# Patient Record
Sex: MALE | Race: WHITE | NOT HISPANIC OR LATINO | Employment: OTHER | URBAN - METROPOLITAN AREA
[De-identification: names, ages, dates, MRNs, and addresses within clinical notes are randomized per-mention and may not be internally consistent; named-entity substitution may affect disease eponyms.]

---

## 2017-06-29 ENCOUNTER — OFFICE VISIT (OUTPATIENT)
Dept: FAMILY MEDICINE | Facility: CLINIC | Age: 57
End: 2017-06-29

## 2017-06-29 VITALS
HEIGHT: 72 IN | RESPIRATION RATE: 16 BRPM | BODY MASS INDEX: 28.04 KG/M2 | WEIGHT: 207 LBS | OXYGEN SATURATION: 97 % | DIASTOLIC BLOOD PRESSURE: 95 MMHG | TEMPERATURE: 97.7 F | SYSTOLIC BLOOD PRESSURE: 178 MMHG | HEART RATE: 84 BPM

## 2017-06-29 DIAGNOSIS — I10 ESSENTIAL HYPERTENSION: ICD-10-CM

## 2017-06-29 DIAGNOSIS — E07.9 DISORDER OF THYROID: ICD-10-CM

## 2017-06-29 DIAGNOSIS — E11.9 DIABETES MELLITUS WITHOUT COMPLICATION (H): Primary | ICD-10-CM

## 2017-06-29 LAB
ANION GAP SERPL CALCULATED.3IONS-SCNC: 8 MMOL/L (ref 3–14)
BUN SERPL-MCNC: 18 MG/DL (ref 7–30)
CALCIUM SERPL-MCNC: 9.2 MG/DL (ref 8.5–10.1)
CHLORIDE SERPL-SCNC: 98 MMOL/L (ref 94–109)
CHOLEST SERPL-MCNC: 236 MG/DL
CO2 SERPL-SCNC: 27 MMOL/L (ref 20–32)
CREAT SERPL-MCNC: 0.97 MG/DL (ref 0.66–1.25)
CREAT UR-MCNC: 49 MG/DL
GFR SERPL CREATININE-BSD FRML MDRD: 79 ML/MIN/1.7M2
GLUCOSE SERPL-MCNC: 324 MG/DL (ref 70–99)
HBA1C MFR BLD: 10.7 % (ref 4.3–6)
HDLC SERPL-MCNC: 35 MG/DL
LDLC SERPL CALC-MCNC: ABNORMAL MG/DL
MICROALBUMIN UR-MCNC: 465 MG/L
MICROALBUMIN/CREAT UR: 956.79 MG/G CR (ref 0–17)
NONHDLC SERPL-MCNC: 201 MG/DL
POTASSIUM SERPL-SCNC: 4.1 MMOL/L (ref 3.4–5.3)
SODIUM SERPL-SCNC: 133 MMOL/L (ref 133–144)
TRIGL SERPL-MCNC: 541 MG/DL
TSH SERPL DL<=0.005 MIU/L-ACNC: 3.17 MU/L (ref 0.4–4)

## 2017-06-29 RX ORDER — GLIPIZIDE 10 MG/1
10 TABLET ORAL
Qty: 30 TABLET | Refills: 0 | Status: SHIPPED | OUTPATIENT
Start: 2017-06-29 | End: 2017-06-29

## 2017-06-29 RX ORDER — BLOOD-GLUCOSE METER
EACH MISCELLANEOUS
Qty: 1 KIT | Refills: 0 | Status: SHIPPED | OUTPATIENT
Start: 2017-06-29 | End: 2017-06-29

## 2017-06-29 RX ORDER — LANCETS
EACH MISCELLANEOUS
Qty: 102 EACH | Refills: 0 | Status: SHIPPED | OUTPATIENT
Start: 2017-06-29 | End: 2017-06-29

## 2017-06-29 RX ORDER — GLIPIZIDE 10 MG/1
10 TABLET ORAL
Qty: 60 TABLET | Refills: 0 | Status: SHIPPED | OUTPATIENT
Start: 2017-06-29 | End: 2017-07-27

## 2017-06-29 RX ORDER — LANCETS
EACH MISCELLANEOUS
Qty: 102 EACH | Refills: 0 | Status: SHIPPED | OUTPATIENT
Start: 2017-06-29 | End: 2019-07-29

## 2017-06-29 RX ORDER — LISINOPRIL 20 MG/1
20 TABLET ORAL DAILY
Qty: 30 TABLET | Refills: 0 | Status: SHIPPED | OUTPATIENT
Start: 2017-06-29 | End: 2017-07-27

## 2017-06-29 RX ORDER — BLOOD-GLUCOSE METER
EACH MISCELLANEOUS
Qty: 1 KIT | Refills: 0 | Status: SHIPPED | OUTPATIENT
Start: 2017-06-29 | End: 2019-07-29

## 2017-06-29 RX ORDER — LISINOPRIL 20 MG/1
20 TABLET ORAL DAILY
Qty: 30 TABLET | Refills: 0 | Status: SHIPPED | OUTPATIENT
Start: 2017-06-29 | End: 2017-06-29

## 2017-06-29 ASSESSMENT — PAIN SCALES - GENERAL: PAINLEVEL: NO PAIN (0)

## 2017-06-29 NOTE — Clinical Note
I have completed my note, please review, add tie in statement and close encounter. Any feedback you have would be appreciated! Thank you for all of your help.  Thanks!   Carlos [email or web address]

## 2017-06-29 NOTE — MR AVS SNAPSHOT
After Visit Summary   2017    Nabil Cheung    MRN: [id number]           Patient Information     Date Of Birth          1960        Visit Information        Provider Department      2017 7:00 PM Clinician, Tomi Claudio [facility]        Today's Diagnoses     Diabetes mellitus without complication (H)    -  1    Essential hypertension        Disorder of thyroid           Follow-ups after your visit        Who to contact     Please call your clinic at [phone] to:    Ask questions about your health    Make or cancel appointments    Discuss your medicines    Learn about your test results    Speak to your doctor   If you have compliments or concerns about an experience at your clinic, or if you wish to file a complaint, please contact [facility] Patient Relations at [phone] or email us at [email or web address]         Additional Information About Your Visit        MyChart Information     The Simple is an electronic gateway that provides easy, online access to your medical records. With The Simple, you can request a clinic appointment, read your test results, renew a prescription or communicate with your care team.     To sign up for Rank By Searcht visit the website at [email or web address]   You will be asked to enter the access code listed below, as well as some personal information. Please follow the directions to create your username and password.     Your access code is: [email or web address]  Expires: 2017  8:25 PM     Your access code will  in 90 days. If you need help or a new code, please contact your [facility] or call [phone] for assistance.        Care EveryWhere ID     This is your Care EveryWhere ID. This could be used by other organizations to access your [place] medical records  [id number]        Your Vitals Were     Pulse Temperature Respirations Height Pulse Oximetry BMI (Body Mass  Index)    84 97.7  F (36.5  C) (Oral) 16 6' (182.9 cm) 97% 28.07 kg/m2       Blood Pressure from Last 3 Encounters:   06/29/17 (!) 178/95    Weight from Last 3 Encounters:   06/29/17 207 lb (93.9 kg)              We Performed the Following     Albumin Random Urine Quantitative     Basic metabolic panel     Hemoglobin A1c     Lipid panel reflex to direct LDL     TSH with free T4 reflex        Primary Care Provider    None Specified       No primary provider on file.        Equal Access to Services     [facility] : Hadii aad ku hadasho Soomaali, waaxda luqadaha, qaybta kaalmada adeegyada, waxsuresh wray . So [facility] [phone].    ATENCIÓN: Si habla español, tiene a delaney disposición servicios gratuitos de asistencia lingüística. Llame al [phone].    We comply with applicable federal civil rights laws and [place] laws. We do not discriminate on the basis of race, color, national origin, age, disability sex, sexual orientation or gender identity.            Thank you!     Thank you for choosing [facility]  for your care. Our goal is always to provide you with excellent care. Hearing back from our patients is one way we can continue to improve our services. Please take a few minutes to complete the written survey that you may receive in the mail after your visit with us. Thank you!             Your Updated Medication List - Protect others around you: Learn how to safely use, store and throw away your medicines at www.disposemymeds.org.      Notice  As of 6/29/2017  8:27 PM    You have not been prescribed any medications.

## 2017-06-30 LAB — LDLC SERPL DIRECT ASSAY-MCNC: 132 MG/DL

## 2017-06-30 NOTE — NURSING NOTE
Patient reports to [facility] as a first time patient. ODALSI. He lost his insurance about 14 months ago and has now run out of his medications for diabetes and hypertension. Patient brought old medications bottles with to the visit. He was on 1000mg Metformin, 10mg Glipizide, and 10mg Lisinopril. Patient states that he has run out of all three medications within the past few weeks to month. BP was 178/95. Patient states that he checked his blood pressure a few weeks ago and it was 175/90. While on 10 mg Lisinopril, patient states that his blood pressure was down to 155/85. He drinks 2-3 diet cokes per day and states he has no symptoms related to his diabetes or hypertension. Denies pain and PHQ-2 negative. Information has been provided on social work and patient spoke with CHW at the  for insurance information. SN Alfonzo

## 2017-06-30 NOTE — PROGRESS NOTES
[facility] Progress Note    Chief complaint: Diabetes and Hypertension Medications    Subjective:  Condition 1: Diabetes  Condition 2: Hypertension     Patient CLAUDIO presents to the [facility] after running out of his diabetes and hypertension medications 1-2 weeks ago depending on the medication. Patient CLAUDIO has currently been without insurance for over 12 months but is hopeful to get insurance within the next 2 months. When taking diabetes meds blood glucose levels ranged from 180-200 (patient took readings in the morning before meals). Last A1C in March 2016 was 8.1. When taking his lisinopril 10 mg, patient had a BP around 155/85. Patient normally does a fairly good job of walking /exercising during the summer.      Objective:   BP (!) 178/95 (BP Location: Right arm, Patient Position: Sitting, Cuff Size: Adult Regular)  Pulse 84  Temp 97.7  F (36.5  C) (Oral)  Resp 16  Ht 6' (182.9 cm)  Wt 207 lb (93.9 kg)  SpO2 97%  BMI 28.07 kg/m2    Assessment:     Condition 1- Diabetes  DTP: None  Rationale: Patient should continue to take his Metformin 1000mg 2 times daily and glipizide 10 mg 2 times daily. CLAUDIO was currently without his medication for roughly 1 week because of insurance issues but should continue therapy as before. Patient also ran out of test strips and lancets for his OneTouch glucometer, but the only supplies [facility] has is AccuCheck so a new meter was also dispensed.    Condition 2- Hypertension  DTP: Dosage Too Low: dose too low   Rationale: When taking current lisinopril 10 mg dose, patient had a blood pressure level around 155/85, which was slightly high.. Patient indicated a desire to increase strength of medication to lower his BP. Lisinopril 10 mg dose is not the max dose for this medication, so lisinopril 20 mg is an appropriate dose to change to for this patient.    Plan:  1. Continue to take metformin 1000mg twice daily and glipizide twice daily BID for diabetes and use AccuCheck glucometer and  supplies to check blood sugars daily, or as directed by PCP.  2. Begin taking lisinopril 20mg once daily for hypertension.  3. Continue to walk/exercise regularly for general health and wellness related to diabetes.    Pharmacy Follow-Up Plan (Method, Date, Parameters): Phone call on 7/6/17 should be initiated. Evaluate effectiveness by asking patient if they have taken their BP since switichng to lisinopril 20 mg and determine if blood pressure is lower than 155/85. Evaluate safety by assessing patient for low blood pressure symptoms such as fatigue and lightheadedness. Ask patient if new glucometer is easy to use and how frequently they are measuring their blood-glucose levels.    PharmCare Clinician: Carlos Bashir  Pharmacy Preceptor: Jojo Ross PharmD, INDER    _____________________________  Preceptor Use Only:  In supervising the student, I have reviewed and verified the student's documentation and found it to be correct and complete.   Preceptor Signature: Jojo Ross PharmD, INDER

## 2017-06-30 NOTE — PROGRESS NOTES
MEDICINE NOTE    SUBJECTIVE:  Mr. Cheung is a 57 y.o. male with a history of diabetes and hypertension who presents to clinic in need of a refill of his medications to manage his chronic conditions.  He has been seen regularly by a PCP until around a year ago when he was kicked off his insurance.  He is confident that he will have Medicaid within a month but has been out of medications for 1-2 weeks and would like a refill in the meantime.  He has no acute complaints. He was originally diagnosed with diabetes 13 years ago and has been on Metformin since.  1.5 years ago he began taking Glipizide as well.  He reports that he normally has a glucose level of 200 when measured in the morning while taking his medications.  He has not checked since running out of his medications.  The only associated symptoms reported are fatigue when walking and slight neuropathy in both lower extremities bilaterally.  His last HbA1c was March of 2016 and it was 8.1.     Regarding his hypertension, he generally is in the range of 150/85 when taking his lisinopril.  He has been on this medication for 1.5-2 years. He has a chronic cough that he attributes to his acid reflux and allergies.  He takes tums 7x per week to treat this symptom.  He also noted that he has a benign thyroid tumor, but denies difficulty swallowing.  He said that his PCP was monitoring this and expressed interest in lab tests tonight to check TSH.      REVIEW OF SYSTEMS:  Gen: no fevers, notes fatigue with exertion   Eyes: no vision change, cloudy vision  Cardiac: chest pain associated with musculoskeletal pain, no palpitations since college  Lungs: no dyspnea, cough, or shortness of breath  : no change in urine  Musculoskeletal: no joint or muscle pain or swelling  Skin: no concerning lesions or moles  Neuro: no loss of strength, slight numbness and loss of sensation bilaterally in the lower extremities near the 1st digit  Endo: no polyuria  Allergy: pollen,  feathers, dust  Psych: no depression or anxiety    No past medical history on file.    No past surgical history on file.    No family history on file.    Social History     Social History     Marital status: Single     Spouse name: N/A     Number of children: N/A     Years of education: N/A     Occupational History     Not on file.     Social History Main Topics     Smoking status: Never Smoker     Smokeless tobacco: Not on file     Alcohol use No     Drug use: No     Sexual activity: Not on file     Other Topics Concern     Not on file     Social History Narrative    6/29/17 - Pt was interested in getting help filling out [employer] application online to receive medicaid. CHW gave him the list of information he needed to complete online application. He stated he had a tax extension for 2016 so he did not have tax forms that the application required. He was going to work on the application with tax information from 2015 later tonight. AM           OBJECTIVE:  Physical Exam:  BP (!) 178/95 (BP Location: Right arm, Patient Position: Sitting, Cuff Size: Adult Regular)  Pulse 84  Temp 97.7  F (36.5  C) (Oral)  Resp 16  Ht 6' (182.9 cm)  Wt 207 lb (93.9 kg)  SpO2 97%  BMI 28.07 kg/m2  Constitutional: no distress, comfortable, pleasant   Ears, Nose and Throat:  no thyromegaly appreciated  Cardiovascular: regular rate and rhythm, normal S1 and S2, no murmurs, rubs or gallops, peripheral pulses full and symmetric   Respiratory: clear to auscultation, no wheezes or crackles, normal breath sounds   Musculoskeletal: full range of motion, slight edema noted in left LE  Skin: no concerning lesions, no jaundice   Neurological: cranial nerves intact, normal strength and sensation  Psychological: appropriate mood     ASSESSMENT/PLAN:  Nabil was seen today for medication problem.    Diagnoses and all orders for this visit:    Diabetes mellitus without complication (H)  -     Hemoglobin A1c  -     Basic metabolic panel  -     Lipid  panel reflex to direct LDL  -     Albumin Random Urine Quantitative  -     Discontinue: metFORMIN (GLUCOPHAGE) 1000 MG tablet; Take 1 tablet (1,000 mg) by mouth 2 times daily (with meals)  -     Discontinue: glipiZIDE (GLUCOTROL) 10 MG tablet; Take 1 tablet (10 mg) by mouth 2 times daily (before meals)  -     Discontinue: blood glucose monitoring (ACCU-CHEK SHERRY) test strip; Use to test blood sugar once daily or as directed.  -     Discontinue: blood glucose monitoring (ACCU-CHEK SHERRY PLUS) meter device kit; Use to test blood sugar once daily or as directed.  -     metFORMIN (GLUCOPHAGE) 1000 MG tablet; Take 1 tablet (1,000 mg) by mouth 2 times daily (with meals)  -     glipiZIDE (GLUCOTROL) 10 MG tablet; Take 1 tablet (10 mg) by mouth 2 times daily (before meals)  -     blood glucose monitoring (ACCU-CHEK SHERRY) test strip; Use to test blood sugar once daily or as directed.  -     blood glucose monitoring (ACCU-CHEK SHERRY PLUS) meter device kit; Use to test blood sugar once daily or as directed.    Essential hypertension  -     Discontinue: lisinopril (PRINIVIL/ZESTRIL) 20 MG tablet; Take 1 tablet (20 mg) by mouth daily  -     Discontinue: blood glucose monitoring (ACCU-CHEK FASTCLIX) lancets; Use to test blood sugar once daily or as directed.  -     lisinopril (PRINIVIL/ZESTRIL) 20 MG tablet; Take 1 tablet (20 mg) by mouth daily  -     blood glucose monitoring (ACCU-CHEK FASTCLIX) lancets; Use to test blood sugar once daily or as directed.    Disorder of thyroid  -     TSH with free T4 reflex    Other orders  -     Cancel: UA without Microscopic  -     Cancel: Cholesterol    1. Diabetes: Mr. Cheung's diabetes has been controled on Metformin and Glipizide. Reported glucose levels are slightly high around 200, but we will continue this medication on the same dose that he was previously taking (1000 mg Metformin and 10mg Glipizide).  Ordered albumin urine test and BMP to check kidney function.  Also, ordered lipid  panel to check cholesterol levels.     2. Hypertension: Systolic blood pressure has remained slightly elevated on low dose lisinopril, so we will increase dose to 20 mg daily.  Follow-up in clinic or with PCP on next visit to re-check blood pressure and make adjustments to dosage as needed.      3. Benign thyroid tumor: No palpable thyroid masses on exam.  Ordered a thyroid cascade to check thyroid function and rule out any possible complications.        Med Clinician: John Lauren, MS2  Preceptor: Stephanie Reed MD    In supervising the Medical student, I repeated the exam documented above.  I have reviewed and verified the student s documentation.  Supervising Provider: Stephanie Reed MD

## 2017-07-03 ENCOUNTER — TELEPHONE (OUTPATIENT)
Dept: FAMILY MEDICINE | Facility: CLINIC | Age: 57
End: 2017-07-03

## 2017-07-06 ENCOUNTER — TELEPHONE (OUTPATIENT)
Dept: FAMILY MEDICINE | Facility: CLINIC | Age: 57
End: 2017-07-06

## 2017-07-06 NOTE — TELEPHONE ENCOUNTER
----- Message from Carlos Bashir sent at 2017  4:53 PM CDT -----  Regardin17 ENGLISH  Phone call on 17 should be initiated. Evaluate effectiveness by asking patient if they have taken their BP since switichng to lisinopril 20 mg and determine if blood pressure is lower than 155/85. Evaluate safety by assessing patient for low blood pressure symptoms such as fatigue and lightheadedness. Ask patient if new glucometer is easy to use and how frequently they are measuring their blood-glucose levels.

## 2017-07-07 NOTE — TELEPHONE ENCOUNTER
Patient denies any side effects to lisinopril such as fatigue and lightheadedness. He has not checked his blood pressure within the past week since his last visit to [facility] because he has been away from home. He states that he has been feeling fine so far. Patient has been measuring his blood glucose a couple of times in the morning within the past week. They range around 200-300 mg/dl. Patient is currently taking metformin 1000 mg BID and glipizide 10 mg BID for his diabetes. Patient is aware of the need to monitor his blood pressure at home, as well as taking his diabetic medications.

## 2017-07-11 NOTE — TELEPHONE ENCOUNTER
"Pt was concerned with albumin/creatinine ratio lab result (received call about lab results a few days ago). Explained to pt that his GFR and creatinine do not indicate kidney failure but the high albumin/creatinine ratio could indicate potential future kidney failure. Also explained the benefits of ACE inhibitor in protecting kidney function. Regarding dose increase of lisinopril, pt reports no dizziness, lightheadedness or fatigue. Hasn't started using new glucometer yet because he still has his old one. Says his blood pressure and blood glucose levels have been \"good,\" didn't give exact values.   ===================    Pharmacy Attestation Statement:    Patient s case reviewed. I agree with the written assessment and plan of care.    Bry Dubose, PharmD.    ===================    "

## 2017-07-11 NOTE — TELEPHONE ENCOUNTER
----- Message from Carlso Bashir sent at 2017  4:53 PM CDT -----  Regardin17 ENGLISH  Phone call on 17 should be initiated. Evaluate effectiveness by asking patient if they have taken their BP since switichng to lisinopril 20 mg and determine if blood pressure is lower than 155/85. Evaluate safety by assessing patient for low blood pressure symptoms such as fatigue and lightheadedness. Ask patient if new glucometer is easy to use and how frequently they are measuring their blood-glucose levels.

## 2017-07-27 ENCOUNTER — OFFICE VISIT (OUTPATIENT)
Dept: FAMILY MEDICINE | Facility: CLINIC | Age: 57
End: 2017-07-27

## 2017-07-27 VITALS
HEART RATE: 70 BPM | HEIGHT: 72 IN | DIASTOLIC BLOOD PRESSURE: 82 MMHG | WEIGHT: 209.4 LBS | BODY MASS INDEX: 28.36 KG/M2 | OXYGEN SATURATION: 98 % | SYSTOLIC BLOOD PRESSURE: 168 MMHG | TEMPERATURE: 97.3 F | RESPIRATION RATE: 16 BRPM

## 2017-07-27 DIAGNOSIS — E11.9 DIABETES MELLITUS WITHOUT COMPLICATION (H): ICD-10-CM

## 2017-07-27 DIAGNOSIS — I10 ESSENTIAL HYPERTENSION: ICD-10-CM

## 2017-07-27 RX ORDER — ASPIRIN 81 MG/1
81 TABLET, CHEWABLE ORAL DAILY
Qty: 30 TABLET | Refills: 0 | Status: SHIPPED | OUTPATIENT
Start: 2017-07-27 | End: 2017-08-26

## 2017-07-27 RX ORDER — GLIPIZIDE 10 MG/1
10 TABLET ORAL
Qty: 60 TABLET | Refills: 0 | Status: SHIPPED | OUTPATIENT
Start: 2017-07-27 | End: 2017-08-28

## 2017-07-27 RX ORDER — LISINOPRIL 20 MG/1
40 TABLET ORAL DAILY
Qty: 60 TABLET | Refills: 0 | Status: SHIPPED | OUTPATIENT
Start: 2017-07-27 | End: 2017-08-28

## 2017-07-27 NOTE — Clinical Note
I have completed my note, please review, add tie in statement and close encounter.   Thanks!   Lorenzo [email or web address]

## 2017-07-27 NOTE — MR AVS SNAPSHOT
After Visit Summary   2017    Nabil Cheung    MRN: [id number]           Patient Information     Date Of Birth          1960        Visit Information        Provider Department      2017 6:45 PM Clinician, Tomi Claudio [facility]        Today's Diagnoses     Diabetes mellitus without complication (H)        Essential hypertension           Follow-ups after your visit        Who to contact     Please call your clinic at [phone] to:    Ask questions about your health    Make or cancel appointments    Discuss your medicines    Learn about your test results    Speak to your doctor   If you have compliments or concerns about an experience at your clinic, or if you wish to file a complaint, please contact [facility] Patient Relations at [phone] or email us at [email or web address]         Additional Information About Your Visit        MyChart Information     Neptune.io is an electronic gateway that provides easy, online access to your medical records. With Neptune.io, you can request a clinic appointment, read your test results, renew a prescription or communicate with your care team.     To sign up for Letaot visit the website at [email or web address]   You will be asked to enter the access code listed below, as well as some personal information. Please follow the directions to create your username and password.     Your access code is: [email or web address]  Expires: 2017  8:25 PM     Your access code will  in 90 days. If you need help or a new code, please contact your [facility] or call [phone] for assistance.        Care EveryWhere ID     This is your Care EveryWhere ID. This could be used by other organizations to access your [place] medical records  [id number]        Your Vitals Were     Pulse Temperature Respirations Height Pulse Oximetry BMI (Body Mass Index)    70 97.3  F (36.3  C)  "(Oral) 16 5' 11.75\" (182.2 cm) 98% 28.6 kg/m2       Blood Pressure from Last 3 Encounters:   07/27/17 168/82   06/29/17 (!) 178/95    Weight from Last 3 Encounters:   07/27/17 209 lb 6.4 oz (95 kg)   06/29/17 207 lb (93.9 kg)              Today, you had the following     No orders found for display         Today's Medication Changes          These changes are accurate as of: 7/27/17 11:59 PM.  If you have any questions, ask your nurse or doctor.               Start taking these medicines.        Dose/Directions    aspirin 81 MG chewable tablet   Used for:  Diabetes mellitus without complication (H)   Started by:  ClinicianTomi Ump        Dose:  81 mg   Take 1 tablet (81 mg) by mouth daily   Quantity:  30 tablet   Refills:  0         These medicines have changed or have updated prescriptions.        Dose/Directions    lisinopril 20 MG tablet   Commonly known as:  PRINIVIL/ZESTRIL   This may have changed:  how much to take   Used for:  Essential hypertension   Changed by:  ClinicianTomi Ump        Dose:  40 mg   Take 2 tablets (40 mg) by mouth daily   Quantity:  60 tablet   Refills:  0            Where to get your medicines      Some of these will need a paper prescription and others can be bought over the counter.  Ask your nurse if you have questions.     Bring a paper prescription for each of these medications     aspirin 81 MG chewable tablet    glipiZIDE 10 MG tablet    lisinopril 20 MG tablet    metFORMIN 1000 MG tablet                Primary Care Provider    None Specified       No primary provider on file.        Equal Access to Services     [facility]: Shanae Thibodeaux, wadaronda luraj, qaybta kaalmada omari, niels wray . So [facility] [phone].    ATENCIÓN: Si habla español, tiene a delaney disposición servicios gratuitos de asistencia lingüística. Llame al [phone].    We comply with applicable federal civil rights laws and [place] laws. We do not " discriminate on the basis of race, color, national origin, age, disability sex, sexual orientation or gender identity.            Thank you!     Thank you for choosing [facility]  for your care. Our goal is always to provide you with excellent care. Hearing back from our patients is one way we can continue to improve our services. Please take a few minutes to complete the written survey that you may receive in the mail after your visit with us. Thank you!             Your Updated Medication List - Protect others around you: Learn how to safely use, store and throw away your medicines at www.disposemymeds.org.          This list is accurate as of: 7/27/17 11:59 PM.  Always use your most recent med list.                   Brand Name Dispense Instructions for use Diagnosis    aspirin 81 MG chewable tablet     30 tablet    Take 1 tablet (81 mg) by mouth daily    Diabetes mellitus without complication (H)       blood glucose monitoring lancets     102 each    Use to test blood sugar once daily or as directed.    Essential hypertension       blood glucose monitoring meter device kit     1 kit    Use to test blood sugar once daily or as directed.    Diabetes mellitus without complication (H)       blood glucose monitoring test strip    ACCU-CHEK SHERRY    50 strip    Use to test blood sugar once daily or as directed.    Diabetes mellitus without complication (H)       glipiZIDE 10 MG tablet    GLUCOTROL    60 tablet    Take 1 tablet (10 mg) by mouth 2 times daily (before meals)    Diabetes mellitus without complication (H)       lisinopril 20 MG tablet    PRINIVIL/ZESTRIL    60 tablet    Take 2 tablets (40 mg) by mouth daily    Essential hypertension       metFORMIN 1000 MG tablet    GLUCOPHAGE    60 tablet    Take 1 tablet (1,000 mg) by mouth 2 times daily (with meals)    Diabetes mellitus without complication (H)

## 2017-07-28 NOTE — NURSING NOTE
Patient Education Outtake Form      DOMAIN (choose one): ENV     PSYCHSOC     PHYSIO     HEALTH-RELATED  PROBLEM 1: Income    Problem Specific Details:      Other data/Risk factors :     MODIFIER 1 (choose one):  Actual   Potential          Health Promotion Potential  MODIFIER 2 (choose one): Individual Family  Community     Impairment: Wants to exercise but cannot afford a gym    Problem Rating:   Knowledge: 4    Behavior: 3    Status: 3    Intervention Scheme:  Teaching, Guidance, and Counseling: community outreach - connected him with CHW to look for gyms that he may be able to get for a lower rate within price range.     Treatments and Procedures:     Surveillance:       DOMAIN (choose one): ENV     PSYCHSOC     PHYSIO     HEALTH-RELATED  PROBLEM 2: Neighborhood safety    Problem Specific Details: Patient wants to exercise, but recently moved from a place with a safe path to walk on and no longer has access/feels safe walking in neighborhood alone.       Other data/Risk factors :     MODIFIER 1 (choose one):  Actual   Potential          Health Promotion Potential  MODIFIER 2 (choose one): Individual Family  Community     Impairment: Unable to access safe place (without cars etc) to walk for exercise when alone.     Problem Rating:   Knowledge: 4    Behavior: 3    Status:3    Intervention Scheme:  Teaching, Guidance, and Counseling: Connected him with CHW to look for gyms in his area    Treatments and Procedures:     Surveillance:     DOMAIN (choose one): ENV     PSYCHSOC     PHYSIO     HEALTH-RELATED  PROBLEM 3: Physical activity    Problem Specific Details:      Other data/Risk factors :     MODIFIER 1 (choose one):  Actual   Potential          Health Promotion Potential  MODIFIER 2 (choose one): Individual Family  Community     Impairment: Has high blood pressure that he has previously managed with a mixture of exercise and medication. No longer exercising due to changes in environment.     Problem Rating:    Knowledge: 4    Behavior: 3    Status: 3    Intervention Scheme:  Teaching, Guidance, and Counseling: Talked about importance of exercise and connected him with CHW for information about gyms he could access.     Treatments and Procedures:     Surveillance:

## 2017-08-03 ENCOUNTER — TELEPHONE (OUTPATIENT)
Dept: FAMILY MEDICINE | Facility: CLINIC | Age: 57
End: 2017-08-03

## 2017-08-03 NOTE — TELEPHONE ENCOUNTER
Contacted RG on the phone. RG reported that lisinopril was working well; BP down to 145/85 this morning with no cough, hypotension, or other side effects with the exception of fatigue (although patient indicated this may be due to recent strenuous physical activity). RG reported that metformin was continuing to work well (no change in strength from last pharm care visit), with no side effects reported (diarrhea, constipation, nausea). RG reported that glipizide was continuing to work (no change in strength from last pharm care visit), with no side effects reported (hypoglycemia). RG reported that his blood glucose continues to be high (did not have specific values on hand).   RG reported that he may skip some meals and is not regularly taking medication 30 minutes before meals; reminded to skip dose if skipping meals.   Patient was confused why he was restarted on aspirin; RG reported on last visit aspirin was not discussed. RG reported he is not taking aspirin and does not plan to take aspirin but pledged to keep his dose on hand.

## 2017-08-03 NOTE — PROGRESS NOTES
[facility] Pharm[facility] Progress Note    Chief complaint: Patient presents to the clinic to day to refill his medications with hypertension and diabetes.    Subjective:    CLAUDIO is returning to get his medications refilled. He notes that he has no known drug allergies. He typically drinks about 1 cup of coffee 3 times a week or he'll drink diet coke on the days he's not drinking coffee. Patient likes to maintain moderate alcohol consumption as he notes that it is healthy for someone his size based on studies he had come across. He reports to drinking about 1-2 alcoholic beverages per day, about twice a week.  Condition 1: Hypertension-  His blood pressure readings today are 168/82 mmHg on the first reading and 158/68 mmHg on the second reading. On the previous visit, his lisinopril dose was changed from 10mg to 20mg, and he has been taking the lisinopril 20mg for about 3 weeks now. Patient is a moderate historian with his medications and notes that it was typical for him to have high blood pressure.     Condition 2: Diabetes- RG notes that he had been diagnosed with diabetes for about 10 years now, and is confident in his ability to manage his condition. He reports that his metformin and glipizide has been working and he has not had any side effects with them. He reports that due to his partner's recent hip surgery, he has not been exercising as often has he used to because he would typically go on long walks with his partner. RG typically checks his blood sugar level about once per week. CLAUDIO is not willing to start any new diabetes treatment at this time.     Condition 3: Dyslipidemia- CLAUDIO's 10 year ASCVD risk score is 34.6%. He reports that he had tried simvastatin in the past, but had side effects such as back pain and muscle spasms. He is hesitant to try new medications at this time because of his past reaction.     Current Outpatient Prescriptions   Medication Sig Dispense Refill     metFORMIN (GLUCOPHAGE) 1000 MG tablet Take  "1 tablet (1,000 mg) by mouth 2 times daily (with meals) 60 tablet 0     glipiZIDE (GLUCOTROL) 10 MG tablet Take 1 tablet (10 mg) by mouth 2 times daily (before meals) 60 tablet 0     lisinopril (PRINIVIL/ZESTRIL) 20 MG tablet Take 2 tablets (40 mg) by mouth daily 60 tablet 0     aspirin 81 MG chewable tablet Take 1 tablet (81 mg) by mouth daily 30 tablet 0     blood glucose monitoring (ACCU-CHEK FASTCLIX) lancets Use to test blood sugar once daily or as directed. 102 each 0     blood glucose monitoring (ACCU-CHEK SHERRY) test strip Use to test blood sugar once daily or as directed. 50 strip 0     blood glucose monitoring (ACCU-CHEK SHERRY PLUS) meter device kit Use to test blood sugar once daily or as directed. 1 kit 0         Objective:   /82 (BP Location: Left arm)  Pulse 70  Temp 97.3  F (36.3  C) (Oral)  Resp 16  Ht 5' 11.75\" (182.2 cm)  Wt 209 lb 6.4 oz (95 kg)  SpO2 98%  BMI 28.6 kg/m2    Assessment:     Condition 1- Hypertension  DTP: Effectiveness: dose too low   Rationale: CLAUDIO's current dose of lisinopril 20mg is not effective in treating his blood pressure and requires an increase in dose. Current options to treat CLAUDIO's condition include increasing lisinopril to 40mg or adding another agent such as a calcium channel blocker to help reduce blood pressure. Because patient is compliant with current therapy, increasing the lisinopril dose to 40mg may be the best option in reducing blood pressure levels and protecting his kidneys in the long run.     Condition 2- Diabetes  DTP: Effectiveness- different drug needed  Rationale: CLAUDIO's current medications of metformin 1000mg twice daily with meals and Glipizide 10 mg twice daily with meals is helping reduce the progression of diabetes. However, CLAUDIO's A1c level of 10.7 indicates that CLAUDIO needs additional drug therapy to manage his disease condition. Patient reports that he has not had any sides of neuropathy or episodes of hypoglycemia with his condition. " Additional options include adding a long acting insulin such as insulin glargine or adding a short acting insulin. Patient is resistant towards the thought of using insulin, and would prefer not to use it.     Condition 3- Dyslipidemia  DTP: Needs Additional Therapy: preventative therapy   Rationale: CLAUDIO's current ASCVD risk of 34% puts him at greater risk of experiencing a cardiac event. Due to this risk score, CLAUDIO's cardivascular health requires additional drug therapy to help prevent potential cardiac events. Options to help prevent long term cardiac events include statins and aspirin. CLAUDIO reports that due to his past reaction of muscle spasms and back pain to simvastatin, he is hesitant to try another statin at this time. CLAUDIO is willing to use aspirin as a long term preventative measure.       Plan:  1. Continue Metformin 1000mg. Take 1 tablet by mouth twice daily.   2. Continue Glipizide 10mg. Take 1 tablet by mouth twice daily with meals.   3. Increase lisinopril 20mg to lisinopril 40mg. Take 2 tablets (40mg) by mouth once daily.   4. Initiate aspirin 81mg. Take 1 tablet by mouth once daily.     Pharmacy Follow-Up Plan (Method, Date, Parameters):  Follow up with patient in one week (08/03/2017) by phone to discuss potential side effects and effectiveness of medications. Follow up with patient in clinic in one month (08/24/2017) to discuss safety and efficacy of medications.   Lisinopril: unbearable cough, hypotension  Metformin: diarrhea, constipation, nausea  Glipizide: hypoglycemia (shaking, anxiety, increase irritableness). Verify if patient is taking medication 30 minutes before meals, and identify if patient is skipping meals. Educate them to skip the dose if they skip meals.   Aspirin: abnormal bleeding, dizziness, headache.    PharmCare Clinician: Lorenzo Mayen  Pharmacy Preceptor: Patricia Bernstein    _____________________________  Preceptor Use Only:  In supervising the student, I have reviewed and verified the  student's documentation and found it to be correct and complete.   Preceptor Signature: ***

## 2017-08-03 NOTE — TELEPHONE ENCOUNTER
----- Message from Lorenzo Mayen sent at 8/3/2017  2:50 PM CDT -----  Regarding: FW: 08/07/2017 ENGLISH  Contact: [phone]      ----- Message -----     From: Lorenzo Mayen     Sent: 8/3/2017   2:19 PM       To: [facility]  Subject: 08/07/2017 ENGLISH                               CLAUDIO presented to the clinic today to refill his medications of lisinopril, metformin, and glipizide. His calculated ASCVD risk score was greater than 10%, and was given aspirin for a preventative for cardiac events. RG's lisinopril dose was changed from 20mg to 40mg to treat his high blood pressure of 168/82 mmHg from his last reading. Please talk to CLAUDIO about safety and effectiveness in regards to the following meds:  Lisinopril 40mg: unbearable cough, hypotension. Inform patient that this will help protect his kidneys due to his increase microalbumin.  Metformin 1000mg: diarrhea, constipation, nausea  Glipizide 10mg: hypoglycemia (shaking, anxiety, increase irritableness). Verify if patient is taking medication 30 minutes before meals, and identify if patient is skipping meals. Educate them to skip the dose if they skip meals.   Aspirin 81mg: abnormal bleeding, dizziness, headache.

## 2017-08-09 NOTE — PROGRESS NOTES
[facility] Pharmacy Progress Note    Chief complaint: Patient presents to the clinic to day to refill his medications with hypertension and diabetes.    Subjective:    RG is returning to get his medications refilled. He notes that he has no known drug allergies. He typically drinks about 1 cup of coffee 3 times a week or he'll drink diet coke on the days he's not drinking coffee. Patient likes to maintain moderate alcohol consumption as he notes that it is healthy for someone his size based on studies he had come across. He reports to drinking about 1-2 alcoholic beverages per day, about twice a week.    Condition 1: Hypertension-  His blood pressure readings today are 168/82 mmHg on the first reading and 158/68 mmHg on the second reading. On the previous visit, his lisinopril dose was changed from 10mg to 20mg, and he has been taking the lisinopril 20mg for about 3 weeks now. Patient is a moderate historian with his medications and notes that it was typical for him to have high blood pressure. Patient is hesitant to make any changes to antihypertensive medications because he is concerned that his mild dry cough will get worse (cough started before lisinopril was initiated).     Condition 2: Diabetes- RG notes that he had been diagnosed with diabetes for about 10 years now, and is confident in his ability to manage his condition. He reports that his metformin and glipizide have been working and he has not had any side effects with them. He reports that due to his partner's recent hip surgery, he has not been exercising as often has he used to because he would typically go on long walks with his partner. RG typically checks his blood sugar level about once per week and they're often in the 190's mg/dL or higher post-prandial. RG is not willing to start any new diabetes treatments at this time.      Condition 3: Dyslipidemia- He is hesitant to try new medications at this time because of his past reaction to simvastatin - he  "reports that he had side effects such as back pain and muscle spasms. These muscle pains went away when the medication was discontinued.    Current Outpatient Prescriptions   Medication Sig Dispense Refill     metFORMIN (GLUCOPHAGE) 1000 MG tablet Take 1 tablet (1,000 mg) by mouth 2 times daily (with meals) 60 tablet 0     glipiZIDE (GLUCOTROL) 10 MG tablet Take 1 tablet (10 mg) by mouth 2 times daily (before meals) 60 tablet 0     lisinopril (PRINIVIL/ZESTRIL) 20 MG tablet Take 2 tablets (40 mg) by mouth daily 60 tablet 0     aspirin 81 MG chewable tablet Take 1 tablet (81 mg) by mouth daily 30 tablet 0     blood glucose monitoring (ACCU-CHEK FASTCLIX) lancets Use to test blood sugar once daily or as directed. 102 each 0     blood glucose monitoring (ACCU-CHEK SHERRY) test strip Use to test blood sugar once daily or as directed. 50 strip 0     blood glucose monitoring (ACCU-CHEK SHERRY PLUS) meter device kit Use to test blood sugar once daily or as directed. 1 kit 0         Objective:   /82 (BP Location: Left arm)  Pulse 70  Temp 97.3  F (36.3  C) (Oral)  Resp 16  Ht 5' 11.75\" (182.2 cm)  Wt 209 lb 6.4 oz (95 kg)  SpO2 98%  BMI 28.6 kg/m2    Assessment:     Condition 1- Hypertension  DTP: Effectiveness: dose too low   Rationale: CLAUDIO's current dose of lisinopril 20mg is not effective in treating his blood pressure and requires an increase in dose. Current options to treat CLAUDIO's condition include increasing lisinopril to 40mg or adding another agent such as a calcium channel blocker to help reduce blood pressure. CLAUDIO's previous microalbumin level of 956 mg/g puts him at risk for MISA or CKD. Because patient is compliant with current therapy, increasing the lisinopril dose to 40mg may be the best option in reducing blood pressure levels and protecting his kidneys in the long run.     Condition 2- Diabetes  DTP: Effectiveness- different drug needed  Rationale: CLAUDIO's current medications of metformin 1000mg twice " daily with meals and Glipizide 10 mg twice daily with meals is helping reduce the progression of diabetes. However, CLAUDIO's A1c level of 10.7 indicates that CLAUDIO needs additional drug therapy to manage his disease condition. Patient reports that he has not had any sides of neuropathy or episodes of hypoglycemia with his condition. Additional options include adding a long acting insulin such as insulin glargine or adding a short acting insulin. Based on the American Diabetes Association guidelines, clinical recommendation would be to initiate long-acting insulin daily and short-acting insulin pre-prandial. After a long discussion with the patient about insulin therapy, patient refused to start insulin or another medication and is going to make significant lifestyle changes.    Condition 3- Dyslipidemia  DTP: Needs Additional Therapy: preventative therapy   Rationale: CLAUDIO's current ASCVD risk of 34% puts him at greater risk of experiencing a cardiac event. CLAUDIO's 10 year ASCVD risk score is 34.6%. Due to this risk score, CLAUDIO's cardivascular health requires additional drug therapy to help prevent potential cardiac events. Options to help prevent long term cardiac events include statins and aspirin. CLAUDIO reports that due to his past reaction of muscle spasms and back pain to simvastatin, he refused to try another statin at this time. CLAUDIO is willing to use aspirin as a long term preventative measure.       Plan:  1. Continue Metformin 1000mg. Take 1 tablet by mouth twice daily.   2. Continue Glipizide 10mg. Take 1 tablet by mouth twice daily with meals.   3. Increase lisinopril 20mg to lisinopril 40mg. Take 2 tablets (40mg) by mouth once daily.   4. Initiate aspirin 81mg. Take 1 tablet by mouth once daily.     Pharmacy Follow-Up Plan (Method, Date, Parameters):  Follow up with patient in one week (08/03/2017) by phone to discuss potential side effects and effectiveness of medications. Follow up with patient in clinic in one month  (08/24/2017) to discuss safety and efficacy of medications.   Lisinopril: unbearable cough, hypotension  Metformin: diarrhea, constipation, nausea  Glipizide: hypoglycemia (shaking, anxiety, increase irritableness). Verify if patient is taking medication 30 minutes before meals, and identify if patient is skipping meals. Educate them to skip the dose if they skip meals.   Aspirin: abnormal bleeding, dizziness, headache.    PharmCare Clinician: Lorenzo Mayen  Pharmacy Preceptor: Patricia Bernstein    _____________________________  Preceptor Use Only:  In supervising the student, I have reviewed and verified the student's documentation and found it to be correct and complete.   Preceptor Signature: Patricia Bernstein, PharmD 8/10/17

## 2017-08-28 ENCOUNTER — OFFICE VISIT (OUTPATIENT)
Dept: FAMILY MEDICINE | Facility: CLINIC | Age: 57
End: 2017-08-28

## 2017-08-28 VITALS
OXYGEN SATURATION: 98 % | BODY MASS INDEX: 28.16 KG/M2 | DIASTOLIC BLOOD PRESSURE: 90 MMHG | TEMPERATURE: 98.1 F | HEART RATE: 103 BPM | RESPIRATION RATE: 15 BRPM | SYSTOLIC BLOOD PRESSURE: 156 MMHG | WEIGHT: 206.2 LBS

## 2017-08-28 DIAGNOSIS — I10 ESSENTIAL HYPERTENSION: Primary | ICD-10-CM

## 2017-08-28 DIAGNOSIS — E11.9 DIABETES MELLITUS WITHOUT COMPLICATION (H): ICD-10-CM

## 2017-08-28 RX ORDER — GLIPIZIDE 10 MG/1
10 TABLET ORAL
Qty: 60 TABLET | Refills: 0 | Status: SHIPPED | OUTPATIENT
Start: 2017-08-28 | End: 2017-09-28

## 2017-08-28 RX ORDER — LISINOPRIL 20 MG/1
40 TABLET ORAL DAILY
Qty: 60 TABLET | Refills: 0 | Status: SHIPPED | OUTPATIENT
Start: 2017-08-28 | End: 2017-09-28

## 2017-08-28 NOTE — MR AVS SNAPSHOT
After Visit Summary   2017    Nabil Cheung    MRN: [id number]           Patient Information     Date Of Birth          1960        Visit Information        Provider Department      2017 6:45 PM Clinician, Tomi Claudio [facility]        Today's Diagnoses     Essential hypertension    -  1    Diabetes mellitus without complication (H)           Follow-ups after your visit        Who to contact     Please call your clinic at [phone] to:    Ask questions about your health    Make or cancel appointments    Discuss your medicines    Learn about your test results    Speak to your doctor   If you have compliments or concerns about an experience at your clinic, or if you wish to file a complaint, please contact [facility] Patient Relations at [phone] or email us at [email or web address]         Additional Information About Your Visit        MyChart Information     [employer] is an electronic gateway that provides easy, online access to your medical records. With [employer], you can request a clinic appointment, read your test results, renew a prescription or communicate with your care team.     To sign up for [employer] visit the website at [email or web address]   You will be asked to enter the access code listed below, as well as some personal information. Please follow the directions to create your username and password.     Your access code is: [email or web address]  Expires: 2017  6:36 PM     Your access code will  in 90 days. If you need help or a new code, please contact your [facility] or call [phone] for assistance.        Care EveryWhere ID     This is your Care EveryWhere ID. This could be used by other organizations to access your [place] medical records  [id number]        Your Vitals Were     Pulse Temperature Respirations Pulse Oximetry BMI (Body Mass Index)       103 98.1  F (36.7  C)  (Oral) 15 98% 28.16 kg/m2        Blood Pressure from Last 3 Encounters:   11/02/17 (!) 152/100   09/28/17 (!) 142/92   08/28/17 156/90    Weight from Last 3 Encounters:   09/28/17 209 lb 3.2 oz (94.9 kg)   08/28/17 206 lb 3.2 oz (93.5 kg)   07/27/17 209 lb 6.4 oz (95 kg)                 Where to get your medicines      Some of these will need a paper prescription and others can be bought over the counter.  Ask your nurse if you have questions.     Bring a paper prescription for each of these medications     glipiZIDE 10 MG tablet    lisinopril 20 MG tablet    metFORMIN 1000 MG tablet          Primary Care Provider    None Specified       No primary provider on file.        Equal Access to Services     TIMOTEO SAGASTUME : Shanae Thibodeaux, bridgette lainez, ashwin salcido, niels benson. So [facility] [phone].    ATENCIÓN: Si habla español, tiene a delaney disposición servicios gratuitos de asistencia lingüística. Llame al [phone].    We comply with applicable federal civil rights laws and [place] laws. We do not discriminate on the basis of race, color, national origin, age, disability, sex, sexual orientation, or gender identity.            Thank you!     Thank you for choosing [facility]  for your care. Our goal is always to provide you with excellent care. Hearing back from our patients is one way we can continue to improve our services. Please take a few minutes to complete the written survey that you may receive in the mail after your visit with us. Thank you!             Your Updated Medication List - Protect others around you: Learn how to safely use, store and throw away your medicines at www.disposemymeds.org.          This list is accurate as of: 8/28/17 11:59 PM.  Always use your most recent med list.                   Brand Name Dispense Instructions for use Diagnosis    blood glucose monitoring lancets     102 each    Use to test blood  sugar once daily or as directed.    Essential hypertension       blood glucose monitoring meter device kit     1 kit    Use to test blood sugar once daily or as directed.    Diabetes mellitus without complication (H)       blood glucose monitoring test strip    ACCU-CHEK SHERRY    50 strip    Use to test blood sugar once daily or as directed.    Diabetes mellitus without complication (H)       glipiZIDE 10 MG tablet    GLUCOTROL    60 tablet    Take 1 tablet (10 mg) by mouth 2 times daily (before meals)    Diabetes mellitus without complication (H)       lisinopril 20 MG tablet    PRINIVIL/ZESTRIL    60 tablet    Take 2 tablets (40 mg) by mouth daily    Essential hypertension       metFORMIN 1000 MG tablet    GLUCOPHAGE    60 tablet    Take 1 tablet (1,000 mg) by mouth 2 times daily (with meals)    Diabetes mellitus without complication (H)

## 2017-08-29 NOTE — NURSING NOTE
"Pt is here for a medication refill. Originally a full med but will be returning in a month for a lab visit and full med visit, so med refill was done today. Reporting an increased heart rate (measured in clinic and other settings) in last month but no \"racing heart\" or chest pains. Complains of no other s/sx. Reports checking blood sugars every morning or every other morning. Reports blood sugars ranging from 160-190.  "

## 2017-09-25 ENCOUNTER — OFFICE VISIT (OUTPATIENT)
Dept: FAMILY MEDICINE | Facility: CLINIC | Age: 57
End: 2017-09-25

## 2017-09-25 DIAGNOSIS — I10 ESSENTIAL HYPERTENSION: ICD-10-CM

## 2017-09-25 LAB
ALBUMIN UR-MCNC: 10 MG/DL
ANION GAP SERPL CALCULATED.3IONS-SCNC: 8 MMOL/L (ref 3–14)
APPEARANCE UR: CLEAR
BILIRUB UR QL STRIP: NEGATIVE
BUN SERPL-MCNC: 25 MG/DL (ref 7–30)
CALCIUM SERPL-MCNC: 8.8 MG/DL (ref 8.5–10.1)
CHLORIDE SERPL-SCNC: 101 MMOL/L (ref 94–109)
CHOLEST SERPL-MCNC: 212 MG/DL
CO2 SERPL-SCNC: 26 MMOL/L (ref 20–32)
COLOR UR AUTO: ABNORMAL
CREAT SERPL-MCNC: 1.06 MG/DL (ref 0.66–1.25)
GFR SERPL CREATININE-BSD FRML MDRD: 71 ML/MIN/1.7M2
GLUCOSE SERPL-MCNC: 348 MG/DL (ref 70–99)
GLUCOSE UR STRIP-MCNC: >1000 MG/DL
HBA1C MFR BLD: 9.6 % (ref 4.3–6)
HDLC SERPL-MCNC: 31 MG/DL
HGB UR QL STRIP: NEGATIVE
KETONES UR STRIP-MCNC: NEGATIVE MG/DL
LDLC SERPL CALC-MCNC: ABNORMAL MG/DL
LEUKOCYTE ESTERASE UR QL STRIP: NEGATIVE
NITRATE UR QL: NEGATIVE
NONHDLC SERPL-MCNC: 181 MG/DL
PH UR STRIP: 5 PH (ref 5–7)
POTASSIUM SERPL-SCNC: 5 MMOL/L (ref 3.4–5.3)
SODIUM SERPL-SCNC: 135 MMOL/L (ref 133–144)
SOURCE: ABNORMAL
SP GR UR STRIP: 1.02 (ref 1–1.03)
TRIGL SERPL-MCNC: 565 MG/DL
TSH SERPL DL<=0.005 MIU/L-ACNC: 2.79 MU/L (ref 0.4–4)
UROBILINOGEN UR STRIP-MCNC: NORMAL MG/DL (ref 0–2)

## 2017-09-25 NOTE — MR AVS SNAPSHOT
After Visit Summary   2017    Nabil Cheung    MRN: [id number]           Patient Information     Date Of Birth          1960        Visit Information        Provider Department      2017 7:15 PM Clinician, [facility]        Today's Diagnoses     Essential hypertension           Follow-ups after your visit        Who to contact     Please call your clinic at [phone] to:    Ask questions about your health    Make or cancel appointments    Discuss your medicines    Learn about your test results    Speak to your doctor   If you have compliments or concerns about an experience at your clinic, or if you wish to file a complaint, please contact [facility] Patient Relations at [phone] or email us at [email or web address]         Additional Information About Your Visit        MyChart Information     Tiberium is an electronic gateway that provides easy, online access to your medical records. With Tiberium, you can request a clinic appointment, read your test results, renew a prescription or communicate with your care team.     To sign up for [employer] visit the website at [email or web address]   You will be asked to enter the access code listed below, as well as some personal information. Please follow the directions to create your username and password.     Your access code is: [email or web address]  Expires: 2017  6:36 PM     Your access code will  in 90 days. If you need help or a new code, please contact your [facility] or call [phone] for assistance.        Care EveryWhere ID     This is your Care EveryWhere ID. This could be used by other organizations to access your [facility] medical records  [id number]         Blood Pressure from Last 3 Encounters:   17 (!) 152/100   17 (!) 142/92   17 156/90    Weight from Last 3 Encounters:   17 209 lb 3.2 oz (94.9 kg)    08/28/17 206 lb 3.2 oz (93.5 kg)   07/27/17 209 lb 6.4 oz (95 kg)              We Performed the Following     Basic metabolic panel     Hemoglobin A1c     LDL cholesterol direct     Lipid panel reflex to direct LDL     TSH with free T4 reflex     UA without Microscopic        Primary Care Provider    None Specified       No primary provider on file.        Equal Access to Services     LUISIGOR TANIKA : Hadii rosa ku hadsilaso Somayelinali, waaxda luqadaha, qaybta kaalmada omari, niels ruizanayelichandler benson. So [facility] [phone].    ATENCIÓN: Si habla español, tiene a delaney disposición servicios gratuitos de asistencia lingüística. Llame al [phone].    We comply with applicable federal civil rights laws and [place] laws. We do not discriminate on the basis of race, color, national origin, age, disability, sex, sexual orientation, or gender identity.            Thank you!     Thank you for choosing [facility]  for your care. Our goal is always to provide you with excellent care. Hearing back from our patients is one way we can continue to improve our services. Please take a few minutes to complete the written survey that you may receive in the mail after your visit with us. Thank you!             Your Updated Medication List - Protect others around you: Learn how to safely use, store and throw away your medicines at www.disposemymeds.org.          This list is accurate as of: 9/25/17 11:59 PM.  Always use your most recent med list.                   Brand Name Dispense Instructions for use Diagnosis    blood glucose monitoring lancets     102 each    Use to test blood sugar once daily or as directed.    Essential hypertension       blood glucose monitoring meter device kit     1 kit    Use to test blood sugar once daily or as directed.    Diabetes mellitus without complication (H)       blood glucose monitoring test strip    ACCU-CHEK SHERRY    50 strip    Use to test blood sugar once  daily or as directed.    Diabetes mellitus without complication (H)

## 2017-09-26 LAB — LDLC SERPL DIRECT ASSAY-MCNC: 118 MG/DL

## 2017-09-28 ENCOUNTER — OFFICE VISIT (OUTPATIENT)
Dept: FAMILY MEDICINE | Facility: CLINIC | Age: 57
End: 2017-09-28

## 2017-09-28 VITALS
DIASTOLIC BLOOD PRESSURE: 92 MMHG | BODY MASS INDEX: 28.33 KG/M2 | HEIGHT: 72 IN | HEART RATE: 85 BPM | RESPIRATION RATE: 16 BRPM | WEIGHT: 209.2 LBS | SYSTOLIC BLOOD PRESSURE: 142 MMHG | TEMPERATURE: 97.8 F | OXYGEN SATURATION: 96 %

## 2017-09-28 DIAGNOSIS — I10 ESSENTIAL HYPERTENSION: ICD-10-CM

## 2017-09-28 DIAGNOSIS — E11.9 DIABETES MELLITUS WITHOUT COMPLICATION (H): ICD-10-CM

## 2017-09-28 RX ORDER — LISINOPRIL 20 MG/1
40 TABLET ORAL DAILY
Qty: 60 TABLET | Refills: 0 | Status: SHIPPED | OUTPATIENT
Start: 2017-09-28 | End: 2017-11-02

## 2017-09-28 RX ORDER — GLIPIZIDE 10 MG/1
10 TABLET ORAL
Qty: 60 TABLET | Refills: 0 | Status: SHIPPED | OUTPATIENT
Start: 2017-09-28 | End: 2017-11-02

## 2017-09-28 NOTE — Clinical Note
I have completed my note, please review, add tie in statement and close encounter. Please feel free to let me know if you have any comments, questions, or concerns. It was great to meet you!   Thanks!   Shanda [email or web address]

## 2017-09-28 NOTE — NURSING NOTE
Pt states he had an A1C of 11 last time he was here. Pt was also increased on his dose of Lisinopril to 20 mg BID. Pt said he also had some labs last time that needed review including Albumin and Kidney Function labs. Pt says his blood glucose is typically 200-240 in the morning. Pt says he hasn't been able to do as much exercise recently because he has been helping take care of his partners father.     Pt states he also has a benign thyroid tumor and a shoulder injury that needs surgery from a volleyball injury from 25 years ago. Pt is hoping to get back on insurance soon but was curious what other options might exist to deal with these issues as thyroid tumor is causing some limited difficulty with swallowing for him.    Pt is taking magnesium and dayan-lipoic acid.

## 2017-09-28 NOTE — MR AVS SNAPSHOT
After Visit Summary   9/28/2017    Nabil Cheung    MRN: [id number]           Patient Information     Date Of Birth          1960        Visit Information        Provider Department      9/28/2017 6:30 PM Clinician, [facility]        Today's Diagnoses     Diabetes mellitus without complication (H)        Essential hypertension          Care Instructions    Patient Visit Summary    Patient Name: Nabil Cheung  MRN: [id number]    Date of Visit: 9/28/2017    Physician's Recommendations/Instructions: We refilled your lisinopril, metformin, glipizide. Please continue taking prescribed medications as instructed. If possible, try to start exercising as time and circumstances allow.    We also discussed potentially starting aspirin and simvastatin in the future once circumstances stabilize for you.    Follow Up/Results: Your A1C taken on 9/25/17 was 9.6. We have given you a copy of the lab results from 9/25/17 for your records.    Referrals and Instructions: Please come back in a month for medication refill. If you have other concerns before then, please come in whenever they occur.    Ophthomology night is on Monday, November 20th.    Physician: Dr. Moreau          Follow-ups after your visit        Who to contact     Please call your clinic at [phone] to:    Ask questions about your health    Make or cancel appointments    Discuss your medicines    Learn about your test results    Speak to your doctor   If you have compliments or concerns about an experience at your clinic, or if you wish to file a complaint, please contact [facility] Patient Relations at [phone] or email us at [email or web address]         Additional Information About Your Visit        [employer] Information     [employer] is an electronic gateway that provides easy, online access to your medical records. With [employer], you can request a clinic appointment, read your test  "results, renew a prescription or communicate with your care team.     To sign up for TGS Knee Innovationshart visit the website at [email or web address]   You will be asked to enter the access code listed below, as well as some personal information. Please follow the directions to create your username and password.     Your access code is: [email or web address]  Expires: 2017  7:36 PM     Your access code will  in 90 days. If you need help or a new code, please contact your [facility] or call [phone] for assistance.        Care EveryWhere ID     This is your Care EveryWhere ID. This could be used by other organizations to access your [place] medical records  [id number]        Your Vitals Were     Pulse Temperature Respirations Height Pulse Oximetry BMI (Body Mass Index)    85 97.8  F (36.6  C) (Oral) 16 5' 11.75\" (182.2 cm) 96% 28.57 kg/m2       Blood Pressure from Last 3 Encounters:   17 (!) 142/92   17 156/90   17 168/82    Weight from Last 3 Encounters:   17 209 lb 3.2 oz (94.9 kg)   17 206 lb 3.2 oz (93.5 kg)   17 209 lb 6.4 oz (95 kg)              Today, you had the following     No orders found for display         Where to get your medicines      Some of these will need a paper prescription and others can be bought over the counter.  Ask your nurse if you have questions.     Bring a paper prescription for each of these medications     glipiZIDE 10 MG tablet    lisinopril 20 MG tablet    metFORMIN 1000 MG tablet          Primary Care Provider    None Specified       No primary provider on file.        Equal Access to Services     TIMOTEO SAGASTUME : Hadlacie Thibodeaux, bridgette lainez, niels morrissey. So [facility] [phone].    ATENCIÓN: Si habla español, tiene a delaney disposición servicios gratuitos de asistencia lingüística. Llame al [phone].    We comply with applicable federal civil " rights laws and [place] laws. We do not discriminate on the basis of race, color, national origin, age, disability sex, sexual orientation or gender identity.            Thank you!     Thank you for choosing [facility]  for your care. Our goal is always to provide you with excellent care. Hearing back from our patients is one way we can continue to improve our services. Please take a few minutes to complete the written survey that you may receive in the mail after your visit with us. Thank you!             Your Updated Medication List - Protect others around you: Learn how to safely use, store and throw away your medicines at www.disposemymeds.org.          This list is accurate as of: 9/28/17  7:36 PM.  Always use your most recent med list.                   Brand Name Dispense Instructions for use Diagnosis    blood glucose monitoring lancets     102 each    Use to test blood sugar once daily or as directed.    Essential hypertension       blood glucose monitoring meter device kit     1 kit    Use to test blood sugar once daily or as directed.    Diabetes mellitus without complication (H)       blood glucose monitoring test strip    ACCU-CHEK SHERRY    50 strip    Use to test blood sugar once daily or as directed.    Diabetes mellitus without complication (H)       glipiZIDE 10 MG tablet    GLUCOTROL    60 tablet    Take 1 tablet (10 mg) by mouth 2 times daily (before meals)    Diabetes mellitus without complication (H)       lisinopril 20 MG tablet    PRINIVIL/ZESTRIL    60 tablet    Take 2 tablets (40 mg) by mouth daily    Essential hypertension       metFORMIN 1000 MG tablet    GLUCOPHAGE    60 tablet    Take 1 tablet (1,000 mg) by mouth 2 times daily (with meals)    Diabetes mellitus without complication (H)

## 2017-09-29 NOTE — PROGRESS NOTES
"[facility] Progress Note    Chief complaint: Lab Results and Medication Refill    Last date of full med (Med Refill only):    Subjective:  Patient is a 57 year old male who presents to clinic today for follow-up on labs that were drawn 3 days ago (9/25/17) and also for medication refills. He seems very aware of how his activity and diet affects his medical conditions. He reports drinking 3 cups of coffee 5 days per week, no tobacco use, 1 alcoholic drink a few times weekly, and no illicit drug use.   Condition 1: Hypertension  Patient states that his blood pressure is dropping significantly with systolic in the 140s and that he is happy with that. He feels that he is not exercising as much as he would like and that is preventing him from being as healthy as he used to be. He checks his blood pressure \"a few times a week\" and says it ranges from 130-150/.   Condition 2: Type 2 Diabetes Mellitus  Patient reports that when he began coming to the [facility], his A1C was around 11. He is happy today that it has dropped to 9.6. Patient reports checking his blood glucose 3x weekly and states that his fasting readings are between 200 and 250mg/dL.     Current Outpatient Prescriptions   Medication Sig Dispense Refill     metFORMIN (GLUCOPHAGE) 1000 MG tablet Take 1 tablet (1,000 mg) by mouth 2 times daily (with meals) 60 tablet 0     glipiZIDE (GLUCOTROL) 10 MG tablet Take 1 tablet (10 mg) by mouth 2 times daily (before meals) 60 tablet 0     lisinopril (PRINIVIL/ZESTRIL) 20 MG tablet Take 2 tablets (40 mg) by mouth daily 60 tablet 0     MAGNESIUM OXIDE PO Take 250 mg by mouth At Bedtime       blood glucose monitoring (ACCU-CHEK FASTCLIX) lancets Use to test blood sugar once daily or as directed. 102 each 0     blood glucose monitoring (ACCU-CHEK SHERRY) test strip Use to test blood sugar once daily or as directed. 50 strip 0     blood glucose monitoring (ACCU-CHEK SHERRY PLUS) meter device kit Use to " "test blood sugar once daily or as directed. 1 kit 0     [DISCONTINUED] metFORMIN (GLUCOPHAGE) 1000 MG tablet Take 1 tablet (1,000 mg) by mouth 2 times daily (with meals) 60 tablet 0     [DISCONTINUED] glipiZIDE (GLUCOTROL) 10 MG tablet Take 1 tablet (10 mg) by mouth 2 times daily (before meals) 60 tablet 0     [DISCONTINUED] lisinopril (PRINIVIL/ZESTRIL) 20 MG tablet Take 2 tablets (40 mg) by mouth daily 60 tablet 0         Objective:  BP (!) 142/92 (BP Location: Left arm, Patient Position: Chair, Cuff Size: Adult Regular)  Pulse 85  Temp 97.8  F (36.6  C) (Oral)  Resp 16  Ht 5' 11.75\" (182.2 cm)  Wt 209 lb 3.2 oz (94.9 kg)  SpO2 96%  BMI 28.57 kg/m2    Assessment:     Condition 1- Hypertension  DTP:Patient's hypertension is not adequately controlled with 20 mg lisinopril BID.   Rationale: Patient's blood pressure is 142/92. Options include adding an additional medication. However, at this time, the patient is hoping to lower his blood pressure via lifestyle changes and diet changes. He is not at his goal for physical activity and is hoping to improve this, especially to help his diabetes and blood pressure.    Condition 2- Type II Diabetes Mellitus   DTP: The patient's blood sugars are not well controlled, based on his self reported readings of 200-250 mg/dL at home, and 350 at clinic on Monday 9/25/17. The patient was counseled on the ability to be referred to [facility] for insulin, but did not want to pursue this at this time. He is hoping to address some of his high blood sugars with exercise and diet before moving to insulin. The patient was counseled on the benefits of statin use due to his 10 year ASCVD risk of 29%, but he is not going to take advantage of this at this time.   Rationale: Patient is unwilling to add additional medications to his regimen today.      Plan:  1. Continue taking Lisinopril 20 mg tab BID.   2. Continue taking Metformin 1000 mg tab BID.  3. Continue taking Glipizide 10 mg tab " BID.  4. Work toward walking 2 miles daily, as pt was doing previously, and talk with [facility]W to find affordable gym options.  5. Report back to clinic next month for medication refill and a follow up visit to discuss diabetes control further.     PharmCare Clinician: Shanda Rice  Pharmacy Preceptor: Roberta Lieberman    _____________________________  Preceptor Use Only:  In supervising the student, I have reviewed and verified the student's documentation and found it to be correct and complete.   Preceptor Signature: Roberta Lieberman, Tania

## 2017-09-29 NOTE — PROGRESS NOTES
"NUTRITION NOTE    Patient Name: Nabil Cheung   MRN: [id number]    Reason for assessment:  Pt request to see nutrition; referral related to elevated morning blood glucose self-checks.    Anthropometrics  Admit Weight:   Height: 5' 11.75\"   Current weight: 209 lbs 3.2 oz   Body mass index is 28.57 kg/(m^2).     Assessment  Relevant nutritional labs: Labs taken 9/25/17-- A1C 9.6, glucose 348    Nutrition problem: food- and nutrition-related knowledge deficit (NB-1.1)    Related to: healthy shelf-stable food choices, meal preparation with limited space and equipment, and healthy, lower-carbohydrate options at fast food restaurants    As indicated by: elevated glucose and A1c (indicated above), diet history, and self report    Nutrition Counseling: Pt wished to discuss with this writer options for healthy, diabetic-friendly, shelf-stable or longer-lasting refrigerated food options. Pt stated he has a small refrigerator, no freezer, and a microwave for food storage/preparation at his home. Pt relayed he tries to eat a healthy diet, which for him is a higher-fat, higher-protein, lower-carbohydrate diet. Pt relayed he eats out frequently and attempts to order lower-carbohydrate options. Pt stated he enjoys sweets and sometimes prefers to  save  his carbohydrates for these options and instead choosing foods with lower carbohydrate content for meals. Pt has 12-15 staple foods he usually has at home for consumption, including yogurt, cheese, grapefruit juice, 2% milk, peanut butter, nuts, olives and a small amount of fruits and vegetables. He keeps a detailed spreadsheet to assist in grocery shopping, but states he sometimes doesn't get to the store as often as he would like to. Pt asked this writer for additional ideas to add to his list of staples. This writer discussed a variety of options to see what might be acceptable to pt, including canned beans, canned fruits (in 100% juice) and vegetables, popcorn, whole wheat " crackers, broth-based canned soup, eggs, and cottage cheese. Pt questioned nutritional quality of canned vs fresh fruit and this writer assured pt they are nutritionally equivalent. This writer also recommended pt to choose lower-sodium options related to history of HTN. This writer discussed healthier options when eating out, such as choosing grilled over fried or breaded meats and choosing flatbread over other bread options at [employer]. Pt was provided with nutrition fast-pass to come back to discuss further healthier options at fast food restaurants.    Those present during counseling: Dea Corley nutrition clinician    Interventions:  - Developed a plan/list of healthy staple foods for easy meal preparation given food storage and equipment constraints.  - Provided recommendations for healthier choices when eating out at fast food restaurants.  - Encouraged pt to request nutrition information when eating out to facilitate making healthier food choices.  - Provided nutrition fast pass for pt to come back to clinic to discuss additional ways to choose healthy, diabetic-friendly options when eating out and any other nutrition concerns.     Monitor/evaluate: Blood glucose, A1c, intake and food choices per 24-hour recall.    Understanding of recommendations demonstrated: Yes    Predicted compliance: compliant most of the time-- pt maintains a spreadsheet for grocery shopping purposes.    Nutrition Clinician: Dea Corley  Preceptor: Mariana Law, SUKUMAR, ONELIA    In supervising the nutrition student, I repeated the exam documented above.  I have reviewed and verified the student s documentation.  Supervising Provider: Mariana Law, SUKUMAR, ONELIA

## 2017-09-29 NOTE — PROGRESS NOTES
MEDICINE NOTE    SUBJECTIVE: Mr. Cheung is a 57 year old man presenting to clinic to review his 9/25/17 labs and discuss his medications.    REVIEW OF SYSTEMS:  Gen: no fevers, night sweats or weight change  Eyes: no vision change, diplopia or red eyes  Ears, Noses, Mouth, Throat: no ringing in ears or hearing change, no epistaxis or nasal discharge, no oral lesions, throat clear  Cardiac: no chest pain, palpitations, or pain with walking  Lungs: no dyspnea, cough, or shortness of breath  GI: no nausea, vomiting, diarrhea or constipation, no abdominal pain  : no change in urine, hematuria, or sexual dysfunction  Musculoskeletal: no joint or muscle pain or swelling  Skin: no concerning lesions or moles  Neuro: no loss of strength or sensation, no numbness or tingling, no tremor  Endo: no polyuria or polydipsia, no temperature intolerance  Heme/Lymph: no concerning bumps, no bleeding problems  Allergy: no environmental or drug allergies  Psych: no depression or anxiety    No past medical history on file.    No past surgical history on file.    No family history on file.    Social History     Social History     Marital status: Single     Spouse name: N/A     Number of children: N/A     Years of education: N/A     Occupational History     Not on file.     Social History Main Topics     Smoking status: Never Smoker     Smokeless tobacco: Not on file     Alcohol use No     Drug use: No     Sexual activity: Not on file     Other Topics Concern     Not on file     Social History Narrative    6/29/17 - Pt was interested in getting help filling out [employer] application online to receive medicaid. CHW gave him the list of information he needed to complete online application. He stated he had a tax extension for 2016 so he did not have tax forms that the application required. He was going to work on the application with tax information from 2015 later tonight. AM        7/27/17:    Pt wanted to get more information about gyms  "that are low-cost within the [facility] area. Pt's asked for a gym with a membership fee of $10-$20 (max), that also had a walking track and a hot tub/sauna. We told him that options that fit these requirements he mentioned were very limited. He then stated that he heard that the [facility] offered a $5 fee for track-usage only, however, we could not call [facility] since it was closed. We suggested to the patient to call tomorrow and also mentioned there are gyms that are low-cost nearby, however, might not have a track or hot tub/sauna. We also suggested that he could walk around a lake would also be a cheap-alternative. -HB and NV        8/28/17:    Pt was here for a med refill. We asked him about his gym membership and access based on CHW's previous encounter with him and he said he tried to walk outside more this summer, but still did not find an affordable option. He did not have need for any other services at the time.     - AA and KT        OBJECTIVE:  Physical Exam:  BP (!) 142/92 (BP Location: Left arm, Patient Position: Chair, Cuff Size: Adult Regular)  Pulse 85  Temp 97.8  F (36.6  C) (Oral)  Resp 16  Ht 5' 11.75\" (182.2 cm)  Wt 209 lb 3.2 oz (94.9 kg)  SpO2 96%  BMI 28.57 kg/m2  Constitutional: no distress, comfortable, pleasant   Eyes: anicteric, normal extra-ocular movements    Skin: no concerning lesions, no jaundice   Neurological: normal gait, no tremor   Psychological: appropriate mood     ASSESSMENT/PLAN:  Nabil was seen today for diabetes.    Diagnoses and all orders for this visit:    Diabetes mellitus without complication (H)  -     metFORMIN (GLUCOPHAGE) 1000 MG tablet; Take 1 tablet (1,000 mg) by mouth 2 times daily (with meals)  -     glipiZIDE (GLUCOTROL) 10 MG tablet; Take 1 tablet (10 mg) by mouth 2 times daily (before meals)    Essential hypertension  -     lisinopril (PRINIVIL/ZESTRIL) 20 MG tablet; Take 2 tablets (40 mg) by mouth daily        Med Clinician: Johana Chan  Preceptor:  " Jessica Moreau MD, PhD    In supervising the medical student, I repeated the exam documented above.  I have reviewed and verified the student s documentation.  Patients is a 57-year-old man with long-standing diabetes.  Patient is very knowledgeable about his diabetes and is aware that his hgb A1C is not at goal.  His personal goal is 6.5-7s.  He identifies several life stressors that have interfered with his ability to achieve this goal.  In the past, he had been very successful with life-style interventions including regular walking.  His goal is moderate walking 5 days per week and currently at only about 1.  His weight is also up about 10lbs.  We reviewed his 10-year ASCVD risk calculator, which puts him at about 29% 10-year risk and discussed potential strategies to improve this.  His is currently at upper limit of ACE therapy and is not interested in adding another agent to improved BP control.  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  We discussed that the only other option for statins available in our clinic in simvastatin but if he is able to regain insurance, he could consider other options such as fluvastatin or pravastatin that have lower risk of myopathy.  He was previously on aspirin therapy but stopped this due to GI upset.  He is not currently interested in resuming this.  With respect to diabetes control, we discussed that there may be other options, including insulin, and could place referral to low-cost clinic such as [facility] if he is interested.  He would prefer to focus on diet and exercise at this time so we will check back in with him in a couple months to see how this is going and re-assess A1C.   We also reviewed screening for microvascular risk .  He is already on an ACE and does regular foot exams.  He is interested in dilated eye exam so we provided information about next ophthalmology night.     Supervising Provider: Jessica NICHOLSON  MD Prieto, PhD  Adult & Pediatric Infectious Diseases Fellow PGY6, [facility]  Phone: [phone]  Pager: [phone]

## 2017-11-02 ENCOUNTER — OFFICE VISIT (OUTPATIENT)
Dept: FAMILY MEDICINE | Facility: CLINIC | Age: 57
End: 2017-11-02

## 2017-11-02 VITALS
HEART RATE: 86 BPM | OXYGEN SATURATION: 97 % | RESPIRATION RATE: 12 BRPM | SYSTOLIC BLOOD PRESSURE: 152 MMHG | DIASTOLIC BLOOD PRESSURE: 100 MMHG

## 2017-11-02 DIAGNOSIS — I10 ESSENTIAL HYPERTENSION: ICD-10-CM

## 2017-11-02 DIAGNOSIS — E11.9 DIABETES MELLITUS WITHOUT COMPLICATION (H): ICD-10-CM

## 2017-11-02 RX ORDER — LISINOPRIL 20 MG/1
40 TABLET ORAL DAILY
Qty: 60 TABLET | Refills: 0 | Status: SHIPPED | OUTPATIENT
Start: 2017-11-02 | End: 2018-07-19

## 2017-11-02 RX ORDER — GLIPIZIDE 10 MG/1
10 TABLET ORAL
Qty: 60 TABLET | Refills: 0 | Status: SHIPPED | OUTPATIENT
Start: 2017-11-02 | End: 2018-07-19

## 2017-11-02 NOTE — Clinical Note
I have completed my note, please review, add tie in statement and close encounter.   Thanks!   Mick Linares

## 2017-11-02 NOTE — MR AVS SNAPSHOT
After Visit Summary   2017    Nabil Cheung    MRN: [id number]           Patient Information     Date Of Birth          1960        Visit Information        Provider Department      2017 6:45 PM Clinician, Tomi Claudio [facility]        Today's Diagnoses     Diabetes mellitus without complication (H)        Essential hypertension           Follow-ups after your visit        Who to contact     Please call your clinic at [phone] to:    Ask questions about your health    Make or cancel appointments    Discuss your medicines    Learn about your test results    Speak to your doctor   If you have compliments or concerns about an experience at your clinic, or if you wish to file a complaint, please contact [facility] Patient Relations at [phone] or email us at [email or web address]         Additional Information About Your Visit        MyChart Information     PassKit is an electronic gateway that provides easy, online access to your medical records. With PassKit, you can request a clinic appointment, read your test results, renew a prescription or communicate with your care team.     To sign up for LoopNett visit the website at [email or web address]   You will be asked to enter the access code listed below, as well as some personal information. Please follow the directions to create your username and password.     Your access code is: [email or web address]  Expires: 2017  7:36 PM     Your access code will  in 90 days. If you need help or a new code, please contact your [facility] or call [phone] for assistance.        Care EveryWhere ID     This is your Care EveryWhere ID. This could be used by other organizations to access your [place] medical records  [id number]        Your Vitals Were     Pulse Respirations Pulse Oximetry             86 12 97%          Blood Pressure from Last 3 Encounters:    11/02/17 (!) 152/100   09/28/17 (!) 142/92   08/28/17 156/90    Weight from Last 3 Encounters:   09/28/17 209 lb 3.2 oz (94.9 kg)   08/28/17 206 lb 3.2 oz (93.5 kg)   07/27/17 209 lb 6.4 oz (95 kg)              Today, you had the following     No orders found for display         Where to get your medicines      Some of these will need a paper prescription and others can be bought over the counter.  Ask your nurse if you have questions.     Bring a paper prescription for each of these medications     glipiZIDE 10 MG tablet    lisinopril 20 MG tablet    metFORMIN 1000 MG tablet          Primary Care Provider    None Specified       No primary provider on file.        Equal Access to Services     TIMOTEO SAGASTUME : Shanae Thibodeaux, bridgette lainez, ashwin salcido, niels benson. So [facility] [phone].    ATENCIÓN: Si habla español, tiene a delaney disposición servicios gratuitos de asistencia lingüística. Llame al [phone].    We comply with applicable federal civil rights laws and [place] laws. We do not discriminate on the basis of race, color, national origin, age, disability, sex, sexual orientation, or gender identity.            Thank you!     Thank you for choosing [facility]  for your care. Our goal is always to provide you with excellent care. Hearing back from our patients is one way we can continue to improve our services. Please take a few minutes to complete the written survey that you may receive in the mail after your visit with us. Thank you!             Your Updated Medication List - Protect others around you: Learn how to safely use, store and throw away your medicines at www.disposemymeds.org.          This list is accurate as of: 11/2/17 11:59 PM.  Always use your most recent med list.                   Brand Name Dispense Instructions for use Diagnosis    blood glucose monitoring lancets     102 each    Use to test blood sugar  once daily or as directed.    Essential hypertension       blood glucose monitoring meter device kit     1 kit    Use to test blood sugar once daily or as directed.    Diabetes mellitus without complication (H)       blood glucose monitoring test strip    ACCU-CHEK SHERRY    50 strip    Use to test blood sugar once daily or as directed.    Diabetes mellitus without complication (H)       glipiZIDE 10 MG tablet    GLUCOTROL    60 tablet    Take 1 tablet (10 mg) by mouth 2 times daily (before meals)    Diabetes mellitus without complication (H)       lisinopril 20 MG tablet    PRINIVIL/ZESTRIL    60 tablet    Take 2 tablets (40 mg) by mouth daily    Essential hypertension       MAGNESIUM OXIDE PO      Take 250 mg by mouth At Bedtime        metFORMIN 1000 MG tablet    GLUCOPHAGE    60 tablet    Take 1 tablet (1,000 mg) by mouth 2 times daily (with meals)    Diabetes mellitus without complication (H)

## 2017-11-03 NOTE — PROGRESS NOTES
[facility] Progress Note     Chief complaint: Patient presents to the clinic to day to refill his medications for hypertension and diabetes.     Subjective:  CLAUDIO is returning to get his medications refilled. He appeared agitated as he assumed he was getting QuickFill today. He hopes to be placed on QuickFill in his next visit. He notes that he has no known drug allergies. He typically drinks about 1-2 cups of coffee 5 times a week. He doesn't drink alcohol often averaging around 1 drink per week. Patient denies tobacco use and illicit drug use. He denies any symptoms of dizziness, headache, diarrhea, or shakiness.     Condition 1: Hypertension-  His blood pressure reading today in clinic was 152/100 mmHg but his blood pressure reading this morning at home was 149/91 mmHg.. Based on the previous visit, his lisinopril dose should be 2 tablets of 20 mg in the morning but patient takes 1 20mg tablet in the morning and 1 20mg tablet in the evening. He believes the way he takes the medication is a greater benefit to his blood pressure readings. Patient indicates his blood pressure normally reads high around 145/95 mmHg. He knows his readings are not ideal but he is very content with it and does not see a need to change this current regimen. Patient indicates a mild cough that is slightly bothersome.     Condition 2: Diabetes- CLAUDIO notes that he had been diagnosed with diabetes in 2004 and is confident in his ability to manage his condition. He reports that his metformin and glipizide have been working. He reports he has not been exercising as often as he used to. CLAUDIO typically checks his blood sugar level about once per week in the morning and they're often in the 200-220 mg/dL. CLAUDIO is not willing to start any new diabetes treatments at this time.       Condition 3: Dyslipidemia- He is hesitant to try new medications at this time because of his past reaction to simvastatin - he reports that he had side effects such as back  pain and muscle spasms. These muscle pains went away when the medication was discontinued.     Current Outpatient Prescriptions   Medication Sig Dispense Refill     metFORMIN (GLUCOPHAGE) 1000 MG tablet Take 1 tablet (1,000 mg) by mouth 2 times daily (with meals) 60 tablet 0     glipiZIDE (GLUCOTROL) 10 MG tablet Take 1 tablet (10 mg) by mouth 2 times daily (before meals) 60 tablet 0     lisinopril (PRINIVIL/ZESTRIL) 20 MG tablet Take 2 tablets (40 mg) by mouth daily 60 tablet 0     MAGNESIUM OXIDE PO Take 250 mg by mouth At Bedtime       [DISCONTINUED] metFORMIN (GLUCOPHAGE) 1000 MG tablet Take 1 tablet (1,000 mg) by mouth 2 times daily (with meals) 60 tablet 0     [DISCONTINUED] glipiZIDE (GLUCOTROL) 10 MG tablet Take 1 tablet (10 mg) by mouth 2 times daily (before meals) 60 tablet 0     [DISCONTINUED] lisinopril (PRINIVIL/ZESTRIL) 20 MG tablet Take 2 tablets (40 mg) by mouth daily 60 tablet 0     blood glucose monitoring (ACCU-CHEK FASTCLIX) lancets Use to test blood sugar once daily or as directed. 102 each 0     blood glucose monitoring (ACCU-CHEK SHERRY) test strip Use to test blood sugar once daily or as directed. 50 strip 0     blood glucose monitoring (ACCU-CHEK SHERRY PLUS) meter device kit Use to test blood sugar once daily or as directed. 1 kit 0     Objective:   BP (!) 152/100 (BP Location: Right arm, Patient Position: Supine)  Pulse 86  Resp 12  SpO2 97%    Assessment:   Condition 1- Hypertension  DTP: Effectiveness: need additional therapy  Rationale: CLAUDIO's current dose of lisinopril 40mg total is not effective in treating his blood pressure and requires additional therapy. His current blood pressure goal is <140/90 mmHg per ADA 2017 guidelines. Current options to treat CLAUDIO's condition is to add another agent such as a calcium channel blocker. After explaining his goals, risks of continuous high blood pressure, and the additional medication we might add, patient did not want to start a new  therapy.     Condition 2- Diabetes  DTP: Effectiveness- needs additional therapy  Rationale: CLAUDIO's current medications of metformin 1000mg twice daily with meals and Glipizide 10 mg twice daily with meals is helping reduce the progression of diabetes. However, CLAUDIO's A1c level of 9.6 indicates that CLAUDIO needs additional drug therapy to manage his disease condition. Patient reports that he has not had any sides of neuropathy or episodes of hypoglycemia with his condition. Additional options include adding a long acting insulin such as insulin glargine or adding a short acting insulin. Based on the American Diabetes Association guidelines, clinical recommendation would be to initiate long-acting insulin daily and short-acting insulin pre-prandial. Patient refuses to start insulin or another medication at this time.     Condition 3- Dyslipidemia  DTP: Indication - Needs Additional Therapy   Rationale: CLAUDIO's 10 year ASCVD risk score is 34.6%. Due to this risk score, CLAUDIO's cardivascular health requires additional drug therapy to help prevent potential cardiac events. Options to help prevent long term cardiac events include statins and aspirin. CLAUDIO reports that due to his past reaction of muscle spasms and back pain to simvastatin, he refused to try another statin at this time. CLAUDIO is unwilling to use aspirin as a long term preventative measure.      Plan:  1. Continue Metformin 1000mg. Take 1 tablet by mouth twice daily.   2. Continue Glipizide 10mg. Take 1 tablet by mouth twice daily with meals.   3. Continue lisinopril 20mg. Take 2 tablets (40mg) by mouth once daily.     Pharmacy Follow-Up Plan (Method, Date, Parameters):  Follow up with patient in clinic for FULL MED visit in one month (11/30/2017) to discuss safety and efficacy of medications as well as additional therapy to control his conditions.   Lisinopril: unbearable cough, hypotension  Metformin: diarrhea, constipation, nausea  Glipizide: hypoglycemia (shaking,  anxiety, increase irritableness). Verify if patient is taking medication 30 minutes before meals, and identify if patient is skipping meals. Educate them to skip the dose if they skip meals.     PharmCare Clinician: Mick Linares PD3  Pharmacy Preceptor: Patricia Bernstein PharmD     _____________________________  Preceptor Use Only:  In supervising the student, I have reviewed and verified the student's documentation and found it to be correct and complete.   Preceptor Signature: Patricia Bernstein, PharmD 11/3/17

## 2017-11-03 NOTE — NURSING NOTE
"Patient came to clinic for a refill of his Metformin. He has DM type II since 2008. He was not interested in hearing about diet modification to manage his DM. Patient states, \"The fact is I have better numbers on medication,\" even though his blood pressure and fasting blood glucose is elevated.   "

## 2018-07-19 ENCOUNTER — OFFICE VISIT (OUTPATIENT)
Dept: FAMILY MEDICINE | Facility: CLINIC | Age: 58
End: 2018-07-19

## 2018-07-19 VITALS
WEIGHT: 209.9 LBS | HEIGHT: 72 IN | DIASTOLIC BLOOD PRESSURE: 84 MMHG | BODY MASS INDEX: 28.43 KG/M2 | RESPIRATION RATE: 19 BRPM | TEMPERATURE: 98.4 F | HEART RATE: 69 BPM | SYSTOLIC BLOOD PRESSURE: 134 MMHG

## 2018-07-19 DIAGNOSIS — E11.9 DIABETES MELLITUS WITHOUT COMPLICATION (H): Primary | ICD-10-CM

## 2018-07-19 DIAGNOSIS — M10.9 ACUTE GOUTY ARTHRITIS: ICD-10-CM

## 2018-07-19 DIAGNOSIS — I10 ESSENTIAL HYPERTENSION: ICD-10-CM

## 2018-07-19 DIAGNOSIS — M10.072 ACUTE IDIOPATHIC GOUT OF LEFT FOOT: ICD-10-CM

## 2018-07-19 RX ORDER — LISINOPRIL 20 MG/1
40 TABLET ORAL DAILY
Qty: 60 TABLET | Refills: 0 | Status: SHIPPED | OUTPATIENT
Start: 2018-07-19 | End: 2019-07-29

## 2018-07-19 RX ORDER — GLIPIZIDE 10 MG/1
10 TABLET ORAL
Qty: 60 TABLET | Refills: 0 | Status: SHIPPED | OUTPATIENT
Start: 2018-07-19 | End: 2019-07-29

## 2018-07-19 RX ORDER — NAPROXEN 500 MG/1
500 TABLET ORAL 2 TIMES DAILY WITH MEALS
Qty: 14 TABLET | Refills: 0 | Status: SHIPPED | OUTPATIENT
Start: 2018-07-19 | End: 2018-07-26

## 2018-07-19 NOTE — MR AVS SNAPSHOT
After Visit Summary   2018    Nabil Cheung    MRN: [id number]           Patient Information     Date Of Birth          1960        Visit Information        Provider Department      2018 8:15 PM Clinician, Tomi Claudio [facility]        Today's Diagnoses     Diabetes mellitus without complication (H)    -  1    Essential hypertension        Acute idiopathic gout of left foot        Acute gouty arthritis           Follow-ups after your visit        Who to contact     Please call your clinic at [phone] to:    Ask questions about your health    Make or cancel appointments    Discuss your medicines    Learn about your test results    Speak to your doctor            Additional Information About Your Visit        MyChart Information     PayByGroup is an electronic gateway that provides easy, online access to your medical records. With [employer]ByGroup, you can request a clinic appointment, read your test results, renew a prescription or communicate with your care team.     To sign up for PayByGroup visit the website at [email or web address]   You will be asked to enter the access code listed below, as well as some personal information. Please follow the directions to create your username and password.     Your access code is: [email or web address]  Expires: 11/10/2018  5:48 AM     Your access code will  in 90 days. If you need help or a new code, please contact your [facility] or call [phone] for assistance.        Care EveryWhere ID     This is your Care EveryWhere ID. This could be used by other organizations to access your [place] medical records  [id number]        Your Vitals Were     Pulse Temperature Respirations Height BMI (Body Mass Index)       69 98.4  F (36.9  C) (Oral) 19 6' (182.9 cm) 28.47 kg/m2        Blood Pressure from Last 3 Encounters:   18 134/84   17 (!) 152/100   17 (!) 142/92    Weight from Last 3 Encounters:    07/19/18 209 lb 14.4 oz (95.2 kg)   09/28/17 209 lb 3.2 oz (94.9 kg)   08/28/17 206 lb 3.2 oz (93.5 kg)              Today, you had the following     No orders found for display         Today's Medication Changes          These changes are accurate as of 7/19/18 11:59 PM.  If you have any questions, ask your nurse or doctor.               Start taking these medicines.        Dose/Directions    naproxen 500 MG tablet   Commonly known as:  NAPROSYN   Used for:  Acute idiopathic gout of left foot   Started by:  Clinician, Tomi Umfelix        Dose:  500 mg   Take 1 tablet (500 mg) by mouth 2 times daily (with meals) for 7 days   Quantity:  14 tablet   Refills:  0            Where to get your medicines      Some of these will need a paper prescription and others can be bought over the counter.  Ask your nurse if you have questions.     Bring a paper prescription for each of these medications     glipiZIDE 10 MG tablet    lisinopril 20 MG tablet    metFORMIN 1000 MG tablet    naproxen 500 MG tablet                Primary Care Provider    None Specified       No primary provider on file.        Equal Access to Services     TIMOTEO SAGASTUME : Shanae Thibodeaux, wadena lainez, qaybchano kaalsara salcido, niels wray . So [facility] [phone].    ATENCIÓN: Si habla español, tiene a delaney disposición servicios gratuitos de asistencia lingüística. Llame al [phone].    We comply with applicable federal civil rights laws and [place] laws. We do not discriminate on the basis of race, color, national origin, age, disability, sex, sexual orientation, or gender identity.            Thank you!     Thank you for choosing [facility]  for your care. Our goal is always to provide you with excellent care. Hearing back from our patients is one way we can continue to improve our services. Please take a few minutes to complete the written survey that you may receive in the mail after your  visit with us. Thank you!             Your Updated Medication List - Protect others around you: Learn how to safely use, store and throw away your medicines at www.disposemymeds.org.          This list is accurate as of 7/19/18 11:59 PM.  Always use your most recent med list.                   Brand Name Dispense Instructions for use Diagnosis    Alpha-Lipoic Acid 300 MG Caps      Take 500 mg by mouth daily        blood glucose monitoring lancets     102 each    Use to test blood sugar once daily or as directed.    Essential hypertension       blood glucose monitoring meter device kit     1 kit    Use to test blood sugar once daily or as directed.    Diabetes mellitus without complication (H)       blood glucose monitoring test strip    ACCU-CHEK SHERRY    50 strip    Use to test blood sugar once daily or as directed.    Diabetes mellitus without complication (H)       glipiZIDE 10 MG tablet    GLUCOTROL    60 tablet    Take 1 tablet (10 mg) by mouth 2 times daily (before meals)    Diabetes mellitus without complication (H)       lisinopril 20 MG tablet    PRINIVIL/ZESTRIL    60 tablet    Take 2 tablets (40 mg) by mouth daily    Essential hypertension       MAGNESIUM OXIDE PO      Take 250 mg by mouth At Bedtime        metFORMIN 1000 MG tablet    GLUCOPHAGE    60 tablet    Take 1 tablet (1,000 mg) by mouth 2 times daily (with meals)    Diabetes mellitus without complication (H)       naproxen 500 MG tablet    NAPROSYN    14 tablet    Take 1 tablet (500 mg) by mouth 2 times daily (with meals) for 7 days    Acute idiopathic gout of left foot

## 2018-07-19 NOTE — Clinical Note
I have completed my note, please review, add tie in statement and close encounter.   Thanks!   [email or web address] [phone]

## 2018-07-20 NOTE — PROGRESS NOTES
MEDICINE NOTE    SUBJECTIVE:  Nabil is a 59yo male who presents to the clinic [facility] for refills of lisinopril, glipizide, and metformin; and for what he believes to be gout in his left hallux. Nabil is happy with his medications at their current doses, is not experiencing any side effects, and would like to continue his medications at the same doses. His blood pressure is well managed and is the best it has been for many years according to Nabil. He still has high blood glucose levels but is very adamant about not taking insulin and is currently trying lifestyle modifications to help lower his levels. Nabil began a fasting diet about 8 weeks ago to lower his blood glucose levels. This diet consists of 23hrs of fasting twice a week. Nabil does not take glipizide whenever he is fasting. He monitors his blood glucose throughout different time points of his fasting and has never become hypoglycemic. He feels that fasting is helping, and is able to get his blood glucose down to around 200 after a fast. His normal range is 230-250 in the mornings. After about 4 weeks of fasting he experienced a sudden onset of significant pain and swelling in his left hallux which he believes to be gout. He has not noticed any breaks in his skin around the toe and the pain and swelling has not radiated. He used to walk 2-4 miles every day but has had to reduce his walking since the onset of the swelling due to the pain. He has been soaking his toe in cold water, and feels that staying off of his foot and the cold soaks have helped some but he is still experiencing pain in his toe. This pain and inflammation in his toe has been present for the past four weeks. Nabil would like to know if there is an uric acid blocking drug that he could take that would treat gout and allow him to get back to walking and also continue his fasting.     REVIEW OF SYSTEMS:  Gen: No fevers  Eyes: Some diabetic retinopathy, progressively worsening vision over the  Patient needs to be advised no further dose changes until appt.    Rx for 21 tab to use in addition to current 6 tablet.   May take one tab tid until follow up.      Rx completed. Please call to pharmacy of choice and notify patient.        past 12 yrs  Musculoskeletal: Pain and swelling in the left hallux  Skin: no concerning lesions  Neuro: Diabetic neuropathy in hands and feet    No past medical history on file.    No past surgical history on file.    No family history on file.    Social History     Social History     Marital status: Single     Spouse name: N/A     Number of children: N/A     Years of education: N/A     Occupational History     Not on file.     Social History Main Topics     Smoking status: Never Smoker     Smokeless tobacco: Not on file     Alcohol use No     Drug use: No     Sexual activity: Not on file     Other Topics Concern     Not on file     Social History Narrative    6/29/17 - Pt was interested in getting help filling out [employer] application online to receive medicaid. CHW gave him the list of information he needed to complete online application. He stated he had a tax extension for 2016 so he did not have tax forms that the application required. He was going to work on the application with tax information from 2015 later tonight. AM        7/27/17:    Pt wanted to get more information about gyms that are low-cost within the [facility]. Pt's asked for a gym with a membership fee of $10-$20 (max), that also had a walking track and a hot tub/sauna. We told him that options that fit these requirements he mentioned were very limited. He then stated that he heard that the [facility] offered a $5 fee for track-usage only, however, we could not call [facility] since it was closed. We suggested to the patient to call tomorrow and also mentioned there are gyms that are low-cost nearby, however, might not have a track or hot tub/sauna. We also suggested that he could walk around a lake would also be a cheap-alternative. -HB and NV        8/28/17:    Pt was here for a med refill. We asked him about his gym membership and access based on CHW's previous encounter with him and he said he tried to walk outside more this summer, but still  did not find an affordable option. He did not have need for any other services at the time.     - AA and KT         9/28/17:    Pt had questions about free/low cost (under $10/month) walking tracks.  Found a website to walk through with the patient and shared different resources. - AB and HB        7/19/18: Pt. Was not seen tonight.- ZR, KP       OBJECTIVE:  Physical Exam:  /84 (BP Location: Left arm, Patient Position: Sitting, Cuff Size: Adult Regular)  Pulse 69  Temp 98.4  F (36.9  C) (Oral)  Resp 19  Ht 6' (182.9 cm)  Wt 209 lb 14.4 oz (95.2 kg)  BMI 28.47 kg/m2  Constitutional: no distress, comfortable, pleasant   Cardiovascular: Palpable left DP and PT pulses  Musculoskeletal: Inflamed left hallux, slightly tender, not hot to the touch   Skin: no venous ulcers near feet    ASSESSMENT/PLAN:  Diagnoses and all orders for this visit:    Gout    Diabetes mellitus without complication (H)  -     glipiZIDE (GLUCOTROL) 10 MG tablet; Take 1 tablet (10 mg) by mouth 2 times daily (before meals)  -     metFORMIN (GLUCOPHAGE) 1000 MG tablet; Take 1 tablet (1,000 mg) by mouth 2 times daily (with meals)    Essential hypertension  -     lisinopril (PRINIVIL/ZESTRIL) 20 MG tablet; Take 2 tablets (40 mg) by mouth daily    Acute idiopathic gout of left foot  -     naproxen (NAPROSYN) 500 MG tablet; Take 1 tablet (500 mg) by mouth 2 times daily (with meals) for 7 days    BMP should be ordered on future date to recheck kidney function.    I suggested to Nabil to consider modifying his diet since fasting does not seem to be lowering his blood glucose levels consistently and may be triggering the gout, but he is not interested in trying to eat a healthier diet. He will, however, try other unspecified modifications. It may be beneficial for him to talk with nutrition during a future visit. Would defer any increase in his oral hypoglycemics as we are concerned that improved glycemic control may cause him to become  hypoglycemic while he is fasting. Have advised naproxen as needed for left toe pain, which could represent resolving gout.     Med Clinician: Kathy Pepe  Preceptor: Devon Callahan    In supervising the medical student, I repeated the exam documented above.  I have reviewed and verified the student s documentation and modified where appropriate.  Supervising Provider: Devon Callahan, MD on 7/23/2018 at 12:24 PM

## 2018-07-20 NOTE — NURSING NOTE
Patient was seen today for medication refills of Metformin, Lisinopril, and Glipizide. Patient has been on medications long term and is educated on potential side effects. Reports higher blood sugar numbers in the low-mid 200's. Walks 2 miles daily and reports fairly healthy diet. Patient has tried intermittent 24hr fasting 2 times a week and says that has helped lower blood sugars. However, patient is experiencing gout in toes from fasting periods and says that makes it difficult to walk. Is interested in more education regarding fasting, gout, and uric acid and potentially wants to start an anti-uric acid med to combat gout as he says it helps manage his blood glucose levels.

## 2018-07-21 RX ORDER — PERPHENAZINE 16 MG
600 TABLET ORAL DAILY
COMMUNITY

## 2018-07-21 NOTE — PROGRESS NOTES
[facility] Progress Note    Chief complaint: Medication refill and potential gout    Last date of full med (Med Refill only):    Subjective:  CLAUDIO presents to clinic for a refill on his glipizide, metformin and lisinopril, as well as receive treatment for what CLAUDIO self-identified as gout. CLAUDIO said he is only at [facility] to gain a one month supply of his medication since his insurance will begin in August 2018. He says he will start seeking continual primary care elsewhere once he has insurance. Additionally, CLAUDIO reports drinking 2-3 cups of caffeine 2-3 times per week. He does not smoke, drink alcohol or use illicit drugs.     Condition 1: Diabetes - CLAUDIO reports walking 2-4 miles daily for his overall health and wellness. CLAUDIO reports he has moderate diabetic-related neuropathy in his toes, and slight neuropathy in his fingers. He checks his feet nightly with a mirror, and reports his feet have been normal (until about 1 month ago, see condition 2 - gout). CLAUDIO reports he tests his blood sugar once daily first thing in the morning before eating, and his readings range from 230-250 mg/dL. About 1 month ago, CLAUDIO started an intermittent fasting regimen where he does 23 hours of fasting twice a week (starting Sunday night at 6pm and Thursday night at 6pm). CLAUDIO started this fasting regimen because he is willing to exhaust every option before using insulin to lower his blood sugar -- CLAUDIO extensively researches his various conditions and is quite knowledgable about them. CLAUDIO reports the morning during his fasting (Monday and Friday mornings) his glucose levels are approximately 200 mg/dL. Additionally, CLAUDIO reports he does not use glipizide while fasting. Overall, CLAUDIO has continued to fast for the last month and says he does not have any trouble with not eating during fasting, and is happy that his fasting has decreased his blood sugar levels.      Condition 2: Gout - A few days after starting his fasting regimen (about a month ago), CLAUDIO had  walked about 4 miles several days in a row. He noticed unusual swelling in his left big toe, and it was painful to the touch. CLAUDIO believes this is gout. Since the onset, he has decreased his walking, as it is painful to walk on. Every night CLAUDIO runs cold water from the faucet over his toe to help with the swelling and pain, and would like medication to help treat his toe. CLAUDIO has continued his fasting regimen ever since the incident, and the swelling was still present during the visit.     Condition 3: Hypertension - CLAUDIO reports starting lisinopril 10mg daily for his high blood pressure about 3 years ago, and has continued to take lisinopril since. He reports [facility] increased his dose around August 2017 from 10mg daily to 20mg daily in order to help lower his blood pressure. He says [facility] increased his dose again around October 2017 from 20mg daily to 40 mg daily in order to further lower his blood pressure. During a visit to [facility] on 9/28/2017 it was documented the pharmacy student encouraged GR to start walking 2 miles daily. CLAUDIO now reports he walks 2-4 miles daily, and he believes the addition of walking has helped lower his blood pressure.      Current Outpatient Prescriptions   Medication Sig Dispense Refill     Alpha-Lipoic Acid 300 MG CAPS Take 500 mg by mouth daily       blood glucose monitoring (ACCU-CHEK SHERRY PLUS) meter device kit Use to test blood sugar once daily or as directed. 1 kit 0     blood glucose monitoring (ACCU-CHEK SHERRY) test strip Use to test blood sugar once daily or as directed. 50 strip 0     blood glucose monitoring (ACCU-CHEK FASTCLIX) lancets Use to test blood sugar once daily or as directed. 102 each 0     glipiZIDE (GLUCOTROL) 10 MG tablet Take 1 tablet (10 mg) by mouth 2 times daily (before meals) 60 tablet 0     lisinopril (PRINIVIL/ZESTRIL) 20 MG tablet Take 2 tablets (40 mg) by mouth daily 60 tablet 0     MAGNESIUM OXIDE PO Take 250 mg by mouth At Bedtime       metFORMIN (GLUCOPHAGE) 1000  MG tablet Take 1 tablet (1,000 mg) by mouth 2 times daily (with meals) 60 tablet 0     naproxen (NAPROSYN) 500 MG tablet Take 1 tablet (500 mg) by mouth 2 times daily (with meals) for 7 days 14 tablet 0         Objective:   /84 (BP Location: Left arm, Patient Position: Sitting, Cuff Size: Adult Regular)  Pulse 69  Temp 98.4  F (36.9  C) (Oral)  Resp 19  Ht 6' (182.9 cm)  Wt 209 lb 14.4 oz (95.2 kg)  BMI 28.47 kg/m2    Assessment:     Condition 1- Diabetes  DTP: Indication - needs additional therapy  Rationale: According to CLAUDIO's self-recorded blood glucose readings, his levels are still high. The acceptable range for preprandial capillary PG (before eating) is  mg/dL per ADA 2016 guidelines. A long-acting insulin would be best for CLAUDIO to help lower his baseline blood glucose levels, but CLAUDIO refuses to use insulin until he has exhausted all other options. We then suggested he highly considers changing his diet since he has already increased his physical activity via walking. Our hope is when he gets insurance, a primary physician will be able to convince him of changing his diet or consider using insulin to lower his blood glucose levels. If CLAUDIO does come back to clinic, nutrition may be a great specialty for him to chat with.    Condition 2- Gout   DTP: Indication - needs additional therapy  Rationale: Upon examination by our medical student and med preceptor, it was confirmed CLAUDIO does have an acute case of gout. Please refer to the medical student's progress notes (Kathy Pepe) for further details of examination. We decided to dispense Naproxen 500mg (take 1 (500 mg) tablet by mouth twice daily) to help with his swelling and pain. We dispensed a one-week supply of naproxen, but informed the patient the swelling should dissipate within 3-5 days.    Condition 3- Hypertension  Rationale: MAMIE's blood pressure was 134/84 mmHg, which is within the acceptable range of <140/90 mmHg per ADA 2018 guidelines.  MAMIE's history of blood pressure readings have been near or above 140/90 mmHg. This visit's readings linger near the acceptable blood pressure readings, so if MAMIE continues his care through [facility] (he says he will be starting primary care elsewhere once he gains insurance in August 2018) he should be monitored to see if additional drug therapy is necessary, such as a calcium channel blocker. At a previous [facility] visit on 11/2/2017, MAMIE did not want to start additional therapy for blood pressure, such as a calcium channel blocker.     Plan:  1. When CLAUDIO gains insurance again, he plans to follow up with a primary physician to receive continual care.   2. We advised CLAUDIO to continue taking his medications, as well as consider changing his diet to continue decreasing his blood sugar levels.   3. We dispensed Naproxen 500mg for treatment of CLAUDIO's gout, with the instructions of take 1 tablet (500 mg) by mouth twice daily. We informed the patient the swelling should dissipate within 3-5 days, but we gave a one-week supply just in case.          PharmCare Clinician: Karely Corley  Pharmacy Preceptor: Dr. Ross    I am signing this for Dr. Ross who has been unable to sign this note in a timely manner.  The content of this note has been reviewed by the preceptor for accuracy.  I did not participate in the care of this patient.  Alex Ortiz MD - Medical Director, [facility]

## 2019-07-29 ENCOUNTER — APPOINTMENT (OUTPATIENT)
Dept: CT IMAGING | Facility: CLINIC | Age: 59
End: 2019-07-29
Attending: EMERGENCY MEDICINE
Payer: COMMERCIAL

## 2019-07-29 ENCOUNTER — HOSPITAL ENCOUNTER (EMERGENCY)
Facility: CLINIC | Age: 59
Discharge: HOME | End: 2019-07-29
Attending: EMERGENCY MEDICINE | Admitting: EMERGENCY MEDICINE
Payer: COMMERCIAL

## 2019-07-29 VITALS
TEMPERATURE: 97.8 F | OXYGEN SATURATION: 98 % | SYSTOLIC BLOOD PRESSURE: 156 MMHG | WEIGHT: 190 LBS | HEIGHT: 72 IN | HEART RATE: 91 BPM | RESPIRATION RATE: 16 BRPM | BODY MASS INDEX: 25.73 KG/M2 | DIASTOLIC BLOOD PRESSURE: 99 MMHG

## 2019-07-29 DIAGNOSIS — N40.0 ENLARGED PROSTATE: ICD-10-CM

## 2019-07-29 DIAGNOSIS — N39.0 URINARY TRACT INFECTION WITHOUT HEMATURIA, SITE UNSPECIFIED: ICD-10-CM

## 2019-07-29 DIAGNOSIS — E87.1 HYPONATREMIA: ICD-10-CM

## 2019-07-29 DIAGNOSIS — R33.9 URINARY RETENTION: ICD-10-CM

## 2019-07-29 LAB
ALBUMIN UR-MCNC: 30 MG/DL
ANION GAP SERPL CALCULATED.3IONS-SCNC: 9 MMOL/L (ref 3–14)
APPEARANCE UR: CLEAR
BACTERIA #/AREA URNS HPF: ABNORMAL /HPF
BASOPHILS # BLD AUTO: 0 10E9/L (ref 0–0.2)
BASOPHILS NFR BLD AUTO: 0.1 %
BILIRUB UR QL STRIP: ABNORMAL
BUN SERPL-MCNC: 11 MG/DL (ref 7–30)
CALCIUM SERPL-MCNC: 8.6 MG/DL (ref 8.5–10.1)
CHLORIDE SERPL-SCNC: 92 MMOL/L (ref 94–109)
CO2 BLDCOV-SCNC: 22 MMOL/L (ref 21–28)
CO2 SERPL-SCNC: 24 MMOL/L (ref 20–32)
COLOR UR AUTO: ABNORMAL
CREAT SERPL-MCNC: 1.02 MG/DL (ref 0.66–1.25)
DIFFERENTIAL METHOD BLD: ABNORMAL
EOSINOPHIL # BLD AUTO: 0.2 10E9/L (ref 0–0.7)
EOSINOPHIL NFR BLD AUTO: 0.9 %
ERYTHROCYTE [DISTWIDTH] IN BLOOD BY AUTOMATED COUNT: 12.7 % (ref 10–15)
GFR SERPL CREATININE-BSD FRML MDRD: 79 ML/MIN/{1.73_M2}
GLUCOSE SERPL-MCNC: 220 MG/DL (ref 70–99)
GLUCOSE UR STRIP-MCNC: 150 MG/DL
HCT VFR BLD AUTO: 34.7 % (ref 40–53)
HGB BLD-MCNC: 12.5 G/DL (ref 13.3–17.7)
HGB UR QL STRIP: ABNORMAL
IMM GRANULOCYTES # BLD: 0.1 10E9/L (ref 0–0.4)
IMM GRANULOCYTES NFR BLD: 0.5 %
KETONES UR STRIP-MCNC: NEGATIVE MG/DL
LACTATE BLD-SCNC: 1.1 MMOL/L (ref 0.7–2.1)
LEUKOCYTE ESTERASE UR QL STRIP: ABNORMAL
LYMPHOCYTES # BLD AUTO: 1.8 10E9/L (ref 0.8–5.3)
LYMPHOCYTES NFR BLD AUTO: 8.7 %
MCH RBC QN AUTO: 28.3 PG (ref 26.5–33)
MCHC RBC AUTO-ENTMCNC: 36 G/DL (ref 31.5–36.5)
MCV RBC AUTO: 79 FL (ref 78–100)
MONOCYTES # BLD AUTO: 1.6 10E9/L (ref 0–1.3)
MONOCYTES NFR BLD AUTO: 7.5 %
NEUTROPHILS # BLD AUTO: 17.3 10E9/L (ref 1.6–8.3)
NEUTROPHILS NFR BLD AUTO: 82.3 %
NITRATE UR QL: POSITIVE
NRBC # BLD AUTO: 0 10*3/UL
NRBC BLD AUTO-RTO: 0 /100
PCO2 BLDV: 35 MM HG (ref 40–50)
PH BLDV: 7.41 PH (ref 7.32–7.43)
PH UR STRIP: 6 PH (ref 5–7)
PLATELET # BLD AUTO: 284 10E9/L (ref 150–450)
PO2 BLDV: 26 MM HG (ref 25–47)
POTASSIUM SERPL-SCNC: 3.9 MMOL/L (ref 3.4–5.3)
RBC # BLD AUTO: 4.41 10E12/L (ref 4.4–5.9)
RBC #/AREA URNS AUTO: 1 /HPF (ref 0–2)
SAO2 % BLDV FROM PO2: 50 %
SODIUM SERPL-SCNC: 125 MMOL/L (ref 133–144)
SOURCE: ABNORMAL
SP GR UR STRIP: 1 (ref 1–1.03)
SQUAMOUS #/AREA URNS AUTO: <1 /HPF (ref 0–1)
TRANS CELLS #/AREA URNS HPF: <1 /HPF (ref 0–1)
UROBILINOGEN UR STRIP-MCNC: 2 MG/DL (ref 0–2)
WBC # BLD AUTO: 21 10E9/L (ref 4–11)
WBC #/AREA URNS AUTO: 80 /HPF (ref 0–5)
WBC CLUMPS #/AREA URNS HPF: PRESENT /HPF

## 2019-07-29 PROCEDURE — 96365 THER/PROPH/DIAG IV INF INIT: CPT | Mod: 59

## 2019-07-29 PROCEDURE — 82803 BLOOD GASES ANY COMBINATION: CPT

## 2019-07-29 PROCEDURE — 25800030 ZZH RX IP 258 OP 636: Performed by: EMERGENCY MEDICINE

## 2019-07-29 PROCEDURE — 25000132 ZZH RX MED GY IP 250 OP 250 PS 637: Performed by: EMERGENCY MEDICINE

## 2019-07-29 PROCEDURE — 96361 HYDRATE IV INFUSION ADD-ON: CPT

## 2019-07-29 PROCEDURE — 25500064 ZZH RX 255 OP 636: Performed by: EMERGENCY MEDICINE

## 2019-07-29 PROCEDURE — 99285 EMERGENCY DEPT VISIT HI MDM: CPT | Mod: 25

## 2019-07-29 PROCEDURE — 25000125 ZZHC RX 250: Performed by: EMERGENCY MEDICINE

## 2019-07-29 PROCEDURE — 85025 COMPLETE CBC W/AUTO DIFF WBC: CPT | Performed by: EMERGENCY MEDICINE

## 2019-07-29 PROCEDURE — 51798 US URINE CAPACITY MEASURE: CPT

## 2019-07-29 PROCEDURE — 81001 URINALYSIS AUTO W/SCOPE: CPT | Performed by: EMERGENCY MEDICINE

## 2019-07-29 PROCEDURE — 87086 URINE CULTURE/COLONY COUNT: CPT | Performed by: EMERGENCY MEDICINE

## 2019-07-29 PROCEDURE — 51702 INSERT TEMP BLADDER CATH: CPT

## 2019-07-29 PROCEDURE — 25000128 H RX IP 250 OP 636: Performed by: EMERGENCY MEDICINE

## 2019-07-29 PROCEDURE — 74177 CT ABD & PELVIS W/CONTRAST: CPT

## 2019-07-29 PROCEDURE — 80048 BASIC METABOLIC PNL TOTAL CA: CPT | Performed by: EMERGENCY MEDICINE

## 2019-07-29 PROCEDURE — 83605 ASSAY OF LACTIC ACID: CPT

## 2019-07-29 RX ORDER — SODIUM CHLORIDE, SODIUM LACTATE, POTASSIUM CHLORIDE, CALCIUM CHLORIDE 600; 310; 30; 20 MG/100ML; MG/100ML; MG/100ML; MG/100ML
1000 INJECTION, SOLUTION INTRAVENOUS CONTINUOUS
Status: DISCONTINUED | OUTPATIENT
Start: 2019-07-29 | End: 2019-07-29 | Stop reason: HOSPADM

## 2019-07-29 RX ORDER — ATROPA BELLADONNA AND OPIUM 16.2; 3 MG/1; MG/1
30 SUPPOSITORY RECTAL ONCE
Status: COMPLETED | OUTPATIENT
Start: 2019-07-29 | End: 2019-07-29

## 2019-07-29 RX ORDER — LIDOCAINE HYDROCHLORIDE 20 MG/ML
10 JELLY TOPICAL ONCE
Status: DISCONTINUED | OUTPATIENT
Start: 2019-07-29 | End: 2019-07-29

## 2019-07-29 RX ORDER — CIPROFLOXACIN 500 MG/1
500 TABLET, FILM COATED ORAL 2 TIMES DAILY
Qty: 14 TABLET | Refills: 0 | Status: SHIPPED | OUTPATIENT
Start: 2019-07-29 | End: 2019-08-05

## 2019-07-29 RX ORDER — HYDROCODONE BITARTRATE AND ACETAMINOPHEN 5; 325 MG/1; MG/1
1 TABLET ORAL ONCE
Status: COMPLETED | OUTPATIENT
Start: 2019-07-29 | End: 2019-07-29

## 2019-07-29 RX ORDER — CHOLECALCIFEROL (VITAMIN D3) 50 MCG
1 TABLET ORAL DAILY
COMMUNITY

## 2019-07-29 RX ORDER — SULFAMETHOXAZOLE/TRIMETHOPRIM 800-160 MG
1 TABLET ORAL 2 TIMES DAILY
COMMUNITY
Start: 2019-07-23 | End: 2019-09-06

## 2019-07-29 RX ORDER — POLYETHYLENE GLYCOL 3350 17 G/17G
1 POWDER, FOR SOLUTION ORAL DAILY
COMMUNITY
End: 2020-07-08

## 2019-07-29 RX ORDER — CYCLOBENZAPRINE HCL 10 MG
10 TABLET ORAL 3 TIMES DAILY PRN
Qty: 15 TABLET | Refills: 0 | Status: ON HOLD | OUTPATIENT
Start: 2019-07-29 | End: 2020-06-21

## 2019-07-29 RX ORDER — CYCLOBENZAPRINE HCL 10 MG
10 TABLET ORAL ONCE
Status: COMPLETED | OUTPATIENT
Start: 2019-07-29 | End: 2019-07-29

## 2019-07-29 RX ORDER — LISINOPRIL 20 MG/1
2.5 TABLET ORAL DAILY
Status: ON HOLD | COMMUNITY
End: 2020-06-21

## 2019-07-29 RX ORDER — MULTIVITAMIN WITH IRON
1 TABLET ORAL AT BEDTIME
Status: ON HOLD | COMMUNITY
End: 2020-06-21

## 2019-07-29 RX ORDER — LIDOCAINE HYDROCHLORIDE 20 MG/ML
10 JELLY TOPICAL ONCE
Status: COMPLETED | OUTPATIENT
Start: 2019-07-29 | End: 2019-07-29

## 2019-07-29 RX ORDER — CEFTRIAXONE 2 G/1
2 INJECTION, POWDER, FOR SOLUTION INTRAMUSCULAR; INTRAVENOUS ONCE
Status: COMPLETED | OUTPATIENT
Start: 2019-07-29 | End: 2019-07-29

## 2019-07-29 RX ORDER — TAMSULOSIN HYDROCHLORIDE 0.4 MG/1
0.4 CAPSULE ORAL EVERY EVENING
Status: ON HOLD | COMMUNITY
End: 2020-06-11

## 2019-07-29 RX ORDER — CIPROFLOXACIN 500 MG/1
750 TABLET, FILM COATED ORAL
Status: ON HOLD | COMMUNITY
End: 2020-06-11

## 2019-07-29 RX ORDER — SENNOSIDES 8.6 MG
1 TABLET ORAL 2 TIMES DAILY
COMMUNITY
End: 2020-07-08

## 2019-07-29 RX ADMIN — HYDROCODONE BITARTRATE AND ACETAMINOPHEN 1 TABLET: 5; 325 TABLET ORAL at 08:42

## 2019-07-29 RX ADMIN — SODIUM CHLORIDE, POTASSIUM CHLORIDE, SODIUM LACTATE AND CALCIUM CHLORIDE 1000 ML: 600; 310; 30; 20 INJECTION, SOLUTION INTRAVENOUS at 06:16

## 2019-07-29 RX ADMIN — IOHEXOL 95 ML: 350 INJECTION, SOLUTION INTRAVENOUS at 07:01

## 2019-07-29 RX ADMIN — LIDOCAINE HYDROCHLORIDE 6 ML: 20 JELLY TOPICAL at 08:14

## 2019-07-29 RX ADMIN — ATROPA BELLADONNA AND OPIUM 1 SUPPOSITORY: 16.2; 3 SUPPOSITORY RECTAL at 09:11

## 2019-07-29 RX ADMIN — CYCLOBENZAPRINE HYDROCHLORIDE 10 MG: 10 TABLET, FILM COATED ORAL at 10:36

## 2019-07-29 RX ADMIN — SODIUM CHLORIDE 69 ML: 9 INJECTION, SOLUTION INTRAVENOUS at 07:01

## 2019-07-29 RX ADMIN — CEFTRIAXONE SODIUM 2 G: 2 INJECTION, POWDER, FOR SOLUTION INTRAMUSCULAR; INTRAVENOUS at 07:04

## 2019-07-29 ASSESSMENT — ENCOUNTER SYMPTOMS
DIFFICULTY URINATING: 1
FEVER: 0
RECTAL PAIN: 1

## 2019-07-29 ASSESSMENT — MIFFLIN-ST. JEOR: SCORE: 1704.83

## 2019-07-29 NOTE — ED PROVIDER NOTES
History     Chief Complaint:  Dysuria      HPI   Nabil Cheung is a 59 year old male who presents with difficulty urinating. The patient states that it is difficult for him to start and continue urinating with 8 to 9/10 pain. He says it takes his breath away. He finally went in for evaluation one and a half weeks ago. After painful digital rectal exam and other laboratory studies he was diagnosed with prostatitis and discharged on Senakot, Miralax, and Bactrim. However, patient reports he is not producing much stool and becomes exhausted from the act of voiding both urine and stool because of the pain in both his anus and urethra.     UA: Urein glucose >1000, Blood small, Protein 100  Trichomonas PCR: negative  Chlamydia and neisseria: negative   ALT: 17  AST: 13  HGB A1c: 12.1  Hepatitis C antibody: nonreactive  BMP: potassium 132, Glucose 336, WNL (Creatinine 1.08)  TSH: 3.92  HIV combo: nonreactive       Allergies:  No known drug allergies     Medications:    Alpha-lipoic acid  Glipizide  Lisinopril  Metformin  Miralax  Senokot  Flomax  Bactrim  Pyridium    Past Medical History:    Diabetes   Hypertension  Microalbuminuria  Thyroid nodule    Past Surgical History:    History reviewed. No pertinent surgical history.    Family History:    Diabetes  Cancer    Social History:  Smoking status: Never smoker  Alcohol use: No  Marital Status:  Single [1]     Review of Systems   Constitutional: Negative for fever.   Gastrointestinal: Positive for rectal pain.   Genitourinary: Positive for difficulty urinating.   All other systems reviewed and are negative.        Physical Exam     Patient Vitals for the past 24 hrs:   BP Temp Temp src Heart Rate Resp SpO2 Height Weight   07/29/19 0512 (!) 142/94 97.8  F (36.6  C) Oral 102 16 98 % 1.829 m (6') 86.2 kg (190 lb)         Physical Exam  General: Alert, interactive in mild distress  Head:  Scalp is atraumatic  Eyes:  The pupils are equal, round, and reactive to light    EOM's  intact    No scleral icterus  ENT:      Nose:  The external nose is normal  Ears:  External ears are normal  Mouth/Throat: The oropharynx is normal    Mucus membranes are moist      Neck:  Normal range of motion.      There is no rigidity.    Trachea is in the midline         CV:  Regular rate and rhythm    No murmur   Resp:  Breath sounds are clear bilaterally    Non-labored, no retractions or accessory muscle use      GI:  Abdomen is soft, no distension, mild LLQ tenderness.       MS:  Normal strength in all 4 extremities, No CVAtenderness  Skin:  Warm and dry, No rash or lesions noted.  Neuro: Strength 5/5 x4.  Sensation intact  In all 4 extremities.        Psych:  Awake. Alert.  Normal affect.      Appropriate interactions.      Emergency Department Course     Imaging:  Radiographic findings were communicated with the patient who voiced understanding of the findings.    CT abdomen pelvis w contrast  CT Abdomen Pelvis w Contrast   Final Result   IMPRESSION:   1.  No acute abnormalities in the abdomen or pelvis.   2.  Colonic diverticulosis without evidence of diverticulitis.   3.  Prostatomegaly.      HOMAR LOAIZA MD          Laboratory:  CBC: WBC 21.0, HGB 12.5,   BMP: Sodium 125, Chloride 92, Glucose 220, WNL (Creatinine 1.02)  ISTAT gases lactate ashlee POCT: pH 7.41, PCO2 35, PO2 26, Bicarbonate 22    UA: Glucose 150, bilirubin small, blood trace, protein albumin 30, nitrite positive, leukocyte esterase large, WBC 80, bacteria few, o/w negative     Interventions:  0559: LR bolus 1000 ml IV  0624: Rocephin 2 g in 100ml NaCl IV infusion    Emergency Department Course:  Past medical records, nursing notes, and vitals reviewed.  0516: I performed an exam of the patient and obtained history, as documented above.    IV inserted and blood drawn.    The patient was sent for a CT abdomen pelvis while in the emergency department, findings above.    Discussed benefits and risk of Winchester catheter placement and the  patient is requesting this which I find reasonable given the urinary retention and discomfort.    Impression & Plan      Medical Decision Making:  Following presentation history and physical examination were performed findings are consistent with an acute UTI, patient has been retaining urine and I think this is likely secondary to his enlarged prostate.  He is not improving with Bactrim therefore I have added ciprofloxacin to his regimen.  Urine culture is pending at this time.  Patient does have a leukocytosis however has a normal lactate, no fever, and his heart rate is normal.  I doubt sepsis or more concerning illness.  A Winchester catheter was placed for his comfort and he will follow-up with his primary care provider as well as urologist he will return here if new symptoms develop.  He was informed of the hyponatremia he states he has been drinking large amounts of water to flush his  System.  Patient was feeling improved and was comfortable with this plan of action was subsequently discharged home.    Diagnosis:    ICD-10-CM    1. Urinary tract infection without hematuria, site unspecified N39.0 Urine Culture   2. Enlarged prostate N40.0    3. Hyponatremia E87.1    4. Urinary retention R33.9        Disposition:  discharged to home    Discharge Medications:     Medication List      Started    ciprofloxacin 500 MG tablet  Commonly known as:  CIPRO  500 mg, Oral, 2 TIMES DAILY              Mesfin Barcenas  7/29/2019    EMERGENCY DEPARTMENT    Scribe Disclosure:  I, Mesfin Barcenas, am serving as a scribe at 5:16 AM on 7/29/2019 to document services personally performed by García Trent,* based on my observations and the provider's statements to me.          García Trent MD  07/29/19 8539

## 2019-07-29 NOTE — ED NOTES
"[facility]  ED Nurse Handoff Report    ED Chief complaint: Dysuria      ED Diagnosis:   Final diagnoses:   Urinary tract infection without hematuria, site unspecified   Enlarged prostate   Hyponatremia   Urinary retention       Code Status: Full Code to be confirmed by admitting    Allergies:   Allergies   Allergen Reactions     Dust Mites        Activity level - Baseline/Home:  Independent  Activity Level - Current:   Independent    Patient's Preferred language: english   Needed?: No    Isolation: No  Infection: Not Applicable  Bariatric?: No    Vital Signs:   Vitals:    07/29/19 0715 07/29/19 0720 07/29/19 0740 07/29/19 0800   BP: 162/84 (!) 188/93 154/83 151/76   Pulse: 87  86 89   Resp: 16      Temp:       TempSrc:       SpO2:       Weight:       Height:           Cardiac Rhythm: ,        Pain level: 0-10 Pain Scale: 9    Is this patient confused?: No   Does this patient have a guardian?  No         If yes, is there guardianship documents in the Epic \"Code/ACP\" activity?  N/A         Guardian Notified?  N/A  [place] - Suicide Severity Rating Scale Completed?  Yes  If yes, what color did the patient score?  White    Patient Report: Initial Complaint: dysuria for past couple weeks. Diagnosed with prostatitis. Continues with dysuria and frequency.  Focused Assessment: Dysuria, polyuria, urinary retention, anxious  Tests Performed: labs, CT, UA  Abnormal Results:   Labs Ordered and Resulted from Time of ED Arrival Up to the Time of Departure from the ED   ROUTINE UA WITH MICROSCOPIC - Abnormal; Notable for the following components:       Result Value    Glucose Urine 150 (*)     Bilirubin Urine Small (*)     Blood Urine Trace (*)     Protein Albumin Urine 30 (*)     Nitrite Urine Positive (*)     Leukocyte Esterase Urine Large (*)     WBC Urine 80 (*)     WBC Clumps Present (*)     Bacteria Urine Few (*)     All other components within normal limits   BASIC METABOLIC PANEL - Abnormal; " Notable for the following components:    Sodium 125 (*)     Chloride 92 (*)     Glucose 220 (*)     All other components within normal limits   CBC WITH PLATELETS DIFFERENTIAL - Abnormal; Notable for the following components:    WBC 21.0 (*)     Hemoglobin 12.5 (*)     Hematocrit 34.7 (*)     Absolute Neutrophil 17.3 (*)     Absolute Monocytes 1.6 (*)     All other components within normal limits   ISTAT  GASES LACTATE PATRICK POCT - Abnormal; Notable for the following components:    PCO2 Venous 35 (*)     All other components within normal limits   BLADDER SCAN   PERIPHERAL IV CATHETER   FREE WATER   ISTAT GAS OR ELECTROLYTE NURSING POCT   NURSING DRAW AND HOLD   CONTINUE INDWELLING URINARY CATHETER (UMANA)   URINE CULTURE AEROBIC BACTERIAL     CT Abdomen Pelvis w Contrast   Final Result   IMPRESSION:   1.  No acute abnormalities in the abdomen or pelvis.   2.  Colonic diverticulosis without evidence of diverticulitis.   3.  Prostatomegaly.      HOMAR LOAIZA MD        Treatments provided: 1L LR bolus, norco, B&O suppository, flexeril, umana placement, rocephin    Family Comments: wife at bedside    OBS brochure/video discussed/provided to patient/family: Yes      ED Medications:   Medications   lactated ringers BOLUS 1,000 mL (0 mLs Intravenous Stopped 7/29/19 0842)     Followed by   lactated ringers infusion (has no administration in time range)   cefTRIAXone (ROCEPHIN) 2 g vial to attach to  ml bag for ADULTS or NS 50 ml bag for PEDS (0 g Intravenous Stopped 7/29/19 0745)   iohexol (OMNIPAQUE) 350 mg/mL solution 95 mL (95 mLs Intravenous Given 7/29/19 0701)   CT scan flush (69 mLs Intravenous Given 7/29/19 0701)   lidocaine (XYLOCAINE) 2 % external gel 10 mL (6 mLs Urethral Given 7/29/19 0814)   HYDROcodone-acetaminophen (NORCO) 5-325 MG per tablet 1 tablet (1 tablet Oral Given 7/29/19 0842)   opium-belladonna (B&O SUPPRETTES) 30-16.2 MG per suppository 1 suppository (1 suppository Rectal Given 7/29/19 0911)        Drips infusing?:  No    For the majority of the shift this patient was Green but very anxious and requires frequent rounding/reassurance  Interventions performed were care, rounding, reassurance    Severe Sepsis OR Septic Shock Diagnosis Present: No    To be done/followed up on inpatient unit:      ED NURSE PHONE NUMBER: *[phone]

## 2019-07-29 NOTE — ED AVS SNAPSHOT
Emergency Department  [address] [place] [address]  Phone:  [phone]  Fax:  [phone]                                    Nabil Cheung   MRN: [id number]    Department:   Emergency Department   Date of Visit:  7/29/2019           After Visit Summary Signature Page    I have received my discharge instructions, and my questions have been answered. I have discussed any challenges I see with this plan with the nurse or doctor.    ..........................................................................................................................................  Patient/Patient Representative Signature      ..........................................................................................................................................  Patient Representative Print Name and Relationship to Patient    ..................................................               ................................................  Date                                   Time    ..........................................................................................................................................  Reviewed by Signature/Title    ...................................................              ..............................................  Date                                               Time          22EPIC Rev 08/18

## 2019-07-29 NOTE — ED PROVIDER NOTES
ED course:  Patient received as a handoff from my partner Dr. Trent.  On face-to-face evaluation patient reports increasing bladder and leg spasms after [place] catheter placement.  Patient was provided Norco, Flexeril, and belladonna suppository with some improvement in symptoms.  Patient reported that he did not feel comfortable with discharge given his current symptoms and was offered hospital observation which he declined.  Prescribed Flexeril for muscle spasms.  Please see Dr. Trent's note for additional details.    Impression:    ICD-10-CM    1. Urinary tract infection without hematuria, site unspecified N39.0 Urine Culture   2. Enlarged prostate N40.0    3. Hyponatremia E87.1    4. Urinary retention R33.9        Disposition:  Discharge       Jaswant Dang, DO  07/29/19 1038       Jaswant Dang, DO  07/29/19 1151

## 2019-07-29 NOTE — PHARMACY-ADMISSION MEDICATION HISTORY
Admission medication history interview status for the 7/29/2019  admission is complete. See EPIC admission navigator for prior to admission medications     Medication history source reliability:Good    Actions taken by pharmacist (provider contacted, etc): Interviewed patient, had med bottles with him.      Additional medication history information not noted on PTA med list :None    Medication reconciliation/reorder completed by provider prior to medication history? No    Time spent in this activity: 15 minutes    Prior to Admission medications    Medication Sig Last Dose Taking? Auth Provider   alpha-lipoic acid 600 MG capsule Take 600 mg by mouth daily  7/28/2019 at Unknown time Yes Unknown, Entered By History   ciprofloxacin (CIPRO) 500 MG tablet Take 1 tablet (500 mg) by mouth 2 times daily for 7 days  Yes Trigger, García Foster MD   ciprofloxacin (CIPRO) 500 MG tablet Take 750 mg by mouth once as needed (prior to prostate biopsy)  at prn Yes Unknown, Entered By History   lisinopril (PRINIVIL/ZESTRIL) 20 MG tablet Take 20 mg by mouth 2 times daily 7/28/2019 at pm Yes Unknown, Entered By History   magnesium 250 MG tablet Take 1 tablet by mouth At Bedtime 7/28/2019 at pm Yes Unknown, Entered By History   metFORMIN (GLUCOPHAGE) 1000 MG tablet Take 1 tablet (1,000 mg) by mouth 2 times daily (with meals) 7/28/2019 at pm Yes Eduardo Dash MD   polyethylene glycol (MIRALAX/GLYCOLAX) packet Take 1 packet by mouth daily 7/28/2019 at am Yes Unknown, Entered By History   sennosides (SENOKOT) 8.6 MG tablet Take 1 tablet by mouth 2 times daily 7/28/2019 at pm Yes Unknown, Entered By History   sulfamethoxazole-trimethoprim (BACTRIM DS/SEPTRA DS) 800-160 MG tablet Take 1 tablet by mouth 2 times daily 45 days 7/28/2019 at pm Yes Unknown, Entered By History   tamsulosin (FLOMAX) 0.4 MG capsule Take 0.4 mg by mouth every evening 7/28/2019 at pm Yes Unknown, Entered By History   vitamin D3 (CHOLECALCIFEROL) 2000 units (50  mcg) tablet Take 1 tablet by mouth daily 7/28/2019 at am Yes Unknown, Entered By History     Amparo Nicolas,  PharmD  [phone]

## 2019-07-29 NOTE — DISCHARGE INSTRUCTIONS
Discharge Instructions  Urinary Tract Infection  You or your child have been diagnosed with a urinary tract infection, or UTI. The urinary tract includes the kidneys (which make urine/pee), ureters (the tubes that carry urine/pee from the kidneys to the bladder), the bladder (which stores urine/pee), and urethra (the tube that carries urine/pee out of the bladder). Urinary tract infections occur when bacteria travel up the urethra into the bladder (bladder infection) and, in some cases, from there into the kidneys (kidney infection).  Generally, every Emergency Department visit should have a follow-up clinic visit with either a primary or a specialty clinic/provider. Please follow-up as instructed by your emergency provider today.  Return to the Emergency Department if:  You or your child have severe back pain.  You or your child are vomiting (throwing up) so that you cannot take your medicine.  You or your child have a new fever (had not previously had a fever) over 101 F.  You or your child have confusion or are very weak, or feel very ill.  Your child seems much more ill, will not wake up, will not respond right, or is crying for a long time and will not calm down.  You or your child are showing signs of dehydration. These signs may include decreased urination (pee), dry mouth/gums/tongue, or decreased activity.    Follow-up with your provider:   Children under 24 months need to be seen by their regular provider within one week after a diagnosis of a UTI. It may be necessary to do some more tests to look at the child s kidney or bladder.  You should begin to feel better within 24 - 48 hours of starting your antibiotic; follow-up with your regular clinic/doctor/provider if this is not the case.    Treatment:   You will be treated with an antibiotic to kill the bacteria. We have to make an educated guess, based on what we know about common bacteria and antibiotics, as to which antibiotic will work for your  "infection. We will be correct most times but there will be some cases where the antibiotic chosen is not correct (see urine cultures below).  Take a pain medication such as acetaminophen (Tylenol ) or ibuprofen (Advil , Motrin , Nuprin ).  Phenazopyridine (Pyridium , Uristat ) is a prescription medication that numbs the bladder to reduce the burning pain of some UTIs.  The same medication is available in a non-prescription version (Azo-Standard , Urodol ). This medication will change the color of the urine and tears (usually blue or orange). If you wear contacts, do not wear them while taking this medication as they may be stained by the medication.    Urine Cultures:  If indicated, a urine culture may have been performed today. This test generally takes 24-48 hours to complete so the results are not known at this time. The results can confirm that an infection is present but also determine which antibiotic is effective for the specific bacteria that is causing the infection. If your urine culture shows that the antibiotic you were given today will not work to treat your infection, we will attempt to contact you to make arrangements to change the antibiotic. If the culture confirms that the antibiotic is effective for your infection, you will not be contacted. We often recommend follow-up with your regular physician/provider on the culture results regardless of this process.    Antibiotic Warning:   If you have been placed on antibiotics - watch for signs of allergic reaction.  These include rash, lip swelling, difficulty breathing, wheezing, and dizziness.  If you develop any of these symptoms, stop the antibiotic immediately and go to an emergency room or urgent care for evaluation.    Probiotics: If you have been given an antibiotic, you may want to also take a probiotic pill or eat yogurt with live cultures. Probiotics have \"good bacteria\" to help your intestines stay healthy. Studies have shown that probiotics " help prevent diarrhea and other intestine problems (including C. diff infection) when you take antibiotics. You can buy these without a prescription in the pharmacy section of the store.   If you were given a prescription for medicine here today, be sure to read all of the information (including the package insert) that comes with your prescription.  This will include important information about the medicine, its side effects, and any warnings that you need to know about.  The pharmacist who fills the prescription can provide more information and answer questions you may have about the medicine.  If you have questions or concerns that the pharmacist cannot address, please call or return to the Emergency Department.   Remember that you can always come back to the Emergency Department if you are not able to see your regular provider in the amount of time listed above, if you get any new symptoms, or if there is anything that worries you.

## 2019-07-30 LAB
BACTERIA SPEC CULT: NO GROWTH
Lab: NORMAL
SPECIMEN SOURCE: NORMAL

## 2019-07-30 NOTE — RESULT ENCOUNTER NOTE
Final urine culture report is NEGATIVE per [facility] ED Lab Result protocol.    If NEGATIVE result, no change in treatment, per [facility] ED Lab Result protocol.

## 2020-06-09 ENCOUNTER — TRANSFERRED RECORDS (OUTPATIENT)
Dept: HEALTH INFORMATION MANAGEMENT | Facility: CLINIC | Age: 60
End: 2020-06-09

## 2020-06-10 ENCOUNTER — TRANSFERRED RECORDS (OUTPATIENT)
Dept: HEALTH INFORMATION MANAGEMENT | Facility: CLINIC | Age: 60
End: 2020-06-10

## 2020-06-10 ENCOUNTER — HOSPITAL ENCOUNTER (INPATIENT)
Facility: CLINIC | Age: 60
LOS: 11 days | Discharge: HOME HEALTH | End: 2020-06-21
Attending: THORACIC SURGERY (CARDIOTHORACIC VASCULAR SURGERY) | Admitting: THORACIC SURGERY (CARDIOTHORACIC VASCULAR SURGERY)
Payer: COMMERCIAL

## 2020-06-10 DIAGNOSIS — T81.89XD NON-HEALING SURGICAL WOUND, SUBSEQUENT ENCOUNTER: ICD-10-CM

## 2020-06-10 DIAGNOSIS — Z98.890 STATUS POST CARDIAC SURGERY: ICD-10-CM

## 2020-06-10 DIAGNOSIS — T81.49XA WOUND INFECTION AFTER SURGERY: Primary | ICD-10-CM

## 2020-06-10 LAB — GLUCOSE BLDC GLUCOMTR-MCNC: 184 MG/DL (ref 70–99)

## 2020-06-10 PROCEDURE — 25000131 ZZH RX MED GY IP 250 OP 636 PS 637: Performed by: STUDENT IN AN ORGANIZED HEALTH CARE EDUCATION/TRAINING PROGRAM

## 2020-06-10 PROCEDURE — 25800030 ZZH RX IP 258 OP 636

## 2020-06-10 PROCEDURE — 25000132 ZZH RX MED GY IP 250 OP 250 PS 637: Performed by: STUDENT IN AN ORGANIZED HEALTH CARE EDUCATION/TRAINING PROGRAM

## 2020-06-10 PROCEDURE — 25800030 ZZH RX IP 258 OP 636: Performed by: STUDENT IN AN ORGANIZED HEALTH CARE EDUCATION/TRAINING PROGRAM

## 2020-06-10 PROCEDURE — 12000004 ZZH R&B IMCU UMMC

## 2020-06-10 PROCEDURE — 25000128 H RX IP 250 OP 636: Performed by: STUDENT IN AN ORGANIZED HEALTH CARE EDUCATION/TRAINING PROGRAM

## 2020-06-10 PROCEDURE — 00000146 ZZHCL STATISTIC GLUCOSE BY METER IP

## 2020-06-10 PROCEDURE — 25000128 H RX IP 250 OP 636

## 2020-06-10 RX ORDER — TAMSULOSIN HYDROCHLORIDE 0.4 MG/1
0.4 CAPSULE ORAL EVERY EVENING
Status: DISCONTINUED | OUTPATIENT
Start: 2020-06-10 | End: 2020-06-10

## 2020-06-10 RX ORDER — METOPROLOL TARTRATE 50 MG
50 TABLET ORAL DAILY
Status: ON HOLD | COMMUNITY
End: 2020-06-11

## 2020-06-10 RX ORDER — ROSUVASTATIN CALCIUM 20 MG/1
20 TABLET, COATED ORAL EVERY EVENING
Status: DISCONTINUED | OUTPATIENT
Start: 2020-06-10 | End: 2020-06-21 | Stop reason: HOSPADM

## 2020-06-10 RX ORDER — ACETAMINOPHEN 325 MG/1
975 TABLET ORAL 3 TIMES DAILY
Status: DISCONTINUED | OUTPATIENT
Start: 2020-06-10 | End: 2020-06-11

## 2020-06-10 RX ORDER — FINASTERIDE 1 MG/1
TABLET, FILM COATED ORAL DAILY
Status: ON HOLD | COMMUNITY
End: 2020-06-11

## 2020-06-10 RX ORDER — NALOXONE HYDROCHLORIDE 0.4 MG/ML
.1-.4 INJECTION, SOLUTION INTRAMUSCULAR; INTRAVENOUS; SUBCUTANEOUS
Status: DISCONTINUED | OUTPATIENT
Start: 2020-06-10 | End: 2020-06-19

## 2020-06-10 RX ORDER — ASPIRIN 325 MG
325 TABLET ORAL DAILY
Status: DISCONTINUED | OUTPATIENT
Start: 2020-06-11 | End: 2020-06-21 | Stop reason: HOSPADM

## 2020-06-10 RX ORDER — NICOTINE POLACRILEX 4 MG
15-30 LOZENGE BUCCAL
Status: DISCONTINUED | OUTPATIENT
Start: 2020-06-10 | End: 2020-06-21 | Stop reason: HOSPADM

## 2020-06-10 RX ORDER — OXYCODONE HYDROCHLORIDE 5 MG/1
5-10 TABLET ORAL
Status: DISCONTINUED | OUTPATIENT
Start: 2020-06-10 | End: 2020-06-21 | Stop reason: HOSPADM

## 2020-06-10 RX ORDER — ALFUZOSIN HYDROCHLORIDE 10 MG/1
10 TABLET, EXTENDED RELEASE ORAL DAILY
Status: DISCONTINUED | OUTPATIENT
Start: 2020-06-11 | End: 2020-06-21 | Stop reason: HOSPADM

## 2020-06-10 RX ORDER — LIDOCAINE 40 MG/G
CREAM TOPICAL
Status: DISCONTINUED | OUTPATIENT
Start: 2020-06-10 | End: 2020-06-13

## 2020-06-10 RX ORDER — PIPERACILLIN SODIUM, TAZOBACTAM SODIUM 3; .375 G/15ML; G/15ML
3.38 INJECTION, POWDER, LYOPHILIZED, FOR SOLUTION INTRAVENOUS EVERY 6 HOURS
Status: COMPLETED | OUTPATIENT
Start: 2020-06-10 | End: 2020-06-12

## 2020-06-10 RX ORDER — ROSUVASTATIN CALCIUM 40 MG/1
20 TABLET, COATED ORAL DAILY
COMMUNITY

## 2020-06-10 RX ORDER — CHLORAL HYDRATE 500 MG
1 CAPSULE ORAL 2 TIMES DAILY
COMMUNITY
End: 2023-12-01

## 2020-06-10 RX ORDER — SODIUM CHLORIDE, SODIUM LACTATE, POTASSIUM CHLORIDE, CALCIUM CHLORIDE 600; 310; 30; 20 MG/100ML; MG/100ML; MG/100ML; MG/100ML
1000 INJECTION, SOLUTION INTRAVENOUS CONTINUOUS
Status: DISCONTINUED | OUTPATIENT
Start: 2020-06-10 | End: 2020-06-11

## 2020-06-10 RX ORDER — HYDROMORPHONE HCL/0.9% NACL/PF 0.2MG/0.2
0.2 SYRINGE (ML) INTRAVENOUS
Status: DISCONTINUED | OUTPATIENT
Start: 2020-06-10 | End: 2020-06-20

## 2020-06-10 RX ORDER — METOPROLOL TARTRATE 50 MG
50 TABLET ORAL DAILY
Status: DISCONTINUED | OUTPATIENT
Start: 2020-06-11 | End: 2020-06-11

## 2020-06-10 RX ORDER — CYCLOBENZAPRINE HCL 10 MG
10 TABLET ORAL 3 TIMES DAILY PRN
Status: DISCONTINUED | OUTPATIENT
Start: 2020-06-10 | End: 2020-06-21 | Stop reason: HOSPADM

## 2020-06-10 RX ORDER — LISINOPRIL 2.5 MG/1
2.5 TABLET ORAL DAILY
Status: DISCONTINUED | OUTPATIENT
Start: 2020-06-11 | End: 2020-06-11

## 2020-06-10 RX ORDER — DEXTROSE MONOHYDRATE 25 G/50ML
25-50 INJECTION, SOLUTION INTRAVENOUS
Status: DISCONTINUED | OUTPATIENT
Start: 2020-06-10 | End: 2020-06-21 | Stop reason: HOSPADM

## 2020-06-10 RX ORDER — AMIODARONE HYDROCHLORIDE 100 MG/1
200 TABLET ORAL DAILY
Status: ON HOLD | COMMUNITY
Start: 2020-06-11 | End: 2020-06-21

## 2020-06-10 RX ORDER — ALFUZOSIN HYDROCHLORIDE 10 MG/1
10 TABLET, EXTENDED RELEASE ORAL DAILY
COMMUNITY
End: 2023-12-01

## 2020-06-10 RX ORDER — FINASTERIDE 1 MG/1
1 TABLET, FILM COATED ORAL DAILY
Status: DISCONTINUED | OUTPATIENT
Start: 2020-06-11 | End: 2020-06-11

## 2020-06-10 RX ADMIN — SODIUM CHLORIDE, POTASSIUM CHLORIDE, SODIUM LACTATE AND CALCIUM CHLORIDE 1000 ML: 600; 310; 30; 20 INJECTION, SOLUTION INTRAVENOUS at 22:19

## 2020-06-10 RX ADMIN — PIPERACILLIN AND TAZOBACTAM 3.38 G: 3; .375 INJECTION, POWDER, FOR SOLUTION INTRAVENOUS at 22:24

## 2020-06-10 RX ADMIN — VANCOMYCIN HYDROCHLORIDE 1750 MG: 10 INJECTION, POWDER, LYOPHILIZED, FOR SOLUTION INTRAVENOUS at 23:03

## 2020-06-10 RX ADMIN — INSULIN GLARGINE 18 UNITS: 100 INJECTION, SOLUTION SUBCUTANEOUS at 22:59

## 2020-06-10 RX ADMIN — CYCLOBENZAPRINE 10 MG: 10 TABLET, FILM COATED ORAL at 21:56

## 2020-06-10 RX ADMIN — ROSUVASTATIN CALCIUM 20 MG: 20 TABLET, FILM COATED ORAL at 21:56

## 2020-06-10 ASSESSMENT — ACTIVITIES OF DAILY LIVING (ADL)
COGNITION: 0 - NO COGNITION ISSUES REPORTED
COGNITION: 0 - NO COGNITION ISSUES REPORTED
DRESS: 0-->INDEPENDENT
SWALLOWING: 0-->SWALLOWS FOODS/LIQUIDS WITHOUT DIFFICULTY
AMBULATION: 0-->INDEPENDENT
BATHING: 0-->INDEPENDENT
DRESS: 0-->INDEPENDENT
TRANSFERRING: 0-->INDEPENDENT
TOILETING: 0-->INDEPENDENT
RETIRED_EATING: 0-->INDEPENDENT
BATHING: 0-->INDEPENDENT
TRANSFERRING: 0-->INDEPENDENT
RETIRED_COMMUNICATION: 0-->UNDERSTANDS/COMMUNICATES WITHOUT DIFFICULTY
RETIRED_COMMUNICATION: 0-->UNDERSTANDS/COMMUNICATES WITHOUT DIFFICULTY
FALL_HISTORY_WITHIN_LAST_SIX_MONTHS: NO
AMBULATION: 0-->INDEPENDENT
RETIRED_EATING: 0-->INDEPENDENT
FALL_HISTORY_WITHIN_LAST_SIX_MONTHS: NO
SWALLOWING: 0-->SWALLOWS FOODS/LIQUIDS WITHOUT DIFFICULTY
TOILETING: 0-->INDEPENDENT

## 2020-06-10 ASSESSMENT — MIFFLIN-ST. JEOR: SCORE: 1686.13

## 2020-06-10 ASSESSMENT — PAIN DESCRIPTION - DESCRIPTORS: DESCRIPTORS: DISCOMFORT

## 2020-06-10 NOTE — H&P
Cardiovascular Surgery H&P  6/10/2020           Assessment and Plan:     Nabil Cheung is a 60 y.o. male with a PMH of poorly controlled DMT2, hypertension, CKD stage 2-3, BPH/urinary retention, chronic diabetic left foot ulcer, and recently diagnosed severe 3 vessel disease with progressive symptoms at home. Underwent CABG x 4 (LIMA to LAD, SVG to rPDA, SVG to OM, SVG to diag) 05/04 with Dr. Mckinney. Discharged to home 05/12. Admitted to [facility] from home 06/08 with sternal wound infection. Underwent I&D of sternum at [facility] 06/10 with Dr. Mckinney, then transfered to [facility] for further wound management management    Cardiovascular:   CAD s/p CABG  Hypertension  - Most recent TTE 05/05 with preserved biventricular dysfunction  - Aspirin 325 mg daily  - Rosuvastatin 20 mg daily  - PTA lisinopril 2.5 mg daily and metoprol 50 mg daily  - Euvolemic. Diuretic PRN    Pulmonary:  - No active issues. Saturating well on RA.   - Supplemental O2 PRN to keep sats > 92%. Wean off as tolerated.  - Pulm toilet, IS, activity and deep breathing     Neurology / MSK:  Postop pain  - Tylenol PRN  - Oxycodone PRN  - IV Dilaudid PRN     / Renal:  CKD stage 2-3  - Most recent creatinine within baseline, trend daily  - Avoid nephrotoxins  - Diuresis as above    BPH  Urinary retention  - PTA finasteride 5 mg daily  - PTA tamsulosin 0.4 mg daily    GI / FEN:   Nutrition  - Diabetic diet, bowel regimen    Endocrine:  DMT2, poorly controlled  - Endocrine consult  - Continue home meds plus SSI    Infectious Disease:  Leukocytosis  Sternal wound infection  - Bedside I&D 06/09 at [facility]  - Wound culture 06/09 ([facility]) pending  - OR I&D 06/10 with Dr. Mckinney. Infection tracks length of sternal incision with areas of exposed bone and exposed wires. Exposed wires removed, sternal wound vac placed  - Consult ID  - Continue vanc and zosyn   - May eventually need plastic surgery for flap closure   Chronic left foot ulcer  - Previously recommended for  out-patient podiatry follow up for further debridement  - WOC consult  - Mepilex dressing daily and PRN for saturation    Hematology:   - CBC pending    Prophylaxis:   - Stress ulcer prophylaxis: not indicated  - DVT prophylaxis: Subcutaneous heparin, SCD      Patient and plan discussed with CVTS fellow, Dr. Sena    ________________________________________________________________________________________________    HPI:   Nabil Cheung is a 60 y.o. male with a PMH of poorly controlled DMT2, hypertension, CKD stage 2-3, BPH/urinary retention, chronic diabetic left foot ulcer, and recently diagnosed severe 3 vessel disease with progressive symptoms at home. Underwent CABG x 4 (LIMA to LAD, SVG to rPDA, SVG to OM, SVG to diag) 05/04 with Dr. Mckinney. Discharged to home 05/12. Now admitted to [facility] from home 06/08 with sternal wound infection.     Mr. Cheung was seen in postop follow up in clinic on 05/26 at which time he did report some grinding and clicking with some activity, but his sternum and incision were noted to be in tact at that time; sternal precautions were reinforced. Mr Cheung reports that last week he noticed increased redness and warmth at the superior aspect of the incision.  This progressed over the weekend and began draining a moderate amount of serous and purulent fluid. He noticed some sutures protruding from the wound on Saturday and cut them out. He reports that clicking and grinding of his sternum have also progressed and are now more prominent with activity. He denies recent fever, chills, body aches. He called clinic on Monday to report his concerns, and was advised to come in for direct admission to [facility] 06/08. He was started on broad spectrum IV abx. He underwent bedside sternal I&D 6/09 and then OR I&D 06/10 with Dr. Mckinney. Following I&D 06/10 he was transferred to [facility] for further wound management.     PMH:  No past medical history on file.    PSH:  No past surgical history on  file.    FH:  No family history on file.    SH:  Social History     Socioeconomic History     Marital status: Single     Spouse name: Not on file     Number of children: Not on file     Years of education: Not on file     Highest education level: Not on file   Occupational History     Not on file   Social Needs     Financial resource strain: Not on file     Food insecurity     Worry: Not on file     Inability: Not on file     Transportation needs     Medical: Not on file     Non-medical: Not on file   Tobacco Use     Smoking status: Never Smoker   Substance and Sexual Activity     Alcohol use: Yes     Drug use: No     Sexual activity: Not on file   Lifestyle     Physical activity     Days per week: Not on file     Minutes per session: Not on file     Stress: Not on file   Relationships     Social connections     Talks on phone: Not on file     Gets together: Not on file     Attends Holiness service: Not on file     Active member of club or organization: Not on file     Attends meetings of clubs or organizations: Not on file     Relationship status: Not on file     Intimate partner violence     Fear of current or ex partner: Not on file     Emotionally abused: Not on file     Physically abused: Not on file     Forced sexual activity: Not on file   Other Topics Concern     Not on file   Social History Narrative    6/29/17 - Pt was interested in getting help filling out [employer] application online to receive medicaid. CHW gave him the list of information he needed to complete online application. He stated he had a tax extension for 2016 so he did not have tax forms that the application required. He was going to work on the application with tax information from 2015 later tonight. AM        7/27/17:    Pt wanted to get more information about gyms that are low-cost within the [facility]. Pt's asked for a gym with a membership fee of $10-$20 (max), that also had a walking track and a hot tub/sauna. We told him  that options that fit these requirements he mentioned were very limited. He then stated that he heard that [facility] offered a $5 fee for track-usage only, however, we could not call [facility] since [facility] was closed. We suggested to the patient to call tomorrow and also mentioned there are gyms that are low-cost nearby, however, might not have a track or hot tub/sauna. We also suggested that he could walk around a lake would also be a cheap-alternative. -HB and NV        8/28/17:    Pt was here for a med refill. We asked him about his gym membership and access based on CHW's previous encounter with him and he said he tried to walk outside more this summer, but still did not find an affordable option. He did not have need for any other services at the time.     - AA and KT         9/28/17:    Pt had questions about free/low cost (under $10/month) walking tracks.  Found a website to walk through with the patient and shared different resources. - AB and HB        7/19/18: Pt. Was not seen tonight.- ZR,        Home Meds:  Medications Prior to Admission   Medication Sig Dispense Refill Last Dose     alfuzosin ER (UROXATRAL) 10 MG 24 hr tablet Take by mouth daily Unknown dosage   Past Week at Unknown time     aspirin (ASA) 325 MG EC tablet Take 325 mg by mouth daily   Past Week at Unknown time     cyclobenzaprine (FLEXERIL) 10 MG tablet Take 1 tablet (10 mg) by mouth 3 times daily as needed for muscle spasms 15 tablet 0 6/9/2020 at 2000     empagliflozin (JARDIANCE) 10 MG TABS tablet Take by mouth daily Unknown dosage   Past Week at Unknown time     finasteride (PROPECIA) 1 MG tablet Take by mouth daily Unknown dosage   Past Week at Unknown time     fish oil-omega-3 fatty acids 1000 MG capsule Take 1 g by mouth 2 times daily   6/9/2020 at Unknown time     lisinopril (PRINIVIL/ZESTRIL) 20 MG tablet Take 2.5 mg by mouth daily    6/9/2020 at 2000     magnesium 250 MG tablet Take 1 tablet by mouth At Bedtime   Past Week at 2000      metFORMIN (GLUCOPHAGE) 1000 MG tablet Take 1 tablet (1,000 mg) by mouth 2 times daily (with meals) 60 tablet 0 Past Week     rosuvastatin (CRESTOR) 20 MG tablet Take 20 mg by mouth daily Evening   Past Week at Unknown time     vitamin D3 (CHOLECALCIFEROL) 2000 units (50 mcg) tablet Take 1 tablet by mouth daily   Past Week at Unknown time     alpha-lipoic acid 600 MG capsule Take 600 mg by mouth daily    More than a month at Unknown time     ciprofloxacin (CIPRO) 500 MG tablet Take 750 mg by mouth once as needed (prior to prostate biopsy)   More than a month at Unknown time     metoprolol tartrate (LOPRESSOR) 50 MG tablet Take 50 mg by mouth daily Evening   Unknown at Unknown time     polyethylene glycol (MIRALAX/GLYCOLAX) packet Take 1 packet by mouth daily   More than a month at Unknown time     sennosides (SENOKOT) 8.6 MG tablet Take 1 tablet by mouth 2 times daily   More than a month at Unknown time     tamsulosin (FLOMAX) 0.4 MG capsule Take 0.4 mg by mouth every evening   More than a month at Unknown time       Allergies:  Allergies   Allergen Reactions     Atorvastatin      Dust Mites        ROS:  10 point ROS neg other than the symptoms noted above in the HPI.       Physical Exam:  Temp:  [97.6  F (36.4  C)] 97.6  F (36.4  C)  Pulse:  [82] 82  Resp:  [18] 18  BP: (147)/(76) 147/76  SpO2:  [96 %] 96 %    NAD  Anicteric, normocephalic  RRR  Normal work of breathing  Wound vac in place, holding suction, no surrounding erythema  Abdomen soft, nontender, nondistended  WWP, no edema    Labs:  ABG No lab results found in last 7 days.  CBCNo lab results found in last 7 days.  BMPNo lab results found in last 7 days.  LFTNo lab results found in last 7 days.  PancreasNo lab results found in last 7 days.    Imaging:  No results found for this or any previous visit (from the past 24 hour(s)).   CT Chest 06/08/2020 ([facility]  Impression:   1. Soft tissue thickening/inflammatory changes posterior to the midline   sternotomy  without discrete fluid collection.   2. Small pericardial effusion.   3. Subtle groundglass opacities in the left lingula, may represent   atelectasis, mild edema/inflammation, or  infection.     [place]  The estimated left ventricular ejection fraction is 62 %.  There is no left ventricular wall motion abnormality identified.  No tricuspid regurgitation was present, so it was not possible to estimate  PA systolic pressure.  Based on the appearance of the IVC, the RA pressure is significantly  elevated (assuming pt is not mechanically ventilated or receiving CPAP or  BiPaP----which would invalidate accuracy of RA pressure estimation based  on IVC geometry).  No evidence for pericardial effusion.

## 2020-06-11 ENCOUNTER — PREP FOR PROCEDURE (OUTPATIENT)
Dept: CARDIOLOGY | Facility: CLINIC | Age: 60
End: 2020-06-11

## 2020-06-11 ENCOUNTER — ANESTHESIA EVENT (OUTPATIENT)
Dept: SURGERY | Facility: CLINIC | Age: 60
End: 2020-06-11
Payer: COMMERCIAL

## 2020-06-11 DIAGNOSIS — Z98.890 STATUS POST CARDIAC SURGERY: Primary | ICD-10-CM

## 2020-06-11 LAB
ABO + RH BLD: NORMAL
ABO + RH BLD: NORMAL
ALBUMIN SERPL-MCNC: 2.6 G/DL (ref 3.4–5)
ALP SERPL-CCNC: 64 U/L (ref 40–150)
ALT SERPL W P-5'-P-CCNC: 16 U/L (ref 0–70)
ANION GAP SERPL CALCULATED.3IONS-SCNC: 6 MMOL/L (ref 3–14)
AST SERPL W P-5'-P-CCNC: 12 U/L (ref 0–45)
BILIRUB SERPL-MCNC: 0.5 MG/DL (ref 0.2–1.3)
BLD GP AB SCN SERPL QL: NORMAL
BLOOD BANK CMNT PATIENT-IMP: NORMAL
BUN SERPL-MCNC: 15 MG/DL (ref 7–30)
CALCIUM SERPL-MCNC: 8.3 MG/DL (ref 8.5–10.1)
CHLORIDE SERPL-SCNC: 105 MMOL/L (ref 94–109)
CO2 SERPL-SCNC: 26 MMOL/L (ref 20–32)
CREAT SERPL-MCNC: 1.13 MG/DL (ref 0.66–1.25)
ERYTHROCYTE [DISTWIDTH] IN BLOOD BY AUTOMATED COUNT: 15.2 % (ref 10–15)
GFR SERPL CREATININE-BSD FRML MDRD: 69 ML/MIN/{1.73_M2}
GLUCOSE BLDC GLUCOMTR-MCNC: 102 MG/DL (ref 70–99)
GLUCOSE BLDC GLUCOMTR-MCNC: 188 MG/DL (ref 70–99)
GLUCOSE BLDC GLUCOMTR-MCNC: 244 MG/DL (ref 70–99)
GLUCOSE BLDC GLUCOMTR-MCNC: 309 MG/DL (ref 70–99)
GLUCOSE SERPL-MCNC: 100 MG/DL (ref 70–99)
HCT VFR BLD AUTO: 32.4 % (ref 40–53)
HGB BLD-MCNC: 9.8 G/DL (ref 13.3–17.7)
MAGNESIUM SERPL-MCNC: 2.1 MG/DL (ref 1.6–2.3)
MCH RBC QN AUTO: 25.3 PG (ref 26.5–33)
MCHC RBC AUTO-ENTMCNC: 30.2 G/DL (ref 31.5–36.5)
MCV RBC AUTO: 84 FL (ref 78–100)
PHOSPHATE SERPL-MCNC: 3.8 MG/DL (ref 2.5–4.5)
PLATELET # BLD AUTO: 320 10E9/L (ref 150–450)
POTASSIUM SERPL-SCNC: 4.1 MMOL/L (ref 3.4–5.3)
PROT SERPL-MCNC: 6.4 G/DL (ref 6.8–8.8)
RBC # BLD AUTO: 3.88 10E12/L (ref 4.4–5.9)
SARS-COV-2 PCR COMMENT: NORMAL
SARS-COV-2 RNA SPEC QL NAA+PROBE: NEGATIVE
SARS-COV-2 RNA SPEC QL NAA+PROBE: NORMAL
SODIUM SERPL-SCNC: 137 MMOL/L (ref 133–144)
SPECIMEN EXP DATE BLD: NORMAL
SPECIMEN SOURCE: NORMAL
SPECIMEN SOURCE: NORMAL
WBC # BLD AUTO: 13.3 10E9/L (ref 4–11)

## 2020-06-11 PROCEDURE — 21400000 ZZH R&B CCU UMMC

## 2020-06-11 PROCEDURE — 85027 COMPLETE CBC AUTOMATED: CPT | Performed by: STUDENT IN AN ORGANIZED HEALTH CARE EDUCATION/TRAINING PROGRAM

## 2020-06-11 PROCEDURE — 25000128 H RX IP 250 OP 636

## 2020-06-11 PROCEDURE — 25000132 ZZH RX MED GY IP 250 OP 250 PS 637: Performed by: THORACIC SURGERY (CARDIOTHORACIC VASCULAR SURGERY)

## 2020-06-11 PROCEDURE — 80053 COMPREHEN METABOLIC PANEL: CPT | Performed by: STUDENT IN AN ORGANIZED HEALTH CARE EDUCATION/TRAINING PROGRAM

## 2020-06-11 PROCEDURE — 25000132 ZZH RX MED GY IP 250 OP 250 PS 637: Performed by: PHYSICIAN ASSISTANT

## 2020-06-11 PROCEDURE — 25000132 ZZH RX MED GY IP 250 OP 250 PS 637: Performed by: STUDENT IN AN ORGANIZED HEALTH CARE EDUCATION/TRAINING PROGRAM

## 2020-06-11 PROCEDURE — 36415 COLL VENOUS BLD VENIPUNCTURE: CPT | Performed by: STUDENT IN AN ORGANIZED HEALTH CARE EDUCATION/TRAINING PROGRAM

## 2020-06-11 PROCEDURE — 83735 ASSAY OF MAGNESIUM: CPT | Performed by: STUDENT IN AN ORGANIZED HEALTH CARE EDUCATION/TRAINING PROGRAM

## 2020-06-11 PROCEDURE — U0003 INFECTIOUS AGENT DETECTION BY NUCLEIC ACID (DNA OR RNA); SEVERE ACUTE RESPIRATORY SYNDROME CORONAVIRUS 2 (SARS-COV-2) (CORONAVIRUS DISEASE [COVID-19]), AMPLIFIED PROBE TECHNIQUE, MAKING USE OF HIGH THROUGHPUT TECHNOLOGIES AS DESCRIBED BY CMS-2020-01-R: HCPCS | Performed by: PHYSICIAN ASSISTANT

## 2020-06-11 PROCEDURE — 86900 BLOOD TYPING SEROLOGIC ABO: CPT | Performed by: PHYSICIAN ASSISTANT

## 2020-06-11 PROCEDURE — 86850 RBC ANTIBODY SCREEN: CPT | Performed by: PHYSICIAN ASSISTANT

## 2020-06-11 PROCEDURE — 25800030 ZZH RX IP 258 OP 636

## 2020-06-11 PROCEDURE — 86901 BLOOD TYPING SEROLOGIC RH(D): CPT | Performed by: PHYSICIAN ASSISTANT

## 2020-06-11 PROCEDURE — 00000146 ZZHCL STATISTIC GLUCOSE BY METER IP

## 2020-06-11 PROCEDURE — 84100 ASSAY OF PHOSPHORUS: CPT | Performed by: STUDENT IN AN ORGANIZED HEALTH CARE EDUCATION/TRAINING PROGRAM

## 2020-06-11 PROCEDURE — 25000128 H RX IP 250 OP 636: Performed by: STUDENT IN AN ORGANIZED HEALTH CARE EDUCATION/TRAINING PROGRAM

## 2020-06-11 PROCEDURE — G0463 HOSPITAL OUTPT CLINIC VISIT: HCPCS

## 2020-06-11 PROCEDURE — 25000131 ZZH RX MED GY IP 250 OP 636 PS 637: Performed by: NURSE PRACTITIONER

## 2020-06-11 PROCEDURE — 36415 COLL VENOUS BLD VENIPUNCTURE: CPT | Performed by: PHYSICIAN ASSISTANT

## 2020-06-11 PROCEDURE — 40000141 ZZH STATISTIC PERIPHERAL IV START W/O US GUIDANCE

## 2020-06-11 RX ORDER — FINASTERIDE 5 MG/1
5 TABLET, FILM COATED ORAL DAILY
COMMUNITY

## 2020-06-11 RX ORDER — METOPROLOL SUCCINATE 50 MG/1
50 TABLET, EXTENDED RELEASE ORAL DAILY
Status: DISCONTINUED | OUTPATIENT
Start: 2020-06-12 | End: 2020-06-21 | Stop reason: HOSPADM

## 2020-06-11 RX ORDER — FINASTERIDE 5 MG/1
5 TABLET, FILM COATED ORAL DAILY
Status: DISCONTINUED | OUTPATIENT
Start: 2020-06-12 | End: 2020-06-11

## 2020-06-11 RX ORDER — ONDANSETRON 4 MG/1
TABLET, FILM COATED ORAL EVERY 4 HOURS PRN
COMMUNITY
End: 2021-08-15

## 2020-06-11 RX ORDER — LISINOPRIL 5 MG/1
5 TABLET ORAL DAILY
Status: DISCONTINUED | OUTPATIENT
Start: 2020-06-12 | End: 2020-06-13

## 2020-06-11 RX ORDER — FINASTERIDE 1 MG/1
5 TABLET, FILM COATED ORAL DAILY
Status: DISCONTINUED | OUTPATIENT
Start: 2020-06-12 | End: 2020-06-11

## 2020-06-11 RX ORDER — AMIODARONE HYDROCHLORIDE 200 MG/1
200 TABLET ORAL DAILY
Status: DISCONTINUED | OUTPATIENT
Start: 2020-06-11 | End: 2020-06-21 | Stop reason: HOSPADM

## 2020-06-11 RX ORDER — NITROGLYCERIN 0.4 MG/1
0.4 TABLET SUBLINGUAL EVERY 5 MIN PRN
COMMUNITY

## 2020-06-11 RX ORDER — ACETAMINOPHEN 325 MG/1
325-650 TABLET ORAL EVERY 4 HOURS PRN
Status: DISCONTINUED | OUTPATIENT
Start: 2020-06-11 | End: 2020-06-21 | Stop reason: HOSPADM

## 2020-06-11 RX ORDER — ACETAMINOPHEN 325 MG/1
650 TABLET ORAL EVERY 6 HOURS PRN
COMMUNITY

## 2020-06-11 RX ORDER — METOPROLOL SUCCINATE 50 MG/1
50 TABLET, EXTENDED RELEASE ORAL EVERY EVENING
Status: ON HOLD | COMMUNITY
End: 2021-08-21

## 2020-06-11 RX ORDER — FINASTERIDE 5 MG/1
5 TABLET, FILM COATED ORAL DAILY
Status: DISCONTINUED | OUTPATIENT
Start: 2020-06-11 | End: 2020-06-21 | Stop reason: HOSPADM

## 2020-06-11 RX ORDER — LISINOPRIL 2.5 MG/1
2.5 TABLET ORAL ONCE
Status: COMPLETED | OUTPATIENT
Start: 2020-06-11 | End: 2020-06-11

## 2020-06-11 RX ADMIN — INSULIN ASPART 1 UNITS: 100 INJECTION, SOLUTION INTRAVENOUS; SUBCUTANEOUS at 12:31

## 2020-06-11 RX ADMIN — VANCOMYCIN HYDROCHLORIDE 1750 MG: 10 INJECTION, POWDER, LYOPHILIZED, FOR SOLUTION INTRAVENOUS at 23:25

## 2020-06-11 RX ADMIN — CYCLOBENZAPRINE 10 MG: 10 TABLET, FILM COATED ORAL at 08:06

## 2020-06-11 RX ADMIN — PIPERACILLIN AND TAZOBACTAM 3.38 G: 3; .375 INJECTION, POWDER, FOR SOLUTION INTRAVENOUS at 09:18

## 2020-06-11 RX ADMIN — PIPERACILLIN AND TAZOBACTAM 3.38 G: 3; .375 INJECTION, POWDER, FOR SOLUTION INTRAVENOUS at 22:34

## 2020-06-11 RX ADMIN — CYCLOBENZAPRINE 10 MG: 10 TABLET, FILM COATED ORAL at 16:24

## 2020-06-11 RX ADMIN — LISINOPRIL 2.5 MG: 2.5 TABLET ORAL at 10:26

## 2020-06-11 RX ADMIN — ACETAMINOPHEN 650 MG: 325 TABLET, FILM COATED ORAL at 14:27

## 2020-06-11 RX ADMIN — INSULIN ASPART 3 UNITS: 100 INJECTION, SOLUTION INTRAVENOUS; SUBCUTANEOUS at 17:16

## 2020-06-11 RX ADMIN — METOPROLOL TARTRATE 50 MG: 50 TABLET, FILM COATED ORAL at 07:53

## 2020-06-11 RX ADMIN — VANCOMYCIN HYDROCHLORIDE 1750 MG: 10 INJECTION, POWDER, LYOPHILIZED, FOR SOLUTION INTRAVENOUS at 10:26

## 2020-06-11 RX ADMIN — FINASTERIDE 5 MG: 5 TABLET, FILM COATED ORAL at 20:52

## 2020-06-11 RX ADMIN — ROSUVASTATIN CALCIUM 20 MG: 20 TABLET, FILM COATED ORAL at 20:10

## 2020-06-11 RX ADMIN — PIPERACILLIN AND TAZOBACTAM 3.38 G: 3; .375 INJECTION, POWDER, FOR SOLUTION INTRAVENOUS at 03:44

## 2020-06-11 RX ADMIN — ALFUZOSIN HYDROCHLORIDE 10 MG: 10 TABLET, EXTENDED RELEASE ORAL at 07:53

## 2020-06-11 RX ADMIN — AMIODARONE HYDROCHLORIDE 200 MG: 200 TABLET ORAL at 10:26

## 2020-06-11 RX ADMIN — LISINOPRIL 2.5 MG: 2.5 TABLET ORAL at 07:53

## 2020-06-11 RX ADMIN — PIPERACILLIN AND TAZOBACTAM 3.38 G: 3; .375 INJECTION, POWDER, FOR SOLUTION INTRAVENOUS at 16:09

## 2020-06-11 RX ADMIN — ASPIRIN 325 MG ORAL TABLET 325 MG: 325 PILL ORAL at 07:53

## 2020-06-11 ASSESSMENT — ACTIVITIES OF DAILY LIVING (ADL)
ADLS_ACUITY_SCORE: 14

## 2020-06-11 ASSESSMENT — PAIN DESCRIPTION - DESCRIPTORS
DESCRIPTORS: SHOOTING
DESCRIPTORS: SHOOTING;INTERMITTENT

## 2020-06-11 ASSESSMENT — MIFFLIN-ST. JEOR: SCORE: 1687.13

## 2020-06-11 NOTE — CONSULTS
"Diabetes/Hyperglycemia Management Consult    Chief Complaint diabetes management  Consult requested by: Dr. Laughlin  History of Present Illness Nabil Cheung is a 60 year old male with history of type 2 diabetes, hypertension, chronic kidney disease stage 3, CABG x 4 (5/4/2020) with Dr. Mckinney.  [place] [facility] [place] infection (6/8/2020) with sternal wound infection. Transferred to [facility] on (6/10/2020) for further wound management.    COVID 19-negative  Information was obtained from review of medical records and interview with patient.    Mr. Cheung was dx with type 2 diabetes > 15 years ago. PTA medications as listed below. He has tried using glipizide, does not recall the reason why the medications was discontinued. Has tried a GLP-1, but caused sever nausea and vomiting that continued after discontinuation of the medication for additional 2-3 months ( per chart review was Bydureon 2 mg weekly).  Adjusts diet to help improve blood sugars. He prefers to eat a low carbohydrate diet along with walking 2 miles per day.  He has tried intermittent fasting ( no breakfast and lunch on Mondays and Tuesdays).  He likes the philosophy from Dr. Jasso, who has wrote the book, \"Diabetes Code\"  He tests his blood sugars daily in the morning, reports glucose 100-150.  Monthly, will test his blood sugars every 1/2 hour after meals to see how certain foods impact his blood sugars.      Most recent clinic note: April 9/2020: Is taking \"Shiela Herbal blood sugar support\" per documentation it contains 20 herbs. In addition to prescribed medications, glucose was reported 160's to 170's.    HGBA1C 12.1 (H) 07/22/2019 1122   HGBA1C 9.6 (H) 11/15/2019 0913     Hemoglobin A1C 11.4 (H) 4.0 - 6.0 %  (2/13/2020)    A1C 9.8% (3/17/2020)  A1C 9.4% (4/27/2020) with HGB 13.6  A1C 7.5% (6/9/2020) with HGB of 9.3%    On presentation: continued on lantus 18 units at bedtime. Metformin and Jardiance on hold  Am glucose 100 at ~ 0430 and " 102 at ~ 0800.  Pre-meal glucose 188  Per CVTS, NPO after midnight for procedure    Currently, denies fever, chills, chest pain, shortness of breath, abdominal pain, nausea and or vomiting, bowel or bladder issues.        Recent Labs   Lab 06/11/20  0427 06/10/20  2202   *  --    BGM  --  184*         Diabetes Type: type 2  Diabetes Duration: dx > 15 years ago  Usual Diabetes Regimen:   lantus 18 units at bedtime  metformin 1000 mg twice daily  Jardiance (empagliflozin) does not recall the dose, per chart review 10 mg daily  Sitagliptin ( januvia) 100 mg daily ( has not start the medication)  Ability to Many Prescribed Regimen: yes, but has very specific thoughts  Diabetes Control:   Lab Results   Component Value Date    A1C 9.6 09/25/2017    A1C 10.7 06/29/2017     Diabetes Complications: diabetic foot ulcer, chronic kidney disease  Able to Detect Hypoglycemia: yes  Usual Diabetes Care Provider: Dr. Melina Gold and roger Butler CDE  Factors Impacting Glucose Control: choices      Review of Systems  10 point ROS completed with pertinent positives and negatives noted in the HPI    Past medical, family and social histories are reviewed and updated.    Past Medical History  Type 2 diabetes  Hypertension  CKD stage 3    Family History  Family history specific for diabetes, mother and father and possibly grandparents  Social History  Social History     Socioeconomic History     Marital status: Single     Spouse name: Not on file     Number of children: Not on file     Years of education: Not on file     Highest education level: Not on file   Occupational History     Not on file   Social Needs     Financial resource strain: Not on file     Food insecurity     Worry: Not on file     Inability: Not on file     Transportation needs     Medical: Not on file     Non-medical: Not on file   Tobacco Use     Smoking status: Never Smoker   Substance and Sexual Activity     Alcohol use: Yes     Drug use: No     Sexual  activity: Not on file   Lifestyle     Physical activity     Days per week: Not on file     Minutes per session: Not on file     Stress: Not on file   Relationships     Social connections     Talks on phone: Not on file     Gets together: Not on file     Attends Episcopal service: Not on file     Active member of club or organization: Not on file     Attends meetings of clubs or organizations: Not on file     Relationship status: Not on file     Intimate partner violence     Fear of current or ex partner: Not on file     Emotionally abused: Not on file     Physically abused: Not on file     Forced sexual activity: Not on file   Other Topics Concern     Not on file   Social History Narrative    6/29/17 - Pt was interested in getting help filling out [employer] application online to receive medicaid. CHW gave him the list of information he needed to complete online application. He stated he had a tax extension for 2016 so he did not have tax forms that the application required. He was going to work on the application with tax information from 2015 later tonight. AM        7/27/17:    Pt wanted to get more information about gyms that are low-cost within the [facility]. Pt's asked for a gym with a membership fee of $10-$20 (max), that also had a walking track and a hot tub/sauna. We told him that options that fit these requirements he mentioned were very limited. He then stated that he heard that the [facility] offered a $5 fee for track-usage only, however, we could not call [facility] since it was closed. We suggested to the patient to call tomorrow and also mentioned there are gyms that are low-cost nearby, however, might not have a track or hot tub/sauna. We also suggested that he could walk around a lake would also be a cheap-alternative. -HB and NV        8/28/17:    Pt was here for a med refill. We asked him about his gym membership and access based on CHW's previous encounter with him and he said he tried to walk  "outside more this summer, but still did not find an affordable option. He did not have need for any other services at the time.     - AA and KT         9/28/17:    Pt had questions about free/low cost (under $10/month) walking tracks.  Found a website to walk through with the patient and shared different resources. - AB and HB        7/19/18: Pt. Was not seen tonight.- ZR, KP         Physical Exam  /70   Pulse 95   Temp 98.2  F (36.8  C) (Oral)   Resp 18   Ht 1.803 m (5' 11\")   Wt 86.5 kg (190 lb 11.2 oz)   SpO2 97%   BMI 26.60 kg/m        General:  pleasant  resting in bed, in no distress.   HEENT: PER and anicteric, non-injected, oral mucous membranes moist.   Lungs: non-labored  ABD:  soft  Skin: warm and dry   MSK:  Moving all extremties  Mental status:  alert, oriented x3, communicating clearly  Psych:  calm, even mood    Laboratory  Recent Labs   Lab Test 06/11/20 0427 07/29/19  0529    125*   POTASSIUM 4.1 3.9   CHLORIDE 105 92*   CO2 26 24   ANIONGAP 6 9   * 220*   BUN 15 11   CR 1.13 1.02   ONEYDA 8.3* 8.6     CBC RESULTS:   Recent Labs   Lab Test 06/11/20 0427   WBC 13.3*   RBC 3.88*   HGB 9.8*   HCT 32.4*   MCV 84   MCH 25.3*   MCHC 30.2*   RDW 15.2*          Liver Function Studies -   Recent Labs   Lab Test 06/11/20 0427   PROTTOTAL 6.4*   ALBUMIN 2.6*   BILITOTAL 0.5   ALKPHOS 64   AST 12   ALT 16       Active Medications  Current Facility-Administered Medications   Medication     acetaminophen (TYLENOL) tablet 975 mg     alfuzosin ER (UROXATRAL) 24 hr tablet 10 mg     aspirin (ASA) EC tablet 325 mg     cyclobenzaprine (FLEXERIL) tablet 10 mg     glucose gel 15-30 g    Or     dextrose 50 % injection 25-50 mL    Or     glucagon injection 1 mg     finasteride (PROPECIA) tablet 1 mg     HYDROmorphone (DILAUDID) injection 0.2 mg     insulin glargine (LANTUS PEN) injection 18 Units     lactated ringers infusion     lidocaine (LMX4) cream     lidocaine 1 % 1 mL     lisinopril " (ZESTRIL) tablet 2.5 mg     metoprolol tartrate (LOPRESSOR) tablet 50 mg     naloxone (NARCAN) injection 0.1-0.4 mg     oxyCODONE (ROXICODONE) tablet 5-10 mg     piperacillin-tazobactam (ZOSYN) 3.375 g vial to attach to  mL bag     rosuvastatin (CRESTOR) tablet 20 mg     sodium chloride (PF) 0.9% PF flush 3 mL     sodium chloride (PF) 0.9% PF flush 3 mL     vancomycin (VANCOCIN) 1,750 mg in sodium chloride 0.9 % 500 mL intermittent infusion     No current outpatient medications on file.       Current Diet  Orders Placed This Encounter      Regular Diet Adult        Assessment: Nabil Cheung is a 60 year old male with history of type 2 diabetes, hypertension, chronic kidney disease stage 2-3, CABG x 4 (5/4/2020) with Dr. Mckinney.  Admitted to [facility] with sternal wound infection (6/8/2020) with sternal wound infection. Transferred to [facility] on (6/10/2020) for further wound management.    Type 2 diabetes: poorly controlled with a recent A1C, A1C 7.5% (6/9/2020) with HGB of 9.3%. not an accurate measure 2/2 anemia.  -Mr. Cheung has specific ideas regarding his diabetes    Plan  -Lantus 18 units at bedtime, will decrease to 12 units  for NPO after midnight  - Added novolog medium intensity sliding scale before meals and at bedtime  - will add meal insulin if glucose continues to be elevated  -test blood sugars before meals, HS and 0200  -continue to hold metformin and Jardiance  -hypoglycemia protocol  -will continue to follow    Caro Arzola, [phone]  Diabetes Management Team job code: [id number]    I spent a total of 80 minutes bedside and on the inpatient unit managing the glycemic care of Nabil Cheung. Over 50% of my time on the unit was spent counseling the patient  and/or coordinating care regarding .  See note for details.

## 2020-06-11 NOTE — PLAN OF CARE
Neuro: A&Ox4. Baseline numbness to feet unchanged.    Cardiac: SR with 1st Degree AVB. VSS.        Respiratory: Sating 92%+ on RA, denies SOB.  GI/: Adequate urine output, no BM. No N/V.   Diet/appetite: Tolerating regular diet.  Activity:  SBA.  Pain: Intermittent pain to wound vac on chest, declined pain medication.  Skin: No new deficits noted. Wound vac -125 to chest with ~50 mls sanguinous output, Old chest and graft site incisions approximated, BECKY/CDI. L foot ulceration with primapore CDI.  LDA's: L PIV with LR @100/hr.      Plan: Continue with POC. Notify primary team with changes

## 2020-06-11 NOTE — CONSULTS
[facility] ID SERVICE CONSULTATION     Patient:  Nabil Cheung   Date of birth 1960, Medical record number [id number]  Date of Visit:  06/11/2020  Date of Admission: 6/10/2020  Consult Requester:Mark Mckinney, *            Assessment and Recommendations:   ASSESSMENT:  1. Patient with sternal wound infection after surgery. Culture from outside hospital, [facility] on 6/9/2020 growing Staphylococcus epidermidis only. Some thickening/inflammation of tissue seen posterior to the sternum on the [facility] CT chest. No clear abscess or mediastinal fluid collection seen.       RECOMMENDATION:  1. Would continue IV vancomycin and plan a prolonged course 4-6 weeks due to concern the infection may extend down to and include the sternum.    2. Discontinue piperacillin/tazobactam.  3. Monitor Cr and vanco trough levels. Aim for a trough level of 15-20.     Attending Physician Attestation:  I have seen and evaluated Nabil Cheung.  I have reviewed today's vital signs, medications, labs and imaging.     Shivani Lopes MD  ID staff   Pager [phone]  ________________________________________________________________    Consult Question:.  Admission Diagnosis: sternal wound infection  Wound infection after surgery         History of Present Illness:     Patient is a pleasant 60 year old man with long term diagnosis of type 2 DM with complication of CAD requiring 4 vessel CABG in early May 2020. He was admitted to [facility] two days ago when we developed signs of a sternal wound infection. He denies fevers or chills but he did develop swelling tenderness and drainage at the superior aspect of the sternal wound. A stitch was sticking out and and it seem irritated so he cut off the part that was sticking out. The wound opened slightly. He applied hot compresses and pressed on it and the wound drainage clear fluid and a lttle bloody drainage and some white drainage. Then is started to get swollen up again the next day so he went to [facility] to be  seen. I bedside I and D was done on 6/9/2020 and that culture swab is growing one colony Staph epi at [facility]. Susceptibility tests were not done.     Patient states that the bone orf the sternum have not healed together well. He experiences clicking and grinding of the sternal edges with movement. He is worried that the wires were loose or broken.       Recent culture results include:  Culture Micro   Date Value Ref Range Status   07/29/2019 No growth  Final              Review of Systems:   CONSTITUTIONAL:  No fevers or chills  EYES: negative for icterus  ENT:  negative for hearing loss, tinnitus and sore throat  RESPIRATORY:  negative for cough with sputum and dyspnea  CARDIOVASCULAR:  negative for chest pain, dyspnea  GASTROINTESTINAL:  negative for nausea, vomiting, diarrhea and constipation  GENITOURINARY:  negative for dysuria  HEME:  No easy bruising  INTEGUMENT:  negative for rash and pruritus  NEURO:  Negative for headache           Past Medical History:   No past medical history on file.  Type 2 DM for over 15 years, not well controlled  Chronic kidney disease stage 3.  HTN         Past Surgical History:   No past surgical history on file.   S/p CABG x 4 vessels 5[place]4/[place] [facility]         Family History:   No family history on file.   Father with DM  Mother with breast CA and DM         Social History:     Social History     Tobacco Use     Smoking status: Never Smoker   Substance Use Topics     Alcohol use: Yes     History   Sexual Activity     Sexual activity: Not on file            Current Medications (antimicrobials listed in bold):       alfuzosin ER  10 mg Oral Daily     amiodarone  200 mg Oral Daily     aspirin  325 mg Oral Daily     [START ON 6/12/2020] finasteride  5 mg Oral Daily     insulin aspart  1-7 Units Subcutaneous TID AC     insulin aspart  1-5 Units Subcutaneous At Bedtime     insulin glargine  12 Units Subcutaneous At Bedtime     [START ON 6/12/2020] lisinopril  5 mg Oral Daily     [START  "ON 6/12/2020] metoprolol succinate ER  50 mg Oral Daily     piperacillin-tazobactam  3.375 g Intravenous Q6H     rosuvastatin  20 mg Oral QPM     sodium chloride (PF)  3 mL Intracatheter Q8H     vancomycin (VANCOCIN) IV  1,750 mg Intravenous Q12H            Allergies:     Allergies   Allergen Reactions     Atorvastatin      Dust Mites             Physical Exam:   Vitals were reviewed  Patient Vitals for the past 24 hrs:   BP Temp Temp src Pulse Heart Rate Resp SpO2 Height Weight   06/11/20 1533 133/78 98.7  F (37.1  C) Oral -- 81 16 97 % -- --   06/11/20 1350 132/70 98.5  F (36.9  C) Oral 80 80 18 96 % -- --   06/11/20 0737 137/82 98.2  F (36.8  C) Oral 95 94 18 97 % -- --   06/11/20 0345 (!) 159/88 -- -- -- -- -- -- -- --   06/11/20 0331 -- -- -- -- -- -- -- -- 86.5 kg (190 lb 11.2 oz)   06/11/20 0328 (!) 175/87 98.6  F (37  C) Oral 88 86 18 97 % -- --   06/11/20 0030 (!) 167/89 98.6  F (37  C) Oral 81 81 18 98 % -- --   06/10/20 2100 -- -- -- -- -- -- -- 1.803 m (5' 11\") 86.4 kg (190 lb 7.6 oz)   06/10/20 1919 (!) 147/76 97.6  F (36.4  C) Oral 82 -- 18 96 % -- --     Ranges for his vital signs:  Temp:  [97.6  F (36.4  C)-98.7  F (37.1  C)] 98.7  F (37.1  C)  Pulse:  [80-95] 80  Heart Rate:  [80-94] 81  Resp:  [16-18] 16  BP: (132-175)/(70-89) 133/78  SpO2:  [96 %-98 %] 97 %    Physical Examination:  GENERAL:  well-developed, well-nourished, in bed in no acute distress.   HEENT:  Head is normocephalic, atraumatic   EYES:  Eyes have anicteric sclerae without conjunctival injection or stigmata of endocarditis.    ENT:  Oropharynx is moist without exudates or ulcers. Tongue is midline  NECK:  Supple. No  Cervical lymphadenopathy  LUNGS:  Clear to auscultation bilateral.   CARDIOVASCULAR:  Regular rate and rhythm with no murmurs, gallops or rubs. Large mid-line sternotomy open chest wound down to sternal with wound vac overlying. Surrounding skin is clean dry and intact under the wound VAC dressing.   ABDOMEN:  Normal " bowel sounds, soft, nontender. No appreciable hepatosplenomegaly  SKIN:  No acute rashes.  Line(s) are in place without any surrounding erythema or exudate. No stigmata of endocarditis.  NEUROLOGIC:  Grossly nonfocal. Active x4 extremities         Laboratory Data:     Inflammatory Markers  No lab results found.    Hematology Studies    Recent Labs   Lab Test 06/11/20 0427 07/29/19  0529   WBC 13.3* 21.0*   ANEU  --  17.3*   AEOS  --  0.2   HGB 9.8* 12.5*   MCV 84 79    284       Immune Globulin Studies  No lab results found.    Metabolic Studies     Recent Labs   Lab Test 06/11/20 0427 07/29/19  0529 09/25/17  1825 06/29/17  2100    125* 135 133   POTASSIUM 4.1 3.9 5.0 4.1   CHLORIDE 105 92* 101 98   CO2 26 24 26 27   BUN 15 11 25 18   CR 1.13 1.02 1.06 0.97   GFRESTIMATED 69 79 71 79       Hepatic Studies    Recent Labs   Lab Test 06/11/20 0427   BILITOTAL 0.5   ALKPHOS 64   ALBUMIN 2.6*   AST 12   ALT 16       Thyroid Studies    Recent Labs   Lab Test 09/25/17  1825 06/29/17  2100   TSH 2.79 3.17       Microbiology:  Culture Micro   Date Value Ref Range Status   07/29/2019 No growth  Final       Urine Studies    Recent Labs   Lab Test 07/29/19  0544 09/25/17  1830   LEUKEST Large* Negative   WBCU 80*  --        Vancomycin Levels  No lab results found.    Invalid input(s): VANCO    Serostatus:  No results found for: CMVG, CMIGG, CMIG, CMIM, CMVIGM, CMVM, CMLTX, HSVG1, HSVG2, HSVTP1, JS5264, HS12M, HS12GR, HS1GR, HS2GR, HERPES, HSIM, HSIG, HSIGR, HSVIGMAB, HSVG1, VARICZOSAB, EBVIGG, EBIGG, EBVAGN, ND0652  No results found for: EBIG2, EBIGM, TOXG  No lab results found.    Invalid input(s): HSV12, VZVG, FIO772    Toxoplasma Testing  No lab results found.    Invalid input(s): TOXPL, TOXABM, TOXPCR    Virology:  CMV viral loads  No lab results found.  No results found for: CMQNT, CMVQ  EBV viral loads   No lab results found.  No results found for: EBVDN, EBRES, EBVDN, EBVSP, EBVPC, EBVPCR  BK viral loads  No lab results found.    Invalid input(s): BKDN, BKVPC, BKVPCR  HSV testing  No lab results found.    Hepatitis B Testing No lab results found.  Hepatitis C Testing   No results found for: HCVAB, HQTG, HCGENO, HCPCR, HQTRNA, HEPRNA  Respiratory Virus Testing    No results found for: RS, FLUAG

## 2020-06-11 NOTE — PHARMACY-ADMISSION MEDICATION HISTORY
Admission medication history interview status for the 6/10/2020 admission is complete. See Epic admission navigator for allergy information, pharmacy, prior to admission medications and immunization status.     Medication history interview sources:  Discharge med rec completed at [facility] prior to transfer    Changes made to PTA medication list (reason)  Added: sitagliptan, Nitrostat, tylenol, ondansetron, amiodarone  Deleted: cipro, tamsulosin  Changed: metoprolol from tartrate to succinate, finasteride from 1mg to 5mg    Additional medication history information (including reliability of information, actions taken by pharmacist):None      Prior to Admission medications    Medication Sig Last Dose Taking? Auth Provider   alfuzosin ER (UROXATRAL) 10 MG 24 hr tablet Take 10 mg by mouth daily  Past Week at Unknown time Yes Reported, Patient   amiodarone (PACERONE) 100 MG TABS tablet Take 200 mg by mouth daily   at Not started Yes Reported, Patient   aspirin (ASA) 325 MG EC tablet Take 325 mg by mouth daily Past Week at Unknown time Yes Reported, Patient   cyclobenzaprine (FLEXERIL) 10 MG tablet Take 1 tablet (10 mg) by mouth 3 times daily as needed for muscle spasms 6/9/2020 at 2000 Yes Jaswant Dang,    empagliflozin (JARDIANCE) 10 MG TABS tablet Take by mouth daily Unknown dosage Past Week at Unknown time Yes Reported, Patient   finasteride (PROSCAR) 5 MG tablet Take 5 mg by mouth daily 06/09/2020 at AM Yes Unknown, Entered By History   fish oil-omega-3 fatty acids 1000 MG capsule Take 1 g by mouth 2 times daily 6/9/2020 at Unknown time Yes Reported, Patient   insulin glargine (LANTUS PEN) 100 UNIT/ML pen Inject 18 Units Subcutaneous At Bedtime 06/09/2020 at PM Yes Reported, Patient   lisinopril (PRINIVIL/ZESTRIL) 20 MG tablet Take 2.5 mg by mouth daily  6/9/2020 at PM Yes Unknown, Entered By History   magnesium 250 MG tablet Take 1 tablet by mouth At Bedtime Past Week at PM Yes Unknown, Entered By  History   metFORMIN (GLUCOPHAGE) 1000 MG tablet Take 1 tablet (1,000 mg) by mouth 2 times daily (with meals) Past Week at Unknown time Yes Eduardo Dash MD   metoprolol succinate ER (TOPROL-XL) 50 MG 24 hr tablet Take 50 mg by mouth every evening 06/09/2020 at PM Yes Unknown, Entered By History   ondansetron (ZOFRAN) 4 MG tablet Take by mouth every 4 hours as needed for nausea Past Week at Unknown time Yes Unknown, Entered By History   rosuvastatin (CRESTOR) 20 MG tablet Take 20 mg by mouth daily Evening Past Week at PM Yes Reported, Patient   sitagliptin (JANUVIA) 100 MG tablet Take 100 mg by mouth daily Past Week at Unknown time Yes Unknown, Entered By History   vitamin D3 (CHOLECALCIFEROL) 2000 units (50 mcg) tablet Take 1 tablet by mouth daily Past Week at PM Yes Unknown, Entered By History   acetaminophen (TYLENOL) 325 MG tablet Take 650 mg by mouth every 6 hours as needed for mild pain 06/09/2020 at AM  Unknown, Entered By History   alpha-lipoic acid 600 MG capsule Take 600 mg by mouth daily  More than a month at Unknown time  Unknown, Entered By History   nitroGLYcerin (NITROSTAT) 0.4 MG sublingual tablet Place 0.4 mg under the tongue every 5 minutes as needed for chest pain For chest pain place 1 tablet under the tongue every 5 minutes for 3 doses. If symptoms persist 5 minutes after 1st dose call 911. Unknown at Unknown time  Unknown, Entered By History   polyethylene glycol (MIRALAX/GLYCOLAX) packet Take 1 packet by mouth daily More than a month at PM  Unknown, Entered By History   sennosides (SENOKOT) 8.6 MG tablet Take 1 tablet by mouth 2 times daily More than a month at PM  Unknown, Entered By History         Medication history completed by: García Romeo RP on 6/11/2020 at 8:13 AM

## 2020-06-11 NOTE — PROGRESS NOTES
D: PMH of poorly controlled DMT2, hypertension, CKD stage 2-3, BPH/urinary retention, chronic diabetic left foot ulcer, and recently diagnosed severe 3 vessel disease with progressive symptoms at home. Underwent CABG x 4 (LIMA to LAD, SVG to rPDA, SVG to OM, SVG to diag) 05/04 with Dr. Mckinney. Discharged to home 05/12. [place] [facility] [place]08 with sternal wound infection. Underwent I&D of sternum at [facility] 06/10 with Dr. Mckinney, then transfered to [facility] for further wound management.   ?  I: Monitored vitals and assessed pt status. Monitor wound vac.   Changed:  Running: IV vanco and zosyn infusions  PRN: flexeril given for pain related to muscle spasms with good effect.   ?  A: A0x4. VSS. Afebrile. Urinating adequately. Wound vac in place and maintaining suction. IV infiltrated during zosyn administration, immediate site slightly red, no edema present, patient denies pain/burning at site, IV replaced. Will continue to monitor.   ?  P: Continue to monitor Pt status and report changes to treatment team. Plan is to go to OR tomorrow for wound irrigation and wound vac change. NPO at midnight.

## 2020-06-11 NOTE — PROGRESS NOTES
[facility] Nurse Inpatient Wound Assessment   Reason for consultation: Left foot wound   Sternal wound with NPWT    Assessment  Left lateral 5th MTP wound due to Neuropathic Ulcer  Status: initial assessment     Sternum not assessed today     Treatment Plan  Left foot  wound: Daily    cleanse wound with microklenz spray and gauze.    Apply vaseline to wound.  Cover with primapore dressing    Orders Written  Recommended provider order: Outpatient Podiatry consult to see Dr. Ivy at [facility] Nurse follow-up plan:weekly for foot.  tomm for NPWT sternal dressing change   Nursing to notify the Provider(s) and re-consult the [facility] Nurse if wound(s) deteriorates or new skin concern.    Patient History  According to provider note(s): 60 y.o. male with a PMH of poorly controlled DMT2, hypertension, CKD stage 2-3, BPH/urinary retention, chronic diabetic left foot ulcer, and recently diagnosed severe 3 vessel disease with progressive symptoms at home. Underwent CABG x 4 (LIMA to LAD, SVG to rPDA, SVG to OM, SVG to diag) 05/04 with Dr. Mckinney. Discharged to home 05/12. Admitted to [facility] from [place] 06/08 with sternal wound infection. Underwent I&D of sternum at [facility] 06/10 with Dr. Mckinney, then transfered to [facility] for further wound management management  Objective Data  Active Diet Order  Orders Placed This Encounter      Regular Diet Adult      NPO per Anesthesia Guidelines for Procedure/Surgery Except for: Meds      Output:   I/O last 3 completed shifts:  In: 1768.33 [P.O.:600; I.V.:1168.33]  Out: 2075 [Urine:1950; Drains:125]    Risk Assessment:   Sensory Perception: 4-->no impairment  Moisture: 4-->rarely moist  Activity: 3-->walks occasionally  Mobility: 4-->no limitation  Nutrition: 3-->adequate  Friction and Shear: 3-->no apparent problem  Solis Score: 21                          Labs:   Recent Labs   Lab 06/11/20  0427   ALBUMIN 2.6*   HGB 9.8*   WBC 13.3*       Physical Exam  Skin inspection: focused Left foot    Skin pale,  "dry and peeling.  PP and DP strong.  Cap refill 2-3 seconds.  Foot including toes appropriately warm     Wound Location: Left lateral foot, inferior to 5th toe         Date of last photo 6/11/20  Wound History: wound present for approximately 11 months.  Pt tries to walk 2 miles a day.  Pt notes pain in foot during walks but \"I power through it because the walks are so important for my Diabetic management\"  He has shaved down the periwound callus multiple times.  Was given a DH2 shoe but states that is too awkward to wear.  He has cut out the area under the wound on his tennis shoe insole.  Has used cocoa butter, Vit E oil for wound care.  Most recently leaving open to air and walking 2 blocks twice a day.    Measurements (length x width x depth, in cm) 1.8  x 1.1  x  0.1 cm   Wound Base: 100 % dry red fibrin that is peeling at edges   Palpation of the wound bed: normal, no fluctuance noted  Periwound skin: intact and hyperkeratosis  Periwound Color: normal and consistent with surrounding tissue  Periwound Temperature: normal   Drainage:, none  Odor: none  Pain: only when walking     Interventions  Current support surface: Standard  Low air loss mattress  Visual inspection and assessment completed   Wound Care: done per plan of care  Supplies: at bedside  Education provided: plan of care, wound progress and Off-loading pressure  Discussed plan of care with Patient      "

## 2020-06-11 NOTE — PLAN OF CARE
Patient A&Ox4. SR, HR 80s. VSS. Afebrile. Sating >95% on RA. Denies SOB. Denies nausea. BS ACHS, on regular diet. Sternal wound vac in place at -125 suction. Flexeril given x1 for leg cramping

## 2020-06-11 NOTE — PROGRESS NOTES
Admission          6/10/2020  7:15 PM  -----------------------------------------------------------  Reason for admission: management of wound vac  Primary team notified of pt arrival.  Admitted from: [facility]   Via: stretcher  Accompanied by: EMS   Belongings: Placed in closet;   Admission Profile: complete  Teaching: orientation to unit and call light- call light within reach, call don't fall, use of console, meal times, when to call for the RN, and enforced importance of safety   Access: PIV  Telemetry:Placed on pt  Ht./Wt.: complete  2 RN Skin Assessment Completed By: alfredo rn and matt rn   Bed surface reassessed with algorithm and charted: yes  New bed surface ordered: no    Pt status:    Temp:  [97.6  F (36.4  C)] 97.6  F (36.4  C)  Pulse:  [82] 82  Resp:  [18] 18  BP: (147)/(76) 147/76  SpO2:  [96 %] 96 %

## 2020-06-11 NOTE — PROGRESS NOTES
Transfer  Transferred from: 6B  Via: wheel chair  Reason for transfer:Pt appropriate for 6C- sternal incision infection with wound vac therapy, stable condition  Family: Aware of transfer  Belongings: Sent with pt  Chart: Sent with pt  Medications: Meds from bin sent with pt  Report called from: Carmita ESCAMILLA

## 2020-06-11 NOTE — PHARMACY-VANCOMYCIN DOSING SERVICE
Pharmacy Vancomycin Initial Note  Date of Service Erica 10, 2020  Patient's  1960  60 year old, male    Indication: Bone and Joint Infection    Current estimated CrCl = CrCl cannot be calculated (Patient's most recent lab result is older than the maximum 10 days allowed.).    Creatinine for last 3 days  No results found for requested labs within last 72 hours.    Recent Vancomycin Level(s) for last 3 days  No results found for requested labs within last 72 hours.      Vancomycin IV Administrations (past 72 hours)      No vancomycin orders with administrations in past 72 hours.                Nephrotoxins and other renal medications (From now, onward)    Start     Dose/Rate Route Frequency Ordered Stop    20 0800  lisinopril (ZESTRIL) tablet 2.5 mg      2.5 mg Oral DAILY 06/10/20 2120      06/10/20 2200  piperacillin-tazobactam (ZOSYN) 3.375 g vial to attach to  mL bag      3.375 g  over 30 Minutes Intravenous EVERY 6 HOURS 06/10/20 2104      06/10/20 2200  vancomycin (VANCOCIN) 1,750 mg in sodium chloride 0.9 % 500 mL intermittent infusion      1,750 mg  over 2 Hours Intravenous EVERY 12 HOURS 06/10/20 2136            Contrast Orders - past 72 hours (72h ago, onward)    None                Plan:  1.  Start vancomycin  1750 mg IV q12h. (Patient was receiving this dose at [facility]. Per MAR last dose was  11:05PM)  2.  Goal Trough Level: 15-20 mg/L   3.  Pharmacy will check trough levels as appropriate in 1-3 Days.    4. Serum creatinine levels will be ordered daily for the first week of therapy and at least twice weekly for subsequent weeks.    5. [place] method utilized to dose vancomycin therapy: Method 2    Benjamín Regalado, [facility]

## 2020-06-11 NOTE — ANESTHESIA PREPROCEDURE EVALUATION
"Anesthesia Pre-Procedure Evaluation    Patient: Nabil Cheung   MRN:     [id number] Gender:   male   Age:    60 year old :      1960        Preoperative Diagnosis: Status post cardiac surgery [Z98.890]   Procedure(s):  INCISION AND DRAINAGE, WOUND, CHEST, WITH IRRIGATION and wound vac change     LABS:  CBC:   Lab Results   Component Value Date    WBC 13.3 (H) 2020    WBC 21.0 (H) 2019    HGB 9.8 (L) 2020    HGB 12.5 (L) 2019    HCT 32.4 (L) 2020    HCT 34.7 (L) 2019     2020     2019     BMP:   Lab Results   Component Value Date     2020     (L) 2019    POTASSIUM 4.1 2020    POTASSIUM 3.9 2019    CHLORIDE 105 2020    CHLORIDE 92 (L) 2019    CO2 26 2020    CO2 24 2019    BUN 15 2020    BUN 11 2019    CR 1.13 2020    CR 1.02 2019     (H) 2020     (H) 2019     COAGS: No results found for: PTT, INR, FIBR  POC:   Lab Results   Component Value Date     (H) 2020     OTHER:   Lab Results   Component Value Date    LACT 1.1 2019    A1C 9.6 (H) 2017    ONEYDA 8.3 (L) 2020    PHOS 3.8 2020    MAG 2.1 2020    ALBUMIN 2.6 (L) 2020    PROTTOTAL 6.4 (L) 2020    ALT 16 2020    AST 12 2020    ALKPHOS 64 2020    BILITOTAL 0.5 2020    TSH 2.79 2017        Preop Vitals    BP Readings from Last 3 Encounters:   20 133/78   19 (!) 156/99   18 134/84    Pulse Readings from Last 3 Encounters:   20 80   19 91   18 69      Resp Readings from Last 3 Encounters:   20 16   19 16   18 19    SpO2 Readings from Last 3 Encounters:   20 97%   19 98%   17 97%      Temp Readings from Last 1 Encounters:   20 37.1  C (98.7  F) (Oral)    Ht Readings from Last 1 Encounters:   06/10/20 1.803 m (5' 11\")      Wt Readings from " "Last 1 Encounters:   06/11/20 86.5 kg (190 lb 11.2 oz)    Estimated body mass index is 26.6 kg/m  as calculated from the following:    Height as of this encounter: 1.803 m (5' 11\").    Weight as of this encounter: 86.5 kg (190 lb 11.2 oz).     LDA:  Peripheral IV 06/11/20 Left;Anterior Lower forearm (Active)   Site Assessment WDL 06/11/20 1700   Line Status Infusing 06/11/20 1700   Infiltration Scale 0 06/11/20 1700   Infiltration Site Treatment Method  Warm compress 06/11/20 1700   Extravasation? No 06/11/20 1700   Number of days: 0       Negative Pressure Wound Therapy Chest (Active)   Wound Type Surgical 06/11/20 1350   Dressing Pieces Removed (# of Each Type) Black foam 06/11/20 0800   Cycle On 06/11/20 0800   Target Pressure (mmHg) 125 06/11/20 0800   Dressing Status Clean, dry, intact 06/11/20 1350   Drainage Color/Charcteristics Sanguinous 06/11/20 1350   Cannister changed? No 06/11/20 0345   Output (ml) 25 ml 06/11/20 0701   Number of days: 1        No past medical history on file.   No past surgical history on file.   Allergies   Allergen Reactions     Atorvastatin      Dust Mites         Anesthesia Evaluation     . Pt has had prior anesthetic. Type: General           ROS/MED HX    ENT/Pulmonary:  - neg pulmonary ROS     Neurologic:     (+)neuropathy - Feet,     Cardiovascular:     (+) hypertension--CAD, -CABG-date: 5/4/20, . Taking blood thinners : . . . :. .       METS/Exercise Tolerance:     Hematologic:     (+) Anemia, -      Musculoskeletal:  - neg musculoskeletal ROS       GI/Hepatic:  - neg GI/hepatic ROS       Renal/Genitourinary:     (+) chronic renal disease, type: CRI,       Endo:     (+) type II DM Using insulin Diabetic complications: nephropathy neuropathy, .      Psychiatric:  - neg psychiatric ROS       Infectious Disease:  - neg infectious disease ROS       Malignancy:      - no malignancy   Other:                     JSTEPHENIE[facility] AN PHYSICAL EXAM    Assessment:   ASA SCORE: 3    H&P: History and " physical reviewed and following examination; no interval change.   Smoking Status:  Non-Smoker/Unknown   NPO Status: NPO Appropriate     Plan:   Anes. Type:  General   Pre-Medication: None   Induction:  IV (Standard)   Airway: ETT; Oral   Access/Monitoring: PIV; 2nd PIV; A-Line   Maintenance: Balanced     Blood products: PRBC     Advanced Monitoring: BIS; NIRS (cerebral)     Postop Plan:   Postop Pain: Opioids  Postop Sedation/Airway: Not planned  Disposition: Inpatient/Admit     PONV Management:   Adult Risk Factors:, Non-Smoker, Postop Opioids   Prevention: Ondansetron                   Artur Thompson MD

## 2020-06-11 NOTE — PROGRESS NOTES
Neuro: Patient alert and oriented x4.   Cardiac: NSR, BP's stable. Metoprolol restarted. Amiodarone added. Afebrile.   Respiratory: Sating >92% on RA.  GI/: Adequate urine output. No BM.   Diet/appetite: Tolerating regular diet. Eating well.  Activity:  Assist of stand by, up to chair and in halls.  Pain: At acceptable level on current regimen. Flexeril given x1.   Skin: No new deficits noted. WOC to see pt today for unhealing foot ulcer.   LDA's: PIV TKO for antibiotics. Wound vac to -125 to sternum (to be managed by CVTS until further notice).   Plan: Plans to go to OR tomorrow for sternum wash out. NPO at midnight. Transferring to . Report given.

## 2020-06-11 NOTE — PROGRESS NOTES
Cardiovascular Surgery Progress Note  6/11/2020  Surgeon: Dr. Mckinney           Assessment and Plan:     Nabil Cheung is a 60 y.o. male with a PMH of poorly controlled DMT2, hypertension, CKD stage 2-3, BPH/urinary retention, chronic diabetic left foot ulcer, and recently diagnosed severe 3 vessel disease with progressive symptoms at home. Underwent CABG x 4 (LIMA to LAD, SVG to rPDA, SVG to OM, SVG to diag) 05/04 with Dr. Mckinney. Discharged to home 05/12. Admitted to [facility] from home 06/08 with sternal wound infection. Underwent I&D of sternum at [facility] 06/10 with Dr. Mckinney, then transfered to [facility] for further wound management management     Cardiovascular:   CAD s/p CABG  Hypertension  - Most recent TTE 05/05 with preserved biventricular dysfunction  - Aspirin 325 mg daily  - Rosuvastatin 20 mg daily  - BP high. Increase lisinopril to 5 mg daily and Toprol 50 mg daily  - Euvolemic. Diuretic PRN  Postop atrial fibrillation  - Continue PTA amiodarone taper. 200 mg daily (last dose 06/25)  - Systemic anticoagulation deferred     Pulmonary:  - No active issues. Saturating well on RA.   - Supplemental O2 PRN to keep sats > 92%. Wean off as tolerated.  - Pulm toilet, IS, activity and deep breathing      Neurology / MSK:  Postop pain  - Tylenol PRN  - Oxycodone PRN  - IV Dilaudid PRN  - Flexeril PRN      / Renal:  CKD stage 2-3  - Most recent creatinine within baseline, trend daily  - Avoid nephrotoxins  - Diuresis as above   BPH  Urinary retention  - PTA finasteride 5 mg daily  - PTA alfuzosin 10 mg daily     GI / FEN:   Nutrition  - Diabetic diet, bowel regimen     Endocrine:  DMT2, poorly controlled  - Endocrine consult, appreciate recs  - Continue home meds plus SSI     Infectious Disease:  Leukocytosis  Sternal wound infection  - Bedside I&D 06/09 at [facility]  - Wound culture 06/09 ([facility]) growing staph epidermidis  - Will plan to obtain repeat cultures at next washout  - OR I&D 06/10 with Dr. Mckinney. Infection  "tracks length of sternal incision with areas of exposed bone and exposed wires. Exposed wires removed, sternal wound vac placed  - OR washout 06/12  - Consult ID  - Continue vanc and zosyn pending cultures  - May eventually need plastic surgery for flap closure   Chronic left foot ulcer  - Previously recommended for out-patient podiatry follow up for further debridement  - WOC consult  - Mepilex dressing daily and PRN for saturation     Hematology:   - CBC pending     Prophylaxis:   - Stress ulcer prophylaxis: not indicated  - DVT prophylaxis: Subcutaneous heparin, SCD    Discussed with CVTS Fellow as needed.  Staff surgeons have been informed of changes through both verbal and written communication.      Maribeth Pennington PA-C  Cardiothoracic Surgery  Pager [phone]            Interval History:     No complaints. Pain controlled. Denies SOB.          Physical Exam:   Blood pressure 137/82, pulse 95, temperature 98.2  F (36.8  C), temperature source Oral, resp. rate 18, height 1.803 m (5' 11\"), weight 86.5 kg (190 lb 11.2 oz), SpO2 97 %.  Vitals:    06/10/20 2100 06/11/20 0331   Weight: 86.4 kg (190 lb 7.6 oz) 86.5 kg (190 lb 11.2 oz)      Gen: NAD, resting in bed  CV: RRR, S1S2 normal, no murmurs, rubs, or gallops. JVP not elevated  Pulm: clear to auscultation bilaterally, no rhonchi or wheezes  Abd: soft, non-tender, no guarding, +BM  Ext: no lower extremity edema  Incision: sternal wound vac in place  Neuro: grossly normal  Psych: calm, cooperative            Data:    Imaging:  reviewed recent imaging    Labs:  BMP  Recent Labs   Lab 06/11/20 0427      POTASSIUM 4.1   CHLORIDE 105   ONEYDA 8.3*   CO2 26   BUN 15   CR 1.13   *     CBC  Recent Labs   Lab 06/11/20 0427   WBC 13.3*   RBC 3.88*   HGB 9.8*   HCT 32.4*   MCV 84   MCH 25.3*   MCHC 30.2*   RDW 15.2*        INR  No lab results found in last 7 days.   Liver Function Studies -   Recent Labs   Lab Test 06/11/20 0427   PROTTOTAL 6.4* "   ALBUMIN 2.6*   BILITOTAL 0.5   ALKPHOS 64   AST 12   ALT 16     GLUCOSE:   Recent Labs   Lab 06/11/20  0756 06/11/20  0427 06/10/20  2202   GLC  --  100*  --    *  --  184*

## 2020-06-12 ENCOUNTER — ANESTHESIA (OUTPATIENT)
Dept: SURGERY | Facility: CLINIC | Age: 60
End: 2020-06-12
Payer: COMMERCIAL

## 2020-06-12 LAB
GLUCOSE BLDC GLUCOMTR-MCNC: 148 MG/DL (ref 70–99)
GLUCOSE BLDC GLUCOMTR-MCNC: 155 MG/DL (ref 70–99)
GLUCOSE BLDC GLUCOMTR-MCNC: 167 MG/DL (ref 70–99)
GLUCOSE BLDC GLUCOMTR-MCNC: 190 MG/DL (ref 70–99)
GLUCOSE BLDC GLUCOMTR-MCNC: 227 MG/DL (ref 70–99)
GLUCOSE BLDC GLUCOMTR-MCNC: 280 MG/DL (ref 70–99)
GRAM STN SPEC: NORMAL
SPECIMEN SOURCE: NORMAL
VANCOMYCIN SERPL-MCNC: 25.9 MG/L

## 2020-06-12 PROCEDURE — 25000132 ZZH RX MED GY IP 250 OP 250 PS 637: Performed by: PHYSICIAN ASSISTANT

## 2020-06-12 PROCEDURE — 87077 CULTURE AEROBIC IDENTIFY: CPT | Performed by: THORACIC SURGERY (CARDIOTHORACIC VASCULAR SURGERY)

## 2020-06-12 PROCEDURE — 87205 SMEAR GRAM STAIN: CPT | Performed by: THORACIC SURGERY (CARDIOTHORACIC VASCULAR SURGERY)

## 2020-06-12 PROCEDURE — 25000125 ZZHC RX 250: Performed by: NURSE ANESTHETIST, CERTIFIED REGISTERED

## 2020-06-12 PROCEDURE — 25000128 H RX IP 250 OP 636: Performed by: PHYSICIAN ASSISTANT

## 2020-06-12 PROCEDURE — 25000125 ZZHC RX 250: Performed by: THORACIC SURGERY (CARDIOTHORACIC VASCULAR SURGERY)

## 2020-06-12 PROCEDURE — 36415 COLL VENOUS BLD VENIPUNCTURE: CPT | Performed by: THORACIC SURGERY (CARDIOTHORACIC VASCULAR SURGERY)

## 2020-06-12 PROCEDURE — 2W14X6Z COMPRESSION OF CHEST WALL USING PRESSURE DRESSING: ICD-10-PCS | Performed by: THORACIC SURGERY (CARDIOTHORACIC VASCULAR SURGERY)

## 2020-06-12 PROCEDURE — 25000128 H RX IP 250 OP 636: Performed by: STUDENT IN AN ORGANIZED HEALTH CARE EDUCATION/TRAINING PROGRAM

## 2020-06-12 PROCEDURE — 36000059 ZZH SURGERY LEVEL 3 EA 15 ADDTL MIN UMMC: Performed by: THORACIC SURGERY (CARDIOTHORACIC VASCULAR SURGERY)

## 2020-06-12 PROCEDURE — 87070 CULTURE OTHR SPECIMN AEROBIC: CPT | Performed by: THORACIC SURGERY (CARDIOTHORACIC VASCULAR SURGERY)

## 2020-06-12 PROCEDURE — 25800030 ZZH RX IP 258 OP 636: Performed by: THORACIC SURGERY (CARDIOTHORACIC VASCULAR SURGERY)

## 2020-06-12 PROCEDURE — 0PD00ZZ EXTRACTION OF STERNUM, OPEN APPROACH: ICD-10-PCS | Performed by: THORACIC SURGERY (CARDIOTHORACIC VASCULAR SURGERY)

## 2020-06-12 PROCEDURE — 87102 FUNGUS ISOLATION CULTURE: CPT | Performed by: THORACIC SURGERY (CARDIOTHORACIC VASCULAR SURGERY)

## 2020-06-12 PROCEDURE — 80202 ASSAY OF VANCOMYCIN: CPT | Performed by: THORACIC SURGERY (CARDIOTHORACIC VASCULAR SURGERY)

## 2020-06-12 PROCEDURE — 87186 SC STD MICRODIL/AGAR DIL: CPT | Performed by: THORACIC SURGERY (CARDIOTHORACIC VASCULAR SURGERY)

## 2020-06-12 PROCEDURE — 00000146 ZZHCL STATISTIC GLUCOSE BY METER IP

## 2020-06-12 PROCEDURE — 25000132 ZZH RX MED GY IP 250 OP 250 PS 637: Performed by: STUDENT IN AN ORGANIZED HEALTH CARE EDUCATION/TRAINING PROGRAM

## 2020-06-12 PROCEDURE — 40000170 ZZH STATISTIC PRE-PROCEDURE ASSESSMENT II: Performed by: THORACIC SURGERY (CARDIOTHORACIC VASCULAR SURGERY)

## 2020-06-12 PROCEDURE — 37000009 ZZH ANESTHESIA TECHNICAL FEE, EACH ADDTL 15 MIN: Performed by: THORACIC SURGERY (CARDIOTHORACIC VASCULAR SURGERY)

## 2020-06-12 PROCEDURE — 87075 CULTR BACTERIA EXCEPT BLOOD: CPT | Performed by: THORACIC SURGERY (CARDIOTHORACIC VASCULAR SURGERY)

## 2020-06-12 PROCEDURE — 21400000 ZZH R&B CCU UMMC

## 2020-06-12 PROCEDURE — 36000057 ZZH SURGERY LEVEL 3 1ST 30 MIN - UMMC: Performed by: THORACIC SURGERY (CARDIOTHORACIC VASCULAR SURGERY)

## 2020-06-12 PROCEDURE — 37000008 ZZH ANESTHESIA TECHNICAL FEE, 1ST 30 MIN: Performed by: THORACIC SURGERY (CARDIOTHORACIC VASCULAR SURGERY)

## 2020-06-12 PROCEDURE — 27210794 ZZH OR GENERAL SUPPLY STERILE: Performed by: THORACIC SURGERY (CARDIOTHORACIC VASCULAR SURGERY)

## 2020-06-12 PROCEDURE — 25000128 H RX IP 250 OP 636: Performed by: THORACIC SURGERY (CARDIOTHORACIC VASCULAR SURGERY)

## 2020-06-12 PROCEDURE — 25000128 H RX IP 250 OP 636: Performed by: NURSE ANESTHETIST, CERTIFIED REGISTERED

## 2020-06-12 RX ORDER — LABETALOL 20 MG/4 ML (5 MG/ML) INTRAVENOUS SYRINGE
10-20 ONCE
Status: COMPLETED | OUTPATIENT
Start: 2020-06-12 | End: 2020-06-12

## 2020-06-12 RX ORDER — SODIUM CHLORIDE, SODIUM GLUCONATE, SODIUM ACETATE, POTASSIUM CHLORIDE AND MAGNESIUM CHLORIDE 526; 502; 368; 37; 30 MG/100ML; MG/100ML; MG/100ML; MG/100ML; MG/100ML
INJECTION, SOLUTION INTRAVENOUS CONTINUOUS PRN
Status: DISCONTINUED | OUTPATIENT
Start: 2020-06-12 | End: 2020-06-12

## 2020-06-12 RX ORDER — ONDANSETRON 2 MG/ML
INJECTION INTRAMUSCULAR; INTRAVENOUS PRN
Status: DISCONTINUED | OUTPATIENT
Start: 2020-06-12 | End: 2020-06-12

## 2020-06-12 RX ORDER — POLYETHYLENE GLYCOL 3350 17 G/17G
17 POWDER, FOR SOLUTION ORAL DAILY PRN
Status: DISCONTINUED | OUTPATIENT
Start: 2020-06-12 | End: 2020-06-21 | Stop reason: HOSPADM

## 2020-06-12 RX ORDER — FENTANYL CITRATE 50 UG/ML
INJECTION, SOLUTION INTRAMUSCULAR; INTRAVENOUS PRN
Status: DISCONTINUED | OUTPATIENT
Start: 2020-06-12 | End: 2020-06-12

## 2020-06-12 RX ORDER — FENTANYL CITRATE 50 UG/ML
25-50 INJECTION, SOLUTION INTRAMUSCULAR; INTRAVENOUS
Status: CANCELLED | OUTPATIENT
Start: 2020-06-12

## 2020-06-12 RX ORDER — PROPOFOL 10 MG/ML
INJECTION, EMULSION INTRAVENOUS CONTINUOUS PRN
Status: DISCONTINUED | OUTPATIENT
Start: 2020-06-12 | End: 2020-06-12

## 2020-06-12 RX ORDER — NALOXONE HYDROCHLORIDE 0.4 MG/ML
.1-.4 INJECTION, SOLUTION INTRAMUSCULAR; INTRAVENOUS; SUBCUTANEOUS
Status: DISCONTINUED | OUTPATIENT
Start: 2020-06-12 | End: 2020-06-13

## 2020-06-12 RX ORDER — ONDANSETRON 2 MG/ML
4 INJECTION INTRAMUSCULAR; INTRAVENOUS EVERY 30 MIN PRN
Status: CANCELLED | OUTPATIENT
Start: 2020-06-12

## 2020-06-12 RX ORDER — SODIUM CHLORIDE, SODIUM LACTATE, POTASSIUM CHLORIDE, CALCIUM CHLORIDE 600; 310; 30; 20 MG/100ML; MG/100ML; MG/100ML; MG/100ML
INJECTION, SOLUTION INTRAVENOUS CONTINUOUS
Status: DISCONTINUED | OUTPATIENT
Start: 2020-06-12 | End: 2020-06-20

## 2020-06-12 RX ORDER — SODIUM CHLORIDE, SODIUM LACTATE, POTASSIUM CHLORIDE, CALCIUM CHLORIDE 600; 310; 30; 20 MG/100ML; MG/100ML; MG/100ML; MG/100ML
INJECTION, SOLUTION INTRAVENOUS CONTINUOUS
Status: CANCELLED | OUTPATIENT
Start: 2020-06-12

## 2020-06-12 RX ORDER — ONDANSETRON 4 MG/1
4 TABLET, ORALLY DISINTEGRATING ORAL EVERY 30 MIN PRN
Status: CANCELLED | OUTPATIENT
Start: 2020-06-12

## 2020-06-12 RX ORDER — LIDOCAINE 40 MG/G
CREAM TOPICAL
Status: DISCONTINUED | OUTPATIENT
Start: 2020-06-12 | End: 2020-06-15

## 2020-06-12 RX ADMIN — MIDAZOLAM 1 MG: 1 INJECTION INTRAMUSCULAR; INTRAVENOUS at 08:45

## 2020-06-12 RX ADMIN — METOPROLOL SUCCINATE 50 MG: 50 TABLET, EXTENDED RELEASE ORAL at 10:27

## 2020-06-12 RX ADMIN — ROSUVASTATIN CALCIUM 20 MG: 20 TABLET, FILM COATED ORAL at 20:07

## 2020-06-12 RX ADMIN — PIPERACILLIN AND TAZOBACTAM 3.38 G: 3; .375 INJECTION, POWDER, FOR SOLUTION INTRAVENOUS at 10:28

## 2020-06-12 RX ADMIN — LISINOPRIL 5 MG: 5 TABLET ORAL at 10:27

## 2020-06-12 RX ADMIN — PROPOFOL 150 MCG/KG/MIN: 10 INJECTION, EMULSION INTRAVENOUS at 08:51

## 2020-06-12 RX ADMIN — CYCLOBENZAPRINE 10 MG: 10 TABLET, FILM COATED ORAL at 18:00

## 2020-06-12 RX ADMIN — ASPIRIN 325 MG ORAL TABLET 325 MG: 325 PILL ORAL at 10:27

## 2020-06-12 RX ADMIN — CYCLOBENZAPRINE 10 MG: 10 TABLET, FILM COATED ORAL at 07:57

## 2020-06-12 RX ADMIN — ONDANSETRON 4 MG: 2 INJECTION INTRAMUSCULAR; INTRAVENOUS at 09:18

## 2020-06-12 RX ADMIN — VANCOMYCIN HYDROCHLORIDE 1500 MG: 10 INJECTION, POWDER, LYOPHILIZED, FOR SOLUTION INTRAVENOUS at 13:01

## 2020-06-12 RX ADMIN — PIPERACILLIN AND TAZOBACTAM 3.38 G: 3; .375 INJECTION, POWDER, FOR SOLUTION INTRAVENOUS at 18:00

## 2020-06-12 RX ADMIN — SODIUM CHLORIDE, SODIUM GLUCONATE, SODIUM ACETATE, POTASSIUM CHLORIDE AND MAGNESIUM CHLORIDE: 526; 502; 368; 37; 30 INJECTION, SOLUTION INTRAVENOUS at 08:42

## 2020-06-12 RX ADMIN — FINASTERIDE 5 MG: 5 TABLET, FILM COATED ORAL at 20:07

## 2020-06-12 RX ADMIN — ACETAMINOPHEN 650 MG: 325 TABLET, FILM COATED ORAL at 00:10

## 2020-06-12 RX ADMIN — AMIODARONE HYDROCHLORIDE 200 MG: 200 TABLET ORAL at 10:27

## 2020-06-12 RX ADMIN — LABETALOL 20 MG/4 ML (5 MG/ML) INTRAVENOUS SYRINGE 10 MG: at 01:31

## 2020-06-12 RX ADMIN — ALFUZOSIN HYDROCHLORIDE 10 MG: 10 TABLET, EXTENDED RELEASE ORAL at 10:27

## 2020-06-12 RX ADMIN — FENTANYL CITRATE 25 MCG: 50 INJECTION, SOLUTION INTRAMUSCULAR; INTRAVENOUS at 08:57

## 2020-06-12 RX ADMIN — ACETAMINOPHEN 650 MG: 325 TABLET, FILM COATED ORAL at 07:57

## 2020-06-12 RX ADMIN — POLYETHYLENE GLYCOL 3350 17 G: 17 POWDER, FOR SOLUTION ORAL at 22:34

## 2020-06-12 RX ADMIN — FENTANYL CITRATE 25 MCG: 50 INJECTION, SOLUTION INTRAMUSCULAR; INTRAVENOUS at 09:05

## 2020-06-12 RX ADMIN — CYCLOBENZAPRINE 10 MG: 10 TABLET, FILM COATED ORAL at 00:10

## 2020-06-12 RX ADMIN — ACETAMINOPHEN 650 MG: 325 TABLET, FILM COATED ORAL at 18:00

## 2020-06-12 RX ADMIN — PIPERACILLIN AND TAZOBACTAM 3.38 G: 3; .375 INJECTION, POWDER, FOR SOLUTION INTRAVENOUS at 03:06

## 2020-06-12 ASSESSMENT — PAIN DESCRIPTION - DESCRIPTORS
DESCRIPTORS: ACHING;SORE
DESCRIPTORS: ACHING

## 2020-06-12 ASSESSMENT — ACTIVITIES OF DAILY LIVING (ADL)
ADLS_ACUITY_SCORE: 14

## 2020-06-12 ASSESSMENT — MIFFLIN-ST. JEOR: SCORE: 1698.03

## 2020-06-12 NOTE — ANESTHESIA POSTPROCEDURE EVALUATION
Anesthesia POST Procedure Evaluation    Patient: Nabil Cheung   MRN:     [id number] Gender:   male   Age:    60 year old :      1960        Preoperative Diagnosis: Status post cardiac surgery [Z98.890]   Procedure(s):  INCISION AND DRAINAGE, WOUND, CHEST, WITH IRRIGATION and wound vac change   Postop Comments: No value filed.     Anesthesia Type: General       Disposition: Admission   Postop Pain Control: Uneventful            Sign Out: Well controlled pain   PONV: No   Neuro/Psych: Uneventful            Sign Out: Acceptable/Baseline neuro status   Airway/Respiratory: Uneventful            Sign Out: AIRWAY IN SITU/Resp. Support   CV/Hemodynamics: Uneventful            Sign Out: Acceptable CV status   Other NRE: NONE   DID A NON-ROUTINE EVENT OCCUR? No         Last Anesthesia Record Vitals:  CRNA VITALS  2020 0853 - 2020 0953      2020             Pulse:  70    SpO2:  97 %    Resp Rate (observed):  12          Last PACU Vitals:  No vitals data found for the desired time range.        Electronically Signed By: Nigel Rivero MD, 2020, 12:01 PM

## 2020-06-12 NOTE — PROGRESS NOTES
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 Mckinney.  [place] [facility] [place] infection (6/8/2020) with sternal wound infection. Transferred to [facility] on (6/10/2020) for further wound management.     Type 2 diabetes: poorly controlled with a recent A1C, A1C 7.5% (6/9/2020) with HGB of 9.3%. not an accurate measure 2/2 anemia.  -Mr. Cheung has specific ideas regarding his diabetes     Plan  - resume Lantus 18 units at bedtime  -add prandial insulin, Novolog 1 unit per 13 grams of CHO for meals/snacks/supplements  -  Discontinue novolog medium intensity sliding scale before meals and at bedtime  - changed to custome correction 1 unit per 40 > 140 before meals and > 200 HS  -test blood sugars before meals, HS and 0200  -continue to hold metformin and Jardiance ( Jardiance is non-formulary and possibly additional procedures per CVTS)  -hypoglycemia protocol  -will continue to follow     Outpatient diabetes follow up: Dr. Melina Gold and roger Butler CDE  Plan discussed with patient and primary team.           Interval History:   The last 24 hours progress and nursing notes reviewed.    Was NPO for wash-out of sternum for Friday morning  Decreased lantus for procedure and resumed PTA dose for Friday night   Discussed making sure glucose at ~ 0200, 100 or > and fasting glcusoe ~ 120   Discussed the rational to avoid hypoglycemia overnight.  Discussed glucose control of 180 or less for healing and the importance of good nutrition  reviewed previous A1C and recommended A1C around 7%, without hypoglycemia  Appetite is reported as ok, denies nausea and or vomiting      PTA:  lantus 18 units at bedtime  metformin 1000 mg twice daily  Jardiance (empagliflozin) does not recall the dose, per chart review 10 mg  "daily  Sitagliptin ( januvia) 100 mg daily ( has not start the medication)      Recent Labs   Lab 06/12/20  0139 06/11/20  2144 06/11/20  1713 06/11/20  1153 06/11/20  0756 06/11/20  0427 06/10/20  2202   GLC  --   --   --   --   --  100*  --    * 309* 244* 188* 102*  --  184*               Review of Systems:   See interval hx          Medications:     Orders Placed This Encounter      NPO per Anesthesia Guidelines for Procedure/Surgery Except for: Meds       Physical Exam:  Gen: Alert, resting in bed, in NAD   HEENT:  mucous membranes are moist  Resp: non-labored  Ext: moving all extremteis   Neuro:oriented x3, communicating clearly  BP (!) 146/82 (BP Location: Left arm)   Pulse 80   Temp 98  F (36.7  C) (Oral)   Resp 16   Ht 1.803 m (5' 11\")   Wt 87.6 kg (193 lb 1.6 oz)   SpO2 94%   BMI 26.93 kg/m             Data:     Lab Results   Component Value Date    A1C 9.6 09/25/2017    A1C 10.7 06/29/2017              CBC RESULTS:   Recent Labs   Lab Test 06/11/20 0427   WBC 13.3*   RBC 3.88*   HGB 9.8*   HCT 32.4*   MCV 84   MCH 25.3*   MCHC 30.2*   RDW 15.2*        Recent Labs   Lab Test 06/11/20  0427 07/29/19  0529    125*   POTASSIUM 4.1 3.9   CHLORIDE 105 92*   CO2 26 24   ANIONGAP 6 9   * 220*   BUN 15 11   CR 1.13 1.02   ONEYDA 8.3* 8.6     Liver Function Studies -   Recent Labs   Lab Test 06/11/20  0427   PROTTOTAL 6.4*   ALBUMIN 2.6*   BILITOTAL 0.5   ALKPHOS 64   AST 12   ALT 16     No results found for: INR      I spent a total of 35 minutes bedside and on the inpatient unit managing the glycemic care of Nabil Cheung. Over 50% of my time on the unit was spent counseling the patient  and/or coordinating care regarding .  See note for details.      Caro Arzola -[phone]  Diabetes Management job code [id number]            "

## 2020-06-12 NOTE — PROGRESS NOTES
[facility] GENERAL ID SERVICE CONSULTATION     Patient:  Nabil Cheung   Date of birth 1960, Medical record number [id number]  Date of Visit:  06/12/2020  Date of Admission: 6/10/2020  Consult Requester:Mark Mckinney, *            Assessment and Recommendations:   ASSESSMENT:  1. Patient with sternal wound infection after surgery. Culture from outside hospital, [facility] on 6/9/2020 growing Staphylococcus epidermidis only. Some thickening/inflammation of tissue seen posterior to the sternum on the [facility] CT chest. No clear abscess or mediastinal fluid collection seen. Concern for possible osteomyelitis mediastinitis though. Awaiting results of the multiple samples of tissue and bone sent from the OR debridement today for gram stain and cultures both aerobic and anaerobic.       RECOMMENDATION:  1. Would continue IV vancomycin and plan a prolonged course 6 weeks due to concern the infection may extend down to and include the sternum.    2. Discontinue piperacillin/tazobactam for now based on the [facility] cultures only showing coag neg staph. If the [facility] cultures grow gram neg rods or anaerobic bacteria though will need to add back additional gram negative and anaerobic coverage.   3. Monitor Cr and vanco trough levels. Aim for a trough level of 15-20.     ID will follow-up on the tissue/bone culture results and make final antibiotic treatment recs.  Dr. Janna Cervantes will be on call for general ID this weekend.     Attending Physician Attestation:  I have seen and evaluated Nabil Cheung.  I have reviewed today's vital signs, medications, labs and imaging.     Shivani Lopes MD  ID staff   Pager [phone]    6/12/202: Interval History- Patient had wound debrided and irrigated in the OR today with removal of infected appearing/non-viable tissue and bone.   ________________________________________________________________    Consult Question:.  Admission Diagnosis: sternal wound infection  Wound infection after surgery         History of  Present Illness:     Patient is a pleasant 60 year old man with long term diagnosis of type 2 DM with complication of CAD requiring 4 vessel CABG in early May 2020. He was admitted to [facility] two days ago when we developed signs of a sternal wound infection. He denies fevers or chills but he did develop swelling tenderness and drainage at the superior aspect of the sternal wound. A stitch was sticking out and and it seem irritated so he cut off the part that was sticking out. The wound opened slightly. He applied hot compresses and pressed on it and the wound drainage clear fluid and a lttle bloody drainage and some white drainage. Then is started to get swollen up again the next day so he went to [facility] to be seen. I bedside I and D was done on 6/9/2020 and that culture swab is growing one colony Staph epi at [facility]. Susceptibility tests were not done.     Patient states that the bone orf the sternum have not healed together well. He experiences clicking and grinding of the sternal edges with movement. He is worried that the wires were loose or broken.       Recent culture results include:  Culture Micro   Date Value Ref Range Status   06/12/2020 PENDING  Preliminary   06/12/2020 PENDING  Preliminary   06/12/2020   Preliminary    Canceled, Test credited  Test reordered as correct code  Reordered as BONEC     06/12/2020 PENDING  Preliminary   06/12/2020 PENDING  Preliminary   06/12/2020 PENDING  Preliminary   06/12/2020   Preliminary    Canceled, Test credited  Incorrectly ordered by PCU/Clinic  Test reordered as correct code     07/29/2019 No growth  Final              Review of Systems:   CONSTITUTIONAL:  No fevers or chills  EYES: negative for icterus  ENT:  negative for hearing loss, tinnitus and sore throat  RESPIRATORY:  negative for cough with sputum and dyspnea  CARDIOVASCULAR:  negative for chest pain, dyspnea  GASTROINTESTINAL:  negative for nausea, vomiting, diarrhea and constipation  GENITOURINARY:  negative  for dysuria  HEME:  No easy bruising  INTEGUMENT:  negative for rash and pruritus  NEURO:  Negative for headache           Past Medical History:   No past medical history on file.  Type 2 DM for over 15 years, not well controlled  Chronic kidney disease stage 3.  HTN         Past Surgical History:   History reviewed. No pertinent surgical history.   S/p CABG x 4 vessels 5/4/2020 at [facility]         Family History:   History reviewed. No pertinent family history.   Father with DM  Mother with breast CA and DM         Social History:     Social History     Tobacco Use     Smoking status: Never Smoker   Substance Use Topics     Alcohol use: Yes     History   Sexual Activity     Sexual activity: Not on file            Current Medications (antimicrobials listed in bold):       alfuzosin ER  10 mg Oral Daily     amiodarone  200 mg Oral Daily     aspirin  325 mg Oral Daily     finasteride  5 mg Oral Daily     insulin aspart   Subcutaneous TID w/meals     insulin aspart  1-6 Units Subcutaneous TID w/meals     insulin aspart  1-5 Units Subcutaneous At Bedtime     insulin glargine  18 Units Subcutaneous At Bedtime     lisinopril  5 mg Oral Daily     metoprolol succinate ER  50 mg Oral Daily     piperacillin-tazobactam  3.375 g Intravenous Q6H     rosuvastatin  20 mg Oral QPM     sodium chloride (PF)  3 mL Intracatheter Q8H     vancomycin (VANCOCIN) IV  1,500 mg Intravenous Q12H            Allergies:     Allergies   Allergen Reactions     Atorvastatin      Dust Mites             Physical Exam:   Vitals were reviewed  Patient Vitals for the past 24 hrs:   BP Temp Temp src Pulse Heart Rate Resp SpO2 Weight   06/12/20 1130 (!) 143/80 -- -- 63 -- 18 95 % --   06/12/20 1045 (!) 150/79 -- -- 68 -- -- -- --   06/12/20 1030 (!) 156/69 -- -- 63 -- -- 96 % --   06/12/20 1015 (!) 152/76 -- -- 62 -- -- -- --   06/12/20 1000 138/75 -- -- 64 -- 16 94 % --   06/12/20 0754 (!) 151/90 98  F (36.7  C) Oral -- 69 16 97 % --   06/12/20 0619 -- -- --  -- -- -- -- 87.6 kg (193 lb 1.6 oz)   06/12/20 0304 (!) 146/82 98  F (36.7  C) Oral -- 78 16 94 % --   06/12/20 0100 (!) 168/86 -- -- -- 86 16 -- --   06/12/20 0007 (!) 161/79 98.5  F (36.9  C) Oral -- 80 16 97 % --   06/11/20 2000 136/79 97.5  F (36.4  C) Oral -- 89 16 96 % --   06/11/20 1533 133/78 98.7  F (37.1  C) Oral -- 81 16 97 % --     Ranges for his vital signs:  Temp:  [97.5  F (36.4  C)-98.7  F (37.1  C)] 98  F (36.7  C)  Pulse:  [62-68] 63  Heart Rate:  [69-89] 69  Resp:  [16-18] 18  BP: (133-168)/(69-90) 143/80  SpO2:  [94 %-97 %] 95 %    Physical Examination:  GENERAL:  well-developed, well-nourished, in bed in no acute distress.   HEENT:  Head is normocephalic, atraumatic   EYES:  Eyes have anicteric sclerae without conjunctival injection or stigmata of endocarditis.    ENT:  Oropharynx is moist without exudates or ulcers. Tongue is midline  NECK:  Supple. No  Cervical lymphadenopathy  LUNGS:  Clear to auscultation bilateral.   CARDIOVASCULAR:  Regular rate and rhythm with no murmurs, gallops or rubs. Large mid-line sternotomy open chest wound down to sternal with wound vac overlying. Surrounding skin is clean dry and intact under the wound VAC dressing.   ABDOMEN:  Normal bowel sounds, soft, nontender. No appreciable hepatosplenomegaly  SKIN:  No acute rashes.  Line(s) are in place without any surrounding erythema or exudate. No stigmata of endocarditis.  NEUROLOGIC:  Grossly nonfocal. Active x4 extremities         Laboratory Data:     Inflammatory Markers  No lab results found.    Hematology Studies    Recent Labs   Lab Test 06/11/20  0427 07/29/19  0529   WBC 13.3* 21.0*   ANEU  --  17.3*   AEOS  --  0.2   HGB 9.8* 12.5*   MCV 84 79    284       Immune Globulin Studies  No lab results found.    Metabolic Studies     Recent Labs   Lab Test 06/11/20  0427 07/29/19  0529 09/25/17  1825 06/29/17  2100    125* 135 133   POTASSIUM 4.1 3.9 5.0 4.1   CHLORIDE 105 92* 101 98   CO2 26 24 26 27    BUN 15 11 25 18   CR 1.13 1.02 1.06 0.97   GFRESTIMATED 69 79 71 79       Hepatic Studies    Recent Labs   Lab Test 06/11/20  0427   BILITOTAL 0.5   ALKPHOS 64   ALBUMIN 2.6*   AST 12   ALT 16       Thyroid Studies    Recent Labs   Lab Test 09/25/17  1825 06/29/17  2100   TSH 2.79 3.17       Microbiology:  Culture Micro   Date Value Ref Range Status   06/12/2020 PENDING  Preliminary   06/12/2020 PENDING  Preliminary   06/12/2020   Preliminary    Canceled, Test credited  Test reordered as correct code  Reordered as BONEC     06/12/2020 PENDING  Preliminary   06/12/2020 PENDING  Preliminary   06/12/2020 PENDING  Preliminary   06/12/2020   Preliminary    Canceled, Test credited  Incorrectly ordered by PCU/Clinic  Test reordered as correct code     07/29/2019 No growth  Final       Urine Studies    Recent Labs   Lab Test 07/29/19  0544 09/25/17  1830   LEUKEST Large* Negative   WBCU 80*  --        Vancomycin Levels    Recent Labs   Lab Test 06/12/20  1000   VANCOMYCIN 25.9*

## 2020-06-12 NOTE — PROGRESS NOTES
Cardiovascular Surgery Progress Note  6/12/2020         Assessment and Plan:     Nabil Cheung is a 60 y.o. male with a PMH of poorly controlled DMT2, hypertension, CKD stage 2-3, BPH/urinary retention, chronic diabetic left foot ulcer, and recently diagnosed severe 3 vessel disease with progressive symptoms at home. Underwent CABG x 4 (LIMA to LAD, SVG to rPDA, SVG to OM, SVG to diag) 05/04 with Dr. Mckinney. Discharged to home 05/12. Admitted to [facility] from home 06/08 with sternal wound infection. Underwent I&D of sternum at [facility] 06/10 with Dr. Mckinney, then transfered to [facility] for further wound management management.   Plastic Surgery consult ordered for 6/15.      Cardiovascular:   CAD s/p CABG  Hypertension  - Most recent TTE 05/05 with preserved biventricular dysfunction  - Aspirin 325 mg daily  - Rosuvastatin 20 mg daily  - HTN: increase meds PRN; lisinopril 5 mg daily and Toprol 50 mg daily  - Euvolemic. Diuretic PRN  Postop atrial fibrillation  - Continue PTA amiodarone taper, currently 200 mg daily (last dose 06/25)  - Systemic anticoagulation deferred     Pulmonary:  - No active issues. Saturating well on RA.   - Supplemental O2 PRN to keep sats > 92%. Wean off as tolerated.  - Pulm toilet, IS, activity and deep breathing      Neurology / MSK:  Postop pain  - Tylenol PRN, Oxycodone PRN  - IV Dilaudid PRN  - Flexeril PRN      / Renal:  CKD stage 2-3  - Most recent creatinine within baseline, trend daily  - Avoid nephrotoxins, Diuresis as above   BPH  Urinary retention  - PTA finasteride 5 mg daily  - PTA alfuzosin 10 mg daily     GI / FEN:   Nutrition  - Diabetic diet, bowel regimen     Endocrine:  DMT2, poorly controlled  - Endocrine consult, appreciate recs.  - Lantus, sliding scale, prandial dosing      Infectious Disease:  Leukocytosis  Sternal wound infection  - Bedside I&D 06/09 at [facility], Culture 06/09 ([facility]) growing staph epidermidis  - OR I&D 6/10 with Dr. Mckinney. Infection tracks length of  "sternal incision with areas of exposed bone and exposed wires. Exposed wires removed, sternal wound vac placed  - OR washout 6/12 with replacement of wound vac, purulent debris present, cultures resent.   - Consult ID 6/11: continue vanc through 4-6 weeks and stop zosyn 6/12.    - Will eventually need plastic surgery for flap closure.    Chronic left foot ulcer  - Previously recommended for out-patient podiatry follow up for further debridement  - WOC consult  - Mepilex dressing daily and PRN for saturation     Hematology:   - Stable Hbg 9's, Plts WNL.      Prophylaxis:   - Stress ulcer prophylaxis: not indicated  - DVT prophylaxis: Subcutaneous heparin, SCD      Discussed with CVTS Fellow as needed.  Staff surgeons have been informed of changes through both verbal and written communication.      Joao Claros PA-C  Cardiothoracic Surgery  Pager [phone]    9:32 AM   June 12, 2020          Interval History:     No overnight events.    States pain is well managed on current regimen. Slept well overnight.  Tolerating diet, is passing flatus, + BM. No nausea or vomiting.  Breathing well without complaints.   Working with therapies and ambulating in halls without assistance.   Denies chest pain, palpitations, dizziness, syncopal symptoms, fevers, chills, myalgias.   Feels sternal popping/clicking with deep inspiration.         Physical Exam:   Blood pressure (!) 151/90, pulse 80, temperature 98  F (36.7  C), temperature source Oral, resp. rate 16, height 1.803 m (5' 11\"), weight 87.6 kg (193 lb 1.6 oz), SpO2 97 %.  Vitals:    06/10/20 2100 06/11/20 0331 06/12/20 0619   Weight: 86.4 kg (190 lb 7.6 oz) 86.5 kg (190 lb 11.2 oz) 87.6 kg (193 lb 1.6 oz)      Weight; + 1.2 kg since admit and trending up.   24 hr Fluid status; net loss 800 mL.   MAPs: 108 - 127    Gen: A&Ox4, NAD  Neuro: no focal deficits   CV: RRR, normal S1 S2, no murmurs, rubs or gallops.  Pulm: CTA, no wheezing or rhonchi, normal breathing   Abd: " nondistended, normal BS, soft, nontender  Ext: trace periperal edema  Incision: suction with wound vac intact          Data:    Imaging:  reviewed recent imaging, no acute concerns    Labs:  BMP  Recent Labs   Lab 06/11/20 0427      POTASSIUM 4.1   CHLORIDE 105   ONEYDA 8.3*   CO2 26   BUN 15   CR 1.13   *     CBC  Recent Labs   Lab 06/11/20 0427   WBC 13.3*   RBC 3.88*   HGB 9.8*   HCT 32.4*   MCV 84   MCH 25.3*   MCHC 30.2*   RDW 15.2*        INR  No lab results found in last 7 days.   Liver Function Studies -   Recent Labs   Lab Test 06/11/20 0427   PROTTOTAL 6.4*   ALBUMIN 2.6*   BILITOTAL 0.5   ALKPHOS 64   AST 12   ALT 16     GLUCOSE:   Recent Labs   Lab 06/12/20  0800 06/12/20  0139 06/11/20  2144 06/11/20  1713 06/11/20  1153 06/11/20  0756 06/11/20 0427   GLC  --   --   --   --   --   --  100*   * 280* 309* 244* 188* 102*  --

## 2020-06-12 NOTE — PLAN OF CARE
D: Pt admitted from [facility] for further management of sternal wound infection. PMHx DM2, HTN, CKD, urinary retention, chronic diabetic L foot ulcer, CABGx4 (5/4) c/b wound infection now s/p I&D of sternum on 6/10.     I/A: AVSS, on RA. SR. Flexeril and tylenol for pain. AOx4. NPO for procedure today. BG checks ACHS. +BS. Voiding via urinal, good amounts.Woundvac to sternal incision, cdi. PIV SL. Up SBA. Pre-procedure shampoo and bath completed last evening. Needs second one this am. Slept between cares.    P: Plan is for OR washout today and woundvac change. Continue to monitor and follow POC. Notify team of any changes.

## 2020-06-12 NOTE — PHARMACY-VANCOMYCIN DOSING SERVICE
Pharmacy Vancomycin Note  Date of Service 2020  Patient's  1960   60 year old, male    Indication: Bone and Joint Infection  Goal Trough Level: 15-20 mg/L  Day of Therapy: 2  Current Vancomycin regimen:  1750 mg IV q12h    Current estimated CrCl = Estimated Creatinine Clearance: 84 mL/min (based on SCr of 1.13 mg/dL).    Creatinine for last 3 days  2020:  4:27 AM Creatinine 1.13 mg/dL    Recent Vancomycin Levels (past 3 days)  2020: 10:00 AM Vancomycin Level 25.9 mg/L (10.5 hr tr)    Vancomycin IV Administrations (past 72 hours)                   vancomycin (VANCOCIN) 1,750 mg in sodium chloride 0.9 % 500 mL intermittent infusion (mg) 1,750 mg New Bag 20 2325     1,750 mg New Bag  1026     1,750 mg New Bag 06/10/20 2303                Nephrotoxins and other renal medications (From now, onward)    Start     Dose/Rate Route Frequency Ordered Stop    20 0800  lisinopril (ZESTRIL) tablet 5 mg      5 mg Oral DAILY 20 0956      06/10/20 2200  piperacillin-tazobactam (ZOSYN) 3.375 g vial to attach to  mL bag      3.375 g  over 30 Minutes Intravenous EVERY 6 HOURS 06/10/20 2104 20 2159    06/10/20 2200  vancomycin (VANCOCIN) 1,750 mg in sodium chloride 0.9 % 500 mL intermittent infusion      1,750 mg  over 2 Hours Intravenous EVERY 12 HOURS 06/10/20 2136               Contrast Orders - past 72 hours (72h ago, onward)    None          Interpretation of levels and current regimen:  Trough level is  Supratherapeutic    Has serum creatinine changed > 50% in last 72 hours: unable to determine as only one level on     Urine output:  good urine output    Renal Function: Stable    Plan:  1.  Decrease Dose to 1500mg (17mg/kg) IV q 12 h and rescheduled dose for 1400 as patient has not received this morning's dose yet  2.  Pharmacy will check trough levels as appropriate in 1-3 Days.    3. Serum creatinine levels will be ordered daily for the first week of therapy and at  least twice weekly for subsequent weeks.      Carmine Beavers RPH        .

## 2020-06-12 NOTE — PROGRESS NOTES
WOC Consult    WOC Consult received 6/11/2020 for VAC changes to sternal wound starting 6/12/2020. WOC attempted to see patient for VAC change, patient in OR for washout and VAC change.     WOC will follow up with Plastics on Monday 6/15/2020 to assess need.    Shreya Sanchez RN, CWOCN

## 2020-06-12 NOTE — ANESTHESIA CARE TRANSFER NOTE
Patient: Nabil Cheung    Procedure(s):  INCISION AND DRAINAGE, WOUND, CHEST, WITH IRRIGATION and wound vac change    Diagnosis: Status post cardiac surgery [Z98.890]  Diagnosis Additional Information: No value filed.    Anesthesia Type:   General     Note:  Airway :Room Air  Patient transferred to:PACU  Comments: Patient transported to PACU by this CRNA. Assessed with PACU RN and determined that he meets criteria to return to  with transport. Report called to  RN, all questions answered. VSS. Patient A&O, neuro at baseline.Handoff Report: Identifed the Patient, Identified the Reponsible Provider, Reviewed the pertinent medical history, Discussed the surgical course, Reviewed Intra-OP anesthesia mangement and issues during anesthesia, Set expectations for post-procedure period and Allowed opportunity for questions and acknowledgement of understanding      Vitals: (Last set prior to Anesthesia Care Transfer)    CRNA VITALS  6/12/2020 0853 - 6/12/2020 0935      6/12/2020             NIBP:  126/69    Pulse:  73    SpO2:  96 %    EKG:  Sinus rhythm                Electronically Signed By: KANCHAN Boucher CRNA  June 12, 2020  9:35 AM

## 2020-06-12 NOTE — PLAN OF CARE
Pt went to OR for sternal wound washout, and wound VAC replacement this morning. Returned without complications. Denies pain. Currently new drsg intact. Continues on IV abxs-vanco dosing adjusted for elevated trough level.   Endocrine adjusted insulin coverage for CHO counting and sliding scale. Most recent  before first meal post-op.   Pt cooperative but frustrated at current medical status and implications with future lifting restrictions.  Continue current meds and therapies.

## 2020-06-12 NOTE — OP NOTE
OPERATIVE DATE: 6/12/2020    PRE-OPERATIVE DIAGNOSIS:  1) Mediastinitis  2) Coronary artery disease  Patient Active Problem List   Diagnosis     Diabetes mellitus (H)     Essential (primary) hypertension     Wound infection after surgery     Status post cardiac surgery       POST-OPERATIVE DIAGNOSIS:  1) Mediastinitis  2) Coronary artery disease  Patient Active Problem List   Diagnosis     Diabetes mellitus (H)     Essential (primary) hypertension     Wound infection after surgery     Status post cardiac surgery       PROCEDURE:  1) Chest washout  2) Wound vac placement    SURGEON: Mark Mckinney MD    ASSISTANT: Driss Gresham PA-C    ANESTHESIA: GETA    ESTIMATED BLOOD LOSS: 25cc    INDICATIONS:  Mr. ABILIO SUAZO is a 60 year old male admitted with mediastinitis after recent CABG.  [place] was [place] [facility] [place] possible [place] closure.  The patient had wound debridement on Wednesday.  I recommend dressing change today.  Risks and benefits of the operation were explained to the patient and their family including, but not limited to, bleeding, infection, stroke and even death.  They understood these risks and agreed to proceed electively.    OPERATIVE REPORT:  The patient was transferred to the operating room and positioned supine on the OR table.  General anesthesia was initiated by the anesthesia team.  Endotracheal intubation and IV access was already in place. The patients neck, chest, abdomen and bilateral lower extremities were clipped, prepped and draped in sterile fashion.  A pre-procedure time-out was performed confirming the correct patient, correct site and correct procedure.    Previous dressing was removed.  There was necrotic tissue, purulent debris and exposed bone in the bed of the wound.  All nonviable tissue was sharply debrided with scissors to the level of the sternum, including some non-viable sternum bone.  The wound was made hemostatic.  Wound swab, tissue and bone were sent for culture.   A wound vac was fashioned and applied.    The patient was transferred to PACU in stable condition.    All needle, sponge and instrument counts were correct at the end of the case.    Mark Mckinney  Cardiothoracic Surgery  Pager: [phone]

## 2020-06-13 ENCOUNTER — APPOINTMENT (OUTPATIENT)
Dept: GENERAL RADIOLOGY | Facility: CLINIC | Age: 60
End: 2020-06-13
Attending: PHYSICIAN ASSISTANT
Payer: COMMERCIAL

## 2020-06-13 LAB
CREAT SERPL-MCNC: 1.38 MG/DL (ref 0.66–1.25)
GFR SERPL CREATININE-BSD FRML MDRD: 54 ML/MIN/{1.73_M2}
GLUCOSE BLDC GLUCOMTR-MCNC: 133 MG/DL (ref 70–99)
GLUCOSE BLDC GLUCOMTR-MCNC: 179 MG/DL (ref 70–99)
GLUCOSE BLDC GLUCOMTR-MCNC: 183 MG/DL (ref 70–99)
VANCOMYCIN SERPL-MCNC: 23.1 MG/L

## 2020-06-13 PROCEDURE — 21400000 ZZH R&B CCU UMMC

## 2020-06-13 PROCEDURE — 25000128 H RX IP 250 OP 636: Performed by: THORACIC SURGERY (CARDIOTHORACIC VASCULAR SURGERY)

## 2020-06-13 PROCEDURE — 40000556 ZZH STATISTIC PERIPHERAL IV START W US GUIDANCE

## 2020-06-13 PROCEDURE — 25000132 ZZH RX MED GY IP 250 OP 250 PS 637: Performed by: PHYSICIAN ASSISTANT

## 2020-06-13 PROCEDURE — 25800030 ZZH RX IP 258 OP 636: Performed by: THORACIC SURGERY (CARDIOTHORACIC VASCULAR SURGERY)

## 2020-06-13 PROCEDURE — 00000146 ZZHCL STATISTIC GLUCOSE BY METER IP

## 2020-06-13 PROCEDURE — 71046 X-RAY EXAM CHEST 2 VIEWS: CPT

## 2020-06-13 PROCEDURE — 80202 ASSAY OF VANCOMYCIN: CPT | Performed by: THORACIC SURGERY (CARDIOTHORACIC VASCULAR SURGERY)

## 2020-06-13 PROCEDURE — 25000132 ZZH RX MED GY IP 250 OP 250 PS 637: Performed by: STUDENT IN AN ORGANIZED HEALTH CARE EDUCATION/TRAINING PROGRAM

## 2020-06-13 PROCEDURE — 36415 COLL VENOUS BLD VENIPUNCTURE: CPT | Performed by: THORACIC SURGERY (CARDIOTHORACIC VASCULAR SURGERY)

## 2020-06-13 PROCEDURE — 82565 ASSAY OF CREATININE: CPT | Performed by: THORACIC SURGERY (CARDIOTHORACIC VASCULAR SURGERY)

## 2020-06-13 RX ORDER — HEPARIN SODIUM,PORCINE 10 UNIT/ML
2-5 VIAL (ML) INTRAVENOUS
Status: COMPLETED | OUTPATIENT
Start: 2020-06-13 | End: 2020-06-16

## 2020-06-13 RX ORDER — LIDOCAINE 40 MG/G
CREAM TOPICAL
Status: DISCONTINUED | OUTPATIENT
Start: 2020-06-13 | End: 2020-06-17

## 2020-06-13 RX ORDER — HYDRALAZINE HYDROCHLORIDE 25 MG/1
25 TABLET, FILM COATED ORAL 3 TIMES DAILY
Status: DISCONTINUED | OUTPATIENT
Start: 2020-06-13 | End: 2020-06-14

## 2020-06-13 RX ADMIN — ASPIRIN 325 MG ORAL TABLET 325 MG: 325 PILL ORAL at 08:19

## 2020-06-13 RX ADMIN — ACETAMINOPHEN 650 MG: 325 TABLET, FILM COATED ORAL at 08:19

## 2020-06-13 RX ADMIN — HYDRALAZINE HYDROCHLORIDE 25 MG: 25 TABLET, FILM COATED ORAL at 20:13

## 2020-06-13 RX ADMIN — CYCLOBENZAPRINE 10 MG: 10 TABLET, FILM COATED ORAL at 16:28

## 2020-06-13 RX ADMIN — FINASTERIDE 5 MG: 5 TABLET, FILM COATED ORAL at 20:13

## 2020-06-13 RX ADMIN — POLYETHYLENE GLYCOL 3350 17 G: 17 POWDER, FOR SOLUTION ORAL at 08:35

## 2020-06-13 RX ADMIN — ALFUZOSIN HYDROCHLORIDE 10 MG: 10 TABLET, EXTENDED RELEASE ORAL at 08:19

## 2020-06-13 RX ADMIN — VANCOMYCIN HYDROCHLORIDE 1250 MG: 10 INJECTION, POWDER, LYOPHILIZED, FOR SOLUTION INTRAVENOUS at 16:28

## 2020-06-13 RX ADMIN — OXYCODONE HYDROCHLORIDE 5 MG: 5 TABLET ORAL at 20:13

## 2020-06-13 RX ADMIN — ACETAMINOPHEN 650 MG: 325 TABLET, FILM COATED ORAL at 20:13

## 2020-06-13 RX ADMIN — ROSUVASTATIN CALCIUM 20 MG: 20 TABLET, FILM COATED ORAL at 20:13

## 2020-06-13 RX ADMIN — ACETAMINOPHEN 650 MG: 325 TABLET, FILM COATED ORAL at 00:04

## 2020-06-13 RX ADMIN — CYCLOBENZAPRINE 10 MG: 10 TABLET, FILM COATED ORAL at 08:19

## 2020-06-13 RX ADMIN — VANCOMYCIN HYDROCHLORIDE 1500 MG: 10 INJECTION, POWDER, LYOPHILIZED, FOR SOLUTION INTRAVENOUS at 02:59

## 2020-06-13 RX ADMIN — CYCLOBENZAPRINE 10 MG: 10 TABLET, FILM COATED ORAL at 00:04

## 2020-06-13 RX ADMIN — LISINOPRIL 5 MG: 5 TABLET ORAL at 08:19

## 2020-06-13 RX ADMIN — METOPROLOL SUCCINATE 50 MG: 50 TABLET, EXTENDED RELEASE ORAL at 08:19

## 2020-06-13 RX ADMIN — ACETAMINOPHEN 650 MG: 325 TABLET, FILM COATED ORAL at 13:06

## 2020-06-13 RX ADMIN — AMIODARONE HYDROCHLORIDE 200 MG: 200 TABLET ORAL at 08:19

## 2020-06-13 RX ADMIN — OXYCODONE HYDROCHLORIDE 5 MG: 5 TABLET ORAL at 13:06

## 2020-06-13 RX ADMIN — HYDRALAZINE HYDROCHLORIDE 25 MG: 25 TABLET, FILM COATED ORAL at 13:03

## 2020-06-13 ASSESSMENT — ACTIVITIES OF DAILY LIVING (ADL)
ADLS_ACUITY_SCORE: 14

## 2020-06-13 ASSESSMENT — MIFFLIN-ST. JEOR: SCORE: 1711.63

## 2020-06-13 ASSESSMENT — PAIN DESCRIPTION - DESCRIPTORS
DESCRIPTORS: ACHING;SORE
DESCRIPTORS: ACHING
DESCRIPTORS: ACHING;SORE
DESCRIPTORS: ACHING

## 2020-06-13 NOTE — PROGRESS NOTES
Cardiovascular Surgery Progress Note  6/13/2020         Assessment and Plan:     Nabil Cheung is a 60 y.o. male with a PMH of poorly controlled DMT2, hypertension, CKD stage 2-3, BPH/urinary retention, chronic diabetic left foot ulcer, and recently diagnosed severe 3 vessel disease with progressive symptoms at home. Underwent CABG x 4 (LIMA to LAD, SVG to rPDA, SVG to OM, SVG to diag) 05/04 with Dr. Mckinney. Discharged to home 05/12. [place] [facility] [place]08 with sternal wound infection. Underwent I&D of sternum [place] [facility] 06/10 with Dr. Mckniney, then transfered to [facility] for further wound management management.   Plastic Surgery consult ordered for 6/15.      Cardiovascular:   CAD s/p CABG  Hypertension  - Most recent TTE 05/05 with preserved biventricular dysfunction  - Aspirin 325 mg daily  - Rosuvastatin 20 mg daily  - HTN: increase meds PRN; lisinopril 5 mg daily and Toprol 50 mg daily. Hypertensive today, change lisinopril to hydralazine for bump in Cr likely due to elevated vanco level. Continue to titrate up.   - Euvolemic. Diuretic PRN  Postop atrial fibrillation  - Continue PTA amiodarone taper, currently 200 mg daily (last dose 06/25)  - Systemic anticoagulation deferred     Pulmonary:  - No active issues. Saturating well on RA.   - Supplemental O2 PRN to keep sats > 92%. Wean off as tolerated.  - Pulm toilet, IS, activity and deep breathing      Neurology / MSK:  Postop pain  - Tylenol PRN, Oxycodone PRN  - IV Dilaudid PRN  - Flexeril PRN      / Renal:  CKD stage 2-3  - Most recent creatinine within baseline, trend daily  - Avoid nephrotoxins, Diuresis PRN  - Cr 1.38 today up from 1.13, vanco level 25.9 on 6/12, 23 this AM.   - stopped Lisinopril.     BPH  Urinary retention  - PTA finasteride 5 mg daily  - PTA alfuzosin 10 mg daily     GI / FEN:   Nutrition  - Diabetic diet, bowel regimen     Endocrine:  DMT2, poorly controlled  - Endocrine consult, appreciate recs.  - Lantus, sliding scale,  "prandial dosing      Infectious Disease:  Leukocytosis  Sternal wound infection  - Bedside I&D 06/09 at [facility], Culture 06/09 ([facility]) growing staph epidermidis  - OR I&D 6/10 with Dr. Mckinney. Infection tracks length of sternal incision with areas of exposed bone and exposed wires. Exposed wires removed, sternal wound vac placed  - OR washout 6/12 with replacement of wound vac, purulent debris present, cultures resent.   - Consult ID 6/11: continue vanc through 4-6 weeks and stop zosyn 6/12.    - Will eventually need plastic surgery for flap closure.    Chronic left foot ulcer  - Previously recommended for out-patient podiatry follow up for further debridement  - WO consult  - Mepilex dressing daily and PRN for saturation     Hematology:   - Stable Hbg 9's, Plts WNL.      Prophylaxis:   - Stress ulcer prophylaxis: not indicated  - DVT prophylaxis: Subcutaneous heparin, SCD      Discussed with CVTS Fellow as needed.  Staff surgeons have been informed of changes through both verbal and written communication.      Elma Hallman PA-C  Cardiothoracic Surgery  Pager [phone]  June 13, 2020            Interval History:     No overnight events.    States pain is well managed on current regimen. Slept well overnight.  Tolerating diet, is passing flatus, + BM. No nausea or vomiting.  Breathing well without complaints.   Working with therapies and ambulating in halls without assistance.   Denies chest pain, palpitations, dizziness, syncopal symptoms, fevers, chills, myalgias.   Feels sternal popping/clicking with deep inspiration.  Very concerned about blood pressure medications and home regimen this AM.          Physical Exam:   Blood pressure (!) 161/76, pulse 77, temperature 98.4  F (36.9  C), temperature source Oral, resp. rate 20, height 1.803 m (5' 11\"), weight 89 kg (196 lb 1.6 oz), SpO2 93 %.  Vitals:    06/11/20 0331 06/12/20 0619 06/13/20 0500   Weight: 86.5 kg (190 lb 11.2 oz) 87.6 kg (193 lb 1.6 oz) 89 kg " (196 lb 1.6 oz)        Gen: A&Ox4, NAD  Neuro: no focal deficits   CV: RRR, normal S1 S2, no murmurs, rubs or gallops.  Pulm: CTA, no wheezing or rhonchi, normal breathing   Abd: nondistended, normal BS, soft, nontender  Ext: no periperal edema  Incision: suction with wound vac intact          Data:    Imaging:  reviewed recent imaging, no acute concerns    Labs:  BMP  Recent Labs   Lab 06/13/20  0540 06/11/20 0427   NA  --  137   POTASSIUM  --  4.1   CHLORIDE  --  105   ONEYDA  --  8.3*   CO2  --  26   BUN  --  15   CR 1.38* 1.13   GLC  --  100*     CBC  Recent Labs   Lab 06/11/20 0427   WBC 13.3*   RBC 3.88*   HGB 9.8*   HCT 32.4*   MCV 84   MCH 25.3*   MCHC 30.2*   RDW 15.2*        INR  No lab results found in last 7 days.   Liver Function Studies -   Recent Labs   Lab Test 06/11/20 0427   PROTTOTAL 6.4*   ALBUMIN 2.6*   BILITOTAL 0.5   ALKPHOS 64   AST 12   ALT 16     GLUCOSE:   Recent Labs   Lab 06/13/20  1412 06/13/20  0859 06/12/20  2152 06/12/20  1820 06/12/20  1342 06/12/20  1151  06/11/20  0427   GLC  --   --   --   --   --   --   --  100*   * 133* 227* 190* 148* 155*   < >  --     < > = values in this interval not displayed.

## 2020-06-13 NOTE — PROGRESS NOTES
Pt reports feeling sore today s/p washout in OR yesterday. Tylenol, flexeril and oxycodone for pain w/general relief. Continues on IV vanco. Ambulating independently in room. Reported constipation in a.m but had large BM after miralax and prune juice.  WoundVAC in place w/minimal drainage. Possible plan to return to OR again Monday-awaiting plastics consult.

## 2020-06-13 NOTE — PHARMACY-VANCOMYCIN DOSING SERVICE
Pharmacy Vancomycin Note  Date of Service 2020  Patient's  1960   60 year old, male    Indication: Bone and Joint Infection  Goal Trough Level: 15-20 mg/L  Day of Therapy: 3  Current Vancomycin regimen:  1250 mg IV q12h    Current estimated CrCl = Estimated Creatinine Clearance: 69.9 mL/min (A) (based on SCr of 1.38 mg/dL (H)).    Creatinine for last 3 days  2020:  4:27 AM Creatinine 1.13 mg/dL  2020:  5:40 AM Creatinine 1.38 mg/dL    Recent Vancomycin Levels (past 3 days)  2020: 10:00 AM Vancomycin Level 25.9 mg/L  2020:  1:02 PM Vancomycin Level 23.1 mg/L    Vancomycin IV Administrations (past 72 hours)                   vancomycin 1500 mg in 0.9% NaCl 250 ml intermittent infusion 1,500 mg (mg) 1,500 mg Given 20 0259     1,500 mg Given 20 1301    vancomycin (VANCOCIN) 1,750 mg in sodium chloride 0.9 % 500 mL intermittent infusion (mg) 1,750 mg New Bag 20 2325     1,750 mg New Bag  1026     1,750 mg New Bag 06/10/20 2303                Nephrotoxins and other renal medications (From now, onward)    Start     Dose/Rate Route Frequency Ordered Stop    20 1445  vancomycin 1250 mg in 0.9% NaCl 250 mL intermittent infusion 1,250 mg      1,250 mg  over 90 Minutes Intravenous EVERY 12 HOURS 20 1432               Contrast Orders - past 72 hours (72h ago, onward)    None          Interpretation of levels and current regimen:  Trough level is  Supratherapeutic    Has serum creatinine changed > 50% in last 72 hours: Yes, increased.     Urine output:  good urine output    Plan:  1.  Decrease dose to 1250 mg IV q12h.   2.  Pharmacy will check trough levels as appropriate in 1-3 Days.      Esme Chavez, PharmD, pager [phone]        .

## 2020-06-13 NOTE — PLAN OF CARE
Alert and oriented x4. Anxious at times. VSS on RA. Sinus rhythm. Tylenol and Flexeril given x1 for incisional pain. On diabetic diet. Good appetite. No BM. PRN Miralax given. Voiding adequately. PIV saline locked. Wound vac on sternal incision. Up with standby assist. Pt appeared to rest between cares. Will continue to monitor and notify team accordingly.

## 2020-06-14 LAB
ANION GAP SERPL CALCULATED.3IONS-SCNC: 4 MMOL/L (ref 3–14)
BACTERIA SPEC CULT: NORMAL
BUN SERPL-MCNC: 16 MG/DL (ref 7–30)
CALCIUM SERPL-MCNC: 8.3 MG/DL (ref 8.5–10.1)
CHLORIDE SERPL-SCNC: 107 MMOL/L (ref 94–109)
CO2 SERPL-SCNC: 26 MMOL/L (ref 20–32)
CREAT SERPL-MCNC: 1.07 MG/DL (ref 0.66–1.25)
ERYTHROCYTE [DISTWIDTH] IN BLOOD BY AUTOMATED COUNT: 14.9 % (ref 10–15)
GFR SERPL CREATININE-BSD FRML MDRD: 74 ML/MIN/{1.73_M2}
GLUCOSE BLDC GLUCOMTR-MCNC: 215 MG/DL (ref 70–99)
GLUCOSE BLDC GLUCOMTR-MCNC: 268 MG/DL (ref 70–99)
GLUCOSE SERPL-MCNC: 174 MG/DL (ref 70–99)
HCT VFR BLD AUTO: 30.2 % (ref 40–53)
HGB BLD-MCNC: 9.3 G/DL (ref 13.3–17.7)
MCH RBC QN AUTO: 25.7 PG (ref 26.5–33)
MCHC RBC AUTO-ENTMCNC: 30.8 G/DL (ref 31.5–36.5)
MCV RBC AUTO: 83 FL (ref 78–100)
PLATELET # BLD AUTO: 299 10E9/L (ref 150–450)
POTASSIUM SERPL-SCNC: 4 MMOL/L (ref 3.4–5.3)
RBC # BLD AUTO: 3.62 10E12/L (ref 4.4–5.9)
SODIUM SERPL-SCNC: 137 MMOL/L (ref 133–144)
SPECIMEN SOURCE: NORMAL
WBC # BLD AUTO: 7.9 10E9/L (ref 4–11)

## 2020-06-14 PROCEDURE — 25000132 ZZH RX MED GY IP 250 OP 250 PS 637: Performed by: PHYSICIAN ASSISTANT

## 2020-06-14 PROCEDURE — 80048 BASIC METABOLIC PNL TOTAL CA: CPT | Performed by: PHYSICIAN ASSISTANT

## 2020-06-14 PROCEDURE — 21400000 ZZH R&B CCU UMMC

## 2020-06-14 PROCEDURE — 00000146 ZZHCL STATISTIC GLUCOSE BY METER IP

## 2020-06-14 PROCEDURE — 25000128 H RX IP 250 OP 636: Performed by: THORACIC SURGERY (CARDIOTHORACIC VASCULAR SURGERY)

## 2020-06-14 PROCEDURE — 85027 COMPLETE CBC AUTOMATED: CPT | Performed by: PHYSICIAN ASSISTANT

## 2020-06-14 PROCEDURE — 36415 COLL VENOUS BLD VENIPUNCTURE: CPT | Performed by: PHYSICIAN ASSISTANT

## 2020-06-14 PROCEDURE — 25800030 ZZH RX IP 258 OP 636: Performed by: THORACIC SURGERY (CARDIOTHORACIC VASCULAR SURGERY)

## 2020-06-14 RX ORDER — LISINOPRIL 5 MG/1
5 TABLET ORAL 2 TIMES DAILY
Status: DISCONTINUED | OUTPATIENT
Start: 2020-06-14 | End: 2020-06-14

## 2020-06-14 RX ORDER — HYDRALAZINE HYDROCHLORIDE 50 MG/1
50 TABLET, FILM COATED ORAL 3 TIMES DAILY
Status: DISCONTINUED | OUTPATIENT
Start: 2020-06-14 | End: 2020-06-14

## 2020-06-14 RX ORDER — LISINOPRIL 10 MG/1
10 TABLET ORAL DAILY
Status: DISCONTINUED | OUTPATIENT
Start: 2020-06-14 | End: 2020-06-16

## 2020-06-14 RX ADMIN — CYCLOBENZAPRINE 10 MG: 10 TABLET, FILM COATED ORAL at 00:31

## 2020-06-14 RX ADMIN — CYCLOBENZAPRINE 10 MG: 10 TABLET, FILM COATED ORAL at 08:25

## 2020-06-14 RX ADMIN — VANCOMYCIN HYDROCHLORIDE 1250 MG: 10 INJECTION, POWDER, LYOPHILIZED, FOR SOLUTION INTRAVENOUS at 09:32

## 2020-06-14 RX ADMIN — ACETAMINOPHEN 650 MG: 325 TABLET, FILM COATED ORAL at 00:31

## 2020-06-14 RX ADMIN — ASPIRIN 325 MG ORAL TABLET 325 MG: 325 PILL ORAL at 08:25

## 2020-06-14 RX ADMIN — METOPROLOL SUCCINATE 50 MG: 50 TABLET, EXTENDED RELEASE ORAL at 08:25

## 2020-06-14 RX ADMIN — ROSUVASTATIN CALCIUM 20 MG: 20 TABLET, FILM COATED ORAL at 19:40

## 2020-06-14 RX ADMIN — OXYCODONE HYDROCHLORIDE 5 MG: 5 TABLET ORAL at 09:12

## 2020-06-14 RX ADMIN — VANCOMYCIN HYDROCHLORIDE 1250 MG: 10 INJECTION, POWDER, LYOPHILIZED, FOR SOLUTION INTRAVENOUS at 19:40

## 2020-06-14 RX ADMIN — OXYCODONE HYDROCHLORIDE 5 MG: 5 TABLET ORAL at 15:48

## 2020-06-14 RX ADMIN — AMIODARONE HYDROCHLORIDE 200 MG: 200 TABLET ORAL at 08:25

## 2020-06-14 RX ADMIN — ACETAMINOPHEN 650 MG: 325 TABLET, FILM COATED ORAL at 15:48

## 2020-06-14 RX ADMIN — HYDRALAZINE HYDROCHLORIDE 25 MG: 25 TABLET, FILM COATED ORAL at 08:25

## 2020-06-14 RX ADMIN — LISINOPRIL 10 MG: 10 TABLET ORAL at 09:44

## 2020-06-14 RX ADMIN — FINASTERIDE 5 MG: 5 TABLET, FILM COATED ORAL at 19:40

## 2020-06-14 RX ADMIN — ALFUZOSIN HYDROCHLORIDE 10 MG: 10 TABLET, EXTENDED RELEASE ORAL at 08:27

## 2020-06-14 RX ADMIN — CYCLOBENZAPRINE 10 MG: 10 TABLET, FILM COATED ORAL at 15:48

## 2020-06-14 RX ADMIN — ACETAMINOPHEN 650 MG: 325 TABLET, FILM COATED ORAL at 08:25

## 2020-06-14 RX ADMIN — OXYCODONE HYDROCHLORIDE 5 MG: 5 TABLET ORAL at 00:31

## 2020-06-14 ASSESSMENT — MIFFLIN-ST. JEOR: SCORE: 1715.26

## 2020-06-14 ASSESSMENT — ACTIVITIES OF DAILY LIVING (ADL)
ADLS_ACUITY_SCORE: 14

## 2020-06-14 ASSESSMENT — PAIN DESCRIPTION - DESCRIPTORS
DESCRIPTORS: ACHING;BURNING;SHARP
DESCRIPTORS: ACHING;SORE
DESCRIPTORS: ACHING
DESCRIPTORS: ACHING

## 2020-06-14 NOTE — PROGRESS NOTES
Cardiovascular Surgery Progress Note  6/14/2020         Assessment and Plan:     Nabil Cheung is a 60 y.o. male with a PMH of poorly controlled DMT2, hypertension, CKD stage 2-3, BPH/urinary retention, chronic diabetic left foot ulcer, and recently diagnosed severe 3 vessel disease with progressive symptoms at home. Underwent CABG x 4 (LIMA to LAD, SVG to rPDA, SVG to OM, SVG to diag) 05/04 with Dr. Mckinney. Discharged to home 05/12. Admitted to [facility] [place] from home 06/08 with sternal wound infection. Underwent I&D of sternum at [facility] [place] 06/10 with Dr. Mckinney, then transfered to [facility] for further wound management management.   Plastic Surgery consult ordered for 6/15.      Cardiovascular:   CAD s/p CABG  Hypertension  - Most recent TTE 05/05 with preserved biventricular dysfunction  - Aspirin 325 mg daily  - Rosuvastatin 20 mg daily  - HTN: increase meds PRN; Toprol 50 mg daily. Hypertensive, changed lisinopril to hydralazine for bump in Cr likely due to elevated vanco level. Now Cr back down, will switch back to Lisinopril at 10 mg daily.   - Euvolemic. Diuretic PRN  Postop atrial fibrillation  - Continue PTA amiodarone taper, currently 200 mg daily (last dose 06/25)  - Systemic anticoagulation deferred     Pulmonary:  - No active issues. Saturating well on RA.   - Supplemental O2 PRN to keep sats > 92%. Wean off as tolerated.  - Pulm toilet, IS, activity and deep breathing      Neurology / MSK:  Postop pain  - Tylenol PRN, Oxycodone PRN  - IV Dilaudid PRN  - Flexeril PRN      / Renal:  CKD stage 2-3  - Most recent creatinine within baseline, trend daily  - Avoid nephrotoxins, Diuresis PRN  - Cr 1.38 6/13 up from 1.13, now back to 1.07 vanco level 25.9 on 6/12, 23 this AM.   - Monitor weight, weight elevated but patient appears euvolemic and doesn't want diuretic.     BPH  Urinary retention  - PTA finasteride 5 mg daily  - PTA alfuzosin 10 mg daily     GI / FEN:   Nutrition  - Diabetic diet, bowel  regimen     Endocrine:  DMT2, poorly controlled  - Endocrine consult, appreciate recs.  - Lantus, sliding scale, prandial dosing      Infectious Disease:  Leukocytosis  Sternal wound infection  - Bedside I&D 06/09 at [facility], Culture 06/09 ([facility]) growing staph epidermidis  - OR I&D 6/10 with Dr. Mckinney. Infection tracks length of sternal incision with areas of exposed bone and exposed wires. Exposed wires removed, sternal wound vac placed  - OR washout 6/12 with replacement of wound vac, purulent debris present, cultures resent.   - Consult ID 6/11: continue vanc through 4-6 weeks and stop zosyn 6/12.    - Will eventually need plastic surgery for flap closure.    - Itra op culture of bone showing gram positive cocci, awaiting speciation.     Chronic left foot ulcer  - Previously recommended for out-patient podiatry follow up for further debridement  - [facility] consult  - Mepilex dressing daily and PRN for saturation     Hematology:   - Stable Hbg 9's, Plts WNL.      Prophylaxis:   - Stress ulcer prophylaxis: not indicated  - DVT prophylaxis: Subcutaneous heparin, SCD  - Get med list from [facility] Monday   - Discuss plan with bebe Monday, Plastics??  - Ordered PICC/Midline, patient talking over with family.       Discussed with CVTS Fellow as needed.  Staff surgeons have been informed of changes through both verbal and written communication.      Elma Hallman PA-C  Cardiothoracic Surgery  Pager [phone]  June 14, 2020            Interval History:     No overnight events.    States pain is well managed on current regimen. Slept well overnight.  Tolerating diet, is passing flatus, + BM. No nausea or vomiting.  Breathing well without complaints.   Working with therapies and ambulating in halls without assistance.   He is very worries about the movement of his sternum. He reports he is not in a hurry to leave with it moving as much as it is.   Denies chest pain, palpitations, dizziness, syncopal symptoms, fevers,  "chills, myalgias.   Feels sternal popping/clicking. More today.   Worried about getting PICC line, I discussed this with patient. He may prefer midline instead. He is going to talk with his family.          Physical Exam:   Blood pressure (!) 139/90, pulse 63, temperature 96.7  F (35.9  C), temperature source Axillary, resp. rate 18, height 1.803 m (5' 11\"), weight 89.3 kg (196 lb 14.4 oz), SpO2 99 %.  Vitals:    06/12/20 0619 06/13/20 0500 06/14/20 0539   Weight: 87.6 kg (193 lb 1.6 oz) 89 kg (196 lb 1.6 oz) 89.3 kg (196 lb 14.4 oz)        Gen: A&Ox4, NAD  Neuro: no focal deficits   CV: RRR, normal S1 S2, no murmurs, rubs or gallops.  Pulm: CTA, no wheezing or rhonchi, normal breathing   Abd: nondistended, normal BS, soft, nontender  Ext: no periperal edema  Incision: suction with wound vac intact. Sternum moves with coughing and deep breathing.          Data:    Imaging:  reviewed recent imaging, no acute concerns    Labs:  BMP  Recent Labs   Lab 06/14/20 0540 06/13/20 0540 06/11/20 0427     --  137   POTASSIUM 4.0  --  4.1   CHLORIDE 107  --  105   ONEYDA 8.3*  --  8.3*   CO2 26  --  26   BUN 16  --  15   CR 1.07 1.38* 1.13   *  --  100*     CBC  Recent Labs   Lab 06/14/20  0540 06/11/20  0427   WBC 7.9 13.3*   RBC 3.62* 3.88*   HGB 9.3* 9.8*   HCT 30.2* 32.4*   MCV 83 84   MCH 25.7* 25.3*   MCHC 30.8* 30.2*   RDW 14.9 15.2*    320     INR  No lab results found in last 7 days.   Liver Function Studies -   Recent Labs   Lab Test 06/11/20 0427   PROTTOTAL 6.4*   ALBUMIN 2.6*   BILITOTAL 0.5   ALKPHOS 64   AST 12   ALT 16     GLUCOSE:   Recent Labs   Lab 06/14/20  0540 06/13/20  1850 06/13/20  1412 06/13/20  0859 06/12/20  2152 06/12/20  1820 06/12/20  1342  06/11/20  0427   *  --   --   --   --   --   --   --  100*   BGM  --  183* 179* 133* 227* 190* 148*   < >  --     < > = values in this interval not displayed.       "

## 2020-06-14 NOTE — PROGRESS NOTES
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 Mckinney.  His type 2 DM is complicated by CAD and chronic diabetic foot ulcer.    Admitted to [facility] with sternal wound infection (6/8/2020) with sternal wound infection. Transferred to [facility] on (6/10/2020) for further wound management.     Type 2 diabetes: poorly controlled and complicated by CAD and chronic diabetic foot ulcerwith a recent A1C, A1C 7.5% (6/9/2020) with HGB of 9.3%. not an accurate measure 2/2 anemia.  -Mr. Cheung has specific ideas regarding his diabetes.  Good appetite currently.      Plan  - Continue Lantus 18 units at bedtime  -Increase prandial insulin, Novolog 1 unit per 12 grams of CHO for meals/snacks/supplements based on recent TDD and post prandial hyperglycemia  -  Discontinue novolog medium intensity sliding scale before meals and at bedtime  - changed to custome correction 1 unit per 40 > 140 before meals and > 200 HS  -test blood sugars before meals, HS and 0200  -continue to hold metformin and Jardiance ( Jardiance is non-formulary and possibly additional procedures per CVTS)  -hypoglycemia protocol  -will continue to follow     Outpatient diabetes follow up: Dr. Melina Gold and roger Butler CDE  Plan discussed with patient and primary team.           Interval History:   The last 24 hours progress and nursing notes reviewed.    Was NPO for wash-out of sternum for Friday morning  Decreased lantus for procedure and resumed PTA dose for Friday night   Discussed making sure glucose at ~ 0200, 100 or > and fasting glcusoe ~ 120   Discussed the rational to avoid hypoglycemia overnight.  Discussed glucose control of 180 or less for healing and the importance of good nutrition  reviewed previous A1C and recommended A1C around 7%, without  "hypoglycemia  Appetite is reported as ok, denies nausea and or vomiting      PTA:  lantus 18 units at bedtime  metformin 1000 mg twice daily  Jardiance (empagliflozin) does not recall the dose, per chart review 10 mg daily  Sitagliptin ( januvia) 100 mg daily ( has not start the medication)      Recent Labs   Lab 06/14/20  0540 06/13/20  1850 06/13/20  1412 06/13/20  0859 06/12/20  2152 06/12/20  1820 06/12/20  1342  06/11/20  0427   *  --   --   --   --   --   --   --  100*   BGM  --  183* 179* 133* 227* 190* 148*   < >  --     < > = values in this interval not displayed.               Review of Systems:   See interval hx          Medications:     Orders Placed This Encounter      Consistent Carbohydrate Diet 6177-2841 Calories: Moderate Consistent CHO (4-6 CHO units/meal)       Physical Exam:  Gen: Alert, resting in bed, in NAD   HEENT:  mucous membranes are moist  Resp: non-labored  Ext: moving all extremteis   Neuro:oriented x3, communicating clearly  BP (!) 152/73 (BP Location: Left arm)   Pulse 63   Temp 97.7  F (36.5  C) (Oral)   Resp 16   Ht 1.803 m (5' 11\")   Wt 89.3 kg (196 lb 14.4 oz)   SpO2 95%   BMI 27.46 kg/m             Data:     Lab Results   Component Value Date    A1C 9.6 09/25/2017    A1C 10.7 06/29/2017              CBC RESULTS:   Recent Labs   Lab Test 06/11/20 0427   WBC 13.3*   RBC 3.88*   HGB 9.8*   HCT 32.4*   MCV 84   MCH 25.3*   MCHC 30.2*   RDW 15.2*        Recent Labs   Lab Test 06/11/20 0427 07/29/19  0529    125*   POTASSIUM 4.1 3.9   CHLORIDE 105 92*   CO2 26 24   ANIONGAP 6 9   * 220*   BUN 15 11   CR 1.13 1.02   ONEYDA 8.3* 8.6     Liver Function Studies -   Recent Labs   Lab Test 06/11/20 0427   PROTTOTAL 6.4*   ALBUMIN 2.6*   BILITOTAL 0.5   ALKPHOS 64   AST 12   ALT 16     No results found for: INR      I spent a total of 35 minutes bedside and on the inpatient unit managing the glycemic care of Nabil Cheung. Over 50% of my time on the unit was " spent counseling the patient  and/or coordinating care regarding .  See note for details.      Renata Rose PA-C, MPAS [phone]  Diabetes Management job code [id number]

## 2020-06-14 NOTE — PLAN OF CARE
Alert and oriented x4. Anxious at times. BP elevated. Other VSS. Sinus rhythm. Incisional pain managed with Tylenol, Flexeril and Oxycodone. On diabetic diet. Good appetite. No BM overnight. Voiding adequately. PIV saline locked. Wound vac on sternal incision. Up with standby assist. Pt appeared to rest between cares. Will continue to monitor and notify team accordingly.

## 2020-06-14 NOTE — PROGRESS NOTES
No significant change in status today. BP remains elevated. Hydralazine discontinued and lisinopril dosing increased.   Continues on IV vanco for sternal wound infection. WoundVAC in place w/small amt serosang drainage.   Tylenol & flexeril for pain w/occasional 5mg oxycodone as well.   Plan for future midline or PICC to be placed for long-term antibiotics.  Awaiting word as to whether pt will return to OR tomorrow or Tuesday for washout again.

## 2020-06-14 NOTE — PROGRESS NOTES
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 Mckinney.  [place] [facility] [place] (6/8/2020) with sternal wound infection. Transferred to [facility] on (6/10/2020) for further wound management.     Type 2 diabetes: poorly controlled with a recent A1C, A1C 7.5% (6/9/2020) with HGB of 9.3%. not an accurate measure 2/2 anemia.  -Mr. Cheung has has read a number of books regarding management type 2 diabetes.  He did not tolerate trial of Bydurean earlier this year.   -He very much respects his CDE, and wants to continue working with her.    Plan  -Increase Lantus to 19 units at bedtime, suspect may need more than this.  -Increase prandial insulin, Novolog 1 unit per 12 grams of CHO for meals/snacks/supplements  -Continue custome correction 1 unit per 40 > 140 before meals and > 200 HS. this may also need to be advanced addressed ongoing hyperglycemia.  -test blood sugars before meals, HS and 0200  -continue to hold metformin and Jardiance ( Jardiance is non-formulary and possibly additional procedures per CVTS)  -hypoglycemia protocol  -will continue to follow     Outpatient diabetes follow up: Dr. Melina Gold and roger Butler CDE.  Recommend resume empagliflozin.  Plan discussed with patient and primary team.           Interval History:   The last 48 hours progress and nursing notes reviewed.  Blood glucose to 227 overnight on 6/12 coming into range 133-183 yesterday and this morning 174 on 18 units of Lantus each evening with sliding scale 1u:40 mg/dl and 1u:12 g carb coverage insulin.  Rising to 215 today following afternoon meal.          PTA:  lantus 18 units at bedtime  metformin 1000 mg twice daily  Jardiance (empagliflozin) does not recall the dose, per chart review 10 mg daily  Sitagliptin (  "januvia) 100 mg daily ( has not start the medication)      Recent Labs   Lab 06/14/20  0540 06/13/20  1850 06/13/20  1412 06/13/20  0859 06/12/20  2152 06/12/20  1820 06/12/20  1342  06/11/20  0427   *  --   --   --   --   --   --   --  100*   BGM  --  183* 179* 133* 227* 190* 148*   < >  --     < > = values in this interval not displayed.               Review of Systems:   See interval hx          Medications:     Orders Placed This Encounter      Consistent Carbohydrate Diet 6695-7053 Calories: Moderate Consistent CHO (4-6 CHO units/meal)       Physical Exam:  Gen: Alert, resting in bed, in NAD.  Very talkative man in good spirits.  Inquisitive asks a lot of questions about research regarding allergies and GLP-1 agonists, time restricted feeding, different diabetic diets.  HEENT:  mucous membranes are moist  Resp: non-labored, no cough appreciated  Ext: moving all extremteis   Skin is warm and moist without any lesions.  Neuro:oriented x3, communicating clearly  Mood is good affect is bright and warm.  He has 4 non science books and he recommends all of them.  BP (!) 152/73 (BP Location: Left arm)   Pulse 63   Temp 97.7  F (36.5  C) (Oral)   Resp 16   Ht 1.803 m (5' 11\")   Wt 89.3 kg (196 lb 14.4 oz)   SpO2 95%   BMI 27.46 kg/m             Data:     Lab Results   Component Value Date    A1C 9.6 09/25/2017    A1C 10.7 06/29/2017              CBC RESULTS:   Recent Labs   Lab Test 06/11/20 0427   WBC 13.3*   RBC 3.88*   HGB 9.8*   HCT 32.4*   MCV 84   MCH 25.3*   MCHC 30.2*   RDW 15.2*        Recent Labs   Lab Test 06/11/20 0427 07/29/19  0529    125*   POTASSIUM 4.1 3.9   CHLORIDE 105 92*   CO2 26 24   ANIONGAP 6 9   * 220*   BUN 15 11   CR 1.13 1.02   ONEYDA 8.3* 8.6     Liver Function Studies -   Recent Labs   Lab Test 06/11/20 0427   PROTTOTAL 6.4*   ALBUMIN 2.6*   BILITOTAL 0.5   ALKPHOS 64   AST 12   ALT 16     No results found for: INR    GFR Estimate   Date Value Ref Range " Status   06/14/2020 74 >60 mL/min/[1.73_m2] Final     Comment:     Non  GFR Calc  Starting 12/18/2018, serum creatinine based estimated GFR (eGFR) will be   calculated using the Chronic Kidney Disease Epidemiology Collaboration   (CKD-EPI) equation.           I spent a total of 35 minutes bedside and on the inpatient unit managing the glycemic care of Nabil Cheung. Over 50% of my time on the unit was spent counseling the patient  and/or coordinating care regarding .  See note for details.      Renata Rose PA-C [phone]  Diabetes Management job code [id number]

## 2020-06-15 ENCOUNTER — APPOINTMENT (OUTPATIENT)
Dept: GENERAL RADIOLOGY | Facility: CLINIC | Age: 60
End: 2020-06-15
Attending: PHYSICIAN ASSISTANT
Payer: COMMERCIAL

## 2020-06-15 ENCOUNTER — DOCUMENTATION ONLY (OUTPATIENT)
Dept: SURGERY | Facility: CLINIC | Age: 60
End: 2020-06-15

## 2020-06-15 DIAGNOSIS — T81.89XD NON-HEALING SURGICAL WOUND, SUBSEQUENT ENCOUNTER: Primary | ICD-10-CM

## 2020-06-15 LAB
ANION GAP SERPL CALCULATED.3IONS-SCNC: 6 MMOL/L (ref 3–14)
BUN SERPL-MCNC: 21 MG/DL (ref 7–30)
CALCIUM SERPL-MCNC: 8.6 MG/DL (ref 8.5–10.1)
CHLORIDE SERPL-SCNC: 106 MMOL/L (ref 94–109)
CO2 SERPL-SCNC: 26 MMOL/L (ref 20–32)
CREAT SERPL-MCNC: 1.16 MG/DL (ref 0.66–1.25)
ERYTHROCYTE [DISTWIDTH] IN BLOOD BY AUTOMATED COUNT: 15.1 % (ref 10–15)
GFR SERPL CREATININE-BSD FRML MDRD: 67 ML/MIN/{1.73_M2}
GLUCOSE BLDC GLUCOMTR-MCNC: 162 MG/DL (ref 70–99)
GLUCOSE BLDC GLUCOMTR-MCNC: 168 MG/DL (ref 70–99)
GLUCOSE BLDC GLUCOMTR-MCNC: 182 MG/DL (ref 70–99)
GLUCOSE BLDC GLUCOMTR-MCNC: 194 MG/DL (ref 70–99)
GLUCOSE BLDC GLUCOMTR-MCNC: 204 MG/DL (ref 70–99)
GLUCOSE SERPL-MCNC: 187 MG/DL (ref 70–99)
HCT VFR BLD AUTO: 29.8 % (ref 40–53)
HGB BLD-MCNC: 9.1 G/DL (ref 13.3–17.7)
MCH RBC QN AUTO: 25.3 PG (ref 26.5–33)
MCHC RBC AUTO-ENTMCNC: 30.5 G/DL (ref 31.5–36.5)
MCV RBC AUTO: 83 FL (ref 78–100)
PLATELET # BLD AUTO: 313 10E9/L (ref 150–450)
POTASSIUM SERPL-SCNC: 4.2 MMOL/L (ref 3.4–5.3)
RBC # BLD AUTO: 3.59 10E12/L (ref 4.4–5.9)
SODIUM SERPL-SCNC: 139 MMOL/L (ref 133–144)
VANCOMYCIN SERPL-MCNC: 22.2 MG/L
WBC # BLD AUTO: 7.8 10E9/L (ref 4–11)

## 2020-06-15 PROCEDURE — 25800030 ZZH RX IP 258 OP 636: Performed by: THORACIC SURGERY (CARDIOTHORACIC VASCULAR SURGERY)

## 2020-06-15 PROCEDURE — 40000986 XR CHEST PORT 1 VW

## 2020-06-15 PROCEDURE — 36569 INSJ PICC 5 YR+ W/O IMAGING: CPT

## 2020-06-15 PROCEDURE — 25000132 ZZH RX MED GY IP 250 OP 250 PS 637: Performed by: PHYSICIAN ASSISTANT

## 2020-06-15 PROCEDURE — 80202 ASSAY OF VANCOMYCIN: CPT | Performed by: THORACIC SURGERY (CARDIOTHORACIC VASCULAR SURGERY)

## 2020-06-15 PROCEDURE — 25000128 H RX IP 250 OP 636: Performed by: PHYSICIAN ASSISTANT

## 2020-06-15 PROCEDURE — 36415 COLL VENOUS BLD VENIPUNCTURE: CPT | Performed by: THORACIC SURGERY (CARDIOTHORACIC VASCULAR SURGERY)

## 2020-06-15 PROCEDURE — 27211417 ZZ H KIT, VPS RHYTHM STYLET

## 2020-06-15 PROCEDURE — 36415 COLL VENOUS BLD VENIPUNCTURE: CPT | Performed by: PHYSICIAN ASSISTANT

## 2020-06-15 PROCEDURE — 85027 COMPLETE CBC AUTOMATED: CPT | Performed by: PHYSICIAN ASSISTANT

## 2020-06-15 PROCEDURE — 21400000 ZZH R&B CCU UMMC

## 2020-06-15 PROCEDURE — 80048 BASIC METABOLIC PNL TOTAL CA: CPT | Performed by: PHYSICIAN ASSISTANT

## 2020-06-15 PROCEDURE — 00000146 ZZHCL STATISTIC GLUCOSE BY METER IP

## 2020-06-15 PROCEDURE — 25000128 H RX IP 250 OP 636: Performed by: THORACIC SURGERY (CARDIOTHORACIC VASCULAR SURGERY)

## 2020-06-15 PROCEDURE — 84134 ASSAY OF PREALBUMIN: CPT | Performed by: THORACIC SURGERY (CARDIOTHORACIC VASCULAR SURGERY)

## 2020-06-15 PROCEDURE — C1751 CATH, INF, PER/CENT/MIDLINE: HCPCS

## 2020-06-15 RX ORDER — CEFAZOLIN SODIUM 1 G/50ML
1 INJECTION, SOLUTION INTRAVENOUS SEE ADMIN INSTRUCTIONS
Status: CANCELLED | OUTPATIENT
Start: 2020-06-15

## 2020-06-15 RX ORDER — CEFAZOLIN SODIUM 2 G/50ML
2 SOLUTION INTRAVENOUS
Status: CANCELLED | OUTPATIENT
Start: 2020-06-15

## 2020-06-15 RX ORDER — LIDOCAINE 40 MG/G
CREAM TOPICAL
Status: DISCONTINUED | OUTPATIENT
Start: 2020-06-15 | End: 2020-06-21 | Stop reason: HOSPADM

## 2020-06-15 RX ADMIN — VANCOMYCIN HYDROCHLORIDE 1250 MG: 10 INJECTION, POWDER, LYOPHILIZED, FOR SOLUTION INTRAVENOUS at 19:46

## 2020-06-15 RX ADMIN — CYCLOBENZAPRINE 10 MG: 10 TABLET, FILM COATED ORAL at 08:41

## 2020-06-15 RX ADMIN — OXYCODONE HYDROCHLORIDE 5 MG: 5 TABLET ORAL at 08:41

## 2020-06-15 RX ADMIN — AMIODARONE HYDROCHLORIDE 200 MG: 200 TABLET ORAL at 08:42

## 2020-06-15 RX ADMIN — CYCLOBENZAPRINE 10 MG: 10 TABLET, FILM COATED ORAL at 00:02

## 2020-06-15 RX ADMIN — OXYCODONE HYDROCHLORIDE 5 MG: 5 TABLET ORAL at 00:02

## 2020-06-15 RX ADMIN — ACETAMINOPHEN 650 MG: 325 TABLET, FILM COATED ORAL at 15:44

## 2020-06-15 RX ADMIN — VANCOMYCIN HYDROCHLORIDE 1250 MG: 10 INJECTION, POWDER, LYOPHILIZED, FOR SOLUTION INTRAVENOUS at 08:44

## 2020-06-15 RX ADMIN — ACETAMINOPHEN 650 MG: 325 TABLET, FILM COATED ORAL at 08:41

## 2020-06-15 RX ADMIN — ASPIRIN 325 MG ORAL TABLET 325 MG: 325 PILL ORAL at 08:42

## 2020-06-15 RX ADMIN — FINASTERIDE 5 MG: 5 TABLET, FILM COATED ORAL at 19:46

## 2020-06-15 RX ADMIN — CYCLOBENZAPRINE 10 MG: 10 TABLET, FILM COATED ORAL at 15:44

## 2020-06-15 RX ADMIN — ALFUZOSIN HYDROCHLORIDE 10 MG: 10 TABLET, EXTENDED RELEASE ORAL at 08:42

## 2020-06-15 RX ADMIN — ACETAMINOPHEN 650 MG: 325 TABLET, FILM COATED ORAL at 00:02

## 2020-06-15 RX ADMIN — Medication 0.2 MG: at 09:59

## 2020-06-15 RX ADMIN — OXYCODONE HYDROCHLORIDE 5 MG: 5 TABLET ORAL at 15:44

## 2020-06-15 RX ADMIN — LISINOPRIL 10 MG: 10 TABLET ORAL at 08:42

## 2020-06-15 RX ADMIN — ROSUVASTATIN CALCIUM 20 MG: 20 TABLET, FILM COATED ORAL at 19:46

## 2020-06-15 RX ADMIN — METOPROLOL SUCCINATE 50 MG: 50 TABLET, EXTENDED RELEASE ORAL at 08:42

## 2020-06-15 ASSESSMENT — ACTIVITIES OF DAILY LIVING (ADL)
ADLS_ACUITY_SCORE: 14

## 2020-06-15 ASSESSMENT — MIFFLIN-ST. JEOR: SCORE: 1726.6

## 2020-06-15 ASSESSMENT — PAIN DESCRIPTION - DESCRIPTORS
DESCRIPTORS: ACHING;BURNING;SHARP
DESCRIPTORS: ACHING;DISCOMFORT

## 2020-06-15 NOTE — PHARMACY-VANCOMYCIN DOSING SERVICE
Pharmacy Vancomycin Note  Date of Service Erica 15, 2020  Patient's  1960   60 year old, male    Indication: Bone and Joint Infection  Goal Trough Level: 15-20 mg/L  Day of Therapy: 8 (start 2020)  Current Vancomycin regimen: 1250 mg IV Q12h    Current estimated CrCl = Estimated Creatinine Clearance: 75.9 mL/min (based on SCr of 1.16 mg/dL).    Creatinine for last 3 days  2020:  5:40 AM Creatinine 1.38 mg/dL  2020:  5:40 AM Creatinine 1.07 mg/dL  6/15/2020:  5:43 AM Creatinine 1.16 mg/dL    Recent Vancomycin Levels (past 3 days)  2020: 10:00 AM Vancomycin Level 25.9 mg/L  2020:  1:02 PM Vancomycin Level 23.1 mg/L  6/15/2020:  5:43 AM Vancomycin Level 22.2 mg/L (10 hr tr)    Vancomycin IV Administrations (past 72 hours)                   vancomycin 1250 mg in 0.9% NaCl 250 mL intermittent infusion 1,250 mg (mg) 1,250 mg Given 20 1940     1,250 mg Given  09     1,250 mg Given 20 1628    vancomycin 1500 mg in 0.9% NaCl 250 ml intermittent infusion 1,500 mg (mg) 1,500 mg Given 20 0259     1,500 mg Given 20 1301                Nephrotoxins and other renal medications (From now, onward)    Start     Dose/Rate Route Frequency Ordered Stop    20 0915  lisinopril (ZESTRIL) tablet 10 mg      10 mg Oral DAILY 20 0903      20 1445  vancomycin 1250 mg in 0.9% NaCl 250 mL intermittent infusion 1,250 mg      1,250 mg  over 90 Minutes Intravenous EVERY 12 HOURS 20 1432               Contrast Orders - past 72 hours (72h ago, onward)    None          Interpretation of levels and current regimen:  Trough level is Supratherapeutic but this trough was drawn early. Estimated true trough would be 20 mg/L. Dose was recently decreased and patient has good renal function, so continue current dose.    Has serum creatinine changed > 50% in last 72 hours: No    Urine output: good    Renal Function: Stable    Plan:  1.  Continue Current Dose  2.  Pharmacy will check  trough levels as appropriate in 1-3 Days.    3. Serum creatinine levels will be ordered daily for the first week of therapy and at least twice weekly for subsequent weeks.      María Vasquez, PharmD, MPH  PGY1 Pharmacy Practice Resident

## 2020-06-15 NOTE — PROCEDURES
[facility] [place]    Double Lumen PICC Placement    Date/Time: 6/15/2020 12:28 PM  Performed by: Alisia Rios RN  Authorized by: Elma Osborne PA-C   Indications: vascular access    UNIVERSAL PROTOCOL   Site Marked: Yes  Prior Images Obtained and Reviewed:  Yes  Required items: Required blood products, implants, devices and special equipment available    Patient identity confirmed:  Verbally with patient and arm band  NA - No sedation, light sedation, or local anesthesia  Confirmation Checklist:  Patient's identity using two indicators, relevant allergies, procedure was appropriate and matched the consent or emergent situation and correct equipment/implants were available  Time out: Immediately prior to the procedure a time out was called    Universal Protocol: the [facility] Universal Protocol was followed    Preparation: Patient was prepped and draped in usual sterile fashion           ANESTHESIA    Anesthesia: Local infiltration  Local Anesthetic:  Lidocaine 2% without epinephrine  Anesthetic Total (mL):  3.5      SEDATION    Patient Sedated: No        Preparation: skin prepped with ChloraPrep  Skin prep agent: skin prep agent completely dried prior to procedure  Sterile barriers: maximum sterile barriers were used: cap, mask, sterile gown, sterile gloves, and large sterile sheet  Hand hygiene: hand hygiene performed prior to central venous catheter insertion  Type of line used: PICC and Power PICC  Catheter type: double lumen  Lumen type: valved  Catheter size: 5 Fr  Brand: other (see comment) (Qoniac)  Lot number: [id number]  Placement method: venipuncture, MST, ultrasound and tip confirmation system  Number of attempts: 1  Successful placement: yes  Orientation: left  Location: basilic vein (0.65 cm vein diameter)  Arm circumference: adults 10 cm  Extremity circumference: 28  Visible catheter length: 1  Total catheter length: 48  Dressing and securement:  chlorhexidine disc applied, site cleaned, sterile dressing applied and statlock  Post procedure assessment: placement verified by x-ray, free fluid flow and blood return through all ports  PROCEDURE   Patient Tolerance:  Patient tolerated the procedure well with no immediate complications

## 2020-06-15 NOTE — PLAN OF CARE
D: Pt presented to [facility] [place]8/20 with sternal wound infection following CABG x4 5/4/2020. Pt underwent I&D of sternum at [facility] 6/10 and transferred to [facility] for wound management. History of poorly controlled DMII, HTN, CKD, BPH/urinary retention, CAD and chronic diabetic L foot ulcer.     I/A: A&Ox4. BP elevated 's. RA. SR. Pain at incision/wound vac site. Incision/wound pain managed with PRN oxycodone, flexeril and tylenol x1. PRN IV dilaudid with wound vac dressing change. Urinating adequately. No BM this shift. PICC placed this afternoon for long term antibiotics. Persistent L foot ulcer covered, dressing CDI, no drainage noted.      P: Per NP note, wound vac change on Wednesday, 6/17, with plastics team at 0830, and tentative flap closure on 6/18.

## 2020-06-15 NOTE — PLAN OF CARE
D: Pt presented to [facility] 6/8/20 with sternal wound infection following CABG x4 5/4/2020 after recently diagnosed severe 3 vessel disease w/progressive symptoms at home. Pt underwent I&D of sternum at [facility] 6/10 and transferred to [facility] for wound management. PMH poorly controlled DMII, HTN, CKD 2-3, BPH/urinary retention, chronic diabetic L foot ulcer    I/A:   Neuro: A&Ox4. Pleasant, makes needs known.   VS: BP elevated 's. RA.   Tele: SR  Pain: Pain at incision/wound vac site. PRN oxycodone x1, PRN tylenol x1, and PRN flexeril x1 with relief.   GI/: Urinating adequately. No BM this shift.   Diet: Consistent carb [id number]  BG/insulin: Pt refused 0200 BG check for sleep promotion.   IV/Drips: L PIV SL.  Activity: Independent  Skin/drains: Pt has continuous wound vac on chest incision, dressing CDI. Persistent L foot ulcer covered, dressing CDI, no drainage noted. Pt is to have plastic surgery consult 6/15 for flap closure    P: Plan for possible PICC placement for long term IV antibiotics and possible repeat washout today or Tuesday. Continue to monitor pt status and report changes to CVTS.     Valentine Thomas RN

## 2020-06-15 NOTE — CONSULTS
[facility] Surgery Consultation    Nabil Cheung MRN# [id number]   Age: 60 year old YOB: 1960     Date of Admission:  6/10/2020    Date of Consult:   6/10/20    Reason for consult: Chest wound       Requesting service: CVTS                   Assessment and Plan:   Assessment:   61 yo male with PMH significant for DM2, HTN, CKD, with sternal wound infection following CABG on 5/4/2020 now s/p debridement and vac placement.        Plan:   - Vac change with CVTS today  - If wound looks healthy for reconstruction, can tentatively add on for reconstruction with right VRAM vs omentum vs pec flaps on Thursday 6/18    Discussed with staff, Dr. Serna            Chief Complaint:   Sternal wound infection         History of Present Illness:   61 yo male with PMH significant for DM2, HTN, CKD, with sternal wound infection following CABG on 5/4/2020.  Admitted to [facility] on 6/8 with sternal wound infection underwent debridement and transferred to [facility] for repeat debridement and vac placement on 6/12. Currently denies fever/chills or significant pain/swelling/redness around wound.          Past Medical History:   DM2  HTN  CKD  BPH          Past Surgical History:   CABG  Thyroid bx  Foot surgery  No prior abdominal surgery          Social History:     Social History     Tobacco Use     Smoking status: Never Smoker   Substance Use Topics     Alcohol use: Yes             Family History:   History reviewed. No pertinent family history.             Allergies:     Allergies   Allergen Reactions     Atorvastatin      Dust Mites              Medications:     Current Facility-Administered Medications   Medication     acetaminophen (TYLENOL) tablet 325-650 mg     alfuzosin ER (UROXATRAL) 24 hr tablet 10 mg     amiodarone (PACERONE) tablet 200 mg     aspirin (ASA) EC tablet 325 mg     Beta Blocker NOT indicated pre-operatively for the patient     cyclobenzaprine (FLEXERIL) tablet 10 mg     glucose gel 15-30 g    Or      dextrose 50 % injection 25-50 mL    Or     glucagon injection 1 mg     finasteride (PROSCAR) tablet 5 mg     heparin lock flush 10 UNIT/ML injection 2-5 mL     HOLD:  All AM Medications     HYDROmorphone (DILAUDID) injection 0.2 mg     insulin aspart (NovoLOG) injection (RAPID ACTING)     insulin aspart (NovoLOG) injection (RAPID ACTING)     insulin aspart (NovoLOG) injection (RAPID ACTING)     insulin aspart (NovoLOG) injection (RAPID ACTING)     insulin glargine (LANTUS PEN) injection 19 Units     lactated ringers infusion     lidocaine (LMX4) cream     lidocaine (LMX4) cream     lidocaine 1 % 0.1-1 mL     lidocaine 1 % 1 mL     lisinopril (ZESTRIL) tablet 10 mg     metoprolol succinate ER (TOPROL-XL) 24 hr tablet 50 mg     naloxone (NARCAN) injection 0.1-0.4 mg     oxyCODONE (ROXICODONE) tablet 5-10 mg     Patient RECEIVING antibiotic to treat a different condition and it provides ADEQUATE COVERAGE for this surgical procedure.     polyethylene glycol (MIRALAX) Packet 17 g     rosuvastatin (CRESTOR) tablet 20 mg     sodium chloride (PF) 0.9% PF flush 10-20 mL     sodium chloride (PF) 0.9% PF flush 3 mL     sodium chloride (PF) 0.9% PF flush 3 mL     sodium chloride (PF) 0.9% PF flush 5-50 mL     vancomycin 1250 mg in 0.9% NaCl 250 mL intermittent infusion 1,250 mg               Review of Systems:   ROS otherwise negative          Physical Exam:   All vitals have been reviewed  Temp:  [96.7  F (35.9  C)-98.7  F (37.1  C)] 97.7  F (36.5  C)  Heart Rate:  [62-78] 71  Resp:  [16-18] 18  BP: (110-158)/(74-90) 158/86  SpO2:  [95 %-99 %] 97 %    Intake/Output Summary (Last 24 hours) at 6/15/2020 0823  Last data filed at 6/15/2020 0647  Gross per 24 hour   Intake 1990 ml   Output 2400 ml   Net -410 ml     Physical Exam:  Gen: NAD, AOx3  Chest: NLB on RA, RRR. Midline wound ~3-4cm in width with wound vac in place.  Abdomen: soft, ND. No scars other than chest tube sites.  Ext: WWP          Data:   All laboratory data  reviewed    Results:  BMP  Recent Labs   Lab 06/15/20  0543 06/14/20  0540 06/13/20  0540 06/11/20  0427    137  --  137   POTASSIUM 4.2 4.0  --  4.1   CHLORIDE 106 107  --  105   CO2 26 26  --  26   BUN 21 16  --  15   CR 1.16 1.07 1.38* 1.13   * 174*  --  100*     CBC  Recent Labs   Lab 06/15/20  0543 06/14/20  0540 06/11/20  0427   WBC 7.8 7.9 13.3*   HGB 9.1* 9.3* 9.8*    299 320     LFT  Recent Labs   Lab 06/11/20 0427   AST 12   ALT 16   ALKPHOS 64   BILITOTAL 0.5   ALBUMIN 2.6*     Recent Labs   Lab 06/15/20  0815 06/15/20  0543 06/15/20  0405 06/14/20  2158 06/14/20  1813 06/14/20  0540 06/13/20  1850 06/13/20  1412  06/11/20  0427   GLC  --  187*  --   --   --  174*  --   --   --  100*   *  --  204* 268* 215*  --  183* 179*   < >  --     < > = values in this interval not displayed.       Imaging:  No results found for this or any previous visit (from the past 24 hour(s)).    Rahul Mejia MD   Surgery PGY-4  Please Page On-Call on [employer]

## 2020-06-15 NOTE — PROGRESS NOTES
Surgery is scheduled with Dr. Serna on 6/25 at [place].  Scheduled per MD.    H&P: to be completed by inpatient team.  POST-OP: 7/10 with Saadia LARA    Pt is aware that they will be contacted to schedule a COVID-19 test within 3 days of surgery.    They are aware that they will receive a call  ~2 days prior to the scheduled procedure and will be given an exact arrival/start time.    Patient is currently admitted and so I did not call.

## 2020-06-15 NOTE — PROGRESS NOTES
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 Mckinney.  Admitted to [facility] with sternal wound infection (6/8/2020) with sternal wound infection. Transferred to [facility] on (6/10/2020) for further wound management.     Type 2 diabetes: poorly controlled with a recent A1C, A1C 7.5% (6/9/2020) with HGB of 9.3%. not an accurate measure 2/2 anemia.  -Mr. Cheung has has read a number of books regarding management type 2 diabetes.  He did not tolerate trial of Bydurean earlier this year.   -He very much respects his CDE, and wants to continue working with her.     Plan  - Lantus to 19 units at bedtime, increase to 21 units at bedtime  -Increase prandial insulin, Novolog 1 unit per 12 grams of CHO for meals/snacks/supplements  -Novolog custome correction 1 unit per 40 > 140 before meals and > 200 HS. this may also need to be advanced addressed ongoing hyperglycemia.  -test blood sugars before meals, HS and 0200  -continue to hold metformin and Jardiance ( Jardiance is non-formulary and possibly additional procedures per CVTS)  -hypoglycemia protocol  -will continue to follow     Outpatient diabetes follow up: Dr. Melina Gold and roger Butler CDE.  Recommend resume empagliflozin.                       Plan discussed with bedside RN           Interval History:   The last 24 hours progress and nursing notes reviewed.    Blood sugars are moderately controlled on current regime  Glucose at bedtime 268  Glucose at ~ 0400, 204 and am glucose 187/162, will increase basal insulin for tonight  Mr. Cheung was sleepy after VAC change today ( received medication)  Seems to have a good appetite. Had 75 grams of CHO for breakfast and 67 grams of CHO for lunch    PTA:  lantus 18 units at bedtime  metformin 1000 mg twice daily  Jardiance (empagliflozin) does not  "recall the dose, per chart review 10 mg daily  Sitagliptin ( januvia) 100 mg daily ( has not start the medication)    Recent Labs   Lab 06/15/20  0543 06/15/20  0405 06/14/20  2158 06/14/20  1813 06/14/20  0540 06/13/20  1850 06/13/20  1412 06/13/20  0859  06/11/20  0427   *  --   --   --  174*  --   --   --   --  100*   BGM  --  204* 268* 215*  --  183* 179* 133*   < >  --     < > = values in this interval not displayed.               Review of Systems:   See interval hx          Medications:     Orders Placed This Encounter      Consistent Carbohydrate Diet 5447-8358 Calories: Moderate Consistent CHO (4-6 CHO units/meal)       Physical Exam:  Gen: Alert,  NAD   HEENT:  mucous membranes are moist  Resp: non-labored  Neuro: sleeping  BP (!) 144/81 (BP Location: Left arm)   Pulse 63   Temp 97.9  F (36.6  C) (Oral)   Resp 18   Ht 1.803 m (5' 11\")   Wt 90.4 kg (199 lb 6.4 oz)   SpO2 95%   BMI 27.81 kg/m             Data:     Lab Results   Component Value Date    A1C 9.6 09/25/2017    A1C 10.7 06/29/2017              CBC RESULTS:   Recent Labs   Lab Test 06/15/20  0543   WBC 7.8   RBC 3.59*   HGB 9.1*   HCT 29.8*   MCV 83   MCH 25.3*   MCHC 30.5*   RDW 15.1*        Recent Labs   Lab Test 06/15/20  0543 06/14/20  0540    137   POTASSIUM 4.2 4.0   CHLORIDE 106 107   CO2 26 26   ANIONGAP 6 4   * 174*   BUN 21 16   CR 1.16 1.07   ONEYDA 8.6 8.3*     Liver Function Studies -   Recent Labs   Lab Test 06/11/20  0427   PROTTOTAL 6.4*   ALBUMIN 2.6*   BILITOTAL 0.5   ALKPHOS 64   AST 12   ALT 16     No results found for: INR      I spent a total of 25 minutes bedside and on the inpatient unit managing the glycemic care of Nabil Cheung. Over 50% of my time on the unit was spent counseling the patient  and/or coordinating care regarding .  See note for details.      Caro Arzola [phone]  Diabetes Management job code [id number]            "

## 2020-06-15 NOTE — PROGRESS NOTES
Cardiovascular Surgery Progress Note  6/14/2020         Assessment and Plan:     Nabil Cheung is a 60 y.o. male with a PMH of poorly controlled DMT2, hypertension, CKD stage 2-3, BPH/urinary retention, chronic diabetic left foot ulcer, and recently diagnosed severe 3 vessel disease with progressive symptoms at home. Underwent CABG x 4 (LIMA to LAD, SVG to rPDA, SVG to OM, SVG to diag) 05/04 with Dr. Mckinney. Discharged to home 05/12. Admitted to [facility] from home 06/08 with sternal wound infection. Underwent I&D of sternum at [facility] 06/10 with Dr. Mckinney, then transfered to [facility] for further wound management management.   Plastic Surgery consult ordered for 6/15.        Infectious Disease:  Leukocytosis  Sternal wound infection  - Bedside I&D 06/09 at [facility], Culture 06/09 ([facility]) growing staph epidermidis  - OR I&D 6/10 with Dr. Mckinney. Infection tracks length of sternal incision with areas of exposed bone and exposed wires. Exposed wires removed, sternal wound vac placed  - OR washout 6/12 with replacement of wound vac, purulent debris present, cultures resent.   - Consulted ID 6/11: continue vanc through 4-6 weeks and stop zosyn 6/12.    - Intra op culture of bone showing gram positive cocci, ID awaiting final tissue/bone culture results and will make final abx recs   - Plastic surgery consulted 6/15 for flap closure: tentatively plan for flap closure Thursday, 6/18, if wound looks clean. If not, then plan to follow up as outpatient to determine timing of flap closure.      Addendum 1045:   Dressing changed per Dr. Mckinney, wound looked clean.   Plan dressing change on Wednesday 6/17 in coordination with Plastics to see wound and likely proceed with skin flap with wound closure 6/18.     Plastics will plan to be here Wed 6/17 at 0830 to see wound.     Cardiovascular:   CAD s/p CABG 5/4/2020  Hypertension  - Most recent TTE 05/05 with preserved biventricular dysfunction  - Aspirin 325 mg daily  - Rosuvastatin 20  mg daily  - HTN: increase meds PRN; Toprol 50 mg daily.   - Hypertensive, changed lisinopril to hydralazine for bump in Cr likely due to elevated vanco level. Now Cr back down, switched back to Lisinopril at 10 mg daily 6/14. Creat stable, b/p slightly improved. Monitor b/p and creat.   - net even over 24 hours, net negative overall 1.9 L despite weight being up 1.1 kg from yesterday. Diuretic PRN  Postop atrial fibrillation - currently in SR   - Continue PTA amiodarone taper, currently 200 mg daily (last dose 06/25)  - Systemic anticoagulation deferred     Pulmonary:  - No active issues. Saturating well on RA.   - Supplemental O2 PRN to keep sats > 92%. Wean off as tolerated.  - Pulm toilet, IS, activity and deep breathing      Neurology / MSK:  Postop pain - moderate, controlled with pain meds   - Tylenol PRN, Oxycodone PRN  - IV Dilaudid PRN  - Flexeril PRN      / Renal:  CKD stage 2-3  - Most recent creatinine within baseline, trend daily  - Avoid nephrotoxins, Diuresis PRN  - Cr 1.38 6/13 up from 1.13, 1.16 today. Vanco level 25.9 on 6/12, 22.2 this AM. Aim for a trough level of 15-20 per ID.   - Monitor weight, weight elevated but patient appears euvolemic and doesn't want diuretic.     BPH  Urinary retention  - PTA finasteride 5 mg daily  - PTA alfuzosin 10 mg daily     GI / FEN:   Nutrition  - Diabetic diet, bowel regimen     Endocrine:  DMT2, poorly controlled  - Endocrine consult, appreciate recs.  - Lantus, sliding scale, prandial dosing      Chronic left foot ulcer  - Previously recommended for out-patient podiatry follow up for further debridement  - WOC consult  - Mepilex dressing daily and PRN for saturation     Hematology:   - Stable Hbg 9.1s, Plts WNL.      Prophylaxis:   - Stress ulcer prophylaxis: not indicated  - DVT prophylaxis: Subcutaneous heparin, SCD  - Order PICC for prior to discharge for long term IV antibiotics, patient okay with this.       Discussed with CVTS Fellow as needed.  Staff  "surgeons have been informed of changes through both verbal and written communication.      Anahy Pena, HARPAL, CNP  Cardiothoracic Surgery  Pager [phone]  Erica 15, 2020            Interval History:     No overnight events.    States pain is very well managed on current regimen. Slept well overnight.  Tolerating diet, is passing flatus, + BM. No nausea or vomiting.  Breathing well without complaints.   Working with therapies and ambulating in halls without assistance.   Does feeling some movement of his sternum and sternal popping with cough and deep breath.    Denies chest pain, palpitations, dizziness, syncopal symptoms, fevers, chills, myalgias.            Physical Exam:   Blood pressure (!) 144/81, pulse 63, temperature 97.9  F (36.6  C), temperature source Oral, resp. rate 18, height 1.803 m (5' 11\"), weight 90.4 kg (199 lb 6.4 oz), SpO2 95 %.  Vitals:    06/13/20 0500 06/14/20 0539 06/15/20 0408   Weight: 89 kg (196 lb 1.6 oz) 89.3 kg (196 lb 14.4 oz) 90.4 kg (199 lb 6.4 oz)        Gen: A&Ox4, NAD  Neuro: no focal deficits   CV: RRR, normal S1 S2, no murmurs, rubs or gallops.  Pulm: CTA, no wheezing or rhonchi, normal breathing   Abd: nondistended, normal BS, soft, nontender  Ext: no periperal edema  Incision: suction with wound vac intact. Feels the sternum move slightly with coughing and deep breathing.          Data:    Imaging:  reviewed recent imaging, no acute concerns    CXR 6/13/2020: Findings:   PA and lateral views of the chest were obtained. Intact upper  sternotomy wires. The cardiomediastinal silhouette is at the upper  limits of normal in size. No pneumothorax. Small left pleural effusion  and possible trace right pleural effusion. Mild streaky bibasilar  opacities, likely atelectasis. Mild gaseous distention of bowel in the  left upper quadrant. No acute osseous abnormalities visualized  radiographically although the sternum is suboptimally evaluated.                                          "            Impression:   Mild bibasilar atelectasis and small left pleural effusion. No  pneumothorax or appreciable pneumomediastinum following sternal  debridement.    Labs:  BMP  Recent Labs   Lab 06/15/20  0543 06/14/20  0540 06/13/20  0540 06/11/20  0427    137  --  137   POTASSIUM 4.2 4.0  --  4.1   CHLORIDE 106 107  --  105   ONEYDA 8.6 8.3*  --  8.3*   CO2 26 26  --  26   BUN 21 16  --  15   CR 1.16 1.07 1.38* 1.13   * 174*  --  100*     CBC  Recent Labs   Lab 06/15/20  0543 06/14/20  0540 06/11/20 0427   WBC 7.8 7.9 13.3*   RBC 3.59* 3.62* 3.88*   HGB 9.1* 9.3* 9.8*   HCT 29.8* 30.2* 32.4*   MCV 83 83 84   MCH 25.3* 25.7* 25.3*   MCHC 30.5* 30.8* 30.2*   RDW 15.1* 14.9 15.2*    299 320     INR  No lab results found in last 7 days.   Liver Function Studies -   Recent Labs   Lab Test 06/11/20 0427   PROTTOTAL 6.4*   ALBUMIN 2.6*   BILITOTAL 0.5   ALKPHOS 64   AST 12   ALT 16     GLUCOSE:   Recent Labs   Lab 06/15/20  0543 06/15/20  0405 06/14/20  2158 06/14/20  1813 06/14/20  0540 06/13/20  1850 06/13/20  1412 06/13/20  0859  06/11/20  0427   *  --   --   --  174*  --   --   --   --  100*   BGM  --  204* 268* 215*  --  183* 179* 133*   < >  --     < > = values in this interval not displayed.     MEDICATIONS:   alfuzosin ER, 10 mg, Oral, Daily  amiodarone, 200 mg, Oral, Daily  aspirin, 325 mg, Oral, Daily  finasteride, 5 mg, Oral, Daily  insulin aspart, , Subcutaneous, TID w/meals  insulin aspart, 1-6 Units, Subcutaneous, TID w/meals  insulin aspart, 1-5 Units, Subcutaneous, At Bedtime  insulin glargine, 19 Units, Subcutaneous, At Bedtime  lisinopril, 10 mg, Oral, Daily  metoprolol succinate ER, 50 mg, Oral, Daily  rosuvastatin, 20 mg, Oral, QPM  sodium chloride (PF), 3 mL, Intracatheter, Q8H  vancomycin (VANCOCIN) IV, 1,250 mg, Intravenous, Q12H

## 2020-06-15 NOTE — PROGRESS NOTES
[facility] : PROGRESS NOTE  Nabil Cheung : 1960 Sex: male:   Medical record number [id number] Attending Physician: Mark Mckinney, *  Date of Service: Erica 15, 2020    ASSESSMENT :  Nabil Cheung is a 60 y.o. male with a PMH of poorly controlled DMT2, hypertension, CKD stage 2-3, BPH/urinary retention, chronic diabetic left foot ulcer, and recently diagnosed severe 3 vessel disease with progressive symptoms at home. Underwent CABG x 4 (LIMA to LAD, SVG to rPDA, SVG to OM, SVG to diag)  with Dr. Mckinney. Discharged to home . Admitted to [facility] [place]  with sternal wound infection. Underwent I&D of sternum at [facility] 06/10 with Dr. Mckinney, then transfered to [facility] for further wound management management.     1. Mediastinitis / Sternal osteomyelitis  s/p CABG x4 on 20  - s/p washout and wound vac placement 20     - Staphylococcus epidermidis at [facility] 20  - Staphylococcus epidermidis at [facility]  - susceptibility is pending  - CT chest wo contrast 20   Impression:   1. Soft tissue thickening/inflammatory changes posterior to the midline sternotomy without discrete fluid collection.  2. Small pericardial effusion.  3. Subtle groundglass opacities in the left lingula, may represent atelectasis, mild edema/inflammation, or  infection.    2. CAD s/p CABG x 4 20  3. Diabetes mellitus  HbA1c 7.5 (20)  4. CKD   5. Hypertension   6. leukocytosis - resolved     PLAN:  - continue Vancomycin IV ( goal Vanco trough 15-20)   - follow CRP/ ESR   - follow-up cultures     ID will continue to follow.     Bart Landeros MD, M.Med.Sc.  Staff, Infectious Diseases  Pager: [phone]     SUBJECTIVE:   no fever, chills. still having pain around the sternal area. wound vac in place. concern about infection and plastic surgery on Thursday     ROS:  A five-point review of systems was obtained and was negative with the exception of that which is described  "above.  Allergies   Allergen Reactions     Atorvastatin      Dust Mites    CURRENT ANTI-INFECTIVES:   Vancomycin IV 6/10-present   Pip/tazo 6/10-6/12    EXAMINATION: (Recommend ? 5 systems)   Vital Signs: BP (!) 157/78 (BP Location: Left arm)   Pulse 63   Temp 97.7  F (36.5  C) (Oral)   Resp 16   Ht 1.803 m (5' 11\")   Wt 90.4 kg (199 lb 6.4 oz)   SpO2 99%   BMI 27.81 kg/m     GENERAL:  well-developed, well-nourished, in bed in no acute distress.  HEENT:  Head is normocephalic, atraumatic   EYES:  Eyes have anicteric sclerae without conjunctival injection     ENT:  Oropharynx is moist without exudates or ulcers. Tongue is midline  NECK:  Supple. No  Cervical lymphadenopathy  Chest : surgical site is covered, wound vac+, render around the surgical site   LUNGS:  Clear to auscultation bilateral.decreased breath ounds in bases   CARDIOVASCULAR:  Regular rate and rhythm with no murmurs, gallops or rubs.  ABDOMEN:  Normal bowel sounds, soft, nontender. No appreciable hepatosplenomegaly  SKIN:  No acute rashes.  Line(s) are in place without any surrounding erythema or exudate.   NEUROLOGIC:  Grossly nonfocal. Active x4 extremities  CURRENT LINES: PIV     NEW DATA/RESULTS:   Culture Micro   Date Value Ref Range Status   06/12/2020 Culture negative monitoring continues  Preliminary   06/12/2020 PENDING  Preliminary   06/12/2020   Final    Canceled, Test credited  Test reordered as correct code  Reordered as BONE     06/12/2020 (A)  Preliminary    On day 2, isolated in broth only:  Staphylococcus epidermidis  Susceptibility testing in progress     06/12/2020 Culture in progress  Preliminary     No lab results found.  Recent Labs   Lab Test 06/15/20  0543 06/14/20  0540 06/11/20  0427 07/29/19  0529   WBC 7.8 7.9 13.3* 21.0*     Recent Labs   Lab Test 06/15/20  0543 06/14/20  0540 06/13/20  0540 06/11/20  0427   CR 1.16 1.07 1.38* 1.13   GFRESTIMATED 67 74 54* 69     Hematology Studies  Recent Labs   Lab Test " 06/15/20  0543 06/14/20  0540 06/11/20 0427 07/29/19  0529   WBC 7.8 7.9 13.3* 21.0*   ANEU  --   --   --  17.3*   AEOS  --   --   --  0.2   HCT 29.8* 30.2* 32.4* 34.7*    299 320 284     Metabolic  Recent Labs   Lab Test 06/15/20  0543 06/14/20 0540 06/13/20 0540 06/11/20 0427    137  --  137   BUN 21 16  --  15   CO2 26 26  --  26   CR 1.16 1.07 1.38* 1.13   GFRESTIMATED 67 74 54* 69     Hepatic Studies  Recent Labs   Lab Test 06/11/20 0427   BILITOTAL 0.5   ALKPHOS 64   ALBUMIN 2.6*   AST 12   ALT 16

## 2020-06-16 LAB
CREAT SERPL-MCNC: 1.11 MG/DL (ref 0.66–1.25)
CRP SERPL-MCNC: 26 MG/L (ref 0–8)
ERYTHROCYTE [SEDIMENTATION RATE] IN BLOOD BY WESTERGREN METHOD: 50 MM/H (ref 0–20)
GFR SERPL CREATININE-BSD FRML MDRD: 71 ML/MIN/{1.73_M2}
GLUCOSE BLDC GLUCOMTR-MCNC: 117 MG/DL (ref 70–99)
GLUCOSE BLDC GLUCOMTR-MCNC: 158 MG/DL (ref 70–99)
GLUCOSE BLDC GLUCOMTR-MCNC: 173 MG/DL (ref 70–99)
GLUCOSE BLDC GLUCOMTR-MCNC: 202 MG/DL (ref 70–99)
PREALB SERPL IA-MCNC: 16 MG/DL (ref 15–45)

## 2020-06-16 PROCEDURE — G0463 HOSPITAL OUTPT CLINIC VISIT: HCPCS

## 2020-06-16 PROCEDURE — 25000132 ZZH RX MED GY IP 250 OP 250 PS 637: Performed by: NURSE PRACTITIONER

## 2020-06-16 PROCEDURE — 25000132 ZZH RX MED GY IP 250 OP 250 PS 637: Performed by: PHYSICIAN ASSISTANT

## 2020-06-16 PROCEDURE — 25000128 H RX IP 250 OP 636: Performed by: PHYSICIAN ASSISTANT

## 2020-06-16 PROCEDURE — 21400000 ZZH R&B CCU UMMC

## 2020-06-16 PROCEDURE — 36415 COLL VENOUS BLD VENIPUNCTURE: CPT | Performed by: THORACIC SURGERY (CARDIOTHORACIC VASCULAR SURGERY)

## 2020-06-16 PROCEDURE — 85652 RBC SED RATE AUTOMATED: CPT | Performed by: INTERNAL MEDICINE

## 2020-06-16 PROCEDURE — 36592 COLLECT BLOOD FROM PICC: CPT | Performed by: INTERNAL MEDICINE

## 2020-06-16 PROCEDURE — 86140 C-REACTIVE PROTEIN: CPT | Performed by: THORACIC SURGERY (CARDIOTHORACIC VASCULAR SURGERY)

## 2020-06-16 PROCEDURE — 25800030 ZZH RX IP 258 OP 636: Performed by: THORACIC SURGERY (CARDIOTHORACIC VASCULAR SURGERY)

## 2020-06-16 PROCEDURE — 25000128 H RX IP 250 OP 636: Performed by: THORACIC SURGERY (CARDIOTHORACIC VASCULAR SURGERY)

## 2020-06-16 PROCEDURE — 00000146 ZZHCL STATISTIC GLUCOSE BY METER IP

## 2020-06-16 PROCEDURE — 82565 ASSAY OF CREATININE: CPT | Performed by: THORACIC SURGERY (CARDIOTHORACIC VASCULAR SURGERY)

## 2020-06-16 RX ORDER — LISINOPRIL 10 MG/1
10 TABLET ORAL ONCE
Status: COMPLETED | OUTPATIENT
Start: 2020-06-16 | End: 2020-06-16

## 2020-06-16 RX ORDER — LISINOPRIL 20 MG/1
20 TABLET ORAL DAILY
Status: DISCONTINUED | OUTPATIENT
Start: 2020-06-17 | End: 2020-06-19

## 2020-06-16 RX ORDER — LABETALOL 20 MG/4 ML (5 MG/ML) INTRAVENOUS SYRINGE
10-20 EVERY 4 HOURS PRN
Status: DISCONTINUED | OUTPATIENT
Start: 2020-06-16 | End: 2020-06-21 | Stop reason: HOSPADM

## 2020-06-16 RX ADMIN — AMIODARONE HYDROCHLORIDE 200 MG: 200 TABLET ORAL at 08:11

## 2020-06-16 RX ADMIN — ACETAMINOPHEN 650 MG: 325 TABLET, FILM COATED ORAL at 16:06

## 2020-06-16 RX ADMIN — ASPIRIN 325 MG ORAL TABLET 325 MG: 325 PILL ORAL at 08:10

## 2020-06-16 RX ADMIN — OXYCODONE HYDROCHLORIDE 5 MG: 5 TABLET ORAL at 20:10

## 2020-06-16 RX ADMIN — CYCLOBENZAPRINE 10 MG: 10 TABLET, FILM COATED ORAL at 00:11

## 2020-06-16 RX ADMIN — ACETAMINOPHEN 650 MG: 325 TABLET, FILM COATED ORAL at 00:11

## 2020-06-16 RX ADMIN — FINASTERIDE 5 MG: 5 TABLET, FILM COATED ORAL at 20:01

## 2020-06-16 RX ADMIN — ACETAMINOPHEN 650 MG: 325 TABLET, FILM COATED ORAL at 20:10

## 2020-06-16 RX ADMIN — METOPROLOL SUCCINATE 50 MG: 50 TABLET, EXTENDED RELEASE ORAL at 08:11

## 2020-06-16 RX ADMIN — VANCOMYCIN HYDROCHLORIDE 1250 MG: 10 INJECTION, POWDER, LYOPHILIZED, FOR SOLUTION INTRAVENOUS at 20:01

## 2020-06-16 RX ADMIN — OXYCODONE HYDROCHLORIDE 5 MG: 5 TABLET ORAL at 08:10

## 2020-06-16 RX ADMIN — VANCOMYCIN HYDROCHLORIDE 1250 MG: 10 INJECTION, POWDER, LYOPHILIZED, FOR SOLUTION INTRAVENOUS at 08:10

## 2020-06-16 RX ADMIN — LISINOPRIL 10 MG: 10 TABLET ORAL at 10:00

## 2020-06-16 RX ADMIN — Medication 5 ML: at 06:06

## 2020-06-16 RX ADMIN — ALFUZOSIN HYDROCHLORIDE 10 MG: 10 TABLET, EXTENDED RELEASE ORAL at 08:10

## 2020-06-16 RX ADMIN — LISINOPRIL 10 MG: 10 TABLET ORAL at 08:11

## 2020-06-16 RX ADMIN — OXYCODONE HYDROCHLORIDE 5 MG: 5 TABLET ORAL at 00:11

## 2020-06-16 RX ADMIN — ROSUVASTATIN CALCIUM 20 MG: 20 TABLET, FILM COATED ORAL at 20:01

## 2020-06-16 RX ADMIN — ACETAMINOPHEN 650 MG: 325 TABLET, FILM COATED ORAL at 08:10

## 2020-06-16 RX ADMIN — CYCLOBENZAPRINE 10 MG: 10 TABLET, FILM COATED ORAL at 16:05

## 2020-06-16 RX ADMIN — CYCLOBENZAPRINE 10 MG: 10 TABLET, FILM COATED ORAL at 08:10

## 2020-06-16 ASSESSMENT — PAIN DESCRIPTION - DESCRIPTORS
DESCRIPTORS: ACHING
DESCRIPTORS: ACHING
DESCRIPTORS: ACHING;DISCOMFORT
DESCRIPTORS: CRAMPING

## 2020-06-16 ASSESSMENT — MIFFLIN-ST. JEOR: SCORE: 1713.45

## 2020-06-16 ASSESSMENT — ACTIVITIES OF DAILY LIVING (ADL)
ADLS_ACUITY_SCORE: 14

## 2020-06-16 NOTE — PROGRESS NOTES
[facility] Nurse Inpatient Wound Assessment   Reason for consultation: Left foot wound   Sternal wound with NPWT    Assessment  Left lateral 5th MTP wound due to Neuropathic Ulcer  Status: stable     Sternum: Patient with VAC in place. [facility] consulted for VAC changes. Spoke with CVTS NP Anahy Pena: CVTS will continue to perform VAC changes and involve Plastics for help with wound coverage. Per YOLANDA Higginbotham for WOC to sign off on sternal wound.     Treatment Plan  Left foot  wound: Daily    cleanse wound with microklenz spray and gauze.    Apply vaseline to wound.  Cover with primapore dressing      Orders Reviewed  Recommended provider order: Outpatient Podiatry consult to see Dr. [facility]   WO Nurse follow-up plan:weekly for foot  Nursing to notify the Provider(s) and re-consult the [facility] Nurse if wound(s) deteriorates or new skin concern.    Patient History  According to provider note(s): 60 y.o. male with a PMH of poorly controlled DMT2, hypertension, CKD stage 2-3, BPH/urinary retention, chronic diabetic left foot ulcer, and recently diagnosed severe 3 vessel disease with progressive symptoms at home. Underwent CABG x 4 (LIMA to LAD, SVG to rPDA, SVG to OM, SVG to diag) 05/04 with Dr. Mckinney. Discharged to home 05/12. Admitted to [facility] from home 06/08 with sternal wound infection. Underwent I&D of sternum at [facility] 06/10 with Dr. Mckinney, then transfered to [facility] for further wound management management  Objective Data  Active Diet Order  Orders Placed This Encounter      Consistent Carbohydrate Diet [id number] Calories: Moderate Consistent CHO (4-6 CHO units/meal)      Output:   I/O last 3 completed shifts:  In: 2450 [P.O.:2200; I.V.:250]  Out: 2075 [Urine:2075]    Risk Assessment:   Sensory Perception: 3-->slightly limited  Moisture: 4-->rarely moist  Activity: 3-->walks occasionally  Mobility: 4-->no limitation  Nutrition: 3-->adequate  Friction and Shear: 3-->no apparent problem  Solis Score: 20                    "       Labs:   Recent Labs   Lab 06/16/20  0610 06/15/20  0543  06/11/20  0427   ALBUMIN  --   --   --  2.6*   PREALB  --  16  --   --    HGB  --  9.1*   < > 9.8*   WBC  --  7.8   < > 13.3*   CRP 26.0*  --   --   --     < > = values in this interval not displayed.       Physical Exam  Skin inspection: focused Left foot    Skin pale, dry and peeling.  PP and DP strong.  Cap refill 2-3 seconds.  Foot including toes appropriately warm     Wound Location: Left lateral foot, inferior to 5th toe     6/16 Left lateral foot    Date of last photo 6/18/20  Wound History: wound present for approximately 11 months.  Pt tries to walk 2 miles a day.  Pt notes pain in foot during walks but \"I power through it because the walks are so important for my Diabetic management\"  He has shaved down the periwound callus multiple times.  Was given a DH2 shoe but states that is too awkward to wear.  He has cut out the area under the wound on his tennis shoe insole.  Has used cocoa butter, Vit E oil for wound care.  Most recently leaving open to air and walking 2 blocks twice a day.    Measurements (length x width x depth, in cm) 1.8  x 1.1  x  0.1 cm   Wound Base: 100 % thick callous with old blood   Palpation of the wound bed: normal, no fluctuance noted  Periwound skin: intact and hyperkeratosis  Periwound Color: normal and consistent with surrounding tissue  Periwound Temperature: normal   Drainage: none  Odor: none  Pain: only when walking     Interventions  Current support surface: Standard  Low air loss mattress  Visual inspection and assessment completed   Wound Care: done per plan of care  Supplies: at bedside  Education provided: plan of care, wound progress and Off-loading pressure  Discussed plan of care with Patient      "

## 2020-06-16 NOTE — PROGRESS NOTES
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 Mckinney.  Admitted to [facility] with sternal wound infection (6/8/2020) with sternal wound infection. Transferred to [facility] on (6/10/2020) for further wound management.     Type 2 diabetes: poorly controlled with a recent A1C, A1C 7.5% (6/9/2020) with HGB of 9.3%. not an accurate measure 2/2 anemia.  -Mr. Cheung has has read a number of books regarding management type 2 diabetes.  He did not tolerate trial of Bydurean earlier this year.   -He very much respects his CDE, and defers to her for advice on most changes to DM management.    Post-prandial BGs too high, but pt not comfortable increasing aspart any further.     Plan  - Lantus order changed to 18 units at bedtime, (pt declines to take 21 units)  -continue prandial insulin, Novolog 1 unit per 12 grams of CHO for meals/snacks/supplements for tonight.  - patient will limit carb intake (about 45 grams), reassess post-meal BG, then may increase insulin to carb ratio (keeping total meal dose to 6 units or less, per his maximum comfortable dose)  - RN will provide carb content supplement for the menu, to aid pt in ordering lower carb meals  -Novolog custom correction 1 unit per 40 > 140 before meals and > 200 HS.   -test blood sugars before meals, HS and 0200  -continue to hold metformin and empaglifozin for now  -hypoglycemia protocol  -will continue to follow     Outpatient diabetes follow up: Dr. Melina Gold and roger Butler CDE.                   Plan discussed with patient and bedside RN           Interval History:   The last 24 hours progress and nursing notes reviewed.  Nabil asks why metformin and empagliflozin can't be used.  Explained acute care setting increases risk for MISA and concerns with volume loss/hypotension (and no RTC acute  "care studies on empagliflozin). He understands that withholding those medications are the reason he needs aspart.  But, feels willing to accept higher BGs to avoid using more insulin.  And he verbalizes knowledge of the risk higher BGs have for impaired healing.  He proposes carbohydrate reduction/fasting. Knows he needs sufficient calories to heal.  Discussed carb reduction to 45 grams as safe choice and he can manage this if has resource.    Plans for OR Thursday.    After surgery, once nearing discharge consider restart metformin and possibly empagliflozin.    PTA:  lantus 18 units at bedtime  metformin 1000 mg twice daily  Jardiance (empagliflozin) does not recall the dose, per chart review 10 mg daily  Sitagliptin ( januvia) 100 mg daily ( has not start the medication)    Recent Labs   Lab 06/16/20  0746 06/15/20  2135 06/15/20  1759 06/15/20  1238 06/15/20  0815 06/15/20  0543 06/15/20  0405  06/14/20  0540  06/11/20  0427   GLC  --   --   --   --   --  187*  --   --  174*  --  100*   * 194* 168* 182* 162*  --  204*   < >  --    < >  --     < > = values in this interval not displayed.               Review of Systems:   See interval hx          Medications:     Orders Placed This Encounter      Consistent Carbohydrate Diet [id number] Calories: Moderate Consistent CHO (4-6 CHO units/meal)       Physical Exam:  Gen: Alert,  NAD   HEENT:  mucous membranes are moist  Resp: non-labored  Neuro: alert, oriented x3, communicating clearly and asking appropriate questions  BP (!) 152/78   Pulse 62   Temp 97.8  F (36.6  C) (Oral)   Resp 16   Ht 1.803 m (5' 11\")   Wt 89.1 kg (196 lb 8 oz)   SpO2 96%   BMI 27.41 kg/m    Remainder physical exam not completed d/t Covid 19 precautions         Data:     Lab Results   Component Value Date    A1C 9.6 09/25/2017    A1C 10.7 06/29/2017            Recent Labs   Lab Test 06/16/20  0610 06/15/20  0543 06/14/20  0540   NA  --  139 137   POTASSIUM  --  4.2 4.0   CHLORIDE  " "--  106 107   CO2  --  26 26   ANIONGAP  --  6 4   GLC  --  187* 174*   BUN  --  21 16   CR 1.11 1.16 1.07   ONEYDA  --  8.6 8.3*     CBC RESULTS:   Recent Labs   Lab Test 06/15/20  0543   WBC 7.8   RBC 3.59*   HGB 9.1*   HCT 29.8*   MCV 83   MCH 25.3*   MCHC 30.5*   RDW 15.1*        Due to coronavirus precautions, a telephone (video visit offered, pt declined) visit instead of an in-person visit took place.       Nabil Cheung is being evaluated via a billable telephone visit.             \"This telephone visit will be conducted via a call between patient and  provider. We have found that certain health care needs can be provided without the need for a full physical exam.  This service lets us provide the care you need with a phone conversation.\"       Patient has given verbal consent for Telephone visit? Yes           I spent a total of 10 minutes via telephone with patient, and 20 minutes floor time /care coordination managing glycemic care. See note for details.             Krystina Rosa APRN [id number]  Diabetes Management job code [id number]            "

## 2020-06-16 NOTE — PLAN OF CARE
Status: Patient admitted after admission to [facility] for sternal wound infection. Here for wound management.   VS: VSS on RA. HTN. PRN Labetalol ordered.   Neuros: A&Ox4. [place].  Cardiovascular: First degree AV block 60-80s.   GI/: Tolerating moderate CHO diet. Denies nausea. LBM 6/15. Voiding spontaneously.  IV: L PIV & L PICC SL.  Activity: Up indpendently.   Pain: Incisional pain controlled w/ PRN Oxycodone, Tylenol, and Flexeril.  Respiratory/Trach: No issues.  Skin: Midsternal wound vac to -125. Plan for CVTS/Plastics to change dressing today.   Plan of Care: Plan for flap closure on 6/18 if wound healing. Continue to monitor and follow POC.

## 2020-06-16 NOTE — PROGRESS NOTES
Cardiovascular Surgery Progress Note  6/14/2020         Assessment and Plan:     Nabil Cheung is a 60 y.o. male with a PMH of poorly controlled DMT2, hypertension, CKD stage 2-3, BPH/urinary retention, chronic diabetic left foot ulcer, and recently diagnosed severe 3 vessel disease with progressive symptoms at home. Underwent CABG x 4 (LIMA to LAD, SVG to rPDA, SVG to OM, SVG to diag) 05/04 with Dr. Mckinney. Discharged to home 05/12. Admitted to [facility] [place] 06/08 with sternal wound infection. Underwent I&D of sternum at [facility] 06/10 with Dr. Mckinney, then transfered to [facility] for further wound management management.   Plastic Surgery consult ordered for 6/15.        Infectious Disease:  Leukocytosis  Sternal wound infection  - Bedside I&D 06/09 at [facility], Culture 06/09 ([facility]) growing staph epidermidis  - OR I&D 6/10 with Dr. Mckinney. Infection tracks length of sternal incision with areas of exposed bone and exposed wires. Exposed wires removed, sternal wound vac placed  - OR washout 6/12 with replacement of wound vac, purulent debris present, cultures resent.   - Consulted ID 6/11: continue vanc through 4-6 weeks and stop zosyn 6/12.    - Intra op culture of bone showing gram positive cocci, ID awaiting final tissue/bone culture results and will make final abx recs   - Plastic surgery consulted 6/15 for flap closure: tentatively plan for flap closure Thursday, 6/18, if wound looks clean during dressing change 6/17. If not, then plan to follow up as outpatient to determine timing of flap closure.    - Dressing change planned for Wednesday 6/17 in coordination with Plastics.     Cardiovascular:   CAD s/p CABG 5/4/2020  Hypertension  - Most recent TTE 05/05 with preserved biventricular dysfunction  - Aspirin 325 mg daily  - Rosuvastatin 20 mg daily  - HTN: increase meds PRN; Toprol 50 mg daily.   - Hypertensive, changed lisinopril to hydralazine for bump in Cr likely due to elevated vanco level. Now Cr back down,  switched back to Lisinopril at 10 mg daily 6/14. Creat stable, b/p slightly improved.   - B/p's have continued to run high in the 150-160's/70-90's, Will increase Lisinopril to 20 mg daily.    *patient very concerned about the jump in b/p and spent a great deal of time discussing his concerns and asking for an explanation. Reviewed diet, stress, anxiety, and pain all being potential causes of increased, as well recent surgery and infection contributing to lower b/p's prior to admission. He has had his Metformin and Jardiance held while here, taking sliding scale insulin. Jardiance could have contributed to a decrease in b/p prior to admission. Will need to monitor b/p's closely when home meds resumed.    - net even over 24 hours, net negative overall 1.9 L despite weight being up 1.1 kg from yesterday. Diuretic PRN  Postop atrial fibrillation - currently in SR   - Continue PTA amiodarone taper, currently 200 mg daily (last dose 06/25)  - Systemic anticoagulation deferred     Pulmonary:  - No active issues. Saturating well on RA.   - Supplemental O2 PRN to keep sats > 92%. Wean off as tolerated.  - Pulm toilet, IS, activity and deep breathing      Neurology / MSK:  Postop pain - moderate, controlled with pain meds   - Tylenol PRN, Oxycodone PRN  - IV Dilaudid PRN  - Flexeril PRN      / Renal:  CKD stage 2-3  - Most recent creatinine within baseline, trend daily  - Avoid nephrotoxins, Diuresis PRN  - Cr 1.38 6/13 up from 1.13, 1.11 today. Vanco level 25.9 on 6/12, 22.2 6/15. Aim for a trough level of 15-20 per ID.   - Monitor weight, appears euvolemic, 24 hour net even. Doesn't want diuretic.     BPH  Urinary retention  - PTA finasteride 5 mg daily  - PTA alfuzosin 10 mg daily     GI / FEN:   Nutrition  - Diabetic diet, bowel regimen     Endocrine:  DMT2, poorly controlled  - Endocrine consult, appreciate recs.  - Lantus, sliding scale, prandial dosing      Chronic left foot ulcer  - Previously recommended for  "out-patient podiatry follow up for further debridement  - WOC consult, saw today   - Mepilex dressing daily and PRN for saturation     Hematology:   - Stable Hbg 9s, Plts WNL.   - recheck CBC tomorrow      Prophylaxis:   - Stress ulcer prophylaxis: not indicated  - DVT prophylaxis: Subcutaneous heparin, SCD  - PICC for long term IV antibiotics      Discussed with CVTS Fellow as needed.  Staff surgeons have been informed of changes through both verbal and written communication.      Anahy Pena, DNP, CNP  Cardiothoracic Surgery  Pager [phone]  June 16, 2020            Interval History:     No overnight events.    States pain is very well managed on current regimen, although the dressing change yesterday did require more pain medication.  Slept well overnight.  Tolerating diet, is passing flatus, + BM. No nausea or vomiting.  Breathing well without complaints.   Working with therapies and ambulating in halls without assistance.   Does feeling some movement of his sternum with cough and deep breath.    Denies chest pain, palpitations, dizziness, syncopal symptoms, fevers, chills, myalgias.            Physical Exam:   Blood pressure (!) 152/78, pulse 62, temperature 97.8  F (36.6  C), temperature source Oral, resp. rate 16, height 1.803 m (5' 11\"), weight 89.1 kg (196 lb 8 oz), SpO2 96 %.  Vitals:    06/14/20 0539 06/15/20 0408 06/16/20 0437   Weight: 89.3 kg (196 lb 14.4 oz) 90.4 kg (199 lb 6.4 oz) 89.1 kg (196 lb 8 oz)        Gen: A&Ox4, NAD  Neuro: no focal deficits   CV: RRR, normal S1 S2, no murmurs, rubs or gallops.  Pulm: CTA, no wheezing or rhonchi, normal breathing   Abd: nondistended, normal BS, soft, nontender  Ext: no periperal edema  Incision: suction with wound vac intact. Feels the sternum move slightly with coughing and deep breathing.    Left foot dressing WDI.          Data:    Imaging:  reviewed recent imaging, no acute concerns    CXR 6/13/2020: Findings:   PA and lateral views of the chest " were obtained. Intact upper  sternotomy wires. The cardiomediastinal silhouette is at the upper  limits of normal in size. No pneumothorax. Small left pleural effusion  and possible trace right pleural effusion. Mild streaky bibasilar  opacities, likely atelectasis. Mild gaseous distention of bowel in the  left upper quadrant. No acute osseous abnormalities visualized  radiographically although the sternum is suboptimally evaluated.                                                     Impression:   Mild bibasilar atelectasis and small left pleural effusion. No  pneumothorax or appreciable pneumomediastinum following sternal  debridement.    Labs:  BMP  Recent Labs   Lab 06/16/20  0610 06/15/20  0543 06/14/20  0540 06/13/20  0540 06/11/20 0427   NA  --  139 137  --  137   POTASSIUM  --  4.2 4.0  --  4.1   CHLORIDE  --  106 107  --  105   ONEYDA  --  8.6 8.3*  --  8.3*   CO2  --  26 26  --  26   BUN  --  21 16  --  15   CR 1.11 1.16 1.07 1.38* 1.13   GLC  --  187* 174*  --  100*     CBC  Recent Labs   Lab 06/15/20  0543 06/14/20  0540 06/11/20 0427   WBC 7.8 7.9 13.3*   RBC 3.59* 3.62* 3.88*   HGB 9.1* 9.3* 9.8*   HCT 29.8* 30.2* 32.4*   MCV 83 83 84   MCH 25.3* 25.7* 25.3*   MCHC 30.5* 30.8* 30.2*   RDW 15.1* 14.9 15.2*    299 320     INR  No lab results found in last 7 days.   Liver Function Studies -   Recent Labs   Lab Test 06/11/20 0427   PROTTOTAL 6.4*   ALBUMIN 2.6*   BILITOTAL 0.5   ALKPHOS 64   AST 12   ALT 16     GLUCOSE:   Recent Labs   Lab 06/16/20  0746 06/15/20  2135 06/15/20  1759 06/15/20  1238 06/15/20  0815 06/15/20  0543 06/15/20  0405  06/14/20  0540  06/11/20 0427   GLC  --   --   --   --   --  187*  --   --  174*  --  100*   * 194* 168* 182* 162*  --  204*   < >  --    < >  --     < > = values in this interval not displayed.     MEDICATIONS:   alfuzosin ER, 10 mg, Oral, Daily  amiodarone, 200 mg, Oral, Daily  aspirin, 325 mg, Oral, Daily  finasteride, 5 mg, Oral, Daily  insulin  aspart, , Subcutaneous, TID w/meals  insulin aspart, 1-6 Units, Subcutaneous, TID w/meals  insulin aspart, 1-5 Units, Subcutaneous, At Bedtime  insulin glargine, 21 Units, Subcutaneous, At Bedtime  [START ON 6/17/2020] lisinopril, 20 mg, Oral, Daily  metoprolol succinate ER, 50 mg, Oral, Daily  rosuvastatin, 20 mg, Oral, QPM  sodium chloride (PF), 10 mL, Intracatheter, Q7 Days  sodium chloride (PF), 3 mL, Intracatheter, Q8H  vancomycin (VANCOCIN) IV, 1,250 mg, Intravenous, Q12H

## 2020-06-16 NOTE — PROVIDER NOTIFICATION
CVTS cross cover notified - /85, no PRNs. Not sure if the team is aware about pt's 1st degree AV block?    PRN Labetalol ordered. Team aware of heart block.

## 2020-06-16 NOTE — PLAN OF CARE
D: Pt presented to [facility] 6/8/20 with sternal wound infection following CABG x4 5/4/2020. Pt underwent I&D of sternum at [facility] 6/10 and transferred to [facility] for wound management. History of poorly controlled DMII, HTN, CKD, BPH/urinary retention, CAD and chronic diabetic L foot ulcer.    I: Monitored vitals and assessed pt status.   Changed: L foot wound dressing  Running: PIV SL, PICC SL- int abx  PRN: oxy, tylenol, flexeril    A: A0x4. VSS- BP consistently elevated, increased lisinopril to 20mg today. Labetalol available if SBP >160.  Afebrile. SR, 1st degree AVB. C/o some sternal pain- treated with tylenol, oxy, flexeril q 8hr per pt request (all avail. PRN). Next due @ 1600. WV to sternal wound @ 125cont. Serosang output. Urinating adequately. LBM 6/15. Eating well, regular diet. BG ACHS- sliding scale, carb coverage. Up ind in room. Pt pleasant, cooperative.     P: WV dressing change tomorrow. Potential sternal wound closure, Thursday. 6/18. Continue to monitor Pt status and report changes to treatment team.

## 2020-06-17 ENCOUNTER — ANESTHESIA EVENT (OUTPATIENT)
Dept: SURGERY | Facility: CLINIC | Age: 60
End: 2020-06-17
Payer: COMMERCIAL

## 2020-06-17 LAB
BACTERIA SPEC CULT: ABNORMAL
BASOPHILS # BLD AUTO: 0.1 10E9/L (ref 0–0.2)
BASOPHILS NFR BLD AUTO: 1 %
CREAT SERPL-MCNC: 1.14 MG/DL (ref 0.66–1.25)
DIFFERENTIAL METHOD BLD: ABNORMAL
EOSINOPHIL # BLD AUTO: 0.6 10E9/L (ref 0–0.7)
EOSINOPHIL NFR BLD AUTO: 7.5 %
ERYTHROCYTE [DISTWIDTH] IN BLOOD BY AUTOMATED COUNT: 15 % (ref 10–15)
GFR SERPL CREATININE-BSD FRML MDRD: 68 ML/MIN/{1.73_M2}
GLUCOSE BLDC GLUCOMTR-MCNC: 124 MG/DL (ref 70–99)
GLUCOSE BLDC GLUCOMTR-MCNC: 129 MG/DL (ref 70–99)
GLUCOSE BLDC GLUCOMTR-MCNC: 132 MG/DL (ref 70–99)
GLUCOSE BLDC GLUCOMTR-MCNC: 181 MG/DL (ref 70–99)
GLUCOSE BLDC GLUCOMTR-MCNC: 205 MG/DL (ref 70–99)
HCT VFR BLD AUTO: 31 % (ref 40–53)
HGB BLD-MCNC: 9.4 G/DL (ref 13.3–17.7)
IMM GRANULOCYTES # BLD: 0 10E9/L (ref 0–0.4)
IMM GRANULOCYTES NFR BLD: 0.4 %
LYMPHOCYTES # BLD AUTO: 1.9 10E9/L (ref 0.8–5.3)
LYMPHOCYTES NFR BLD AUTO: 25 %
MCH RBC QN AUTO: 25.6 PG (ref 26.5–33)
MCHC RBC AUTO-ENTMCNC: 30.3 G/DL (ref 31.5–36.5)
MCV RBC AUTO: 85 FL (ref 78–100)
MONOCYTES # BLD AUTO: 0.7 10E9/L (ref 0–1.3)
MONOCYTES NFR BLD AUTO: 8.9 %
NEUTROPHILS # BLD AUTO: 4.4 10E9/L (ref 1.6–8.3)
NEUTROPHILS NFR BLD AUTO: 57.2 %
NRBC # BLD AUTO: 0 10*3/UL
NRBC BLD AUTO-RTO: 0 /100
PLATELET # BLD AUTO: 283 10E9/L (ref 150–450)
RBC # BLD AUTO: 3.67 10E12/L (ref 4.4–5.9)
SARS-COV-2 PCR COMMENT: NORMAL
SARS-COV-2 RNA SPEC QL NAA+PROBE: NEGATIVE
SARS-COV-2 RNA SPEC QL NAA+PROBE: NORMAL
SPECIMEN SOURCE: ABNORMAL
SPECIMEN SOURCE: NORMAL
SPECIMEN SOURCE: NORMAL
WBC # BLD AUTO: 7.8 10E9/L (ref 4–11)

## 2020-06-17 PROCEDURE — 82565 ASSAY OF CREATININE: CPT | Performed by: THORACIC SURGERY (CARDIOTHORACIC VASCULAR SURGERY)

## 2020-06-17 PROCEDURE — 25000128 H RX IP 250 OP 636: Performed by: THORACIC SURGERY (CARDIOTHORACIC VASCULAR SURGERY)

## 2020-06-17 PROCEDURE — 25000132 ZZH RX MED GY IP 250 OP 250 PS 637: Performed by: PHYSICIAN ASSISTANT

## 2020-06-17 PROCEDURE — U0003 INFECTIOUS AGENT DETECTION BY NUCLEIC ACID (DNA OR RNA); SEVERE ACUTE RESPIRATORY SYNDROME CORONAVIRUS 2 (SARS-COV-2) (CORONAVIRUS DISEASE [COVID-19]), AMPLIFIED PROBE TECHNIQUE, MAKING USE OF HIGH THROUGHPUT TECHNOLOGIES AS DESCRIBED BY CMS-2020-01-R: HCPCS | Performed by: STUDENT IN AN ORGANIZED HEALTH CARE EDUCATION/TRAINING PROGRAM

## 2020-06-17 PROCEDURE — 21400000 ZZH R&B CCU UMMC

## 2020-06-17 PROCEDURE — 25800030 ZZH RX IP 258 OP 636: Performed by: THORACIC SURGERY (CARDIOTHORACIC VASCULAR SURGERY)

## 2020-06-17 PROCEDURE — 00000146 ZZHCL STATISTIC GLUCOSE BY METER IP

## 2020-06-17 PROCEDURE — 25000132 ZZH RX MED GY IP 250 OP 250 PS 637: Performed by: NURSE PRACTITIONER

## 2020-06-17 PROCEDURE — 25000128 H RX IP 250 OP 636: Performed by: PHYSICIAN ASSISTANT

## 2020-06-17 PROCEDURE — 85025 COMPLETE CBC W/AUTO DIFF WBC: CPT | Performed by: THORACIC SURGERY (CARDIOTHORACIC VASCULAR SURGERY)

## 2020-06-17 PROCEDURE — 36592 COLLECT BLOOD FROM PICC: CPT | Performed by: THORACIC SURGERY (CARDIOTHORACIC VASCULAR SURGERY)

## 2020-06-17 RX ORDER — LIDOCAINE 40 MG/G
CREAM TOPICAL
Status: DISCONTINUED | OUTPATIENT
Start: 2020-06-17 | End: 2020-06-17

## 2020-06-17 RX ORDER — HEPARIN SODIUM,PORCINE 10 UNIT/ML
5-10 VIAL (ML) INTRAVENOUS EVERY 24 HOURS
Status: DISCONTINUED | OUTPATIENT
Start: 2020-06-17 | End: 2020-06-17

## 2020-06-17 RX ORDER — AMLODIPINE BESYLATE 5 MG/1
5 TABLET ORAL DAILY
Status: DISCONTINUED | OUTPATIENT
Start: 2020-06-17 | End: 2020-06-19

## 2020-06-17 RX ORDER — HEPARIN SODIUM,PORCINE 10 UNIT/ML
5-10 VIAL (ML) INTRAVENOUS
Status: DISCONTINUED | OUTPATIENT
Start: 2020-06-17 | End: 2020-06-17

## 2020-06-17 RX ADMIN — METOPROLOL SUCCINATE 50 MG: 50 TABLET, EXTENDED RELEASE ORAL at 08:24

## 2020-06-17 RX ADMIN — OXYCODONE HYDROCHLORIDE 5 MG: 5 TABLET ORAL at 04:19

## 2020-06-17 RX ADMIN — AMIODARONE HYDROCHLORIDE 200 MG: 200 TABLET ORAL at 08:24

## 2020-06-17 RX ADMIN — ROSUVASTATIN CALCIUM 20 MG: 20 TABLET, FILM COATED ORAL at 20:28

## 2020-06-17 RX ADMIN — LISINOPRIL 20 MG: 20 TABLET ORAL at 08:24

## 2020-06-17 RX ADMIN — OXYCODONE HYDROCHLORIDE 5 MG: 5 TABLET ORAL at 20:28

## 2020-06-17 RX ADMIN — AMLODIPINE BESYLATE 5 MG: 5 TABLET ORAL at 09:44

## 2020-06-17 RX ADMIN — VANCOMYCIN HYDROCHLORIDE 1250 MG: 10 INJECTION, POWDER, LYOPHILIZED, FOR SOLUTION INTRAVENOUS at 08:27

## 2020-06-17 RX ADMIN — ACETAMINOPHEN 650 MG: 325 TABLET, FILM COATED ORAL at 09:44

## 2020-06-17 RX ADMIN — ACETAMINOPHEN 650 MG: 325 TABLET, FILM COATED ORAL at 20:28

## 2020-06-17 RX ADMIN — CYCLOBENZAPRINE 10 MG: 10 TABLET, FILM COATED ORAL at 14:28

## 2020-06-17 RX ADMIN — ASPIRIN 325 MG ORAL TABLET 325 MG: 325 PILL ORAL at 08:24

## 2020-06-17 RX ADMIN — OXYCODONE HYDROCHLORIDE 5 MG: 5 TABLET ORAL at 00:33

## 2020-06-17 RX ADMIN — VANCOMYCIN HYDROCHLORIDE 1250 MG: 10 INJECTION, POWDER, LYOPHILIZED, FOR SOLUTION INTRAVENOUS at 20:33

## 2020-06-17 RX ADMIN — Medication 0.2 MG: at 08:44

## 2020-06-17 RX ADMIN — CYCLOBENZAPRINE 10 MG: 10 TABLET, FILM COATED ORAL at 00:33

## 2020-06-17 RX ADMIN — ACETAMINOPHEN 650 MG: 325 TABLET, FILM COATED ORAL at 04:19

## 2020-06-17 RX ADMIN — OXYCODONE HYDROCHLORIDE 5 MG: 5 TABLET ORAL at 14:28

## 2020-06-17 RX ADMIN — ACETAMINOPHEN 650 MG: 325 TABLET, FILM COATED ORAL at 14:28

## 2020-06-17 RX ADMIN — CYCLOBENZAPRINE 10 MG: 10 TABLET, FILM COATED ORAL at 09:44

## 2020-06-17 RX ADMIN — ACETAMINOPHEN 650 MG: 325 TABLET, FILM COATED ORAL at 00:33

## 2020-06-17 RX ADMIN — FINASTERIDE 5 MG: 5 TABLET, FILM COATED ORAL at 20:28

## 2020-06-17 RX ADMIN — ALFUZOSIN HYDROCHLORIDE 10 MG: 10 TABLET, EXTENDED RELEASE ORAL at 08:24

## 2020-06-17 ASSESSMENT — MIFFLIN-ST. JEOR: SCORE: 1713.45

## 2020-06-17 ASSESSMENT — PAIN DESCRIPTION - DESCRIPTORS: DESCRIPTORS: ACHING

## 2020-06-17 ASSESSMENT — ACTIVITIES OF DAILY LIVING (ADL)
ADLS_ACUITY_SCORE: 14

## 2020-06-17 NOTE — PROGRESS NOTES
[facility]EAS[facility] : PROGRESS NOTE  Nabil Cheung : 1960 Sex: male:   Medical record number [id number] Attending Physician: Mark Mckinney, *  Date of Service: 2020    ASSESSMENT :  Nabil Cheung is a 60 y.o. male with a PMH of poorly controlled DMT2, hypertension, CKD stage 2-3, BPH/urinary retention, chronic diabetic left foot ulcer, and recently diagnosed severe 3 vessel disease with progressive symptoms at home. Underwent CABG x 4 (LIMA to LAD, SVG to rPDA, SVG to OM, SVG to diag)  with Dr. Mckinney. Discharged to home . Admitted to [facility] from [place]  with sternal wound infection. Underwent I&D of sternum at [facility] 06/10 with Dr. Mckinney, then transfered to [facility] for further wound management management.     1. Mediastinitis / Sternal osteomyelitis  s/p CABG x4 on 20  - s/p washout and wound vac placement 20   - Staphylococcus epidermidis at [facility] 20  - Staphylococcus epidermidis at [facility]  - oxacillin resistant MRSE   - CT chest wo contrast 20   Impression:   1. Soft tissue thickening/inflammatory changes posterior to the midline sternotomy without discrete fluid collection.  2. Small pericardial effusion.  3. Subtle groundglass opacities in the left lingula, may represent atelectasis, mild edema/inflammation, or  infection.    2. CAD s/p CABG x 4 20  3. Diabetes mellitus  HbA1c 7.5 (20)  4. CKD   5. Hypertension   6. leukocytosis - resolved   7. Left foot wound - dry, no redness - started in 2020  7. CRP 26 (20)    PLAN:  - continue Vancomycin IV ( goal Vanco trough 15-)     ID will continue to follow.     Bart Landeros MD, M.Med.Sc.  Staff, Infectious Diseases  Pager: [phone]     SUBJECTIVE:   no fever, chills. wound vac in place. has good appetite.no shortness of breath. left foot wound -started in 2020    ROS:  A five-point review of systems was obtained and was negative with the exception of that  "which is described above.  Allergies   Allergen Reactions     Atorvastatin      Dust Mites    CURRENT ANTI-INFECTIVES:   Vancomycin IV 6/10-present   Pip/tazo 6/10-6/12    EXAMINATION: (Recommend ? 5 systems)   Vital Signs: BP (!) 160/86 (BP Location: Right arm)   Pulse 78   Temp 97.4  F (36.3  C) (Oral)   Resp 16   Ht 1.803 m (5' 11\")   Wt 89.1 kg (196 lb 8 oz)   SpO2 95%   BMI 27.41 kg/m     GENERAL:  well-developed, well-nourished, in bed in no acute distress.  HEENT:  Head is normocephalic, atraumatic   EYES:  Eyes have anicteric sclerae without conjunctival injection     ENT:  Oropharynx is moist without exudates or ulcers. Tongue is midline  NECK:  Supple. No  Cervical lymphadenopathy  Chest : surgical site is covered, wound vac+, render around the surgical site   LUNGS:  Clear to auscultation bilateral.decreased breath ounds in bases   CARDIOVASCULAR:  Regular rate and rhythm with no murmurs, gallops or rubs.  ABDOMEN:  Normal bowel sounds, soft, nontender. No appreciable hepatosplenomegaly  SKIN:  No acute rashes.  left foot - plantar - ulcer - dry. no redness  Line(s) are in place without any surrounding erythema or exudate.   NEUROLOGIC:  Grossly nonfocal. Active x4 extremities  CURRENT LINES: PICC     NEW DATA/RESULTS:   Culture Micro   Date Value Ref Range Status   06/12/2020 Culture negative monitoring continues  Preliminary   06/12/2020 Culture negative after 4 days  Preliminary   06/12/2020   Final    Canceled, Test credited  Test reordered as correct code  Reordered as BONEC     06/12/2020 (A)  Final    On day 2, isolated in broth only:  Staphylococcus epidermidis     06/12/2020 Culture negative monitoring continues  Preliminary   06/12/2020 Culture negative after 4 days  Preliminary   06/12/2020 Light growth  Normal skin riki    Final     Recent Labs   Lab Test 06/16/20  0610   CRP 26.0*     Recent Labs   Lab Test 06/15/20  0543 06/14/20  0540 06/11/20  0427 07/29/19  0529   WBC 7.8 7.9 " 13.3* 21.0*     Recent Labs   Lab Test 06/16/20  0610 06/15/20  0543 06/14/20  0540 06/13/20  0540   CR 1.11 1.16 1.07 1.38*   GFRESTIMATED 71 67 74 54*     Hematology Studies  Recent Labs   Lab Test 06/15/20  0543 06/14/20  0540 06/11/20 0427 07/29/19  0529   WBC 7.8 7.9 13.3* 21.0*   ANEU  --   --   --  17.3*   AEOS  --   --   --  0.2   HCT 29.8* 30.2* 32.4* 34.7*    299 320 284     Metabolic  Recent Labs   Lab Test 06/16/20  0610 06/15/20  0543 06/14/20  0540  06/11/20 0427   NA  --  139 137  --  137   BUN  --  21 16  --  15   CO2  --  26 26  --  26   CR 1.11 1.16 1.07   < > 1.13   GFRESTIMATED 71 67 74   < > 69    < > = values in this interval not displayed.     Hepatic Studies  Recent Labs   Lab Test 06/11/20 0427   BILITOTAL 0.5   ALKPHOS 64   ALBUMIN 2.6*   AST 12   ALT 16

## 2020-06-17 NOTE — PLAN OF CARE
[place] 6[place]10 [place] [facility] [place]al wound infection s/p I & D with wound vac placement 6/10.  Wound vac has serosanguinous drainage and closure planned soon.  SBP elevated, but barely did not met parameters for prn Labetalol SBP >160.  SR on RA with incisional pain and medicated with prn Tylenol, Flexeril, and Roxicodone.  Weight identical last two nights:)  Otherwise, pt slept well and up independently.  Continue to monitor and with POC.

## 2020-06-17 NOTE — PROGRESS NOTES
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 Mckinney.  Admitted to [facility] with sternal wound infection (6/8/2020) with sternal wound infection. Transferred to [facility] on (6/10/2020) for further wound management.     Type 2 diabetes: poorly controlled with a recent A1C, A1C 7.5% (6/9/2020) with HGB of 9.3%. not an accurate measure 2/2 anemia.  -Mr. Cheung has has read a number of books regarding management type 2 diabetes.  He did not tolerate trial of Bydurean earlier this year.   -He very much respects his CDE, and defers to her for advice on most changes to DM management.    Post-prandial BG still too high, after starting carb reduction w/ supper last night     Plan  - Lantus 18 units at bedtime, this is appropriate to continue when NPO  - patient declines to increase prandial insulin, so continue Novolog 1 unit per 12 grams of CHO for meals/snacks/supplements.  - patient will continue cutting carb intake (</= 45 grams),  If post-prandial glucose remains high, he may reconsider increasing aspart  - RN provided carb content supplement for the menu, to aid pt in ordering lower carb meals  -Novolog custom correction 1 unit per 40 > 140 before meals and > 200 HS.   -test blood sugars before meals, HS and 0200  -continue to hold metformin and empaglifozin for now, restart as soon as stable post-op.  -hypoglycemia protocol  -will continue to follow     Outpatient diabetes follow up: Dr. Melina Gold and Charlotte Butler CDE.                   Plan discussed with patient and bedside RN, page to primary, message to outpt CDE           Interval History:   The last 24 hours progress and nursing notes reviewed.  Reduced supper carb intake to 36 grams-- covered with 3 units.  BG at .  4 units for the meal might have matched need better.  Nabil  "continues to be resistance to increasing aspart.  He describes how his BG patterns usually run.  300s not unusual for him and not unusual for sustained elevations.  He again acknowledged he knows the goal for BGs < 180 for wound healing. Verbalizes understanding recommendation to increase aspart and declines.  Wound vac change this morning and plans for surgery w/ Plastics tomorrow.  Discussed NPO status and glargine.    6/16- Nabil asks why metformin and empagliflozin can't be used.  Explained acute care setting increases risk for MISA and concerns with volume loss/hypotension (and no RTC acute care studies on empagliflozin). He understands that withholding those medications are the reason he needs aspart.  But, feels willing to accept higher BGs to avoid using more insulin.  And he verbalizes knowledge of the risk higher BGs have for impaired healing.  He proposes carbohydrate reduction/fasting. Knows he needs sufficient calories to heal.  Discussed carb reduction to 45 grams as safe choice and he can manage this if has resource.    \"tentatively plan for flap closure Thursday, 6/18, if wound looks clean during dressing change 6/17. If not, then plan to follow up as outpatient to determine timing of flap closure.\"    After surgery, once nearing discharge consider restart metformin and possibly empagliflozin.    PTA:  lantus 18 units at bedtime  metformin 1000 mg twice daily  Jardiance (empagliflozin) does not recall the dose, per chart review 10 mg daily  Sitagliptin ( januvia) 100 mg daily ( has not start the medication)    Recent Labs   Lab 06/17/20  0421 06/16/20  2201 06/16/20  1752 06/16/20  1140 06/16/20  0746 06/15/20  2135  06/15/20  0543  06/14/20  0540  06/11/20  0427   GLC  --   --   --   --   --   --   --  187*  --  174*  --  100*   * 202* 158* 173* 117* 194*   < >  --    < >  --    < >  --     < > = values in this interval not displayed.               Review of Systems:   See interval hx          " "Medications:     Orders Placed This Encounter      Consistent Carbohydrate Diet 2694-0598 Calories: Moderate Consistent CHO (4-6 CHO units/meal)       Physical Exam:  Gen: Alert,  NAD  HEENT:  mucous membranes are moist  Resp: non-labored  Neuro: alert, oriented x3, communicating clearly, answering in detail  BP (!) 160/95 (BP Location: Right arm)   Pulse 78   Temp 98  F (36.7  C) (Oral)   Resp 16   Ht 1.803 m (5' 11\")   Wt 89.1 kg (196 lb 8 oz)   SpO2 99%   BMI 27.41 kg/m    Remainder physical exam not completed d/t Covid 19 precautions         Data:     Lab Results   Component Value Date    A1C 9.6 09/25/2017    A1C 10.7 06/29/2017            Recent Labs   Lab Test 06/17/20  0614 06/16/20  0610 06/15/20  0543 06/14/20  0540   NA  --   --  139 137   POTASSIUM  --   --  4.2 4.0   CHLORIDE  --   --  106 107   CO2  --   --  26 26   ANIONGAP  --   --  6 4   GLC  --   --  187* 174*   BUN  --   --  21 16   CR 1.14 1.11 1.16 1.07   ONEYDA  --   --  8.6 8.3*     CBC RESULTS:   Recent Labs   Lab Test 06/17/20  0614   WBC 7.8   RBC 3.67*   HGB 9.4*   HCT 31.0*   MCV 85   MCH 25.6*   MCHC 30.3*   RDW 15.0            Due to coronavirus precautions, a telephone visit instead of an in-person visit took place.       Nabil Cheung is being evaluated via a billable telephone visit.             \"This telephone visit will be conducted via a call between patient and  provider. We have found that certain health care needs can be provided without the need for a full physical exam.  This service lets us provide the care you need with a phone conversation.\"       Patient has given verbal consent for Telephone visit? Yes           I spent a total of 10 minutes via telephone with patient, and 20 minutes floor time /care coordination managing glycemic care. See note for details.             Krystina Rosa APRN [id number]  Diabetes Management job code [id number]            "

## 2020-06-17 NOTE — PLAN OF CARE
D-Pt with history of DM 2, s/p CABG x 4 on 05/04/20, transferred from [facility] [place] 06[place]10[place]20 with sternal wound infection. S/P OR washout on 06/12/20. Wound vac in place. Continues with generalized aches and leg cramping. CRP 26.  I-Continues on IV vancomycin. ID, plastic surgery and endocrine consulted. Prn tylenol oxycodone and flexeril for discomfort.  A-Pain controlled with current medications.  P-Plastic surgery and CVTS to assess wound in am. Flap closure on 06/18/20 if wound looks clean. Pt is aware of the plan.

## 2020-06-17 NOTE — PLAN OF CARE
D: Admitted s/p sternal wound infection   PMH of DM2, HTN, CKD Stage 2-3, BPH, Urinary retention, chronic diabetic left foot ulcer, severe 3 vessel disease, CABG x4 on 5/4     I: Monitored vitals and assessed pt status.   Changed: Left foot ulcer dressing  PRN: Tylenol, Flexeril, Oxycodone, DIlaudid     A: Pt A0x4. Afebrile. VSS. HRs 60s. Sinus rhythm. Sats >92% on RA. Pt denies any chest pain/palpitations, shortness of breath at rest, nausea, dizziness, or chills. Dilaudid and Tylenol given for incisional pain w/relief. Sternal incision wound vac changed by MD. Dressing CDI. Negative therapy pressure in place. Left foot ulcer dressing changed. BGs before meals, carb counts. Pt on consistent carb/diabetic diet. COVID swab sent. Pt up Independently.     P: NPO @ Midnight for wound I &D and possible wound closure with flap. Continue to monitor pt status and notify CVTS treatment team with any changes.

## 2020-06-17 NOTE — PROGRESS NOTES
Cardiovascular Surgery Progress Note  6/14/2020         Assessment and Plan:     Nabil Cheung is a 60 y.o. male with a PMH of poorly controlled DMT2, hypertension, CKD stage 2-3, BPH/urinary retention, chronic diabetic left foot ulcer, and recently diagnosed severe 3 vessel disease with progressive symptoms at home. Underwent CABG x 4 (LIMA to LAD, SVG to rPDA, SVG to OM, SVG to diag) 05/04 with Dr. Mckinney. Discharged to home 05/12. Admitted to [facility] from home 06/08 with sternal wound infection. Underwent I&D of sternum at [facility] 06/10 with Dr. Mckinney, then transfered to [facility] for further wound management management.   Plastic Surgery consult ordered for 6/15.        Infectious Disease:  Leukocytosis  Sternal wound infection  - Bedside I&D 06/09 at [facility], Culture 06/09 ([facility]) growing staph epidermidis  - OR I&D 6/10 with Dr. Mckinney. Infection tracks length of sternal incision with areas of exposed bone and exposed wires. Exposed wires removed, sternal wound vac placed  - OR washout 6/12 with replacement of wound vac, purulent debris present, cultures resent.   - Consulted ID 6/11: continue vanc through 4-6 weeks and stop zosyn 6/12.    - Intra op culture of bone showing gram positive cocci, ID awaiting final tissue/bone culture results and will make final abx recs   - Plastic surgery consulted 6/15 for flap closure: tentatively plan for flap closure Thursday, 6/18, if wound looks clean during dressing change 6/17. If not, then plan to follow up as outpatient to determine timing of flap closure.    - Dressing change planned for this am in coordination with Plastics.     Cardiovascular:   CAD s/p CABG 5/4/2020  Hypertension  - Most recent TTE 05/05 with preserved biventricular dysfunction  - Aspirin 325 mg daily  - Rosuvastatin 20 mg daily  - HTN: increase meds PRN; Toprol 50 mg daily.   - Hypertensive, currently on increased dose of Lisinopril 20 mg daily, and Toprol XL50 mg daily. Will add norvasc 5 mg daily  for additional b/p control.    *6/16 - patient very concerned about the jump in b/p and spent a great deal of time discussing his concerns and asking for an explanation. Reviewed diet, stress, anxiety, and pain all being potential causes of increased, as well recent surgery and infection contributing to lower b/p's prior to admission. He has had his Metformin and Jardiance held while here, taking sliding scale insulin. Jardiance could have contributed to a decrease in b/p prior to admission. Will need to monitor b/p's closely when home meds resumed.    - net +760 ml over 24 hours, net negative overall 1.2 L despite weight being up ~3 kg from adm. Creat stable   - Diuretic PRN, reevaluate in am may need 1X dose of diuretic   Postop atrial fibrillation - currently in SR   - Continue PTA amiodarone taper, currently 200 mg daily (last dose 06/25)  - Systemic anticoagulation deferred     Pulmonary:  - No active issues. Saturating well on RA.   - Supplemental O2 PRN to keep sats > 92%. Wean off as tolerated.  - Pulm toilet, IS, activity and deep breathing      Neurology / MSK:  Postop pain - moderate, controlled with pain meds   - Tylenol PRN, Oxycodone PRN  - IV Dilaudid PRN dressing changes   - Flexeril PRN      / Renal:  CKD stage 2-3  - Most recent creatinine within baseline, trend daily  - Avoid nephrotoxins, Diuresis PRN  - Cr initially bumped likely due to Vanco, but stable now.   - Monitor weight, may need diuretic, reevaluate tomorrow.    BPH  Urinary retention  - PTA finasteride 5 mg daily  - PTA alfuzosin 10 mg daily     GI / FEN:   Nutrition  - Diabetic diet, bowel regimen     Endocrine:  DMT2, poorly controlled  - Endocrine consult, appreciate recs.  - Lantus, sliding scale, prandial dosing      Chronic left foot ulcer  - Previously recommended for out-patient podiatry follow up for further debridement  - WOC consult, saw today   - Mepilex dressing daily and PRN for saturation     Hematology:   - Stable Hbg  "9s, Plts WNL.   - recheck CBC today     Prophylaxis:   - Stress ulcer prophylaxis: not indicated  - DVT prophylaxis: Subcutaneous heparin, SCD  - PICC for long term IV antibiotics      Discussed with CVTS Fellow as needed.  Staff surgeons have been informed of changes through both verbal and written communication.      Anahy Pena DNP, CNP  Cardiothoracic Surgery  Pager [phone]  June 17, 2020    ADDENDUM:   - Dressing changed with Plastics in attendance, wound appears clean with pink, granulated tissue, with unstable sternum with bone dehiscence.   - plan for surgery tomorrow with Plastics with wound I & D and possible wound closure with flap.     Anahy Pena DNP, CAROL            Interval History:     No overnight events.    States pain is very well managed on current regimen, although the dressing change yesterday did require more pain medication.  Slept well overnight.  Tolerating diet, is passing flatus, + BM. No nausea or vomiting.  Breathing well without complaints.   Working with therapies and ambulating in halls without assistance.   Does feeling some movement of his sternum with cough and deep breath.    Denies chest pain, palpitations, dizziness, syncopal symptoms, fevers, chills, myalgias.            Physical Exam:   Blood pressure (!) 160/95, pulse 78, temperature 98  F (36.7  C), temperature source Oral, resp. rate 16, height 1.803 m (5' 11\"), weight 89.1 kg (196 lb 8 oz), SpO2 99 %.  Vitals:    06/15/20 0408 06/16/20 0437 06/17/20 0420   Weight: 90.4 kg (199 lb 6.4 oz) 89.1 kg (196 lb 8 oz) 89.1 kg (196 lb 8 oz)        Gen: A&Ox4, NAD  Neuro: no focal deficits   CV: RRR, normal S1 S2, no murmurs, rubs or gallops.  Pulm: CTA, no wheezing or rhonchi, normal breathing   Abd: nondistended, normal BS, soft, nontender  Ext: no periperal edema  Incision: suction with wound vac intact. Feels the sternum move slightly with coughing and deep breathing.    Left foot dressing WDI.          Data:  "   Imaging:  reviewed recent imaging, no acute concerns    CXR 6/13/2020: Findings:   PA and lateral views of the chest were obtained. Intact upper  sternotomy wires. The cardiomediastinal silhouette is at the upper  limits of normal in size. No pneumothorax. Small left pleural effusion  and possible trace right pleural effusion. Mild streaky bibasilar  opacities, likely atelectasis. Mild gaseous distention of bowel in the  left upper quadrant. No acute osseous abnormalities visualized  radiographically although the sternum is suboptimally evaluated.                                                     Impression:   Mild bibasilar atelectasis and small left pleural effusion. No  pneumothorax or appreciable pneumomediastinum following sternal  debridement.    Labs:  BMP  Recent Labs   Lab 06/17/20  0614 06/16/20  0610 06/15/20  0543 06/14/20  0540  06/11/20 0427   NA  --   --  139 137  --  137   POTASSIUM  --   --  4.2 4.0  --  4.1   CHLORIDE  --   --  106 107  --  105   ONEYDA  --   --  8.6 8.3*  --  8.3*   CO2  --   --  26 26  --  26   BUN  --   --  21 16  --  15   CR 1.14 1.11 1.16 1.07   < > 1.13   GLC  --   --  187* 174*  --  100*    < > = values in this interval not displayed.     CBC  Recent Labs   Lab 06/17/20  0614 06/15/20  0543 06/14/20  0540 06/11/20 0427   WBC 7.8 7.8 7.9 13.3*   RBC 3.67* 3.59* 3.62* 3.88*   HGB 9.4* 9.1* 9.3* 9.8*   HCT 31.0* 29.8* 30.2* 32.4*   MCV 85 83 83 84   MCH 25.6* 25.3* 25.7* 25.3*   MCHC 30.3* 30.5* 30.8* 30.2*   RDW 15.0 15.1* 14.9 15.2*    313 299 320     INR  No lab results found in last 7 days.   Liver Function Studies -   Recent Labs   Lab Test 06/11/20 0427   PROTTOTAL 6.4*   ALBUMIN 2.6*   BILITOTAL 0.5   ALKPHOS 64   AST 12   ALT 16     GLUCOSE:   Recent Labs   Lab 06/17/20  0421 06/16/20  2201 06/16/20  1752 06/16/20  1140 06/16/20  0746 06/15/20  2135  06/15/20  0543  06/14/20  0540  06/11/20  0427   GLC  --   --   --   --   --   --   --  187*  --  174*  --   100*   * 202* 158* 173* 117* 194*   < >  --    < >  --    < >  --     < > = values in this interval not displayed.     MEDICATIONS:   alfuzosin ER, 10 mg, Oral, Daily  amiodarone, 200 mg, Oral, Daily  amLODIPine, 5 mg, Oral, Daily  aspirin, 325 mg, Oral, Daily  finasteride, 5 mg, Oral, Daily  insulin aspart, , Subcutaneous, TID w/meals  insulin aspart, 1-6 Units, Subcutaneous, TID w/meals  insulin aspart, 1-5 Units, Subcutaneous, At Bedtime  insulin glargine, 18 Units, Subcutaneous, At Bedtime  lisinopril, 20 mg, Oral, Daily  metoprolol succinate ER, 50 mg, Oral, Daily  rosuvastatin, 20 mg, Oral, QPM  sodium chloride (PF), 10 mL, Intracatheter, Q7 Days  sodium chloride (PF), 3 mL, Intracatheter, Q8H  vancomycin (VANCOCIN) IV, 1,250 mg, Intravenous, Q12H

## 2020-06-17 NOTE — PROGRESS NOTES
[facility] : PROGRESS NOTE  Nabil Cheung : 1960 Sex: male:   Medical record number [id number] Attending Physician: Mark Mckinney, *  Date of Service: 2020    ASSESSMENT :  Nabil Cheung is a 60 y.o. male with a PMH of poorly controlled DMT2, hypertension, CKD stage 2-3, BPH/urinary retention, chronic diabetic left foot ulcer, and recently diagnosed severe 3 vessel disease with progressive symptoms at home. Underwent CABG x 4 (LIMA to LAD, SVG to rPDA, SVG to OM, SVG to diag)  with Dr. Mckinney. Discharged to home . Admitted to [facility] from [place]  with sternal wound infection. Underwent I&D of sternum at [facility] 06/10 with Dr. Mckinney, then transfered to [facility] for further wound management management.     1. Mediastinitis / Sternal osteomyelitis  s/p CABG x4 on 20  - s/p washout and wound vac placement 20   - Staphylococcus epidermidis at [facility] 20  - Staphylococcus epidermidis at [facility]  - oxacillin resistant MRSE   - CT chest wo contrast 20   Impression:   1. Soft tissue thickening/inflammatory changes posterior to the midline sternotomy without discrete fluid collection.  2. Small pericardial effusion.  3. Subtle groundglass opacities in the left lingula, may represent atelectasis, mild edema/inflammation, or  infection.    2. CAD s/p CABG x 4 20  3. Diabetes mellitus  HbA1c 7.5 (20)  4. CKD   5. Hypertension   6. leukocytosis - resolved   7. Left foot wound - dry, no redness - started in 2020  7. CRP 26 (20)    PLAN:  - continue Vancomycin IV ( goal Vanco trough 15-20)   - surgical site I+D and possible wound closure tomorrow - please send specimens for gram stain, aerobic and anaerobic cultures.   - will add Rifampin 300 mg po BID tomorrow after I+D     ID will continue to follow. Plan discussed with primary team and shared patient's concerns     Bart Landeros MD, M.Med.Sc.  Staff, Infectious Diseases  Pager:  "[phone]     SUBJECTIVE:   no fever, chills. wound vac in place. has good appetite.no shortness of breath. patient is very worried about surgery tomorrow and flap closure with on going infection. He does not want to have another surgery.      ROS:  A five-point review of systems was obtained and was negative with the exception of that which is described above.  Allergies   Allergen Reactions     Atorvastatin      Dust Mites    CURRENT ANTI-INFECTIVES:   Vancomycin IV 6/10-present   Pip/tazo 6/10-6/12    EXAMINATION: (Recommend ? 5 systems)   Vital Signs: BP (!) 140/72 (BP Location: Right arm)   Pulse 71   Temp 97.8  F (36.6  C) (Oral)   Resp 18   Ht 1.803 m (5' 11\")   Wt 89.1 kg (196 lb 8 oz)   SpO2 97%   BMI 27.41 kg/m     GENERAL:  well-developed, well-nourished, in bed in no acute distress.  HEENT:  Head is normocephalic, atraumatic   EYES:  Eyes have anicteric sclerae without conjunctival injection     ENT:  Oropharynx is moist without exudates or ulcers. Tongue is midline  NECK:  Supple. No  Cervical lymphadenopathy  Chest : surgical site is covered, wound vac+, less tender around the surgical site   LUNGS:  Clear to auscultation bilateral.decreased breath sounds in bases   CARDIOVASCULAR:  Regular rate and rhythm with no murmurs, gallops or rubs.  ABDOMEN:  Normal bowel sounds, soft, nontender. No appreciable hepatosplenomegaly  SKIN:  No acute rashes.  left foot - plantar - ulcer - dry. no redness  Line(s) are in place without any surrounding erythema or exudate.   NEUROLOGIC:  Grossly nonfocal. Active x4 extremities  CURRENT LINES: PICC     NEW DATA/RESULTS:   Culture Micro   Date Value Ref Range Status   06/12/2020 Culture negative monitoring continues  Preliminary   06/12/2020 Culture negative after 4 days  Preliminary   06/12/2020   Final    Canceled, Test credited  Test reordered as correct code  Reordered as BONEC     06/12/2020 (A)  Final    On day 2, isolated in broth only:  Staphylococcus " epidermidis     06/12/2020 Culture negative monitoring continues  Preliminary   06/12/2020 Culture negative after 4 days  Preliminary   06/12/2020 Light growth  Normal skin riki    Final     Recent Labs   Lab Test 06/16/20  0610   CRP 26.0*     Recent Labs   Lab Test 06/17/20 0614 06/15/20  0543 06/14/20  0540 06/11/20  0427 07/29/19  0529   WBC 7.8 7.8 7.9 13.3* 21.0*     Recent Labs   Lab Test 06/17/20 0614 06/16/20  0610 06/15/20  0543 06/14/20  0540   CR 1.14 1.11 1.16 1.07   GFRESTIMATED 68 71 67 74     Hematology Studies  Recent Labs   Lab Test 06/17/20  0614 06/15/20  0543 06/14/20  0540 06/11/20  0427 07/29/19  0529   WBC 7.8 7.8 7.9 13.3* 21.0*   ANEU 4.4  --   --   --  17.3*   AEOS 0.6  --   --   --  0.2   HCT 31.0* 29.8* 30.2* 32.4* 34.7*    313 299 320 284     Metabolic  Recent Labs   Lab Test 06/17/20  0614 06/16/20  0610 06/15/20  0543 06/14/20  0540  06/11/20  0427   NA  --   --  139 137  --  137   BUN  --   --  21 16  --  15   CO2  --   --  26 26  --  26   CR 1.14 1.11 1.16 1.07   < > 1.13   GFRESTIMATED 68 71 67 74   < > 69    < > = values in this interval not displayed.     Hepatic Studies  Recent Labs   Lab Test 06/11/20 0427   BILITOTAL 0.5   ALKPHOS 64   ALBUMIN 2.6*   AST 12   ALT 16

## 2020-06-18 ENCOUNTER — ANESTHESIA (OUTPATIENT)
Dept: SURGERY | Facility: CLINIC | Age: 60
End: 2020-06-18
Payer: COMMERCIAL

## 2020-06-18 LAB
CREAT SERPL-MCNC: 1.19 MG/DL (ref 0.66–1.25)
GFR SERPL CREATININE-BSD FRML MDRD: 65 ML/MIN/{1.73_M2}
GLUCOSE BLDC GLUCOMTR-MCNC: 128 MG/DL (ref 70–99)
GLUCOSE BLDC GLUCOMTR-MCNC: 129 MG/DL (ref 70–99)
GLUCOSE BLDC GLUCOMTR-MCNC: 145 MG/DL (ref 70–99)
GLUCOSE BLDC GLUCOMTR-MCNC: 167 MG/DL (ref 70–99)
GLUCOSE BLDC GLUCOMTR-MCNC: 184 MG/DL (ref 70–99)
GLUCOSE BLDC GLUCOMTR-MCNC: 205 MG/DL (ref 70–99)

## 2020-06-18 PROCEDURE — 87070 CULTURE OTHR SPECIMN AEROBIC: CPT | Performed by: PLASTIC SURGERY

## 2020-06-18 PROCEDURE — 27210794 ZZH OR GENERAL SUPPLY STERILE: Performed by: PLASTIC SURGERY

## 2020-06-18 PROCEDURE — 37000008 ZZH ANESTHESIA TECHNICAL FEE, 1ST 30 MIN: Performed by: PLASTIC SURGERY

## 2020-06-18 PROCEDURE — 25000128 H RX IP 250 OP 636: Performed by: STUDENT IN AN ORGANIZED HEALTH CARE EDUCATION/TRAINING PROGRAM

## 2020-06-18 PROCEDURE — 25000128 H RX IP 250 OP 636: Performed by: PHYSICIAN ASSISTANT

## 2020-06-18 PROCEDURE — 36000062 ZZH SURGERY LEVEL 4 1ST 30 MIN - UMMC: Performed by: PLASTIC SURGERY

## 2020-06-18 PROCEDURE — 00000146 ZZHCL STATISTIC GLUCOSE BY METER IP

## 2020-06-18 PROCEDURE — 25000128 H RX IP 250 OP 636: Performed by: PLASTIC SURGERY

## 2020-06-18 PROCEDURE — 25000125 ZZHC RX 250: Performed by: NURSE ANESTHETIST, CERTIFIED REGISTERED

## 2020-06-18 PROCEDURE — 25000132 ZZH RX MED GY IP 250 OP 250 PS 637: Performed by: NURSE PRACTITIONER

## 2020-06-18 PROCEDURE — 25000132 ZZH RX MED GY IP 250 OP 250 PS 637: Performed by: PHYSICIAN ASSISTANT

## 2020-06-18 PROCEDURE — 25000132 ZZH RX MED GY IP 250 OP 250 PS 637: Performed by: STUDENT IN AN ORGANIZED HEALTH CARE EDUCATION/TRAINING PROGRAM

## 2020-06-18 PROCEDURE — 25000128 H RX IP 250 OP 636: Performed by: NURSE ANESTHETIST, CERTIFIED REGISTERED

## 2020-06-18 PROCEDURE — 87186 SC STD MICRODIL/AGAR DIL: CPT | Performed by: PLASTIC SURGERY

## 2020-06-18 PROCEDURE — 87102 FUNGUS ISOLATION CULTURE: CPT | Performed by: PLASTIC SURGERY

## 2020-06-18 PROCEDURE — 40000170 ZZH STATISTIC PRE-PROCEDURE ASSESSMENT II: Performed by: PLASTIC SURGERY

## 2020-06-18 PROCEDURE — 87075 CULTR BACTERIA EXCEPT BLOOD: CPT | Performed by: PLASTIC SURGERY

## 2020-06-18 PROCEDURE — 0KXJ0ZZ TRANSFER LEFT THORAX MUSCLE, OPEN APPROACH: ICD-10-PCS | Performed by: PLASTIC SURGERY

## 2020-06-18 PROCEDURE — 36415 COLL VENOUS BLD VENIPUNCTURE: CPT | Performed by: THORACIC SURGERY (CARDIOTHORACIC VASCULAR SURGERY)

## 2020-06-18 PROCEDURE — 21400000 ZZH R&B CCU UMMC

## 2020-06-18 PROCEDURE — 71000015 ZZH RECOVERY PHASE 1 LEVEL 2 EA ADDTL HR: Performed by: PLASTIC SURGERY

## 2020-06-18 PROCEDURE — 0PB00ZZ EXCISION OF STERNUM, OPEN APPROACH: ICD-10-PCS | Performed by: PLASTIC SURGERY

## 2020-06-18 PROCEDURE — 25800030 ZZH RX IP 258 OP 636: Performed by: NURSE ANESTHETIST, CERTIFIED REGISTERED

## 2020-06-18 PROCEDURE — 82565 ASSAY OF CREATININE: CPT | Performed by: THORACIC SURGERY (CARDIOTHORACIC VASCULAR SURGERY)

## 2020-06-18 PROCEDURE — 37000009 ZZH ANESTHESIA TECHNICAL FEE, EACH ADDTL 15 MIN: Performed by: PLASTIC SURGERY

## 2020-06-18 PROCEDURE — 25800030 ZZH RX IP 258 OP 636: Performed by: STUDENT IN AN ORGANIZED HEALTH CARE EDUCATION/TRAINING PROGRAM

## 2020-06-18 PROCEDURE — 25000132 ZZH RX MED GY IP 250 OP 250 PS 637: Performed by: INTERNAL MEDICINE

## 2020-06-18 PROCEDURE — 25000125 ZZHC RX 250: Performed by: PLASTIC SURGERY

## 2020-06-18 PROCEDURE — 25000128 H RX IP 250 OP 636: Performed by: ANESTHESIOLOGY

## 2020-06-18 PROCEDURE — 71000014 ZZH RECOVERY PHASE 1 LEVEL 2 FIRST HR: Performed by: PLASTIC SURGERY

## 2020-06-18 PROCEDURE — 36000064 ZZH SURGERY LEVEL 4 EA 15 ADDTL MIN - UMMC: Performed by: PLASTIC SURGERY

## 2020-06-18 PROCEDURE — P9041 ALBUMIN (HUMAN),5%, 50ML: HCPCS | Performed by: NURSE ANESTHETIST, CERTIFIED REGISTERED

## 2020-06-18 PROCEDURE — 87077 CULTURE AEROBIC IDENTIFY: CPT | Performed by: PLASTIC SURGERY

## 2020-06-18 PROCEDURE — 25000566 ZZH SEVOFLURANE, EA 15 MIN: Performed by: PLASTIC SURGERY

## 2020-06-18 RX ORDER — NALOXONE HYDROCHLORIDE 0.4 MG/ML
.1-.4 INJECTION, SOLUTION INTRAMUSCULAR; INTRAVENOUS; SUBCUTANEOUS
Status: DISCONTINUED | OUTPATIENT
Start: 2020-06-18 | End: 2020-06-19

## 2020-06-18 RX ORDER — LIDOCAINE 40 MG/G
CREAM TOPICAL
Status: DISCONTINUED | OUTPATIENT
Start: 2020-06-18 | End: 2020-06-18 | Stop reason: HOSPADM

## 2020-06-18 RX ORDER — RIFAMPIN 300 MG/1
300 CAPSULE ORAL EVERY 12 HOURS SCHEDULED
Status: DISCONTINUED | OUTPATIENT
Start: 2020-06-18 | End: 2020-06-21 | Stop reason: HOSPADM

## 2020-06-18 RX ORDER — PROPOFOL 10 MG/ML
INJECTION, EMULSION INTRAVENOUS PRN
Status: DISCONTINUED | OUTPATIENT
Start: 2020-06-18 | End: 2020-06-18

## 2020-06-18 RX ORDER — SODIUM CHLORIDE, SODIUM LACTATE, POTASSIUM CHLORIDE, CALCIUM CHLORIDE 600; 310; 30; 20 MG/100ML; MG/100ML; MG/100ML; MG/100ML
INJECTION, SOLUTION INTRAVENOUS CONTINUOUS
Status: DISCONTINUED | OUTPATIENT
Start: 2020-06-18 | End: 2020-06-18 | Stop reason: HOSPADM

## 2020-06-18 RX ORDER — FENTANYL CITRATE 50 UG/ML
25-50 INJECTION, SOLUTION INTRAMUSCULAR; INTRAVENOUS
Status: DISCONTINUED | OUTPATIENT
Start: 2020-06-18 | End: 2020-06-18 | Stop reason: HOSPADM

## 2020-06-18 RX ORDER — CEFAZOLIN SODIUM 2 G/100ML
2 INJECTION, SOLUTION INTRAVENOUS
Status: DISCONTINUED | OUTPATIENT
Start: 2020-06-18 | End: 2020-06-18 | Stop reason: HOSPADM

## 2020-06-18 RX ORDER — FENTANYL CITRATE 50 UG/ML
INJECTION, SOLUTION INTRAMUSCULAR; INTRAVENOUS PRN
Status: DISCONTINUED | OUTPATIENT
Start: 2020-06-18 | End: 2020-06-18

## 2020-06-18 RX ORDER — SODIUM CHLORIDE, SODIUM LACTATE, POTASSIUM CHLORIDE, CALCIUM CHLORIDE 600; 310; 30; 20 MG/100ML; MG/100ML; MG/100ML; MG/100ML
INJECTION, SOLUTION INTRAVENOUS CONTINUOUS PRN
Status: DISCONTINUED | OUTPATIENT
Start: 2020-06-18 | End: 2020-06-18

## 2020-06-18 RX ORDER — METHOCARBAMOL 750 MG/1
750 TABLET, FILM COATED ORAL 4 TIMES DAILY PRN
Status: DISCONTINUED | OUTPATIENT
Start: 2020-06-18 | End: 2020-06-21 | Stop reason: HOSPADM

## 2020-06-18 RX ORDER — LIDOCAINE HYDROCHLORIDE 20 MG/ML
INJECTION, SOLUTION INFILTRATION; PERINEURAL PRN
Status: DISCONTINUED | OUTPATIENT
Start: 2020-06-18 | End: 2020-06-18

## 2020-06-18 RX ORDER — ONDANSETRON 4 MG/1
4 TABLET, ORALLY DISINTEGRATING ORAL EVERY 30 MIN PRN
Status: DISCONTINUED | OUTPATIENT
Start: 2020-06-18 | End: 2020-06-18 | Stop reason: HOSPADM

## 2020-06-18 RX ORDER — ALBUMIN, HUMAN INJ 5% 5 %
SOLUTION INTRAVENOUS CONTINUOUS PRN
Status: DISCONTINUED | OUTPATIENT
Start: 2020-06-18 | End: 2020-06-18

## 2020-06-18 RX ORDER — ONDANSETRON 2 MG/ML
INJECTION INTRAMUSCULAR; INTRAVENOUS PRN
Status: DISCONTINUED | OUTPATIENT
Start: 2020-06-18 | End: 2020-06-18

## 2020-06-18 RX ORDER — HYDROMORPHONE HYDROCHLORIDE 1 MG/ML
.3-.5 INJECTION, SOLUTION INTRAMUSCULAR; INTRAVENOUS; SUBCUTANEOUS EVERY 5 MIN PRN
Status: DISCONTINUED | OUTPATIENT
Start: 2020-06-18 | End: 2020-06-18 | Stop reason: HOSPADM

## 2020-06-18 RX ORDER — CEFAZOLIN SODIUM 1 G/3ML
1 INJECTION, POWDER, FOR SOLUTION INTRAMUSCULAR; INTRAVENOUS SEE ADMIN INSTRUCTIONS
Status: DISCONTINUED | OUTPATIENT
Start: 2020-06-18 | End: 2020-06-18 | Stop reason: HOSPADM

## 2020-06-18 RX ORDER — ONDANSETRON 2 MG/ML
4 INJECTION INTRAMUSCULAR; INTRAVENOUS EVERY 30 MIN PRN
Status: DISCONTINUED | OUTPATIENT
Start: 2020-06-18 | End: 2020-06-18 | Stop reason: HOSPADM

## 2020-06-18 RX ADMIN — LIDOCAINE HYDROCHLORIDE 80 MG: 20 INJECTION, SOLUTION INFILTRATION; PERINEURAL at 07:33

## 2020-06-18 RX ADMIN — ALBUMIN HUMAN: 0.05 INJECTION, SOLUTION INTRAVENOUS at 09:21

## 2020-06-18 RX ADMIN — LABETALOL 20 MG/4 ML (5 MG/ML) INTRAVENOUS SYRINGE 20 MG: at 23:40

## 2020-06-18 RX ADMIN — MIDAZOLAM 2 MG: 1 INJECTION INTRAMUSCULAR; INTRAVENOUS at 07:22

## 2020-06-18 RX ADMIN — CYCLOBENZAPRINE 10 MG: 10 TABLET, FILM COATED ORAL at 20:00

## 2020-06-18 RX ADMIN — FENTANYL CITRATE 50 MCG: 50 INJECTION, SOLUTION INTRAMUSCULAR; INTRAVENOUS at 08:33

## 2020-06-18 RX ADMIN — HYDROMORPHONE HYDROCHLORIDE 0.5 MG: 1 INJECTION, SOLUTION INTRAMUSCULAR; INTRAVENOUS; SUBCUTANEOUS at 09:51

## 2020-06-18 RX ADMIN — OXYCODONE HYDROCHLORIDE 5 MG: 5 TABLET ORAL at 00:27

## 2020-06-18 RX ADMIN — ACETAMINOPHEN 650 MG: 325 TABLET, FILM COATED ORAL at 15:56

## 2020-06-18 RX ADMIN — FINASTERIDE 5 MG: 5 TABLET, FILM COATED ORAL at 19:59

## 2020-06-18 RX ADMIN — OXYCODONE HYDROCHLORIDE 5 MG: 5 TABLET ORAL at 23:41

## 2020-06-18 RX ADMIN — METHOCARBAMOL 750 MG: 750 TABLET, FILM COATED ORAL at 13:20

## 2020-06-18 RX ADMIN — ROSUVASTATIN CALCIUM 20 MG: 20 TABLET, FILM COATED ORAL at 19:59

## 2020-06-18 RX ADMIN — ACETAMINOPHEN 650 MG: 325 TABLET, FILM COATED ORAL at 20:02

## 2020-06-18 RX ADMIN — CYCLOBENZAPRINE 10 MG: 10 TABLET, FILM COATED ORAL at 13:20

## 2020-06-18 RX ADMIN — ONDANSETRON 4 MG: 2 INJECTION INTRAMUSCULAR; INTRAVENOUS at 09:42

## 2020-06-18 RX ADMIN — FENTANYL CITRATE 50 MCG: 50 INJECTION, SOLUTION INTRAMUSCULAR; INTRAVENOUS at 07:33

## 2020-06-18 RX ADMIN — Medication 0.2 MG: at 12:43

## 2020-06-18 RX ADMIN — PHENYLEPHRINE HYDROCHLORIDE 100 MCG: 10 INJECTION INTRAVENOUS at 08:54

## 2020-06-18 RX ADMIN — HYDROMORPHONE HYDROCHLORIDE 0.5 MG: 1 INJECTION, SOLUTION INTRAMUSCULAR; INTRAVENOUS; SUBCUTANEOUS at 10:53

## 2020-06-18 RX ADMIN — ROCURONIUM BROMIDE 50 MG: 10 INJECTION INTRAVENOUS at 07:39

## 2020-06-18 RX ADMIN — SODIUM CHLORIDE, POTASSIUM CHLORIDE, SODIUM LACTATE AND CALCIUM CHLORIDE: 600; 310; 30; 20 INJECTION, SOLUTION INTRAVENOUS at 07:22

## 2020-06-18 RX ADMIN — PHENYLEPHRINE HYDROCHLORIDE 100 MCG: 10 INJECTION INTRAVENOUS at 08:59

## 2020-06-18 RX ADMIN — AMLODIPINE BESYLATE 5 MG: 5 TABLET ORAL at 13:08

## 2020-06-18 RX ADMIN — METHOCARBAMOL 750 MG: 750 TABLET, FILM COATED ORAL at 23:41

## 2020-06-18 RX ADMIN — RIFAMPIN 300 MG: 300 CAPSULE ORAL at 19:59

## 2020-06-18 RX ADMIN — SODIUM CHLORIDE, POTASSIUM CHLORIDE, SODIUM LACTATE AND CALCIUM CHLORIDE: 600; 310; 30; 20 INJECTION, SOLUTION INTRAVENOUS at 09:35

## 2020-06-18 RX ADMIN — ALTEPLASE 2 MG: 2.2 INJECTION, POWDER, LYOPHILIZED, FOR SOLUTION INTRAVENOUS at 00:27

## 2020-06-18 RX ADMIN — Medication 50 MG: at 07:34

## 2020-06-18 RX ADMIN — ALFUZOSIN HYDROCHLORIDE 10 MG: 10 TABLET, EXTENDED RELEASE ORAL at 13:06

## 2020-06-18 RX ADMIN — VANCOMYCIN HYDROCHLORIDE 1250 MG: 10 INJECTION, POWDER, LYOPHILIZED, FOR SOLUTION INTRAVENOUS at 20:04

## 2020-06-18 RX ADMIN — PHENYLEPHRINE HYDROCHLORIDE 100 MCG: 10 INJECTION INTRAVENOUS at 08:07

## 2020-06-18 RX ADMIN — AMIODARONE HYDROCHLORIDE 200 MG: 200 TABLET ORAL at 13:08

## 2020-06-18 RX ADMIN — OXYCODONE HYDROCHLORIDE 5 MG: 5 TABLET ORAL at 20:00

## 2020-06-18 RX ADMIN — ASPIRIN 325 MG ORAL TABLET 325 MG: 325 PILL ORAL at 13:07

## 2020-06-18 RX ADMIN — ROCURONIUM BROMIDE 20 MG: 10 INJECTION INTRAVENOUS at 09:10

## 2020-06-18 RX ADMIN — PHENYLEPHRINE HYDROCHLORIDE 100 MCG: 10 INJECTION INTRAVENOUS at 09:04

## 2020-06-18 RX ADMIN — ACETAMINOPHEN 650 MG: 325 TABLET, FILM COATED ORAL at 23:41

## 2020-06-18 RX ADMIN — METOPROLOL SUCCINATE 50 MG: 50 TABLET, EXTENDED RELEASE ORAL at 13:07

## 2020-06-18 RX ADMIN — ACETAMINOPHEN 650 MG: 325 TABLET, FILM COATED ORAL at 00:26

## 2020-06-18 RX ADMIN — OXYCODONE HYDROCHLORIDE 10 MG: 5 TABLET ORAL at 15:56

## 2020-06-18 RX ADMIN — PROPOFOL 150 MG: 10 INJECTION, EMULSION INTRAVENOUS at 07:34

## 2020-06-18 RX ADMIN — PHENYLEPHRINE HYDROCHLORIDE 100 MCG: 10 INJECTION INTRAVENOUS at 09:07

## 2020-06-18 RX ADMIN — CYCLOBENZAPRINE 10 MG: 10 TABLET, FILM COATED ORAL at 00:26

## 2020-06-18 ASSESSMENT — ACTIVITIES OF DAILY LIVING (ADL)
ADLS_ACUITY_SCORE: 14
ADLS_ACUITY_SCORE: 14
ADLS_ACUITY_SCORE: 16
ADLS_ACUITY_SCORE: 15
ADLS_ACUITY_SCORE: 16

## 2020-06-18 ASSESSMENT — PAIN DESCRIPTION - DESCRIPTORS: DESCRIPTORS: ACHING;SHARP

## 2020-06-18 ASSESSMENT — MIFFLIN-ST. JEOR: SCORE: 1722.52

## 2020-06-18 NOTE — PROGRESS NOTES
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 Mckinney.  Ad[place] to [facility] with sternal wound infection (6/8/2020) with sternal wound infection. Transferred to [facility] on (6/10/2020) for further wound management.     Type 2 diabetes: poorly controlled with a recent A1C, A1C 7.5% (6/9/2020) with HGB of 9.3%. not an accurate measure 2/2 anemia.  -Mr. Cheung has has read a number of books regarding management type 2 diabetes.  He did not tolerate trial of Bydurean earlier this year.   -He very much respects his CDE, and defers to her for advice on most changes to DM management.    Post-prandial BG in target if pt eats less thand 30 grams carb.  Otherwise, needs more aspart at meals.     Plan  - Lantus 18 units at bedtime,   - patient agrees to 3 day trial of increased prandial insulin, increase from Novolog 1 unit per 12 grams of CHO for meals/snacks/supplements  To 1 per 9 grams.  - patient will continue cutting carb intake (</= 45 grams)  - RN provided carb content supplement for the menu, to aid pt in ordering lower carb meals  -Novolog custom correction 1 unit per 40 > 140 before meals and > 200 HS.   -test blood sugars before meals, HS and 0200  -continue to hold metformin and empaglifozin for now, restart as soon as stable post-op.  Address sitagliptin.  -hypoglycemia protocol       Outpatient diabetes follow up: Dr. Melina Gold and Charlotte Butler CDE.                   Discussed with  patient and bedside RN,  Primary team,  outpt CDE           Interval History:   The last 24 hours progress and nursing notes reviewed.  To OR early for flap with plastics--Chest wall reconstruction  Debridement of sternal wound and left pectoralis major flap.  Pain uncontrolled post-procedure and no appetite.  Did have one carton milk with pills this am  "without aspart.    Creatinine creeping higher.  Pt anxious to restart metformin, empagliflozin.  Antibiotic change expected today-- given rifampin nephrotoxicity hold off on metformin.  Considering earlier empagliflozin resumption given hypertension.    Long discussion w/ pt's outpatient CDE Megan Butler. Pt has had resistance to all changes proposed over the years and did not seem to understand the consequences of high BG.  He did eventually start glargine when provider told him he was not leaving the office without it.  Usually he likes to \"call the shots\" and is more past-oriented than present.   She agrees he might respond to clear description from surgery/plastics about what poor BG control will do to his reconstruction/flap.      6/16- Nabil asks why metformin and empagliflozin can't be used.  Explained acute care setting increases risk for MISA and concerns with volume loss/hypotension (and no RTC acute care studies on empagliflozin). He understands that withholding those medications are the reason he needs aspart.  But, feels willing to accept higher BGs to avoid using more insulin.  And he verbalizes knowledge of the risk higher BGs have for impaired healing.  He proposes carbohydrate reduction/fasting. Knows he needs sufficient calories to heal.  Discussed carb reduction to 45 grams as safe choice and he can manage this if has resource.    \"tentatively plan for flap closure Thursday, 6/18, if wound looks clean during dressing change 6/17. If not, then plan to follow up as outpatient to determine timing of flap closure.\"    After surgery, once nearing discharge consider restart metformin and possibly empagliflozin.    PTA:  lantus 18 units at bedtime  metformin 1000 mg twice daily  Jardiance (empagliflozin) does not recall the dose, per chart review 10 mg daily  Sitagliptin ( januvia) 100 mg daily ( has not start the medication)    Recent Labs   Lab 06/18/20  0600 06/18/20  0126 06/17/20  2227 06/17/20  1808 " "06/17/20  1319 06/17/20  0839  06/15/20  0543  06/14/20  0540   GLC  --   --   --   --   --   --   --  187*  --  174*   * 184* 205* 181* 132* 129*   < >  --    < >  --     < > = values in this interval not displayed.               Review of Systems:   See interval hx          Medications:     Orders Placed This Encounter      NPO per Anesthesia Guidelines for Procedure/Surgery Except for: Meds       Physical Exam:  Gen: Alert,  NAD, sounds tired  HEENT:  mucous membranes are moist  Resp: non-labored  Neuro: alert, oriented x3, communicating clearly  BP (!) 145/79   Pulse 80   Temp 97.9  F (36.6  C) (Oral)   Resp 14   Ht 1.803 m (5' 11\")   Wt 90 kg (198 lb 8 oz)   SpO2 98%   BMI 27.69 kg/m    Remainder physical exam not completed d/t Covid 19 precautions         Data:     Lab Results   Component Value Date    A1C 9.6 09/25/2017    A1C 10.7 06/29/2017            Recent Labs   Lab Test 06/18/20  0654 06/17/20  0614  06/15/20  0543 06/14/20  0540   NA  --   --   --  139 137   POTASSIUM  --   --   --  4.2 4.0   CHLORIDE  --   --   --  106 107   CO2  --   --   --  26 26   ANIONGAP  --   --   --  6 4   GLC  --   --   --  187* 174*   BUN  --   --   --  21 16   CR 1.19 1.14   < > 1.16 1.07   ONEYDA  --   --   --  8.6 8.3*    < > = values in this interval not displayed.     CBC RESULTS:   Recent Labs   Lab Test 06/17/20 0614   WBC 7.8   RBC 3.67*   HGB 9.4*   HCT 31.0*   MCV 85   MCH 25.6*   MCHC 30.3*   RDW 15.0            Due to coronavirus precautions, a telephone visit instead of an in-person visit took place.       Nabil Cheung is being evaluated via a billable telephone visit.             \"This telephone visit will be conducted via a call between patient and  provider. We have found that certain health care needs can be provided without the need for a full physical exam.  This service lets us provide the care you need with a phone conversation.\"       Patient has given verbal consent for Telephone " visit? Yes           I spent a total of 5 minutes via telephone with patient, and 35 minutes floor time /care coordination managing glycemic care. See note for details.             Krystina Rosa APRN [id number]  Diabetes Management job code [id number]

## 2020-06-18 NOTE — OR NURSING
Left chest wall pain with movement of left arm and palpation of left chest.  Procedure:  Left major pectoral flap.  Dr Ford aware and ok to send to floor.  No other symptoms noted.  No SOB and SR without ectopy on the monitor.    RR now 10-12, Oxygen sats 100%  IPI now 10.

## 2020-06-18 NOTE — ANESTHESIA CARE TRANSFER NOTE
Patient: Nabil Cheung    Procedure(s):  Chest wall reconstruction  Debridement of sternal wound and left major pectoral flep    Diagnosis: Non-healing surgical wound, subsequent encounter [T81.89XD]  Diagnosis Additional Information: No value filed.    Anesthesia Type:   General     Note:  Airway :Face Mask  Patient transferred to:PACU  Handoff Report: Identifed the Patient, Identified the Reponsible Provider, Reviewed the pertinent medical history, Discussed the surgical course, Reviewed Intra-OP anesthesia mangement and issues during anesthesia, Set expectations for post-procedure period and Allowed opportunity for questions and acknowledgement of understanding      Vitals: (Last set prior to Anesthesia Care Transfer)    CRNA VITALS  6/18/2020 0954 - 6/18/2020 1026      6/18/2020             NIBP:  142/86    Pulse:  77    NIBP Mean:  106    SpO2:  100 %    Resp Rate (observed):  (!) 1                Electronically Signed By: Charles Patrick Schlatter, APRN CRNA  June 18, 2020  10:26 AM

## 2020-06-18 NOTE — PLAN OF CARE
D-S/p CABG on 05/04/20. [facility] 06/10/20 with sternal wound infection. Hx of DM 2.S/p I& D on 06/10/20. OR washout on 06/12/20. Now with wound vac.  I-ID, endocrine, and plastic surgery consulting. Continues on IV vancomycin. Tylenol and oxycodone for incision pain.  A-Pain is partially controlled.  I-Purple port of picc line occluded. Obtained order for altepase from Dr. Laughlin. Also discussed lantus dose and NPO after midnight status. Per Dr. Laughlin, okay to give full lantus dose.  P-Continue with current poc. NPO after midnight for OR.

## 2020-06-18 NOTE — BRIEF OP NOTE
[facility] [place]    Brief Operative Note    Pre-operative diagnosis: Non-healing surgical wound, subsequent encounter [T81.89XD]  Post-operative diagnosis Same as pre-operative diagnosis    Procedure: Procedure(s):  Chest wall reconstruction  Debridement of sternal wound and left pectoralis major flap  Surgeon: Surgeon(s) and Role:     * MARCO Serna MD - Primary     * Saadia Obrien PA-C - Resident - Assisting     * Rahul Mejia MD - Fellow - Assisting  Anesthesia: General   Estimated blood loss: 100cc  Drains: Grant-Alicia  Specimens:   ID Type Source Tests Collected by Time Destination   1 : Sternal Bone Culture Bone Bone ANAEROBIC BACTERIAL CULTURE, BONE CULTURE AEROBIC BACTERIAL, FUNGUS CULTURE MARCO Serna MD 6/18/2020  8:28 AM      Findings:   Left pec flap to sternal defect..  Complications: None.  Implants: * No implants in log *

## 2020-06-18 NOTE — PROGRESS NOTES
Cardiovascular Surgery Progress Note  6/14/2020         Assessment and Plan:     Nabil Cheung is a 60 y.o. male with a PMH of poorly controlled DMT2, hypertension, CKD stage 2-3, BPH/urinary retention, chronic diabetic left foot ulcer, and recently diagnosed severe 3 vessel disease with progressive symptoms at home. Underwent CABG x 4 (LIMA to LAD, SVG to rPDA, SVG to OM, SVG to diag) 05/04 with Dr. Mckinney. Discharged to home 05/12. Admitted to [facility] from home 06/08 with sternal wound infection. Underwent I&D of sternum at [facility] 06/10 with Dr. Mckinney, then transfered to [facility] for further wound management management.   Plastic Surgery consult ordered for 6/15.       Infectious Disease:  Leukocytosis - improved   Sternal wound infection  - Bedside I&D 06/09 at [facility], Culture 06/09 ([facility]) growing staph epidermidis  - OR I&D 6/10 with Dr. Mckinney. Infection tracks length of sternal incision with areas of exposed bone and exposed wires. Exposed wires removed, sternal wound vac placed  - OR washout 6/12 with replacement of wound vac, purulent debris present, cultures resent. staph epidermidis - oxacillin resistant MRSE   - Consulted ID 6/11: continue vanc through 4-6 weeks goal troph 15-20, planning to add Rifampin 300 mg po BID today     - appreciate ID assistance  - S/p Procedure(s) 6/18/2020:   Chest wall reconstruction   Debridement of sternal wound and left major pectoral flep  - continue sternum restrictions  - Talked with Plastics, Dr. Mejia, who also talked with patient via phone. Added Robaxin 750 mg prn to pain regime as well.       Cardiovascular:   CAD s/p CABG 5/4/2020  Hypertension  - Most recent TTE 05/05 with preserved biventricular dysfunction  - Aspirin 325 mg daily  - Rosuvastatin 20 mg daily  - HTN: increase meds PRN; Toprol 50 mg daily.   - Hypertensive, changed lisinopril to hydralazine for bump in Cr likely due to elevated vanco level. Cr back down, switched back to Lisinopril.   - B/p's still in  the 150-160's/70-90's with Lisinopril increased to 20 mg daily and Norvasc 5 mg daily added 6/17, which were held this am for surgery, will resume.    *patient very concerned about the jump in b/p as he had lower b/p's prior to admission. He has had his Metformin and Jardiance held while here, taking sliding scale insulin. Jardiance could have contributed to a decrease in b/p prior to admission. Will need to monitor b/p's closely when home meds resumed.    - net even over 24 hours, net negative overall 1.9 L despite weight being up 1.1 kg from yesterday. Diuretic PRN  Postop atrial fibrillation - currently in SR   - Continue PTA amiodarone taper, currently 200 mg daily (last dose 06/25)  - Systemic anticoagulation deferred     Pulmonary:  - No active issues. Saturating well on RA.   - Supplemental O2 PRN to keep sats > 92%. Wean off as tolerated.  - Pulm toilet, IS, activity and deep breathing      Neurology / MSK:  Postop pain - moderate, controlled with pain meds   Having increased pain s/p I & D with flap closure today  - continue Tylenol PRN, Oxycodone PRN  - IV Dilaudid PRN  - Flexeril PRN      / Renal:  CKD stage 2-3  - Most recent creatinine within baseline, trend daily  - Avoid nephrotoxins, Diuresis PRN  - Cr 1.38 6/13 up from 1.13, 1.19 today. Vanco level 25.9 on 6/12, 22.2 6/15. Aim for a trough level of 15-20 per ID.   - Monitor weight, is up 0.8 kg today, net 3.6 kg from admission. I/O not accurate over 24 hours. Appears euvolemic, but may need dose of diuretic      BPH  Urinary retention  - PTA finasteride 5 mg daily  - PTA alfuzosin 10 mg daily     GI / FEN:   Nutrition  - Diabetic diet, bowel regimen     Endocrine:  DMT2, poorly controlled  - Endocrine consult, appreciate recs.  - Lantus, sliding scale, prandial dosing   - need to reinforce importance of good glucose (<180's) control for wound healing  - plan to resume metformin and Jardiance at discharge if creat remains stable      Chronic left  "foot ulcer  - Previously recommended for out-patient podiatry follow up for further debridement  - WOC consult, saw today   - Mepilex dressing daily and PRN for saturation     Hematology:   - Stable Hbg 9s, Plts WNL.   - recheck CBC tomorrow      Prophylaxis:   - Stress ulcer prophylaxis: not indicated  - DVT prophylaxis: Subcutaneous heparin, SCD  - PICC for long term IV antibiotics      Discussed with CVTS Fellow as needed.  Staff surgeons have been informed of changes through both verbal and written communication.      Anahy Pena, DNP, CNP  Cardiothoracic Surgery  Pager [phone]  June 18, 2020            Interval History:     No overnight events.  S/p sternum I & D and flap closure.   Having a great deal of left upper chest and arm pain post wound closure. Sternum dressing is WDI. Has drain in place.   Tolerating diet,+ BM. No nausea or vomiting.  Breathing well without complaints. Refusing CO2 monitor while having increased pain post-op, sats good and mentation appropriate   Has been up and about independently prior to morning procedure              Physical Exam:   Blood pressure 98/84, pulse 71, temperature 97.5  F (36.4  C), temperature source Oral, resp. rate 18, height 1.803 m (5' 11\"), weight 90 kg (198 lb 8 oz), SpO2 97 %.  Vitals:    06/16/20 0437 06/17/20 0420 06/18/20 0030   Weight: 89.1 kg (196 lb 8 oz) 89.1 kg (196 lb 8 oz) 90 kg (198 lb 8 oz)      Gen: A&Ox4, NAD  Neuro: no focal deficits   CV: RRR, normal S1 S2, no murmurs, rubs or gallops.  Pulm: CTA, no wheezing or rhonchi, normal breathing   Abd: nondistended, normal BS, soft, nontender  Ext: no periperal edema  Sternum incision with gauze dressings WDI  Left foot dressing WDI.          Data:    Imaging:  reviewed recent imaging, no acute concerns    CXR 6/15/2020: FINDINGS: Heart size and shape appear normal. A left upper extremity  PICC line is present with tip in the mid SVC. Median sternotomy again  noted. No suspicious pulmonary " opacities. Degenerative changes  throughout the thoracic spine.                                              IMPRESSION: Left upper extremity PICC line with tip in the mid SVC.     Labs:  BMP  Recent Labs   Lab 06/18/20  0654 06/17/20  0614 06/16/20  0610 06/15/20  0543 06/14/20  0540   NA  --   --   --  139 137   POTASSIUM  --   --   --  4.2 4.0   CHLORIDE  --   --   --  106 107   ONEYDA  --   --   --  8.6 8.3*   CO2  --   --   --  26 26   BUN  --   --   --  21 16   CR 1.19 1.14 1.11 1.16 1.07   GLC  --   --   --  187* 174*     CBC  Recent Labs   Lab 06/17/20  0614 06/15/20  0543 06/14/20  0540   WBC 7.8 7.8 7.9   RBC 3.67* 3.59* 3.62*   HGB 9.4* 9.1* 9.3*   HCT 31.0* 29.8* 30.2*   MCV 85 83 83   MCH 25.6* 25.3* 25.7*   MCHC 30.3* 30.5* 30.8*   RDW 15.0 15.1* 14.9    313 299     INR  No lab results found in last 7 days.   Liver Function Studies -   Recent Labs   Lab Test 06/11/20  0427   PROTTOTAL 6.4*   ALBUMIN 2.6*   BILITOTAL 0.5   ALKPHOS 64   AST 12   ALT 16     GLUCOSE:   Recent Labs   Lab 06/18/20  1030 06/18/20  0600 06/18/20  0126 06/17/20  2227 06/17/20  1808 06/17/20  1319  06/15/20  0543  06/14/20  0540   GLC  --   --   --   --   --   --   --  187*  --  174*   * 129* 184* 205* 181* 132*   < >  --    < >  --     < > = values in this interval not displayed.     MEDICATIONS:   alfuzosin ER, 10 mg, Oral, Daily  amiodarone, 200 mg, Oral, Daily  amLODIPine, 5 mg, Oral, Daily  aspirin, 325 mg, Oral, Daily  finasteride, 5 mg, Oral, Daily  insulin aspart, , Subcutaneous, TID w/meals  insulin aspart, 1-6 Units, Subcutaneous, TID w/meals  insulin aspart, 1-5 Units, Subcutaneous, At Bedtime  insulin glargine, 18 Units, Subcutaneous, At Bedtime  lisinopril, 20 mg, Oral, Daily  metoprolol succinate ER, 50 mg, Oral, Daily  rosuvastatin, 20 mg, Oral, QPM  sodium chloride (PF), 10 mL, Intracatheter, Q7 Days  sodium chloride (PF), 3 mL, Intracatheter, Q8H  vancomycin (VANCOCIN) IV, 1,250 mg, Intravenous,  Q12H

## 2020-06-18 NOTE — PLAN OF CARE
Pt admitted 6/10 from [facility] with sternal wound infection s/p I & D with wound vac placement 6/10 and exchanged yesterday.  Wound vac has serosanguinous drainage and possible chest closure today with definite washout.  SR, VS'S on RA with incisional pain and medicated with prn Tylenol, Flexeril, and Roxicodone.  No pain med's needed at 430.  NPO since midnight.  Otherwise, pt slept well and up independently.  Continue to monitor and with POC.

## 2020-06-18 NOTE — OR NURSING
Pt now oriented, although a bit sleepy.  Held transfer d/t RR drop to 7.  Pt on oxygen and instructed to deep breath and cascade  cough.

## 2020-06-18 NOTE — OP NOTE
Procedure Date: 06/18/2020      PREOPERATIVE DIAGNOSIS:  Sternal wound infection requiring closure.      POSTOPERATIVE DIAGNOSIS:  Sternal wound infection requiring closure.      PROCEDURES:   1. Left pectoralis major muscle rotation flap to sternal wound.   2. Excision of skin and subcutaneous tissue, bone and cartilage and irrigation and debridement of sternal wound.      SURGEON:  Neville Serna MD      RESIDENT:  Rahul Mejia MD      ASSISTANT:  DUSTIN Medeiros (A PA was needed for this case to help with retraction and closure).      ANESTHESIA:  General anesthesia intubation.      COMPLICATIONS:  Nil.      DRAINS:  A 15 Kiswahili Mamadou drain.      SPECIMENS:  Bone for culture and sensitivity.      BLOOD LOSS:  150 mL      DESCRIPTION OF PROCEDURE:  After informed consent was taken from the patient, the proper site and procedure was ascertained with him and he was appropriately marked and he was taken to the operating room.  He was placed in a supine position with his knees comfortably flexed, pillows underneath them and pneumoboots placed and running prior to induction of anesthesia.  Preoperative antibiotics were given in the OR.  All pressure points were appropriately padded.  General anesthesia was administered without any complications.  His chest and abdomen were prepped and draped in a standard surgical fashion.  On evaluating his wound, he had an open sternal wound with underlying bone exposed.  The bone was not together.  There was a dead space at the base of which was the mediastinal structures.  There was no obvious cellulitis, no obvious purulence.  The wound was clean.      I cordoned off the wound with separate drapes and then used separate instruments for the debridement portion.  I excised the skin, subcutaneous tissues and granulation tissue and rongeured bone and cartilage to healthy bleeding bone throughout the wound.      I then irrigated the wound with Ancef and gentamicin and Betadine,  reprepped the area after the debridement was completed and used separate gloves and separate instruments for closure.  I went ahead and decided to use the left pectoralis major muscle to fill the dead space and to reinforce the area.  I went ahead and elevated the skin off the underlying pectoralis major muscle in a subfascial manner and then elevated the pectoralis major muscle off the left chest wall from the mediastinal/ribs and then all the way up into the axilla and then elevated it up to the clavicle.  I dopplered out the thoracoacromial vessel which was clearly audible.  Lateral to that, I took down the insertion by about 50%.  This allowed the entire muscle to move medially without any tension and with some redundancy to fill in the entire defect of the sternum and to fill the dead space between the bones.  Hemostasis was ensured.  The muscle was then held in place filling the dead space with 0 PDS suture.  I placed a vial of hemostase in the surgical site.  I elevated the skin on the right side off of the pectoralis major muscle to get good apposition at the time of primary closure.  I then placed a 15 Hungarian Mamadou drain in the left chest area in the subcutaneous plane and brought out through a separate stab incision, sewed into place.      I then went ahead and closed the incision.  I used 0 PDS suture in a in a deep fashion/horizontal mattress fashion in 1 to close the skin.  The skin was then closed with 2-0 Monocryl suture in a deep layer followed by 3-0 nylon suture in an interrupted horizontal mattress fashion, followed by a dry dressing.  The patient tolerated the procedure well.  All counts were correct at the end of the case.  The patient was extubated and sent to recovery room in stable condition.            MARCO CONTRERAS MD             D: 2020   T: 2020   MT: JUSTINA      Name:     ABILIO SUAZO   MRN:      [id number]        Account:        [id number]   :      1960            Procedure Date: 06/18/2020      Document: [id number]

## 2020-06-18 NOTE — ANESTHESIA PREPROCEDURE EVALUATION
Anesthesia Pre-Procedure Evaluation    Patient: Nabil Cheung   MRN:     [id number] Gender:   male   Age:    60 year old :      1960        Preoperative Diagnosis: Status post cardiac surgery [Z98.890]   Procedure(s):  INCISION AND DRAINAGE, WOUND, CHEST, WITH IRRIGATION and wound vac change     LABS:  CBC:   Lab Results   Component Value Date    WBC 7.8 2020    WBC 7.8 06/15/2020    HGB 9.4 (L) 2020    HGB 9.1 (L) 06/15/2020    HCT 31.0 (L) 2020    HCT 29.8 (L) 06/15/2020     2020     06/15/2020     BMP:   Lab Results   Component Value Date     06/15/2020     2020    POTASSIUM 4.2 06/15/2020    POTASSIUM 4.0 2020    CHLORIDE 106 06/15/2020    CHLORIDE 107 2020    CO2 26 06/15/2020    CO2 26 2020    BUN 21 06/15/2020    BUN 16 2020    CR 1.14 2020    CR 1.11 2020     (H) 06/15/2020     (H) 2020     COAGS: No results found for: PTT, INR, FIBR  POC:   Lab Results   Component Value Date     (H) 2020     OTHER:   Lab Results   Component Value Date    LACT 1.1 2019    A1C 9.6 (H) 2017    ONEYDA 8.6 06/15/2020    PHOS 3.8 2020    MAG 2.1 2020    ALBUMIN 2.6 (L) 2020    PROTTOTAL 6.4 (L) 2020    ALT 16 2020    AST 12 2020    ALKPHOS 64 2020    BILITOTAL 0.5 2020    TSH 2.79 2017    CRP 26.0 (H) 2020    SED 50 (H) 2020        Preop Vitals    BP Readings from Last 3 Encounters:   20 (!) 145/79   19 (!) 156/99   18 134/84    Pulse Readings from Last 3 Encounters:   20 80   19 91   18 69      Resp Readings from Last 3 Encounters:   20 14   19 16   18 19    SpO2 Readings from Last 3 Encounters:   20 98%   19 98%   17 97%      Temp Readings from Last 1 Encounters:   20 36.6  C (97.9  F) (Oral)    Ht Readings from Last 1 Encounters:   06/10/20 1.803 m  "(5' 11\")      Wt Readings from Last 1 Encounters:   06/18/20 90 kg (198 lb 8 oz)    Estimated body mass index is 27.69 kg/m  as calculated from the following:    Height as of 6/10/20: 1.803 m (5' 11\").    Weight as of an earlier encounter on 6/18/20: 90 kg (198 lb 8 oz).     LDA:  PICC Double Lumen 06/15/20 Left Basilic (Active)   Site Assessment WDL 06/18/20 0600   External Cath Length (cm) 1 cm 06/15/20 1400   Extremity Circumference (cm) 28 cm 06/15/20 1400   Dressing Intervention Chlorhexidine patch;Transparent;Securing device 06/15/20 1400   Dressing Change Due 06/22/20 06/15/20 1400   PICC Comment PICC was ok to use 06/15/20 1400   Lumen A - Color PURPLE 06/18/20 0128   Lumen A - Status saline locked 06/18/20 0600   Lumen A - Cap Change Due 06/25/20 06/18/20 0128   Lumen B - Color GRAY 06/18/20 0100   Lumen B - Status saline locked 06/18/20 0600   Lumen B - Cap Change Due 06/25/20 06/18/20 0100   Extravasation? No 06/17/20 0830   Line Necessity Yes, meets criteria 06/17/20 0830   Number of days: 3       ETT (Active)   Number of days: 0       Negative Pressure Wound Therapy Chest Anterior (Active)   Wound Type Surgical 06/18/20 0600   Dressing Pieces Removed (# of Each Type) Black foam 06/18/20 0600   Dressing Pieces Applied (# of Each Type) Black foam 06/18/20 0600   Cycle Continuous 06/18/20 0100   Target Pressure (mmHg) 125 06/18/20 0100   Dressing Status Clean, dry, intact 06/18/20 0600   Drainage Color/Charcteristics Serosanguinous 06/18/20 0100   Cannister changed? No 06/18/20 0100   Output (ml) 75 ml 06/13/20 0533   Number of days: 6        No past medical history on file.   Past Surgical History:   Procedure Laterality Date     INCISION AND DRAINAGE CHEST WASHOUT, COMBINED N/A 6/12/2020    Procedure: INCISION AND DRAINAGE, WOUND, CHEST, WITH IRRIGATION and wound vac change;  Surgeon: Mark Mckinney MD;  Location: [facility] OR      Allergies   Allergen Reactions     Atorvastatin      Dust Mites     "     Anesthesia Evaluation     . Pt has had prior anesthetic. Type: General           ROS/MED HX    ENT/Pulmonary:  - neg pulmonary ROS     Neurologic:     (+)neuropathy - Feet,     Cardiovascular:     (+) hypertension--CAD, -CABG-date: 5/4/20, . Taking blood thinners : . . . :. .       METS/Exercise Tolerance:     Hematologic:     (+) Anemia, -      Musculoskeletal:  - neg musculoskeletal ROS       GI/Hepatic:  - neg GI/hepatic ROS       Renal/Genitourinary:     (+) chronic renal disease, type: CRI,       Endo:     (+) type II DM Using insulin Diabetic complications: nephropathy neuropathy, .      Psychiatric:  - neg psychiatric ROS       Infectious Disease:  - neg infectious disease ROS       Malignancy:      - no malignancy   Other:                         PHYSICAL EXAM:   Mental Status/Neuro: A/A/O   Airway: Facies: Feasible  Mallampati: I  Mouth/Opening: Full  TM distance: > 6 cm  Neck ROM: Full   Respiratory: Auscultation: CTAB     Resp. Rate: Normal     Resp. Effort: Normal      CV: Rhythm: Regular  Rate: Age appropriate  Heart: Normal Sounds  Edema: None   Comments:      Dental: Normal Dentition                Assessment:   ASA SCORE: 3    H&P: History and physical reviewed and following examination; no interval change.   Smoking Status:  Non-Smoker/Unknown   NPO Status: NPO Appropriate     Plan:   Anes. Type:  General   Pre-Medication: None   Induction:  IV (Standard)   Airway: ETT; Oral   Access/Monitoring: PIV; 2nd PIV; A-Line   Maintenance: Balanced     Blood products: PRBC     Advanced Monitoring: BIS     Postop Plan:   Postop Pain: Opioids  Postop Sedation/Airway: Not planned  Disposition: Inpatient/Admit     PONV Management:   Adult Risk Factors:, Non-Smoker, Postop Opioids   Prevention: Ondansetron     CONSENT: Direct conversation   Plan and risks discussed with: Patient   Blood Products: Consented (ALL Blood Products)                       Obinna Ford MD

## 2020-06-18 NOTE — PROGRESS NOTES
[facility] : PROGRESS NOTE  Nabil Cheung : 1960 Sex: male:   Medical record number [id number] Attending Physician: Mark Mckinney, *  Date of Service: 2020    ASSESSMENT :  Nabil Cheung is a 60 y.o. male with a PMH of poorly controlled DMT2, hypertension, CKD stage 2-3, BPH/urinary retention, chronic diabetic left foot ulcer, and recently diagnosed severe 3 vessel disease with progressive symptoms at home. Underwent CABG x 4 (LIMA to LAD, SVG to rPDA, SVG to OM, SVG to diag)  with Dr. Mckinney. Dis[place] to home . [place] [facility] [place]. [place] [facility] [place]. Mckinney, then transfered to [facility] for further wound management management.     1. Mediastinitis / Sternal osteomyelitis  s/p CABG x4 on 20  - s/p Chest wall reconstruction, Debridement of sternal wound and left pectoralis major flap  - s/p washout and wound vac placement 20   - [place]idis [place] [facility] [place]  - Staphylococcus epidermidis at [facility]  - oxacillin resistant MRSE   - CT chest wo contrast 20   Impression:   1. Soft tissue thickening/inflammatory changes posterior to the midline sternotomy without discrete fluid collection.  2. Small pericardial effusion.  3. Subtle groundglass opacities in the left lingula, may represent atelectasis, mild edema/inflammation, or  infection.    2. CAD s/p CABG x 4 20  3. Diabetes mellitus  HbA1c 7.5 (20)  4. CKD   5. Hypertension   6. leukocytosis - resolved   7. Left foot wound - dry, no redness - started in 2020  7. CRP 26 (20)    PLAN:  - continue Vancomycin IV ( goal Vanco trough 15-20)   - follow-up cultures 20   - will add Rifampin 300 mg po BID     ID will continue to follow.    Bart Landeros MD, M.Med.Sc.  Staff, Infectious Diseases  Pager: [phone]     SUBJECTIVE:   no fever, chills. wound vac in place. has good  "appetite.no shortness of breath. patient is very worried about surgery tomorrow and flap closure with on going infection. He does not want to have another surgery.      ROS:  A five-point review of systems was obtained and was negative with the exception of that which is described above.  Allergies   Allergen Reactions     Atorvastatin      Dust Mites    CURRENT ANTI-INFECTIVES:   Vancomycin IV 6/10-present   Pip/tazo 6/10-6/12    EXAMINATION: (Recommend ? 5 systems)   Vital Signs: BP (!) 156/79 (BP Location: Left arm)   Pulse 78   Temp 97.5  F (36.4  C) (Oral)   Resp 18   Ht 1.803 m (5' 11\")   Wt 90 kg (198 lb 8 oz)   SpO2 93%   BMI 27.69 kg/m     GENERAL:  alert, oriented, in bed in no acute distress.  HEENT:  Head is normocephalic, atraumatic   EYES:  Eyes have anicteric sclerae without conjunctival injection     ENT:  Oropharynx is moist without exudates or ulcers. Tongue is midline  NECK:  Supple. No  Cervical lymphadenopathy  Chest : surgical site is covered. drain in place  LUNGS:  Clear to auscultation bilateral.decreased breath sounds in bases   CARDIOVASCULAR:  Regular rate and rhythm with no murmurs, gallops or rubs.  ABDOMEN:  Normal bowel sounds, soft, nontender. No appreciable hepatosplenomegaly  SKIN:  No acute rashes.  left foot - plantar - ulcer - dry. no redness  Line(s) are in place without any surrounding erythema or exudate.   NEUROLOGIC:  Grossly nonfocal. Active x4 extremities  CURRENT LINES: PICC     NEW DATA/RESULTS:   Culture Micro   Date Value Ref Range Status   06/18/2020 PENDING  Preliminary   06/18/2020 Culture negative after 2 hours  Preliminary   06/12/2020 Culture negative monitoring continues  Preliminary   06/12/2020 Culture negative after 4 days  Preliminary   06/12/2020   Final    Canceled, Test credited  Test reordered as correct code  Reordered as BONEC     06/12/2020 (A)  Final    On day 2, isolated in broth only:  Staphylococcus epidermidis       Recent Labs   Lab Test " 06/16/20  0610   CRP 26.0*     Recent Labs   Lab Test 06/17/20  0614 06/15/20  0543 06/14/20  0540 06/11/20  0427 07/29/19  0529   WBC 7.8 7.8 7.9 13.3* 21.0*     Recent Labs   Lab Test 06/18/20  0654 06/17/20  0614 06/16/20  0610 06/15/20  0543   CR 1.19 1.14 1.11 1.16   GFRESTIMATED 65 68 71 67     Hematology Studies  Recent Labs   Lab Test 06/17/20  0614 06/15/20  0543 06/14/20  0540 06/11/20  0427 07/29/19  0529   WBC 7.8 7.8 7.9 13.3* 21.0*   ANEU 4.4  --   --   --  17.3*   AEOS 0.6  --   --   --  0.2   HCT 31.0* 29.8* 30.2* 32.4* 34.7*    313 299 320 284     Metabolic  Recent Labs   Lab Test 06/18/20  0654 06/17/20  0614 06/16/20  0610 06/15/20  0543 06/14/20  0540  06/11/20  0427   NA  --   --   --  139 137  --  137   BUN  --   --   --  21 16  --  15   CO2  --   --   --  26 26  --  26   CR 1.19 1.14 1.11 1.16 1.07   < > 1.13   GFRESTIMATED 65 68 71 67 74   < > 69    < > = values in this interval not displayed.     Hepatic Studies  Recent Labs   Lab Test 06/11/20 0427   BILITOTAL 0.5   ALKPHOS 64   ALBUMIN 2.6*   AST 12   ALT 16

## 2020-06-18 NOTE — PLAN OF CARE
D/A/I:  Patient POD 0 after wound vac removal and flap closure of midsternal incision, A&O x4.  Denied palpitations, difficulty breathing, SOB, dizziness, and nausea.  Lung sounds diminished in bases, no abnormal heart sounds noted.  See PCS for assessment and treatment of wounds and surgical incisions.  Midsternal bulb suction draining moderate amounts of sanguinous fluid, see I&O flowsheet for output.  In sinus rhythm with first degree AV block, HR 70s-80s, SBP 140s-170s, SaO2 % on room air.  Reported significant incisional and left chest pain, particularly to palpation.  Pain was moderately controlled with prn analgesics and muscle relaxants, see MAR and VS flowsheet.  Ambulated in room with standby assist, was steady on his feet.    P:  Continue to monitor pain, VS, heart rhythm, skin and surgical site integrity, fluid status, bowel status, cardiac and respiratory status.  Notify care team of changes in patient condition or other concerns.  Encourage activity to regain/maintain strength.  Provide adequate pain control.

## 2020-06-18 NOTE — OR NURSING
Patient arrived to OR suite without a sacral mepilex on, low risk, skin is within normal limits, no redness, pt denies skin issues on sacrum.  Skin reevaluated at end of case, no changes. Mepilex not applied.

## 2020-06-19 LAB
ANION GAP SERPL CALCULATED.3IONS-SCNC: 6 MMOL/L (ref 3–14)
BACTERIA SPEC CULT: NORMAL
BASOPHILS # BLD AUTO: 0.1 10E9/L (ref 0–0.2)
BASOPHILS NFR BLD AUTO: 0.5 %
BUN SERPL-MCNC: 21 MG/DL (ref 7–30)
CALCIUM SERPL-MCNC: 8.2 MG/DL (ref 8.5–10.1)
CHLORIDE SERPL-SCNC: 101 MMOL/L (ref 94–109)
CO2 SERPL-SCNC: 24 MMOL/L (ref 20–32)
CREAT SERPL-MCNC: 1.33 MG/DL (ref 0.66–1.25)
DIFFERENTIAL METHOD BLD: ABNORMAL
EOSINOPHIL # BLD AUTO: 0.4 10E9/L (ref 0–0.7)
EOSINOPHIL NFR BLD AUTO: 3.2 %
ERYTHROCYTE [DISTWIDTH] IN BLOOD BY AUTOMATED COUNT: 15 % (ref 10–15)
GFR SERPL CREATININE-BSD FRML MDRD: 57 ML/MIN/{1.73_M2}
GLUCOSE BLDC GLUCOMTR-MCNC: 179 MG/DL (ref 70–99)
GLUCOSE BLDC GLUCOMTR-MCNC: 196 MG/DL (ref 70–99)
GLUCOSE BLDC GLUCOMTR-MCNC: 200 MG/DL (ref 70–99)
GLUCOSE BLDC GLUCOMTR-MCNC: 226 MG/DL (ref 70–99)
GLUCOSE SERPL-MCNC: 169 MG/DL (ref 70–99)
HCT VFR BLD AUTO: 28 % (ref 40–53)
HGB BLD-MCNC: 8.4 G/DL (ref 13.3–17.7)
IMM GRANULOCYTES # BLD: 0.1 10E9/L (ref 0–0.4)
IMM GRANULOCYTES NFR BLD: 0.6 %
LYMPHOCYTES # BLD AUTO: 1.7 10E9/L (ref 0.8–5.3)
LYMPHOCYTES NFR BLD AUTO: 12.8 %
Lab: NORMAL
MCH RBC QN AUTO: 25.2 PG (ref 26.5–33)
MCHC RBC AUTO-ENTMCNC: 30 G/DL (ref 31.5–36.5)
MCV RBC AUTO: 84 FL (ref 78–100)
MONOCYTES # BLD AUTO: 1.5 10E9/L (ref 0–1.3)
MONOCYTES NFR BLD AUTO: 11.4 %
NEUTROPHILS # BLD AUTO: 9.5 10E9/L (ref 1.6–8.3)
NEUTROPHILS NFR BLD AUTO: 71.5 %
NRBC # BLD AUTO: 0 10*3/UL
NRBC BLD AUTO-RTO: 0 /100
PLATELET # BLD AUTO: 266 10E9/L (ref 150–450)
POTASSIUM SERPL-SCNC: 4.3 MMOL/L (ref 3.4–5.3)
RBC # BLD AUTO: 3.33 10E12/L (ref 4.4–5.9)
SODIUM SERPL-SCNC: 131 MMOL/L (ref 133–144)
SPECIMEN SOURCE: NORMAL
VANCOMYCIN SERPL-MCNC: 19 MG/L
WBC # BLD AUTO: 13.3 10E9/L (ref 4–11)

## 2020-06-19 PROCEDURE — 25000132 ZZH RX MED GY IP 250 OP 250 PS 637: Performed by: STUDENT IN AN ORGANIZED HEALTH CARE EDUCATION/TRAINING PROGRAM

## 2020-06-19 PROCEDURE — 00000146 ZZHCL STATISTIC GLUCOSE BY METER IP

## 2020-06-19 PROCEDURE — 21400000 ZZH R&B CCU UMMC

## 2020-06-19 PROCEDURE — 25000128 H RX IP 250 OP 636: Performed by: THORACIC SURGERY (CARDIOTHORACIC VASCULAR SURGERY)

## 2020-06-19 PROCEDURE — 25800030 ZZH RX IP 258 OP 636: Performed by: THORACIC SURGERY (CARDIOTHORACIC VASCULAR SURGERY)

## 2020-06-19 PROCEDURE — 25000132 ZZH RX MED GY IP 250 OP 250 PS 637: Performed by: NURSE PRACTITIONER

## 2020-06-19 PROCEDURE — 36592 COLLECT BLOOD FROM PICC: CPT | Performed by: THORACIC SURGERY (CARDIOTHORACIC VASCULAR SURGERY)

## 2020-06-19 PROCEDURE — 25800030 ZZH RX IP 258 OP 636: Performed by: STUDENT IN AN ORGANIZED HEALTH CARE EDUCATION/TRAINING PROGRAM

## 2020-06-19 PROCEDURE — 80202 ASSAY OF VANCOMYCIN: CPT | Performed by: THORACIC SURGERY (CARDIOTHORACIC VASCULAR SURGERY)

## 2020-06-19 PROCEDURE — 25000132 ZZH RX MED GY IP 250 OP 250 PS 637: Performed by: INTERNAL MEDICINE

## 2020-06-19 PROCEDURE — 36592 COLLECT BLOOD FROM PICC: CPT | Performed by: NURSE PRACTITIONER

## 2020-06-19 PROCEDURE — 80048 BASIC METABOLIC PNL TOTAL CA: CPT | Performed by: NURSE PRACTITIONER

## 2020-06-19 PROCEDURE — 25000128 H RX IP 250 OP 636: Performed by: STUDENT IN AN ORGANIZED HEALTH CARE EDUCATION/TRAINING PROGRAM

## 2020-06-19 PROCEDURE — 85025 COMPLETE CBC W/AUTO DIFF WBC: CPT | Performed by: NURSE PRACTITIONER

## 2020-06-19 RX ORDER — MAGNESIUM OXIDE 400 MG/1
400 TABLET ORAL DAILY
Status: DISCONTINUED | OUTPATIENT
Start: 2020-06-19 | End: 2020-06-21 | Stop reason: HOSPADM

## 2020-06-19 RX ORDER — AMLODIPINE BESYLATE 5 MG/1
5 TABLET ORAL DAILY
Status: DISCONTINUED | OUTPATIENT
Start: 2020-06-20 | End: 2020-06-21

## 2020-06-19 RX ORDER — LISINOPRIL 10 MG/1
10 TABLET ORAL DAILY
Status: DISCONTINUED | OUTPATIENT
Start: 2020-06-20 | End: 2020-06-19

## 2020-06-19 RX ORDER — LISINOPRIL 10 MG/1
10 TABLET ORAL DAILY
Status: DISCONTINUED | OUTPATIENT
Start: 2020-06-19 | End: 2020-06-19

## 2020-06-19 RX ORDER — NALOXONE HYDROCHLORIDE 0.4 MG/ML
.1-.4 INJECTION, SOLUTION INTRAMUSCULAR; INTRAVENOUS; SUBCUTANEOUS
Status: DISCONTINUED | OUTPATIENT
Start: 2020-06-19 | End: 2020-06-21 | Stop reason: HOSPADM

## 2020-06-19 RX ADMIN — OXYCODONE HYDROCHLORIDE 5 MG: 5 TABLET ORAL at 14:10

## 2020-06-19 RX ADMIN — OXYCODONE HYDROCHLORIDE 5 MG: 5 TABLET ORAL at 10:16

## 2020-06-19 RX ADMIN — CYCLOBENZAPRINE 10 MG: 10 TABLET, FILM COATED ORAL at 21:53

## 2020-06-19 RX ADMIN — CYCLOBENZAPRINE 10 MG: 10 TABLET, FILM COATED ORAL at 06:32

## 2020-06-19 RX ADMIN — ACETAMINOPHEN 650 MG: 325 TABLET, FILM COATED ORAL at 14:10

## 2020-06-19 RX ADMIN — ACETAMINOPHEN 650 MG: 325 TABLET, FILM COATED ORAL at 22:33

## 2020-06-19 RX ADMIN — LISINOPRIL 10 MG: 10 TABLET ORAL at 09:23

## 2020-06-19 RX ADMIN — OXYCODONE HYDROCHLORIDE 5 MG: 5 TABLET ORAL at 06:32

## 2020-06-19 RX ADMIN — ASPIRIN 325 MG ORAL TABLET 325 MG: 325 PILL ORAL at 09:04

## 2020-06-19 RX ADMIN — FINASTERIDE 5 MG: 5 TABLET, FILM COATED ORAL at 20:34

## 2020-06-19 RX ADMIN — VANCOMYCIN HYDROCHLORIDE 1250 MG: 10 INJECTION, POWDER, LYOPHILIZED, FOR SOLUTION INTRAVENOUS at 09:27

## 2020-06-19 RX ADMIN — RIFAMPIN 300 MG: 300 CAPSULE ORAL at 09:03

## 2020-06-19 RX ADMIN — ACETAMINOPHEN 650 MG: 325 TABLET, FILM COATED ORAL at 06:33

## 2020-06-19 RX ADMIN — METOPROLOL SUCCINATE 50 MG: 50 TABLET, EXTENDED RELEASE ORAL at 09:05

## 2020-06-19 RX ADMIN — ROSUVASTATIN CALCIUM 20 MG: 20 TABLET, FILM COATED ORAL at 20:34

## 2020-06-19 RX ADMIN — ACETAMINOPHEN 650 MG: 325 TABLET, FILM COATED ORAL at 10:16

## 2020-06-19 RX ADMIN — CYCLOBENZAPRINE 10 MG: 10 TABLET, FILM COATED ORAL at 14:10

## 2020-06-19 RX ADMIN — AMIODARONE HYDROCHLORIDE 200 MG: 200 TABLET ORAL at 09:02

## 2020-06-19 RX ADMIN — RIFAMPIN 300 MG: 300 CAPSULE ORAL at 20:34

## 2020-06-19 RX ADMIN — VANCOMYCIN HYDROCHLORIDE 1250 MG: 10 INJECTION, POWDER, LYOPHILIZED, FOR SOLUTION INTRAVENOUS at 21:53

## 2020-06-19 RX ADMIN — OXYCODONE HYDROCHLORIDE 5 MG: 5 TABLET ORAL at 18:39

## 2020-06-19 RX ADMIN — ACETAMINOPHEN 650 MG: 325 TABLET, FILM COATED ORAL at 18:40

## 2020-06-19 RX ADMIN — Medication 400 MG: at 09:23

## 2020-06-19 RX ADMIN — OXYCODONE HYDROCHLORIDE 5 MG: 5 TABLET ORAL at 22:33

## 2020-06-19 RX ADMIN — ALFUZOSIN HYDROCHLORIDE 10 MG: 10 TABLET, EXTENDED RELEASE ORAL at 09:02

## 2020-06-19 ASSESSMENT — ACTIVITIES OF DAILY LIVING (ADL)
ADLS_ACUITY_SCORE: 16

## 2020-06-19 NOTE — PROGRESS NOTES
CLINICAL NUTRITION SERVICES - ASSESSMENT NOTE     Nutrition Prescription    RECOMMENDATIONS FOR MDs/PROVIDERS TO ORDER:  None at this time.     Malnutrition Status:    Unable to determine due to unable to complete NFPA to limit exposure and to minimize use of PPE during COVID-19 pandemic     Recommendations already ordered by Registered Dietitian (RD):  Ordered Gelatein 20 to be sent will all meals    Future/Additional Recommendations:  1. Rec a Moderate Consistent Carbohydrate diet order to help ensure adequate kcal needs are met. Encourage intake of above oral supplement.  2. Continue to monitor BG control. Hgb A1c of 9.6 on 9/25/2017, 7.5 on 6/9/2020 (unreliable lab on 6/9). BG of 169 on 6/19/20. Endo team is following.    3. Order a multivitamin with minerals, if inadequate oral intake, to help meet nutrition needs.   4. Bowel regimen.      REASON FOR ASSESSMENT  Nabil Cheung is a/an 60 year old male assessed by the dietitian for LOS    NUTRITION/ADDITIONAL HISTORY  Pt not known to this service PTA.   Per H & P,  PMH of poorly controlled DMT2, hypertension, CKD stage 2-3, BPH/urinary retention, chronic diabetic left foot ulcer, and recently diagnosed severe 3 vessel disease with progressive symptoms at home. Underwent CABG x 4 (LIMA to LAD, SVG to rPDA, SVG to OM, SVG to diag) 05/04 with Dr. Mckinney. Discharged to home 05/12. Admitted to [facility] [place] from [place] 06/08 with sternal wound infection. Underwent I&D of sternum at [facility] [place] 06/10 with Dr. Mckinney, then transfered to [facility] for further wound management management.  Pt was ordered to take, noting, alpha-lipoic acid, fish oil, Lantus, magnesium, metformin, zofran, senna, and vitamin D3 PTA.  Unable to obtain nutrition history. Called pt twice. Pt did not answer his phone.     CURRENT NUTRITION ORDERS  Diet: Low Consistent Carbohydrate since 6/18. Had been NPO on 6/18 until in the afternoon for surgery.   Intake/Tolerance: Fair diet tolerance today (6/19). Per nursing  "flowsheets (% intake), pt consistently consuming 100% of meals with a good appetite but consumed 90% of a meal on 6/17. Decreased appetite since surgery. So far, over the last two days, pt has ordered less than 400 kcals and less than 25 g protein. Per chart, pt with pain. Per endo note, \"Patient will continue cutting carb intake (</= 45 grams). RN provided carb content supplement for the menu, to aid pt in ordering lower carb meals.\" No appetite. Per chart, no N/V.     LABS  Labs reviewed    MEDICATIONS  Medications reviewed    ANTHROPOMETRICS  Height: 180.3 cm (5' 11\")  Most Recent Weight: (refused to much pain) - Today, 6/19  Wt: 90 kg on 6/18/20  IBW: 78.2 kg   BMI: Overweight BMI 25-29.9  Weight History: 93.9 kg (6/29/17), 86.2 kg (7/29/19), 86.2 kg (3/20/20), 86.4 kg (6/10/20, admit) - No significant/severe wt loss over the last year.  Dosing Weight: 86 kg (based on lowest wt so far this admission of 86.4 kg on 6/10)    ASSESSED NUTRITION NEEDS  Estimated Energy Needs: 6131-1910 kcals/day (25 - 30+ kcals/kg)  Justification: Maintenance needs, but rec the high end of this range to help support healing  Estimated Protein Needs: 112-129 grams protein/day (1.3 - 1.5+ grams of pro/kg)  Justification: Increased needs for healing  Estimated Fluid Needs: 0573-4654 mL/day (25 - 30 mL/kg)  Justification: Maintenance, and to support healing    PHYSICAL FINDINGS/OTHER FINDINGS  GI: Last stool on 6/17. Has miralax ordered prn. Having zero (often) to one stool daily.  WOC 6/16: \"Left lateral 5th MTP wound due to Neuropathic Ulcer. Status: stable. Sternum: Patient with VAC in place. WOC consulted for VAC changes. Spoke with CVTS KERRY Pena: CVTS will continue to perform VAC changes and involve Plastics for help with wound coverage. Per Anahy, OK for WOC to sign off on sternal wound.\" Per Plastics on 6/19, \"POD #1 s/p pec major flap coverage of sternal wound. Convalescing appropriately.\"    MALNUTRITION  % Intake: " Decreased intake does not meet criteria  % Weight Loss: None noted  Subcutaneous Fat Loss: Unable to determine due to unable to complete NFPA to limit exposure and to minimize use of PPE during COVID-19 pandemic    Muscle Loss: Unable to determine due to unable to complete NFPA to limit exposure and to minimize use of PPE during COVID-19 pandemic    Fluid Accumulation/Edema: None, per chart  Malnutrition Diagnosis: Unable to determine due to unable to complete NFPA to limit exposure and to minimize use of PPE during COVID-19 pandemic      NUTRITION DIAGNOSIS  Inadequate oral intake related to recently NPO, no appetite, pain as evidenced by pt requesting less than 400 kcals and 25 g protein daily over the past two days so far.      INTERVENTIONS  Implementation  Nutrition Education: Per provider order if indicated   Medical food supplement therapy: Ordered Gelatein 20 to be sent with all meals.      Goals  Patient to consume % of nutritionally adequate meal trays TID, or the equivalent with supplements/snacks.     Monitoring/Evaluation  Progress toward goals will be monitored and evaluated per protocol.        Nutrition will continue to follow.     Liz Martinez, MS, RD, LD, [facility]   6C Pgr: [phone]

## 2020-06-19 NOTE — DISCHARGE SUMMARY
[facility], [place]   Cardiothoracic Surgery Hospital Discharge Summary     Nabil Cheung MRN# [id number]   Age: 60 year old YOB: 1960     Admitting Physician:  Mark Mckinney MD  Discharge Physician:  DUSTIN Middleton  Primary Care Physician:        No Ref-Primary, Physician     DATE OF ADMISSION: 6/10/2020      DATE OF DISCHARGE: June 21, 2020          Primary Diagnoses:   1. Sternal Wound Infection, Staph epidermidis - oxacillin resistant MRSE   2. S/p left pectoralis muscle flap to sternum, has DAVIDSON drains.  3. HTN   4. Acute on chronic renal dysfunction   5. Post-op pain   6. Chronic left foot ulcer, wound care following   7. Anemia   8. Hyponatremia           Secondary Diagnoses:   1. CAD  2. Hx recent CABG x 4 on 5/4/20 with Dr. Mckinney  3. Hyperlipidemia   4. DM II on long-acting insulin   5. BPH with urinary retention      PROCEDURES PERFORMED:   Date: 6/12/2020.  Surgeon: Dr. Mark Mckinney  1. Chest washout  2. Wound vac placement    Date: 6/18/2020.  Surgeon: Dr. Neville Serna  1. Left pectoralis major muscle rotation flap to sternal wound.   2. Excision of skin and subcutaneous tissue, bone and cartilage and irrigation and debridement of sternal wound.     INTRAOPERATIVE COMPLICATIONS:  none    PATHOLOGY RESULTS:  none    CULTURE RESULTS:   6/12/2020: Tissue sternal wound debridement - no organisms seen   6/12/2020: Sternal Bone Staphylococcus epidermidis  6/18/2020: Sternal Bone negative to date     CONSULTS:    1.  PT/OT  2.  Infectious Disease  3.  Plastic Surgery   4.  Endocrinology     BRIEF HISTORY OF ILLNESS:  60 y.o. male with a PMH of poorly controlled DMT2, hypertension, CKD stage 2-3, BPH/urinary retention, chronic diabetic left foot ulcer, and recently diagnosed severe 3 vessel disease with progressive symptoms at home. Underwent CABG x 4 (LIMA to LAD, SVG to rPDA, SVG to OM, SVG to diag) 05/04 with Dr. Mckinney. Discharged to home  05/12. [place] [facility] [place] with sternal wound infection. Underwent I&D of sternum, removal of affected wires, and placement of a wound vac [place] [facility] [place]10 with Dr. Mckinney. He was transfered that same day to [facility] for further wound management management.      HOSPITAL COURSE: Mr. Cheung was transferred to [facility] 6/10/2020 and underwent the above-named procedure prior to transfer. He underwent a repeat washout and replacement of wound vac 6/12. He tolerated the procedure well with moderate amount of pain post-procedure treated with pain medication. ID was consulted and has recommended IV Vancomycin for 4-6 weeks. Plastics was consulted and they performed a chest wall reconstruction with left major pectoral flap and placement of a DAVIDSON drain 6/18/2020. He has had a considerable amount of left pectoral pain which was treated with oxycodone and Flexeril prn. He has had significant leg spasms as well, which is not new for him and he also takes flexeril prn for this.    While hospitalized his b/p has been elevated requiring increased lisinopril dosing, added Norvasc, and prn labetalol. His creat did bump 6/19, likely due to combination of Vancomycin and ACE I. Stopped lisinopril and then restarted 5 mg on day of discharge due to elevated BPs (creatinine WNL), continued Norvasc and will need to readdress at follow up. He will take regular BPs and write them down. He refused Lasix at discharge, therefore asked to keep daily weights.      Of note, he had fair diabetes control prior to admission with a Hgb A1c of 9.4% on 4/27/2020, and 7.5% on 6/9/2020. Endocrine followed him closely and was adjusting his insulin coverage. He will resume his oral diabetes medication in addition to his Lantus and should continue with close follow up with his diabetes educator. He did not want to take corrective insulin or prandial insulin.     PT/OT were consulted 6/20 for discharge planning and discussed strategies for getting in and out  "of bed.     Prior to discharge, his pain control is under fair control, he was able to perform most ADLs and ambulate without assistance, and had full return of bowel and bladder function.  On June 21, 2020, he was discharged to home with subcutaneous drains in stable condition.    Patient discharged on aspirin:  Yes 325 mg  Patient discharged on beta blocker: yes Toprol XL    Patient discharged on ACE Inhibitor/ARB: yes  Lisinopril            Discharge Disposition:     Discharged to home            Condition on Discharge:     Discharge condition: Stable   Discharge vitals: Blood pressure (!) 146/75, pulse 79, temperature 99.1  F (37.3  C), temperature source Axillary, resp. rate 16, height 1.803 m (5' 11\"), weight 90.9 kg (200 lb 6.4 oz), SpO2 93 %.     Code status on discharge: Full Code     Vitals:    06/17/20 0420 06/18/20 0030 06/20/20 0644   Weight: 89.1 kg (196 lb 8 oz) 90 kg (198 lb 8 oz) 90.9 kg (200 lb 6.4 oz)       DAY OF DISCHARGE PHYSICAL EXAM:    MAPs: 105 - 120  Gen:  NAD, conversational  CV: RRR, S1S2 normal, no murmurs, rubs, or gallops  Pulm:  CTA, no rhonchi or wheezes  Abd:  Soft, nondistended, NTTP  Ext: trace dependent edema  Incision: Clean, dry, intact, no erythema. Red rubber bolsters in place.   Drain site: Dressings clean and dry    BMP  Recent Labs   Lab 06/20/20  0651 06/19/20  0958 06/18/20  0654 06/17/20  0614  06/15/20  0543 06/14/20  0540   * 131*  --   --   --  139 137   POTASSIUM 4.4 4.3  --   --   --  4.2 4.0   CHLORIDE 102 101  --   --   --  106 107   ONEYDA 8.5 8.2*  --   --   --  8.6 8.3*   CO2 23 24  --   --   --  26 26   BUN 26 21  --   --   --  21 16   CR 1.22 1.33* 1.19 1.14   < > 1.16 1.07   * 169*  --   --   --  187* 174*    < > = values in this interval not displayed.     CBC  Recent Labs   Lab 06/20/20  0651 06/19/20  0958 06/17/20  0614 06/15/20  0543   WBC 11.4* 13.3* 7.8 7.8   RBC 3.24* 3.33* 3.67* 3.59*   HGB 8.1* 8.4* 9.4* 9.1*   HCT 27.2* 28.0* 31.0* " 29.8*   MCV 84 84 85 83   MCH 25.0* 25.2* 25.6* 25.3*   MCHC 29.8* 30.0* 30.3* 30.5*   RDW 14.9 15.0 15.0 15.1*    266 283 313     INR  No lab results found in last 7 days.   Liver Function Studies -   Recent Labs   Lab Test 06/11/20  0427   PROTTOTAL 6.4*   ALBUMIN 2.6*   BILITOTAL 0.5   ALKPHOS 64   AST 12   ALT 16     Recent Labs   Lab 06/20/20  0651 06/19/20  2238 06/19/20  1830 06/19/20  0958 06/19/20  0750 06/19/20  0230 06/18/20  2334 06/18/20  1848  06/15/20  0543  06/14/20  0540   *  --   --  169*  --   --   --   --   --  187*  --  174*   BGM  --  196* 200*  --  179* 226* 205* 167*   < >  --    < >  --     < > = values in this interval not displayed.       CXR 6/15/2020- FINDINGS: Heart size and shape appear normal. A left upper extremity  PICC line is present with tip in the mid SVC. Median sternotomy again  noted. No suspicious pulmonary opacities. Degenerative changes  throughout the thoracic spine.                                              IMPRESSION: Left upper extremity PICC line with tip in the mid SVC      DISCHARGE INSTRUCTIONS:  You had a sternectomy with muscle flap closure, avoid lifting anything greater than ten pounds for 6 weeks after surgery and then less than 20 pounds for an additional 6 weeks. Do not reach backwards or use arms to push out of chair. Do not let people pull on your arms to assist with standing. Avoid twisting or reaching too far across your body.  Avoid strenuous activities such as bowling, vacuuming, raking, shoveling, golf or tennis for 12 weeks after your surgery OR as indicated by Plastic Surgery.   It is okay to resume sex if you feel comfortable in doing so. You may have to try different positions with your partner.  Splint your chest incision by hugging a pillow or bringing your arms across your chest when coughing or sneezing.     No driving for 4 weeks after surgery or while on pain medication.    Shower or wash your incisions daily with soap and  water (or as instructed), pat dry. Keep wound clean and dry, showers are okay after discharge, but don't let spray hit directly on incision. No baths or swimming for 1 month. Cover chest tube sites with gauze until they stop draining, then leave open to air. It is not abnormal for chest tube sites to drain yellowish/clear fluid for up to 2-3 weeks after surgery.   Watch for signs of infection: increased redness, tenderness, warmth or any drainage that appears infected (pus like) or is persistent.  Also a temperature > 100.5 F or chills. Call your surgeon or primary care provider's office immediately. Remove any skin glue left on incisions after 10-14 days. This will not affect your incision and can speed up healing.    Exercise is very important in your recovery. Please follow the guidelines set up for you in your cardiac rehab classes at the hospital. If outpatient cardiac rehab was ordered for you, we highly recommend you participate. If you have problems arranging your cardiac rehab, please call [phone] for all locations, with the exception of [place], please call [phone] and [facility] [place], please call [phone].    Avoid sitting for prolonged periods of time, try to walk every hour during the day. If you have a leg incision, elevate your leg often when you are not walking.    Check your weight when you get home from the hospital and continue to check it daily through your recovery for at least a month. If you notice a weight gain of 2-3 pounds in a week, notify your primary care physician, cardiologist or surgeon.    Bowel activity may be slow after surgery. If necessary, you may take an over the counter laxative such as Milk of Magnesia or Miralax. You may have stool softeners prescribed (docusate sodium, Senokot). We recommend using stool softeners while using narcotics for pain (oxycodone/percocet, hydrocodone/vicodin, hydromorphone/dilaudid).      Wean OFF of narcotics (oxycodone, dilaudid,  hydrocodone) as soon as possible. You should continue taking acetaminophen as long as you have any surgical pain as the first choice for pain control and add narcotics as necessary for pain to be tolerable.      DO NOT SMOKE.  IF YOU NEED HELP QUITTING, PLEASE TALK WITH YOUR CARDIOLOGIST OR PRIMARY DOCTOR.    REGARDING PRESCRIPTION REFILLS.  If you need a refill on your pain medication contact us to discuss your pain and a possible one time refill.   All other medications will be adjusted, discontinued and re-filled by your primary care physician and/or your cardiologist as they were prior to your surgery. We have given you enough for one to three month with possibly one refill.    POST-OPERATIVE CLINIC VISITS  You have a follow up telephone call with [facility] Advance Care Practitioners on 6/26/20 at 2:30 pm.  You will then return to the care of your primary provider and your cardiologist. Future medication refills should come from your PCP or Cardiologist.   You should see your primary care provider in 1-2 weeks after discharge. It is important to see your cardiologist about 4-6 weeks after discharge.    You will see Infectious Disease (Dr Connolly) on 7/8/20 at 9 am.    You will see Plastic Surgery team on 7/10 at 9:30 am.   If you do not hear from a  in 7 days, please call [phone] (choose option 1) and request to be seen with a general cardiologist or someone that you have seen in the past.   If there is a need to return to see CT Surgery please call our  at [phone].    DISCHARGE MEDICATIONS:    Nabil Cheung   Home Medication Instructions RIANA:[id number]    Printed on:06/21/20 5610   Medication Information                      acetaminophen (TYLENOL) 325 MG tablet  Take 650 mg by mouth every 6 hours as needed for mild pain             alfuzosin ER (UROXATRAL) 10 MG 24 hr tablet  Take 10 mg by mouth daily              alpha-lipoic acid 600 MG capsule  Take 600 mg by mouth daily               amLODIPine (NORVASC) 10 MG tablet  Take 1 tablet (10 mg) by mouth daily             aspirin (ASA) 325 MG EC tablet  Take 325 mg by mouth daily             cyclobenzaprine (FLEXERIL) 10 MG tablet  Take 1 tablet (10 mg) by mouth 3 times daily as needed for muscle spasms             empagliflozin (JARDIANCE) 10 MG TABS tablet  Take by mouth daily Unknown dosage             finasteride (PROSCAR) 5 MG tablet  Take 5 mg by mouth daily             fish oil-omega-3 fatty acids 1000 MG capsule  Take 1 g by mouth 2 times daily             insulin glargine (LANTUS PEN) 100 UNIT/ML pen  Inject 15 Units Subcutaneous At Bedtime             lactulose (CEPHULAC) 20 GM packet  Take 1 packet (20 g) by mouth daily as needed for constipation             lisinopril (ZESTRIL) 2.5 MG tablet  Take 2 tablets (5 mg) by mouth daily             metFORMIN (GLUCOPHAGE) 1000 MG tablet  Take 1 tablet (1,000 mg) by mouth 2 times daily (with meals)             metoprolol succinate ER (TOPROL-XL) 50 MG 24 hr tablet  Take 50 mg by mouth every evening             nitroGLYcerin (NITROSTAT) 0.4 MG sublingual tablet  Place 0.4 mg under the tongue every 5 minutes as needed for chest pain For chest pain place 1 tablet under the tongue every 5 minutes for 3 doses. If symptoms persist 5 minutes after 1st dose call 911.             ondansetron (ZOFRAN) 4 MG tablet  Take by mouth every 4 hours as needed for nausea             oxyCODONE (ROXICODONE) 5 MG tablet  Take 1 tablet (5 mg) by mouth every 4 hours as needed for severe pain             polyethylene glycol (MIRALAX/GLYCOLAX) packet  Take 1 packet by mouth daily             rifampin (RIFADIN) 300 MG capsule  Take 1 capsule (300 mg) by mouth every 12 hours             rosuvastatin (CRESTOR) 20 MG tablet  Take 20 mg by mouth daily Evening             sennosides (SENOKOT) 8.6 MG tablet  Take 1 tablet by mouth 2 times daily             vancomycin 1,250 mg  Inject 1,250 mg into the vein every 12 hours              vitamin D3 (CHOLECALCIFEROL) 2000 units (50 mcg) tablet  Take 1 tablet by mouth daily               CC:s  No Ref-Primary, Physician      [facility]   Cardiothoracic Surgery  Office phone: [phone]  Office fax: [phone]

## 2020-06-19 NOTE — PLAN OF CARE
[place] [facility] [place] infection s/p I & D with wound vac placement 6/10 and pulled yesterday.  S/p flap closure yesterday as well.  Temp did jump to 99.8 and subsided with prn Tylenol.  SBP elevated  and subsided with prn Labetalol SBP >160.  SR/ST on RA with incisional pain and medicated with prn Tylenol, Flexeril, Robaxin, and Roxicodone.  DAVIDSON bulb had a fair amount out.  Otherwise, pt slept well and up independently.  Continue to monitor and with POC.

## 2020-06-19 NOTE — PHARMACY
[facility] [place], [facility]  Parenteral ANtibiotic Review at Departure from Acute Care [facility]     Antimicrobial Stewardship Program - A joint venture between [facility] and  Physicians to optimize antibiotic management.  NOT a formal consult - Restricted Antimicrobial Review     Patient: Nabil Cheung  MRN: [id number]  Allergies: Atorvastatin and Dust mites    Brief Summary: Nabil Cheung is a 60 year old male with a PMHx of poorly controlled T2DM, HTN, CKD II-III, BPH/urinary retention, chronic diabetic L foot ulcer, and recently diagnosed severe 3 vessel disease with progressive symptoms at home. He underwent CABG x4 (LIMA to LAD, SVG to rPDA, SVG to OM, SVG to diag) on 5/4/2020 and was subsequently discharged to home on 5/12/20. He was admitted to [facility] on 6/8 with a sternal wound infection s/p beside I&D at [facility] on 6/9 with cultures growing Staph epidermidis. He transferred to [facility] and underwent I&D at [facility] of sternum on 6/10 with exposed wire removal, followed by a washout on 6/12 with cultures growing methicillin-resistant  Staph epidermidis. On 6/18, he went back to the OR for chest wall reconstruction and debridement of sternal wound and left major pectoral flap. Cultures from this procedure are pending.    Antimicrobial Dose/Route/Frequency Duration/Indication Start Date End Date   Vancomycin IV 1250 mg/IV/every 12 hours Tentative plan of 4-6 weeks/osteomyelitis(MRSE mediastinitis) 6/10/897108 7/8/2020-  7/22/2020 (tentative)    Rifampin PO   300 mg/PO/twice daily While on vancomycin IV/osteomyelitis(MRSE mediastinitis) 6/18/2020 7/8/2020-  7/22/2020 (tentative)     Laboratory Tests: CRP, CBC with Diff, BMP   Lab Monitoring Frequency: 1x week   Therapeutic Drug Monitoring: Vancomycin Trough (serum) goal trough 15-20 mg/L (to ensure adequate concentrations are being reached in the bone)   Therapeutic Drug Monitoring Frequency: 1x week(or more frequently  with dose changes, labile renal function, and/or concurrent nephrotoxic medications)   Miscellaneous Drug Monitoring: None      Line Type: PICC    Reassess Line/Pull Line Date: TBD    First Dose Received in Controlled Setting: Yes    Designated Provider: Dr. Bart Landeros    Follow-up: Patient does  have follow-up. Dr. Landeros working to schedule an appointment in ID clinic on 7/8/20.     Recommendations/Additional Information:   1.) Agree with current management - follow-up cultures from 6/18/20 procedure  2.) Monitor for drug-drug interactions with rifampin new start     Anat Rivas, PharmD  Pager: [phone]    Vital Signs/Clinical Features:  Vitals         06/17 0700  -  06/18 0659 06/18 0700  -  06/19 0659 06/19 0700  -  06/19 1249   Most Recent    Temp ( F) 97.5 -  98    97.4 -  99.8    97.8 -  98.1     98.1 (36.7)    Pulse 71 -  80    66 -  103       103    Heart Rate 61 -  79    70 -  116    79 -  88     79    Resp 14 -  18    6 -  20      20     20    /72 -  160/95    98/84 -  169/85    108/63 -  125/71     125/71    SpO2 (%) 95 -  99    90 -  100    93 -  95     95            Labs  Estimated Creatinine Clearance: 73.3 mL/min (A) (based on SCr of 1.33 mg/dL (H)).  Recent Labs   Lab Test 06/14/20  0540 06/15/20  0543 06/16/20  0610 06/17/20  0614 06/18/20  0654 06/19/20  0958   CR 1.07 1.16 1.11 1.14 1.19 1.33*       Recent Labs   Lab Test 07/29/19  0529 06/11/20  0427 06/14/20  0540 06/15/20  0543 06/17/20  0614 06/19/20  0958   WBC 21.0* 13.3* 7.9 7.8 7.8 13.3*   ANEU 17.3*  --   --   --  4.4 9.5*   ALYM 1.8  --   --   --  1.9 1.7   SID 1.6*  --   --   --  0.7 1.5*   AEOS 0.2  --   --   --  0.6 0.4   HGB 12.5* 9.8* 9.3* 9.1* 9.4* 8.4*   HCT 34.7* 32.4* 30.2* 29.8* 31.0* 28.0*   MCV 79 84 83 83 85 84    320 299 313 283 266       Recent Labs   Lab Test 06/11/20  0427   BILITOTAL 0.5   ALKPHOS 64   ALBUMIN 2.6*   AST 12   ALT 16       Recent Labs   Lab Test 07/29/19  0617  06/16/20  0610 06/16/20  0759   LACT 1.1  --   --    CRP  --  26.0*  --    SED  --   --  50*       Recent Labs   Lab Test 06/15/20  0543   VANCOMYCIN 22.2       Culture Results:  7-Day Micro Results       Procedure Component Value Units Date/Time    Anaerobic bacterial culture [[id number]] Collected:  06/18/20 0828    Order Status:  Completed Lab Status:  Preliminary result Updated:  06/19/20 0929    Specimen:  Bone      Specimen Description Bone STERNAL     Special Requests Received in anaerobic tubes.     Culture Micro Culture negative monitoring continues    Bone Culture Aerobic Bacterial [[id number]] Collected:  06/18/20 0828    Order Status:  Completed Lab Status:  Preliminary result Updated:  06/19/20 0758    Specimen:  Bone      Specimen Description Bone STERNAL     Culture Micro Culture negative monitoring continues    Fungus Culture, non-blood [[id number]] Collected:  06/18/20 0828    Order Status:  Completed Lab Status:  Preliminary result Updated:  06/19/20 1141    Specimen:  Bone      Specimen Description Bone STERNAL     Culture Micro Culture negative after 1 day    Bone Culture Aerobic Bacterial Collected:  06/18/20 0825    Order Status:  Canceled Lab Status:  No result     Specimen:  Bone     Fungus Culture, non-blood Collected:  06/18/20 0825    Order Status:  Canceled Lab Status:  No result     Specimen:  Bone             Recent Labs   Lab Test 09/25/17  1830 07/29/19  0544   URINEPH 5.0 6.0   NITRITE Negative Positive*   LEUKEST Negative Large*   WBCU  --  80*                         Imaging: Xr Chest 2 Views    Result Date: 6/13/2020  Exam: XR CHEST 2 VW, 6/13/2020 9:34 AM Indication: Sternal wound, sternal osteomyelitis Comparison: None available Findings: PA and lateral views of the chest were obtained. Intact upper sternotomy wires. The cardiomediastinal silhouette is at the upper limits of normal in size. No pneumothorax. Small left pleural effusion and possible trace right pleural effusion. Mild streaky  bibasilar opacities, likely atelectasis. Mild gaseous distention of bowel in the left upper quadrant. No acute osseous abnormalities visualized radiographically although the sternum is suboptimally evaluated.     Impression: Mild bibasilar atelectasis and small left pleural effusion. No pneumothorax or appreciable pneumomediastinum following sternal debridement. HAIDER REYNA MD    Xr Chest Port 1 View    Result Date: 6/15/2020  Portable chest INDICATION: Picc COMPARISON: 6/13/2020 FINDINGS: Heart size and shape appear normal. A left upper extremity PICC line is present with tip in the mid SVC. Median sternotomy again noted. No suspicious pulmonary opacities. Degenerative changes throughout the thoracic spine.     IMPRESSION: Left upper extremity PICC line with tip in the mid SVC. AGUSTINA MCKEON MD

## 2020-06-19 NOTE — PROGRESS NOTES
[facility]EAS[facility] : PROGRESS NOTE  Nabil Cheung : 1960 Sex: male:   Medical record number [id number] Attending Physician: Mark Mckinney, *  Date of Service: 2020    ASSESSMENT :  Nabil Cheung is a 60 y.o. male with a PMH of poorly controlled DMT2, hypertension, CKD stage 2-3, BPH/urinary retention, chronic diabetic left foot ulcer, and recently diagnosed severe 3 vessel disease with progressive symptoms at home. Underwent CABG x 4 (LIMA to LAD, SVG to rPDA, SVG to OM, SVG to diag)  with Dr. Mckinney. Discharged to home . Admitted to [facility] from home  with sternal wound infection. Underwent I&D of sternum at [facility] 06/10 with Dr. Mckinney, then transfered to [facility] for further wound management management.     1. Mediastinitis / Sternal osteomyelitis  s/p CABG x4 on 20  - s/p Chest wall reconstruction, Debridement of sternal wound and left pectoralis major flap 20 - Cx- neg so far   - s/p washout and wound vac placement 20   - Staphylococcus epidermidis at [facility] 20  - Staphylococcus epidermidis at [facility]  - oxacillin resistant MRSE   - CT chest wo contrast 20   Impression:   1. Soft tissue thickening/inflammatory changes posterior to the midline sternotomy without discrete fluid collection.  2. Small pericardial effusion.  3. Subtle groundglass opacities in the left lingula, may represent atelectasis, mild edema/inflammation, or  infection.    2. CAD s/p CABG x 4 20  3. Diabetes mellitus  HbA1c 7.5 (20)  4. CKD   5. Hypertension   6. leukocytosis - resolved   7. Left foot wound - dry, no redness - started in 2020  7. CRP 26 (20)    PLAN:  - continue Vancomycin IV ( goal Vanco trough 15-) Pharmacy may adjust dose per trough level  - follow-up cultures collected on  20   - continue Rifampin 300 mg po BID due to remaining hardware in place      weekly lab: CMP, CBC diff, Vanco trough, CRP   video/tel f/u on  at 9 am  "  ID will sign off.      Bart Carole Landeros MD, M.Med.Sc.  Staff, Infectious Diseases  Pager: [phone]     SUBJECTIVE:   no fever, chills.pain is controlled. has many questions about his Blood pressure. lives by himself. posible discharge tomorrow.     ROS:  A five-point review of systems was obtained and was negative with the exception of that which is described above.  Allergies   Allergen Reactions     Atorvastatin      Dust Mites    CURRENT ANTI-INFECTIVES:   Vancomycin IV 6/10-present   Pip/tazo 6/10-6/12    EXAMINATION: (Recommend ? 5 systems)   Vital Signs: /71 (BP Location: Right arm)   Pulse 103   Temp 98.1  F (36.7  C) (Oral)   Resp 20   Ht 1.803 m (5' 11\")   Wt 90 kg (198 lb 8 oz)   SpO2 95%   BMI 27.69 kg/m     GENERAL:  alert, oriented, in bed in no acute distress.  HEENT:  Head is normocephalic, atraumatic   EYES:  Eyes have anicteric sclerae without conjunctival injection     ENT:  Oropharynx is moist without exudates or ulcers. Tongue is midline  NECK:  Supple. No  Cervical lymphadenopathy  Chest : surgical site is covered. drain in place, less tender   LUNGS:  Clear to auscultation bilateral.decreased breath sounds in bases   CARDIOVASCULAR:  Regular rate and rhythm with no murmurs, gallops or rubs.  ABDOMEN:  Normal bowel sounds, soft, nontender. No appreciable hepatosplenomegaly  SKIN:  No acute rashes.  left foot - plantar - ulcer - dry. no redness  Line(s) are in place without any surrounding erythema or exudate.   NEUROLOGIC:  Grossly nonfocal. Active x4 extremities  CURRENT LINES: PICC     NEW DATA/RESULTS:   Culture Micro   Date Value Ref Range Status   06/18/2020 Culture negative monitoring continues  Preliminary   06/18/2020 Culture negative monitoring continues  Preliminary   06/18/2020 Culture negative after 1 day  Preliminary   06/12/2020 No anaerobes isolated  Final   06/12/2020 Culture negative after 1 week  Preliminary   06/12/2020   Final    Canceled, Test " credited  Test reordered as correct code  Reordered as BONEC     06/12/2020 (A)  Final    On day 2, isolated in broth only:  Staphylococcus epidermidis       Recent Labs   Lab Test 06/16/20  0610   CRP 26.0*     Recent Labs   Lab Test 06/19/20  0958 06/17/20  0614 06/15/20  0543 06/14/20  0540 06/11/20  0427 07/29/19  0529   WBC 13.3* 7.8 7.8 7.9 13.3* 21.0*     Recent Labs   Lab Test 06/19/20  0958 06/18/20  0654 06/17/20  0614 06/16/20  0610   CR 1.33* 1.19 1.14 1.11   GFRESTIMATED 57* 65 68 71     Hematology Studies  Recent Labs   Lab Test 06/19/20  0958 06/17/20  0614 06/15/20  0543 06/14/20  0540 06/11/20  0427 07/29/19  0529   WBC 13.3* 7.8 7.8 7.9 13.3* 21.0*   ANEU 9.5* 4.4  --   --   --  17.3*   AEOS 0.4 0.6  --   --   --  0.2   HCT 28.0* 31.0* 29.8* 30.2* 32.4* 34.7*    283 313 299 320 284     Metabolic  Recent Labs   Lab Test 06/19/20  0958 06/18/20  0654 06/17/20  0614  06/15/20  0543 06/14/20  0540   *  --   --   --  139 137   BUN 21  --   --   --  21 16   CO2 24  --   --   --  26 26   CR 1.33* 1.19 1.14   < > 1.16 1.07   GFRESTIMATED 57* 65 68   < > 67 74    < > = values in this interval not displayed.     Hepatic Studies  Recent Labs   Lab Test 06/11/20 0427   BILITOTAL 0.5   ALKPHOS 64   ALBUMIN 2.6*   AST 12   ALT 16

## 2020-06-19 NOTE — PROGRESS NOTES
"                     Diabetes Consult Daily  Progress Note          Assessment/Plan:   Nabil Cheung is a 60 year old male with history of type 2 diabetes, hypertension, chronic kidney disease stage 2-3, CABG x 4 (5/4/2020) with Dr. Mckinney.  Admitted to [facility] [place] [place] with sternal wound infection (6/8/2020) with sternal wound infection. Transferred to [facility] on (6/10/2020) for further wound management.     Type 2 diabetes: poorly controlled with a recent A1C, A1C 7.5% (6/9/2020) with HGB of 9.3%. not an accurate measure 2/2 anemia.  -Mr. Cheung has read a number of books regarding management type 2 diabetes.  He did not tolerate trial of Bydurean earlier this year.   -He very much respects his CDE, and defers to her for advice on most changes to DM management.    BG up to 200s last evening despite increase in I:C.  Missed aspart for smaller \"snacks\" this morning, then refused midday glucose.  Cr trending up after starting Rifampin yesterday.     Plan  - Lantus 18 units at bedtime,   - aspart for meals and snacks: increased to 1unit/9g CHO on Thursday evening. Pt reminded to notify RN of ALL po intake- he needs aspart even if having a small snack.  - patient will continue cutting carb intake (</= 45 grams)  - RN provided carb content supplement for the menu, to aid pt in ordering lower carb meals  -Novolog custom correction 1 unit per 40 > 140 before meals and > 200 HS.   -test blood sugars before meals, HS and 0200  -continue to hold metformin and empaglifozin for now, restart once Cr trending down.  Address sitagliptin (reviewed today, 6/19, that this likely will not improve post prandial hyperglycemia to the degree that is needed)..  -hypoglycemia protocol    If Nabil discharges tomorrow our team would recommend meal insulin (could determine fixed doses) given post prandial hyperglycemia and rising Cr.  Pt indicated again today that he is not willing to take mealtime insulin at home.       Outpatient diabetes follow up: Dr. Summers " "Alla and Charlotte Butler CDE within 1 week of discharge.                   Discussed with  patient and bedside RN, paged primary team.           Interval History:   The last 24 hours progress and nursing notes reviewed.  Nabil is having more pain today following flap reconstruction yesterday.  His appetite is decreased b/c of pain, per pt.  He had carton of milk earlier this morning (no aspart given), then part of a yogurt (~7g CHO) late morning- again no aspart.  Pt refused to have midday glucose checked b/c of recent yogurt.  Last evening he agreed (after much discussion) to increase insulin to carb ratio to 1unit/9g CHO.  BG following supper was still in the low 200s despite this increase.  Overnight BG remained in 200s, but came down to high 100s this morning.    I had long discussion with pt today.  We reviewed the risk of infection and impaired healing if glucoses are running high.  He still maintains that he does not want to be on meal insulin at home.  Per notes, planning for discharge tomorrow.  Creatinine is up to 1.33 today after starting rifampin yesterday, so we are not starting empagliflozin or metformin yet.    Nabil says at home he can use other ways to get glucose down, and post prandial hyperglycemia is main problem.  However, he then admitted that glucoses still usually reach the 300s post meals even when he is able to go for walks at home.      Notes indicate that Nabil could discharge Saturday or Sunday.    From 6/18:  Long discussion w/ pt's outpatient CDE Megan Butler. Pt has had resistance to all changes proposed over the years and did not seem to understand the consequences of high BG.  He did eventually start glargine when provider told him he was not leaving the office without it.  Usually he likes to \"call the shots\" and is more past-oriented than present.   She agrees he might respond to clear description from surgery/plastics about what poor BG control will do to his " "reconstruction/flap.      6/16- Nabil asks why metformin and empagliflozin can't be used.  Explained acute care setting increases risk for MISA and concerns with volume loss/hypotension (and no RTC acute care studies on empagliflozin). He understands that withholding those medications are the reason he needs aspart.  But, feels willing to accept higher BGs to avoid using more insulin.  And he verbalizes knowledge of the risk higher BGs have for impaired healing.  He proposes carbohydrate reduction/fasting. Knows he needs sufficient calories to heal.  Discussed carb reduction to 45 grams as safe choice and he can manage this if has resource.      PTA:  lantus 18 units at bedtime  metformin 1000 mg twice daily  Jardiance (empagliflozin) does not recall the dose, per chart review 10 mg daily  Sitagliptin ( januvia) 100 mg daily ( has not start the medication)    Recent Labs   Lab 06/19/20  0958 06/19/20  0750 06/19/20  0230 06/18/20  2334 06/18/20  1848 06/18/20  1248 06/18/20  1030  06/15/20  0543  06/14/20  0540   *  --   --   --   --   --   --   --  187*  --  174*   BGM  --  179* 226* 205* 167* 145* 128*   < >  --    < >  --     < > = values in this interval not displayed.               Review of Systems:   See interval hx          Medications:     Orders Placed This Encounter      Low Consistent CHO Diet       Physical Exam:  Gen: Alert,  NAD.  HEENT: hearing intact with normal conversational volume via phone.  Resp: non-labored  Neuro: alert, oriented x3, communicating clearly  /71 (BP Location: Right arm)   Pulse 103   Temp 98.1  F (36.7  C) (Oral)   Resp 20   Ht 1.803 m (5' 11\")   Wt 90 kg (198 lb 8 oz)   SpO2 95%   BMI 27.69 kg/m    Remainder physical exam not completed d/t Covid 19 precautions         Data:     Lab Results   Component Value Date    A1C 9.6 09/25/2017    A1C 10.7 06/29/2017            Recent Labs   Lab Test 06/19/20  0958 06/18/20  0654  06/15/20  0543   *  --   --  139 " "  POTASSIUM 4.3  --   --  4.2   CHLORIDE 101  --   --  106   CO2 24  --   --  26   ANIONGAP 6  --   --  6   *  --   --  187*   BUN 21  --   --  21   CR 1.33* 1.19   < > 1.16   ONEYDA 8.2*  --   --  8.6    < > = values in this interval not displayed.     CBC RESULTS:   Recent Labs   Lab Test 06/19/20  0958   WBC 13.3*   RBC 3.33*   HGB 8.4*   HCT 28.0*   MCV 84   MCH 25.2*   MCHC 30.0*   RDW 15.0          Due to coronavirus precautions, a telephone visit instead of an in-person visit took place.       Nabil Cheung is being evaluated via a billable telephone visit.             \"This telephone visit will be conducted via a call between patient and  provider. We have found that certain health care needs can be provided without the need for a full physical exam.  This service lets us provide the care you need with a phone conversation.\"       Patient has given verbal consent for Telephone visit? Yes           I spent a total of 16 minutes via telephone with patient, and 20 minutes floor time /care coordination managing glycemic care. See note for details.             Tash Cazares PA-C [phone]  Diabetes Management Team Job Code [id number]              "

## 2020-06-19 NOTE — PROGRESS NOTES
"Plastic Surgery Progress Note    S: No acute events overnight. Pain moderately controlled. Minor chills and subjective fever.    O:  /62 (BP Location: Right arm)   Pulse 103   Temp 98.8  F (37.1  C) (Oral)   Resp 20   Ht 1.803 m (5' 11\")   Wt 90 kg (198 lb 8 oz)   SpO2 93%   BMI 27.69 kg/m    Gen: NAD, AOx3  Chest: NLB, mildly tachy around 100s. Incision with dressing in place, minimal strikethrough. Skin flap WWP. No evidence of hematoma. Drain s/s.  Ext: WWP.    A/P:  61 yo with sternal wound infection now POD #1 s/p pec major flap coverage of sternal wound. Convalescing appropriately    - Follow up on intra-op cultures  - Left arm limit abduction to less than 45 degrees for ~2-3 weeks  - Okay to discharge from plastics perspective when pain controlled and appropriate outpatient antimicrobial plan in place    Rahul Mejia MD   Surgery PGY-4  Please Page On-Call on [employer]    "

## 2020-06-19 NOTE — PROGRESS NOTES
Cardiovascular Surgery Progress Note  6/14/2020         Assessment and Plan:     Nabil Cheung is a 60 y.o. male with a PMH of poorly controlled DMT2, hypertension, CKD stage 2-3, BPH/urinary retention, chronic diabetic left foot ulcer, and recently diagnosed severe 3 vessel disease with progressive symptoms at home. Underwent CABG x 4 (LIMA to LAD, SVG to rPDA, SVG to OM, SVG to diag) 05/04 with Dr. Mckinney. Discharged to home 05/12. Admitted to [facility] [place] from home 06/08 with sternal wound infection. Underwent I&D of sternum at [facility] [place] 06/10 with Dr. Mckinney, then transfered to [facility] for further wound management management.   Plastic Surgery consult ordered for 6/15.       Infectious Disease:  Leukocytosis - improved   Sternal wound infection  - Bedside I&D 06/09 at [facility] [place], Culture 06/09 ([facility] [place]) growing staph epidermidis  - OR I&D 6/10 with Dr. Mckinney. Infection tracks length of sternal incision with areas of exposed bone and exposed wires. Exposed wires removed, sternal wound vac placed  - OR washout 6/12 with replacement of wound vac, purulent debris present, cultures resent. staph epidermidis - oxacillin resistant MRSE   - Consulted ID 6/11: continue vanc through 4-6 weeks goal troph 15-20, Rifampin 300 mg po BID today added 6/18    - appreciate ID assistance  - S/p Procedure(s) 6/18/2020:   Chest wall reconstruction   Debridement of sternal wound and left major pectoral flep  - DAVIDSON drain with 250 ml bloody output from yesterday. Continue DAVIDSON drain, will need to continue at discharge as well.   - As per Plastics:    - Follow up on intra-op cultures   - Left arm limit abduction to less than 90 degrees for ~2-3 weeks   - Okay to discharge from plastics perspective when pain controlled and appropriate outpatient antimicrobial plan in place   - Follow up in plastics as scheduled in July  - continue sternum restrictions    Cardiovascular:   CAD s/p CABG 5/4/2020  Hypertension  - Most recent TTE 05/05 with preserved  biventricular dysfunction  - Aspirin 325 mg daily  - Rosuvastatin 20 mg daily  - Hypertensive, changed lisinopril to hydralazine for bump in Cr likely due to elevated vanco level. Cr back down, switched back to Lisinopril.   - B/p's lower this am, likely due to better pain control and antispasmodic medications, will decrease Lisinopril to 10 mg daily and continue Norvasc 5 mg daily, and Toprol XL.  Monitor.   *patient very concerned about the jump in b/p as he had lower b/p's prior to admission. He has had his Metformin and Jardiance held while here, taking sliding scale insulin. Jardiance could have contributed to a decrease in b/p prior to admission. Will need to monitor b/p's closely when home meds resumed.    - net even over 24 hours, net negative overall 2.6 L despite weight being up 1.1 kg from yesterday. Diuretic PRN  Postop atrial fibrillation - currently in SR   - Continue PTA amiodarone taper, currently 200 mg daily (last dose 06/25)  - Systemic anticoagulation deferred     Pulmonary:  - No active issues. Saturating well on RA.   - Supplemental O2 PRN to keep sats > 92%. Wean off as tolerated.  - Pulm toilet, IS, activity and deep breathing      Neurology / MSK:  Postop pain - moderate, controlled with pain meds   Having increased pain s/p I & D with flap closure 6/18  - continue Tylenol PRN, Oxycodone PRN  - IV Dilaudid PRN  - Robaxin PRN post-flap, Flexeril prn muscle spasms         / Renal:  CKD stage 2-3  - Most recent creatinine within baseline, trend daily  - Avoid nephrotoxins, Diuresis PRN  - Cr 1.38 6/13 up from 1.13, 1.19 today. Vanco level 25.9 on 6/12, 22.2 6/15. Aim for a trough level of 15-20 per ID.   - Monitor weight, is up 0.8 kg today, net 3.6 kg from admission. I/O not accurate over 24 hours. Appears euvolemic  - check BMP today       BPH  Urinary retention  - PTA finasteride 5 mg daily  - PTA alfuzosin 10 mg daily     GI / FEN:   Nutrition  - Diabetic diet, bowel  regimen     Endocrine:  DMT2, poorly controlled  - Endocrine consult, appreciate recs.  - Lantus, sliding scale, prandial dosing   - need to reinforce importance of good glucose (<180's) control for wound healing  - plan to resume metformin and Jardiance at discharge if creat remains stable      Chronic left foot ulcer  - Previously recommended for out-patient podiatry follow up for further debridement  - WOC consult, saw today   - Mepilex dressing daily and PRN for saturation     Hematology:   - Stable Hbg 9s, Plts WNL.   - recheck CBC      Prophylaxis:   - Stress ulcer prophylaxis: not indicated  - DVT prophylaxis: Subcutaneous heparin, SCD  - PICC for long term IV antibiotics    Disposition:   Possible discharge over the weekend if pain under good control and okay with ID.   Follow up primary provider within a week for ongoing diabetes and b/p management   Follow up with Plastics, Saadia Obrien PA-C on July 10, 2020 at 9:45 am.   Follow up with ID as per their recommendations, f/u cultures and abx therapy  Follow up with CVTS as needed.       Discussed with CVTS Fellow as needed.  Staff surgeons have been informed of changes through both verbal and written communication.      Anahy Pena DNP, CNP  Cardiothoracic Surgery  Pager [phone]  June 19, 2020    ADDENDUM:   BMP today shows Na 131 and Creat increased to 1.33  CBC today shows drop in hgb to 8.4.   Plan:   - Discontinue lisinopril for now, start norvasc and reevaluate restarting ACE I as outpatient pending creat while on Vancomycin  - recheck BMP tomorrow am  - recheck Hgb in am     Anahy Pena DNP, CNP              Interval History:     No overnight events.  S/p sternum I & D and flap closure. Pain under better control this am but still 4-5 incisional and left pectoral pain   Sternum dressing is WDI with small amount of old bloody drainage. Has drain in place with bloody drainage.   Tolerating diet.  No nausea or vomiting.  Breathing well  "without complaints.   Limited activity this am due to muscle spams in his left lower leg, great toe. Has had leg spasms since childhood. Taking flexeril prn.   Denies light headedness.            Physical Exam:   Blood pressure 108/63, pulse 103, temperature 97.8  F (36.6  C), temperature source Oral, resp. rate 20, height 1.803 m (5' 11\"), weight 90 kg (198 lb 8 oz), SpO2 93 %.  Vitals:    06/16/20 0437 06/17/20 0420 06/18/20 0030   Weight: 89.1 kg (196 lb 8 oz) 89.1 kg (196 lb 8 oz) 90 kg (198 lb 8 oz)      Gen: A&Ox4, NAD. Pain under better control today.  Neuro: no focal deficits   CV: RRR, normal S1 S2, no murmurs, rubs or gallops.  Pulm: CTA, no wheezing or rhonchi, normal breathing   Abd: nondistended, normal BS, soft, nontender  Ext: no periperal edema  Sternum incision with gauze dressings WDI  Left foot dressing WDI.   Sternal, post-op DAVIDSON in place to bulb suction with bloody drainage         Data:    Imaging:  reviewed recent imaging, no acute concerns    CXR 6/15/2020: FINDINGS: Heart size and shape appear normal. A left upper extremity  PICC line is present with tip in the mid SVC. Median sternotomy again  noted. No suspicious pulmonary opacities. Degenerative changes  throughout the thoracic spine.                                              IMPRESSION: Left upper extremity PICC line with tip in the mid SVC.     Labs:  BMP  Recent Labs   Lab 06/18/20  0654 06/17/20  0614 06/16/20  0610 06/15/20  0543 06/14/20  0540   NA  --   --   --  139 137   POTASSIUM  --   --   --  4.2 4.0   CHLORIDE  --   --   --  106 107   ONEYDA  --   --   --  8.6 8.3*   CO2  --   --   --  26 26   BUN  --   --   --  21 16   CR 1.19 1.14 1.11 1.16 1.07   GLC  --   --   --  187* 174*     CBC  Recent Labs   Lab 06/17/20  0614 06/15/20  0543 06/14/20  0540   WBC 7.8 7.8 7.9   RBC 3.67* 3.59* 3.62*   HGB 9.4* 9.1* 9.3*   HCT 31.0* 29.8* 30.2*   MCV 85 83 83   MCH 25.6* 25.3* 25.7*   MCHC 30.3* 30.5* 30.8*   RDW 15.0 15.1* 14.9    " 313 299     INR  No lab results found in last 7 days.   Liver Function Studies -   Recent Labs   Lab Test 06/11/20  0427   PROTTOTAL 6.4*   ALBUMIN 2.6*   BILITOTAL 0.5   ALKPHOS 64   AST 12   ALT 16     GLUCOSE:   Recent Labs   Lab 06/19/20  0750 06/19/20  0230 06/18/20  2334 06/18/20  1848 06/18/20  1248 06/18/20  1030  06/15/20  0543  06/14/20  0540   GLC  --   --   --   --   --   --   --  187*  --  174*   * 226* 205* 167* 145* 128*   < >  --    < >  --     < > = values in this interval not displayed.     MEDICATIONS:   alfuzosin ER, 10 mg, Oral, Daily  amiodarone, 200 mg, Oral, Daily  amLODIPine, 5 mg, Oral, Daily  aspirin, 325 mg, Oral, Daily  finasteride, 5 mg, Oral, Daily  insulin aspart, , Subcutaneous, TID w/meals  insulin aspart, 1-6 Units, Subcutaneous, TID w/meals  insulin aspart, 1-5 Units, Subcutaneous, At Bedtime  insulin glargine, 18 Units, Subcutaneous, At Bedtime  [START ON 6/20/2020] lisinopril, 10 mg, Oral, Daily  metoprolol succinate ER, 50 mg, Oral, Daily  rifampin, 300 mg, Oral, Q12H JAIR  rosuvastatin, 20 mg, Oral, QPM  sodium chloride (PF), 10 mL, Intracatheter, Q7 Days  sodium chloride (PF), 3 mL, Intracatheter, Q8H  vancomycin (VANCOCIN) IV, 1,250 mg, Intravenous, Q12H

## 2020-06-19 NOTE — PROGRESS NOTES
Care Coordinator Progress Note    Admission Date/Time:  6/10/2020  Attending MD:  Mark Mckinney    Data  Chart reviewed, discussed with interdisciplinary team.   Patient was admitted for:    Status post cardiac surgery  Non-healing surgical wound, subsequent encounter   S/P pec major flap coverage of sternal wound on 6/18/2020.    Assessment  Concerns with insurance coverage for discharge needs: TBD  Current Living Situation: Patient lives alone. Pt states he was independent with his cares prior to admission  Support System: Supportive and Involved friend  Services Involved: None previously  Transportation at Discharge: Family or friend will provide  Transportation to Medical Appointments: Friends  Barriers to Discharge: Medical plan of care, arrangements for home IV antibiotics.    Coordination of Care and Referrals: Provided patient/family with options for home infusion. Per NP, pt will need IV vancomycin for a 4-6 wk course. This writer met with pt to discuss home infusion which pt states he has never done, but he feels like he would be able to learn. Pt states that he would need to be able to do it independently as he doesn't have a friend/family member that could come twice a day to assist. Pt stated he would like to check for home infusion coverage with [facility].    Intervention    Voicemail left with PLC to arrange education for tomorrow for PICC/IV meds and DAVIDSON drain, pt states he is not feeling up to education today.       Plan  Anticipated Discharge Date: 6/20/2020  Anticipated Discharge Plan: Discharge to home with home infusion  CC will continue to monitor patient's medical condition and progress towards discharge.  Hien Garcia RN BSN  6C Unit Care Coordinator  Phone number: [phone]  Pager: [phone]    Addendum 6/19 at 1123:  This writer received update from [facility] that they are not in network with pt's insurance plan, referral cancelled. This writer called Anna Jessica  [facility] (Cell: [phone]) to initiate a benefit check.  1215: Per Anna with [facility], pt has 100% coverage for IV abx at home. Anna is working on coordinating a nurse for education tomorrow. PLC spot tentatively held for Sunday at 10:30am, but pt will likely discharge prior to that. Home infusion orders placed. Discussed with bedside RN who will do DAVIDSON education.  1520:  This writer received update from Sofy at [facility]  (cell: [phone]) that she has secured a nursing agency, [employer] who is able to see pt tomorrow if he discharges. Sofy stated she will call pt in the morning to start education and then based on pt's comfort level she can come for an in person teaching session prior to discharge.

## 2020-06-20 ENCOUNTER — APPOINTMENT (OUTPATIENT)
Dept: GENERAL RADIOLOGY | Facility: CLINIC | Age: 60
End: 2020-06-20
Attending: PHYSICIAN ASSISTANT
Payer: COMMERCIAL

## 2020-06-20 ENCOUNTER — APPOINTMENT (OUTPATIENT)
Dept: OCCUPATIONAL THERAPY | Facility: CLINIC | Age: 60
End: 2020-06-20
Attending: PHYSICIAN ASSISTANT
Payer: COMMERCIAL

## 2020-06-20 LAB
ANION GAP SERPL CALCULATED.3IONS-SCNC: 8 MMOL/L (ref 3–14)
BASOPHILS # BLD AUTO: 0.1 10E9/L (ref 0–0.2)
BASOPHILS NFR BLD AUTO: 0.6 %
BUN SERPL-MCNC: 26 MG/DL (ref 7–30)
CALCIUM SERPL-MCNC: 8.5 MG/DL (ref 8.5–10.1)
CHLORIDE SERPL-SCNC: 102 MMOL/L (ref 94–109)
CO2 SERPL-SCNC: 23 MMOL/L (ref 20–32)
CREAT SERPL-MCNC: 1.22 MG/DL (ref 0.66–1.25)
DIFFERENTIAL METHOD BLD: ABNORMAL
EOSINOPHIL # BLD AUTO: 0.6 10E9/L (ref 0–0.7)
EOSINOPHIL NFR BLD AUTO: 5.2 %
ERYTHROCYTE [DISTWIDTH] IN BLOOD BY AUTOMATED COUNT: 14.9 % (ref 10–15)
GFR SERPL CREATININE-BSD FRML MDRD: 63 ML/MIN/{1.73_M2}
GLUCOSE BLDC GLUCOMTR-MCNC: 174 MG/DL (ref 70–99)
GLUCOSE BLDC GLUCOMTR-MCNC: 178 MG/DL (ref 70–99)
GLUCOSE BLDC GLUCOMTR-MCNC: 211 MG/DL (ref 70–99)
GLUCOSE SERPL-MCNC: 196 MG/DL (ref 70–99)
HCT VFR BLD AUTO: 27.2 % (ref 40–53)
HGB BLD-MCNC: 8.1 G/DL (ref 13.3–17.7)
IMM GRANULOCYTES # BLD: 0.1 10E9/L (ref 0–0.4)
IMM GRANULOCYTES NFR BLD: 0.4 %
LYMPHOCYTES # BLD AUTO: 1.3 10E9/L (ref 0.8–5.3)
LYMPHOCYTES NFR BLD AUTO: 11.7 %
MCH RBC QN AUTO: 25 PG (ref 26.5–33)
MCHC RBC AUTO-ENTMCNC: 29.8 G/DL (ref 31.5–36.5)
MCV RBC AUTO: 84 FL (ref 78–100)
MONOCYTES # BLD AUTO: 1.2 10E9/L (ref 0–1.3)
MONOCYTES NFR BLD AUTO: 10.2 %
NEUTROPHILS # BLD AUTO: 8.2 10E9/L (ref 1.6–8.3)
NEUTROPHILS NFR BLD AUTO: 71.9 %
NRBC # BLD AUTO: 0 10*3/UL
NRBC BLD AUTO-RTO: 0 /100
PLATELET # BLD AUTO: 252 10E9/L (ref 150–450)
POTASSIUM SERPL-SCNC: 4.4 MMOL/L (ref 3.4–5.3)
RBC # BLD AUTO: 3.24 10E12/L (ref 4.4–5.9)
SODIUM SERPL-SCNC: 132 MMOL/L (ref 133–144)
WBC # BLD AUTO: 11.4 10E9/L (ref 4–11)

## 2020-06-20 PROCEDURE — 21400000 ZZH R&B CCU UMMC

## 2020-06-20 PROCEDURE — 85025 COMPLETE CBC W/AUTO DIFF WBC: CPT | Performed by: NURSE PRACTITIONER

## 2020-06-20 PROCEDURE — 97530 THERAPEUTIC ACTIVITIES: CPT | Mod: GO

## 2020-06-20 PROCEDURE — 25000132 ZZH RX MED GY IP 250 OP 250 PS 637: Performed by: STUDENT IN AN ORGANIZED HEALTH CARE EDUCATION/TRAINING PROGRAM

## 2020-06-20 PROCEDURE — 00000146 ZZHCL STATISTIC GLUCOSE BY METER IP

## 2020-06-20 PROCEDURE — 25000128 H RX IP 250 OP 636: Performed by: THORACIC SURGERY (CARDIOTHORACIC VASCULAR SURGERY)

## 2020-06-20 PROCEDURE — 25000128 H RX IP 250 OP 636: Performed by: PHYSICIAN ASSISTANT

## 2020-06-20 PROCEDURE — 80048 BASIC METABOLIC PNL TOTAL CA: CPT | Performed by: NURSE PRACTITIONER

## 2020-06-20 PROCEDURE — 97165 OT EVAL LOW COMPLEX 30 MIN: CPT | Mod: GO

## 2020-06-20 PROCEDURE — 25800030 ZZH RX IP 258 OP 636: Performed by: PHYSICIAN ASSISTANT

## 2020-06-20 PROCEDURE — 25000132 ZZH RX MED GY IP 250 OP 250 PS 637: Performed by: NURSE PRACTITIONER

## 2020-06-20 PROCEDURE — 71046 X-RAY EXAM CHEST 2 VIEWS: CPT

## 2020-06-20 PROCEDURE — 25000132 ZZH RX MED GY IP 250 OP 250 PS 637: Performed by: INTERNAL MEDICINE

## 2020-06-20 PROCEDURE — 40000893 ZZH STATISTIC PT IP EVAL DEFER

## 2020-06-20 PROCEDURE — 36592 COLLECT BLOOD FROM PICC: CPT | Performed by: NURSE PRACTITIONER

## 2020-06-20 PROCEDURE — 25000132 ZZH RX MED GY IP 250 OP 250 PS 637: Performed by: PHYSICIAN ASSISTANT

## 2020-06-20 PROCEDURE — 25800030 ZZH RX IP 258 OP 636: Performed by: THORACIC SURGERY (CARDIOTHORACIC VASCULAR SURGERY)

## 2020-06-20 PROCEDURE — 97535 SELF CARE MNGMENT TRAINING: CPT | Mod: GO

## 2020-06-20 RX ORDER — MAGNESIUM OXIDE 400 MG/1
400 TABLET ORAL DAILY
Qty: 14 TABLET | Refills: 0 | Status: SHIPPED | OUTPATIENT
Start: 2020-06-21 | End: 2020-06-21

## 2020-06-20 RX ORDER — FUROSEMIDE 20 MG
20 TABLET ORAL DAILY
Status: DISCONTINUED | OUTPATIENT
Start: 2020-06-20 | End: 2020-06-21 | Stop reason: HOSPADM

## 2020-06-20 RX ORDER — OXYCODONE HYDROCHLORIDE 5 MG/1
5-10 TABLET ORAL EVERY 4 HOURS PRN
Qty: 60 TABLET | Refills: 0 | Status: SHIPPED | OUTPATIENT
Start: 2020-06-20 | End: 2020-06-21

## 2020-06-20 RX ADMIN — OXYCODONE HYDROCHLORIDE 5 MG: 5 TABLET ORAL at 06:54

## 2020-06-20 RX ADMIN — OXYCODONE HYDROCHLORIDE 5 MG: 5 TABLET ORAL at 13:39

## 2020-06-20 RX ADMIN — ACETAMINOPHEN 650 MG: 325 TABLET, FILM COATED ORAL at 15:08

## 2020-06-20 RX ADMIN — CYCLOBENZAPRINE 10 MG: 10 TABLET, FILM COATED ORAL at 09:37

## 2020-06-20 RX ADMIN — CYCLOBENZAPRINE 10 MG: 10 TABLET, FILM COATED ORAL at 22:57

## 2020-06-20 RX ADMIN — VANCOMYCIN HYDROCHLORIDE 1250 MG: 10 INJECTION, POWDER, LYOPHILIZED, FOR SOLUTION INTRAVENOUS at 20:46

## 2020-06-20 RX ADMIN — AMLODIPINE BESYLATE 5 MG: 5 TABLET ORAL at 07:59

## 2020-06-20 RX ADMIN — ACETAMINOPHEN 650 MG: 325 TABLET, FILM COATED ORAL at 02:54

## 2020-06-20 RX ADMIN — ROSUVASTATIN CALCIUM 20 MG: 20 TABLET, FILM COATED ORAL at 20:47

## 2020-06-20 RX ADMIN — RIFAMPIN 300 MG: 300 CAPSULE ORAL at 20:47

## 2020-06-20 RX ADMIN — ASPIRIN 325 MG ORAL TABLET 325 MG: 325 PILL ORAL at 07:58

## 2020-06-20 RX ADMIN — POLYETHYLENE GLYCOL 3350 17 G: 17 POWDER, FOR SOLUTION ORAL at 09:03

## 2020-06-20 RX ADMIN — ALFUZOSIN HYDROCHLORIDE 10 MG: 10 TABLET, EXTENDED RELEASE ORAL at 07:58

## 2020-06-20 RX ADMIN — FINASTERIDE 5 MG: 5 TABLET, FILM COATED ORAL at 20:47

## 2020-06-20 RX ADMIN — ACETAMINOPHEN 650 MG: 325 TABLET, FILM COATED ORAL at 10:57

## 2020-06-20 RX ADMIN — ACETAMINOPHEN 650 MG: 325 TABLET, FILM COATED ORAL at 06:54

## 2020-06-20 RX ADMIN — METFORMIN HYDROCHLORIDE 1000 MG: 500 TABLET ORAL at 09:38

## 2020-06-20 RX ADMIN — CYCLOBENZAPRINE 10 MG: 10 TABLET, FILM COATED ORAL at 14:57

## 2020-06-20 RX ADMIN — OXYCODONE HYDROCHLORIDE 5 MG: 5 TABLET ORAL at 02:54

## 2020-06-20 RX ADMIN — VANCOMYCIN HYDROCHLORIDE 1250 MG: 10 INJECTION, POWDER, LYOPHILIZED, FOR SOLUTION INTRAVENOUS at 08:57

## 2020-06-20 RX ADMIN — METFORMIN HYDROCHLORIDE 1000 MG: 500 TABLET ORAL at 18:04

## 2020-06-20 RX ADMIN — ACETAMINOPHEN 650 MG: 325 TABLET, FILM COATED ORAL at 20:50

## 2020-06-20 RX ADMIN — EMPAGLIFLOZIN 10 MG: 10 TABLET, FILM COATED ORAL at 10:57

## 2020-06-20 RX ADMIN — Medication 400 MG: at 07:58

## 2020-06-20 RX ADMIN — AMIODARONE HYDROCHLORIDE 200 MG: 200 TABLET ORAL at 07:58

## 2020-06-20 RX ADMIN — METOPROLOL SUCCINATE 50 MG: 50 TABLET, EXTENDED RELEASE ORAL at 07:58

## 2020-06-20 RX ADMIN — RIFAMPIN 300 MG: 300 CAPSULE ORAL at 07:58

## 2020-06-20 ASSESSMENT — ACTIVITIES OF DAILY LIVING (ADL)
ADLS_ACUITY_SCORE: 16
ADLS_ACUITY_SCORE: 17
ADLS_ACUITY_SCORE: 16
ADLS_ACUITY_SCORE: 16
ADLS_ACUITY_SCORE: 17
ADLS_ACUITY_SCORE: 17

## 2020-06-20 ASSESSMENT — MIFFLIN-ST. JEOR: SCORE: 1731.14

## 2020-06-20 ASSESSMENT — PAIN DESCRIPTION - DESCRIPTORS: DESCRIPTORS: ACHING

## 2020-06-20 NOTE — PLAN OF CARE
AVSS. Pain controlled with Tylenol 650mg and oxycodone 5mg about q4 hrs. Flexeril x1 today. DAVIDSON with dark red output, volumes charted. Incision dressings with small amount dried drainage, otherwise CDI. MD plans to change tomorrow morning.  Appetite poor today, but good at dinnertime. Sliding scale and carbs covered. Urine output good. No BM today.  Patient has one hour PLC teaching tomorrow, but according to PLC nurse he will need teaching from home care nurse as well, as two hours total is needed.  Home infusion will come tomorrow, and will also meet with patient at home after discharge. Likely discharge tomorrow.

## 2020-06-20 NOTE — PLAN OF CARE
Neuro: A&Ox4.   Cardiac: SR. VSS.   Respiratory: Sating 98% on RA.  GI/: Adequate urine output. BM 6/19  Diet/appetite: Tolerating mod cho diet. Eating well. Monitoring blood sugars- with sliding scale coverage. Restarted Metformin and Jardiance today. Didn't eat dinner- requesting to have evening blood sugar taken at 2000  Activity:  Independent - PT assessed today.   Pain: At acceptable level on current regimen. Oxycodone 5mg x 2. Tylenol and flexaril PRN  Skin: Sternal incision/DAVIDSON dressing changed. DAVIDSON with dark red output.   LDA's: Left PICC-     Plan: Continue with POC. Notify primary team with changes. PLC tomorrow for PICC teaching. Poss discharge tomorrow.         Problem: Adult Inpatient Plan of Care  Goal: Plan of Care Review  Outcome: Improving  Goal: Patient-Specific Goal (Individualization)  Outcome: Improving  Goal: Absence of Hospital-Acquired Illness or Injury  Outcome: Improving  Goal: Optimal Comfort and Wellbeing  Outcome: Improving  Goal: Readiness for Transition of Care  Outcome: Improving  Goal: Rounds/Family Conference  Outcome: Improving     Problem: Infection  Goal: Infection Symptom Resolution  Outcome: Improving

## 2020-06-20 NOTE — PROGRESS NOTES
CVTS:     Planning discharge home Sunday AM.   Will have [facility] RNs visit in afternoon for PICC line teaching and will have Home PT/OT visits as well.   No PLC visit tomorrow.       Joao Claros PA-C  Cardiothoracic Surgery  Pager [phone]    3:06 PM   June 20, 2020

## 2020-06-20 NOTE — PROGRESS NOTES
"   06/20/20 1121   Quick Adds   Type of Visit Initial Occupational Therapy Evaluation   Living Environment   Lives With alone   Living Arrangements other (see comments)  (Basement apartment)   Home Accessibility stairs to enter home  (~8 stairs to basement)   Transportation Anticipated car, drives self   Living Environment Comment Patient lives alone in a basement apartment. Reports ~8 stairs to access. Walk-in shower present. Has friends nearby that can assist.     Self-Care   Usual Activity Tolerance good   Current Activity Tolerance fair   Regular Exercise Yes   Activity/Exercise Type walking  (Walking a few blocks. Planning to start cardiac rehab. )   Equipment Currently Used at Home shower chair   Activity/Exercise/Self-Care Comment Patient reports able to walk ~2 blocks after most recent surgery. [place] [facility] [place].    Functional Level   Ambulation 0-->independent   Transferring 0-->independent   Toileting 0-->independent   Bathing 1-->assistive equipment   Dressing 0-->independent   Eating 0-->independent   Communication 0-->understands/communicates without difficulty   Swallowing 0-->swallows foods/liquids without difficulty   Cognition 0 - no cognition issues reported   Fall history within last six months no   Which of the above functional risks had a recent onset or change? transferring;dressing;bathing;ambulation  (Bed mobility)   Prior Functional Level Comment Patient previously IND with ADL tasks. Uses shower chair at baseline. Given significant R shoulder arthritis, has modified techniques for many years.   General Information   Onset of Illness/Injury or Date of Surgery - Date 06/10/20   Referring Physician Joao Claros PA    Patient/Family Goals Statement To be able to transfer out of bed independently - states this is his primary concern.    Additional Occupational Profile Info/Pertinent History of Current Problem Per chart: \"Nabil Cheung is a " "60 y.o. male with a PMH of poorly controlled DMT2, hypertension, CKD stage 2-3, BPH/urinary retention, chronic diabetic left foot ulcer, and recently diagnosed severe 3 vessel disease with progressive symptoms at home. Underwent CABG x 4 (LIMA to LAD, SVG to rPDA, SVG to OM, SVG to diag) 05/04 with Dr. Mckinney. Discharged to home 05/12. [place] [facility] [place] with [place] wound infection. [place]went [place] of [place] [facility] [place] [facility] [place]10 with Dr. Mckinney, then transfered to [facility] for further wound management management.\"   Precautions/Limitations abdominal precautions;other (see comments)  (No abduction LUE > 45 )   Weight-Bearing Status - LUE partial weight-bearing (% in comments);other (see comments)  (10 lb. lifting restriction)   Weight-Bearing Status - RUE partial weight-bearing (% in comments);other (see comments)  (10 lb. lifting restriction)   Weight-Bearing Status - LLE full weight-bearing   Weight-Bearing Status - RLE full weight-bearing   General Observations Patient greeted supine in bed. Pleasant.   General Info Comments Activity orders: Up ad elena. Do not abduct LUE past 45 degrees (orders for both 45-90 degrees. Spoke with plastics resident to clarify - plan to follow-up w/ attending. Assuming conservative 45 degrees at this time).   Cognitive Status Examination   Orientation orientation to person, place and time   Cognitive Comment Cognition appears intact. Analytical thought processes.    Visual Perception   Visual Perception Comments Patient denies acute changes.   Sensory Examination   Sensory Comments Patient denies changes.   Integumentary/Edema   Integumentary/Edema Comments Sternal incision, L pec flap.    Range of Motion (ROM)   ROM Comment Able to lift L shoulder to ~45 degrees. Limited by precautions.    Strength   Strength Comments DNT due to post-op precautions.   Mobility   Bed Mobility Comments Mod A with difficulty uprighting trunk.   Transfer Skill: Bed to Chair/Chair to Bed   Level of " [place]: Bed to Chair stand-by assist   Transfer Skill: Sit to Stand   Level of [place]: Sit/Stand stand-by assist   Balance   Balance Comments SBA for balance in room. Appears steady on feet. Limited length of ambulation observation due to transport arriving.   Lower Body Dressing   Level of [place]: Dress Lower Body stand-by assist   Instrumental Activities of Daily Living (IADL)   IADL Comments Patient reports IND with IADL tasks at baseline. Has some friends that may be able to assist with heavy ADL/IADL.   Activities of Daily Living Analysis   Impairments Contributing to Impaired Activities of Daily Living pain;flexibility decreased;fear and anxiety;post surgical precautions;strength decreased;ROM decreased  (Post-op Precautions)   General Therapy Interventions   Planned Therapy Interventions ADL retraining;IADL retraining;bed mobility training;transfer training;progressive activity/exercise;home program guidelines;risk factor education   Clinical Impression   Criteria for Skilled Therapeutic Interventions Met yes, treatment indicated   OT Diagnosis Decreased ADL and transfer indepedence.   Influenced by the following impairments Post-Op Precautions, ROM restrictions, Pain, Significant arthritis in R shoulder   Assessment of Occupational Performance 3-5 Performance Deficits   Identified Performance Deficits Bed mobility, dressing, bathing, toileting, home management, leisure   Clinical Decision Making (Complexity) Low complexity   Therapy Frequency Daily   Predicted Duration of Therapy Intervention (days/wks) 4-5 days   Anticipated Equipment Needs at Discharge other (see comments)  (TBD. May require wedge pillow, adjustable bed.)   Anticipated Discharge Disposition Home with Home Therapy;Home with Assist   Risks and Benefits of Treatment have been explained. Yes   Patient, Family & other staff in agreement with plan of care Yes   Clinical Impression Comments Patient would benefit from skilled OT  "services to facilitate increased ADL independence and safety within new post-op restrictions. Has significant difficulty with flat bed mobility.    [facility] AM-PAC  \"6 Clicks\" Daily Activity Inpatient Short Form   1. Putting on and taking off regular lower body clothing? 4 - None   2. Bathing (including washing, rinsing, drying)? 3 - A Little   3. Toileting, which includes using toilet, bedpan or urinal? 3 - A Little   4. Putting on and taking off regular upper body clothing? 4 - None   5. Taking care of personal grooming such as brushing teeth? 4 - None   6. Eating meals? 4 - None   Daily Activity Raw Score (Score out of 24.Lower scores equate to lower levels of function) 22   Total Evaluation Time   Total Evaluation Time (Minutes) 10     "

## 2020-06-20 NOTE — PROGRESS NOTES
Care Coordinator Progress Note    Admission Date/Time:  6/10/2020  Attending MD:  Mark Mckinney, *    Data  Chart reviewed, discussed with interdisciplinary team.   Patient was admitted for:    Status post cardiac surgery  Status post cardiac surgery  Non-healing surgical wound, subsequent encounter  Non-healing surgical wound, subsequent encounter  Wound infection after surgery.    Assessment  PLC appointment cancelled and no availability for the weekend. Patient will need PT and OT consult prior to discharging to home.   Current Plan: PT/OT consults today. Home PT and OT referral sent to [facility] to add to [facility] referral.  IV abx to be given 6/21 prior to discharge in the morning and [facility] RN to meet patient at his home for evening abx dose. No plan for PLC due to cancellation and no appointments available. No further discharge concerns at this time. RNCC available as needed.     Plan  Anticipated Discharge Date:  6/21/2020  Anticipated Discharge Plan:  Home with home infusion and home care.    Rand Ken RN, BSN  Care Coordinator, 8A  Phone [phone]  Pager [phone]

## 2020-06-20 NOTE — PLAN OF CARE
OT/6C: Will see again tomorrow AM to review bed mobility options. Asking if hospital bed rental covered by insurance - per CC, would need to be medically indicated.     Discharge Planner OT   Patient plan for discharge: Home; open to HH therapy evaluation to problem solve sleeping options/bed mobility and ensure safe ADL. Continue OP CR.    Current status: OT evaluation completed, treatment initiated. Patient's biggest concern with home discharge is bed mobility. Limited by significant R shoulder arthritis and L pec flap/sternal restrictions. Previously hooking feet onto baseboards of bed and sitting straight upright. Concern for sternal safety, with patient reporting history of sternal clicking using techniques. Reinforcing importance of protecting sternum while healing. Attempting log roll techniques on flat bed, but patient requiring Mod A to upright trunk d/t arthritic R shoulder. Problem solving sleeping in recliner as option. Patient wondering if hospital bed rental available - reached out to CC. Demonstrates good understanding of UB/LB dressing techniques. SBA for brief ambulation in room to transport.   Barriers to return to prior living situation: Medical Status, Lives Alone, Bed Mobility, Post-op precautions  Recommendations for discharge: Home, but will likely need to sleep in recliner unless would quality for hospital bed or could create some sort of foot support to use as leverage to upright trunk. Plans to stay with friend, Liz for 3 days after discharge. HH therapies to complete home safety eval/further problem solve bed mobility options within home environment; patient ambiguous with ability to re-arrange furniture at home - reports small home environment. OP CR to promote cardiopulmonary recovery post-op - planning to participate at [facility].   Rationale for recommendations: See above for bed mobility concerns. Brief OOB observed due to transport arriving, but appears steady on feet. Needs  reinforcement on importance of maintaining sternal precautions.        Entered by: Hilda Hawk 06/20/2020 11:33 AM

## 2020-06-20 NOTE — PROGRESS NOTES
Inpatient Diabetes consult Service (IDS)    Assessment/plan  1. Diabetes mellitus type 2 .  Control is borderline - could be improved but unclear if patient will accept changes as he heads for possible discharge soon.   He was started back on empaglizin and metformin this AM.   Continue to monitor response to changes.      Due to the COVID 19 pandemic this visit was a video visit in order to help prevent spread of infection in this high risk patient and the general population.     Start time 1441 call to 6c   Stop time 1448  Total time 7 minutes face to face. 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 Mckinney.  [place] [facility] [place]) with sternal wound infection. Transferred to [facility] on (6/10/2020) for further wound management.    During this hospitalization he has had surgical procedures  (washout and wound vac placement) on the mediastinitis/ chest wound on 6/12/2020 and chest wall reconstruction on 6/18/2020    He was first seen by the IDS yesterday. He had been on daily lantus, empagliflozin and metformin prior to admission, He had rx for sitagliptin prior to admission but hadn't actually started it.  Empagliflozin and metformin have been held during the hospitalization.  He has been treated with insulin exclusively during the hospitalizaiton. The total daily insulin doses are trending lower but the blood sugars remain fairly stable in the high 100s /low 200s.  It is noted on the record that he is resistant to change and he needs to be in control and he didn't want to be on prandial insulin at home.  There hasa been discussion about reducing the carb content of his meals.      Total daily insuln  6/19: 25 units  6/18 25 units  6/17: 29 units  6/16: 34 units  6/15: 40 units  6/14/: 39 units  6/13:  "42 units  6/12: 35 units    Ordered DM relevant regimen  glargine 18 units /day at HS- on this dose since 6/12  aspart 1 unit/9 grams of carb with meals and snacks  aspart 1 unit/ 40 > 140 with meals tid and at HS > 200  Amiodarone   Rosuvastatin 20 mg/day  empagliflozin 10 mg/day started today - ordered by someone other than the IDS  Metformin 1000 mg started today - ordered by someone other than the IDS    Active Diet Order  Orders Placed This Encounter      Low Consistent CHO Diet      Recent Labs   Lab 06/20/20  0651 06/19/20  2238 06/19/20  1830 06/19/20  0958 06/19/20  0750 06/19/20  0230 06/18/20  2334 06/18/20  1848  06/15/20  0543  06/14/20  0540   *  --   --  169*  --   --   --   --   --  187*  --  174*   BGM  --  196* 200*  --  179* 226* 205* 167*   < >  --    < >  --     < > = values in this interval not displayed.       PTA:  lantus 18 units at bedtime  metformin 1000 mg twice daily  Jardiance (empagliflozin) does not recall the dose, per chart review 10 mg daily  Sitagliptin ( januvia) 100 mg daily ( has not start the medication)    ROS  So far so good  A little tired; sleep last night ??? Can't remember.  - leg spasms interfered with sleep last night.    Hasn't had lunch yet   He has been walking once in the [place] and also to the bathroom  He anticipates discharge ? Tomorrow?   He is asking me if I have read certain books  Dr Savage Diabetes code and another one about curing diabetes    Exam  BP (!) 146/75 (BP Location: Right arm)   Pulse 79   Temp 99.1  F (37.3  C) (Axillary)   Resp 16   Ht 1.803 m (5' 11\")   Wt 90.9 kg (200 lb 6.4 oz)   SpO2 93%   BMI 27.95 kg/m    GENERAL: no distress. He is sitting in chair in the room  EYES: Eyes grossly normal to inspection.  RESP: No audible wheeze, cough, or visible cyanosis.  No visible retractions or increased work of breathing.    SKIN: Visible skin clear.  NEURO: Cranial nerves grossly intact.  Mentation and speech appropriate  PSYCH: Mentation " appears normal, affect normal, judgement and insight intact, normal speech and appearance well-groomed.    DATA     Results for ABILIO SUAZO (MRN [id number]) as of 6/20/2020 08:21   Ref. Range 6/20/2020 06:51   Sodium Latest Ref Range: 133 - 144 mmol/L 132 (L)   Potassium Latest Ref Range: 3.4 - 5.3 mmol/L 4.4   Chloride Latest Ref Range: 94 - 109 mmol/L 102   Carbon Dioxide Latest Ref Range: 20 - 32 mmol/L 23   Urea Nitrogen Latest Ref Range: 7 - 30 mg/dL 26   Creatinine Latest Ref Range: 0.66 - 1.25 mg/dL 1.22   GFR Estimate Latest Ref Range: >60 mL/min/1.73_m2 63   GFR Estimate If Black Latest Ref Range: >60 mL/min/1.73_m2 73   Calcium Latest Ref Range: 8.5 - 10.1 mg/dL 8.5   Anion Gap Latest Ref Range: 3 - 14 mmol/L 8   Glucose Latest Ref Range: 70 - 99 mg/dL 196 (H)   WBC Latest Ref Range: 4.0 - 11.0 10e9/L 11.4 (H)   Hemoglobin Latest Ref Range: 13.3 - 17.7 g/dL 8.1 (L)   Hematocrit Latest Ref Range: 40.0 - 53.0 % 27.2 (L)   Platelet Count Latest Ref Range: 150 - 450 10e9/L 252

## 2020-06-20 NOTE — PLAN OF CARE
Discharge Planner PT   6C -  Current status: PT orders received. Per discussion with OT - pt will only require one therapy discipline to address rehab needs. Pt transferring and ambulating with SBA - pt mainly limited by bed mobility. OT to continue to follow for therapy, PT to complete orders. Please refer to OT notes for discharge/therapy recs.  Barriers to return to prior living situation: Defer to OT.  Recommendations for discharge: Defer to OT.  Rationale for recommendations: Defer to OT.

## 2020-06-20 NOTE — PHARMACY-VANCOMYCIN DOSING SERVICE
Pharmacy Vancomycin Note  Date of Service 2020  Patient's  1960   60 year old, male    Indication: Bone and Joint Infection  Goal Trough Level: 15-20 mg/L  Day of Therapy: 12  Current Vancomycin regimen:  1250 mg IV q12h    Current estimated CrCl = Estimated Creatinine Clearance: 73.3 mL/min (A) (based on SCr of 1.33 mg/dL (H)).    Creatinine for last 3 days  2020:  6:14 AM Creatinine 1.14 mg/dL  2020:  6:54 AM Creatinine 1.19 mg/dL  2020:  9:58 AM Creatinine 1.33 mg/dL    Recent Vancomycin Levels (past 3 days)  2020:  8:05 PM Vancomycin Level 19.0 mg/L    Vancomycin IV Administrations (past 72 hours)                   vancomycin 1250 mg in 0.9% NaCl 250 mL intermittent infusion 1,250 mg (mg) 1,250 mg Given 20     1,250 mg Given 20     1,250 mg Given 20     1,250 mg Given  827                Nephrotoxins and other renal medications (From now, onward)    Start     Dose/Rate Route Frequency Ordered Stop    20  vancomycin 1250 mg in 0.9% NaCl 250 mL intermittent infusion 1,250 mg      1,250 mg  over 90 Minutes Intravenous EVERY 12 HOURS 20  rifampin (RIFADIN) capsule 300 mg      300 mg Oral EVERY 12 HOURS SCHEDULED 20 1503               Contrast Orders - past 72 hours (72h ago, onward)    None          Interpretation of levels and current regimen:  Trough level is  Therapeutic    Has serum creatinine changed > 50% in last 72 hours: increased in past 24h.     Urine output:  good urine output    Renal Function: Stable    Plan:  1.  Continue Current Dose  2.  Pharmacy will check trough levels as appropriate in 3-5 Days.              .

## 2020-06-20 NOTE — PLAN OF CARE
[id number]: VSS on RA. NSR HR 80s. Afebrile. Pain controlled with PRN Tylenol and Oxycodone q 4 hours. PRN Flexeril TID. Patient refusing PRN Robaxin. Sternal dressing with 2 primapore and L) DAVIDSON with bloody output. BG AC, HS-reminded patient of importance of tight glucose control for wound healing. On IV Vanco and PO Rifampin for sternal infection.   Possible discharge this weekend. Will continue to monitor and follow POC.

## 2020-06-20 NOTE — PROGRESS NOTES
"Plastic Surgery Progress Note    S: No acute events overnight.     O:  /66 (BP Location: Right arm)   Pulse 79   Temp 98.8  F (37.1  C) (Axillary)   Resp 16   Ht 1.803 m (5' 11\")   Wt 90.9 kg (200 lb 6.4 oz)   SpO2 92%   BMI 27.95 kg/m    Gen: NAD, AOx3  Chest: NLB. Incision with dressing in place, minimal strikethrough. Skin flap WWP. No evidence of hematoma. Drain s/s.  Ext: WWP.    A/P:  59 yo with sternal wound infection now POD #2 s/p pec major flap coverage of sternal wound. Convalescing appropriately    - Follow up on intra-op cultures  - Left arm limit abduction to less than 45 degrees for ~2-3 weeks  - Okay to discharge from plastics perspective when pain controlled and appropriate outpatient antimicrobial plan in place    Oren Caro PGY-5  Plastic Surgery  Please page on call    "

## 2020-06-20 NOTE — PROGRESS NOTES
Cardiovascular Surgery Progress Note  6/20/2020         Assessment and Plan:     Nabil Cheung is a 60 y.o. male with a PMH of poorly controlled DMT2, hypertension, CKD stage 2-3, BPH/urinary retention, chronic diabetic left foot ulcer, and recently diagnosed severe 3 vessel disease with progressive symptoms at home. Underwent CABG x 4 (LIMA to LAD, SVG to rPDA, SVG to OM, SVG to diag) 05/04 with Dr. Mckinney. Discharged to home 05/12. Admitted to [facility] [place] [facility] from home 06/08 with sternal wound infection. Underwent I&D of sternum at [facility] 06/10 with Dr. Mckinney, then transfered to [facility] for further wound management management.   Appreciate Plastic Surgery consult and assistance.   PT and OT consult 6/20 for help with ADLs, getting in and out of bed.  Started Metformin and empagliflozin 6/20 am, will need to watch for hypoglycemia during the day and maybe adjust evening Lantus dosing.        Infectious Disease:  Leukocytosis - improved   Sternal wound infection  - Bedside I&D 06/09 at [facility], Culture 6/9 ([facility]) growing staph epidermidis  - OR I&D 6/10 with Dr. Mckinney. Infection tracks length of sternal incision with areas of exposed bone and exposed wires. Exposed wires removed, sternal wound vac placed  - OR washout 6/12 with replacement of wound vac, purulent debris present, cultures resent. Staph epidermidis - oxacillin resistant MRSE   - Consulted ID 6/11: continue vanc through 4-6 weeks goal troph 15-20, Rifampin 300 mg po BID today added 6/18.    - appreciate ID assistance  - S/p Procedure(s) 6/18/2020: Chest wall reconstruction, debridement of sternal wound and left major pectoral flap. DAVIDSON drain with 230 ml bloody output from yesterday. Continue DAVIDSON drain per Plastic surgery, will DC to home with drain.   - As per Plastics:               - Follow up on intra-op cultures              - Left arm limit abduction to less than 90 degrees for ~2-3 weeks              - Okay to discharge from plastics perspective when pain  controlled and appropriate outpatient antimicrobial plan in place              - Follow up in plastics as scheduled in July  - continue sternum restrictions     Cardiovascular:   CAD s/p CABG 5/4/2020  Hypertension  - Most recent TTE 05/05 with preserved biventricular dysfunction  - Aspirin 325 mg daily  - Rosuvastatin 20 mg daily  - Hypertensive, changed lisinopril to hydralazine for bump in Cr likely due to elevated vanco level. Cr back down, switched back to Lisinopril.   - B/p's lower this am, likely due to better pain control and antispasmodic medications, will decrease Lisinopril to 10 mg daily and continue Norvasc 5 mg daily, and Toprol XL.               *patient very concerned about the jump in b/p as he had lower b/p's prior to admission. He has had his Metformin and Jardiance held while here, taking sliding scale insulin. Jardiance could have contributed to a decrease in b/p prior to admission. Will need to monitor b/p's closely when home meds resumed.    - net even over 24 hours, net negative overall 2.6 L despite weight being up 1.1 kg from yesterday. Diuretic PRN  Postop atrial fibrillation - currently in SR   - Continue PTA amiodarone taper, currently 200 mg daily (last dose 06/25)  - Systemic anticoagulation deferred     Pulmonary:  - No active issues. Saturating well on RA.   - Supplemental O2 PRN to keep sats > 92%. Wean off as tolerated.  - Pulm toilet, IS, activity and deep breathing      Neurology / MSK:  Postop pain - moderate, controlled with pain meds   Having increased pain s/p I & D with flap closure 6/18  - continue Tylenol PRN, Oxycodone PRN  - Robaxin PRN post-flap, Flexeril prn muscle spasms.       / Renal:  CKD stage 2-3  - Most recent creatinine within baseline, trend daily. Cr 1.22.    - Avoid nephrotoxins, Diuresis PRN, giving lasix 20 mg 6/20.    - Vanco level 25.9 on 6/12, 22.2 6/15. Aim for a trough level of 15-20 per ID.      BPH  Urinary retention  - PTA finasteride 5 mg  daily.  - PTA alfuzosin 10 mg daily.      GI / FEN:   Nutrition  - Diabetic diet, bowel regimen     Endocrine:  DMT2, poorly controlled  - Endocrine consult, appreciate recs.  Patient has been relatively controlled on Lantus, sliding scale, prandial dosing. However, he has clearly indicated he will not take meal time insulin after returning to home, so will restart Metformin and empagliflozin 6/20 (creat improved) to see how he tolerates it.   - need to reinforce importance of good glucose (<180's) control for wound healing     Chronic left foot ulcer  - Previously recommended for out-patient podiatry follow up for further debridement  - WOC consult  - Mepilex dressing daily and PRN for saturation     Hematology:   - Stable Hgb 9s, Plts WNL.      Prophylaxis:   - Stress ulcer prophylaxis: not indicated.   - DVT prophylaxis: Subcutaneous heparin, SCD.  - PICC for long term IV antibiotics.     Disposition:   Possible discharge Sunday AM after [facility] for PICC/Drain care appt at 10:30 at [facility]. Ordered Home PT/OT and Home Care RN for Vanco administration Sunday around 6 pm.    Follow up PCP within 1 week for ongoing diabetes and BP management.  Follow up with Plastics for all wound care, Saadia Obrien PA-C on July 10, 2020 at 9:45 am.   Follow up with ID as per their recommendations, f/u cultures and abx therapy      Staff surgeons have been informed of changes through both verbal and written communication.      Joao Claros PA-C  Cardiothoracic Surgery  Pager [phone]    8:36 AM   June 20, 2020          Interval History:     No overnight events.  Complains of dry mouth and tingly lips.   States pain is well managed on current regimen. Slept well overnight.  Tolerating diet, is passing flatus, + BM. No nausea or vomiting.  Breathing well without complaints.   Working with therapies and ambulating in halls with assistance.   Denies chest pain, palpitations, dizziness, syncopal symptoms, fevers, chills, myalgias, or  "sternal popping/clicking.         Physical Exam:   Blood pressure (!) 146/75, pulse 79, temperature 99.1  F (37.3  C), temperature source Axillary, resp. rate 16, height 1.803 m (5' 11\"), weight 90.9 kg (200 lb 6.4 oz), SpO2 93 %.  Vitals:    06/17/20 0420 06/18/20 0030 06/20/20 0644   Weight: 89.1 kg (196 lb 8 oz) 90 kg (198 lb 8 oz) 90.9 kg (200 lb 6.4 oz)      Weight; + 4.5 kg since admit and trending up x 1 day.   24 hr Fluid status; net loss 600 mL.   MAPs: 76 - 100     Gen: A&Ox4, NAD  Neuro: no focal deficits   CV: RRR, normal S1 S2, no murmurs, rubs or gallops. + JVD  Pulm: CTA, no wheezing or rhonchi, normal breathing on RA  Abd: nondistended, normal BS, soft, nontender  Ext: no periperal edema  Incision: dressing with spotty soiling, otherwise clean, dry, intact, no erythema, sternum stable  Tubes/drain sites: dressing clean and dry, serosanguinous output, no air leak. 24 hr output 230 mL.            Data:    Imaging:  reviewed recent imaging, no acute concerns    Labs:  BMP  Recent Labs   Lab 06/20/20  0651 06/19/20  0958 06/18/20  0654 06/17/20  0614  06/15/20  0543 06/14/20  0540   * 131*  --   --   --  139 137   POTASSIUM 4.4 4.3  --   --   --  4.2 4.0   CHLORIDE 102 101  --   --   --  106 107   ONEYDA 8.5 8.2*  --   --   --  8.6 8.3*   CO2 23 24  --   --   --  26 26   BUN 26 21  --   --   --  21 16   CR 1.22 1.33* 1.19 1.14   < > 1.16 1.07   * 169*  --   --   --  187* 174*    < > = values in this interval not displayed.     CBC  Recent Labs   Lab 06/20/20  0651 06/19/20  0958 06/17/20  0614 06/15/20  0543   WBC 11.4* 13.3* 7.8 7.8   RBC 3.24* 3.33* 3.67* 3.59*   HGB 8.1* 8.4* 9.4* 9.1*   HCT 27.2* 28.0* 31.0* 29.8*   MCV 84 84 85 83   MCH 25.0* 25.2* 25.6* 25.3*   MCHC 29.8* 30.0* 30.3* 30.5*   RDW 14.9 15.0 15.0 15.1*    266 283 313     INR  No lab results found in last 7 days.   Liver Function Studies -   Recent Labs   Lab Test 06/11/20  0427   PROTTOTAL 6.4*   ALBUMIN 2.6* "   BILITOTAL 0.5   ALKPHOS 64   AST 12   ALT 16     GLUCOSE:   Recent Labs   Lab 06/20/20  0651 06/19/20  2238 06/19/20  1830 06/19/20  0958 06/19/20  0750 06/19/20  0230 06/18/20  2334 06/18/20  1848  06/15/20  0543  06/14/20  0540   *  --   --  169*  --   --   --   --   --  187*  --  174*   BGM  --  196* 200*  --  179* 226* 205* 167*   < >  --    < >  --     < > = values in this interval not displayed.

## 2020-06-21 ENCOUNTER — APPOINTMENT (OUTPATIENT)
Dept: OCCUPATIONAL THERAPY | Facility: CLINIC | Age: 60
End: 2020-06-21
Attending: THORACIC SURGERY (CARDIOTHORACIC VASCULAR SURGERY)
Payer: COMMERCIAL

## 2020-06-21 ENCOUNTER — PATIENT OUTREACH (OUTPATIENT)
Dept: CARE COORDINATION | Facility: CLINIC | Age: 60
End: 2020-06-21

## 2020-06-21 VITALS
HEART RATE: 98 BPM | RESPIRATION RATE: 16 BRPM | OXYGEN SATURATION: 98 % | TEMPERATURE: 99.4 F | SYSTOLIC BLOOD PRESSURE: 163 MMHG | DIASTOLIC BLOOD PRESSURE: 90 MMHG | BODY MASS INDEX: 27.5 KG/M2 | WEIGHT: 196.4 LBS | HEIGHT: 71 IN

## 2020-06-21 LAB
ANION GAP SERPL CALCULATED.3IONS-SCNC: 8 MMOL/L (ref 3–14)
BUN SERPL-MCNC: 24 MG/DL (ref 7–30)
CALCIUM SERPL-MCNC: 9 MG/DL (ref 8.5–10.1)
CHLORIDE SERPL-SCNC: 102 MMOL/L (ref 94–109)
CO2 SERPL-SCNC: 25 MMOL/L (ref 20–32)
CREAT SERPL-MCNC: 1.23 MG/DL (ref 0.66–1.25)
ERYTHROCYTE [DISTWIDTH] IN BLOOD BY AUTOMATED COUNT: 14.8 % (ref 10–15)
GFR SERPL CREATININE-BSD FRML MDRD: 62 ML/MIN/{1.73_M2}
GLUCOSE BLDC GLUCOMTR-MCNC: 140 MG/DL (ref 70–99)
GLUCOSE BLDC GLUCOMTR-MCNC: 146 MG/DL (ref 70–99)
GLUCOSE SERPL-MCNC: 125 MG/DL (ref 70–99)
HCT VFR BLD AUTO: 25.9 % (ref 40–53)
HGB BLD-MCNC: 7.8 G/DL (ref 13.3–17.7)
MAGNESIUM SERPL-MCNC: 2 MG/DL (ref 1.6–2.3)
MCH RBC QN AUTO: 25 PG (ref 26.5–33)
MCHC RBC AUTO-ENTMCNC: 30.1 G/DL (ref 31.5–36.5)
MCV RBC AUTO: 83 FL (ref 78–100)
PLATELET # BLD AUTO: 274 10E9/L (ref 150–450)
POTASSIUM SERPL-SCNC: 4.2 MMOL/L (ref 3.4–5.3)
RBC # BLD AUTO: 3.12 10E12/L (ref 4.4–5.9)
SODIUM SERPL-SCNC: 135 MMOL/L (ref 133–144)
WBC # BLD AUTO: 9.2 10E9/L (ref 4–11)

## 2020-06-21 PROCEDURE — 25000132 ZZH RX MED GY IP 250 OP 250 PS 637: Performed by: STUDENT IN AN ORGANIZED HEALTH CARE EDUCATION/TRAINING PROGRAM

## 2020-06-21 PROCEDURE — 25800030 ZZH RX IP 258 OP 636: Performed by: PHYSICIAN ASSISTANT

## 2020-06-21 PROCEDURE — 25000132 ZZH RX MED GY IP 250 OP 250 PS 637: Performed by: PHYSICIAN ASSISTANT

## 2020-06-21 PROCEDURE — 25000132 ZZH RX MED GY IP 250 OP 250 PS 637: Performed by: INTERNAL MEDICINE

## 2020-06-21 PROCEDURE — 83735 ASSAY OF MAGNESIUM: CPT | Performed by: PHYSICIAN ASSISTANT

## 2020-06-21 PROCEDURE — 25000128 H RX IP 250 OP 636: Performed by: PHYSICIAN ASSISTANT

## 2020-06-21 PROCEDURE — 85027 COMPLETE CBC AUTOMATED: CPT | Performed by: PHYSICIAN ASSISTANT

## 2020-06-21 PROCEDURE — 36592 COLLECT BLOOD FROM PICC: CPT | Performed by: PHYSICIAN ASSISTANT

## 2020-06-21 PROCEDURE — 25000132 ZZH RX MED GY IP 250 OP 250 PS 637: Performed by: NURSE PRACTITIONER

## 2020-06-21 PROCEDURE — 80048 BASIC METABOLIC PNL TOTAL CA: CPT | Performed by: PHYSICIAN ASSISTANT

## 2020-06-21 PROCEDURE — 00000146 ZZHCL STATISTIC GLUCOSE BY METER IP

## 2020-06-21 PROCEDURE — 97530 THERAPEUTIC ACTIVITIES: CPT | Mod: GO

## 2020-06-21 RX ORDER — RIFAMPIN 300 MG/1
300 CAPSULE ORAL EVERY 12 HOURS
Qty: 60 CAPSULE | Refills: 0 | Status: SHIPPED | OUTPATIENT
Start: 2020-06-21 | End: 2020-07-24

## 2020-06-21 RX ORDER — AMLODIPINE BESYLATE 10 MG/1
10 TABLET ORAL DAILY
Status: DISCONTINUED | OUTPATIENT
Start: 2020-06-21 | End: 2020-06-21 | Stop reason: HOSPADM

## 2020-06-21 RX ORDER — OXYCODONE HYDROCHLORIDE 5 MG/1
5 TABLET ORAL EVERY 4 HOURS PRN
Qty: 25 TABLET | Refills: 0 | Status: SHIPPED | OUTPATIENT
Start: 2020-06-21 | End: 2020-07-08

## 2020-06-21 RX ORDER — FUROSEMIDE 20 MG
20 TABLET ORAL DAILY
Qty: 30 TABLET | Refills: 0 | Status: CANCELLED | OUTPATIENT
Start: 2020-06-22

## 2020-06-21 RX ORDER — LISINOPRIL 2.5 MG/1
2.5 TABLET ORAL DAILY
Status: DISCONTINUED | OUTPATIENT
Start: 2020-06-21 | End: 2020-06-21 | Stop reason: HOSPADM

## 2020-06-21 RX ORDER — LISINOPRIL 2.5 MG/1
5 TABLET ORAL DAILY
Qty: 60 TABLET | Refills: 0 | Status: ON HOLD | OUTPATIENT
Start: 2020-06-21 | End: 2021-01-09

## 2020-06-21 RX ORDER — AMLODIPINE BESYLATE 10 MG/1
10 TABLET ORAL DAILY
Qty: 45 TABLET | Refills: 0 | Status: ON HOLD | OUTPATIENT
Start: 2020-06-21 | End: 2021-01-08

## 2020-06-21 RX ORDER — CYCLOBENZAPRINE HCL 10 MG
10 TABLET ORAL 3 TIMES DAILY PRN
Qty: 30 TABLET | Refills: 0 | Status: ON HOLD | OUTPATIENT
Start: 2020-06-21 | End: 2021-08-21

## 2020-06-21 RX ADMIN — EMPAGLIFLOZIN 10 MG: 10 TABLET, FILM COATED ORAL at 08:16

## 2020-06-21 RX ADMIN — METOPROLOL SUCCINATE 50 MG: 50 TABLET, EXTENDED RELEASE ORAL at 08:16

## 2020-06-21 RX ADMIN — ACETAMINOPHEN 650 MG: 325 TABLET, FILM COATED ORAL at 08:32

## 2020-06-21 RX ADMIN — AMLODIPINE BESYLATE 10 MG: 10 TABLET ORAL at 08:32

## 2020-06-21 RX ADMIN — CYCLOBENZAPRINE 10 MG: 10 TABLET, FILM COATED ORAL at 08:32

## 2020-06-21 RX ADMIN — AMIODARONE HYDROCHLORIDE 200 MG: 200 TABLET ORAL at 08:15

## 2020-06-21 RX ADMIN — Medication 400 MG: at 08:16

## 2020-06-21 RX ADMIN — ASPIRIN 325 MG ORAL TABLET 325 MG: 325 PILL ORAL at 08:16

## 2020-06-21 RX ADMIN — LISINOPRIL 2.5 MG: 2.5 TABLET ORAL at 10:10

## 2020-06-21 RX ADMIN — ALFUZOSIN HYDROCHLORIDE 10 MG: 10 TABLET, EXTENDED RELEASE ORAL at 08:16

## 2020-06-21 RX ADMIN — RIFAMPIN 300 MG: 300 CAPSULE ORAL at 08:15

## 2020-06-21 RX ADMIN — VANCOMYCIN HYDROCHLORIDE 1250 MG: 10 INJECTION, POWDER, LYOPHILIZED, FOR SOLUTION INTRAVENOUS at 08:16

## 2020-06-21 ASSESSMENT — ACTIVITIES OF DAILY LIVING (ADL)
ADLS_ACUITY_SCORE: 15

## 2020-06-21 ASSESSMENT — MIFFLIN-ST. JEOR: SCORE: 1712.99

## 2020-06-21 NOTE — PROGRESS NOTES
"Plastic Surgery Progress Note    S: No acute events overnight.     O:  BP (!) 163/90 (BP Location: Right arm)   Pulse 98   Temp 99.4  F (37.4  C) (Axillary)   Resp 16   Ht 1.803 m (5' 11\")   Wt 89.1 kg (196 lb 6.4 oz)   SpO2 98%   BMI 27.39 kg/m    Gen: NAD, AOx3  Chest: NLB. Incision with dressing in place, CVTS team changed dressing this am and no concerns, patient requesting not to remove again, no strikethrough.  Skin flap WWP. No evidence of hematoma. Drain s/s.  Ext: WWP.    A/P:  61 yo with sternal wound infection now POD #3 s/p pec major flap coverage of sternal wound. Convalescing appropriately    - Discharging home today on IV Vanco, PICC in place  - Left arm limit abduction to less than 45 degrees for ~2-3 weeks  - Patient has follow up with plastics in ~2 weeks    Oren Caro PGY-5  Plastic Surgery  Please page on call    "

## 2020-06-21 NOTE — PROGRESS NOTES
Inpatient Diabetes consult Service (IDS)    Assessment/plan  1. Diabetes mellitus type 2 .   Multiple med changes 6/20.  Glucose control looks good so far.  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 Mckinney.  [place] [facility] [place] infection (6/8/2020) with sternal wound infection. Transferred to [facility] on (6/10/2020) for further wound management.    During this hospitalization he has had surgical procedures  (washout and wound vac placement) on the mediastinitis/ chest wound on 6/12/2020 and chest wall reconstruction on 6/18/2020     He had been on daily lantus, empagliflozin and metformin prior to admission, He had rx for sitagliptin prior to admission but hadn't actually started it.  Empagliflozin and metformin have been held during the hospitalization but were restarted yesterday.     Total daily insuln  6/20/2020: 22 units  6/19: 25 units  6/18 25 units  6/17: 29 units  6/16: 34 units    Ordered DM relevant regimen  glargine 15 units /day at HS- started 6/20  aspart 1 unit/ 40 > 140 with meals tid and at HS > 200  Amiodarone   Rosuvastatin 20 mg/day  empagliflozin 10 mg/day started 6/20/2020  Metformin 1000 mg started 6/20/2020    Active Diet Order  Orders Placed This Encounter      Low Consistent CHO Diet      Recent Labs   Lab 06/21/20  0629 06/21/20  0413 06/20/20  2259 06/20/20  2019 06/20/20  0953 06/20/20  0651 06/19/20  2238 06/19/20  1830 06/19/20  0958  06/15/20  0543   *  --   --   --   --  196*  --   --  169*  --  187*   BGM  --  140* 174* 211* 178*  --   "196* 200*  --    < >  --     < > = values in this interval not displayed.       PTA:  lantus 18 units at bedtime  metformin 1000 mg twice daily  Jardiance (empagliflozin) does not recall the dose, per chart review 10 mg daily  Sitagliptin ( januvia) 100 mg daily ( has not start the medication)    Exam  BP (!) 160/90   Pulse 85   Temp 97.4  F (36.3  C) (Axillary)   Resp 16   Ht 1.803 m (5' 11\")   Wt 89.1 kg (196 lb 6.4 oz)   SpO2 98%   BMI 27.39 kg/m        DATA     Results for ABILIO SUAZO (MRN [id number]) as of 6/21/2020 07:40   Ref. Range 6/21/2020 06:29   Sodium Latest Ref Range: 133 - 144 mmol/L 135   Potassium Latest Ref Range: 3.4 - 5.3 mmol/L 4.2   Chloride Latest Ref Range: 94 - 109 mmol/L 102   Carbon Dioxide Latest Ref Range: 20 - 32 mmol/L 25   Urea Nitrogen Latest Ref Range: 7 - 30 mg/dL 24   Creatinine Latest Ref Range: 0.66 - 1.25 mg/dL 1.23   GFR Estimate Latest Ref Range: >60 mL/min/1.73_m2 62   GFR Estimate If Black Latest Ref Range: >60 mL/min/1.73_m2 72   Calcium Latest Ref Range: 8.5 - 10.1 mg/dL 9.0   Anion Gap Latest Ref Range: 3 - 14 mmol/L 8   Magnesium Latest Ref Range: 1.6 - 2.3 mg/dL 2.0   Glucose Latest Ref Range: 70 - 99 mg/dL 125 (H)     "

## 2020-06-21 NOTE — PROGRESS NOTES
Care Coordinator Progress Note    Admission Date/Time:  6/10/2020  Attending MD:  Dr Mark Mckinney    Data  Chart reviewed, discussed with interdisciplinary team. Received call from DUSTIN Hanna; he reported plan is discharge home today on IV antibiotics. I will notify [facility].    Patient was admitted for:    Status post cardiac surgery  Non-healing surgical wound, subsequent encounter  Wound infection after surgery.    Procedure 6-:    1. Left pectoralis major muscle rotation flap to sternal wound.   2. Excision of skin and subcutaneous tissue, bone and cartilage and irrigation and debridement of sternal wound    Coordination of Care and Referrals   Reviewed previous RNCC notes, pt was referred to [facility] for home IV antibiotics and [facility] for skilled home care.   Called [facility] and spoke with Price. Informed her plan is discharge today. Pt on Vanco every 12 hours, next dose is due at 8pm tonight. Price requested Discharge Orders, PICC info be faxed to her at fax# [phone]. Different than other fax number that we have. I will fax to both numbers. Price's phone: [phone].   Faxed Discharge Orders; PICC line info; latest Infectious Disease note and recent Pharmacy notes.   Spoke with pt and confirmed plan for discharge today. Pt said he will be staying at a friend's home for a few days. Discharge Address: [address].   Spoke with TED Stevens. She is recommending home PT/OT. Spoke with nurse, Sarah about discharge plan. She said pt will be discharging with a drain. Updated home care orders on the AVS.    Spoke with Price again at [facility]. She requested specific PICC line flush orders. It can be added to the Vanco script.  Noted pt has a valved PICC line and is being flushed with Normal Saline here, per the MAR. As a resource, I called Abi at [facility] and requested info on their routine PICC line flushes for a valved PICC. I communicated this info to  DUSTIN Johnson and he added it to the Vanco order. Faxed updated Vanco order with flush info to [facility].   Faxed Discharge Orders and Face to Face to [facility]. [facility], they said pt needs to be home by 3pm; they are not able to see him after 3pm. Called pt's nurse, Sarah and she said pt was leaving soon (will be discharged before noon).  [facility] notified of discharge address.      Assessment  Pt ready for discharge. Needing home IV antibiotics; skilled home are RN, PT & OT.      Plan  Anticipated Discharge Date:  Today  Anticipated Discharge Plan:   Discharge to friend's home for a few days. Home care nurse to meet pt at home today for IV teaching. [facility] to provide home IV antibiotics and supplies.       Peri Talavera, RN Care Coordinator (Weekend RNCC Coverage)  [address], [facility]

## 2020-06-21 NOTE — PROGRESS NOTES
DISCHARGE                         6/21/2020 11:59 AM  ----------------------------------------------------------------------------  Discharged to: Home- staying at friend [place] for a couple days/   Via: private transportation- friend antoine  Accompanied by: Family  Discharge Instructions:  Low cho diet, weight restrictions and sternal precautions activity, medications, follow up appointments, when to call the MD, aftercare instructions.  Prescriptions: To be filled by [facility] d/c pharmacy; medication list reviewed & sent with pt  Follow Up Appointments: arranged; information given  Belongings: All sent with pt  IV: d/c'd  Telemetry: d/c'd  Pt exhibits understanding of above discharge instructions; all questions answered.  Educated pt on DAVIDSON and incision cares. Sent dressing supplies home with pt. Pt demonstrated emptying DAVIDSON.     Discharge Paperwork: Signed, copied, and sent home with patient.

## 2020-06-21 NOTE — PLAN OF CARE
OT/6C:   Discharge Planner OT   Patient plan for discharge: To friend [place] for ~3 days and then home with HH therapies.   Current status: Improved success with log roll techniques for bed mobility today with use of wedge pillow and cues to brace LE's against bed. Able to demonstrate x4 with contact guard, progressing to SBA. Patient understands benefits of log roll and importance of protecting sternum. Still reluctant to fully avoid heavy forward flexion but ultimately agreeable upon extended education. IND with donning shoes. Negotiates 3 stairs x2 with cues to slow speed for safety. Ambulates ~150 feet with IV pole support and SBA-CGA. Mild instability, but anticipate related to awkward positioning of IV pole. Declines use of sling on LUE - has been offered. Reinforcing LUE abduction restrictions; with patient electing to hold against body. Elevated BP at end of session.   Barriers to return to prior living situation: Medical Status, Lives Alone, Post-op Precautions  Recommendations for discharge: Home with assist from friends for heavy ADL/IADL tasks. HH OT/PT to progress safety and maintenance of precautions in home environment. Recommend HH/friend assist to set-up bed with wedge pillow supports at home to facilitate ease of bed mobility.   Rationale for recommendations: Patient with improved bed mobility performance today. With environmental modifications, should be able to able to complete bed mobility safely. Eventual progression to OP CR to promote activity tolerance/strength.        Entered by: Hilda Hawk 06/21/2020 9:29 AM     Occupational Therapy Discharge Summary    Reason for therapy discharge:    Discharged to home with home therapy.    Progress towards therapy goal(s). See goals on Care Plan in [facility] electronic health record for goal details.  Goals partially met.  Barriers to achieving goals:   discharge from facility.    Therapy recommendation(s):    Continued therapy is recommended.   Rationale/Recommendations:  HH therapy safety evaluation to optimize home environment and ensure adequate adherence to precautions. Progression to OP CR to promote strength/endurance s/p cardiac surgery.

## 2020-06-21 NOTE — PLAN OF CARE
1900-0730:  Neuro: A&Ox4. Slightly lethargic and forgetful. Patient states he felt groggy and wanted to hold off on taking oxycodone.  Cardiac: NSR, HR 90s. BPs hypertensive, PRN Labetalol not given as recheck was below parameters.  Resp: RA  GI/: Voiding adequate amounts.   Diet/Appetite: Tolerating diabetic diet. BG AC, HS. Restarted metformin and jardiance yesterday.  overnight. Denies nausea.  Skin: Sternal wound covered with primapore. L) ankle ulcer (chronic) covered with mepilex.  Access: L) DL PICC SL. Patient on IV Vanco for sternal infection.  Drains: L) DAVIDSON  Activity: Up ad elena.  Pain: PRN flexeril x1, PRN Tylenol x1.  Discharge today with home care for PICC teaching.    Will continue with plan of care and notify MD of any changes.

## 2020-06-22 ENCOUNTER — TELEPHONE (OUTPATIENT)
Dept: INFECTIOUS DISEASES | Facility: CLINIC | Age: 60
End: 2020-06-22

## 2020-06-22 LAB
BACTERIA SPEC CULT: ABNORMAL
SPECIMEN SOURCE: ABNORMAL

## 2020-06-22 NOTE — TELEPHONE ENCOUNTER
Left [facility] for [place], from Home Health Care, requesting a call back to discuss which specific orders she is wondering if Dr. Connolly will sign.    Tanya Smith RN

## 2020-06-22 NOTE — PROGRESS NOTES
[facility]: Post-Discharge Note  SITUATION                                                      Admission:    Admission Date: 06/10/20   Reason for Admission: Sternal Wound Infection, Staph epidermidis - oxacillin resistant MRSE  Discharge:   Discharge Date: 06/21/20  Discharge Diagnosis: Sternal Wound Infection, Staph epidermidis - oxacillin resistant MRSE  Discharge Service: Cardiothoracic surgery    BACKGROUND                                                      BRIEF HISTORY OF ILLNESS:  60 y.o. male with a PMH of poorly controlled DMT2, hypertension, CKD stage 2-3, BPH/urinary retention, chronic diabetic left foot ulcer, and recently diagnosed severe 3 vessel disease with progressive symptoms at home. Underwent CABG x 4 (LIMA to LAD, SVG to rPDA, SVG to OM, SVG to diag) 05/04 with Dr. Mckinney. Discharged to home 05/12. Admitted to [facility] from home 06/08 with sternal wound infection. Underwent I&D of sternum, removal of affected wires, and placement of a wound vac at [facility] 06/10 with Dr. Mckinney. He was transfered that same day to [facility] for further wound management management.       HOSPITAL COURSE: Mr. Cheung was transferred to [facility] 6/10/2020 and underwent the above-named procedure prior to transfer. He underwent a repeat washout and replacement of wound vac 6/12. He tolerated the procedure well with moderate amount of pain post-procedure treated with pain medication. ID was consulted and has recommended IV Vancomycin for 4-6 weeks. Plastics was consulted and they performed a chest wall reconstruction with left major pectoral flap and placement of a DAVIDSON drain 6/18/2020. He has had a considerable amount of left pectoral pain which was treated with oxycodone and Flexeril prn. He has had significant leg spasms as well, which is not new for him and he also takes flexeril prn for this.     While hospitalized his b/p has been elevated requiring increased lisinopril dosing, added Norvasc, and prn  "labetalol. His creat did bump 6/19, likely due to combination of Vancomycin and ACE I. Stopped lisinopril and then restarted 5 mg on day of discharge due to elevated BPs (creatinine WNL), continued Norvasc and will need to readdress at follow up. He will take regular BPs and write them down. He refused Lasix at discharge, therefore asked to keep daily weights.       Of note, he had fair diabetes control prior to admission with a Hgb A1c of 9.4% on 4/27/2020, and 7.5% on 6/9/2020. Endocrine followed him closely and was adjusting his insulin coverage. He will resume his oral diabetes medication in addition to his Lantus and should continue with close follow up with his diabetes educator. He did not want to take corrective insulin or prandial insulin.      PT/OT were consulted 6/20 for discharge planning and discussed strategies for getting in and out of bed.      Prior to discharge, his pain control is under fair control, he was able to perform most ADLs and ambulate without assistance, and had full return of bowel and bladder function.  On June 21, 2020, he was discharged to home with subcutaneous drains in stable condition.    ASSESSMENT      Discharge Assessment  Patient reports symptoms are: Unchanged  Does the patient have all of their medications?: Yes  Does patient know what their new medications are for?: Yes  Does patient have a follow-up appointment scheduled?: Yes  Does patient have any other questions or concerns?: Yes(Patient reports he is concerned about the \"bones healing. I can hear the clicking and cracking constantly\".)    Post-op  Did the patient have surgery or a procedure: Yes(INCISION AND DRAINAGE, WOUND, CHEST, WITH IRRIGATION and wound vac change)  Fever: No  Chills: No  Eating & Drinking: eating and drinking without complaints/concerns  PO Intake: regular diet  Bowel Function: normal  Urinary Status: voiding without complaint/concerns    PLAN                                                  "     Outpatient Plan:      You have a follow up telephone call with [facility] Advance Care Practitioners on 6/26/20 at 2:30 pm.  You will then return to the care of your primary provider and your cardiologist. Future medication refills should come from your PCP or Cardiologist.   You should see your primary care provider in 1-2 weeks after discharge. It is important to see your cardiologist about 4-6 weeks after discharge.    You will see Infectious Disease (Dr Connolly) on 7/8/20 at 9 am.    You will see Plastic Surgery team on 7/10 at 9:30 am.   If you do not hear from a  in 7 days, please call [phone] (choose option 1) and request to be seen with a general cardiologist or someone that you have seen in the past.   If there is a need to return to see CT Surgery please call our  at [phone].    Future Appointments   Date Time Provider Department Center   6/26/2020  2:30 PM  [facility]   7/8/2020  9:00 AM Bart Landeros MD [facility]   7/10/2020  9:45 AM Saadia Obrien PA-C [facility]           Kimi Landa [facility]

## 2020-06-22 NOTE — TELEPHONE ENCOUNTER
[facility] Call Center    Phone Message    May a detailed message be left on voicemail: yes     Reason for Call: Order(s): Home Care Orders: Other: Olamide from Home Health is asking if Dr Landeros is willing to follow this pt for in hime nursing orders? Please call Olamide at [phone], ext [phone] to discuss. Thanks.    Action Taken: Message routed to:  [facility] ([facility] dis    Travel Screening: Not Applicable

## 2020-06-22 NOTE — PROGRESS NOTES
"Spoke with pt regarding post operative concerns. Pt states he has an uncomfortable \"clicking and clacking\" sensation in his chest when breathing or walking.States this is not painful, but is similar to the feeling he had before his last infection. Pt inquires if this is cause for concern. Pt denies any additional symptoms. Explained that it is this writer's understanding that this would not be concerning and should subside and diminish over time, but that concern would be reviewed with Dr. Serna. Pt states he has also had one loose stool today. Pt states he is taking Rifampin and Vancomycin so there was concern for developing C. Difficile. Recommended pt continue to monitor and report if loose stool is continuing. Vonda HANSEN, RNCC    "

## 2020-06-22 NOTE — PROGRESS NOTES
"The patient was contacted for a post-hospital call and reports overall things are going well. He does have concerns surrounding the bone regrowth and why he is constantly hearing \"clicking and grinding\".    Please contact patient to discuss further.   "

## 2020-06-23 NOTE — PROGRESS NOTES
Relayed to pt per Dr. Serna, not concerning and should subside over time. Recommend reviewing with cardiac team as well. Pt states understanding and denies any additional questions or concerns. Vonda HANSEN RNCC

## 2020-06-24 NOTE — TELEPHONE ENCOUNTER
Left VCM for Olamide, from Home Health Care, letting her know that Dr. oCnnolly will only sign off on orders that are related to ID issues. Since requested orders are not related to ID issues, recommended she route these orders to appropriate doctor.    Tanya Smith RN

## 2020-06-25 LAB
BACTERIA SPEC CULT: NORMAL
Lab: NORMAL
SPECIMEN SOURCE: NORMAL

## 2020-06-26 ENCOUNTER — VIRTUAL VISIT (OUTPATIENT)
Dept: CARDIOLOGY | Facility: CLINIC | Age: 60
End: 2020-06-26
Attending: THORACIC SURGERY (CARDIOTHORACIC VASCULAR SURGERY)
Payer: COMMERCIAL

## 2020-06-26 ENCOUNTER — MYC MEDICAL ADVICE (OUTPATIENT)
Dept: CARDIOLOGY | Facility: CLINIC | Age: 60
End: 2020-06-26

## 2020-06-26 DIAGNOSIS — T81.49XA WOUND INFECTION AFTER SURGERY: Primary | ICD-10-CM

## 2020-06-26 DIAGNOSIS — Z95.1 S/P CABG (CORONARY ARTERY BYPASS GRAFT): ICD-10-CM

## 2020-06-26 RX ORDER — MULTIVITAMIN WITH IRON
1 TABLET ORAL
COMMUNITY
End: 2021-09-01

## 2020-06-26 NOTE — LETTER
"6/26/2020    RE: Nabil Cheung  [address]  [place] [address]       Dear Colleague,    Thank you for the opportunity to participate in the care of your patient, Nabil Cheung, at the [facility]. Please see a copy of my visit note below.    Nabil Cheung is a 60 year old male who is being evaluated via a billable telephone visit.      The patient has been notified of following:     \"This telephone visit will be conducted via a call between you and your physician/provider. We have found that certain health care needs can be provided without the need for a physical exam.  This service lets us provide the care you need with a short phone conversation.  If a prescription is necessary we can send it directly to your pharmacy.  If lab work is needed we can place an order for that and you can then stop by our lab to have the test done at a later time.    Telephone visits are billed at different rates depending on your insurance coverage. Please reach out to your insurance provider with any questions.    If during the course of the call the physician/provider feels a telephone visit is not appropriate, you will not be charged for this service.\"    Patient has given verbal consent for Telephone visit?  Yes    What phone number would you like to be contacted at? [phone]    How would you like to obtain your AVS? Mail a copy          CARDIOTHORACIC SURGERY FOLLOW-UP VISIT  6/26/2020     Nabil Cheung   1960   [id number]      Reason for visit: Sternal wound    HPI: Nabil Cheung is a 60 year old year old male seen in clinic for a routine follow-up appointment after surgery. Patient has past medical history of poorly controlled DMT2, hypertension, CKD stage 2-3, BPH/urinary retention, chronic diabetic left foot ulcer, and recently diagnosed severe 3 vessel disease with progressive symptoms at home. Underwent CABG x 4 (LIMA to LAD, SVG to rPDA, SVG to OM, SVG to " diamayito) 05/04 with Dr. Mckinney. Discharged to home 05/12. Admitted to [facility] from home 06/08 with sternal wound infection. Underwent I&D of sternum, removal of affected wires, and placement of a wound vac at [facility] 06/10 with Dr. Mckinney. He was transfered that same day to [facility] for further wound management management. Patient underwent chest wall reconstruction with left major pectoral flap and placement of a DAVIDSON drain 6/18/2020    Patient has been doing well since discharge and now returns for post op telephone visit.    Overall he is improving, his only complaint is that he is feeling fatigued and not able to walk as far as he was prior to readmission without getting fatigued. He denies any shortness of breath, chest pain, LE edema. This has remained stable.     Otherwise his pain is controlled. He reports his incision is healing well without drainage. He does report a small area of redness at the top of his incision. He denies swelling. He plans to send picture through [employer] for review. He denies any fevers, chills, sweats.     Normal bowel movements.  Voiding without problems. Weight stable  Has not been attending cardiac rehabilitation, but will start shortly.      PAST MEDICAL HISTORY:  No past medical history on file.    PAST SURGICAL HISTORY:  Past Surgical History:   Procedure Laterality Date     GRAFT FLAP PEDICLE TRAM DELAY PROCEDURE Left 6/18/2020    Procedure: Debridement of sternal wound and left major pectoral flep;  Surgeon: MARCO Serna MD;  Location: [facility] OR     INCISION AND DRAINAGE CHEST WASHOUT, COMBINED N/A 6/12/2020    Procedure: INCISION AND DRAINAGE, WOUND, CHEST, WITH IRRIGATION and wound vac change;  Surgeon: Mark Mckinney MD;  Location: [facility] OR     REPAIR CHEST WALL N/A 6/18/2020    Procedure: Chest wall reconstruction;  Surgeon: MARCO Serna MD;  Location: [facility] OR       CURRENT MEDICATIONS:   Current Outpatient Medications   Medication     magnesium 250 MG tablet      acetaminophen (TYLENOL) 325 MG tablet     alfuzosin ER (UROXATRAL) 10 MG 24 hr tablet     alpha-lipoic acid 600 MG capsule     amLODIPine (NORVASC) 10 MG tablet     aspirin (ASA) 325 MG EC tablet     cyclobenzaprine (FLEXERIL) 10 MG tablet     empagliflozin (JARDIANCE) 10 MG TABS tablet     finasteride (PROSCAR) 5 MG tablet     fish oil-omega-3 fatty acids 1000 MG capsule     insulin glargine (LANTUS PEN) 100 UNIT/ML pen     lactulose (CEPHULAC) 20 GM packet     lisinopril (ZESTRIL) 2.5 MG tablet     metFORMIN (GLUCOPHAGE) 1000 MG tablet     metoprolol succinate ER (TOPROL-XL) 50 MG 24 hr tablet     nitroGLYcerin (NITROSTAT) 0.4 MG sublingual tablet     ondansetron (ZOFRAN) 4 MG tablet     oxyCODONE (ROXICODONE) 5 MG tablet     polyethylene glycol (MIRALAX/GLYCOLAX) packet     rifampin (RIFADIN) 300 MG capsule     rosuvastatin (CRESTOR) 20 MG tablet     sennosides (SENOKOT) 8.6 MG tablet     vancomycin 1,250 mg     vitamin D3 (CHOLECALCIFEROL) 2000 units (50 mcg) tablet     No current facility-administered medications for this visit.        ALLERGIES:      Allergies   Allergen Reactions     Atorvastatin      Dust Mites        ROS:  Gen: denies frequent headaches, double/blurry vision, persistent insomnia,  unexplained weight loss/gain, +fatigue/deconditioned  CV: denies chest pain, SOB, palpitations, peripheral edema  Pulm: denies SOB, asthma or wheezing  GI/: denies liver or kidney problems, blood in stool or BRBPR, voiding without problems  Endo: denies thyroid problems or Diabetes  Heme/Onc: denies bleeding  MS: no weakness, tremors or gait instability  Neuro: denies depression, memory problems      PHYSICAL EXAM:   There were no vitals taken for this visit.  No exam given phone visit.    LABS:  Last Basic Metabolic Panel:  Lab Results   Component Value Date     06/21/2020      Lab Results   Component Value Date    POTASSIUM 4.2 06/21/2020     Lab Results   Component Value Date    CHLORIDE 102 06/21/2020      Lab Results   Component Value Date    ONEYDA 9.0 06/21/2020     Lab Results   Component Value Date    CO2 25 06/21/2020     Lab Results   Component Value Date    BUN 24 06/21/2020     Lab Results   Component Value Date    CR 1.23 06/21/2020     Lab Results   Component Value Date     06/21/2020       Last CBC:   Lab Results   Component Value Date    WBC 9.2 06/21/2020     Lab Results   Component Value Date    RBC 3.12 06/21/2020     Lab Results   Component Value Date    HGB 7.8 06/21/2020     Lab Results   Component Value Date    HCT 25.9 06/21/2020     No components found for: MCT  Lab Results   Component Value Date    MCV 83 06/21/2020     Lab Results   Component Value Date    MCH 25.0 06/21/2020     Lab Results   Component Value Date    MCHC 30.1 06/21/2020     Lab Results   Component Value Date    RDW 14.8 06/21/2020     Lab Results   Component Value Date     06/21/2020       INR:  No results found for: INR    IMAGING: None    PROCEDURES: None         ASSESSMENT/PLAN:  Nabil Cheung is a 60 year old year old male status post chest wall reconstruction with left major pectoral flap and placement of a DAVIDSON drain 6/18/2020 who returns to clinic for postop visit.    1. Sternal wound infection - s/p chest wall reconstruction with left major pectoral flap with plastic surgically. Doing well surgically, pain is well controlled, incision is healing well. He does report some redness at the top of his incision, but no drainage or purulence noted. Pictures sent with mild erythema which appears unchanged from 2 days prior, exact dimensions difficult to determine. Patient will outline with marker and continue to monitor over the weekend and send pictures on Monday. Patient will also forward to plastic surgery for review. Follow-up with ID and plastic surgery as planned. We will follow-up with patient next week.   2. S/p CABG - doing well from a cardiovascular standpoint. Continue current medications without change.  Follow-up with cardiologist at [facility] in 4-6 weeks    Plan:   1. Continue current medications, will follow-up on   2. Follow up with cardiology at [facility] in 4-6 weeks.   3. Continue Cardiac Rehab until completed.   4. Continue sternal precautions for 12 weeks from surgery date.        The total time spent with the patient was 50 minutes, > 50% of which was spent in counseling.    CC  BAILEY CHOWDARY     Please do not hesitate to contact me if you have any questions/concerns.     Sincerely,     Cardiovascular Thoracic Surgery

## 2020-06-26 NOTE — PATIENT INSTRUCTIONS
Follow-up with plastic surgery and infectious disease as planned.   Outline redness as discussed and monitor over the weekend  Message sent to plastic surgery clinic to review images you sent.

## 2020-06-26 NOTE — TELEPHONE ENCOUNTER
[facility] Call Center    Phone Message    May a detailed message be left on voicemail: yes     Reason for Call: Order(s): Home Care Orders: Other: Olamide is calling about Dr Gold is not willing to follow patient in homecare and if Dr Landeros is not willing to follow wither the will put the IV services on hold until they have a dr that will follow the home care orders. Also pt has an appt on 7/8/2020 and if patient can in sooner than 7/8/2020 then we can also can include IV orders to in the future if needed? Please call Olamide to discuss further.  call mainline at [phone] ask for intake to talk to a live person about this situation.    Action Taken: Message routed to:  [facility] ([facility]): ID    Travel Screening: Not Applicable

## 2020-06-26 NOTE — PROGRESS NOTES
CARDIOTHORACIC SURGERY FOLLOW-UP VISIT  6/26/2020     Nabil Cheung   1960   [id number]      Reason for visit: Sternal wound    HPI: Nabil Cheung is a 60 year old year old male seen in clinic for a routine follow-up appointment after surgery. Patient has past medical history of poorly controlled DMT2, hypertension, CKD stage 2-3, BPH/urinary retention, chronic diabetic left foot ulcer, and recently diagnosed severe 3 vessel disease with progressive symptoms at home. Underwent CABG x 4 (LIMA to LAD, SVG to rPDA, SVG to OM, SVG to diag) 05/04 with Dr. Mckinney. Discharged to home 05/12. Admitted to [facility] from home 06/08 with sternal wound infection. Underwent I&D of sternum, removal of affected wires, and placement of a wound vac at [facility] 06/10 with Dr. Mckinney. He was transfered that same day to [facility] for further wound management management. Patient underwent chest wall reconstruction with left major pectoral flap and placement of a DAVIDSON drain 6/18/2020    Patient has been doing well since discharge and now returns for post op telephone visit.    Overall he is improving, his only complaint is that he is feeling fatigued and not able to walk as far as he was prior to readmission without getting fatigued. He denies any shortness of breath, chest pain, LE edema. This has remained stable.     Otherwise his pain is controlled. He reports his incision is healing well without drainage. He does report a small area of redness at the top of his incision. He denies swelling. He plans to send picture through [employer] for review. He denies any fevers, chills, sweats.     Normal bowel movements.  Voiding without problems. Weight stable  Has not been attending cardiac rehabilitation, but will start shortly.      PAST MEDICAL HISTORY:  No past medical history on file.    PAST SURGICAL HISTORY:  Past Surgical History:   Procedure Laterality Date     GRAFT FLAP PEDICLE TRAM DELAY PROCEDURE Left 6/18/2020    Procedure: Debridement of sternal  wound and left major pectoral flep;  Surgeon: MARCO Serna MD;  Location: [facility] OR     INCISION AND DRAINAGE CHEST WASHOUT, COMBINED N/A 6/12/2020    Procedure: INCISION AND DRAINAGE, WOUND, CHEST, WITH IRRIGATION and wound vac change;  Surgeon: Mark Mckinney MD;  Location: [facility] OR     REPAIR CHEST WALL N/A 6/18/2020    Procedure: Chest wall reconstruction;  Surgeon: MARCO Serna MD;  Location: [facility] OR       CURRENT MEDICATIONS:   Current Outpatient Medications   Medication     magnesium 250 MG tablet     acetaminophen (TYLENOL) 325 MG tablet     alfuzosin ER (UROXATRAL) 10 MG 24 hr tablet     alpha-lipoic acid 600 MG capsule     amLODIPine (NORVASC) 10 MG tablet     aspirin (ASA) 325 MG EC tablet     cyclobenzaprine (FLEXERIL) 10 MG tablet     empagliflozin (JARDIANCE) 10 MG TABS tablet     finasteride (PROSCAR) 5 MG tablet     fish oil-omega-3 fatty acids 1000 MG capsule     insulin glargine (LANTUS PEN) 100 UNIT/ML pen     lactulose (CEPHULAC) 20 GM packet     lisinopril (ZESTRIL) 2.5 MG tablet     metFORMIN (GLUCOPHAGE) 1000 MG tablet     metoprolol succinate ER (TOPROL-XL) 50 MG 24 hr tablet     nitroGLYcerin (NITROSTAT) 0.4 MG sublingual tablet     ondansetron (ZOFRAN) 4 MG tablet     oxyCODONE (ROXICODONE) 5 MG tablet     polyethylene glycol (MIRALAX/GLYCOLAX) packet     rifampin (RIFADIN) 300 MG capsule     rosuvastatin (CRESTOR) 20 MG tablet     sennosides (SENOKOT) 8.6 MG tablet     vancomycin 1,250 mg     vitamin D3 (CHOLECALCIFEROL) 2000 units (50 mcg) tablet     No current facility-administered medications for this visit.        ALLERGIES:      Allergies   Allergen Reactions     Atorvastatin      Dust Mites        ROS:  Gen: denies frequent headaches, double/blurry vision, persistent insomnia,  unexplained weight loss/gain, +fatigue/deconditioned  CV: denies chest pain, SOB, palpitations, peripheral edema  Pulm: denies SOB, asthma or wheezing  GI/: denies liver or kidney  problems, blood in stool or BRBPR, voiding without problems  Endo: denies thyroid problems or Diabetes  Heme/Onc: denies bleeding  MS: no weakness, tremors or gait instability  Neuro: denies depression, memory problems      PHYSICAL EXAM:   There were no vitals taken for this visit.  No exam given phone visit.    LABS:  Last Basic Metabolic Panel:  Lab Results   Component Value Date     06/21/2020      Lab Results   Component Value Date    POTASSIUM 4.2 06/21/2020     Lab Results   Component Value Date    CHLORIDE 102 06/21/2020     Lab Results   Component Value Date    ONEYDA 9.0 06/21/2020     Lab Results   Component Value Date    CO2 25 06/21/2020     Lab Results   Component Value Date    BUN 24 06/21/2020     Lab Results   Component Value Date    CR 1.23 06/21/2020     Lab Results   Component Value Date     06/21/2020       Last CBC:   Lab Results   Component Value Date    WBC 9.2 06/21/2020     Lab Results   Component Value Date    RBC 3.12 06/21/2020     Lab Results   Component Value Date    HGB 7.8 06/21/2020     Lab Results   Component Value Date    HCT 25.9 06/21/2020     No components found for: MCT  Lab Results   Component Value Date    MCV 83 06/21/2020     Lab Results   Component Value Date    MCH 25.0 06/21/2020     Lab Results   Component Value Date    MCHC 30.1 06/21/2020     Lab Results   Component Value Date    RDW 14.8 06/21/2020     Lab Results   Component Value Date     06/21/2020       INR:  No results found for: INR    IMAGING: None    PROCEDURES: None         ASSESSMENT/PLAN:  Nabil Cheung is a 60 year old year old male status post chest wall reconstruction with left major pectoral flap and placement of a DAVIDSON drain 6/18/2020 who returns to clinic for postop visit.    1. Sternal wound infection - s/p chest wall reconstruction with left major pectoral flap with plastic surgically. Doing well surgically, pain is well controlled, incision is healing well. He does report some redness  at the top of his incision, but no drainage or purulence noted. Pictures sent with mild erythema which appears unchanged from 2 days prior, exact dimensions difficult to determine. Patient will outline with marker and continue to monitor over the weekend and send pictures on Monday. Patient will also forward to plastic surgery for review. Follow-up with ID and plastic surgery as planned. We will follow-up with patient next week.   2. S/p CABG - doing well from a cardiovascular standpoint. Continue current medications without change. [place] [facility] [place]    Plan:   1. Continue current medications, will follow-up on   2. [place] [facility] [place].   3. Continue Cardiac Rehab until completed.   4. Continue sternal precautions for 12 weeks from surgery date.          The total time spent with the patient was 50 minutes, > 50% of which was spent in counseling.    KD CHOWDARY

## 2020-06-26 NOTE — PROGRESS NOTES
"Nabil Cheung is a 60 year old male who is being evaluated via a billable telephone visit.      The patient has been notified of following:     \"This telephone visit will be conducted via a call between you and your physician/provider. We have found that certain health care needs can be provided without the need for a physical exam.  This service lets us provide the care you need with a short phone conversation.  If a prescription is necessary we can send it directly to your pharmacy.  If lab work is needed we can place an order for that and you can then stop by our lab to have the test done at a later time.    Telephone visits are billed at different rates depending on your insurance coverage. Please reach out to your insurance provider with any questions.    If during the course of the call the physician/provider feels a telephone visit is not appropriate, you will not be charged for this service.\"    Patient has given verbal consent for Telephone visit?  Yes    What phone number would you like to be contacted at? [phone]    How would you like to obtain your AVS? Mail a copy        "

## 2020-06-26 NOTE — TELEPHONE ENCOUNTER
Patient will outline redness, and will monitor over the weekend. Will have him forward images to plastic surgery for review. See my clinic note for more details.     Perez Wu

## 2020-06-29 NOTE — TELEPHONE ENCOUNTER
Per discussion with Dr. Connolly, she will sign off on only Infectious Disease related orders (IV antibiotics and labs) but not PT/OT. Writer relayed the above to Home [facility] who said they will try contacting patient's PCP about signing off on the PT/OT orders again.    Tanya Smith RN

## 2020-06-30 ENCOUNTER — PATIENT OUTREACH (OUTPATIENT)
Dept: PLASTIC SURGERY | Facility: CLINIC | Age: 60
End: 2020-06-30

## 2020-06-30 NOTE — PATIENT INSTRUCTIONS
Spoke with pt regarding redness around incision. Pt states this is significantly improved. Pt states he will send new photo of the area (taken Sunday) via [employer] later today for review. Pt denies any concerns. Pt currently has approximately 40ml output from DAVIDSON drain per day. States he has been taking short walks outside and is doing well overall. Encouraged pt to reach out with any concerns. Vonda HANSEN RNCC

## 2020-07-01 ENCOUNTER — TELEPHONE (OUTPATIENT)
Dept: INFECTIOUS DISEASES | Facility: CLINIC | Age: 60
End: 2020-07-01

## 2020-07-01 NOTE — TELEPHONE ENCOUNTER
[facility] Call Center    Phone Message    May a detailed message be left on voicemail: no     Reason for Call: Kurt from [facility] called. He will be faxing over pt's lab results due to pt's WBC is high. Thank you      Action Taken: Message routed to:  [facility]): id    Travel Screening: Not Applicable

## 2020-07-02 NOTE — TELEPHONE ENCOUNTER
Per message below from Dr. Connolly, writer spoke with patient who said the only symptoms he is having is a very minor tenderness at top of surgical site. He denies fever, chills, and fatigue as well as any redness, swelling, or drainage around PICC and surgical site. He had some questions about the culture that was done on 06/12/2020 as well as doing a culture in the near future and his IV antibiotic.       Spoke with Kurt, from [facility], and told him nucleated RBC should be cancelled. Kurt said the nucleated RBC is not put in the system as a separate order but it is automatically done as part of the CBC diff. Confirmed with Kurt that lab orders should be weekly CMP, CBC diff, Vanco trough, CRP.  Per discussion with Dr. Connolly, writer let Kurt know weekly labs should be done on Monday or Tuesday next week in order to have results back by appointment with patient on Wednesday.    Updated Dr. Connolly and patient on the above.    KITTY Mendoza, MD Luis Reynolds Abby, KITTY; Dea Cobb RN               Hi Tanya,     [facility] call patient and ask if there is fever, pain around PICC and surgical site . WBC has gone up     Also, Please call o/p pharmacy, not sure which one, to cancel nucleated RBC . I did not order this.     Thanks   Mohsen

## 2020-07-07 ENCOUNTER — TELEPHONE (OUTPATIENT)
Dept: INFECTIOUS DISEASES | Facility: CLINIC | Age: 60
End: 2020-07-07
Payer: COMMERCIAL

## 2020-07-07 DIAGNOSIS — T81.49XA WOUND INFECTION AFTER SURGERY: Primary | ICD-10-CM

## 2020-07-07 NOTE — TELEPHONE ENCOUNTER
Per message below from Dr. Connolly, writer called Kurt, from [facility], and let him know that labs ordered by Dr. Connolly should be CMP, CBC, CRP and vanco trough. No BMP. Kurt confirmed he understood the above.     Writer called [facility] lab at [phone] and requested that LFTs be added on to blood collected yesterday. Lab order faxed to [facility] lab at [phone].    KITTY Mendoza, Bart Lam MD  P p Infectious Disease Adult [facility]               Hello,     Pls call infusion pharmacy that I ordered CMP not just BMP weekly ( besides CBC, CRP, vanco trough). Pleas let them know to not change my orders     Can the add LFT to blood collected yesterday ?     Debbie Connolly

## 2020-07-08 ENCOUNTER — VIRTUAL VISIT (OUTPATIENT)
Dept: INFECTIOUS DISEASES | Facility: CLINIC | Age: 60
End: 2020-07-08
Attending: INTERNAL MEDICINE
Payer: COMMERCIAL

## 2020-07-08 DIAGNOSIS — J98.51 MEDIASTINITIS: ICD-10-CM

## 2020-07-08 DIAGNOSIS — T81.49XA WOUND INFECTION AFTER SURGERY: ICD-10-CM

## 2020-07-08 DIAGNOSIS — M86.9 STERNAL OSTEOMYELITIS (H): ICD-10-CM

## 2020-07-08 ASSESSMENT — PAIN SCALES - GENERAL: PAINLEVEL: NO PAIN (0)

## 2020-07-08 NOTE — PROGRESS NOTES
"Nabil Cheung is a 60 year old male who is being evaluated via a billable telephone visit.      The patient has been notified of following:     \"This telephone visit will be conducted via a call between you and your physician/provider. We have found that certain health care needs can be provided without the need for a physical exam.  This service lets us provide the care you need with a short phone conversation.  If a prescription is necessary we can send it directly to your pharmacy.  If lab work is needed we can place an order for that and you can then stop by our lab to have the test done at a later time.    Telephone visits are billed at different rates depending on your insurance coverage. During this emergency period, for some insurers they may be billed the same as an in-person visit.  Please reach out to your insurance provider with any questions.    If during the course of the call the physician/provider feels a telephone visit is not appropriate, you will not be charged for this service.\"    Patient has given verbal consent for Telephone visit?  Yes    What phone number would you like to be contacted at? [phone]    How would you like to obtain your AVS? [facility] [place]    INFECTIOUS DISEASES PROGRESS NOTE    Infectious Diseases Clinic     SUBJECTIVE:                                                      Nabil Cheung is a 60 y.o. male with a PMH of poorly controlled DMT2, hypertension, CKD stage 2-3, BPH/urinary retention, chronic diabetic left foot ulcer, and recently diagnosed severe 3 vessel disease with progressive symptoms at home. Underwent CABG x 4 (LIMA to LAD, SVG to rPDA, SVG to OM, SVG to diag) 05/04 with Dr. Mckinney. Discharged to home 05/12. [place] [facility] [place] with sternal wound infection. [place]went [place] of [place] [facility] [place]10 [place] Dr. Mckinney, then transferred to [facility] for further wound management. discharged on 6/21/20.      He is on Vancomycin IV and Rifampin. He tolerates these " antibiotics well. no fever, chills, sweatings. small amount of loose stools, resolved with probiotic use. no abdominal pain.  his diabetes is under control ( between 100-130). his surgical site is healing well. he still has staples and drain in and has a follow-up with CTS this Friday. Drainage is about 20 ml /day for the past 5 days.      discussed labs     Problem list and histories reviewed & adjusted, as indicated.  Additional history: as documented    CURRENT MEDICATIONS:  Current Outpatient Medications   Medication Sig Dispense Refill     alfuzosin ER (UROXATRAL) 10 MG 24 hr tablet Take 10 mg by mouth daily        amLODIPine (NORVASC) 10 MG tablet Take 1 tablet (10 mg) by mouth daily 45 tablet 0     aspirin (ASA) 325 MG EC tablet Take 325 mg by mouth daily       cyclobenzaprine (FLEXERIL) 10 MG tablet Take 1 tablet (10 mg) by mouth 3 times daily as needed for muscle spasms 30 tablet 0     empagliflozin (JARDIANCE) 10 MG TABS tablet Take by mouth daily Unknown dosage       finasteride (PROSCAR) 5 MG tablet Take 5 mg by mouth daily       fish oil-omega-3 fatty acids 1000 MG capsule Take 1 g by mouth 2 times daily       insulin glargine (LANTUS PEN) 100 UNIT/ML pen Inject 15 Units Subcutaneous At Bedtime       lisinopril (ZESTRIL) 2.5 MG tablet Take 2 tablets (5 mg) by mouth daily 60 tablet 0     magnesium 250 MG tablet Take 1 tablet by mouth       metFORMIN (GLUCOPHAGE) 1000 MG tablet Take 1 tablet (1,000 mg) by mouth 2 times daily (with meals) 60 tablet 0     nitroGLYcerin (NITROSTAT) 0.4 MG sublingual tablet Place 0.4 mg under the tongue every 5 minutes as needed for chest pain For chest pain place 1 tablet under the tongue every 5 minutes for 3 doses. If symptoms persist 5 minutes after 1st dose call 911.       rifampin (RIFADIN) 300 MG capsule Take 1 capsule (300 mg) by mouth every 12 hours 60 capsule 0     rosuvastatin (CRESTOR) 20 MG tablet Take 20 mg by mouth daily Evening       vancomycin 1,250 mg Inject  1,250 mg into the vein every 12 hours 60 Dose 0     vitamin D3 (CHOLECALCIFEROL) 2000 units (50 mcg) tablet Take 1 tablet by mouth daily       acetaminophen (TYLENOL) 325 MG tablet Take 650 mg by mouth every 6 hours as needed for mild pain       alpha-lipoic acid 600 MG capsule Take 600 mg by mouth daily        metoprolol succinate ER (TOPROL-XL) 50 MG 24 hr tablet Take 50 mg by mouth every evening       ondansetron (ZOFRAN) 4 MG tablet Take by mouth every 4 hours as needed for nausea       Labs reviewed in EPIC    ASSESSMENT AND PLAN:                                                      1. Mediastinitis / Sternal osteomyelitis  s/p CABG x4 on 5/4/20  - s/p Chest wall reconstruction, Debridement of sternal wound and left pectoralis major flap 6/18/20 - Cx- neg so far   - s/p washout and wound vac placement 6/12/20   - St[place] epidermidis [place] [facility] [place]9/20  - Staphylococcus epidermidis at [facility] 6/12 - oxacillin resistant MRSE   - CT chest wo contrast 6/9/20   Impression:   1. Soft tissue thickening/inflammatory changes posterior to the midline sternotomy without discrete fluid collection.  2. Small pericardial effusion.  3. Subtle groundglass opacities in the left lingula, may represent atelectasis, mild edema/inflammation, or  infection.     2. CAD s/p CABG x 4 5/4/20  3. Diabetes mellitus  HbA1c 7.5 (6/9/20)  4. CKD ( cr 1.05 on 7/6/20)  5. Hypertension   6. leukocytosis - resolved   7. Left foot wound - dry, no redness - started in Jan 2020  7. CRP 26 (6/16/20) --> 0.98 (7/6/20)      PLAN:  - continue Vancomycin IV ( goal Vanco trough 15-20) Pharmacy may adjust dose per trough level  - continue Rifampin 300 mg po BID due to remaining hardware in place    - duration : 6 weeks from 6/18 ( till 7/30/30)    - continue weekly lab: CMP, CBC, CRP, Vanco trough     f/u on 7/24     Bart Landeros MD, M.Med.Sc  Division of Infectious Diseases and International Medicine  [facility]      27  minutes  was spent with patient and greater than 50% of the time was spent counseling and coordination of care

## 2020-07-08 NOTE — LETTER
"7/8/2020       RE: Nabil Cheung  [address]  [place] [facility] [address]     Dear Colleague,    Thank you for referring your patient, Nabil Cheung, to the [facility] AND INFECTIOUS DISEASES at [facility]. Please see a copy of my visit note below.    Nabil Cheung is a 60 year old male who is being evaluated via a billable telephone visit.      The patient has been notified of following:     \"This telephone visit will be conducted via a call between you and your physician/provider. We have found that certain health care needs can be provided without the need for a physical exam.  This service lets us provide the care you need with a short phone conversation.  If a prescription is necessary we can send it directly to your pharmacy.  If lab work is needed we can place an order for that and you can then stop by our lab to have the test done at a later time.    Telephone visits are billed at different rates depending on your insurance coverage. During this emergency period, for some insurers they may be billed the same as an in-person visit.  Please reach out to your insurance provider with any questions.    If during the course of the call the physician/provider feels a telephone visit is not appropriate, you will not be charged for this service.\"    Patient has given verbal consent for Telephone visit?  Yes    What phone number would you like to be contacted at? [phone]    How would you like to obtain your AVS? MyChart    INFECTIOUS DISEASES PROGRESS NOTE    Infectious Diseases Clinic     SUBJECTIVE:                                                      Nabil Cheung is a 60 y.o. male with a PMH of poorly controlled DMT2, hypertension, CKD stage 2-3, BPH/urinary retention, chronic diabetic left foot ulcer, and recently diagnosed severe 3 vessel disease with progressive symptoms at home. Underwent CABG x 4 (LIMA to LAD, SVG to rPDA, SVG to OM, SVG to diag) 05/04 with " Dr. Mckinney. Discharged to home 05/12. [place] [facility] [place]08 with sternal wound infection. Underwent I&[place] of [place] [facility] [place]10 with Dr. Mckinney, then transferred to [facility] for further wound management. discharged on 6/21/20.      He is on Vancomycin IV and Rifampin. He tolerates these antibiotics well. no fever, chills, sweatings. small amount of loose stools, resolved with probiotic use. no abdominal pain.  his diabetes is under control ( between 100-130). his surgical site is healing well. he still has staples and drain in and has a follow-up with CTS this Friday. Drainage is about 20 ml /day for the past 5 days.      discussed labs     Problem list and histories reviewed & adjusted, as indicated.  Additional history: as documented    CURRENT MEDICATIONS:  Current Outpatient Medications   Medication Sig Dispense Refill     alfuzosin ER (UROXATRAL) 10 MG 24 hr tablet Take 10 mg by mouth daily        amLODIPine (NORVASC) 10 MG tablet Take 1 tablet (10 mg) by mouth daily 45 tablet 0     aspirin (ASA) 325 MG EC tablet Take 325 mg by mouth daily       cyclobenzaprine (FLEXERIL) 10 MG tablet Take 1 tablet (10 mg) by mouth 3 times daily as needed for muscle spasms 30 tablet 0     empagliflozin (JARDIANCE) 10 MG TABS tablet Take by mouth daily Unknown dosage       finasteride (PROSCAR) 5 MG tablet Take 5 mg by mouth daily       fish oil-omega-3 fatty acids 1000 MG capsule Take 1 g by mouth 2 times daily       insulin glargine (LANTUS PEN) 100 UNIT/ML pen Inject 15 Units Subcutaneous At Bedtime       lisinopril (ZESTRIL) 2.5 MG tablet Take 2 tablets (5 mg) by mouth daily 60 tablet 0     magnesium 250 MG tablet Take 1 tablet by mouth       metFORMIN (GLUCOPHAGE) 1000 MG tablet Take 1 tablet (1,000 mg) by mouth 2 times daily (with meals) 60 tablet 0     nitroGLYcerin (NITROSTAT) 0.4 MG sublingual tablet Place 0.4 mg under the tongue every 5 minutes as needed for chest pain For chest pain place 1 tablet under the  tongue every 5 minutes for 3 doses. If symptoms persist 5 minutes after 1st dose call 911.       rifampin (RIFADIN) 300 MG capsule Take 1 capsule (300 mg) by mouth every 12 hours 60 capsule 0     rosuvastatin (CRESTOR) 20 MG tablet Take 20 mg by mouth daily Evening       vancomycin 1,250 mg Inject 1,250 mg into the vein every 12 hours 60 Dose 0     vitamin D3 (CHOLECALCIFEROL) 2000 units (50 mcg) tablet Take 1 tablet by mouth daily       acetaminophen (TYLENOL) 325 MG tablet Take 650 mg by mouth every 6 hours as needed for mild pain       alpha-lipoic acid 600 MG capsule Take 600 mg by mouth daily        metoprolol succinate ER (TOPROL-XL) 50 MG 24 hr tablet Take 50 mg by mouth every evening       ondansetron (ZOFRAN) 4 MG tablet Take by mouth every 4 hours as needed for nausea       Labs reviewed in EPIC    ASSESSMENT AND PLAN:                                                      1. Mediastinitis / Sternal osteomyelitis  s/p CABG x4 on 5/4/20  - s/p Chest wall reconstruction, Debridement of sternal wound and left pectoralis major flap 6/18/20 - Cx- neg so far   - s/p washout and wound vac placement 6/12/20   - Staphylococcus epidermidis at [facility] 6/9/20  - Staphylococcus epidermidis at [facility] 6/12 - oxacillin resistant MRSE   - CT chest wo contrast 6/9/20   Impression:   1. Soft tissue thickening/inflammatory changes posterior to the midline sternotomy without discrete fluid collection.  2. Small pericardial effusion.  3. Subtle groundglass opacities in the left lingula, may represent atelectasis, mild edema/inflammation, or  infection.     2. CAD s/p CABG x 4 5/4/20  3. Diabetes mellitus  HbA1c 7.5 (6/9/20)  4. CKD ( cr 1.05 on 7/6/20)  5. Hypertension   6. leukocytosis - resolved   7. Left foot wound - dry, no redness - started in Jan 2020  7. CRP 26 (6/16/20) --> 0.98 (7/6/20)      PLAN:  - continue Vancomycin IV ( goal Vanco trough 15-20) Pharmacy may adjust dose per trough level  - continue Rifampin 300 mg po BID  due to remaining hardware in place    - duration : 6 weeks from 6/18 ( till 7/30/30)    - continue weekly lab: CMP, CBC, CRP, Vanco trough     f/u on 7/24     Bart Landeros MD, M.Med.Sc  Division of Infectious Diseases and International Medicine  [facility]      27  minutes was spent with patient and greater than 50% of the time was spent counseling and coordination of care

## 2020-07-09 NOTE — PROGRESS NOTES
"Plastic Surgery Outpatient Visit    ID: Nabil Cheung is a 60 year old male with PMH DMII, HTN, CKD stage 2-3, BPH/urinary retention, CAD s/p CABG x4 c/b non healing chest wound now s/p pec flap coverage and closure 6/18.    S: Doing well overall. Drain output 20cc daily for 1 week. No pain but complains of a clicking/popping sensation to chest when he moves or breathes.     O:  BP (!) 163/93 (BP Location: Right arm, Patient Position: Sitting, Cuff Size: Adult Regular)   Pulse 102   Temp 97.5  F (36.4  C) (Axillary)   Ht 1.803 m (5' 11\")   Wt 87.1 kg (192 lb)   SpO2 98%   BMI 26.78 kg/m     General: NAD  Chest: midline sternotomy incision with sutures c/d/i. No erythema, open areas or drainage. Drain with small amount serosanguinous output.   Extremities: L arm PICC line    A/P:  s/p pec flap coverage and closure 6/18, healing as anticipated.     -all sutures out today  -DAVIDSON out  -no dressing needed  -lifting restrictions x3 more weeks, no swimming until completely healed and PICC line out  -recommend speaking to cardiology about long term chances of sternal wound healing  -RTC 3 weeks for virtual visit with Dr. Serna, requesting clearance for canoe trip activities    Total time spent with patient was 15 min of which greater than 50% was in counseling.    Saadia Obrien PA-C  Plastic and Reconstructive Surgery  "

## 2020-07-10 ENCOUNTER — OFFICE VISIT (OUTPATIENT)
Dept: PLASTIC SURGERY | Facility: CLINIC | Age: 60
End: 2020-07-10
Payer: COMMERCIAL

## 2020-07-10 VITALS
BODY MASS INDEX: 26.88 KG/M2 | TEMPERATURE: 97.5 F | DIASTOLIC BLOOD PRESSURE: 93 MMHG | HEIGHT: 71 IN | WEIGHT: 192 LBS | OXYGEN SATURATION: 98 % | HEART RATE: 102 BPM | SYSTOLIC BLOOD PRESSURE: 163 MMHG

## 2020-07-10 DIAGNOSIS — Z98.890 S/P FLAP GRAFT: Primary | ICD-10-CM

## 2020-07-10 LAB
FUNGUS SPEC CULT: NORMAL
SPECIMEN SOURCE: NORMAL

## 2020-07-10 ASSESSMENT — MIFFLIN-ST. JEOR: SCORE: 1693.04

## 2020-07-10 ASSESSMENT — PAIN SCALES - GENERAL: PAINLEVEL: NO PAIN (0)

## 2020-07-10 NOTE — LETTER
"7/10/2020       RE: Nabil Cheung  [address]  [place] [address]     Dear Colleague,    Thank you for referring your patient, Nabil Cheung, to the [facility] PLASTIC AND RECON[facility]CT[facility] Please see a copy of my visit note below.    Plastic Surgery Outpatient Visit    ID: Nabil Cheung is a 60 year old male with PMH DMII, HTN, CKD stage 2-3, BPH/urinary retention, CAD s/p CABG x4 c/b non healing chest wound now s/p pec flap coverage and closure 6/18.    S: Doing well overall. Drain output 20cc daily for 1 week. No pain but complains of a clicking/popping sensation to chest when he moves or breathes.     O:  BP (!) 163/93 (BP Location: Right arm, Patient Position: Sitting, Cuff Size: Adult Regular)   Pulse 102   Temp 97.5  F (36.4  C) (Axillary)   Ht 1.803 m (5' 11\")   Wt 87.1 kg (192 lb)   SpO2 98%   BMI 26.78 kg/m     General: NAD  Chest: midline sternotomy incision with sutures c/d/i. No erythema, open areas or drainage. Drain with small amount serosanguinous output.   Extremities: L arm PICC line    A/P:  s/p pec flap coverage and closure 6/18, healing as anticipated.     -all sutures out today  -DAVIDSON out  -no dressing needed  -lifting restrictions x3 more weeks, no swimming until completely healed and PICC line out  -recommend speaking to cardiology about long term chances of sternal wound healing  -RTC 3 weeks for virtual visit with Dr. Serna, requesting clearance for canoe trip activities    Total time spent with patient was 15 min of which greater than 50% was in counseling.    Saadia Obrien PA-C Plastic and Reconstructive Surgery          "

## 2020-07-10 NOTE — NURSING NOTE
"Chief Complaint   Patient presents with     Surgical Followup     Post op visit, date of surgery 6/25/2020 with Dr. Serna.        Vitals:    07/10/20 0934   BP: (!) 163/93   BP Location: Right arm   Patient Position: Sitting   Cuff Size: Adult Regular   Pulse: 102   Temp: 97.5  F (36.4  C)   TempSrc: Axillary   SpO2: 98%   Weight: 87.1 kg (192 lb)   Height: 1.803 m (5' 11\")       Body mass index is 26.78 kg/m .     Ralph Kline LPN                     "

## 2020-07-16 LAB
FUNGUS SPEC CULT: NORMAL
SPECIMEN SOURCE: NORMAL

## 2020-07-21 ENCOUNTER — DOCUMENTATION ONLY (OUTPATIENT)
Dept: CARE COORDINATION | Facility: CLINIC | Age: 60
End: 2020-07-21

## 2020-07-23 ENCOUNTER — TELEPHONE (OUTPATIENT)
Dept: INFECTIOUS DISEASES | Facility: CLINIC | Age: 60
End: 2020-07-23

## 2020-07-23 DIAGNOSIS — M86.9 STERNAL OSTEOMYELITIS (H): Primary | ICD-10-CM

## 2020-07-23 NOTE — TELEPHONE ENCOUNTER
Per discussion with Dr. Connolly, patient should have weekly CRP done, NOT CRP-HS. Writer relayed this to Kurt, pharmacist from [facility], who said he will talk with the patient's home care nurse to make sure the correct CRP is done from now on. Dr. Connolly updated.    Tanya Smith RN

## 2020-07-23 NOTE — TELEPHONE ENCOUNTER
Spoke with Kurt, pharmacist from [facility], who said patient has had to have blood drawn at [facility] the past 2 weeks (07/15 and 07/22) due to nurse not being able to draw blood from patient. Writer spoke with Sushila, with [facility] lab, who said they have the order put into their system as CRP but when she spoke with their Chemistry Department, they said that they only run CRP-HS. Writer relayed the above to patient who said he will go to [facility] at 9:00am next week Tuesday, 07/28/2020, for his labs to be done. Writer entered weekly lab orders (CBC with diff, CMP, CRP, and vanco level) into patient's chart and requested results to faxed to Kurt from [facility], at [phone].  Dr. Connolly updated.    Tanya Smith RN

## 2020-07-24 ENCOUNTER — VIRTUAL VISIT (OUTPATIENT)
Dept: INFECTIOUS DISEASES | Facility: CLINIC | Age: 60
End: 2020-07-24
Attending: INTERNAL MEDICINE
Payer: COMMERCIAL

## 2020-07-24 DIAGNOSIS — M86.9 STERNAL OSTEOMYELITIS (H): Primary | ICD-10-CM

## 2020-07-24 DIAGNOSIS — Z79.4 TYPE 2 DIABETES MELLITUS WITH OTHER SPECIFIED COMPLICATION, WITH LONG-TERM CURRENT USE OF INSULIN (H): ICD-10-CM

## 2020-07-24 DIAGNOSIS — J98.51 MEDIASTINITIS: ICD-10-CM

## 2020-07-24 DIAGNOSIS — E11.69 TYPE 2 DIABETES MELLITUS WITH OTHER SPECIFIED COMPLICATION, WITH LONG-TERM CURRENT USE OF INSULIN (H): ICD-10-CM

## 2020-07-24 DIAGNOSIS — T81.49XA WOUND INFECTION AFTER SURGERY: ICD-10-CM

## 2020-07-24 RX ORDER — RIFAMPIN 300 MG/1
300 CAPSULE ORAL EVERY 12 HOURS
Qty: 28 CAPSULE | Refills: 0 | Status: SHIPPED | OUTPATIENT
Start: 2020-07-31 | End: 2020-08-12

## 2020-07-24 RX ORDER — DOXYCYCLINE HYCLATE 100 MG
100 TABLET ORAL 2 TIMES DAILY
Qty: 28 TABLET | Refills: 0 | Status: SHIPPED | OUTPATIENT
Start: 2020-07-31 | End: 2020-08-12

## 2020-07-24 ASSESSMENT — PAIN SCALES - GENERAL: PAINLEVEL: NO PAIN (0)

## 2020-07-24 NOTE — LETTER
"7/24/2020       RE: Nabil Cheung  [address]  [facility] [address]     Dear Colleague,    Thank you for referring your patient, Nabil Cheung, to the [facility] AND INFECTIOUS DISEASES at [facility]. Please see a copy of my visit note below.    Nabil Cheung is a 60 year old male who is being evaluated via a billable telephone visit.      The patient has been notified of following:     \"This telephone visit will be conducted via a call between you and your physician/provider. We have found that certain health care needs can be provided without the need for a physical exam.  This service lets us provide the care you need with a short phone conversation.  If a prescription is necessary we can send it directly to your pharmacy.  If lab work is needed we can place an order for that and you can then stop by our lab to have the test done at a later time.    Telephone visits are billed at different rates depending on your insurance coverage. During this emergency period, for some insurers they may be billed the same as an in-person visit.  Please reach out to your insurance provider with any questions.    If during the course of the call the physician/provider feels a telephone visit is not appropriate, you will not be charged for this service.\"    Patient has given verbal consent for Telephone visit?  Yes    What phone number would you like to be contacted at? [phone]    How would you like to obtain your AVS? [employer]    Phone call duration: 25 minutes    INFECTIOUS DISEASES PROGRESS NOTE    SUBJECTIVE:                                                      Nabil Cheung is a 60 y.o. male with a PMH of poorly controlled DMT2, hypertension, CKD stage 2-3, BPH/urinary retention, chronic diabetic left foot ulcer, and recently diagnosed severe 3 vessel disease with progressive symptoms at home. Underwent CABG x 4 (LIMA to LAD, SVG to rPDA, SVG to OM, SVG to diag) 05/04 " "with Dr. Mckinney. Discharged to home 05/12. [place] [facility] [place] with sternal wound infection. [place] [facility] [place] Dr. Mckinney, then transferred to [facility] for further wound management. discharged on 6/21/20.      telephone visit on 7/8/20  He is on Vancomycin IV and Rifampin. He tolerates these antibiotics well. no fever, chills, sweatings. small amount of loose stools, resolved with probiotic use. no abdominal pain.  his diabetes is under control ( between 100-130). his surgical site is healing well. he still has staples and drain in and has a follow-up with CTS this Friday. Drainage is about 20 ml /day for the past 5 days.      discussed labs     Telephone visit on 7/24/20   \"doing really well\" drain was removed on 7/10 and surgical site is healed. no redness/ swelling, drainage/pain. \"a bit itchy\" PICC line is working well. no pain, redness, swelling. no fever, chills, nausea, vomiting, diarrhea. has good appetite.  Treatment will end on 7/30 and he wants his PICC removed the next day. He is planning for a canoeing trip in about 1 month. His diabetes is under good control.     Labs reviewed, discussed with patient     Problem list and histories reviewed & adjusted, as indicated.  Additional history: as documented    CURRENT MEDICATIONS:  Current Outpatient Medications   Medication Sig Dispense Refill     acetaminophen (TYLENOL) 325 MG tablet Take 650 mg by mouth every 6 hours as needed for mild pain       alfuzosin ER (UROXATRAL) 10 MG 24 hr tablet Take 10 mg by mouth daily        amLODIPine (NORVASC) 10 MG tablet Take 1 tablet (10 mg) by mouth daily 45 tablet 0     aspirin (ASA) 325 MG EC tablet Take 325 mg by mouth daily       cyclobenzaprine (FLEXERIL) 10 MG tablet Take 1 tablet (10 mg) by mouth 3 times daily as needed for muscle spasms 30 tablet 0     empagliflozin (JARDIANCE) 10 MG TABS tablet Take by mouth daily Unknown dosage       finasteride (PROSCAR) 5 MG tablet Take 5 " mg by mouth daily       fish oil-omega-3 fatty acids 1000 MG capsule Take 1 g by mouth 2 times daily       insulin glargine (LANTUS PEN) 100 UNIT/ML pen Inject 15 Units Subcutaneous At Bedtime       lisinopril (ZESTRIL) 2.5 MG tablet Take 2 tablets (5 mg) by mouth daily 60 tablet 0     magnesium 250 MG tablet Take 1 tablet by mouth       metFORMIN (GLUCOPHAGE) 1000 MG tablet Take 1 tablet (1,000 mg) by mouth 2 times daily (with meals) 60 tablet 0     nitroGLYcerin (NITROSTAT) 0.4 MG sublingual tablet Place 0.4 mg under the tongue every 5 minutes as needed for chest pain For chest pain place 1 tablet under the tongue every 5 minutes for 3 doses. If symptoms persist 5 minutes after 1st dose call 911.       rifampin (RIFADIN) 300 MG capsule Take 1 capsule (300 mg) by mouth every 12 hours 60 capsule 0     rosuvastatin (CRESTOR) 20 MG tablet Take 20 mg by mouth daily Evening       vancomycin 1,250 mg Inject 1,250 mg into the vein every 12 hours 60 Dose 0     vitamin D3 (CHOLECALCIFEROL) 2000 units (50 mcg) tablet Take 1 tablet by mouth daily       alpha-lipoic acid 600 MG capsule Take 600 mg by mouth daily        metoprolol succinate ER (TOPROL-XL) 50 MG 24 hr tablet Take 50 mg by mouth every evening       ondansetron (ZOFRAN) 4 MG tablet Take by mouth every 4 hours as needed for nausea       Labs reviewed in [facility]    ASSESSMENT AND PLAN:                                                      1. Mediastinitis / Sternal osteomyelitis  s/p CABG x4 on 5/4/20  - s/p Chest wall reconstruction, Debridement of sternal wound and left pectoralis major flap 6/18/20 - Sternal bone Cx- positive for Staph epidemidis   - s/p washout and wound vac placement 6/12/20 . Op note: There was necrotic tissue, purulent debris and exposed bone in the bed of the wound.  All nonviable tissue was sharply debrided with scissors to the level of the sternum, including some non-viable sternum bone.  Bone Cx -positive for Staph epidermidis   -  [place] [facility] [place]  - Staphylococcus epidermidis at [facility] 6/12 - oxacillin resistant MRSE   - CT chest wo contrast 6/9/20   Impression:   1. Soft tissue thickening/inflammatory changes posterior to the midline sternotomy without discrete fluid collection.  2. Small pericardial effusion.  3. Subtle groundglass opacities in the left lingula, may represent atelectasis, mild edema/inflammation, or  infection.     2. CAD s/p CABG x 4 5/4/20  3. Diabetes mellitus  HbA1c 7.5 (6/9/20), 6.8 ( 7/15/20)   4. Hypertension   5. leukocytosis - resolved   6. Left foot wound - dry, no redness - started in Jan 2020  7 . CRP 26 (6/16/20) --> 0.98 (7/6/20)      PLAN:  - continue Vancomycin IV ( goal Vanco trough 15-20)  Pharmacy may adjust dose per trough level  - continue Rifampin 300 mg po BID due to remaining hardware in place    - duration : 6 weeks from 6/18 ( till 7/30/30)    - remove PICC on 7/31/20 ( after lst dose of vancomycin IV)   - continue weekly lab: CMP, CBC, CRP, Vanco trough next Tuesday   - will continue 2 weeks Doxycycline 100 mg po q12 hr + Rifampin x 2 more weeks and reevaluate.   - advised to check with his susrgical team, before going on the canoeing trip  - ensure good control of his diabetes mellitus     f/u in 2-3 weeks sooner if needed    Bart Landeros MD, M.Med.Sc  Division of Infectious Diseases and International Medicine  [facility]

## 2020-07-24 NOTE — PROGRESS NOTES
"Nabil Cheung is a 60 year old male who is being evaluated via a billable telephone visit.      The patient has been notified of following:     \"This telephone visit will be conducted via a call between you and your physician/provider. We have found that certain health care needs can be provided without the need for a physical exam.  This service lets us provide the care you need with a short phone conversation.  If a prescription is necessary we can send it directly to your pharmacy.  If lab work is needed we can place an order for that and you can then stop by our lab to have the test done at a later time.    Telephone visits are billed at different rates depending on your insurance coverage. During this emergency period, for some insurers they may be billed the same as an in-person visit.  Please reach out to your insurance provider with any questions.    If during the course of the call the physician/provider feels a telephone visit is not appropriate, you will not be charged for this service.\"    Patient has given verbal consent for Telephone visit?  Yes    What phone number would you like to be contacted at? [phone]    How would you like to obtain your AVS? MyChart    Phone call duration: 25 minutes    INFECTIOUS DISEASES PROGRESS NOTE    SUBJECTIVE:                                                      Nabil Cheung is a 60 y.o. male with a PMH of poorly controlled DMT2, hypertension, CKD stage 2-3, BPH/urinary retention, chronic diabetic left foot ulcer, and recently diagnosed severe 3 vessel disease with progressive symptoms at home. Underwent CABG x 4 (LIMA to LAD, SVG to rPDA, SVG to OM, SVG to diag) 05/04 with Dr. Mckinney. Discharged to home 05/12. [place] [facility] [place] wound infection. [place] [facility] [place] Dr. Mckinney, then transferred to [facility] for further wound management. discharged on 6/21/20.      telephone visit on 7/8/20  He is on Vancomycin IV " "and Rifampin. He tolerates these antibiotics well. no fever, chills, sweatings. small amount of loose stools, resolved with probiotic use. no abdominal pain.  his diabetes is under control ( between 100-130). his surgical site is healing well. he still has staples and drain in and has a follow-up with CTS this Friday. Drainage is about 20 ml /day for the past 5 days.      discussed labs     Telephone visit on 7/24/20   \"doing really well\" drain was removed on 7/10 and surgical site is healed. no redness/ swelling, drainage/pain. \"a bit itchy\" PICC line is working well. no pain, redness, swelling. no fever, chills, nausea, vomiting, diarrhea. has good appetite.  Treatment will end on 7/30 and he wants his PICC removed the next day. He is planning for a canoeing trip in about 1 month. His diabetes is under good control.     Labs reviewed, discussed with patient     Problem list and histories reviewed & adjusted, as indicated.  Additional history: as documented    CURRENT MEDICATIONS:  Current Outpatient Medications   Medication Sig Dispense Refill     acetaminophen (TYLENOL) 325 MG tablet Take 650 mg by mouth every 6 hours as needed for mild pain       alfuzosin ER (UROXATRAL) 10 MG 24 hr tablet Take 10 mg by mouth daily        amLODIPine (NORVASC) 10 MG tablet Take 1 tablet (10 mg) by mouth daily 45 tablet 0     aspirin (ASA) 325 MG EC tablet Take 325 mg by mouth daily       cyclobenzaprine (FLEXERIL) 10 MG tablet Take 1 tablet (10 mg) by mouth 3 times daily as needed for muscle spasms 30 tablet 0     empagliflozin (JARDIANCE) 10 MG TABS tablet Take by mouth daily Unknown dosage       finasteride (PROSCAR) 5 MG tablet Take 5 mg by mouth daily       fish oil-omega-3 fatty acids 1000 MG capsule Take 1 g by mouth 2 times daily       insulin glargine (LANTUS PEN) 100 UNIT/ML pen Inject 15 Units Subcutaneous At Bedtime       lisinopril (ZESTRIL) 2.5 MG tablet Take 2 tablets (5 mg) by mouth daily 60 tablet 0     magnesium " 250 MG tablet Take 1 tablet by mouth       metFORMIN (GLUCOPHAGE) 1000 MG tablet Take 1 tablet (1,000 mg) by mouth 2 times daily (with meals) 60 tablet 0     nitroGLYcerin (NITROSTAT) 0.4 MG sublingual tablet Place 0.4 mg under the tongue every 5 minutes as needed for chest pain For chest pain place 1 tablet under the tongue every 5 minutes for 3 doses. If symptoms persist 5 minutes after 1st dose call 911.       rifampin (RIFADIN) 300 MG capsule Take 1 capsule (300 mg) by mouth every 12 hours 60 capsule 0     rosuvastatin (CRESTOR) 20 MG tablet Take 20 mg by mouth daily Evening       vancomycin 1,250 mg Inject 1,250 mg into the vein every 12 hours 60 Dose 0     vitamin D3 (CHOLECALCIFEROL) 2000 units (50 mcg) tablet Take 1 tablet by mouth daily       alpha-lipoic acid 600 MG capsule Take 600 mg by mouth daily        metoprolol succinate ER (TOPROL-XL) 50 MG 24 hr tablet Take 50 mg by mouth every evening       ondansetron (ZOFRAN) 4 MG tablet Take by mouth every 4 hours as needed for nausea       Labs reviewed in [facility]    ASSESSMENT AND PLAN:                                                      1. Mediastinitis / Sternal osteomyelitis  s/p CABG x4 on 5/4/20  - s/p Chest wall reconstruction, Debridement of sternal wound and left pectoralis major flap 6/18/20 - Sternal bone Cx- positive for Staph epidemidis   - s/p washout and wound vac placement 6/12/20 . Op note: There was necrotic tissue, purulent debris and exposed bone in the bed of the wound.  All nonviable tissue was sharply debrided with scissors to the level of the sternum, including some non-viable sternum bone.  Bone Cx -positive for Staph epidermidis   - [place] [facility] [place]  - Staphylococcus epidermidis at [facility] 6/12 - oxacillin resistant MRSE   - CT chest wo contrast 6/9/20   Impression:   1. Soft tissue thickening/inflammatory changes posterior to the midline sternotomy without discrete fluid collection.  2. Small pericardial  effusion.  3. Subtle groundglass opacities in the left lingula, may represent atelectasis, mild edema/inflammation, or  infection.     2. CAD s/p CABG x 4 5/4/20  3. Diabetes mellitus  HbA1c 7.5 (6/9/20), 6.8 ( 7/15/20)   4. Hypertension   5. leukocytosis - resolved   6. Left foot wound - dry, no redness - started in Jan 2020  7 . CRP 26 (6/16/20) --> 0.98 (7/6/20)      PLAN:  - continue Vancomycin IV ( goal Vanco trough 15-20)  Pharmacy may adjust dose per trough level  - continue Rifampin 300 mg po BID due to remaining hardware in place    - duration : 6 weeks from 6/18 ( till 7/30/30)    - remove PICC on 7/31/20 ( after lst dose of vancomycin IV)   - continue weekly lab: CMP, CBC, CRP, Vanco trough next Tuesday   - will continue 2 weeks Doxycycline 100 mg po q12 hr + Rifampin x 2 more weeks and reevaluate.   - advised to check with his susrgical team, before going on the canoeing trip  - ensure good control of his diabetes mellitus     f/u in 2-3 weeks sooner if needed    Bart Landeros MD, M.Med.Sc  Division of Infectious Diseases and International Medicine  [facility]

## 2020-07-27 ENCOUNTER — TELEPHONE (OUTPATIENT)
Dept: INFECTIOUS DISEASES | Facility: CLINIC | Age: 60
End: 2020-07-27

## 2020-07-27 NOTE — TELEPHONE ENCOUNTER
----- Message from Bart Landeros MD sent at 7/24/2020 10:58 AM CDT -----  Regarding: IV Vancomycin and PICC removal next week  Hi,     Please notify his infusion pharmacy : will complete 6 weeks of Vancomycin IV on 7/30 and to remove PICC on Friday  morning.     Thank you   [place]

## 2020-07-28 DIAGNOSIS — M86.9 STERNAL OSTEOMYELITIS (H): ICD-10-CM

## 2020-07-28 DIAGNOSIS — T81.49XA WOUND INFECTION AFTER SURGERY: ICD-10-CM

## 2020-07-28 LAB
ALBUMIN SERPL-MCNC: 3.4 G/DL (ref 3.4–5)
ALP SERPL-CCNC: 81 U/L (ref 40–150)
ALT SERPL W P-5'-P-CCNC: 18 U/L (ref 0–70)
ANION GAP SERPL CALCULATED.3IONS-SCNC: 7 MMOL/L (ref 3–14)
AST SERPL W P-5'-P-CCNC: 12 U/L (ref 0–45)
BASOPHILS # BLD AUTO: 0.1 10E9/L (ref 0–0.2)
BASOPHILS NFR BLD AUTO: 0.6 %
BILIRUB DIRECT SERPL-MCNC: <0.1 MG/DL (ref 0–0.2)
BILIRUB SERPL-MCNC: 0.3 MG/DL (ref 0.2–1.3)
BUN SERPL-MCNC: 26 MG/DL (ref 7–30)
CALCIUM SERPL-MCNC: 8.7 MG/DL (ref 8.5–10.1)
CHLORIDE SERPL-SCNC: 105 MMOL/L (ref 94–109)
CO2 SERPL-SCNC: 26 MMOL/L (ref 20–32)
CREAT SERPL-MCNC: 1.04 MG/DL (ref 0.66–1.25)
CRP SERPL-MCNC: 8.8 MG/L (ref 0–8)
DIFFERENTIAL METHOD BLD: ABNORMAL
EOSINOPHIL # BLD AUTO: 0.6 10E9/L (ref 0–0.7)
EOSINOPHIL NFR BLD AUTO: 5.3 %
ERYTHROCYTE [DISTWIDTH] IN BLOOD BY AUTOMATED COUNT: 16 % (ref 10–15)
GFR SERPL CREATININE-BSD FRML MDRD: 76 ML/MIN/{1.73_M2}
GLUCOSE SERPL-MCNC: 151 MG/DL (ref 70–99)
HCT VFR BLD AUTO: 36.5 % (ref 40–53)
HGB BLD-MCNC: 11.7 G/DL (ref 13.3–17.7)
LYMPHOCYTES # BLD AUTO: 2.2 10E9/L (ref 0.8–5.3)
LYMPHOCYTES NFR BLD AUTO: 20.7 %
MCH RBC QN AUTO: 26 PG (ref 26.5–33)
MCHC RBC AUTO-ENTMCNC: 32.1 G/DL (ref 31.5–36.5)
MCV RBC AUTO: 81 FL (ref 78–100)
MONOCYTES # BLD AUTO: 1.2 10E9/L (ref 0–1.3)
MONOCYTES NFR BLD AUTO: 11.6 %
NEUTROPHILS # BLD AUTO: 6.4 10E9/L (ref 1.6–8.3)
NEUTROPHILS NFR BLD AUTO: 61.8 %
PLATELET # BLD AUTO: 292 10E9/L (ref 150–450)
POTASSIUM SERPL-SCNC: 4.2 MMOL/L (ref 3.4–5.3)
PROT SERPL-MCNC: 7.6 G/DL (ref 6.8–8.8)
RBC # BLD AUTO: 4.5 10E12/L (ref 4.4–5.9)
SODIUM SERPL-SCNC: 138 MMOL/L (ref 133–144)
VANCOMYCIN SERPL-MCNC: 16 MG/L
WBC # BLD AUTO: 10.4 10E9/L (ref 4–11)

## 2020-07-28 PROCEDURE — 86140 C-REACTIVE PROTEIN: CPT | Performed by: INTERNAL MEDICINE

## 2020-07-28 PROCEDURE — 80053 COMPREHEN METABOLIC PANEL: CPT | Performed by: INTERNAL MEDICINE

## 2020-07-28 PROCEDURE — 36415 COLL VENOUS BLD VENIPUNCTURE: CPT | Performed by: INTERNAL MEDICINE

## 2020-07-28 PROCEDURE — 80202 ASSAY OF VANCOMYCIN: CPT | Performed by: INTERNAL MEDICINE

## 2020-07-28 PROCEDURE — 85025 COMPLETE CBC W/AUTO DIFF WBC: CPT | Performed by: INTERNAL MEDICINE

## 2020-07-28 PROCEDURE — 82248 BILIRUBIN DIRECT: CPT | Performed by: INTERNAL MEDICINE

## 2020-07-29 ENCOUNTER — VIRTUAL VISIT (OUTPATIENT)
Dept: PLASTIC SURGERY | Facility: CLINIC | Age: 60
End: 2020-07-29
Payer: COMMERCIAL

## 2020-07-29 VITALS — HEIGHT: 71 IN | WEIGHT: 190 LBS | BODY MASS INDEX: 26.6 KG/M2

## 2020-07-29 DIAGNOSIS — Z98.890 S/P FLAP GRAFT: Primary | ICD-10-CM

## 2020-07-29 PROBLEM — N53.14 RETROGRADE EJACULATION: Status: ACTIVE | Noted: 2019-12-02

## 2020-07-29 PROBLEM — H52.203 MYOPIA OF BOTH EYES WITH ASTIGMATISM AND PRESBYOPIA: Status: ACTIVE | Noted: 2018-09-26

## 2020-07-29 PROBLEM — E11.65 UNCONTROLLED TYPE 2 DIABETES MELLITUS WITH HYPERGLYCEMIA (H): Status: ACTIVE | Noted: 2019-07-23

## 2020-07-29 PROBLEM — N52.1 ERECTILE DYSFUNCTION ASSOCIATED WITH TYPE 2 DIABETES MELLITUS (H): Status: ACTIVE | Noted: 2019-08-21

## 2020-07-29 PROBLEM — I25.118 CORONARY ARTERY DISEASE INVOLVING NATIVE CORONARY ARTERY OF NATIVE HEART WITH OTHER FORM OF ANGINA PECTORIS (H): Status: ACTIVE | Noted: 2020-05-04

## 2020-07-29 PROBLEM — N40.1 BPH WITH OBSTRUCTION/LOWER URINARY TRACT SYMPTOMS: Status: ACTIVE | Noted: 2019-07-30

## 2020-07-29 PROBLEM — R80.9 MICROALBUMINURIA: Status: ACTIVE | Noted: 2018-08-12

## 2020-07-29 PROBLEM — E04.1 THYROID NODULE: Status: ACTIVE | Noted: 2018-08-24

## 2020-07-29 PROBLEM — T81.49XA SURGICAL WOUND INFECTION: Status: ACTIVE | Noted: 2020-06-08

## 2020-07-29 PROBLEM — L97.412 DIABETIC ULCER OF RIGHT MIDFOOT ASSOCIATED WITH TYPE 2 DIABETES MELLITUS, WITH FAT LAYER EXPOSED (H): Status: ACTIVE | Noted: 2019-07-23

## 2020-07-29 PROBLEM — H26.9 CATARACTS, BILATERAL: Status: ACTIVE | Noted: 2018-09-26

## 2020-07-29 PROBLEM — G89.29 CHRONIC RIGHT SHOULDER PAIN: Status: ACTIVE | Noted: 2018-10-09

## 2020-07-29 PROBLEM — R97.20 ELEVATED PSA, LESS THAN 10 NG/ML: Status: ACTIVE | Noted: 2019-07-23

## 2020-07-29 PROBLEM — E11.69 ERECTILE DYSFUNCTION ASSOCIATED WITH TYPE 2 DIABETES MELLITUS (H): Status: ACTIVE | Noted: 2019-08-21

## 2020-07-29 PROBLEM — H52.13 MYOPIA OF BOTH EYES WITH ASTIGMATISM AND PRESBYOPIA: Status: ACTIVE | Noted: 2018-09-26

## 2020-07-29 PROBLEM — Z95.1 S/P CABG X 4: Status: ACTIVE | Noted: 2020-05-19

## 2020-07-29 PROBLEM — N13.8 BPH WITH OBSTRUCTION/LOWER URINARY TRACT SYMPTOMS: Status: ACTIVE | Noted: 2019-07-30

## 2020-07-29 PROBLEM — M25.511 CHRONIC RIGHT SHOULDER PAIN: Status: ACTIVE | Noted: 2018-10-09

## 2020-07-29 PROBLEM — N45.3 EPIDIDYMOORCHITIS: Status: ACTIVE | Noted: 2020-02-05

## 2020-07-29 PROBLEM — H52.4 MYOPIA OF BOTH EYES WITH ASTIGMATISM AND PRESBYOPIA: Status: ACTIVE | Noted: 2018-09-26

## 2020-07-29 ASSESSMENT — PAIN SCALES - GENERAL: PAINLEVEL: NO PAIN (0)

## 2020-07-29 ASSESSMENT — MIFFLIN-ST. JEOR: SCORE: 1683.96

## 2020-07-29 NOTE — LETTER
"7/29/2020       RE: Nabil Cheung  [address]  [facility] [address]     Dear Colleague,    Thank you for referring your patient, Nabil Cheung, to the [facility] PLASTIC AND RECONSTRUCTIVE SURGERY at [facility]. Please see a copy of my visit note below.    Nabil Cheung is a 60 year old male who is being evaluated via a billable telephone visit.      The patient has been notified of following:     \"This telephone visit will be conducted via a call between you and your physician/provider. We have found that certain health care needs can be provided without the need for a physical exam.  This service lets us provide the care you need with a short phone conversation.  If a prescription is necessary we can send it directly to your pharmacy.  If lab work is needed we can place an order for that and you can then stop by our lab to have the test done at a later time.    Telephone visits are billed at different rates depending on your insurance coverage. During this emergency period, for some insurers they may be billed the same as an in-person visit.  Please reach out to your insurance provider with any questions.    If during the course of the call the physician/provider feels a telephone visit is not appropriate, you will not be charged for this service.\"    Patient has given verbal consent for Telephone visit?  Yes    What phone number would you like to be contacted at? [phone]    How would you like to obtain your AVS? [employer]    Phone call duration: 10 minutes    Neville Serna MD          Service Date: 07/29/2020      PRESENTING COMPLAINT:  Postoperative visit, status post sternal wound infection requiring left pectoralis major muscle flap closure done on 06/18/2020.      HISTORY OF PRESENTING COMPLAINT:  Mr. Cheung is 60 years old.  He is about 6 weeks out from surgery, completely healed, happy with the results, no issues.      PHYSICAL EXAMINATION:  Vital signs are stable.  He is " afebrile, in no obvious distress.  Wounds are healed in.      ASSESSMENT AND PLAN:  Based on above findings, a diagnosis of chest wall reconstruction was made.  Advised aggressive moisturization.  I will see him back p.r.n.  All questions were answered.         MARCO CONTRERAS MD        D: 2020   T: 2020   MT: JANE      Name:     ABILIO SUAZO   MRN:      [id number]        Account:      [id number]   :      1960           Service Date: 2020      Document: [id number]

## 2020-07-29 NOTE — NURSING NOTE
"Chief Complaint   Patient presents with     RECHECK     3wk follow up sternal incision DOS 6/18 from nonhealing sternal wound after CABG, healing well       Vitals:    07/29/20 0916   Weight: 86.2 kg (190 lb)   Height: 1.803 m (5' 11\")       Body mass index is 26.5 kg/m .    Gilberto Laughlin, EMT    "

## 2020-07-29 NOTE — PROGRESS NOTES
"Nabil Cheung is a 60 year old male who is being evaluated via a billable telephone visit.      The patient has been notified of following:     \"This telephone visit will be conducted via a call between you and your physician/provider. We have found that certain health care needs can be provided without the need for a physical exam.  This service lets us provide the care you need with a short phone conversation.  If a prescription is necessary we can send it directly to your pharmacy.  If lab work is needed we can place an order for that and you can then stop by our lab to have the test done at a later time.    Telephone visits are billed at different rates depending on your insurance coverage. During this emergency period, for some insurers they may be billed the same as an in-person visit.  Please reach out to your insurance provider with any questions.    If during the course of the call the physician/provider feels a telephone visit is not appropriate, you will not be charged for this service.\"    Patient has given verbal consent for Telephone visit?  Yes    What phone number would you like to be contacted at? [phone]    How would you like to obtain your AVS? [employer]    Phone call duration: 10 minutes    Neville Serna MD        "

## 2020-07-29 NOTE — PROGRESS NOTES
Service Date: 2020      PRESENTING COMPLAINT:  Postoperative visit, status post sternal wound infection requiring left pectoralis major muscle flap closure done on 2020.      HISTORY OF PRESENTING COMPLAINT:  Mr. Cheung is 60 years old.  He is about 6 weeks out from surgery, completely healed, happy with the results, no issues.      PHYSICAL EXAMINATION:  Vital signs are stable.  He is afebrile, in no obvious distress.  Wounds are healed in.      ASSESSMENT AND PLAN:  Based on above findings, a diagnosis of chest wall reconstruction was made.  Advised aggressive moisturization.  I will see him back p.r.n.  All questions were answered.         MARCO CONTRERAS MD             D: 2020   T: 2020   MT: JANE      Name:     ABILIO CHEUNG   MRN:      [id number]        Account:      [id number]   :      1960           Service Date: 2020      Document: [id number]

## 2020-08-12 ENCOUNTER — VIRTUAL VISIT (OUTPATIENT)
Dept: INFECTIOUS DISEASES | Facility: CLINIC | Age: 60
End: 2020-08-12
Attending: INTERNAL MEDICINE
Payer: COMMERCIAL

## 2020-08-12 DIAGNOSIS — M86.9 STERNAL OSTEOMYELITIS (H): Primary | ICD-10-CM

## 2020-08-12 DIAGNOSIS — J98.51 MEDIASTINITIS: ICD-10-CM

## 2020-08-12 NOTE — LETTER
"8/12/2020       RE: Nabil Cheung  [address]  [facility] [address]     Dear Colleague,    Thank you for referring your patient, Nabil Cheung, to the [facility] INFECTIOUS DISEASES at [facility]. Please see a copy of my visit note below.    Nabil Cheung is a 60 year old male who is being evaluated via a billable telephone visit.      The patient has been notified of following:     \"This telephone visit will be conducted via a call between you and your physician/provider. We have found that certain health care needs can be provided without the need for a physical exam.  This service lets us provide the care you need with a short phone conversation.  If a prescription is necessary we can send it directly to your pharmacy.  If lab work is needed we can place an order for that and you can then stop by our lab to have the test done at a later time.    Telephone visits are billed at different rates depending on your insurance coverage. During this emergency period, for some insurers they may be billed the same as an in-person visit.  Please reach out to your insurance provider with any questions.    If during the course of the call the physician/provider feels a telephone visit is not appropriate, you will not be charged for this service.\"    Patient has given verbal consent for Telephone visit?  Yes    What phone number would you like to be contacted at? [phone]    How would you like to obtain your AVS? [employer]    Phone call duration: 17 minutes    INFECTIOUS DISEASES PROGRESS NOTE    SUBJECTIVE:                                                      Nabil Cheung is a 60 y.o. male with a PMH of poorly controlled DMT2, hypertension, CKD stage 2-3, BPH/urinary retention, chronic diabetic left foot ulcer, and recently diagnosed severe 3 vessel disease with progressive symptoms at home. Underwent CABG x 4 (LIMA to LAD, SVG to rPDA, SVG to OM, SVG to diag) 05/04 " "with Dr. Mckinney. Discharged to home 05/12. Admitted to [facility] from [place] 06/08 with sternal wound infection. Underwent I&D of sternum at [facility] 06/10 with Dr. Mckinney, then transferred to [facility] for further wound management. discharged on 6/21/20.      telephone visit on 7/8/20  He is on Vancomycin IV and Rifampin. He tolerates these antibiotics well. no fever, chills, sweatings. small amount of loose stools, resolved with probiotic use. no abdominal pain.  his diabetes is under control ( between 100-130). his surgical site is healing well. he still has staples and drain in and has a follow-up with CTS this Friday. Drainage is about 20 ml /day for the past 5 days.      discussed labs     Telephone visit on 7/24/20   \"doing really well\" drain was removed on 7/10 and surgical site is healed. no redness/ swelling, drainage/pain. \"a bit itchy\" PICC line is working well. no pain, redness, swelling. no fever, chills, nausea, vomiting, diarrhea. has good appetite.  Treatment will end on 7/30 and he wants his PICC removed the next day. He is planning for a canoeing trip in about 1 month. His diabetes is under good control.     Labs reviewed, discussed with patient     Telephone visit on 8/12/20  feels really good. completed 6 weeks of Vancomycin IV and Rifampin. due to minimal symmptom, was continued on Doxycycline and Rifampin - currently at day 10. surgical site has totally healed. no pain in the area. but complains of some loose stools and upper abdominal discomfort. appetite is not so good. no fever, chills. has cancelled his canoeing trip this month. will have prostate biopsy in September.  I will call his urologist's office again.      Problem list and histories reviewed & adjusted, as indicated.  Additional history: as documented    CURRENT MEDICATIONS:  Current Outpatient Medications   Medication Sig Dispense Refill     acetaminophen (TYLENOL) 325 MG tablet Take 650 mg by mouth every 6 hours as needed for mild pain       " alfuzosin ER (UROXATRAL) 10 MG 24 hr tablet Take 10 mg by mouth daily        alpha-lipoic acid 600 MG capsule Take 600 mg by mouth daily        amLODIPine (NORVASC) 10 MG tablet Take 1 tablet (10 mg) by mouth daily 45 tablet 0     aspirin (ASA) 325 MG EC tablet Take 325 mg by mouth daily       cyclobenzaprine (FLEXERIL) 10 MG tablet Take 1 tablet (10 mg) by mouth 3 times daily as needed for muscle spasms 30 tablet 0     empagliflozin (JARDIANCE) 10 MG TABS tablet Take by mouth daily Unknown dosage       finasteride (PROSCAR) 5 MG tablet Take 5 mg by mouth daily       fish oil-omega-3 fatty acids 1000 MG capsule Take 1 g by mouth 2 times daily       insulin glargine (LANTUS PEN) 100 UNIT/ML pen Inject 15 Units Subcutaneous At Bedtime       lisinopril (ZESTRIL) 2.5 MG tablet Take 2 tablets (5 mg) by mouth daily 60 tablet 0     magnesium 250 MG tablet Take 1 tablet by mouth       metFORMIN (GLUCOPHAGE) 1000 MG tablet Take 1 tablet (1,000 mg) by mouth 2 times daily (with meals) 60 tablet 0     metoprolol succinate ER (TOPROL-XL) 50 MG 24 hr tablet Take 50 mg by mouth every evening       nitroGLYcerin (NITROSTAT) 0.4 MG sublingual tablet Place 0.4 mg under the tongue every 5 minutes as needed for chest pain For chest pain place 1 tablet under the tongue every 5 minutes for 3 doses. If symptoms persist 5 minutes after 1st dose call 911.       ondansetron (ZOFRAN) 4 MG tablet Take by mouth every 4 hours as needed for nausea       rosuvastatin (CRESTOR) 20 MG tablet Take 20 mg by mouth daily Evening       vancomycin 1,250 mg Inject 1,250 mg into the vein every 12 hours 60 Dose 0     vitamin D3 (CHOLECALCIFEROL) 2000 units (50 mcg) tablet Take 1 tablet by mouth daily       Labs reviewed in EPIC    ASSESSMENT AND PLAN:                                                      1. Mediastinitis / Sternal osteomyelitis  s/p CABG x4 on 5/4/20 - resolved - completed antibiotics  - s/p Chest wall reconstruction, Debridement of sternal  wound and left pectoralis major flap 6/18/20 - Sternal bone Cx- positive for Staph epidemidis   - s/p washout and wound vac placement 6/12/20 . Op note: There was necrotic tissue, purulent debris and exposed bone in the bed of the wound.  All nonviable tissue was sharply debrided with scissors to the level of the sternum, including some non-viable sternum bone.  Bone Cx -positive for Staph epidermidis   - Staphylococcus epidermidis at [facility] 6/9/20  - Staphylococcus epidermidis at [facility] 6/12 - oxacillin resistant MRSE   - CT chest wo contrast 6/9/20   Impression:   1. Soft tissue thickening/inflammatory changes posterior to the midline sternotomy without discrete fluid collection.  2. Small pericardial effusion.  3. Subtle groundglass opacities in the left lingula, may represent atelectasis, mild edema/inflammation, or  infection.     - completed Vancomycin and Rifampin x 6 weeks and 10 days of doxy/Rif PO    2. CAD s/p CABG x 4 5/4/20  3. Diabetes mellitus  HbA1c 7.5 (6/9/20), 6.8 ( 7/15/20)   4. Hypertension   5. leukocytosis - resolved   6. Left foot wound - dry, no redness - started in Jan 2020  7 . CRP 26 (6/16/20) --> 0.98 (7/6/20)   8. Elevated PSA - plan for prostate biopsy in September 2020      PLAN:  - stop all antibiotics   - if diarrhea, check stools for C.diff   - ensure good control of his diabetes   - will call his urologist this week , will recommend check UA/UC before prostate biopsy. Per patient, rectal cx will be done   - prophylactic antibiotic 1 hour before prostate biopsy. If UC is positive, will treat before sx.     he is advised to call if he has any questions or concerns     Bart Landeros MD, M.Med.Sc  Division of Infectious Diseases and International Medicine  [facility]

## 2020-08-12 NOTE — PROGRESS NOTES
"Nabil Cheung is a 60 year old male who is being evaluated via a billable telephone visit.      The patient has been notified of following:     \"This telephone visit will be conducted via a call between you and your physician/provider. We have found that certain health care needs can be provided without the need for a physical exam.  This service lets us provide the care you need with a short phone conversation.  If a prescription is necessary we can send it directly to your pharmacy.  If lab work is needed we can place an order for that and you can then stop by our lab to have the test done at a later time.    Telephone visits are billed at different rates depending on your insurance coverage. During this emergency period, for some insurers they may be billed the same as an in-person visit.  Please reach out to your insurance provider with any questions.    If during the course of the call the physician/provider feels a telephone visit is not appropriate, you will not be charged for this service.\"    Patient has given verbal consent for Telephone visit?  Yes    What phone number would you like to be contacted at? [phone]    How would you like to obtain your AVS? MyChart    Phone call duration: 17 minutes    INFECTIOUS DISEASES PROGRESS NOTE    SUBJECTIVE:                                                      Nabil Cheung is a 60 y.o. male with a PMH of poorly controlled DMT2, hypertension, CKD stage 2-3, BPH/urinary retention, chronic diabetic left foot ulcer, and recently diagnosed severe 3 vessel disease with progressive symptoms at home. Underwent CABG x 4 (LIMA to LAD, SVG to rPDA, SVG to OM, SVG to diag) 05/04 with Dr. Mckinney. Discharged to home 05/12. Ad[place] [facility] [place] 06/08 with sternal wound infection. Underwent I&D of sternum at [facility] 06/10 with Dr. Mckinney, then transferred to [facility] for further wound management. discharged on 6/21/20.      telephone visit on 7/8/20  He is on Vancomycin IV " "and Rifampin. He tolerates these antibiotics well. no fever, chills, sweatings. small amount of loose stools, resolved with probiotic use. no abdominal pain.  his diabetes is under control ( between 100-130). his surgical site is healing well. he still has staples and drain in and has a follow-up with CTS this Friday. Drainage is about 20 ml /day for the past 5 days.      discussed labs     Telephone visit on 7/24/20   \"doing really well\" drain was removed on 7/10 and surgical site is healed. no redness/ swelling, drainage/pain. \"a bit itchy\" PICC line is working well. no pain, redness, swelling. no fever, chills, nausea, vomiting, diarrhea. has good appetite.  Treatment will end on 7/30 and he wants his PICC removed the next day. He is planning for a canoeing trip in about 1 month. His diabetes is under good control.     Labs reviewed, discussed with patient     Telephone visit on 8/12/20  feels really good. completed 6 weeks of Vancomycin IV and Rifampin. due to minimal symmptom, was continued on Doxycycline and Rifampin - currently at day 10. surgical site has totally healed. no pain in the area. but complains of some loose stools and upper abdominal discomfort. appetite is not so good. no fever, chills. has cancelled his canoeing trip this month. will have prostate biopsy in September.  I will call his urologist's office again.      Problem list and histories reviewed & adjusted, as indicated.  Additional history: as documented    CURRENT MEDICATIONS:  Current Outpatient Medications   Medication Sig Dispense Refill     acetaminophen (TYLENOL) 325 MG tablet Take 650 mg by mouth every 6 hours as needed for mild pain       alfuzosin ER (UROXATRAL) 10 MG 24 hr tablet Take 10 mg by mouth daily        alpha-lipoic acid 600 MG capsule Take 600 mg by mouth daily        amLODIPine (NORVASC) 10 MG tablet Take 1 tablet (10 mg) by mouth daily 45 tablet 0     aspirin (ASA) 325 MG EC tablet Take 325 mg by mouth daily       " cyclobenzaprine (FLEXERIL) 10 MG tablet Take 1 tablet (10 mg) by mouth 3 times daily as needed for muscle spasms 30 tablet 0     empagliflozin (JARDIANCE) 10 MG TABS tablet Take by mouth daily Unknown dosage       finasteride (PROSCAR) 5 MG tablet Take 5 mg by mouth daily       fish oil-omega-3 fatty acids 1000 MG capsule Take 1 g by mouth 2 times daily       insulin glargine (LANTUS PEN) 100 UNIT/ML pen Inject 15 Units Subcutaneous At Bedtime       lisinopril (ZESTRIL) 2.5 MG tablet Take 2 tablets (5 mg) by mouth daily 60 tablet 0     magnesium 250 MG tablet Take 1 tablet by mouth       metFORMIN (GLUCOPHAGE) 1000 MG tablet Take 1 tablet (1,000 mg) by mouth 2 times daily (with meals) 60 tablet 0     metoprolol succinate ER (TOPROL-XL) 50 MG 24 hr tablet Take 50 mg by mouth every evening       nitroGLYcerin (NITROSTAT) 0.4 MG sublingual tablet Place 0.4 mg under the tongue every 5 minutes as needed for chest pain For chest pain place 1 tablet under the tongue every 5 minutes for 3 doses. If symptoms persist 5 minutes after 1st dose call 911.       ondansetron (ZOFRAN) 4 MG tablet Take by mouth every 4 hours as needed for nausea       rosuvastatin (CRESTOR) 20 MG tablet Take 20 mg by mouth daily Evening       vancomycin 1,250 mg Inject 1,250 mg into the vein every 12 hours 60 Dose 0     vitamin D3 (CHOLECALCIFEROL) 2000 units (50 mcg) tablet Take 1 tablet by mouth daily       Labs reviewed in [facility]    ASSESSMENT AND PLAN:                                                      1. Mediastinitis / Sternal osteomyelitis  s/p CABG x4 on 5/4/20 - resolved - completed antibiotics  - s/p Chest wall reconstruction, Debridement of sternal wound and left pectoralis major flap 6/18/20 - Sternal bone Cx- positive for Staph epidemidis   - s/p washout and wound vac placement 6/12/20 . Op note: There was necrotic tissue, purulent debris and exposed bone in the bed of the wound.  All nonviable tissue was sharply debrided with scissors  to the level of the sternum, including some non-viable sternum bone.  Bone Cx -positive for Staph epidermidis   - Staphylococcus epidermidis at [facility] 6/9/20  - Staphylococcus epidermidis at [facility] 6/12 - oxacillin resistant MRSE   - CT chest wo contrast 6/9/20   Impression:   1. Soft tissue thickening/inflammatory changes posterior to the midline sternotomy without discrete fluid collection.  2. Small pericardial effusion.  3. Subtle groundglass opacities in the left lingula, may represent atelectasis, mild edema/inflammation, or  infection.     - completed Vancomycin and Rifampin x 6 weeks and 10 days of doxy/Rif PO    2. CAD s/p CABG x 4 5/4/20  3. Diabetes mellitus  HbA1c 7.5 (6/9/20), 6.8 ( 7/15/20)   4. Hypertension   5. leukocytosis - resolved   6. Left foot wound - dry, no redness - started in Jan 2020  7 . CRP 26 (6/16/20) --> 0.98 (7/6/20)   8. Elevated PSA - plan for prostate biopsy in September 2020      PLAN:  - stop all antibiotics   - if diarrhea, check stools for C.diff   - ensure good control of his diabetes   - will call his urologist this week , will recommend check UA/UC before prostate biopsy. Per patient, rectal cx will be done   - prophylactic antibiotic 1 hour before prostate biopsy. If UC is positive, will treat before sx.     he is advised to call if he has any questions or concerns     Bart Landeros MD, M.Med.Sc  Division of Infectious Diseases and International Medicine  [facility]

## 2020-12-27 ENCOUNTER — HEALTH MAINTENANCE LETTER (OUTPATIENT)
Age: 60
End: 2020-12-27

## 2021-01-08 ENCOUNTER — HOSPITAL ENCOUNTER (INPATIENT)
Facility: CLINIC | Age: 61
LOS: 1 days | Discharge: HOME | End: 2021-01-09
Attending: EMERGENCY MEDICINE | Admitting: SURGERY
Payer: COMMERCIAL

## 2021-01-08 DIAGNOSIS — Z79.4 ENCOUNTER FOR LONG-TERM (CURRENT) USE OF INSULIN (H): ICD-10-CM

## 2021-01-08 DIAGNOSIS — E11.69 DIABETES MELLITUS ASSOCIATED WITH HORMONAL ETIOLOGY (H): ICD-10-CM

## 2021-01-08 DIAGNOSIS — L08.9 STERNAL WOUND INFECTION: Primary | ICD-10-CM

## 2021-01-08 DIAGNOSIS — S21.101A STERNAL WOUND INFECTION: Primary | ICD-10-CM

## 2021-01-08 DIAGNOSIS — M86.9 OSTEOMYELITIS, UNSPECIFIED SITE, UNSPECIFIED TYPE (H): ICD-10-CM

## 2021-01-08 DIAGNOSIS — T81.49XA WOUND INFECTION AFTER SURGERY: ICD-10-CM

## 2021-01-08 LAB
ALBUMIN SERPL-MCNC: 3.6 G/DL (ref 3.4–5)
ALP SERPL-CCNC: 68 U/L (ref 40–150)
ALT SERPL W P-5'-P-CCNC: 29 U/L (ref 0–70)
ANION GAP SERPL CALCULATED.3IONS-SCNC: 7 MMOL/L (ref 3–14)
AST SERPL W P-5'-P-CCNC: 15 U/L (ref 0–45)
BASOPHILS # BLD AUTO: 0.1 10E9/L (ref 0–0.2)
BASOPHILS NFR BLD AUTO: 0.5 %
BILIRUB SERPL-MCNC: 0.3 MG/DL (ref 0.2–1.3)
BUN SERPL-MCNC: 28 MG/DL (ref 7–30)
CALCIUM SERPL-MCNC: 9 MG/DL (ref 8.5–10.1)
CHLORIDE SERPL-SCNC: 107 MMOL/L (ref 94–109)
CO2 SERPL-SCNC: 22 MMOL/L (ref 20–32)
CREAT SERPL-MCNC: 1.29 MG/DL (ref 0.66–1.25)
DIFFERENTIAL METHOD BLD: ABNORMAL
EOSINOPHIL # BLD AUTO: 0.4 10E9/L (ref 0–0.7)
EOSINOPHIL NFR BLD AUTO: 4.3 %
ERYTHROCYTE [DISTWIDTH] IN BLOOD BY AUTOMATED COUNT: 15 % (ref 10–15)
GFR SERPL CREATININE-BSD FRML MDRD: 59 ML/MIN/{1.73_M2}
GLUCOSE BLDC GLUCOMTR-MCNC: 154 MG/DL (ref 70–99)
GLUCOSE SERPL-MCNC: 233 MG/DL (ref 70–99)
HCT VFR BLD AUTO: 39.6 % (ref 40–53)
HGB BLD-MCNC: 12.8 G/DL (ref 13.3–17.7)
IMM GRANULOCYTES # BLD: 0 10E9/L (ref 0–0.4)
IMM GRANULOCYTES NFR BLD: 0.4 %
INR PPP: 1.05 (ref 0.86–1.14)
LACTATE BLD-SCNC: 2.2 MMOL/L (ref 0.7–2)
LYMPHOCYTES # BLD AUTO: 1.6 10E9/L (ref 0.8–5.3)
LYMPHOCYTES NFR BLD AUTO: 17.2 %
MCH RBC QN AUTO: 25.4 PG (ref 26.5–33)
MCHC RBC AUTO-ENTMCNC: 32.3 G/DL (ref 31.5–36.5)
MCV RBC AUTO: 79 FL (ref 78–100)
MONOCYTES # BLD AUTO: 0.8 10E9/L (ref 0–1.3)
MONOCYTES NFR BLD AUTO: 8.7 %
NEUTROPHILS # BLD AUTO: 6.6 10E9/L (ref 1.6–8.3)
NEUTROPHILS NFR BLD AUTO: 68.9 %
NRBC # BLD AUTO: 0 10*3/UL
NRBC BLD AUTO-RTO: 0 /100
PLATELET # BLD AUTO: 273 10E9/L (ref 150–450)
POTASSIUM SERPL-SCNC: 4.5 MMOL/L (ref 3.4–5.3)
PROT SERPL-MCNC: 7.4 G/DL (ref 6.8–8.8)
RBC # BLD AUTO: 5.03 10E12/L (ref 4.4–5.9)
SODIUM SERPL-SCNC: 137 MMOL/L (ref 133–144)
WBC # BLD AUTO: 9.5 10E9/L (ref 4–11)

## 2021-01-08 PROCEDURE — 83605 ASSAY OF LACTIC ACID: CPT | Performed by: EMERGENCY MEDICINE

## 2021-01-08 PROCEDURE — 87040 BLOOD CULTURE FOR BACTERIA: CPT | Performed by: EMERGENCY MEDICINE

## 2021-01-08 PROCEDURE — 85025 COMPLETE CBC W/AUTO DIFF WBC: CPT | Performed by: EMERGENCY MEDICINE

## 2021-01-08 PROCEDURE — 250N000013 HC RX MED GY IP 250 OP 250 PS 637: Performed by: PHYSICIAN ASSISTANT

## 2021-01-08 PROCEDURE — 214N000001 HC R&B CCU UMMC

## 2021-01-08 PROCEDURE — 250N000011 HC RX IP 250 OP 636: Performed by: PHYSICIAN ASSISTANT

## 2021-01-08 PROCEDURE — 99285 EMERGENCY DEPT VISIT HI MDM: CPT | Mod: 25 | Performed by: EMERGENCY MEDICINE

## 2021-01-08 PROCEDURE — 96366 THER/PROPH/DIAG IV INF ADDON: CPT | Performed by: EMERGENCY MEDICINE

## 2021-01-08 PROCEDURE — 999N001017 HC STATISTIC GLUCOSE BY METER IP

## 2021-01-08 PROCEDURE — 96365 THER/PROPH/DIAG IV INF INIT: CPT | Performed by: EMERGENCY MEDICINE

## 2021-01-08 PROCEDURE — 258N000003 HC RX IP 258 OP 636: Performed by: EMERGENCY MEDICINE

## 2021-01-08 PROCEDURE — 96361 HYDRATE IV INFUSION ADD-ON: CPT | Performed by: EMERGENCY MEDICINE

## 2021-01-08 PROCEDURE — 96367 TX/PROPH/DG ADDL SEQ IV INF: CPT | Performed by: EMERGENCY MEDICINE

## 2021-01-08 PROCEDURE — 250N000012 HC RX MED GY IP 250 OP 636 PS 637: Performed by: PHYSICIAN ASSISTANT

## 2021-01-08 PROCEDURE — 99285 EMERGENCY DEPT VISIT HI MDM: CPT | Performed by: EMERGENCY MEDICINE

## 2021-01-08 PROCEDURE — 85610 PROTHROMBIN TIME: CPT | Performed by: EMERGENCY MEDICINE

## 2021-01-08 PROCEDURE — 250N000011 HC RX IP 250 OP 636: Performed by: EMERGENCY MEDICINE

## 2021-01-08 PROCEDURE — 99231 SBSQ HOSP IP/OBS SF/LOW 25: CPT | Performed by: NURSE PRACTITIONER

## 2021-01-08 PROCEDURE — 99221 1ST HOSP IP/OBS SF/LOW 40: CPT | Performed by: SURGERY

## 2021-01-08 PROCEDURE — 80053 COMPREHEN METABOLIC PANEL: CPT | Performed by: EMERGENCY MEDICINE

## 2021-01-08 RX ORDER — SODIUM CHLORIDE 9 MG/ML
INJECTION, SOLUTION INTRAVENOUS CONTINUOUS
Status: DISCONTINUED | OUTPATIENT
Start: 2021-01-08 | End: 2021-01-09

## 2021-01-08 RX ORDER — ALFUZOSIN HYDROCHLORIDE 10 MG/1
10 TABLET, EXTENDED RELEASE ORAL DAILY
Status: DISCONTINUED | OUTPATIENT
Start: 2021-01-09 | End: 2021-01-09 | Stop reason: HOSPADM

## 2021-01-08 RX ORDER — ACETAMINOPHEN 325 MG/1
650 TABLET ORAL EVERY 6 HOURS PRN
Status: DISCONTINUED | OUTPATIENT
Start: 2021-01-08 | End: 2021-01-09 | Stop reason: HOSPADM

## 2021-01-08 RX ORDER — LISINOPRIL 2.5 MG/1
2.5 TABLET ORAL DAILY
Status: DISCONTINUED | OUTPATIENT
Start: 2021-01-08 | End: 2021-01-09 | Stop reason: HOSPADM

## 2021-01-08 RX ORDER — LIDOCAINE 40 MG/G
CREAM TOPICAL
Status: DISCONTINUED | OUTPATIENT
Start: 2021-01-08 | End: 2021-01-09 | Stop reason: HOSPADM

## 2021-01-08 RX ORDER — PIPERACILLIN SODIUM, TAZOBACTAM SODIUM 3; .375 G/15ML; G/15ML
3.38 INJECTION, POWDER, LYOPHILIZED, FOR SOLUTION INTRAVENOUS ONCE
Status: COMPLETED | OUTPATIENT
Start: 2021-01-08 | End: 2021-01-08

## 2021-01-08 RX ORDER — METOPROLOL SUCCINATE 50 MG/1
50 TABLET, EXTENDED RELEASE ORAL EVERY EVENING
Status: DISCONTINUED | OUTPATIENT
Start: 2021-01-08 | End: 2021-01-09 | Stop reason: HOSPADM

## 2021-01-08 RX ORDER — ROSUVASTATIN CALCIUM 20 MG/1
20 TABLET, COATED ORAL DAILY
Status: DISCONTINUED | OUTPATIENT
Start: 2021-01-08 | End: 2021-01-09 | Stop reason: HOSPADM

## 2021-01-08 RX ORDER — CYCLOBENZAPRINE HCL 10 MG
10 TABLET ORAL 3 TIMES DAILY PRN
Status: DISCONTINUED | OUTPATIENT
Start: 2021-01-08 | End: 2021-01-09 | Stop reason: HOSPADM

## 2021-01-08 RX ORDER — DEXTROSE MONOHYDRATE 25 G/50ML
25-50 INJECTION, SOLUTION INTRAVENOUS
Status: DISCONTINUED | OUTPATIENT
Start: 2021-01-08 | End: 2021-01-09 | Stop reason: HOSPADM

## 2021-01-08 RX ORDER — PIPERACILLIN SODIUM, TAZOBACTAM SODIUM 3; .375 G/15ML; G/15ML
3.38 INJECTION, POWDER, LYOPHILIZED, FOR SOLUTION INTRAVENOUS EVERY 6 HOURS
Status: DISCONTINUED | OUTPATIENT
Start: 2021-01-08 | End: 2021-01-09 | Stop reason: HOSPADM

## 2021-01-08 RX ORDER — NICOTINE POLACRILEX 4 MG
15-30 LOZENGE BUCCAL
Status: DISCONTINUED | OUTPATIENT
Start: 2021-01-08 | End: 2021-01-09 | Stop reason: HOSPADM

## 2021-01-08 RX ORDER — FINASTERIDE 5 MG/1
5 TABLET, FILM COATED ORAL DAILY
Status: DISCONTINUED | OUTPATIENT
Start: 2021-01-08 | End: 2021-01-09 | Stop reason: HOSPADM

## 2021-01-08 RX ADMIN — FINASTERIDE 5 MG: 5 TABLET, FILM COATED ORAL at 17:40

## 2021-01-08 RX ADMIN — PIPERACILLIN SODIUM AND TAZOBACTAM SODIUM 3.38 G: 3; .375 INJECTION, POWDER, LYOPHILIZED, FOR SOLUTION INTRAVENOUS at 23:47

## 2021-01-08 RX ADMIN — SODIUM CHLORIDE: 9 INJECTION, SOLUTION INTRAVENOUS at 13:02

## 2021-01-08 RX ADMIN — SODIUM CHLORIDE 250 ML: 9 INJECTION, SOLUTION INTRAVENOUS at 11:43

## 2021-01-08 RX ADMIN — SODIUM CHLORIDE: 9 INJECTION, SOLUTION INTRAVENOUS at 21:20

## 2021-01-08 RX ADMIN — PIPERACILLIN SODIUM AND TAZOBACTAM SODIUM 3.38 G: 3; .375 INJECTION, POWDER, LYOPHILIZED, FOR SOLUTION INTRAVENOUS at 17:43

## 2021-01-08 RX ADMIN — PIPERACILLIN SODIUM AND TAZOBACTAM SODIUM 3.38 G: 3; .375 INJECTION, POWDER, LYOPHILIZED, FOR SOLUTION INTRAVENOUS at 12:25

## 2021-01-08 RX ADMIN — METOPROLOL SUCCINATE 50 MG: 50 TABLET, EXTENDED RELEASE ORAL at 20:18

## 2021-01-08 RX ADMIN — VANCOMYCIN HYDROCHLORIDE 1250 MG: 10 INJECTION, POWDER, LYOPHILIZED, FOR SOLUTION INTRAVENOUS at 13:03

## 2021-01-08 RX ADMIN — METFORMIN HYDROCHLORIDE 1000 MG: 500 TABLET ORAL at 17:40

## 2021-01-08 RX ADMIN — ROSUVASTATIN CALCIUM 20 MG: 20 TABLET, FILM COATED ORAL at 17:41

## 2021-01-08 RX ADMIN — INSULIN GLARGINE 18 UNITS: 100 INJECTION, SOLUTION SUBCUTANEOUS at 21:15

## 2021-01-08 RX ADMIN — LISINOPRIL 2.5 MG: 2.5 TABLET ORAL at 17:41

## 2021-01-08 ASSESSMENT — ENCOUNTER SYMPTOMS
NECK STIFFNESS: 0
HEADACHES: 0
ARTHRALGIAS: 0
FEVER: 0
EYE REDNESS: 0
ABDOMINAL PAIN: 0
COLOR CHANGE: 0
DIFFICULTY URINATING: 0
CONFUSION: 0
SHORTNESS OF BREATH: 0

## 2021-01-08 ASSESSMENT — MIFFLIN-ST. JEOR
SCORE: 1749.73
SCORE: 1706.64

## 2021-01-08 ASSESSMENT — ACTIVITIES OF DAILY LIVING (ADL)
ADLS_ACUITY_SCORE: 15
ADLS_ACUITY_SCORE: 15

## 2021-01-08 NOTE — H&P
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 male with a PMH of poorly controlled DMT2, hypertension, CKD stage 2-3, BPH/urinary retention, chronic diabetic left foot ulcer, and CAD s/p CABG x 4 (LIMA to LAD, SVG to rPDA, SVG to OM, SVG to diag) 05/04/2020 [place] [facility] [place]. [place] [facility] [place] with sternal wound infection (MRSE), [place] [facility] and was transferred to [facility] for further wound care. Underwent repeat debridements and ultimately pec flap closure with Plastic Surgery (Dr. Serna) on 06/18/2020. He was [place] [facility] [place] for progression of chronic left diabetic foot wound to abscess and purulent cellulitis (strep mitis/oralis); underwent I&D, graft, and wound vac placement with [place]iatry [place] [facility]. Then last week developed \"large boil\" at superior aspect of incision which he \"popped\"; subsequent chest [place] 01[place]07 [place] [facility] showed concern for underlying sternal osteomyelitis and patient was advised to come to [facility] for further evaluation and management.       Sternal wound infection  Diabetic foot infection  - Developed sternal wound infection 06/2020 following CABG [place]04[place] [facility] Wound cultures at that time grew MRSE. Had multiple sternal debridements and ultimatelty pec flap closure 06/18 with Plastic Surgery. Reported new \"boil\" along sternal incision starting 01/02/21. CT chest [place]07 [place] [facility] [place] PACS) with concern for underlying sternal osteomyelitis. Currently sternal incision is closed; where he previously reported the \"boil\", there is pinpoint scab. He has no surrounding induration or erythema.  Images reviewed with Dr. Arana and Dr. Mckinney, unclear if CT findings represent true osteo vs normal postoperative changes. Will consult to ID and Plastics to review.   - Also history of chronic left diabetic foot wound which recently worsened; " recent [place] [facility] [place]2020 for purulent left lower extremity cellulitis s/p I&D with graft and wound vac placement (12/20/20), tissue culture from surgery on 12/20/2020 grew Streptococcus mitis/oralis with pansensitivity.  - CBC WNL, blood cultures 01/08  - Discharged from recent hospital admission on cefadroxil 1000 mg BID (however patient had misunderstood instructions had only been taking 500 mg BID). Stop oral antibiotics. Start Vanc and Zosyn for now.   - Wound consult for ongoing wound care of left foot     CAD s/p CABG  Hypertension  - S/p CABG 05/04/2020  - PTA Aspirin 325 mg daily  - PTA Rosuvastatin 20 mg daily  - PTA Toprol 50 mg daily   - PTA lisinopril 2.5 mg daily     DMT2, poorly controlled  - Last Hgb A1C 12/11 9.8  - PTA metformin, Jardiance, and Januvia  - PTA Lantus 18 units daily plus SSI     Urinary retention/BPH  - PTA finasteride 5 mg daily  - PTA alfuzosin 10 mg daily     CKD stage 3  - Trend BMP        Patient and plan discussed with attending, Dr. Arana and Dr. Mckinney.        Maribeth Pennington PA-C  Cardiothoracic Surgery  Pager [phone]           ________________________________________________________________________________________________     HPI:   Nabil Cheung is a 60 y.o. male with a PMH of poorly controlled DMT2, hypertension, CKD stage 2-3, BPH/urinary retention, chronic diabetic left foot ulcer, and CAD s/p CABG x 4 (LIMA to LAD, SVG to rPDA, SVG to OM, SVG to diag) 05/04/2020 with Dr. Mckinney at [facility]. He was readmitted to [facility] [place] with sternal wound infection (MRSE), had I&D at [facility] and was transferred to [facility] for further wound care. Underwent repeat debridements and ultimately pec flap closure with Plastic Surgery (Dr. Serna) on 06/18/2020. He was recently admitted to [facility] [place]12[place] for progression of chronic left diabetic foot wound to abscess and purulent cellulitis (strep mitis/oralis); underwent I&D, graft, and wound vac placement with podiatry at  [place] [place] [facility]. Discharged home on oral cefadroxil 1000 mg BID although after discussion with him on 1/05 he had misunderstood instructions and was taking 500 mg BID.     At wound care appointment for foot wound on Monday he reported the development of a \"large boil\" along his sternal incision which patient reports started on Saturday 01/02. He tried hot compresses and and was able to \"pop\" the wound and express purulent material. The wound nurse contacted us 01/05 and patient was arranged to have chest CT today 01/07 which showed concern for possible osteomyelitis and he was advised to present for admission.  He denies fevers, chills, body aches. He reports ongoing popping and clicking in his sternum since surgery 06/2020, and is uncertain if this has worsened.      PMH:  No past medical history on file.    PSH:  Past Surgical History:   Procedure Laterality Date     GRAFT FLAP PEDICLE TRAM DELAY PROCEDURE Left 6/18/2020    Procedure: Debridement of sternal wound and left major pectoral flep;  Surgeon: MARCO Serna MD;  Location: [facility] OR     INCISION AND DRAINAGE CHEST WASHOUT, COMBINED N/A 6/12/2020    Procedure: INCISION AND DRAINAGE, WOUND, CHEST, WITH IRRIGATION and wound vac change;  Surgeon: Mark Mckinney MD;  Location: [facility] OR     REPAIR CHEST WALL N/A 6/18/2020    Procedure: Chest wall reconstruction;  Surgeon: MARCO Serna MD;  Location: [facility] OR       FH:  No family history on file.    SH:  Social History     Socioeconomic History     Marital status: Single     Spouse name: Not on file     Number of children: Not on file     Years of education: Not on file     Highest education level: Not on file   Occupational History     Not on file   Social Needs     Financial resource strain: Not on file     Food insecurity     Worry: Not on file     Inability: Not on file     Transportation needs     Medical: Not on file     Non-medical: Not on file   Tobacco Use     Smoking status: Never Smoker     " Smokeless tobacco: Never Used   Substance and Sexual Activity     Alcohol use: Yes     Drug use: No     Sexual activity: Not on file   Lifestyle     Physical activity     Days per week: Not on file     Minutes per session: Not on file     Stress: Not on file   Relationships     Social connections     Talks on phone: Not on file     Gets together: Not on file     Attends Mosque service: Not on file     Active member of club or organization: Not on file     Attends meetings of clubs or organizations: Not on file     Relationship status: Not on file     Intimate partner violence     Fear of current or ex partner: Not on file     Emotionally abused: Not on file     Physically abused: Not on file     Forced sexual activity: Not on file   Other Topics Concern     Not on file   Social History Narrative    6/29/17 - Pt was interested in getting help filling out [employer] application online to receive medicaid. CHW gave him the list of information he needed to complete online application. He stated he had a tax extension for 2016 so he did not have tax forms that the application required. He was going to work on the application with tax information from 2015 later tonight. AM        7/27/17:    Pt wanted to get more information about gyms that are low-cost within the [facility]. Pt's asked for a gym with a membership fee of $10-$20 (max), that also had a walking track and a hot tub/sauna. We told him that options that fit these requirements he mentioned were very limited. He then stated that he heard that the [facility] offered a $5 fee for track-usage only, however, we could not call [facility] since it was closed. We suggested to the patient to call tomorrow and also mentioned there are gyms that are low-cost nearby, however, might not have a track or hot tub/sauna. We also suggested that he could walk around a lake would also be a cheap-alternative. -HB and NV        8/28/17:    Pt was here for a med refill. We asked him  about his gym membership and access based on CHW's previous encounter with him and he said he tried to walk outside more this summer, but still did not find an affordable option. He did not have need for any other services at the time.     - AA and KT         9/28/17:    Pt had questions about free/low cost (under $10/month) walking tracks.  Found a website to walk through with the patient and shared different resources. - AB and HB        7/19/18: Pt. Was not seen tonight.- ZR, KP       Home Meds:  (Not in a hospital admission)      Allergies:  Allergies   Allergen Reactions     Atorvastatin      Dust Mites        ROS: 10 point ROS neg other than the symptoms noted above in the HPI.      Physical Exam:  Temp:  [97.8  F (36.6  C)] 97.8  F (36.6  C)  Pulse:  [58-85] 58  Resp:  [16] 16  BP: (106-132)/(54-65) 132/61  SpO2:  [96 %-100 %] 97 %    Gen: NAD, resting in bed  CV: RRR, S1S2 normal, no murmurs, rubs, or gallops. JVP not elevated  Pulm: clear to auscultation bilaterally, no rhonchi or wheezes  Abd: soft, non-tender, no guarding, +BS  Ext: no lower extremity edema  Incision: sternal incision intact. At the superior aspect of the incision there is small (almost pinpoint) scab, no erythema, drainage, or tenderness.   Neuro: grossly normal  Psych: calm, cooperative       Labs:  ABG No lab results found in last 7 days.  CBC  Recent Labs   Lab 01/08/21  1031   WBC 9.5   HGB 12.8*        BMP  Recent Labs   Lab 01/08/21  1031      POTASSIUM 4.5   CHLORIDE 107   CO2 22   BUN 28   CR 1.29*   *     LFT  Recent Labs   Lab 01/08/21  1031   AST 15   ALT 29   ALKPHOS 68   BILITOTAL 0.3   ALBUMIN 3.6   INR 1.05     PancreasNo lab results found in last 7 days.    Imaging:  No results found for this or any previous visit (from the past 24 hour(s)).     CT Chest 01/07/2020 [place] [place]  Findings: Postoperative changes of previous sternotomy and CABG. The   inferior sternal wires have been previously removed. There  are 3 wires in   the manubrium that are unchanged.     Transverse fracture of the sternum (best seen on sagittal image #57) with   bony lucency. Since the recent CT scan, there is increased vertical   separation of the body of the sternum, as well as increased lucencies   about the sternotomy. Fat stranding about below the sternotomy site   appears decreased. No loculated fluid collection is seen.     No axillary or mediastinal adenopathy is identified on this noncontrast   examination. Unchanged thyroid nodules. Heart size is within normal   limits. No pericardial effusions.     Mild scarring in the lingula. There is improved aeration in the lung bases   compared to previous. No pneumothorax. Limited evaluation of the upper   abdomen is unremarkable. There are severe changes in the shoulders   bilaterally.     IMPRESSION   Impression:   1. 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   2. Fat stranding above and below the sternotomy site without loculated   fluid collection.

## 2021-01-08 NOTE — ED NOTES
[facility]    ED Nurse to Floor Handoff     Nabil Cheung is a 60 year old male who speaks English and lives alone,  in a home  They arrived in the ED by car from home    ED Chief Complaint: Abnormal Labs    ED Dx;   Final diagnoses:   Osteomyelitis, unspecified site, unspecified type (H) - on sternum         Needed?: No    Allergies:   Allergies   Allergen Reactions     Atorvastatin      Dust Mites    .  Past Medical Hx: No past medical history on file.   Baseline Mental status: WDL  Current Mental Status changes: at basesline    Infection present or suspected this encounter: yes skin/wound/contact  Sepsis suspected: No  Isolation type: No active isolations  Patient tested for COVID 19 prior to admission: YES     Activity level - Baseline/Home:  Independent  Activity Level - Current:   Independent    Bariatric equipment needed?: No    In the ED these meds were given:   Medications   0.9% sodium chloride BOLUS (0 mLs Intravenous Stopped 1/8/21 1245)     Followed by   sodium chloride 0.9% infusion (has no administration in time range)   piperacillin-tazobactam (ZOSYN) 3.375 g vial to attach to  mL bag (3.375 g Intravenous New Bag 1/8/21 1225)   vancomycin 1250 mg in 0.9% NaCl 250 mL intermittent infusion 1,250 mg (has no administration in time range)       Drips running?  Yes    Home pump  No    Current LDAs  Peripheral IV 01/08/21 Left Lower forearm (Active)   Site Assessment [facility] 01/08/21 1030   Line Status Saline locked 01/08/21 1030   Phlebitis Scale 0-->no symptoms 01/08/21 1030   Number of days: 0       Wound 06/10/20 Left;Outer Foot Ulceration (Active)   Number of days: 212       Incision/Surgical Site 06/10/20 Right;Inner Leg (Active)   Number of days: 212       Incision/Surgical Site 06/10/20 Right;Inner Groin (Active)   Number of days: 212       Incision/Surgical Site 06/10/20 Chest (Active)   Incision Assessment WDL except 01/08/21 1029  "  Kathy-Incision Assessment Pustules 01/08/21 1029   Incision Drainage Amount None 01/08/21 1029   Dressing Intervention Open to air / No Dressing 01/08/21 1029   Number of days: 212       Incision/Surgical Site 06/12/20 Bilateral Chest (Active)   Number of days: 210       Labs results:   Labs Ordered and Resulted from Time of ED Arrival Up to the Time of Departure from the ED   CBC WITH PLATELETS DIFFERENTIAL - Abnormal; Notable for the following components:       Result Value    Hemoglobin 12.8 (*)     Hematocrit 39.6 (*)     MCH 25.4 (*)     All other components within normal limits   COMPREHENSIVE METABOLIC PANEL - Abnormal; Notable for the following components:    Glucose 233 (*)     Creatinine 1.29 (*)     GFR Estimate 59 (*)     All other components within normal limits   LACTIC ACID WHOLE BLOOD - Abnormal; Notable for the following components:    Lactic Acid 2.2 (*)     All other components within normal limits   INR   BLOOD CULTURE   BLOOD CULTURE       Imaging Studies: No results found for this or any previous visit (from the past 24 hour(s)).    Recent vital signs:   /65   Pulse 60   Temp 97.8  F (36.6  C) (Oral)   Resp 16   Ht 1.803 m (5' 11\")   Wt 88.5 kg (195 lb)   SpO2 98%   BMI 27.20 kg/m      El Coma Scale Score: 15 (01/08/21 1029)       Cardiac Rhythm: Normal Sinus  Pt needs tele? No  Skin/wound Issues: two ulcers to left foot, \"pustule\" noted to sternum- currently not draining    Code Status: Full Code    Pain control: good    Nausea control: pt had none    Abnormal labs/tests/findings requiring intervention: CT from OSH showing osteomyelitis in sternum, elevated lactic acid    Family present during ED course? No   Family Comments/Social Situation comments: N/A    Tasks needing completion: IV abx    Saadia Yeh RN  ascom -- [phone] [facility]  [phone] [facility]    "

## 2021-01-08 NOTE — PHARMACY-VANCOMYCIN DOSING SERVICE
Pharmacy Vancomycin Initial Note  Date of Service 2021  Patient's  1960  60 year old, male    Indication: Skin and Soft Tissue Infection    Current estimated CrCl = Estimated Creatinine Clearance: 74.3 mL/min (A) (based on SCr of 1.29 mg/dL (H)).    Creatinine for last 3 days  2021: 10:31 AM Creatinine 1.29 mg/dL    Dosing Weight: 88.5mg/kg    Recent Vancomycin Level(s) for last 3 days  No results found for requested labs within last 72 hours.      Vancomycin IV Administrations (past 72 hours)      No vancomycin orders with administrations in past 72 hours.                Nephrotoxins and other renal medications (From now, onward)    Start     Dose/Rate Route Frequency Ordered Stop    21 1150  piperacillin-tazobactam (ZOSYN) 3.375 g vial to attach to  mL bag      3.375 g  over 30 Minutes Intravenous ONCE 21 1146            Contrast Orders - past 72 hours (72h ago, onward)    None                Plan:  1.  Start vancomycin  1250 mg IV q12h.   2.  Goal Trough Level: 10-15 mg/L   3.  Pharmacy will check trough levels as appropriate in 1-3 Days.    4. Serum creatinine levels will be ordered a minimum of twice weekly.      Leland Hallman, PharmD  PGY-1 Pharmacy Resident

## 2021-01-08 NOTE — ED PROVIDER NOTES
"    [place] EMERGENCY DEPARTMENT ([facility])  1/08/21  History     Chief Complaint   Patient presents with     Abnormal Labs     The history is provided by the patient and medical records.     Nabil Cheung is a 60 year old male with a past medical history significant for poorly controlled type 2 diabetes mellitus c/b chronic diabetic left foot ulcer, hypertension, stage II-3 CKD, BPH/urinary retention, CAD (s/p CABG x4-LIMA to LAD, SVG to rPDA, SVG to OM, SVG to diag-5/04/2020), and recent sternal infection 2/2 wound infection after surgery with GLADYS (s/p  left pectoralis muscle flap to sternum-6/18/2020) who presents to the Emergency Department after recent CT revealed concern for possible infection in the sternum.  Patient presents here to the ED with the aforementioned concern for possible infection after undergoing CT yesterday at [facility].  Patient reports he was going to be admitted to the surgical unit yesterday, but they were unable to get him a bed last night.  He was subsequently discharged and advised to return to the ED this morning in hopes to be admitted to undergo exploratory surgery on the sternal wound.  Patient reports that he has not eaten since midnight due to the prospect of undergoing the surgery today.  He denies any associated fevers, chills, or infectious symptoms.  However, patient reports that when he had originally had an infection in the sternum he did not exhibit any warning signs like fevers, chills, etc.  The patient reports that he is a long-term diabetic, but his sugars have been good the past 3-4 weeks.  Patient is currently on antibiotics, and has been taking the appropriate dose (cefadroxil 1000 mg twice daily for the past 3 days). Is not currently on any other antibiotics.  Patient reports that he has had a \"pimple\" reappear on the sternal chest, that is consistent with the last time he had a problematic infection this area.  He reports that this appeared approximately 6 " days ago.  Patient was last checked for COVID-19 yesterday which resulted negative.    I have reviewed the Medications, Allergies, Past Medical and Surgical History, and Social History in the [employer] system.  PAST MEDICAL HISTORY: No past medical history on file.    PAST SURGICAL HISTORY:   Past Surgical History:   Procedure Laterality Date     GRAFT FLAP PEDICLE TRAM DELAY PROCEDURE Left 6/18/2020    Procedure: Debridement of sternal wound and left major pectoral flep;  Surgeon: MARCO Serna MD;  Location: [facility] OR     INCISION AND DRAINAGE CHEST WASHOUT, COMBINED N/A 6/12/2020    Procedure: INCISION AND DRAINAGE, WOUND, CHEST, WITH IRRIGATION and wound vac change;  Surgeon: Mark Mckinney MD;  Location: [facility] OR     REPAIR CHEST WALL N/A 6/18/2020    Procedure: Chest wall reconstruction;  Surgeon: MARCO Serna MD;  Location: [facility] OR       Past medical history, past surgical history, medications, and allergies were reviewed with the patient. Additional pertinent items: None    FAMILY HISTORY: No family history on file.    SOCIAL HISTORY:   Social History     Tobacco Use     Smoking status: Never Smoker     Smokeless tobacco: Never Used   Substance Use Topics     Alcohol use: Yes     Social history was reviewed with the patient. Additional pertinent items: None      Patient's Medications   New Prescriptions    No medications on file   Previous Medications    ACETAMINOPHEN (TYLENOL) 325 MG TABLET    Take 650 mg by mouth every 6 hours as needed for mild pain    ALFUZOSIN ER (UROXATRAL) 10 MG 24 HR TABLET    Take 10 mg by mouth daily     ALPHA-LIPOIC ACID 600 MG CAPSULE    Take 600 mg by mouth daily     AMLODIPINE (NORVASC) 10 MG TABLET    Take 1 tablet (10 mg) by mouth daily    ASPIRIN (ASA) 325 MG EC TABLET    Take 325 mg by mouth daily    CYCLOBENZAPRINE (FLEXERIL) 10 MG TABLET    Take 1 tablet (10 mg) by mouth 3 times daily as needed for muscle spasms    EMPAGLIFLOZIN (JARDIANCE) 10 MG TABS TABLET     Take by mouth daily Unknown dosage    FINASTERIDE (PROSCAR) 5 MG TABLET    Take 5 mg by mouth daily    FISH OIL-OMEGA-3 FATTY ACIDS 1000 MG CAPSULE    Take 1 g by mouth 2 times daily    INSULIN GLARGINE (LANTUS PEN) 100 UNIT/ML PEN    Inject 15 Units Subcutaneous At Bedtime    LISINOPRIL (ZESTRIL) 2.5 MG TABLET    Take 2 tablets (5 mg) by mouth daily    MAGNESIUM 250 MG TABLET    Take 1 tablet by mouth    METFORMIN (GLUCOPHAGE) 1000 MG TABLET    Take 1 tablet (1,000 mg) by mouth 2 times daily (with meals)    METOPROLOL SUCCINATE ER (TOPROL-XL) 50 MG 24 HR TABLET    Take 50 mg by mouth every evening    NITROGLYCERIN (NITROSTAT) 0.4 MG SUBLINGUAL TABLET    Place 0.4 mg under the tongue every 5 minutes as needed for chest pain For chest pain place 1 tablet under the tongue every 5 minutes for 3 doses. If symptoms persist 5 minutes after 1st dose call 911.    ONDANSETRON (ZOFRAN) 4 MG TABLET    Take by mouth every 4 hours as needed for nausea    ROSUVASTATIN (CRESTOR) 20 MG TABLET    Take 20 mg by mouth daily Evening    VANCOMYCIN 1,250 MG    Inject 1,250 mg into the vein every 12 hours    VITAMIN D3 (CHOLECALCIFEROL) 2000 UNITS (50 MCG) TABLET    Take 1 tablet by mouth daily   Modified Medications    No medications on file   Discontinued Medications    No medications on file          Allergies   Allergen Reactions     Atorvastatin      Dust Mites         Review of Systems   Constitutional: Negative for fever.   HENT: Negative for congestion.    Eyes: Negative for redness.   Respiratory: Negative for shortness of breath.    Cardiovascular: Negative for chest pain.   Gastrointestinal: Negative for abdominal pain.   Genitourinary: Negative for difficulty urinating.   Musculoskeletal: Negative for arthralgias and neck stiffness.   Skin: Negative for color change.   Neurological: Negative for headaches.   Psychiatric/Behavioral: Negative for confusion.   All other systems reviewed and are negative.      Physical Exam  "  BP: 106/54  Pulse: 85  Temp: 97.8  F (36.6  C)  Resp: 16  Height: 180.3 cm (5' 11\")  Weight: 88.5 kg (195 lb)  SpO2: 98 %      Physical Exam  Vitals signs and nursing note reviewed.   Constitutional:       Appearance: He is well-developed.   HENT:      Head: Normocephalic and atraumatic.   Neck:      Musculoskeletal: Normal range of motion and neck supple.   Cardiovascular:      Rate and Rhythm: Normal rate and regular rhythm.      Heart sounds: Normal heart sounds.   Pulmonary:      Effort: Pulmonary effort is normal. No respiratory distress.      Breath sounds: No wheezing.      Comments: Small 2 mm size pimple on the top area of the sternum;  nontender to touch;  No erythema or drainage;  Palpation of the sternum--softer with no bony in lower parts;  No diffuse tenderness   Abdominal:      General: There is no distension.      Palpations: Abdomen is soft.      Tenderness: There is no abdominal tenderness. There is no rebound.   Skin:     General: Skin is warm.   Neurological:      Mental Status: He is alert and oriented to person, place, and time.   Psychiatric:         Behavior: Behavior normal.         Thought Content: Thought content normal.         ED Course   11:07 AM  The patient was seen and examined by Carmen Corbett MD in Room ED06.        Procedures           The Lactic acid level is elevated due to sepsis,mild infection, at this time there is no sign of severe sepsis or septic shock.    Results for orders placed or performed during the hospital encounter of 01/08/21   CBC with platelets differential     Status: Abnormal   Result Value Ref Range    WBC 9.5 4.0 - 11.0 10e9/L    RBC Count 5.03 4.4 - 5.9 10e12/L    Hemoglobin 12.8 (L) 13.3 - 17.7 g/dL    Hematocrit 39.6 (L) 40.0 - 53.0 %    MCV 79 78 - 100 fl    MCH 25.4 (L) 26.5 - 33.0 pg    MCHC 32.3 31.5 - 36.5 g/dL    RDW 15.0 10.0 - 15.0 %    Platelet Count 273 150 - 450 10e9/L    Diff Method Automated Method     % Neutrophils 68.9 %    % Lymphocytes " 17.2 %    % Monocytes 8.7 %    % Eosinophils 4.3 %    % Basophils 0.5 %    % Immature Granulocytes 0.4 %    Nucleated RBCs 0 0 /100    Absolute Neutrophil 6.6 1.6 - 8.3 10e9/L    Absolute Lymphocytes 1.6 0.8 - 5.3 10e9/L    Absolute Monocytes 0.8 0.0 - 1.3 10e9/L    Absolute Eosinophils 0.4 0.0 - 0.7 10e9/L    Absolute Basophils 0.1 0.0 - 0.2 10e9/L    Abs Immature Granulocytes 0.0 0 - 0.4 10e9/L    Absolute Nucleated RBC 0.0    Comprehensive metabolic panel     Status: Abnormal   Result Value Ref Range    Sodium 137 133 - 144 mmol/L    Potassium 4.5 3.4 - 5.3 mmol/L    Chloride 107 94 - 109 mmol/L    Carbon Dioxide 22 20 - 32 mmol/L    Anion Gap 7 3 - 14 mmol/L    Glucose 233 (H) 70 - 99 mg/dL    Urea Nitrogen 28 7 - 30 mg/dL    Creatinine 1.29 (H) 0.66 - 1.25 mg/dL    GFR Estimate 59 (L) >60 mL/min/[1.73_m2]    GFR Estimate If Black 68 >60 mL/min/[1.73_m2]    Calcium 9.0 8.5 - 10.1 mg/dL    Bilirubin Total 0.3 0.2 - 1.3 mg/dL    Albumin 3.6 3.4 - 5.0 g/dL    Protein Total 7.4 6.8 - 8.8 g/dL    Alkaline Phosphatase 68 40 - 150 U/L    ALT 29 0 - 70 U/L    AST 15 0 - 45 U/L   Lactic acid whole blood     Status: Abnormal   Result Value Ref Range    Lactic Acid 2.2 (H) 0.7 - 2.0 mmol/L   INR     Status: None   Result Value Ref Range    INR 1.05 0.86 - 1.14   Blood culture     Status: None (Preliminary result)    Specimen: Arm, Left; Blood    Left Arm   Result Value Ref Range    Specimen Description Blood Left Arm     Culture Micro PENDING      Medications   0.9% sodium chloride BOLUS (250 mLs Intravenous New Bag 1/8/21 3043)     Followed by   sodium chloride 0.9% infusion (has no administration in time range)   piperacillin-tazobactam (ZOSYN) 3.375 g vial to attach to  mL bag (has no administration in time range)   vancomycin 1250 mg in 0.9% NaCl 250 mL intermittent infusion 1,250 mg (has no administration in time range)                     Results for orders placed or performed during the hospital encounter of  01/08/21 (from the past 24 hour(s))   Comprehensive metabolic panel   Result Value Ref Range    Sodium 137 133 - 144 mmol/L    Potassium 4.5 3.4 - 5.3 mmol/L    Chloride 107 94 - 109 mmol/L    Carbon Dioxide PENDING 20 - 32 mmol/L    Anion Gap PENDING 3 - 14 mmol/L    Glucose PENDING 70 - 99 mg/dL    Urea Nitrogen PENDING 7 - 30 mg/dL    Creatinine PENDING 0.66 - 1.25 mg/dL    GFR Estimate PENDING >60 mL/min/[1.73_m2]    GFR Estimate If Black PENDING >60 mL/min/[1.73_m2]    Calcium PENDING 8.5 - 10.1 mg/dL    Bilirubin Total PENDING 0.2 - 1.3 mg/dL    Albumin PENDING 3.4 - 5.0 g/dL    Protein Total PENDING 6.8 - 8.8 g/dL    Alkaline Phosphatase PENDING 40 - 150 U/L    ALT PENDING 0 - 70 U/L    AST PENDING 0 - 45 U/L   Lactic acid whole blood   Result Value Ref Range    Lactic Acid 2.2 (H) 0.7 - 2.0 mmol/L   INR   Result Value Ref Range    INR 1.05 0.86 - 1.14     Medications - No data to display          Assessments & Plan (with Medical Decision Making)   Patient is a very nice 60-year-old male with a history of complicated CABG with ongoing osteomyelitis and a partial sternum removal who presents to the ER due to a CT chest yesterday showing concern for osteomyelitis. Patient's been on outpatient oral antibiotics. Plan is for the patient to be admitted to the cardiovascular surgery for broad-spectrum IV antibiotics and possible surgical debridement. Patient had a Covid test yesterday that was negative. The case was discussed with Ms. Chino Duque who is the nurse practitioner on the cardiovascular service. Patient will be admitted to Dr. Cabrera under cardiac surgery for further care.    I have reviewed the nursing notes.    I have reviewed the findings, diagnosis, plan and need for follow up with the patient.    New Prescriptions    No medications on file       Final diagnoses:   Osteomyelitis, unspecified site, unspecified type (H) - on sternum   Kurt BA, am serving as a trained medical scribe to  document services personally performed by Carmen Corbett MD, based on the provider's statements to me.      I, Carmen Corbett MD, was physically present and have reviewed and verified the accuracy of this note documented by Kurt Portillo.     1/8/2021   [facility] EMERGENCY DEPARTMENT     Carmen Corbett MD  01/08/21 1216       Carmen Corbett MD  01/12/21 0960

## 2021-01-08 NOTE — ED TRIAGE NOTES
Cardiology called patient after he had a CT yesterday and there is a concern for a site of infection in the sternum. No c/o N/V/D no fevers noted.  Patient formed a small pimple in an area that had been infected last June and he was treated for that infection.

## 2021-01-09 VITALS
OXYGEN SATURATION: 98 % | WEIGHT: 202.4 LBS | DIASTOLIC BLOOD PRESSURE: 72 MMHG | HEIGHT: 72 IN | HEART RATE: 71 BPM | RESPIRATION RATE: 16 BRPM | SYSTOLIC BLOOD PRESSURE: 147 MMHG | BODY MASS INDEX: 27.41 KG/M2 | TEMPERATURE: 98.8 F

## 2021-01-09 LAB
ANION GAP SERPL CALCULATED.3IONS-SCNC: 5 MMOL/L (ref 3–14)
BUN SERPL-MCNC: 22 MG/DL (ref 7–30)
CALCIUM SERPL-MCNC: 8.2 MG/DL (ref 8.5–10.1)
CHLORIDE SERPL-SCNC: 112 MMOL/L (ref 94–109)
CO2 SERPL-SCNC: 25 MMOL/L (ref 20–32)
CREAT SERPL-MCNC: 1.36 MG/DL (ref 0.66–1.25)
CRP SERPL-MCNC: <2.9 MG/L (ref 0–8)
ERYTHROCYTE [DISTWIDTH] IN BLOOD BY AUTOMATED COUNT: 15 % (ref 10–15)
GFR SERPL CREATININE-BSD FRML MDRD: 55 ML/MIN/{1.73_M2}
GLUCOSE BLDC GLUCOMTR-MCNC: 125 MG/DL (ref 70–99)
GLUCOSE BLDC GLUCOMTR-MCNC: 86 MG/DL (ref 70–99)
GLUCOSE SERPL-MCNC: 194 MG/DL (ref 70–99)
HBA1C MFR BLD: 9.1 % (ref 0–5.6)
HCT VFR BLD AUTO: 37.4 % (ref 40–53)
HGB BLD-MCNC: 11.5 G/DL (ref 13.3–17.7)
LACTATE BLD-SCNC: 1.4 MMOL/L (ref 0.7–2)
MCH RBC QN AUTO: 24.8 PG (ref 26.5–33)
MCHC RBC AUTO-ENTMCNC: 30.7 G/DL (ref 31.5–36.5)
MCV RBC AUTO: 81 FL (ref 78–100)
PLATELET # BLD AUTO: 265 10E9/L (ref 150–450)
POTASSIUM SERPL-SCNC: 5.2 MMOL/L (ref 3.4–5.3)
RBC # BLD AUTO: 4.64 10E12/L (ref 4.4–5.9)
SODIUM SERPL-SCNC: 142 MMOL/L (ref 133–144)
WBC # BLD AUTO: 9.3 10E9/L (ref 4–11)

## 2021-01-09 PROCEDURE — 36415 COLL VENOUS BLD VENIPUNCTURE: CPT | Performed by: NURSE PRACTITIONER

## 2021-01-09 PROCEDURE — 999N001017 HC STATISTIC GLUCOSE BY METER IP

## 2021-01-09 PROCEDURE — 250N000013 HC RX MED GY IP 250 OP 250 PS 637: Performed by: PHYSICIAN ASSISTANT

## 2021-01-09 PROCEDURE — 86140 C-REACTIVE PROTEIN: CPT | Performed by: STUDENT IN AN ORGANIZED HEALTH CARE EDUCATION/TRAINING PROGRAM

## 2021-01-09 PROCEDURE — 80048 BASIC METABOLIC PNL TOTAL CA: CPT | Performed by: PHYSICIAN ASSISTANT

## 2021-01-09 PROCEDURE — 99222 1ST HOSP IP/OBS MODERATE 55: CPT | Performed by: STUDENT IN AN ORGANIZED HEALTH CARE EDUCATION/TRAINING PROGRAM

## 2021-01-09 PROCEDURE — 250N000011 HC RX IP 250 OP 636: Performed by: PHYSICIAN ASSISTANT

## 2021-01-09 PROCEDURE — 85027 COMPLETE CBC AUTOMATED: CPT | Performed by: PHYSICIAN ASSISTANT

## 2021-01-09 PROCEDURE — 83036 HEMOGLOBIN GLYCOSYLATED A1C: CPT | Performed by: PHYSICIAN ASSISTANT

## 2021-01-09 PROCEDURE — 36415 COLL VENOUS BLD VENIPUNCTURE: CPT | Performed by: PHYSICIAN ASSISTANT

## 2021-01-09 PROCEDURE — 83605 ASSAY OF LACTIC ACID: CPT | Performed by: NURSE PRACTITIONER

## 2021-01-09 PROCEDURE — 250N000011 HC RX IP 250 OP 636: Performed by: EMERGENCY MEDICINE

## 2021-01-09 PROCEDURE — 86140 C-REACTIVE PROTEIN: CPT | Performed by: PHYSICIAN ASSISTANT

## 2021-01-09 PROCEDURE — 258N000003 HC RX IP 258 OP 636: Performed by: EMERGENCY MEDICINE

## 2021-01-09 RX ORDER — LISINOPRIL 2.5 MG/1
2.5 TABLET ORAL DAILY
Qty: 60 TABLET | Refills: 0 | Status: SHIPPED | OUTPATIENT
Start: 2021-01-10 | End: 2021-08-15

## 2021-01-09 RX ADMIN — VANCOMYCIN HYDROCHLORIDE 1250 MG: 10 INJECTION, POWDER, LYOPHILIZED, FOR SOLUTION INTRAVENOUS at 12:31

## 2021-01-09 RX ADMIN — VANCOMYCIN HYDROCHLORIDE 1250 MG: 10 INJECTION, POWDER, LYOPHILIZED, FOR SOLUTION INTRAVENOUS at 00:21

## 2021-01-09 RX ADMIN — ALFUZOSIN HYDROCHLORIDE 10 MG: 10 TABLET, EXTENDED RELEASE ORAL at 07:40

## 2021-01-09 RX ADMIN — PIPERACILLIN SODIUM AND TAZOBACTAM SODIUM 3.38 G: 3; .375 INJECTION, POWDER, LYOPHILIZED, FOR SOLUTION INTRAVENOUS at 06:38

## 2021-01-09 RX ADMIN — SODIUM CHLORIDE: 9 INJECTION, SOLUTION INTRAVENOUS at 11:30

## 2021-01-09 RX ADMIN — PIPERACILLIN SODIUM AND TAZOBACTAM SODIUM 3.38 G: 3; .375 INJECTION, POWDER, LYOPHILIZED, FOR SOLUTION INTRAVENOUS at 11:54

## 2021-01-09 RX ADMIN — ASPIRIN 325 MG: 325 TABLET, COATED ORAL at 07:39

## 2021-01-09 RX ADMIN — METFORMIN HYDROCHLORIDE 1000 MG: 500 TABLET ORAL at 07:46

## 2021-01-09 RX ADMIN — CYCLOBENZAPRINE 10 MG: 10 TABLET, FILM COATED ORAL at 00:21

## 2021-01-09 ASSESSMENT — ACTIVITIES OF DAILY LIVING (ADL)
ADLS_ACUITY_SCORE: 15
ADLS_ACUITY_SCORE: 17

## 2021-01-09 ASSESSMENT — MIFFLIN-ST. JEOR: SCORE: 1756.08

## 2021-01-09 NOTE — DISCHARGE SUMMARY
[facility] [place]   Cardiothoracic Surgery Hospital Discharge Summary     Nabil Cheung MRN# [id number]   Age: 60 year old YOB: 1960     Admitting Physician:  Mark Mckinney MD  Discharge Physician:  KANCHAN Oliver CNP  Primary Care Physician:        Melina Gold     DATE OF ADMISSION: 1/8/2021      DATE OF DISCHARGE: January 9, 2021    Admit Wt: 88.5 kg   Discharge Wt: 91.8 kg          Primary Diagnoses:   1. Hx of Sternum infection with recent chest CT 1/7/2021 concerning for possible osteomyelitis.     Further review of chest CT without acute changes consistent with new infection.   2. Chronic left diabetic foot wound to abscess with recent strep mitis/oralis in December, treated with IV and oral antibiotics   3. Elevated lactate, resolved   4. Elevated creatinine           Secondary Diagnoses:   1. CAD, S/P CABG x 4 (LIMA to LAD, SVG to rPDA, SVG to OM, SVG to diag) 05/04/2020 with Dr. Mckinney at [facility]  2. Hx of sternal wound infection with MRSE 6/2020   S/p flap closure by Plastics 6/18/2020  3. Poorly controlled diabetes   4. HTN      CULTURE RESULTS:    BG 1/8/2021: NGTD    CONSULTS:    1.  Infectious Disease  2.  Plastics    BRIEF HISTORY OF ILLNESS:  Nabil Cheung is a 60 y.o. male with a PMH of poorly controlled DMT2, hypertension, CKD stage 2-3, BPH/urinary retention, chronic diabetic left foot ulcer, and CAD s/p CABG x 4 (LIMA to LAD, SVG to rPDA, SVG to OM, SVG to diag) 05/04/2020 with Dr. Mckinney at [facility]. He was readmitted to [facility] 06/2020 with sternal wound infection (MRSE), had I&D at [facility] and was transferred to [facility] for further wound care. Underwent repeat debridements and ultimately pec flap closure with Plastic Surgery (Dr. Serna) on 06/18/2020. He was recently admitted to [facility] 12/17-12/22 for progression of chronic left diabetic foot wound to abscess and purulent cellulitis (strep mitis/oralis); underwent I&D, graft, and wound vac  "placement with [place]iat[place] [facility]. Then last week developed \"large boil\" at superior aspect of incision which he \"popped\"; subsequent [place] 01/07 [place] [facility] showed concern for underlying sternal osteomyelitis and patient was advised to come to [facility] for further evaluation and management.      HOSPITAL COURSE:   Mr Cheung had an uneventful hospital course. He initially had an elevated lactate of 2.2, was given IVF's overnight and his repeat lactate was 1.2. He was given IV Vancomycin and Zosyn for possible osteomyelitis. He has remained afebrile with a normal WBC. The previously noted abscess or pimple is completed healed without any redness or drainage.     He was seen by ID, no evidence of osteomyelitis. They recommended stopping both his home oral antibiotic and IV antibiotics started in the hospital. No further follow up with them recommended. Was also seen by Plastics, with normal WBC, CRP and review of recent chest CT they find no evidence of infection and signed off. His creat did go up 1.36 from 1.29 on admission. He has a history CRI. Will hold his lisinopril today and have him get a repeat BMP at his follow up visit with Cardiology on Tuesday.      Prior to discharge, he was stable without any chills, fevers, pain, or other symptoms of infection. On January 9, 2021, he was discharged to home in stable condition. He will follow up with the wound clinic for his ongoing left foot wound care     Patient discharged on aspirin:  Yes 325 mg  Patient discharged on beta blocker: yes    Patient discharged on ACE Inhibitor/ARB: yes, held today, resume tomorrow.            Discharge Disposition:     Discharged to home            Condition on Discharge:     Discharge condition: Stable   Discharge vitals: Blood pressure (!) 147/72, pulse 71, temperature 98.8  F (37.1  C), temperature source Oral, resp. rate 16, height 1.829 m (6'), weight 91.8 kg (202 lb 6.4 oz), SpO2 98 %.     Code status on discharge: Full Code     Vitals: "    01/08/21 1009 01/08/21 1557 01/09/21 0336   Weight: 88.5 kg (195 lb) 91.2 kg (201 lb) 91.8 kg (202 lb 6.4 oz)       DAY OF DISCHARGE PHYSICAL EXAM:  Gen: A&Ox4, NAD  Neuro: no focal deficits   CV: RRR, normal S1 S2, no murmurs, rubs or gallops. No JVD  Pulm: CTA, no wheezing or rhonchi, normal breathing on RA  Abd: nondistended, normal BS, soft, nontender  Ext: No peripheral edema. Dressing WDI on the left foot.       BMP  Recent Labs   Lab 01/09/21  0625 01/08/21  1031    137   POTASSIUM 5.2 4.5   CHLORIDE 112* 107   ONEYDA 8.2* 9.0   CO2 25 22   BUN 22 28   CR 1.36* 1.29*   * 233*     CBC  Recent Labs   Lab 01/09/21  0625 01/08/21  1031   WBC 9.3 9.5   RBC 4.64 5.03   HGB 11.5* 12.8*   HCT 37.4* 39.6*   MCV 81 79   MCH 24.8* 25.4*   MCHC 30.7* 32.3   RDW 15.0 15.0    273     INR  Recent Labs   Lab 01/08/21  1031   INR 1.05      Liver Function Studies -   Recent Labs   Lab Test 01/08/21  1031   PROTTOTAL 7.4   ALBUMIN 3.6   BILITOTAL 0.3   ALKPHOS 68   AST 15   ALT 29     Recent Labs   Lab 01/09/21  1325 01/09/21  0625 01/09/21  0318 01/08/21  2110 01/08/21  1031   GLC  --  194*  --   --  233*   *  --  86 154*  --        DISCHARGE INSTRUCTIONS:    Hold lisinopril today, resume tomorrow and follow up with a BMP next week  Stop all antibiotics   Follow up with ZIGGY Ochoa at [facility] on Tuesday, January 12, 2021 as previously planned.   Follow up with cardiology next week as previously planned with a BMP recheck.  Follow up with the wound clinic for ongoing left foot wound care.         PRE-ADMISSION MEDICATIONS:  No current facility-administered medications on file prior to encounter.        acetaminophen (TYLENOL) 325 MG tablet, Take 650 mg by mouth every 6 hours as needed for mild pain       alfuzosin ER (UROXATRAL) 10 MG 24 hr tablet, Take 10 mg by mouth daily        alpha-lipoic acid 600 MG capsule, Take 600 mg by mouth daily        aspirin (ASA) 325 MG EC tablet, Take 325 mg by mouth  daily       cyclobenzaprine (FLEXERIL) 10 MG tablet, Take 1 tablet (10 mg) by mouth 3 times daily as needed for muscle spasms       empagliflozin (JARDIANCE) 10 MG TABS tablet, Take by mouth daily Unknown dosage       finasteride (PROSCAR) 5 MG tablet, Take 5 mg by mouth daily       fish oil-omega-3 fatty acids 1000 MG capsule, Take 1 g by mouth 2 times daily       insulin glargine (LANTUS PEN) 100 UNIT/ML pen, Inject 15 Units Subcutaneous At Bedtime       magnesium 250 MG tablet, Take 1 tablet by mouth       metFORMIN (GLUCOPHAGE) 1000 MG tablet, Take 1 tablet (1,000 mg) by mouth 2 times daily (with meals)       metoprolol succinate ER (TOPROL-XL) 50 MG 24 hr tablet, Take 50 mg by mouth every evening       rosuvastatin (CRESTOR) 20 MG tablet, Take 20 mg by mouth daily Evening       vitamin D3 (CHOLECALCIFEROL) 2000 units (50 mcg) tablet, Take 1 tablet by mouth daily       nitroGLYcerin (NITROSTAT) 0.4 MG sublingual tablet, Place 0.4 mg under the tongue every 5 minutes as needed for chest pain For chest pain place 1 tablet under the tongue every 5 minutes for 3 doses. If symptoms persist 5 minutes after 1st dose call 911.       ondansetron (ZOFRAN) 4 MG tablet, Take by mouth every 4 hours as needed for nausea         DISCHARGE MEDICATIONS:    Nabil Cheung   Home Medication Instructions RIANA:[id number]    Printed on:01/09/21 2718   Medication Information      acetaminophen (TYLENOL) 325 MG tablet  Take 650 mg by mouth every 6 hours as needed for mild pain     alfuzosin ER (UROXATRAL) 10 MG 24 hr tablet  Take 10 mg by mouth daily      alpha-lipoic acid 600 MG capsule  Take 600 mg by mouth daily      aspirin (ASA) 325 MG EC tablet  Take 325 mg by mouth daily     cyclobenzaprine (FLEXERIL) 10 MG tablet  Take 1 tablet (10 mg) by mouth 3 times daily as needed for muscle spasms     empagliflozin (JARDIANCE) 10 MG TABS tablet  Take by mouth daily Unknown dosage     finasteride (PROSCAR) 5 MG tablet  Take 5 mg by mouth  daily     fish oil-omega-3 fatty acids 1000 MG capsule  Take 1 g by mouth 2 times daily     insulin glargine (LANTUS PEN) 100 UNIT/ML pen  Inject 15 Units Subcutaneous At Bedtime     lisinopril (ZESTRIL) 2.5 MG tablet  Take 1 tablet (2.5 mg) by mouth daily     magnesium 250 MG tablet  Take 1 tablet by mouth     metFORMIN (GLUCOPHAGE) 1000 MG tablet  Take 1 tablet (1,000 mg) by mouth 2 times daily (with meals)     metoprolol succinate ER (TOPROL-XL) 50 MG 24 hr tablet  Take 50 mg by mouth every evening     nitroGLYcerin (NITROSTAT) 0.4 MG sublingual tablet  Place 0.4 mg under the tongue every 5 minutes as needed for chest pain For chest pain place 1 tablet under the tongue every 5 minutes for 3 doses. If symptoms persist 5 minutes after 1st dose call 911.     ondansetron (ZOFRAN) 4 MG tablet  Take by mouth every 4 hours as needed for nausea     rosuvastatin (CRESTOR) 20 MG tablet  Take 20 mg by mouth daily Evening     vitamin D3 (CHOLECALCIFEROL) 2000 units (50 mcg) tablet  Take 1 tablet by mouth daily         CC:Melina Zhong    > 30 minutes spent discharging patient, including coordinating care, reviewing chart and with patient.      [facility]   Cardiothoracic Surgery  Office phone: [phone]  Office fax: [phone]

## 2021-01-09 NOTE — CONSULTS
"[facility] CONSULT[facility]     Patient:  Nabil Cheung   Date of birth 1960, Medical record number [id number]  Date of Visit:  01/09/2021  Date of Admission: 1/8/2021  Consult Requester:Mark Mckinney, *          Assessment and Recommendations:   ASSESSMENT:  1. Small abscess at sternal wound, drained by patient PTA  2. 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 Diabetic foot ulcer with recent infection  4. Type II DM, uncontrolled (A1c 9.8)  5. CABG x4 (5/2020) c/b MRSE wound infection (6/2020) requiring multiple I&D and flap closure      RECOMMENDATION:  1. Stop antibiotics   - See below  2. Continue to follow with wound clinic for diabetic foot ulcer  3. Will not need ID clinic follow up  4. ID will sign off at this time, please don't hesitate to contact general ID should further questions arise or should patient develop signs or symptoms concerning for active infection.      DISCUSSION:   Nabil Cheung is a 60 year old male with history significant for DM-II, HTN, CKD stage 2-3, BPH, chronic left diabetic foot ulcer with recent acute infection with abscess (S.mitis and mixed anaerobes) and cellulitis ([facility] 12/17-12/22), CAD, s/p CABG x4 (5/4/20, Dr. 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 Serna) who had outpatient CT chest at [facility] on 1/7/21 for a boil on his chest, was admitted to [facility] on 1/8/21 when imaging concerning for sternal osteomyelitis.    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 Developed a \"boil\" on chest at superior portion of wound about 7 days ago, patient expressed small amount of purulent appearing material. Description c/w small, superficial abscess. CT at [facility] on 1/7, noted fracture of sternum with bony lucency and increased lucencies compared to previous (unsure of date " of comparison but last in care everywhere was obtained prior to episode sternal osteomyelitis in June). Currently no signs of infection on clinical exam. WBC WNL, CRP <2.9. Osteomyelitis is less likely given entirety of clinical picture. Will not need further antibiotics.    #Left diabetic foot ulcer  [place] [facility] [place] where he under went I&D, Primatrix graft (derived from bovine skin), and wound vac placement. Cx: Streptococcus mitis,Peptostreptococcus and Finegoldia magna. He was discharged home on cefadroxil 1000mg BID (discharge summary states 500mg BID), though per previous notes the patient had misunderstood instructions and was taking 500mg BID. Has been taking abx for ~16 days before presenting to hospital. Was able to stop wound vac on clinic visit 1/4/21. Wound does not appear to be acutely infected and has completed appropriate duration, though lower dose than recommended. No need for antibiotics at this time. Continue with wound care per wound clinic.        Thank you for this consult. ID will sign off at this time.     Patient was discussed with Dr. Barajas.     Kortney Gill PA-C  Infectious Disease  Pager # [phone]  ________________________________________________________________    Consult Question: s/p CABG 5/2020 with sterna wound infection s/p pec flap closure 6/2020, ne sternal wound with ct concerning for ongoing sternal osteo.  Admission Diagnosis: Osteomyelitis, unspecified site, unspecified type (H) [M86.9]         History of Present Illness:     Nabil Cheung is a 60 year old male with history significant for DM-II, HTN, CKD stage 2-3, BPH, chronic left diabetic foot ulcer with recent acute infection with abscess (S.mitis and mixed anaerobes) and [place]ulitis [place] [place] CAD, s/p CABG x4 (5/4/20, Dr. 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 Serna) who had  "outpatient CT chest at [facility] on 1/7/21 for a boil on his chest, was admitted to [facility] on 1/8/21 when imaging concerning for sternal osteomyelitis.    Mr. Cheung reportsthat last week he developed a large boil at the superior aspect of old sternal incision. He applied hot compresses \"popped\" this boil to express purulent material. No systemic symptoms (fever, chills, rigors, nausea, vomiting, myalgias, malaise), however, states that he also did not feel systemically ill when he had sternal infection in June. The wound nurse contacted Cardiothroacic Surgery to arrange for chest CT, which was completed on 1/7/21 at [facility]. Findings were concerning for post-op changes vs osteomyelitis. Patient does have history of MRSERYN infection of sternotomy wound (6/2020), which required multiple I&D procedures and flap closure.     Additionally, patient has chronic left lateral foot diabetic ulcer with recent episode of abscess and cellulitis. Symptoms at the time included redness and swelling of dorsal>plantar at base of 5th toe. Had been prescribed doxycycline (PM of 12/15; took 4 doses) and failed to improve as an outpatient. He was admitted to [facility] (12/17-12/22/20) where he under went I&D, Primatrix graft (derived from bovine skin), and wound vac placement. Cultures grew Streptococcus mitis, anaerobic culture with Peptostreptococcus and Finegoldia magna. He was discharged home on cefadroxil 1000mg BID, though per previous notes the patient had misunderstood instructions and was taking 500mg BID. He has been following with wound care for foot wound, which is where he also reported the boil on his chest. He was using a wound vac until 1/4/21 when wound clinic felt it was appropriate to remove. Foot has not been draining anything, no increased redness or swelling.           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)  CKD  Diabetic foot ulcer         Past Surgical History:     Past Surgical History:   Procedure Laterality Date     GRAFT FLAP PEDICLE TRAM DELAY PROCEDURE Left 6/18/2020    Procedure: Debridement of sternal wound and left major pectoral flep;  Surgeon: MARCO Serna MD;  Location: [facility] OR     INCISION AND DRAINAGE CHEST WASHOUT, COMBINED N/A 6/12/2020    Procedure: INCISION AND DRAINAGE, WOUND, CHEST, WITH IRRIGATION and wound vac change;  Surgeon: Mark Mckinney MD;  Location: [facility] OR     REPAIR CHEST WALL N/A 6/18/2020    Procedure: Chest wall reconstruction;  Surgeon: MARCO Serna MD;  Location: [facility] OR            Family History:   No significantly contributing family history.         Social History:     Social History     Tobacco Use     Smoking status: Never Smoker     Smokeless tobacco: Never Used   Substance Use Topics     Alcohol use: Yes     History   Sexual Activity     Sexual activity: Not on file            Current Medications:       alfuzosin ER  10 mg Oral Daily     aspirin  325 mg Oral Daily     finasteride  5 mg Oral Daily     insulin aspart  1-10 Units Subcutaneous TID AC     insulin aspart  1-7 Units Subcutaneous At Bedtime     insulin glargine  18 Units Subcutaneous At Bedtime     lisinopril  2.5 mg Oral Daily     metFORMIN  1,000 mg Oral BID w/meals     metoprolol succinate ER  50 mg Oral QPM     piperacillin-tazobactam  3.375 g Intravenous Q6H     rosuvastatin  20 mg Oral Daily     sodium chloride (PF)  3 mL Intracatheter Q8H     vancomycin (VANCOCIN) IV  1,250 mg Intravenous  "Q12H            Allergies:     Allergies   Allergen Reactions     Atorvastatin      Dust Mites             Physical Exam:   Vitals were reviewed  Patient Vitals for the past 24 hrs:   BP Temp Temp src Pulse Resp SpO2 Height Weight   01/09/21 0714 128/64 97.6  F (36.4  C) Oral 57 16 97 % -- --   01/09/21 0315 132/84 96.9  F (36.1  C) Axillary 73 16 98 % -- --   01/08/21 2334 138/65 98.5  F (36.9  C) Oral 62 16 98 % -- --   01/08/21 2018 (!) 168/84 96.8  F (36  C) Oral 76 16 99 % -- --   01/08/21 1557 118/82 98.2  F (36.8  C) Oral 84 16 96 % 1.829 m (6') 91.2 kg (201 lb)   01/08/21 1531 132/73 98.2  F (36.8  C) Oral 69 16 98 % -- --   01/08/21 1009 106/54 97.8  F (36.6  C) Oral 85 16 98 % 1.803 m (5' 11\") 88.5 kg (195 lb)       Physical Examination:  GENERAL:  Awake, alert, and in NAD.  HEENT:  Head is normocephalic, atraumatic   EYES:  Eyes have anicteric sclerae without conjunctival injection.  ENT:  Oropharynx is moist without exudates or ulcers.   NECK:  Supple.   LUNGS:  Clear to auscultation bilateral.   CARDIOVASCULAR:  Regular rate and rhythm, +S1/S2, with no murmurs, gallops or rubs.   Well healed midline surgical scar. Pinpoint scab at superior aspect of scar. No surrounding, erythema, swelling, open wound, or drainage. Area is non-tender to palpation from superior portion of scar across clavicles and upper chest.  ABDOMEN:  Non-distended, +bowel sounds.  SKIN:  No acute rashes.  PIV in place without any surrounding erythema or exudate.  EXTREMITIES: No edema. Feet warm.    Left foot: pea-sized ulceration on plantar aspect of foot at base of 5th toe, surrounded by dry flaking skin, orange discoloration from wound care, wound base is dry, no drainage. Pea-sized ulceration on dorsal aspect at base of 5th toe, scant serous drainage, no purulence. Surrounding skin is non-erythematous.   NEUROLOGIC:  Grossly nonfocal. Active x4 extremities         Laboratory Data:     Inflammatory Markers    Recent Labs   Lab " Test 07/28/20  0904 06/16/20  0759 06/16/20  0610   SED  --  50*  --    CRP 8.8*  --  26.0*       Hematology Studies    Recent Labs   Lab Test 01/09/21  0625 01/08/21  1031 07/28/20  0904 06/21/20  0629 06/20/20  0651 06/19/20  0958 06/17/20  0614 07/29/19  0529 07/29/19  0529   WBC 9.3 9.5 10.4 9.2 11.4* 13.3* 7.8   < > 21.0*   ANEU  --  6.6 6.4  --  8.2 9.5* 4.4  --  17.3*   AEOS  --  0.4 0.6  --  0.6 0.4 0.6  --  0.2   HGB 11.5* 12.8* 11.7* 7.8* 8.1* 8.4* 9.4*   < > 12.5*   MCV 81 79 81 83 84 84 85   < > 79    273 292 274 252 266 283   < > 284    < > = values in this interval not displayed.       Metabolic Studies     Recent Labs   Lab Test 01/09/21  0625 01/08/21  1031 07/28/20  0904 06/21/20  0629 06/20/20  0651    137 138 135 132*   POTASSIUM 5.2 4.5 4.2 4.2 4.4   CHLORIDE 112* 107 105 102 102   CO2 25 22 26 25 23   BUN 22 28 26 24 26   CR 1.36* 1.29* 1.04 1.23 1.22   GFRESTIMATED 55* 59* 76 62 63       Hepatic Studies    Recent Labs   Lab Test 01/08/21  1031 07/28/20  0904 06/11/20  0427   BILITOTAL 0.3 0.3 0.5   ALKPHOS 68 81 64   ALBUMIN 3.6 3.4 2.6*   AST 15 12 12   ALT 29 18 16       Microbiology:  Culture Micro   Date Value Ref Range Status   01/08/2021 No growth after 17 hours  Preliminary   01/08/2021 No growth after 18 hours  Preliminary   06/18/2020 No anaerobes isolated  Final   06/18/2020 (A)  Final    On day 2, isolated in broth only:  Staphylococcus epidermidis     06/18/2020 Culture negative after 4 weeks  Final   06/12/2020 No anaerobes isolated  Final   06/12/2020 Culture negative after 4 weeks  Final   06/12/2020   Final    Canceled, Test credited  Test reordered as correct code  Reordered as BONE     06/12/2020 (A)  Final    On day 2, isolated in broth only:  Staphylococcus epidermidis     06/12/2020 No anaerobes isolated  Final   06/12/2020 Culture negative after 4 weeks  Final   06/12/2020 Light growth  Normal skin riki    Final   06/12/2020 No anaerobes isolated  Final    06/12/2020 Culture negative after 4 weeks  Final   06/12/2020   Final    Canceled, Test credited  Incorrectly ordered by PCU/Clinic  Test reordered as correct code     06/12/2020 Light growth  Normal skin riki    Final   07/29/2019 No growth  Final       Urine Studies    Recent Labs   Lab Test 07/29/19  0544 09/25/17  1830   LEUKEST Large* Negative   WBCU 80*  --        Vancomycin Levels    Recent Labs   Lab Test 07/28/20  0904 06/19/20  2005 06/15/20  0543   VANCOMYCIN 16.0 19.0 22.2       Hepatitis B Testing No lab results found.  Hepatitis C Testing   No results found for: HCVAB, HQTG, HCGENO, HCPCR, HQTRNA, HEPRNA  Respiratory Virus Testing    No results found for: RS, FLUAG          Imaging:     [place] [place]  Findings: Postoperative changes of previous sternotomy and CABG. The   inferior sternal wires have been previously removed. There are 3 wires in   the manubrium that are unchanged.     Transverse fracture of the sternum (best seen on sagittal image #57) with   bony lucency. Since the recent CT scan, there is increased vertical   separation of the body of the sternum, as well as increased lucencies   about the sternotomy. Fat stranding about below the sternotomy site   appears decreased. No loculated fluid collection is seen.     No axillary or mediastinal adenopathy is identified on this noncontrast   examination. Unchanged thyroid nodules. Heart size is within normal   limits. No pericardial effusions.     Mild scarring in the lingula. There is improved aeration in the lung bases   compared to previous. No pneumothorax. Limited evaluation of the upper   abdomen is unremarkable. There are severe changes in the shoulders   bilaterally.     IMPRESSION   Impression:   1. 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   2. Fat stranding  above and below the sternotomy site without loculated   fluid collection.

## 2021-01-09 NOTE — PLAN OF CARE
D: Pt admit 1/8/21 for sternal wound infection management. PMH chronic diabetic L foot ulcer, CAD s/p CABG x4 5/4/20 c/b sternal wound infection, had I&D (6/2020) w/repeat debridements w/ultimate flap closure 6/18/2020. Pt also had recent admission to [facility] for [place] foot ulcer, pt underwent I&D/graft/wound vac. Pt most recently presented to [facility] w/ large boil, CT c/g underlying sternal osteomyelitis. Other PMH DM2, HTN, CKD 2-3, BPH/urinary retention    I/A:   Neuro: A&Ox4. Pt deaf in L ear.   VS: VSS. RA  Tele: No telemetry orders  Pain: denies  GI/: Urinating adequately. No BM this shift.  Diet: moderate/consistent CHO  BG/insulin: ACHS BG checks. Pt BG 89 with 0200 check, pt given orange juice and toast per pt request to prevent hypoglycemia  IV/Drips: L PIV infusing  ml/hr  Activity: SBA w/walker  Skin/drains: Pt has L foot ulcer, WOC consult ordered per report.     P: Plan for WOC/Infectious disease/plastic consults. Continue to monitor pt status and report changes to CVTS.     Valentine Thomas RN

## 2021-01-09 NOTE — PROGRESS NOTES
Admission    Diagnosis: sternal wound infection concern on CT scan  Admitted from: ED  Via: Wheelchair  Accompanied by: transport  Belongings: Placed in closet; valuables sent home with family, declined sending any items to security.  Admission Profile: Complete  Teaching: orientation to unit, call don't fall, use of console, meal times, visiting hours, when to call for the RN (angina/sob/dizziness, etc.), and enforced importance of safety   Access: PIV Left  Telemetry: Placed on patient  Height/Weight: Complete

## 2021-01-09 NOTE — PLAN OF CARE
DISCHARGE     Discharged to: Home  Via: Automobile  Accompanied by: Friend   Discharge Instructions: principal diagnosis, major findings/procedures, diet, activity, medications, follow up appointments, when to call the MD, and what to watchout for (i.e. s/s of infection, increasing SOB, palpitations, Angina). Pt states understanding of all discharge instructions.  Prescriptions: To be filled by [facility] pharmacy  pt's request;  Follow Up Appointments: discussed with pt, pt stated understanding   Belongings: All sent with pt. Pt verifies that they are taking all belongings with them.  IV: discontinued.  Pt exhibits understanding of above discharge instructions; all questions answered.  .

## 2021-01-09 NOTE — PLAN OF CARE
Pt admitted 1/8 with possible sternal wound infection on CT scan (incision looks healed topically).  No tele orders on RA and denied pain.  Prn Flexeril available.  SBP elevated and on BP medications.  Vanco and Sugey, ID being consulted.  Left diabetic foot ulcer and dressed, Woc consult ordered.  CHO diet with glucose checks.  Prior hx of CABG.  Otherwise, pt up SBA.  Continue to monitor and with POC.

## 2021-01-09 NOTE — DISCHARGE INSTRUCTIONS
Follow up with ZIGGY Ochoa at [facility] [place] on Tuesday, January 12, 2021 as previously planned.   Follow up with cardiology next week as previously planned with a BMP recheck.

## 2021-01-09 NOTE — PROGRESS NOTES
"  Cardiovascular Surgery Progress Note  01/09/2021         Assessment and Plan:     Nabil Cheung is a 60 y.o. male with a PMH of poorly controlled DMT2, hypertension, CKD stage 2-3, BPH/urinary retention, chronic diabetic left foot ulcer, and CAD s/p CABG x 4 (LIMA to LAD, SVG to rPDA, SVG to OM, SVG to diag) 05/04/2020 with Dr. Mckinney at [facility] [place]. He was readmitted to [facility] 06/2020 with sternal wound infection (MRSE), had I&D at [facility] and was transferred to [facility] for further wound care. Underwent repeat debridements and ultimately pec flap closure with Plastic Surgery (Dr. Serna) on 06/18/2020. He was recently admitted to [facility] [place] 12/17-12/22 for progression of chronic left diabetic foot wound to abscess and purulent cellulitis (strep mitis/oralis); underwent I&D, graft, and wound vac placement with podiatry at [facility] [place] [facility]. Then last week developed \"large boil\" at superior aspect of incision which he \"popped\"; subsequent chest CT 01/07 at [facility] showed concern for underlying sternal osteomyelitis and patient was advised to come to [facility] for further evaluation and management.       Cardiovascular:   CAD s/p CABG  Hypertension  - S/p CABG 05/04/2020  - PTA Aspirin 325 mg daily  - PTA Rosuvastatin 20 mg daily  - PTA Toprol 50 mg daily   - PTA lisinopril 2.5 mg daily, hold with rising creat.      / Renal:  Urinary retention/BPH  - PTA finasteride 5 mg daily  - PTA alfuzosin 10 mg daily*  CKD stage 3  - Trend BMP  -  Creat 1.36 (1.29)    Endocrine:  # DM II  - on Metformin and sliding scale insulin   - Hgb A1c 9.1  - -233  - consider endocrine consult     Infectious Disease:  #Hx of Sternal wound infection  # Hx of Diabetic foot infection with wound    - Developed sternal wound infection 06/2020 following CABG 05/04/2020 at [facility]. Wound cultures at that time grew MRSE. Had multiple sternal debridements and ultimatelty pec flap closure 06/18 with Plastic Surgery. Reported new \"boil\" along sternal incision starting 01/02/21. CT " "[place]st [place] [facility] [place] with concern for underlying sternal osteomyelitis. Currently sternal incision is closed; where he previously reported the \"boil\", there is pinpoint scab. He has no surrounding induration or erythema.  Images reviewed with Dr. Arana and Dr. Mckinney, unclear if CT findings represent true osteo vs normal postoperative changes. Will consult to ID and Plastics to review.   - Also history of chronic left diabetic foot wound which recently worsened; recent [place] [facility] [place] for purulent left lower extremity cellulitis s/p I&D with graft and wound vac placement (12/20/20), tissue culture from surgery on 12/20/2020 grew Streptococcus mitis/oralis with pansensitivity  - Discharged from recent hospital admission on cefadroxil 1000 mg BID (however patient had misunderstood instructions had only been taking 500 mg BID). Stopped oral antibiotics.   - Started IV Vanc and Zosyn 1/8.   - Wound consult for ongoing wound care of left foot  - ID and Plastics consulted 1/9, appreciate their assistance. No further antibiotics needed and okay to discharge home.   - BC X2 1/8: NGTD, WBC 9.3, afebrile   - Lactate 2.2 1/8, was given IVF's overnight and recheck today 1.4.       Anticoagulation:   - ASA only    Prophylaxis:   - DVT prophylaxis: Subcutaneous heparin, SCD    Disposition:   - Discharge home today.   - Has a follow up appointment on Tuesday, January 12th at [facility].   - Hold Lisinopril today and recheck BMP at follow up appointment with Cardiology Tuesday.     Discussed with CVTS Fellow as needed.  Staff surgeons have been informed of changes through both verbal and written communication.      Anahy Pena, HARPAL, CNP  Cardiovascular Thoracic Surgery   Pgr [phone]          Interval History:     No overnight events.  No complaints, feeling well.          Physical Exam:   Blood pressure (!) 147/72, pulse 71, temperature 98.8  F (37.1  C), temperature source Oral, resp. rate 16, " height 1.829 m (6'), weight 91.8 kg (202 lb 6.4 oz), SpO2 98 %.  Vitals:    01/08/21 1009 01/08/21 1557 01/09/21 0336   Weight: 88.5 kg (195 lb) 91.2 kg (201 lb) 91.8 kg (202 lb 6.4 oz)      Gen: A&Ox4, NAD  Neuro: no focal deficits   CV: RRR, normal S1 S2, no murmurs, rubs or gallops. No JVD  Pulm: CTA, no wheezing or rhonchi, normal breathing on RA  Abd: nondistended, normal BS, soft, nontender  Ext: No peripheral edema. Dressing WDI on the left foot.             Data:    Imaging:  reviewed recent imaging, no acute concerns    Labs:  BMP  Recent Labs   Lab 01/09/21  0625 01/08/21  1031    137   POTASSIUM 5.2 4.5   CHLORIDE 112* 107   ONEYDA 8.2* 9.0   CO2 25 22   BUN 22 28   CR 1.36* 1.29*   * 233*     CBC  Recent Labs   Lab 01/09/21  0625 01/08/21  1031   WBC 9.3 9.5   RBC 4.64 5.03   HGB 11.5* 12.8*   HCT 37.4* 39.6*   MCV 81 79   MCH 24.8* 25.4*   MCHC 30.7* 32.3   RDW 15.0 15.0    273     INR  Recent Labs   Lab 01/08/21  1031   INR 1.05      Liver Function Studies -   Recent Labs   Lab Test 01/08/21  1031   PROTTOTAL 7.4   ALBUMIN 3.6   BILITOTAL 0.3   ALKPHOS 68   AST 15   ALT 29     GLUCOSE:   Recent Labs   Lab 01/09/21  0625 01/09/21  0318 01/08/21  2110 01/08/21  1031   *  --   --  233*   BGM  --  86 154*  --

## 2021-01-09 NOTE — PLAN OF CARE
D: pt admitted on 1/08/2020 for concern for sternal osteomyelitis on image . PMH of poorly controlled DMT2, hypertension, CKD stage 2-3, BPH/urinary retention, chronic diabetic left foot ulcer, and CAD s/p CABG x 4  on 05/04/2020. Readmitted to [facility] 06/2020 with sternal wound infection (MRSE), had I&D, Underwent repeat debridements and ultimately pec flap closure with Plastic Surgery on 06/18/2020. He was recently admitted to [facility] 12/17-12/22 for progression of chronic left diabetic foot wound to abscess and purulent cellulitis (strep mitis/oralis); underwent I&D, graft, and wound vac placement with podiatry at [facility].        I: Monitored vitals and assessed pt status.     Vitals:  Temp: 97.6  F (36.4  C) Temp src: Oral BP: 128/64 Pulse: 57   Resp: 16 SpO2: 97 % O2 Device: None (Room air)        A:   Neuro: A&O x 4. Neurologically intact, denies Headache, dizziness and lightheadedness.  Report numbness on bilateral  LE. Denies tingling. calls appropriately   Vitals: Stable, no Tele order. Denies chest pain   Respiratory: sating >92 % on RA. Denies SOB. LS clear , diminished at bases   Diet/appetite:    Moderate consistent CHO diet, good appetite   Endocrine:  BS check AC/HS  GI/:  BM x1. Denies abdominal pain and nausea. Good urine output, voids without difficulty   Activity: stand by assist with walker   Pain: Denies pain   Skin: no new deficit noted. Kerlix gauze dressing to left foot. Dressing CDI.   LDAs: PIV X1    P: plan to discharge to home. Will Continue to monitor Pt status and report changes to treatment team.

## 2021-01-10 ENCOUNTER — PATIENT OUTREACH (OUTPATIENT)
Dept: CARE COORDINATION | Facility: CLINIC | Age: 61
End: 2021-01-10

## 2021-01-11 NOTE — PROGRESS NOTES
"[facility]: Post-Discharge Note  SITUATION                                                      Admission:    Admission Date: 01/08/21   Reason for Admission: Hx of Sternum infection with recent chest CT 1/7/2021 concerning for possible osteomyelitis.  Discharge:   Discharge Date: 01/09/21  Discharge Diagnosis: Hx of Sternum infection with recent chest CT 1/7/2021 concerning for possible osteomyelitis.    BACKGROUND                                                      Nabil Cheung is a 60 y.o. male with a PMH of poorly controlled DMT2, hypertension, CKD stage 2-3, BPH/urinary retention, chronic diabetic left foot ulcer, and CAD s/p CABG x 4 (LIMA to LAD, SVG to rPDA, SVG to OM, SVG to diag) 05/04/2020 with Dr. Mckinney at [facility]. He was readmitted to [facility] 06/2020 with sternal wound infection (MRSE), had I&D at [facility] and was transferred to [facility] for further wound care. Underwent repeat debridements and ultimately pec flap closure with Plastic Surgery (Dr. Serna) on 06/18/2020. He was recently admitted to [facility] 12/17-12/22 for progression of chronic left diabetic foot wound to abscess and purulent cellulitis (strep mitis/oralis); underwent I&D, graft, and wound vac placement with podiatry at [facility]. Then last week developed \"large boil\" at superior aspect of incision which he \"popped\"; subsequent chest CT 01/07 at [facility] showed concern for underlying sternal osteomyelitis and patient was advised to come to [facility] for further evaluation and management.      ASSESSMENT      Discharge Assessment  Patient reports symptoms are: Improved  Does the patient have all of their medications?: Yes  Does patient know what their new medications are for?: Yes  Does patient have a follow-up appointment scheduled?: No  Does patient have any other questions or concerns?: No    Post-op  Did the patient have surgery or a procedure: No  Fever: No  Chills: No  Eating & Drinking: eating and drinking without " complaints/concerns  Bowel Function: normal  Urinary Status: voiding without complaint/concerns        PLAN                                                      Outpatient Plan:    Hold lisinopril today, resume tomorrow and follow up with a BMP next week  Stop all antibiotics   Follow up with ZIGGY Ochoa at [facility] on Tuesday, January 12, 2021 as previously planned.   Follow up with cardiology next week as previously planned with a BMP recheck.  Follow up with the wound clinic for ongoing left foot wound care.   No future appointments.        Sameera Lu

## 2021-01-14 LAB
BACTERIA SPEC CULT: NO GROWTH
BACTERIA SPEC CULT: NO GROWTH
SPECIMEN SOURCE: NORMAL
SPECIMEN SOURCE: NORMAL

## 2021-04-24 ENCOUNTER — HEALTH MAINTENANCE LETTER (OUTPATIENT)
Age: 61
End: 2021-04-24

## 2021-06-08 ENCOUNTER — HOSPITAL ENCOUNTER (OUTPATIENT)
Dept: WOUND CARE | Facility: CLINIC | Age: 61
Discharge: HOME | End: 2021-06-08
Attending: SURGERY | Admitting: SURGERY
Payer: COMMERCIAL

## 2021-06-08 VITALS
TEMPERATURE: 96.3 F | DIASTOLIC BLOOD PRESSURE: 76 MMHG | HEIGHT: 71 IN | HEART RATE: 62 BPM | RESPIRATION RATE: 16 BRPM | BODY MASS INDEX: 28.7 KG/M2 | SYSTOLIC BLOOD PRESSURE: 143 MMHG | WEIGHT: 205 LBS

## 2021-06-08 DIAGNOSIS — E11.65 UNCONTROLLED TYPE 2 DIABETES MELLITUS WITH HYPERGLYCEMIA (H): ICD-10-CM

## 2021-06-08 DIAGNOSIS — E11.621 DIABETIC ULCER OF LEFT MIDFOOT ASSOCIATED WITH TYPE 2 DIABETES MELLITUS, WITH FAT LAYER EXPOSED (H): Primary | ICD-10-CM

## 2021-06-08 DIAGNOSIS — L97.422 DIABETIC ULCER OF LEFT MIDFOOT ASSOCIATED WITH TYPE 2 DIABETES MELLITUS, WITH FAT LAYER EXPOSED (H): Primary | ICD-10-CM

## 2021-06-08 PROBLEM — E08.3593: Status: ACTIVE | Noted: 2020-10-28

## 2021-06-08 PROBLEM — L03.119 CELLULITIS AND ABSCESS OF FOOT: Status: ACTIVE | Noted: 2020-12-17

## 2021-06-08 PROBLEM — L02.619 CELLULITIS AND ABSCESS OF FOOT: Status: ACTIVE | Noted: 2020-12-17

## 2021-06-08 PROCEDURE — G0463 HOSPITAL OUTPT CLINIC VISIT: HCPCS | Mod: 25

## 2021-06-08 PROCEDURE — 17250 CHEM CAUT OF GRANLTJ TISSUE: CPT | Performed by: SURGERY

## 2021-06-08 PROCEDURE — 97597 DBRDMT OPN WND 1ST 20 CM/<: CPT | Performed by: SURGERY

## 2021-06-08 PROCEDURE — 11042 DBRDMT SUBQ TIS 1ST 20SQCM/<: CPT

## 2021-06-08 PROCEDURE — 11042 DBRDMT SUBQ TIS 1ST 20SQCM/<: CPT | Performed by: SURGERY

## 2021-06-08 PROCEDURE — 99204 OFFICE O/P NEW MOD 45 MIN: CPT | Mod: 25 | Performed by: SURGERY

## 2021-06-08 ASSESSMENT — MIFFLIN-ST. JEOR: SCORE: 1747

## 2021-06-08 NOTE — DISCHARGE INSTRUCTIONS
[facility]  [address] [facility] [address] [address]    Call us at [phone] if you have any questions about your wounds, have redness or swelling around your wound, have a fever of 101 or greater or if you have any other problems or concerns. We answer the phone Monday through Friday 8 am to 4 pm, please leave a message as we check the voicemail frequently throughout the day.     Nabil BLACK Cheung      1960  The key to healing your wound is to keeping your blood sugars under good control.     Medications/supplements to aid in healin. Vitamin D3 10,000 iu per day  2. Ping C 1,000 mg take twice daily  3. Vitamin B Complex with zinc take 1 tablet daily   4. Vitamin B 12 1000 mcg daily  5. ----->Order from [employer] Hesperidin Diosmin 1,000 mg tablet twice daily - Vein Formula- take life long     Wound care recommendations to Left Foot  After cleansing with saline or wound cleanser, apply small amount of VASHE on gauze, lay into wound bed, let sit for 15-30 minutes, remove gauze (do not rinse) then apply dressing.  Cover wound with Iodosorb gel on adaptic   Cover wound with gauze and roll gauze  Change dressing daily.    When you sit raise your ankle above your hips to promote wound healing.   You need to stay off your foot at all times to help heal your wound.  You should wear your offloading shoe at all times.  Use Knee roller to keep your weight off your foot.       JASON Galan M.D.. 2021    Wound Clinic follow up [facility] [phone]  [facility] [address] [place] [address], [address] Phone:[phone]      If you had a positive experience please indicate that on your patient satisfaction survey form that [facility] will be sending you.    It was a pleasure meeting with you today.  Thank you for allowing me and my team the privilege of caring for you today.  YOU are the reason we are here, and I truly hope  we provided you with the excellent service you deserve.  Please let us know if there is anything else we can do for you so that we can be sure you are leaving completely satisfied with your care experience.      If you have any billing related questions please call the [facility] Business office at [phone]. The clinic staff does not handle billing related matters.

## 2021-06-08 NOTE — PROGRESS NOTES
[facility] Wound [facility]aling [place] Progress Note    Subject: Nabil Cheung consultation for left lateral foot chronic ulceration, accompanied by a retired internist, urgent care, partner clinic.  Medical record reviewed.  Adult-onset diabetes, history of coronary bypass grafting complicated by sternal wound requiring free flap, no tobacco utilization.  Photodocumentation from wound within the past 2 months demonstrates significant improvement, utilization of Iodosorb, offloading, he has new shoe wear from as though is currently not utilizing until wounds have completely healed.  No history of malignancy.  Medications reviewed, is on Metformin.  Previous CT imaging at [facility] demonstrated no evidence of osteomyelitis of the left fifth metatarsal proximally 6 months ago.  Denies fevers chills sweats.  Has not had previous hyperbaric oxygen treatment.    PMH: No past medical history on file.  Patient Active Problem List   Diagnosis     Diabetes mellitus without complication (H)     Essential (primary) hypertension     Surgical wound infection     S/P CABG x 4     Non-healing surgical wound, subsequent encounter     BPH with obstruction/lower urinary tract symptoms     Cataracts, bilateral     Chronic right shoulder pain     Coronary artery disease involving native coronary artery of native heart with other form of angina pectoris (H)     Diabetic ulcer of right midfoot associated with type 2 diabetes mellitus, with fat layer exposed (H)     Elevated PSA, less than 10 ng/ml     Epididymoorchitis     Erectile dysfunction associated with type 2 diabetes mellitus (H)     Microalbuminuria     Myopia of both eyes with astigmatism and presbyopia     Retrograde ejaculation     Thyroid nodule     Uncontrolled type 2 diabetes mellitus with hyperglycemia (H)     Osteomyelitis, unspecified site, unspecified type (H)     Social Hx:   Social History     Socioeconomic History     Marital status: Single      Spouse name: Not on file     Number of children: Not on file     Years of education: Not on file     Highest education level: Not on file   Occupational History     Not on file   Social Needs     Financial resource strain: Not on file     Food insecurity     Worry: Not on file     Inability: Not on file     Transportation needs     Medical: Not on file     Non-medical: Not on file   Tobacco Use     Smoking status: Never Smoker     Smokeless tobacco: Never Used   Substance and Sexual Activity     Alcohol use: Yes     Drug use: No     Sexual activity: Not on file   Lifestyle     Physical activity     Days per week: Not on file     Minutes per session: Not on file     Stress: Not on file   Relationships     Social connections     Talks on phone: Not on file     Gets together: Not on file     Attends [employer] service: Not on file     Active member of club or organization: Not on file     Attends meetings of clubs or organizations: Not on file     Relationship status: Not on file     Intimate partner violence     Fear of current or ex partner: Not on file     Emotionally abused: Not on file     Physically abused: Not on file     Forced sexual activity: Not on file   Other Topics Concern     Not on file   Social History Narrative    6/29/17 - Pt was interested in getting help filling out [employer] application online to receive medicaid. CHW gave him the list of information he needed to complete online application. He stated he had a tax extension for 2016 so he did not have tax forms that the application required. He was going to work on the application with tax information from 2015 later tonight. AM        7/27/17:    Pt wanted to get more information about gyms that are low-cost within the [facility]. Pt's asked for a gym with a membership fee of $10-$20 (max), that also had a walking track and a hot tub/sauna. We told him that options that fit these requirements he mentioned were very limited. He then stated  that he heard that the [facility] offered a $5 fee for track-usage only, however, we could not call [facility] since it was closed. We suggested to the patient to call tomorrow and also mentioned there are gyms that are low-cost nearby, however, might not have a track or hot tub/sauna. We also suggested that he could walk around a lake would also be a cheap-alternative. -HB and NV        8/28/17:    Pt was here for a med refill. We asked him about his gym membership and access based on CHW's previous encounter with him and he said he tried to walk outside more this summer, but still did not find an affordable option. He did not have need for any other services at the time.     - AA and KT         9/28/17:    Pt had questions about free/low cost (under $10/month) walking tracks.  Found a website to walk through with the patient and shared different resources. - AB and HB        7/19/18: Pt. Was not seen tonight.- ZR, KP       Surgical Hx:   Past Surgical History:   Procedure Laterality Date     GRAFT FLAP PEDICLE TRAM DELAY PROCEDURE Left 6/18/2020    Procedure: Debridement of sternal wound and left major pectoral flep;  Surgeon: MARCO Serna MD;  Location: [facility] OR     INCISION AND DRAINAGE CHEST WASHOUT, COMBINED N/A 6/12/2020    Procedure: INCISION AND DRAINAGE, WOUND, CHEST, WITH IRRIGATION and wound vac change;  Surgeon: Mark Mckinney MD;  Location: [facility] OR     REPAIR CHEST WALL N/A 6/18/2020    Procedure: Chest wall reconstruction;  Surgeon: MARCO Serna MD;  Location: [facility] OR       Allergies:    Allergies   Allergen Reactions     Atorvastatin      Dust Mites        Medications:   Current Outpatient Medications   Medication     acetaminophen (TYLENOL) 325 MG tablet     alfuzosin ER (UROXATRAL) 10 MG 24 hr tablet     alpha-lipoic acid 600 MG capsule     aspirin (ASA) 325 MG EC tablet     cyclobenzaprine (FLEXERIL) 10 MG tablet     empagliflozin (JARDIANCE) 10 MG TABS tablet     finasteride (PROSCAR) 5  MG tablet     fish oil-omega-3 fatty acids 1000 MG capsule     insulin glargine (LANTUS PEN) 100 UNIT/ML pen     lisinopril (ZESTRIL) 2.5 MG tablet     magnesium 250 MG tablet     metFORMIN (GLUCOPHAGE) 1000 MG tablet     metoprolol succinate ER (TOPROL-XL) 50 MG 24 hr tablet     nitroGLYcerin (NITROSTAT) 0.4 MG sublingual tablet     ondansetron (ZOFRAN) 4 MG tablet     rosuvastatin (CRESTOR) 20 MG tablet     vitamin D3 (CHOLECALCIFEROL) 2000 units (50 mcg) tablet     No current facility-administered medications for this encounter.        Labs:   Recent Labs   Lab Test 01/09/21  0625 01/08/21  1031   ALBUMIN  --  3.6   HGB 11.5* 12.8*   INR  --  1.05   WBC 9.3 9.5   A1C 9.1*  --    CRP <2.9  --      Creatinine   Date Value Ref Range Status   01/09/2021 1.36 (H) 0.66 - 1.25 mg/dL Final     GFR Estimate   Date Value Ref Range Status   01/09/2021 55 (L) >60 mL/min/[1.73_m2] Final     Comment:     Non  GFR Calc  Starting 12/18/2018, serum creatinine based estimated GFR (eGFR) will be   calculated using the Chronic Kidney Disease Epidemiology Collaboration   (CKD-EPI) equation.       GFR Estimate If Black   Date Value Ref Range Status   01/09/2021 64 >60 mL/min/[1.73_m2] Final     Comment:      GFR Calc  Starting 12/18/2018, serum creatinine based estimated GFR (eGFR) will be   calculated using the Chronic Kidney Disease Epidemiology Collaboration   (CKD-EPI) equation.       Lab Results   Component Value Date    WBC 9.3 01/09/2021     Lab Results   Component Value Date    RBC 4.64 01/09/2021     Lab Results   Component Value Date    HGB 11.5 01/09/2021     Lab Results   Component Value Date    HCT 37.4 01/09/2021     No components found for: MCT  Lab Results   Component Value Date    MCV 81 01/09/2021     Lab Results   Component Value Date    MCH 24.8 01/09/2021     Lab Results   Component Value Date    MCHC 30.7 01/09/2021     Lab Results   Component Value Date    RDW 15.0 01/09/2021      Lab Results   Component Value Date     01/09/2021          Nutrition requirements were discussed with patient today.  Objective:  There were no vitals taken for this visit.        General:  Patient is alert and orientated, no acute distress.  Conversant    Vascular: Palpable left posterior tibial and dorsalis pedis pulses, right posterior tibial and dorsalis pedis pulses.  Left fifth metatarsal wound examined, no bone or tendon exposure, no cellulitis, mild periwound chronic inflammation, no heel ulcerations.  No wounds of the right lower extremity.    Procedure:   Patient was determined to be capable of making their own medical decisions and informed consent was obtained, topical anesthetic of 4% lidocaine was applied, debridement was performed.  10 blade was used to debride ulcer down to and including subcutaneous tissue and chronic callus. Bleeding controlled with light pressure and application of silver nitrate. Patient tolerated procedure well.    Impression: Chronic left lateral fifth callus with development of ulceration, previous history of surgical management of left foot abscess, no history of osteomyelitis based on previous CT imaging through [facility], diabetic, history of coronary bypass grafting, history of sternal dehiscence requiring free flap  Barriers to healing include: Diabetes, Nutrition and ability to offload    Plan:  We will dress the wounds with Iodosorb, Adaptic, change on a daily basis, this plan has been performed over the past 2 months and has resulted in significant provement in the left lateral fifth metatarsal ulceration.  Obtain ankle-brachial indices and duplex ultrasound a standard of care patient with chronic lower extremity wound with history of chronic diabetes and coronary artery disease requiring coronary to bypass grafting.  This wound may be a result of angios umbel deficit despite palpable pedal pulses.  Discussed microvasculature.  Discussed  Mediterranean style food choices.  Discussed micronutrient utilization including lifelong utilization of micronized purified flavonoid fraction given diagnosis of diabetes.  Patient will return to the clinic in 3-4 weeks time with telehealth    Ketan Galan MD on 6/8/2021 at 7:55 AM

## 2021-06-08 NOTE — PROGRESS NOTES
Patient arrived for wound care visit. Certified Wound Care Nurse time spent evaluating patient record, completed a full evaluation and documented wound(s) & roslyn-wound skin; provided recommendation based on treatment plan. Applied dressing, reviewed discharge instructions, patient education, and discussed plan of care with appropriate medical team staff members and patient and/or family members.

## 2021-06-10 ENCOUNTER — HOSPITAL ENCOUNTER (OUTPATIENT)
Dept: ULTRASOUND IMAGING | Facility: CLINIC | Age: 61
End: 2021-06-10
Attending: SURGERY
Payer: COMMERCIAL

## 2021-06-10 DIAGNOSIS — E11.621 DIABETIC ULCER OF LEFT MIDFOOT ASSOCIATED WITH TYPE 2 DIABETES MELLITUS, WITH FAT LAYER EXPOSED (H): ICD-10-CM

## 2021-06-10 DIAGNOSIS — L97.422 DIABETIC ULCER OF LEFT MIDFOOT ASSOCIATED WITH TYPE 2 DIABETES MELLITUS, WITH FAT LAYER EXPOSED (H): ICD-10-CM

## 2021-06-10 DIAGNOSIS — E11.65 UNCONTROLLED TYPE 2 DIABETES MELLITUS WITH HYPERGLYCEMIA (H): ICD-10-CM

## 2021-06-10 PROCEDURE — 93925 LOWER EXTREMITY STUDY: CPT

## 2021-06-10 PROCEDURE — 93922 UPR/L XTREMITY ART 2 LEVELS: CPT

## 2021-06-22 ENCOUNTER — HOSPITAL ENCOUNTER (OUTPATIENT)
Dept: WOUND CARE | Facility: CLINIC | Age: 61
End: 2021-06-22
Attending: SURGERY
Payer: COMMERCIAL

## 2021-06-22 DIAGNOSIS — E11.621 DIABETIC ULCER OF LEFT MIDFOOT ASSOCIATED WITH TYPE 2 DIABETES MELLITUS, WITH FAT LAYER EXPOSED (H): ICD-10-CM

## 2021-06-22 DIAGNOSIS — L97.422 DIABETIC ULCER OF LEFT MIDFOOT ASSOCIATED WITH TYPE 2 DIABETES MELLITUS, WITH FAT LAYER EXPOSED (H): ICD-10-CM

## 2021-06-22 PROCEDURE — 99213 OFFICE O/P EST LOW 20 MIN: CPT | Mod: 95 | Performed by: SURGERY

## 2021-06-22 RX ORDER — TERBINAFINE HYDROCHLORIDE 250 MG/1
250 TABLET ORAL
COMMUNITY
Start: 2021-06-17 | End: 2021-08-15

## 2021-06-22 RX ORDER — ALBUTEROL SULFATE 90 UG/1
1-2 AEROSOL, METERED RESPIRATORY (INHALATION)
COMMUNITY
Start: 2021-06-21

## 2021-06-22 NOTE — DISCHARGE INSTRUCTIONS
[facility]  [address] [address]    Call us at [phone] if you have any questions about your wounds, have redness or swelling around your wound, have a fever of 101 or greater or if you have any other problems or concerns. We answer the phone Monday through Friday 8 am to 4 pm, please leave a message as we check the voicemail frequently throughout the day.     Nabil Cheung      1960    Key to healing your wound is to keeping your blood sugars under good control.    Medications/supplements to aid in healing:  Vitamin D3 10,000 iu per day  Ping C 1,000 mg take twice daily  Vitamin B Complex with zinc take 1 tablet daily  Vitamin B 12 1000 mcg daily  ----->Order from [facility] Hesperidin Diosmin 1,000 mg tablet twice daily - Vein Formula- take life long    Wound care recommendations to Left Foot  After cleansing with saline or wound cleanser, apply small amount of VASHE on gauze, lay into wound bed, let sit for 15-30 minutes, remove gauze (do not rinse) then apply dressing.  Cover wound with Iodosorb gel on adaptic  Cover wound with gauze and roll gauze  Change dressing daily.    When you sit raise your ankle above your hips to promote wound healing.  You need to stay off your foot at all times to help heal your wound.  You should wear your offloading shoe at all times.  Use Knee roller to keep your weight off your foot.     JASON Galan M.D. June 22, 2021    Wound Clinic follow up ***    If you had a positive experience please indicate that on your patient satisfaction survey form that [facility] will be sending you.    It was a pleasure meeting with you today.  Thank you for allowing me and my team the privilege of caring for you today.  YOU are the reason we are here, and I truly hope we provided you with the excellent service you deserve.  Please let us know if there is anything else we can do for you so that we can be sure you are  leaving completely satisfied with your care experience.      If you have any billing related questions please call the [facility] Business office at [phone]. The clinic staff does not handle billing related matters.

## 2021-06-22 NOTE — PROGRESS NOTES
[facility] Wound Healing [place] Progress Note  Telehealth visit, start time 3:15, end time 3:27, total time 12 minutes    Subject: Nabil Cheung , arterial studies reviewed, does have single isolated tibial artery occlusion right and left lower extremities, occluded right distal posterior tibial artery with patent anterior tibial and peroneal arteries, focal occlusion of the left mid anterior tibial artery with retrograde flow from the left peroneal and posterior tibial arteries. Digit brachial index 0.78 on the right with absolute perfusion pressure of 111 mmHg, digit brachial index on the left 0.92 with absolute perfusion pressure of 131 millimeters of mercury. Perfusion pressure should be adequate for healing. In review of today's photograph, there has been some degree of regression of the wound, patient denies trauma, he has been utilizing a walking boot that he has modified which I suspect is resulting in shear force resulting in regression of the wound. Current dressing had been iodosorb on Adaptic. Blood sugar control has been inadequate in discussion with the patient, he concurs.    Patient Active Problem List   Diagnosis     Diabetes mellitus without complication (H)     Essential (primary) hypertension     Surgical wound infection     S/P CABG x 4     Non-healing surgical wound, subsequent encounter     BPH with obstruction/lower urinary tract symptoms     Cataracts, bilateral     Chronic right shoulder pain     Coronary artery disease involving native coronary artery of native heart with other form of angina pectoris (H)     Diabetic ulcer of left midfoot associated with type 2 diabetes mellitus, with fat layer exposed (H)     Elevated PSA, less than 10 ng/ml     Epididymoorchitis     Erectile dysfunction associated with type 2 diabetes mellitus (H)     Microalbuminuria     Myopia of both eyes with astigmatism and presbyopia     Retrograde ejaculation     Thyroid nodule     Uncontrolled type 2 diabetes  mellitus with hyperglycemia (H)     Osteomyelitis, unspecified site, unspecified type (H)     Cellulitis and abscess of foot     Proliferative diabetic retinopathy of both eyes associated with diabetes mellitus due to underlying condition (H)     No past medical history on file.  Exam:  There were no vitals taken for this visit.     Telehealth visit, photograph reviewed to previous documentation June 8, 2021.    .    Impression: Regression left lateral foot wound fifth metatarsal, isolated single-vessel tibial artery occlusive disease with adequate perfusion pressures, focal occlusion left mid anterior tibial artery, adult-onset diabetes    Plan: We will dress the wounds with Adaptic, he will stop utilizing the modified walking boot, initiate total contact casting, initial total contact cast to be placed Friday, replaced within 72 hours, will subsequently replace again on Friday and then every Friday until wound healed, he is requesting Friday appointments moving forward due to his schedule. Discussed with  Our nursing staff..  Anticipate 6 to 8 weeks of total contact casting, he does have a new shoe from [employer] though believes the shoe requires modification, he will make an appointment for shoe evaluation and modification upon completion of total contact cast management of foot ulceration. He continues on micronized purified flavonoid fraction and adjunctive micronutrients.    Ketan Galan MD on 6/22/2021 at 2:21 PM

## 2021-06-25 ENCOUNTER — HOSPITAL ENCOUNTER (OUTPATIENT)
Dept: WOUND CARE | Facility: CLINIC | Age: 61
Discharge: HOME | End: 2021-06-25
Attending: PODIATRIST | Admitting: PODIATRIST
Payer: COMMERCIAL

## 2021-06-25 VITALS
WEIGHT: 210 LBS | SYSTOLIC BLOOD PRESSURE: 132 MMHG | HEART RATE: 65 BPM | TEMPERATURE: 96.4 F | DIASTOLIC BLOOD PRESSURE: 72 MMHG | HEIGHT: 71 IN | BODY MASS INDEX: 29.4 KG/M2

## 2021-06-25 DIAGNOSIS — L97.422 DIABETIC ULCER OF LEFT MIDFOOT ASSOCIATED WITH TYPE 2 DIABETES MELLITUS, WITH FAT LAYER EXPOSED (H): ICD-10-CM

## 2021-06-25 DIAGNOSIS — E11.42 DIABETIC POLYNEUROPATHY ASSOCIATED WITH TYPE 2 DIABETES MELLITUS (H): ICD-10-CM

## 2021-06-25 DIAGNOSIS — E11.621 DIABETIC ULCER OF LEFT MIDFOOT ASSOCIATED WITH TYPE 2 DIABETES MELLITUS, WITH FAT LAYER EXPOSED (H): ICD-10-CM

## 2021-06-25 PROCEDURE — 11042 DBRDMT SUBQ TIS 1ST 20SQCM/<: CPT

## 2021-06-25 PROCEDURE — 97602 WOUND(S) CARE NON-SELECTIVE: CPT

## 2021-06-25 PROCEDURE — 99213 OFFICE O/P EST LOW 20 MIN: CPT | Mod: 25 | Performed by: PODIATRIST

## 2021-06-25 PROCEDURE — 11042 DBRDMT SUBQ TIS 1ST 20SQCM/<: CPT | Performed by: PODIATRIST

## 2021-06-25 ASSESSMENT — MIFFLIN-ST. JEOR: SCORE: 1769.68

## 2021-06-25 NOTE — PROGRESS NOTES
[facility] [place] Progress Note    Subject: Patient was seen at wound center.  Patient presents to clinic for chronic nonhealing left foot ulcer.  He states the ulcer originally started about 2 years ago and he was seeing Dr. Rhoda turner at [place].  She did have him on a wound VAC for a period of time to help fill it in but the wound keeps reopening.  He saw Dr. Galan about a month ago and it was improving again and he got new shoes he states and then the wound got bigger.  He is here today to start possible total contact casting.  He denies fever, nausea, vomiting.  Notes that his blood sugars are in the 200-300 range but he states he is working with his primary care and endocrinologist on this.  Currently has a knee roller.    PMH: No past medical history on file.    Social Hx:   Social History     Socioeconomic History     Marital status: Single     Spouse name: Not on file     Number of children: Not on file     Years of education: Not on file     Highest education level: Not on file   Occupational History     Not on file   Social Needs     Financial resource strain: Not on file     Food insecurity     Worry: Not on file     Inability: Not on file     Transportation needs     Medical: Not on file     Non-medical: Not on file   Tobacco Use     Smoking status: Never Smoker     Smokeless tobacco: Never Used   Substance and Sexual Activity     Alcohol use: Yes     Drug use: No     Sexual activity: Not on file   Lifestyle     Physical activity     Days per week: Not on file     Minutes per session: Not on file     Stress: Not on file   Relationships     Social connections     Talks on phone: Not on file     Gets together: Not on file     Attends [employer] service: Not on file     Active member of club or organization: Not on file     Attends meetings of clubs or organizations: Not on file     Relationship status: Not on file     Intimate partner violence     Fear of current or ex partner: Not on  file     Emotionally abused: Not on file     Physically abused: Not on file     Forced sexual activity: Not on file   Other Topics Concern     Not on file   Social History Narrative    6/29/17 - Pt was interested in getting help filling out [employer] application online to receive medicaid. CHW gave him the list of information he needed to complete online application. He stated he had a tax extension for 2016 so he did not have tax forms that the application required. He was going to work on the application with tax information from 2015 later tonight. AM        7/27/17:    Pt wanted to get more information about gyms that are low-cost within the [facility]. Pt's asked for a gym with a membership fee of $10-$20 (max), that also had a walking track and a hot tub/sauna. We told him that options that fit these requirements he mentioned were very limited. He then stated that he heard that the [facility] offered a $5 fee for track-usage only, however, we could not call [facility] since it was closed. We suggested to the patient to call tomorrow and also mentioned there are gyms that are low-cost nearby, however, might not have a track or hot tub/sauna. We also suggested that he could walk around a lake would also be a cheap-alternative. -HB and [place]        8/28/17:    Pt was here for a med refill. We asked him about his gym membership and access based on CHW's previous encounter with him and he said he tried to walk outside more this summer, but still did not find an affordable option. He did not have need for any other services at the time.     - AA and KT         9/28/17:    Pt had questions about free/low cost (under $10/month) walking tracks.  Found a website to walk through with the patient and shared different resources. - AB and HB        7/19/18: Pt. Was not seen tonight.- ZR, KP       Surgical Hx:   Past Surgical History:   Procedure Laterality Date     GRAFT FLAP PEDICLE TRAM DELAY PROCEDURE Left 6/18/2020     "Procedure: Debridement of sternal wound and left major pectoral flep;  Surgeon: MARCO Serna MD;  Location: [facility] OR     INCISION AND DRAINAGE CHEST WASHOUT, COMBINED N/A 6/12/2020    Procedure: INCISION AND DRAINAGE, WOUND, CHEST, WITH IRRIGATION and wound vac change;  Surgeon: Mark Mckinney MD;  Location: [facility] OR     REPAIR CHEST WALL N/A 6/18/2020    Procedure: Chest wall reconstruction;  Surgeon: MARCO Serna MD;  Location: [facility] OR       Allergies:    Allergies   Allergen Reactions     Atorvastatin      Dust Mites      Fluorescein Nausea and Vomiting       Medications:   Current Outpatient Medications   Medication     acetaminophen (TYLENOL) 325 MG tablet     albuterol (PROAIR HFA/PROVENTIL HFA/VENTOLIN HFA) 108 (90 Base) MCG/ACT inhaler     alfuzosin ER (UROXATRAL) 10 MG 24 hr tablet     alpha-lipoic acid 600 MG capsule     aspirin (ASA) 325 MG EC tablet     cyclobenzaprine (FLEXERIL) 10 MG tablet     empagliflozin (JARDIANCE) 10 MG TABS tablet     finasteride (PROSCAR) 5 MG tablet     fish oil-omega-3 fatty acids 1000 MG capsule     insulin glargine (LANTUS PEN) 100 UNIT/ML pen     lisinopril (ZESTRIL) 2.5 MG tablet     magnesium 250 MG tablet     metFORMIN (GLUCOPHAGE) 1000 MG tablet     metoprolol succinate ER (TOPROL-XL) 50 MG 24 hr tablet     nitroGLYcerin (NITROSTAT) 0.4 MG sublingual tablet     ondansetron (ZOFRAN) 4 MG tablet     rosuvastatin (CRESTOR) 20 MG tablet     terbinafine (LAMISIL) 250 MG tablet     vitamin D3 (CHOLECALCIFEROL) 2000 units (50 mcg) tablet     No current facility-administered medications for this encounter.          Objective:  Vitals:  /72   Pulse 65   Temp 96.4  F (35.8  C) (Temporal)   Ht 1.803 m (5' 11\")   Wt 95.3 kg (210 lb)   BMI 29.29 kg/m      A1C: 9.1 (1/2021)    General:  Patient is alert and orientated.  NAD     Dermatologic: Full-thickness ulceration to the plantar aspect of the left fifth metatarsal head.  After debridement it " measures 2.0 cm x 2.5 cm x 0.2 cm.  Base of the wound is granular.  Fat layer is exposed but no exposure of muscle or bone.  Moderate amounts of clear serous drainage noted.  No purulent drainage or redness noted.    .   Wound (used by OP [facility] only) 06/08/21 0800 Left (Active)   Dressing Appearance moist drainage 06/25/21 0843   Length (cm) 1 06/25/21 0843   Width (cm) 1.4 06/25/21 0843   Depth (cm) 0.3 06/25/21 0843   Wound (cm^2) 1.4 cm^2 06/25/21 0843   Wound Volume (cm^3) 0.42 cm^3 06/25/21 0843   Wound healing % -185.71 06/25/21 0843   Undermining [Depth (cm)/Location] 7to5/ dep 0.7 06/25/21 0843   Drainage Characteristics/Odor serosanguineous 06/25/21 0843   Drainage Amount moderate 06/25/21 0843          Vascular: DP & PT pulses are faintly palpable bilaterally.  No significant edema or varicosities noted.  CFT and skin temperature is normal to both lower extremities.     Neurologic: Lower extremity sensation is absent to feet..     Musculoskeletal: Patient is ambulatory without assistive device or brace.  No gross ankle deformity noted.  No foot or ankle joint effusion is noted.    Assessment:    Diabetic ulcer of left midfoot associated with type 2 diabetes mellitus, with fat layer exposed (H)  Diabetic polyneuropathy associated with type 2 diabetes mellitus (H)    Plan: At this time the wound was debrided.  We talked about good sugar control otherwise healing will be slow.  We talked about offloading.  At this time we will do a total contact cast.  Please see procedure below.  We will put endoform and Mepilex to the ulceration underneath the total contact cast.  He will follow-up in 4 days to have the cast taken off and the foot reassessed and then he will follow-up with me in a week.  All questions were answered to patient satisfaction and he will call further questions or concerns.    Procedure:  After verbal consent, per protocol lidocaine was applied to the wound. Excisional debridement was performed on  ulcer.   #15 blade was used to debride ulcer down to and including subcutaneous tissue. Bleeding controlled with light pressure.  No drainage noted.   < 20sq cm debrided.  Dry dressing applied to foot.  Patient tolerated procedure well.    PRocedure 2: After verbal consent a total contact cast was then placed on the left foot according to the instructions for use manual.  Patient tolerated procedure well.    Mirian Cline DPM, Podiatry/Foot and Ankle Surgery

## 2021-06-25 NOTE — DISCHARGE INSTRUCTIONS
[facility]  [address] [address]    Call us at [phone] if you have any questions about your wounds, have redness or swelling around your wound, have a fever of 101 or greater or if you have any other problems or concerns. We answer the phone Monday through Friday 8 am to 4 pm, please leave a message as we check the voicemail frequently throughout the day.     Nabil Cheung      1960    Key to healing your wound is to keeping your blood sugars under good control.  Medications/supplements to aid in healing:  Vitamin D3 10,000 iu per day  Ping C 1,000 mg take twice daily  Vitamin B Complex with zinc take 1 tablet daily  Vitamin B 12 1000 mcg daily  ----->Order from [facility] Hesperidin Diosmin 1,000 mg tablet twice daily - Vein Formula- take life long    Wound care recommendations to Left lateral Foot  Apply Endoform antimicrobial then mepilex to wound bed followed by spandage. Then apply total contact cast (TCC)    When you sit raise your ankle above your hips to promote wound healing.  You need to stay off your foot at all times to help heal your wound.  You should wear your offloading shoe at all times.  Use Knee roller to keep your weight off your foot.       MICHI Torres.P.M. June 25, 2021      If you had a positive experience please indicate on your patient satisfaction survey form that [facility] will be sending you.    If you have any billing related questions please call the [facility] Business office at [phone]. The clinic staff does not handle billing related matters.

## 2021-06-29 ENCOUNTER — HOSPITAL ENCOUNTER (OUTPATIENT)
Dept: WOUND CARE | Facility: CLINIC | Age: 61
Discharge: HOME | End: 2021-06-29
Attending: PODIATRIST | Admitting: PODIATRIST
Payer: COMMERCIAL

## 2021-06-29 VITALS
SYSTOLIC BLOOD PRESSURE: 148 MMHG | RESPIRATION RATE: 18 BRPM | DIASTOLIC BLOOD PRESSURE: 78 MMHG | HEART RATE: 65 BPM | TEMPERATURE: 96.8 F

## 2021-06-29 DIAGNOSIS — L97.422 DIABETIC ULCER OF LEFT MIDFOOT ASSOCIATED WITH TYPE 2 DIABETES MELLITUS, WITH FAT LAYER EXPOSED (H): ICD-10-CM

## 2021-06-29 DIAGNOSIS — E11.621 DIABETIC ULCER OF LEFT MIDFOOT ASSOCIATED WITH TYPE 2 DIABETES MELLITUS, WITH FAT LAYER EXPOSED (H): ICD-10-CM

## 2021-06-29 PROCEDURE — 11042 DBRDMT SUBQ TIS 1ST 20SQCM/<: CPT | Performed by: PHYSICIAN ASSISTANT

## 2021-06-29 NOTE — PROGRESS NOTES
[facility]    ASSESSMENT:   1. (E11.621,  L97.422) Diabetic ulcer of left midfoot associated with type 2 diabetes mellitus, with fat layer exposed (H)    PLAN/DISCUSSION:   1. Wound care plan: continue TCC with Endoform AM as primary dressing  2. Nutrition: vit D 5kIU/day, high protein diet    HISTORY OF PRESENT ILLNESS:   Nabil Cheung is a 61 year old male with poorly controlled diabetes who presents today for a left lateral foot ulcer. This is the first time I am meeting the patient but he has been seen by my colleagues Reggie Galan and Marlyn. Working with endo on better glucose control, he visited with endocrinology today and had his medications adjusted. Had a TCC cast put on 4 days ago and has done well with this. He denies any pain or discomfort.     Treatment Course:  6/25: 1st TCC placed  6/29: Returns for cast change, 2nd TCC placed    VITALS: BP (!) 148/78   Pulse 65   Temp 96.8  F (36  C) (Temporal)   Resp 18      PHYSICAL EXAM:  GENERAL: Patient is alert and oriented and in no acute distress  INTEGUMENTARY:   Wound (used by OP [facility] only) 06/08/21 0800 Left (Active)   Thickness/Stage full thickness 06/29/21 1239   Dressing Appearance moist drainage 06/29/21 1239   Periwound macerated 06/29/21 1239   Periwound Temperature warm 06/29/21 1239   Periwound Skin Turgor soft 06/29/21 1239   Length (cm) 1.5 06/29/21 1239   Width (cm) 1.5 06/29/21 1239   Depth (cm) 0.2 06/29/21 1239   Wound (cm^2) 2.25 cm^2 06/29/21 1239   Wound Volume (cm^3) 0.45 cm^3 06/29/21 1239   Wound healing % -359.18 06/29/21 1239   Undermining [Depth (cm)/Location] 12-2 O'clock/0.2cm 06/29/21 1239   Drainage Amount moderate 06/29/21 1239           PROCEDURE: 4% topical lidocaine was applied to the wound by nursing staff. Patient was determined to be capable of making their own medical decisions and informed consent was obtained. Using a 15 blade a surgical debridement was performed down to and including subcutaneous  tissue of <20 cm2. Hemostasis was achieved with pressure. The patient tolerated the procedure well.      NELLY NAJERA PA-C

## 2021-07-02 ENCOUNTER — HOSPITAL ENCOUNTER (OUTPATIENT)
Dept: WOUND CARE | Facility: CLINIC | Age: 61
Discharge: HOME | End: 2021-07-02
Attending: PODIATRIST | Admitting: PODIATRIST
Payer: COMMERCIAL

## 2021-07-02 VITALS — DIASTOLIC BLOOD PRESSURE: 76 MMHG | SYSTOLIC BLOOD PRESSURE: 143 MMHG | TEMPERATURE: 95.3 F | HEART RATE: 55 BPM

## 2021-07-02 DIAGNOSIS — E11.621 DIABETIC ULCER OF LEFT MIDFOOT ASSOCIATED WITH TYPE 2 DIABETES MELLITUS, WITH FAT LAYER EXPOSED (H): ICD-10-CM

## 2021-07-02 DIAGNOSIS — E11.42 DIABETIC POLYNEUROPATHY ASSOCIATED WITH TYPE 2 DIABETES MELLITUS (H): ICD-10-CM

## 2021-07-02 DIAGNOSIS — L97.422 DIABETIC ULCER OF LEFT MIDFOOT ASSOCIATED WITH TYPE 2 DIABETES MELLITUS, WITH FAT LAYER EXPOSED (H): ICD-10-CM

## 2021-07-02 PROCEDURE — 11042 DBRDMT SUBQ TIS 1ST 20SQCM/<: CPT | Performed by: PODIATRIST

## 2021-07-02 PROCEDURE — 11042 DBRDMT SUBQ TIS 1ST 20SQCM/<: CPT

## 2021-07-02 NOTE — DISCHARGE INSTRUCTIONS
[facility]  [address] [address]    Call us at [phone] if you have any questions about your wounds, have redness or swelling around your wound, have a fever of 101 or greater or if you have any other problems or concerns. We answer the phone Monday through Friday 8 am to 4 pm, please leave a message as we check the voicemail frequently throughout the day.     Nabil Cheung      1960    Key to healing your wound is to keeping your blood sugars under good control.    Medications/supplements to aid in healing:  Vitamin D3 10,000 iu per day  Ping C 1,000 mg take twice daily  Vitamin B Complex with zinc take 1 tablet daily  Vitamin B 12 1000 mcg daily  ----->Order from [facility] Hesperidin Diosmin 1,000 mg tablet twice daily - Vein Formula- take life long    Wound care recommendations to Left lateral Foot  Apply Endoform antimicrobial then mepilex to wound bed followed by spandage. Then apply total contact cast (TCC)    When you sit raise your ankle above your hips to promote wound healing.  You need to stay off your foot at all times to help heal your wound.  You should wear your offloading shoe at all times.  Use Knee roller to keep your weight off your foot.     MICHI Torres.P.M.. July 2, 2021      If you had a positive experience please indicate on your patient satisfaction survey form that [facility] will be sending you.    If you have any billing related questions please call the [facility] Business office at [phone]. The clinic staff does not handle billing related matters.

## 2021-07-02 NOTE — ADDENDUM NOTE
Encounter addended by: Vonda Migeul RN on: 7/2/2021 10:03 AM   Actions taken: Flowsheet accepted, Charge Capture section accepted

## 2021-07-02 NOTE — PROGRESS NOTES
[facility]ound [facility] [place] Progress Note    Subject: Patient was seen at wound center. Here for follow up on left foot ulcer. Has been in total contact cast.  Denies fever, chills, nausa. Notes the cast felt a little tight at times las few days.     PMH: No past medical history on file.    Social Hx:   Social History     Socioeconomic History     Marital status: Single     Spouse name: Not on file     Number of children: Not on file     Years of education: Not on file     Highest education level: Not on file   Occupational History     Not on file   Social Needs     Financial resource strain: Not on file     Food insecurity     Worry: Not on file     Inability: Not on file     Transportation needs     Medical: Not on file     Non-medical: Not on file   Tobacco Use     Smoking status: Never Smoker     Smokeless tobacco: Never Used   Substance and Sexual Activity     Alcohol use: Yes     Drug use: No     Sexual activity: Not on file   Lifestyle     Physical activity     Days per week: Not on file     Minutes per session: Not on file     Stress: Not on file   Relationships     Social connections     Talks on phone: Not on file     Gets together: Not on file     Attends [employer] service: Not on file     Active member of club or organization: Not on file     Attends meetings of clubs or organizations: Not on file     Relationship status: Not on file     Intimate partner violence     Fear of current or ex partner: Not on file     Emotionally abused: Not on file     Physically abused: Not on file     Forced sexual activity: Not on file   Other Topics Concern     Not on file   Social History Narrative    6/29/17 - Pt was interested in getting help filling out [employer] application online to receive medicaid. CHW gave him the list of information he needed to complete online application. He stated he had a tax extension for 2016 so he did not have tax forms that the application required. He was going to work on the  application with tax information from 2015 later tonight. AM        7/27/17:    Pt wanted to get more information about gyms that are low-cost within the [facility] area. Pt's asked for a gym with a membership fee of $10-$20 (max), that also had a walking track and a hot tub/sauna. We told him that options that fit these requirements he mentioned were very limited. He then stated that he heard that the [facility] offered a $5 fee for track-usage only, however, we could not call [facility] since it was closed. We suggested to the patient to call tomorrow and also mentioned there are gyms that are low-cost nearby, however, might not have a track or hot tub/sauna. We also suggested that he could walk around a lake would also be a cheap-alternative. -HB and [place]        8/28/17:    Pt was here for a med refill. We asked him about his gym membership and access based on CHW's previous encounter with him and he said he tried to walk outside more this summer, but still did not find an affordable option. He did not have need for any other services at the time.     - AA and KT         9/28/17:    Pt had questions about free/low cost (under $10/month) walking tracks.  Found a website to walk through with the patient and shared different resources. - AB and HB        7/19/18: Pt. Was not seen tonight.- ZR, KP       Surgical Hx:   Past Surgical History:   Procedure Laterality Date     GRAFT FLAP PEDICLE TRAM DELAY PROCEDURE Left 6/18/2020    Procedure: Debridement of sternal wound and left major pectoral flep;  Surgeon: MARCO Serna MD;  [facility]     INCISION AND DRAINAGE CHEST WASHOUT, COMBINED N/A 6/12/2020    Procedure: INCISION AND DRAINAGE, WOUND, CHEST, WITH IRRIGATION and wound vac change;  Surgeon: Mark Mckinney MD;  [facility]     REPAIR CHEST WALL N/A 6/18/2020    Procedure: Chest wall reconstruction;  Surgeon: MARCO Serna MD;  Location: [facility] OR       Allergies:    Allergies   Allergen  Reactions     Atorvastatin      Dust Mites      Fluorescein Nausea and Vomiting       Medications:   Current Outpatient Medications   Medication     acetaminophen (TYLENOL) 325 MG tablet     albuterol (PROAIR HFA/PROVENTIL HFA/VENTOLIN HFA) 108 (90 Base) MCG/ACT inhaler     alfuzosin ER (UROXATRAL) 10 MG 24 hr tablet     alpha-lipoic acid 600 MG capsule     aspirin (ASA) 325 MG EC tablet     cyclobenzaprine (FLEXERIL) 10 MG tablet     empagliflozin (JARDIANCE) 25 MG TABS tablet     finasteride (PROSCAR) 5 MG tablet     fish oil-omega-3 fatty acids 1000 MG capsule     insulin glargine (LANTUS PEN) 100 UNIT/ML pen     lisinopril (ZESTRIL) 2.5 MG tablet     magnesium 250 MG tablet     metFORMIN (GLUCOPHAGE) 1000 MG tablet     metoprolol succinate ER (TOPROL-XL) 50 MG 24 hr tablet     nitroGLYcerin (NITROSTAT) 0.4 MG sublingual tablet     ondansetron (ZOFRAN) 4 MG tablet     rosuvastatin (CRESTOR) 20 MG tablet     sitagliptin (JANUVIA) 50 MG tablet     terbinafine (LAMISIL) 250 MG tablet     vitamin D3 (CHOLECALCIFEROL) 2000 units (50 mcg) tablet     No current facility-administered medications for this encounter.        Objective:  Vitals:  BP (!) 143/76   Pulse 55   Temp 95.3  F (35.2  C) (Temporal)     A1C: 9.1 (1/2021)     General:  Patient is alert and orientated.  NAD      Dermatologic: Full-thickness ulceration to the plantar aspect of the left fifth metatarsal head.  measurements below after debridement.   Base of the wound is granular.  Fat layer is exposed but no exposure of muscle or bone.  Moderate amounts of clear serous drainage noted.  No purulent drainage or redness noted.    Wound (used by OP [facility] only) 06/08/21 0800 Left (Active)   Dressing Appearance moist drainage 07/02/21 0901   Length (cm) 1.3 07/02/21 0901   Width (cm) 1.1 07/02/21 0901   Depth (cm) 1.1 07/02/21 0901   Wound (cm^2) 1.43 cm^2 07/02/21 0901   Wound Volume (cm^3) 1.57 cm^3 07/02/21 0901   Wound healing % -191.84 07/02/21 0901    Drainage Characteristics/Odor serosanguineous 07/02/21 0901   Drainage Amount moderate 07/02/21 0901       Vascular: DP & PT pulses are faintly palpable bilaterally.  No significant edema or varicosities noted.  CFT and skin temperature is normal to both lower extremities.     Neurologic: Lower extremity sensation is absent to feet..     Musculoskeletal: Patient is ambulatory without assistive device or brace.  No gross ankle deformity noted.  No foot or ankle joint effusion is noted.     Assessment:    Diabetic ulcer of left midfoot associated with type 2 diabetes mellitus, with fat layer exposed (H)  Diabetic polyneuropathy associated with type 2 diabetes mellitus (H)     Plan: At this time the wound was debrided.  WE will continue do a total contact casting.  Please see procedure below.  We will put endoform and Mepilex to the ulceration underneath the total contact cast.  He will follow-up in 4 days to have the cast taken off and the foot reassessed and then he will follow-up with me in a week.  All questions were answered to patient satisfaction and he will call further questions or concerns.     Procedure:  After verbal consent, per protocol lidocaine was applied to the wound. Excisional debridement was performed on ulcer.   #15 blade was used to debride ulcer down to and including subcutaneous tissue. Bleeding controlled with light pressure.  No drainage noted.   < 20sq cm debrided.  Dry dressing applied to foot.  Patient tolerated procedure well.     PRocedure 2: After verbal consent a total contact cast was then placed on the left foot according to the instructions for use manual.  Patient tolerated procedure well.    Mirian Cline DPM, Podiatry/Foot and Ankle Surgery

## 2021-07-09 ENCOUNTER — HOSPITAL ENCOUNTER (OUTPATIENT)
Dept: WOUND CARE | Facility: CLINIC | Age: 61
Discharge: HOME | End: 2021-07-09
Attending: PODIATRIST | Admitting: PODIATRIST
Payer: COMMERCIAL

## 2021-07-09 VITALS
SYSTOLIC BLOOD PRESSURE: 156 MMHG | TEMPERATURE: 96.1 F | HEART RATE: 54 BPM | DIASTOLIC BLOOD PRESSURE: 76 MMHG | RESPIRATION RATE: 18 BRPM

## 2021-07-09 DIAGNOSIS — L97.422 DIABETIC ULCER OF LEFT MIDFOOT ASSOCIATED WITH TYPE 2 DIABETES MELLITUS, WITH FAT LAYER EXPOSED (H): ICD-10-CM

## 2021-07-09 DIAGNOSIS — E11.42 DIABETIC POLYNEUROPATHY ASSOCIATED WITH TYPE 2 DIABETES MELLITUS (H): ICD-10-CM

## 2021-07-09 DIAGNOSIS — E11.621 DIABETIC ULCER OF LEFT MIDFOOT ASSOCIATED WITH TYPE 2 DIABETES MELLITUS, WITH FAT LAYER EXPOSED (H): ICD-10-CM

## 2021-07-09 PROCEDURE — 29445 APPL RIGID TOT CNTC LEG CAST: CPT | Performed by: PODIATRIST

## 2021-07-09 PROCEDURE — 17250 CHEM CAUT OF GRANLTJ TISSUE: CPT | Mod: 51 | Performed by: PODIATRIST

## 2021-07-09 PROCEDURE — 29445 APPL RIGID TOT CNTC LEG CAST: CPT | Mod: LT

## 2021-07-09 PROCEDURE — 17250 CHEM CAUT OF GRANLTJ TISSUE: CPT

## 2021-07-09 NOTE — PROGRESS NOTES
[facility] [place] Progress Note    Subject: Patient was seen at wound center. Here for follow up on left foot ulcer. Has been in total contact cast.  Denies fever, chills, nausa.    PMH: No past medical history on file.    Social Hx:   Social History     Socioeconomic History     Marital status: Single     Spouse name: Not on file     Number of children: Not on file     Years of education: Not on file     Highest education level: Not on file   Occupational History     Not on file   Social Needs     Financial resource strain: Not on file     Food insecurity     Worry: Not on file     Inability: Not on file     Transportation needs     Medical: Not on file     Non-medical: Not on file   Tobacco Use     Smoking status: Never Smoker     Smokeless tobacco: Never Used   Substance and Sexual Activity     Alcohol use: Yes     Drug use: No     Sexual activity: Not on file   Lifestyle     Physical activity     Days per week: Not on file     Minutes per session: Not on file     Stress: Not on file   Relationships     Social connections     Talks on phone: Not on file     Gets together: Not on file     Attends Rastafari service: Not on file     Active member of club or organization: Not on file     Attends meetings of clubs or organizations: Not on file     Relationship status: Not on file     Intimate partner violence     Fear of current or ex partner: Not on file     Emotionally abused: Not on file     Physically abused: Not on file     Forced sexual activity: Not on file   Other Topics Concern     Not on file   Social History Narrative    6/29/17 - Pt was interested in getting help filling out [employer] application online to receive medicaid. CHW gave him the list of information he needed to complete online application. He stated he had a tax extension for 2016 so he did not have tax forms that the application required. He was going to work on the application with tax information from 2015 later tonight. AM         7/27/17:    Pt wanted to get more information about gyms that are low-cost within the [facility]. Pt's asked for a gym with a membership fee of $10-$20 (max), that also had a walking track and a hot tub/sauna. We told him that options that fit these requirements he mentioned were very limited. He then stated that he heard that the [facility] offered a $5 fee for track-usage only, however, we could not call [facility] since it was closed. We suggested to the patient to call tomorrow and also mentioned there are gyms that are low-cost nearby, however, might not have a track or hot tub/sauna. We also suggested that he could walk around a lake would also be a cheap-alternative. -HB and NV        8/28/17:    Pt was here for a med refill. We asked him about his gym membership and access based on CHW's previous encounter with him and he said he tried to walk outside more this summer, but still did not find an affordable option. He did not have need for any other services at the time.     - AA and KT         9/28/17:    Pt had questions about free/low cost (under $10/month) walking tracks.  Found a website to walk through with the patient and shared different resources. - AB and HB        7/19/18: Pt. Was not seen tonight.- ZR, KP       Surgical Hx:   Past Surgical History:   Procedure Laterality Date     GRAFT FLAP PEDICLE TRAM DELAY PROCEDURE Left 6/18/2020    Procedure: Debridement of sternal wound and left major pectoral flep;  Surgeon: MARCO Serna MD;  Location: [facility] OR     INCISION AND DRAINAGE CHEST WASHOUT, COMBINED N/A 6/12/2020    Procedure: INCISION AND DRAINAGE, WOUND, CHEST, WITH IRRIGATION and wound vac change;  Surgeon: Mark Mckinney MD;  Location: [facility] OR     REPAIR CHEST WALL N/A 6/18/2020    Procedure: Chest wall reconstruction;  Surgeon: MARCO Serna MD;  Location: [facility] OR       Allergies:    Allergies   Allergen Reactions     Atorvastatin      Dust Mites      Fluorescein Nausea  and Vomiting       Medications:   Current Outpatient Medications   Medication     acetaminophen (TYLENOL) 325 MG tablet     albuterol (PROAIR HFA/PROVENTIL HFA/VENTOLIN HFA) 108 (90 Base) MCG/ACT inhaler     alfuzosin ER (UROXATRAL) 10 MG 24 hr tablet     alpha-lipoic acid 600 MG capsule     aspirin (ASA) 325 MG EC tablet     cyclobenzaprine (FLEXERIL) 10 MG tablet     empagliflozin (JARDIANCE) 25 MG TABS tablet     finasteride (PROSCAR) 5 MG tablet     fish oil-omega-3 fatty acids 1000 MG capsule     insulin glargine (LANTUS PEN) 100 UNIT/ML pen     lisinopril (ZESTRIL) 2.5 MG tablet     magnesium 250 MG tablet     metFORMIN (GLUCOPHAGE) 1000 MG tablet     metoprolol succinate ER (TOPROL-XL) 50 MG 24 hr tablet     nitroGLYcerin (NITROSTAT) 0.4 MG sublingual tablet     ondansetron (ZOFRAN) 4 MG tablet     rosuvastatin (CRESTOR) 20 MG tablet     sitagliptin (JANUVIA) 50 MG tablet     terbinafine (LAMISIL) 250 MG tablet     vitamin D3 (CHOLECALCIFEROL) 2000 units (50 mcg) tablet     No current facility-administered medications for this encounter.          Objective:  Vitals:  BP (!) 156/76   Pulse 54   Temp 96.1  F (35.6  C) (Temporal)   Resp 18     A1C: 9.1 (1/2021)     General:  Patient is alert and orientated.  NAD      Dermatologic: Full-thickness ulceration to the plantar aspect of the left fifth metatarsal head.  measurements below after debridement.   Base of the wound is granular.  Fat layer is exposed but no exposure of muscle or bone.  Moderate amounts of clear serous drainage noted.  No purulent drainage or redness noted.  .   Wound (used by OP [facility] only) 06/08/21 0800 Left (Active)   Dressing Appearance moist drainage 07/09/21 0900   Length (cm) 1 07/09/21 0900   Width (cm) 1.1 07/09/21 0900   Depth (cm) 0.2 07/09/21 0900   Wound (cm^2) 1.1 cm^2 07/09/21 0900   Wound Volume (cm^3) 0.22 cm^3 07/09/21 0900   Wound healing % -124.49 07/09/21 0900   Drainage Characteristics/Odor serosanguineous 07/09/21 0900    Drainage Amount moderate 07/09/21 0900     Vascular: DP & PT pulses are faintly palpable bilaterally.  No significant edema or varicosities noted.  CFT and skin temperature is normal to both lower extremities.     Neurologic: Lower extremity sensation is absent to feet..     Musculoskeletal: Patient is ambulatory without assistive device or brace.  No gross ankle deformity noted.  No foot or ankle joint effusion is noted.     Assessment:    Diabetic ulcer of left midfoot associated with type 2 diabetes mellitus, with fat layer exposed (H)  Diabetic polyneuropathy associated with type 2 diabetes mellitus (H)     Plan: At this time the wound was debrided.  WE will continue do a total contact casting.  Please see procedure below.  We will put fibracol and Mepilex to the ulceration underneath the total contact cast.  He will follow-up in 1 week to have the cast taken off and the foot reassessed and then he will follow-up with me in a week.  All questions were answered to patient satisfaction and he will call further questions or concerns.     Procedure:  After verbal consent, per protocol lidocaine was applied to the wound. Excisional debridement was performed on ulcer.   Silver nitrate stick was used to debride, by a chemical cauterization, the ulcer down to and including subcutaneous tissue.  No drainage noted.   < 20sq cm debrided.  Dry dressing applied to foot.  Patient tolerated procedure well.     PRocedure 2: After verbal consent a total contact cast was then placed on the left foot according to the instructions for use manual.  Patient tolerated procedure well.      Mirian Cline DPM, Podiatry/Foot and Ankle Surgery

## 2021-07-09 NOTE — DISCHARGE INSTRUCTIONS
[facility]  [address] [address]    Call us at [phone] if you have any questions about your wounds, have redness or swelling around your wound, have a fever of 101 or greater or if you have any other problems or concerns. We answer the phone Monday through Friday 8 am to 4 pm, please leave a message as we check the voicemail frequently throughout the day.     Nabil Cheung      1960    Key to healing your wound is to keeping your blood sugars under good control.    Medications/supplements to aid in healing:  Vitamin D3 10,000 iu per day  Ping C 1,000 mg take twice daily  Vitamin B Complex with zinc take 1 tablet daily  Vitamin B 12 1000 mcg daily  ----->Order from [facility] Hesperidin Diosmin 1,000 mg tablet twice daily - Vein Formula- take life long  Wound care recommendations to Left lateral Foot  Apply Endoform antimicrobial then mepilex to wound bed followed by spandage.  Then apply total contact cast (TCC)  When you sit raise your ankle above your hips to promote wound healing.  You need to stay off your foot at all times to help heal your wound.  You should wear your offloading shoe at all times.  Use Knee roller to keep your weight off your foot.       MICHI Torres.P.M.. July 9, 2021      If you had a positive experience please indicate on your patient satisfaction survey form that [facility] will be sending you.    If you have any billing related questions please call the [facility] Business office at [phone]. The clinic staff does not handle billing related matters.

## 2021-07-16 ENCOUNTER — HOSPITAL ENCOUNTER (OUTPATIENT)
Dept: WOUND CARE | Facility: CLINIC | Age: 61
Discharge: HOME | End: 2021-07-16
Attending: PODIATRIST | Admitting: PODIATRIST
Payer: COMMERCIAL

## 2021-07-16 VITALS — HEART RATE: 64 BPM | SYSTOLIC BLOOD PRESSURE: 146 MMHG | TEMPERATURE: 96.9 F | DIASTOLIC BLOOD PRESSURE: 74 MMHG

## 2021-07-16 DIAGNOSIS — E11.42 DIABETIC POLYNEUROPATHY ASSOCIATED WITH TYPE 2 DIABETES MELLITUS (H): ICD-10-CM

## 2021-07-16 DIAGNOSIS — L97.422 DIABETIC ULCER OF LEFT MIDFOOT ASSOCIATED WITH TYPE 2 DIABETES MELLITUS, WITH FAT LAYER EXPOSED (H): ICD-10-CM

## 2021-07-16 DIAGNOSIS — E11.621 DIABETIC ULCER OF LEFT MIDFOOT ASSOCIATED WITH TYPE 2 DIABETES MELLITUS, WITH FAT LAYER EXPOSED (H): ICD-10-CM

## 2021-07-16 PROCEDURE — 11042 DBRDMT SUBQ TIS 1ST 20SQCM/<: CPT

## 2021-07-16 PROCEDURE — 17250 CHEM CAUT OF GRANLTJ TISSUE: CPT | Performed by: PODIATRIST

## 2021-07-16 PROCEDURE — 11042 DBRDMT SUBQ TIS 1ST 20SQCM/<: CPT | Performed by: PODIATRIST

## 2021-07-16 NOTE — PROGRESS NOTES
[facility] [place] Progress Note    Subject: Patient was seen at wound center.  Presents for follow-up on his fourth total contact cast.  He notes that a type in his laundry room broke and water started shooting out of it yesterday.  His foot in cast did get wet.  He denies fever, nausea, vomiting.  No other concerns today.    PMH: No past medical history on file.    Social Hx:   Social History     Socioeconomic History     Marital status: Single     Spouse name: Not on file     Number of children: Not on file     Years of education: Not on file     Highest education level: Not on file   Occupational History     Not on file   Tobacco Use     Smoking status: Never Smoker     Smokeless tobacco: Never Used   Substance and Sexual Activity     Alcohol use: Yes     Drug use: No     Sexual activity: Not on file   Other Topics Concern     Not on file   Social History Narrative    6/29/17 - Pt was interested in getting help filling out [employer] application online to receive medicaid. CHW gave him the list of information he needed to complete online application. He stated he had a tax extension for 2016 so he did not have tax forms that the application required. He was going to work on the application with tax information from 2015 later tonight. AM        7/27/17:    Pt wanted to get more information about gyms that are low-cost within the [facility]. Pt's asked for a gym with a membership fee of $10-$20 (max), that also had a walking track and a hot tub/sauna. We told him that options that fit these requirements he mentioned were very limited. He then stated that he heard that the [facility] offered a $5 fee for track-usage only, however, we could not call [facility] since it was closed. We suggested to the patient to call tomorrow and also mentioned there are gyms that are low-cost nearby, however, might not have a track or hot tub/sauna. We also suggested that he could walk around a lake would also be a  cheap-alternative. -HB and NV        8/28/17:    Pt was here for a med refill. We asked him about his gym membership and access based on CHW's previous encounter with him and he said he tried to walk outside more this summer, but still did not find an affordable option. He did not have need for any other services at the time.     - AA and KT         9/28/17:    Pt had questions about free/low cost (under $10/month) walking tracks.  Found a website to walk through with the patient and shared different resources. - AB and HB        7/19/18: Pt. Was not seen tonight.- ZR, KP     Social Determinants of Health     Financial Resource Strain:      Difficulty of Paying Living Expenses:    Food Insecurity:      Worried About Running Out of Food in the Last Year:      Ran Out of Food in the Last Year:    Transportation Needs:      Lack of Transportation (Medical):      Lack of Transportation (Non-Medical):    Physical Activity:      Days of Exercise per Week:      Minutes of Exercise per Session:    Stress:      Feeling of Stress :    Social Connections:      Frequency of Communication with Friends and Family:      Frequency of Social Gatherings with Friends and Family:      Attends Pentecostal Services:      Active Member of Clubs or Organizations:      Attends Club or Organization Meetings:      Marital Status:    Intimate Partner Violence:      Fear of Current or Ex-Partner:      Emotionally Abused:      Physically Abused:      Sexually Abused:        Surgical Hx:   Past Surgical History:   Procedure Laterality Date     GRAFT FLAP PEDICLE TRAM DELAY PROCEDURE Left 6/18/2020    Procedure: Debridement of sternal wound and left major pectoral flep;  Surgeon: MARCO Serna MD;  [facility]     INCISION AND DRAINAGE CHEST WASHOUT, COMBINED N/A 6/12/2020    Procedure: INCISION AND DRAINAGE, WOUND, CHEST, WITH IRRIGATION and wound vac change;  Surgeon: Mark Mckinney MD;  Location: [facility] OR     REPAIR CHEST WALL  N/A 6/18/2020    Procedure: Chest wall reconstruction;  Surgeon: MARCO Serna MD;  Location: [facility] OR       Allergies:    Allergies   Allergen Reactions     Atorvastatin      Dust Mites      Fluorescein Nausea and Vomiting       Medications:   Current Outpatient Medications   Medication     acetaminophen (TYLENOL) 325 MG tablet     albuterol (PROAIR HFA/PROVENTIL HFA/VENTOLIN HFA) 108 (90 Base) MCG/ACT inhaler     alfuzosin ER (UROXATRAL) 10 MG 24 hr tablet     alpha-lipoic acid 600 MG capsule     aspirin (ASA) 325 MG EC tablet     cyclobenzaprine (FLEXERIL) 10 MG tablet     empagliflozin (JARDIANCE) 25 MG TABS tablet     finasteride (PROSCAR) 5 MG tablet     fish oil-omega-3 fatty acids 1000 MG capsule     insulin glargine (LANTUS PEN) 100 UNIT/ML pen     lisinopril (ZESTRIL) 2.5 MG tablet     magnesium 250 MG tablet     metFORMIN (GLUCOPHAGE) 1000 MG tablet     metoprolol succinate ER (TOPROL-XL) 50 MG 24 hr tablet     nitroGLYcerin (NITROSTAT) 0.4 MG sublingual tablet     ondansetron (ZOFRAN) 4 MG tablet     rosuvastatin (CRESTOR) 20 MG tablet     sitagliptin (JANUVIA) 50 MG tablet     terbinafine (LAMISIL) 250 MG tablet     vitamin D3 (CHOLECALCIFEROL) 2000 units (50 mcg) tablet     No current facility-administered medications for this encounter.         Objective:  Vitals:  BP (!) 146/74   Pulse 64   Temp 96.9  F (36.1  C) (Temporal)     A1C: 9.1 (1/2021)     General:  Patient is alert and orientated.  NAD      Dermatologic: Full-thickness ulceration to the plantar aspect of the left fifth metatarsal head.  measurements below after debridement.   Base of the wound is granular.  Fat layer is exposed but no exposure of muscle or bone.  Moderate amounts of clear serous drainage noted.  No purulent drainage or redness noted.  .   Wound (used by OP [facility] only) 06/08/21 0800 Left (Active)   Dressing Appearance moist drainage 07/16/21 1040   Length (cm) 0.8 07/16/21 1040   Width (cm) 0.8 07/16/21 1040   Depth  (cm) 0.2 07/16/21 1040   Wound (cm^2) 0.64 cm^2 07/16/21 1040   Wound Volume (cm^3) 0.13 cm^3 07/16/21 1040   Wound healing % -30.61 07/16/21 1040   Drainage Characteristics/Odor serosanguineous 07/16/21 1040   Drainage Amount moderate 07/16/21 1040     Vascular: DP & PT pulses are faintly palpable bilaterally.  No significant edema or varicosities noted.  CFT and skin temperature is normal to both lower extremities.     Neurologic: Lower extremity sensation is absent to feet..     Musculoskeletal: Patient is ambulatory without assistive device or brace.  No gross ankle deformity noted.  No foot or ankle joint effusion is noted.     Assessment:    Diabetic ulcer of left midfoot associated with type 2 diabetes mellitus, with fat layer exposed (H)  Diabetic polyneuropathy associated with type 2 diabetes mellitus (H)    Plan: Due to the increased maceration and wetness around the wound we will hold off on the total contact cast at this time.  We will have him do daily dressing changes with dry gauze and iodine.  We will reassess in 1 week to see if the cast should be replaced.  All questions were answered to patient satisfaction he will call further questions or concerns.        Procedure:  After verbal consent, per protocol lidocaine was applied to the wound. Excisional debridement was performed on ulcer.   Silver nitrate stick was used to debride, by a chemical cauterization, the ulcer down to and including subcutaneous tissue.  No drainage noted.   < 20sq cm debrided.  Dry dressing applied to foot.  Patient tolerated procedure well.     Mirian Cline DPM, Podiatry/Foot and Ankle Surgery

## 2021-07-16 NOTE — DISCHARGE INSTRUCTIONS
[facility]  [address] [address]    Call us at [phone] if you have any questions about your wounds, have redness or swelling around your wound, have a fever of 101 or greater or if you have any other problems or concerns. We answer the phone Monday through Friday 8 am to 4 pm, please leave a message as we check the voicemail frequently throughout the day.     Nabil Cheung      1960    Key to healing your wound is to keeping your blood sugars under good control.    Medications/supplements to aid in healing:  Vitamin D3 10,000 iu per day  Ping C 1,000 mg take twice daily  Vitamin B Complex with zinc take 1 tablet daily   Vitamin B 12 1000 mcg daily  ----->Order from [facility] Hesperidin Diosmin 1,000 mg tablet twice daily - Vein Formula- take life long    Wound care recommendations to Left lateral Foot  Cleanse wound with soap and water. Apply Ioplex to wound. Wrap with 4x4 gauze/roll gauze. Secure with medipore tape. Change daily.    When you sit raise your ankle above your hips to promote wound healing.  You need to stay off your foot at all times to help heal your wound.  You should wear your offloading shoe at all times.  Use Knee roller to keep your weight off your foot.     Mirian Cline, MICHI.P.M. July 16, 2021      If you had a positive experience please indicate on your patient satisfaction survey form that [facility] will be sending you.    If you have any billing related questions please call the [facility] Business office at [phone]. The clinic staff does not handle billing related matters.

## 2021-07-23 ENCOUNTER — HOSPITAL ENCOUNTER (OUTPATIENT)
Dept: WOUND CARE | Facility: CLINIC | Age: 61
Discharge: HOME | End: 2021-07-23
Attending: PODIATRIST | Admitting: PODIATRIST
Payer: COMMERCIAL

## 2021-07-23 VITALS
HEART RATE: 62 BPM | RESPIRATION RATE: 18 BRPM | TEMPERATURE: 97.3 F | DIASTOLIC BLOOD PRESSURE: 77 MMHG | SYSTOLIC BLOOD PRESSURE: 161 MMHG

## 2021-07-23 DIAGNOSIS — E11.621 DIABETIC ULCER OF LEFT MIDFOOT ASSOCIATED WITH TYPE 2 DIABETES MELLITUS, WITH FAT LAYER EXPOSED (H): ICD-10-CM

## 2021-07-23 DIAGNOSIS — L97.422 DIABETIC ULCER OF LEFT MIDFOOT ASSOCIATED WITH TYPE 2 DIABETES MELLITUS, WITH FAT LAYER EXPOSED (H): ICD-10-CM

## 2021-07-23 DIAGNOSIS — Q66.70 PES CAVUS: ICD-10-CM

## 2021-07-23 DIAGNOSIS — E11.42 DIABETIC POLYNEUROPATHY ASSOCIATED WITH TYPE 2 DIABETES MELLITUS (H): ICD-10-CM

## 2021-07-23 PROCEDURE — 99213 OFFICE O/P EST LOW 20 MIN: CPT | Mod: 25 | Performed by: PODIATRIST

## 2021-07-23 PROCEDURE — 11042 DBRDMT SUBQ TIS 1ST 20SQCM/<: CPT | Performed by: PODIATRIST

## 2021-07-23 PROCEDURE — 11042 DBRDMT SUBQ TIS 1ST 20SQCM/<: CPT

## 2021-07-23 NOTE — DISCHARGE INSTRUCTIONS
[facility]  [address] [address]    Call us at [phone] if you have any questions about your wounds, have redness or swelling around your wound, have a fever of 101 or greater or if you have any other problems or concerns. We answer the phone Monday through Friday 8 am to 4 pm, please leave a message as we check the voicemail frequently throughout the day.     Nabil Cheung      1960    Key to healing your wound is to keeping your blood sugars under good control.    Medications/supplements to aid in healing:  Vitamin D3 10,000 iu per day  Ping C 1,000 mg take twice daily  Vitamin B Complex with zinc take 1 tablet daily  Vitamin B 12 1000 mcg daily  Hesperidin Diosmin 1 tablet twice daily order from www.veinformula.[employer] or call [phone]      Wound care recommendations to Left lateral Foot  Cleanse wound with soap and water. Apply Ioplex to wound. Wrap with 4x4 gauze/ roll gauze. Secure with medipore tape. Change daily.    When you sit raise your ankle above your hips to promote wound healing.  You need to stay off your foot at all times to help heal your wound.  You should wear your offloading shoe at all times.  Use Knee roller to keep your weight off your foot.       MICHI Torres.P.M. July 23, 2021      If you had a positive experience please indicate on your patient satisfaction survey form that [facility] will be sending you.    If you have any billing related questions please call the [facility] Business office at [phone]. The clinic staff does not handle billing related matters.

## 2021-07-23 NOTE — ADDENDUM NOTE
Encounter addended by: Mirian Cline DPM, Podiatry/Foot and Ankle Surgery on: 7/23/2021 8:27 AM   Actions taken: Clinical Note Signed, Charge Capture section accepted

## 2021-07-23 NOTE — PROGRESS NOTES
[facility] [place] Progress Note    Subject: Patient was seen at wound center.  Patient is here for follow-up on left foot ulceration.  He has been wearing the DH 2 shoe and using the rolling knee scooter.  Patient denies fever, nausea, vomiting.  Has been doing iodine dressing changes every day.  Denies fever, nausea, vomiting.    PMH: No past medical history on file.    Social Hx:   Social History     Socioeconomic History     Marital status: Single     Spouse name: Not on file     Number of children: Not on file     Years of education: Not on file     Highest education level: Not on file   Occupational History     Not on file   Tobacco Use     Smoking status: Never Smoker     Smokeless tobacco: Never Used   Substance and Sexual Activity     Alcohol use: Yes     Drug use: No     Sexual activity: Not on file   Other Topics Concern     Not on file   Social History Narrative    6/29/17 - Pt was interested in getting help filling out [employer] application online to receive medicaid. CHW gave him the list of information he needed to complete online application. He stated he had a tax extension for 2016 so he did not have tax forms that the application required. He was going to work on the application with tax information from 2015 later tonight. AM        7/27/17:    Pt wanted to get more information about gyms that are low-cost within the [facility]. Pt's asked for a gym with a membership fee of $10-$20 (max), that also had a walking track and a hot tub/sauna. We told him that options that fit these requirements he mentioned were very limited. He then stated that he heard that the [facility] offered a $5 fee for track-usage only, however, we could not call [facility] since it was closed. We suggested to the patient to call tomorrow and also mentioned there are gyms that are low-cost nearby, however, might not have a track or hot tub/sauna. We also suggested that he could walk around a lake would also be a  cheap-alternative. -HB and NV        8/28/17:    Pt was here for a med refill. We asked him about his gym membership and access based on CHW's previous encounter with him and he said he tried to walk outside more this summer, but still did not find an affordable option. He did not have need for any other services at the time.     - AA and KT         9/28/17:    Pt had questions about free/low cost (under $10/month) walking tracks.  Found a website to walk through with the patient and shared different resources. - AB and HB        7/19/18: Pt. Was not seen tonight.- ZR, KP     Social Determinants of Health     Financial Resource Strain:      Difficulty of Paying Living Expenses:    Food Insecurity:      Worried About Running Out of Food in the Last Year:      Ran Out of Food in the Last Year:    Transportation Needs:      Lack of Transportation (Medical):      Lack of Transportation (Non-Medical):    Physical Activity:      Days of Exercise per Week:      Minutes of Exercise per Session:    Stress:      Feeling of Stress :    Social Connections:      Frequency of Communication with Friends and Family:      Frequency of Social Gatherings with Friends and Family:      Attends Yarsani Services:      Active Member of Clubs or Organizations:      Attends Club or Organization Meetings:      Marital Status:    Intimate Partner Violence:      Fear of Current or Ex-Partner:      Emotionally Abused:      Physically Abused:      Sexually Abused:        Surgical Hx:   Past Surgical History:   Procedure Laterality Date     GRAFT FLAP PEDICLE TRAM DELAY PROCEDURE Left 6/18/2020    Procedure: Debridement of sternal wound and left major pectoral flep;  Surgeon: MARCO Serna MD;  Location: [facility] OR     INCISION AND DRAINAGE CHEST WASHOUT, COMBINED N/A 6/12/2020    Procedure: INCISION AND DRAINAGE, WOUND, CHEST, WITH IRRIGATION and wound vac change;  Surgeon: Mark Mckinney MD;  Location: [facility] OR     REPAIR CHEST WALL  N/A 6/18/2020    Procedure: Chest wall reconstruction;  Surgeon: MARCO Serna MD;  Location: [facility] OR       Allergies:    Allergies   Allergen Reactions     Atorvastatin      Dust Mites      Fluorescein Nausea and Vomiting       Medications:   Current Outpatient Medications   Medication     acetaminophen (TYLENOL) 325 MG tablet     albuterol (PROAIR HFA/PROVENTIL HFA/VENTOLIN HFA) 108 (90 Base) MCG/ACT inhaler     alfuzosin ER (UROXATRAL) 10 MG 24 hr tablet     alpha-lipoic acid 600 MG capsule     aspirin (ASA) 325 MG EC tablet     cyclobenzaprine (FLEXERIL) 10 MG tablet     empagliflozin (JARDIANCE) 25 MG TABS tablet     finasteride (PROSCAR) 5 MG tablet     fish oil-omega-3 fatty acids 1000 MG capsule     insulin glargine (LANTUS PEN) 100 UNIT/ML pen     lisinopril (ZESTRIL) 2.5 MG tablet     magnesium 250 MG tablet     metFORMIN (GLUCOPHAGE) 1000 MG tablet     metoprolol succinate ER (TOPROL-XL) 50 MG 24 hr tablet     nitroGLYcerin (NITROSTAT) 0.4 MG sublingual tablet     ondansetron (ZOFRAN) 4 MG tablet     rosuvastatin (CRESTOR) 20 MG tablet     sitagliptin (JANUVIA) 50 MG tablet     terbinafine (LAMISIL) 250 MG tablet     vitamin D3 (CHOLECALCIFEROL) 2000 units (50 mcg) tablet     No current facility-administered medications for this encounter.         Objective:  Vitals:  BP (!) 161/77   Pulse 62   Temp 97.3  F (36.3  C)   Resp 18     A1C: 9.1 (1/2021)     General:  Patient is alert and orientated.  NAD      Dermatologic: Full-thickness ulceration to the plantar aspect of the left fifth metatarsal head.  measurements below after debridement.   Base of the wound is granular.  Fat layer is exposed but no exposure of muscle or bone.  No purulent drainage or redness noted.  .   Wound (used by OP [facility] only) 06/08/21 0800 Left (Active)   Dressing Appearance moist drainage 07/23/21 0919   Length (cm) 0.8 07/23/21 0919   Width (cm) 0.9 07/23/21 0919   Depth (cm) 0 07/23/21 0919   Wound (cm^2) 0.72 cm^2  07/23/21 0919   Wound Volume (cm^3) 0 cm^3 07/23/21 0919   Wound healing % -46.94 07/23/21 0919   Drainage Characteristics/Odor serosanguineous 07/23/21 0919   Drainage Amount moderate 07/23/21 0919     Vascular: DP & PT pulses are faintly palpable bilaterally.  No significant edema or varicosities noted.  CFT and skin temperature is normal to both lower extremities.     Neurologic: Lower extremity sensation is absent to feet..     Musculoskeletal: Patient is ambulatory without assistive device or brace.  increase arch height.      Assessment:     Diabetic ulcer of left midfoot associated with type 2 diabetes mellitus, with fat layer exposed (H)  Diabetic polyneuropathy associated with type 2 diabetes mellitus (H)  Pes cavus     Plan:  At this time the wound was debrided.  We will hold off on the casting at this time.  He will continue with iodine daily and 2 x 2 gauze and Band-Aids.  He will continue to wrap the foot with an Ace bandage.  He will continue with his rolling knee scooter and DH 2 shoe.  We will have him follow-up in 2 weeks for reassessment of the wound.  All questions were answered to patient satisfaction he will call for questions or concerns.         Procedure:  After verbal consent, per protocol lidocaine was applied to the wound. Excisional debridement was performed on ulcer.   Silver nitrate stick was used to debride, by a chemical cauterization, the ulcer down to and including subcutaneous tissue.  No drainage noted.   < 20sq cm debrided.  Dry dressing applied to foot.  Patient tolerated procedure well.      Mirian Cline DPM, Podiatry/Foot and Ankle Surgery

## 2021-08-06 ENCOUNTER — HOSPITAL ENCOUNTER (OUTPATIENT)
Dept: WOUND CARE | Facility: CLINIC | Age: 61
Discharge: HOME | End: 2021-08-06
Attending: PODIATRIST | Admitting: PODIATRIST
Payer: COMMERCIAL

## 2021-08-06 VITALS
DIASTOLIC BLOOD PRESSURE: 79 MMHG | HEART RATE: 59 BPM | TEMPERATURE: 96.7 F | SYSTOLIC BLOOD PRESSURE: 166 MMHG | RESPIRATION RATE: 18 BRPM

## 2021-08-06 DIAGNOSIS — E11.65 UNCONTROLLED TYPE 2 DIABETES MELLITUS WITH HYPERGLYCEMIA (H): ICD-10-CM

## 2021-08-06 DIAGNOSIS — L97.422 DIABETIC ULCER OF LEFT MIDFOOT ASSOCIATED WITH TYPE 2 DIABETES MELLITUS, WITH FAT LAYER EXPOSED (H): ICD-10-CM

## 2021-08-06 DIAGNOSIS — E11.621 DIABETIC ULCER OF LEFT MIDFOOT ASSOCIATED WITH TYPE 2 DIABETES MELLITUS, WITH FAT LAYER EXPOSED (H): ICD-10-CM

## 2021-08-06 DIAGNOSIS — Q66.70 PES CAVUS: ICD-10-CM

## 2021-08-06 PROCEDURE — 11042 DBRDMT SUBQ TIS 1ST 20SQCM/<: CPT

## 2021-08-06 PROCEDURE — 17250 CHEM CAUT OF GRANLTJ TISSUE: CPT | Performed by: PODIATRIST

## 2021-08-06 PROCEDURE — 99214 OFFICE O/P EST MOD 30 MIN: CPT | Mod: 25 | Performed by: PODIATRIST

## 2021-08-06 PROCEDURE — 11042 DBRDMT SUBQ TIS 1ST 20SQCM/<: CPT | Performed by: PODIATRIST

## 2021-08-06 NOTE — PROGRESS NOTES
[facility] [place] Progress Note    Subject: Patient was seen at wound center.  Patient is here for follow-up on left foot ulceration.  He has been wearing the DH 2 shoe and using the rolling knee scooter.  Patient denies fever, nausea, vomiting.  Has been doing iodine dressing changes every day.  Denies fever, nausea, vomiting.  Concerned that the wound is getting worse.    PMH: No past medical history on file.    Social Hx:   Social History     Socioeconomic History     Marital status: Single     Spouse name: Not on file     Number of children: Not on file     Years of education: Not on file     Highest education level: Not on file   Occupational History     Not on file   Tobacco Use     Smoking status: Never Smoker     Smokeless tobacco: Never Used   Substance and Sexual Activity     Alcohol use: Yes     Drug use: No     Sexual activity: Not on file   Other Topics Concern     Not on file   Social History Narrative    6/29/17 - Pt was interested in getting help filling out [employer] application online to receive medicaid. CHW gave him the list of information he needed to complete online application. He stated he had a tax extension for 2016 so he did not have tax forms that the application required. He was going to work on the application with tax information from 2015 later tonight. AM        7/27/17:    Pt wanted to get more information about gyms that are low-cost within the [facility]. Pt's asked for a gym with a membership fee of $10-$20 (max), that also had a walking track and a hot tub/sauna. We told him that options that fit these requirements he mentioned were very limited. He then stated that he heard that the [facility] offered a $5 fee for track-usage only, however, we could not call [facility] since it was closed. We suggested to the patient to call tomorrow and also mentioned there are gyms that are low-cost nearby, however, might not have a track or hot tub/sauna. We also suggested that he  could walk around a lake would also be a cheap-alternative. -HB and NV        8/28/17:    Pt was here for a med refill. We asked him about his gym membership and access based on CHW's previous encounter with him and he said he tried to walk outside more this summer, but still did not find an affordable option. He did not have need for any other services at the time.     - AA and KT         9/28/17:    Pt had questions about free/low cost (under $10/month) walking tracks.  Found a website to walk through with the patient and shared different resources. - AB and HB        7/19/18: Pt. Was not seen tonight.- ZR, KP     Social Determinants of Health     Financial Resource Strain:      Difficulty of Paying Living Expenses:    Food Insecurity:      Worried About Running Out of Food in the Last Year:      Ran Out of Food in the Last Year:    Transportation Needs:      Lack of Transportation (Medical):      Lack of Transportation (Non-Medical):    Physical Activity:      Days of Exercise per Week:      Minutes of Exercise per Session:    Stress:      Feeling of Stress :    Social Connections:      Frequency of Communication with Friends and Family:      Frequency of Social Gatherings with Friends and Family:      Attends Temple Services:      Active Member of Clubs or Organizations:      Attends Club or Organization Meetings:      Marital Status:    Intimate Partner Violence:      Fear of Current or Ex-Partner:      Emotionally Abused:      Physically Abused:      Sexually Abused:        Surgical Hx:   Past Surgical History:   Procedure Laterality Date     GRAFT FLAP PEDICLE TRAM DELAY PROCEDURE Left 6/18/2020    Procedure: Debridement of sternal wound and left major pectoral flep;  Surgeon: MARCO Serna MD;  Location: [facility] OR     INCISION AND DRAINAGE CHEST WASHOUT, COMBINED N/A 6/12/2020    Procedure: INCISION AND DRAINAGE, WOUND, CHEST, WITH IRRIGATION and wound vac change;  Surgeon: Mark Mckinney,  MD;  Location: [facility] OR     REPAIR CHEST WALL N/A 6/18/2020    Procedure: Chest wall reconstruction;  Surgeon: MARCO Serna MD;  Location: [facility] OR       Allergies:    Allergies   Allergen Reactions     Atorvastatin      Dust Mites      Fluorescein Nausea and Vomiting       Medications:   Current Outpatient Medications   Medication     acetaminophen (TYLENOL) 325 MG tablet     albuterol (PROAIR HFA/PROVENTIL HFA/VENTOLIN HFA) 108 (90 Base) MCG/ACT inhaler     alfuzosin ER (UROXATRAL) 10 MG 24 hr tablet     alpha-lipoic acid 600 MG capsule     aspirin (ASA) 325 MG EC tablet     cyclobenzaprine (FLEXERIL) 10 MG tablet     empagliflozin (JARDIANCE) 25 MG TABS tablet     finasteride (PROSCAR) 5 MG tablet     fish oil-omega-3 fatty acids 1000 MG capsule     insulin glargine (LANTUS PEN) 100 UNIT/ML pen     lisinopril (ZESTRIL) 2.5 MG tablet     magnesium 250 MG tablet     metFORMIN (GLUCOPHAGE) 1000 MG tablet     metoprolol succinate ER (TOPROL-XL) 50 MG 24 hr tablet     nitroGLYcerin (NITROSTAT) 0.4 MG sublingual tablet     ondansetron (ZOFRAN) 4 MG tablet     rosuvastatin (CRESTOR) 20 MG tablet     sitagliptin (JANUVIA) 50 MG tablet     terbinafine (LAMISIL) 250 MG tablet     vitamin D3 (CHOLECALCIFEROL) 2000 units (50 mcg) tablet     No current facility-administered medications for this encounter.         Objective:  Vitals:  BP (!) 166/79   Pulse 59   Temp (!) 96.7  F (35.9  C) (Temporal)   Resp 18     A1C: 9.1 (1/2021)     General:  Patient is alert and orientated.  NAD      Dermatologic: Full-thickness ulceration to the plantar aspect of the left fifth metatarsal head.  measurements below after debridement.  Wound is larger than previous measurements.  Base of the wound is granular.  Fat layer is exposed but no exposure of muscle or bone.  No purulent drainage or redness noted.    Wound (used by OP [facility] only) 06/08/21 0800 Left (Active)   Thickness/Stage full thickness 08/06/21 0902   Dressing Appearance  moist drainage 08/06/21 0902   Base granulating 08/06/21 0902   Periwound intact 08/06/21 0902   Periwound Temperature warm 08/06/21 0902   Periwound Skin Turgor soft 08/06/21 0902   Edges callused 08/06/21 0902   Length (cm) 2 08/06/21 0902   Width (cm) 2 08/06/21 0902   Depth (cm) 0.2 08/06/21 0902   Wound (cm^2) 4 cm^2 08/06/21 0902   Wound Volume (cm^3) 0.8 cm^3 08/06/21 0902   Wound healing % -716.33 08/06/21 0902   Undermining [Depth (cm)/Location] 3-6 O'clock/0.6cm 08/06/21 0902   Drainage Characteristics/Odor serosanguineous 08/06/21 0902   Drainage Amount moderate 08/06/21 0902   Care, Wound debrided 08/06/21 0902     Vascular: DP & PT pulses are faintly palpable bilaterally.  No significant edema or varicosities noted.  CFT and skin temperature is normal to both lower extremities.     Neurologic: Lower extremity sensation is absent to feet..     Musculoskeletal: Patient is ambulatory without assistive device or brace.  increase arch height.      Assessment:     Diabetic ulcer of left midfoot associated with type 2 diabetes mellitus, with fat layer exposed (H)  Diabetic polyneuropathy associated with type 2 diabetes mellitus (H)  Pes cavus     Plan:  At this time the wound was debrided.  Concerned that the wound is getting larger.  We will order an MRI at this time to see if there is any deeper bone infection going on.  We will also see if ReGranix is  covered by his insurance as I recommend that he use this daily.  If this is not covered we will do a primary dressing of Hydrofera Blue(sterile),  and secondary dressings of 2 x 2 nonsterile gauze x4 per dressing change, and 1 gauze roll 4 inch sterile.  These will be daily dressing changes.  We will call him with the MRI results.  If they are negative we could restart casting in 2 weeks at his next appointment however I do not want to start casting today if there is the possibility of a bone infection in the fifth metatarsal.  All questions were answered to  patient satisfaction he will call for questions or concerns.         Procedure:  After verbal consent, per protocol lidocaine was applied to the wound. Excisional debridement was performed on ulcer.   Silver nitrate stick was used to debride, by a chemical cauterization, the ulcer down to and including subcutaneous tissue.  No drainage noted.   < 20sq cm debrided.  Dry dressing applied to foot.  Patient tolerated procedure well.      Mirian Cline DPM, Podiatry/Foot and Ankle Surgery

## 2021-08-06 NOTE — DISCHARGE INSTRUCTIONS
[facility]  [address] [address]    Call us at [phone] if you have any questions about your wounds, have redness or swelling around your wound, have a fever of 101 or greater or if you have any other problems or concerns. We answer the phone Monday through Friday 8 am to 4 pm, please leave a message as we check the voicemail frequently throughout the day.     Nabil Cheung      1960    Key to healing your wound is to keeping your blood sugars under good control.      Medications/supplements to aid in healing:  Vitamin D3 10,000 iu per day  Ping C 1,000 mg take twice daily  Vitamin B Complex with zinc take 1 tablet daily  Vitamin B 12 1000 mcg daily  Hesperidin Diosmin 1 tablet twice daily order from [email or web address] or call [phone]    Wound care recommendations to the left lateral foot:  Cleanse with soap and water. Apply hydrofera blue ready transfer only until regranex can be obtained. If you get regranex apply that to wound for 12 hours a day. After 12 hours the regranex needs to be removed and then apply hydrofera blue ready transfer. Cover with bandaid. Then apply spandigrip. Ok to remove spandigrip at night. Change dressing twice a day.    When you sit raise your ankle above your hips to promote wound healing.  You need to stay off your foot at all times to help heal your wound.  You should wear your offloading shoe at all times.     Use Knee roller to keep your weight off your foot.         Mirian Cline, MICHI.P.M. August 6, 2021      If you had a positive experience please indicate on your patient satisfaction survey form that [facility] will be sending you.    If you have any billing related questions please call the [facility] Business office at [phone]. The clinic staff does not handle billing related matters.

## 2021-08-14 ENCOUNTER — HEALTH MAINTENANCE LETTER (OUTPATIENT)
Age: 61
End: 2021-08-14

## 2021-08-15 ENCOUNTER — APPOINTMENT (OUTPATIENT)
Dept: GENERAL RADIOLOGY | Facility: CLINIC | Age: 61
End: 2021-08-15
Attending: EMERGENCY MEDICINE
Payer: COMMERCIAL

## 2021-08-15 ENCOUNTER — HOSPITAL ENCOUNTER (INPATIENT)
Facility: CLINIC | Age: 61
LOS: 5 days | Discharge: NURSING, REHAB OR LONG-TERM CARE | End: 2021-08-21
Attending: EMERGENCY MEDICINE | Admitting: INTERNAL MEDICINE
Payer: COMMERCIAL

## 2021-08-15 DIAGNOSIS — L03.119 CELLULITIS AND ABSCESS OF FOOT: ICD-10-CM

## 2021-08-15 DIAGNOSIS — M86.472 CHRONIC OSTEOMYELITIS OF LEFT FOOT WITH DRAINING SINUS (H): ICD-10-CM

## 2021-08-15 DIAGNOSIS — M86.9 OSTEOMYELITIS, UNSPECIFIED SITE, UNSPECIFIED TYPE (H): ICD-10-CM

## 2021-08-15 DIAGNOSIS — L02.619 CELLULITIS AND ABSCESS OF FOOT: ICD-10-CM

## 2021-08-15 DIAGNOSIS — L02.619 CELLULITIS AND ABSCESS OF FOOT, EXCEPT TOES: ICD-10-CM

## 2021-08-15 DIAGNOSIS — E10.621 DIABETIC ULCER OF LEFT MIDFOOT ASSOCIATED WITH TYPE 1 DIABETES MELLITUS, WITH FAT LAYER EXPOSED (H): Primary | ICD-10-CM

## 2021-08-15 DIAGNOSIS — L97.422 DIABETIC ULCER OF LEFT MIDFOOT ASSOCIATED WITH TYPE 1 DIABETES MELLITUS, WITH FAT LAYER EXPOSED (H): Primary | ICD-10-CM

## 2021-08-15 DIAGNOSIS — E11.9 DIABETES MELLITUS WITHOUT COMPLICATION (H): ICD-10-CM

## 2021-08-15 DIAGNOSIS — L03.119 CELLULITIS AND ABSCESS OF FOOT, EXCEPT TOES: ICD-10-CM

## 2021-08-15 LAB
ALBUMIN SERPL-MCNC: 3.3 G/DL (ref 3.4–5)
ALP SERPL-CCNC: 72 U/L (ref 40–150)
ALT SERPL W P-5'-P-CCNC: 23 U/L (ref 0–70)
ANION GAP SERPL CALCULATED.3IONS-SCNC: 2 MMOL/L (ref 3–14)
AST SERPL W P-5'-P-CCNC: 13 U/L (ref 0–45)
BASOPHILS # BLD AUTO: 0.1 10E3/UL (ref 0–0.2)
BASOPHILS NFR BLD AUTO: 1 %
BILIRUB SERPL-MCNC: 0.4 MG/DL (ref 0.2–1.3)
BUN SERPL-MCNC: 26 MG/DL (ref 7–30)
CALCIUM SERPL-MCNC: 8.9 MG/DL (ref 8.5–10.1)
CHLORIDE BLD-SCNC: 102 MMOL/L (ref 94–109)
CO2 SERPL-SCNC: 29 MMOL/L (ref 20–32)
CREAT SERPL-MCNC: 1.36 MG/DL (ref 0.66–1.25)
EOSINOPHIL # BLD AUTO: 0.4 10E3/UL (ref 0–0.7)
EOSINOPHIL NFR BLD AUTO: 4 %
ERYTHROCYTE [DISTWIDTH] IN BLOOD BY AUTOMATED COUNT: 13.5 % (ref 10–15)
GFR SERPL CREATININE-BSD FRML MDRD: 55 ML/MIN/1.73M2
GLUCOSE BLD-MCNC: 261 MG/DL (ref 70–99)
HCO3 BLDV-SCNC: 29 MMOL/L (ref 21–28)
HCT VFR BLD AUTO: 37.6 % (ref 40–53)
HGB BLD-MCNC: 12.7 G/DL (ref 13.3–17.7)
IMM GRANULOCYTES # BLD: 0 10E3/UL
IMM GRANULOCYTES NFR BLD: 0 %
LACTATE BLD-SCNC: 1.6 MMOL/L
LYMPHOCYTES # BLD AUTO: 1.7 10E3/UL (ref 0.8–5.3)
LYMPHOCYTES NFR BLD AUTO: 16 %
MCH RBC QN AUTO: 27.5 PG (ref 26.5–33)
MCHC RBC AUTO-ENTMCNC: 33.8 G/DL (ref 31.5–36.5)
MCV RBC AUTO: 82 FL (ref 78–100)
MONOCYTES # BLD AUTO: 0.9 10E3/UL (ref 0–1.3)
MONOCYTES NFR BLD AUTO: 9 %
NEUTROPHILS # BLD AUTO: 7.5 10E3/UL (ref 1.6–8.3)
NEUTROPHILS NFR BLD AUTO: 70 %
NRBC # BLD AUTO: 0 10E3/UL
NRBC BLD AUTO-RTO: 0 /100
PCO2 BLDV: 48 MM HG (ref 40–50)
PH BLDV: 7.39 [PH] (ref 7.32–7.43)
PLATELET # BLD AUTO: 270 10E3/UL (ref 150–450)
PO2 BLDV: 23 MM HG (ref 25–47)
POTASSIUM BLD-SCNC: 4.8 MMOL/L (ref 3.4–5.3)
PROT SERPL-MCNC: 7.7 G/DL (ref 6.8–8.8)
RBC # BLD AUTO: 4.61 10E6/UL (ref 4.4–5.9)
SAO2 % BLDV: 39 % (ref 94–100)
SARS-COV-2 RNA RESP QL NAA+PROBE: NEGATIVE
SODIUM SERPL-SCNC: 133 MMOL/L (ref 133–144)
WBC # BLD AUTO: 10.6 10E3/UL (ref 4–11)

## 2021-08-15 PROCEDURE — 36415 COLL VENOUS BLD VENIPUNCTURE: CPT | Performed by: EMERGENCY MEDICINE

## 2021-08-15 PROCEDURE — 87040 BLOOD CULTURE FOR BACTERIA: CPT | Performed by: EMERGENCY MEDICINE

## 2021-08-15 PROCEDURE — 87077 CULTURE AEROBIC IDENTIFY: CPT | Performed by: EMERGENCY MEDICINE

## 2021-08-15 PROCEDURE — 96365 THER/PROPH/DIAG IV INF INIT: CPT

## 2021-08-15 PROCEDURE — 87635 SARS-COV-2 COVID-19 AMP PRB: CPT | Performed by: EMERGENCY MEDICINE

## 2021-08-15 PROCEDURE — C9803 HOPD COVID-19 SPEC COLLECT: HCPCS

## 2021-08-15 PROCEDURE — 96367 TX/PROPH/DG ADDL SEQ IV INF: CPT

## 2021-08-15 PROCEDURE — 85025 COMPLETE CBC W/AUTO DIFF WBC: CPT | Performed by: EMERGENCY MEDICINE

## 2021-08-15 PROCEDURE — 99285 EMERGENCY DEPT VISIT HI MDM: CPT | Mod: 25

## 2021-08-15 PROCEDURE — 82803 BLOOD GASES ANY COMBINATION: CPT

## 2021-08-15 PROCEDURE — 73630 X-RAY EXAM OF FOOT: CPT | Mod: LT

## 2021-08-15 PROCEDURE — 80053 COMPREHEN METABOLIC PANEL: CPT | Performed by: EMERGENCY MEDICINE

## 2021-08-15 PROCEDURE — 96375 TX/PRO/DX INJ NEW DRUG ADDON: CPT

## 2021-08-15 RX ORDER — MULTIVIT WITH MINERALS/LUTEIN
1000 TABLET ORAL DAILY
COMMUNITY

## 2021-08-15 RX ORDER — LISINOPRIL 10 MG/1
10 TABLET ORAL DAILY
COMMUNITY
End: 2023-12-01

## 2021-08-15 RX ORDER — LACTOBACILLUS RHAMNOSUS GG 10B CELL
1 CAPSULE ORAL 2 TIMES DAILY
COMMUNITY
End: 2021-08-30

## 2021-08-15 RX ORDER — PIPERACILLIN SODIUM, TAZOBACTAM SODIUM 4; .5 G/20ML; G/20ML
4.5 INJECTION, POWDER, LYOPHILIZED, FOR SOLUTION INTRAVENOUS ONCE
Status: COMPLETED | OUTPATIENT
Start: 2021-08-15 | End: 2021-08-16

## 2021-08-15 ASSESSMENT — ENCOUNTER SYMPTOMS
WOUND: 1
NAUSEA: 0
COLOR CHANGE: 1
FATIGUE: 0
JOINT SWELLING: 1
FEVER: 0
VOMITING: 0

## 2021-08-16 ENCOUNTER — APPOINTMENT (OUTPATIENT)
Dept: MRI IMAGING | Facility: CLINIC | Age: 61
End: 2021-08-16
Attending: INTERNAL MEDICINE
Payer: COMMERCIAL

## 2021-08-16 ENCOUNTER — ANESTHESIA EVENT (OUTPATIENT)
Dept: SURGERY | Facility: CLINIC | Age: 61
End: 2021-08-16
Payer: COMMERCIAL

## 2021-08-16 PROBLEM — L03.119 CELLULITIS AND ABSCESS OF FOOT, EXCEPT TOES: Status: ACTIVE | Noted: 2021-08-16

## 2021-08-16 PROBLEM — L02.619 CELLULITIS AND ABSCESS OF FOOT, EXCEPT TOES: Status: ACTIVE | Noted: 2021-08-16

## 2021-08-16 PROBLEM — M86.472 CHRONIC OSTEOMYELITIS OF LEFT FOOT WITH DRAINING SINUS (H): Status: ACTIVE | Noted: 2021-08-15

## 2021-08-16 PROBLEM — E10.621 DIABETIC ULCER OF LEFT MIDFOOT ASSOCIATED WITH TYPE 1 DIABETES MELLITUS, WITH FAT LAYER EXPOSED (H): Status: ACTIVE | Noted: 2021-08-16

## 2021-08-16 PROBLEM — L97.422 DIABETIC ULCER OF LEFT MIDFOOT ASSOCIATED WITH TYPE 1 DIABETES MELLITUS, WITH FAT LAYER EXPOSED (H): Status: ACTIVE | Noted: 2021-08-16

## 2021-08-16 LAB
GLUCOSE BLD-MCNC: 251 MG/DL (ref 70–99)
GLUCOSE BLDC GLUCOMTR-MCNC: 242 MG/DL (ref 70–99)
GLUCOSE BLDC GLUCOMTR-MCNC: 285 MG/DL (ref 70–99)
GLUCOSE BLDC GLUCOMTR-MCNC: 292 MG/DL (ref 70–99)
POTASSIUM BLD-SCNC: 3.7 MMOL/L (ref 3.4–5.3)

## 2021-08-16 PROCEDURE — 73720 MRI LWR EXTREMITY W/O&W/DYE: CPT | Mod: LT

## 2021-08-16 PROCEDURE — 250N000011 HC RX IP 250 OP 636: Performed by: EMERGENCY MEDICINE

## 2021-08-16 PROCEDURE — A9585 GADOBUTROL INJECTION: HCPCS | Performed by: INTERNAL MEDICINE

## 2021-08-16 PROCEDURE — 96375 TX/PRO/DX INJ NEW DRUG ADDON: CPT

## 2021-08-16 PROCEDURE — 120N000001 HC R&B MED SURG/OB

## 2021-08-16 PROCEDURE — 250N000011 HC RX IP 250 OP 636: Performed by: INTERNAL MEDICINE

## 2021-08-16 PROCEDURE — 96365 THER/PROPH/DIAG IV INF INIT: CPT

## 2021-08-16 PROCEDURE — 250N000012 HC RX MED GY IP 250 OP 636 PS 637: Performed by: INTERNAL MEDICINE

## 2021-08-16 PROCEDURE — 250N000013 HC RX MED GY IP 250 OP 250 PS 637: Performed by: INTERNAL MEDICINE

## 2021-08-16 PROCEDURE — 84132 ASSAY OF SERUM POTASSIUM: CPT | Performed by: INTERNAL MEDICINE

## 2021-08-16 PROCEDURE — 36415 COLL VENOUS BLD VENIPUNCTURE: CPT | Performed by: INTERNAL MEDICINE

## 2021-08-16 PROCEDURE — 258N000003 HC RX IP 258 OP 636: Performed by: EMERGENCY MEDICINE

## 2021-08-16 PROCEDURE — 255N000002 HC RX 255 OP 636: Performed by: INTERNAL MEDICINE

## 2021-08-16 PROCEDURE — 250N000013 HC RX MED GY IP 250 OP 250 PS 637: Performed by: HOSPITALIST

## 2021-08-16 PROCEDURE — 82947 ASSAY GLUCOSE BLOOD QUANT: CPT | Performed by: INTERNAL MEDICINE

## 2021-08-16 PROCEDURE — 96367 TX/PROPH/DG ADDL SEQ IV INF: CPT

## 2021-08-16 PROCEDURE — 99223 1ST HOSP IP/OBS HIGH 75: CPT | Mod: AI | Performed by: INTERNAL MEDICINE

## 2021-08-16 PROCEDURE — 250N000012 HC RX MED GY IP 250 OP 636 PS 637: Performed by: HOSPITALIST

## 2021-08-16 PROCEDURE — 99253 IP/OBS CNSLTJ NEW/EST LOW 45: CPT | Mod: 57 | Performed by: PODIATRIST

## 2021-08-16 RX ORDER — LABETALOL HYDROCHLORIDE 5 MG/ML
20 INJECTION, SOLUTION INTRAVENOUS ONCE
Status: COMPLETED | OUTPATIENT
Start: 2021-08-16 | End: 2021-08-16

## 2021-08-16 RX ORDER — LIDOCAINE 40 MG/G
CREAM TOPICAL
Status: DISCONTINUED | OUTPATIENT
Start: 2021-08-16 | End: 2021-08-17

## 2021-08-16 RX ORDER — ALBUTEROL SULFATE 90 UG/1
1-2 AEROSOL, METERED RESPIRATORY (INHALATION) EVERY 4 HOURS PRN
Status: DISCONTINUED | OUTPATIENT
Start: 2021-08-16 | End: 2021-08-21 | Stop reason: HOSPADM

## 2021-08-16 RX ORDER — ACETAMINOPHEN 325 MG/1
650 TABLET ORAL EVERY 6 HOURS PRN
Status: DISCONTINUED | OUTPATIENT
Start: 2021-08-16 | End: 2021-08-17

## 2021-08-16 RX ORDER — NALOXONE HYDROCHLORIDE 0.4 MG/ML
0.4 INJECTION, SOLUTION INTRAMUSCULAR; INTRAVENOUS; SUBCUTANEOUS
Status: DISCONTINUED | OUTPATIENT
Start: 2021-08-16 | End: 2021-08-21 | Stop reason: HOSPADM

## 2021-08-16 RX ORDER — METOPROLOL SUCCINATE 25 MG/1
50 TABLET, EXTENDED RELEASE ORAL EVERY EVENING
Status: DISCONTINUED | OUTPATIENT
Start: 2021-08-16 | End: 2021-08-16

## 2021-08-16 RX ORDER — ONDANSETRON 4 MG/1
4 TABLET, ORALLY DISINTEGRATING ORAL EVERY 6 HOURS PRN
Status: DISCONTINUED | OUTPATIENT
Start: 2021-08-16 | End: 2021-08-17

## 2021-08-16 RX ORDER — DEXTROSE MONOHYDRATE 25 G/50ML
25-50 INJECTION, SOLUTION INTRAVENOUS
Status: DISCONTINUED | OUTPATIENT
Start: 2021-08-16 | End: 2021-08-17

## 2021-08-16 RX ORDER — NICOTINE POLACRILEX 4 MG
15-30 LOZENGE BUCCAL
Status: DISCONTINUED | OUTPATIENT
Start: 2021-08-16 | End: 2021-08-17

## 2021-08-16 RX ORDER — ALFUZOSIN HYDROCHLORIDE 10 MG/1
10 TABLET, EXTENDED RELEASE ORAL DAILY
Status: DISCONTINUED | OUTPATIENT
Start: 2021-08-16 | End: 2021-08-21 | Stop reason: HOSPADM

## 2021-08-16 RX ORDER — LACTOBACILLUS RHAMNOSUS GG 10B CELL
1 CAPSULE ORAL 2 TIMES DAILY
Status: DISCONTINUED | OUTPATIENT
Start: 2021-08-16 | End: 2021-08-21 | Stop reason: HOSPADM

## 2021-08-16 RX ORDER — AMOXICILLIN 250 MG
1 CAPSULE ORAL 2 TIMES DAILY PRN
Status: DISCONTINUED | OUTPATIENT
Start: 2021-08-16 | End: 2021-08-21 | Stop reason: HOSPADM

## 2021-08-16 RX ORDER — ASCORBIC ACID 500 MG
1000 TABLET ORAL DAILY
Status: DISCONTINUED | OUTPATIENT
Start: 2021-08-16 | End: 2021-08-21 | Stop reason: HOSPADM

## 2021-08-16 RX ORDER — AMOXICILLIN 250 MG
2 CAPSULE ORAL 2 TIMES DAILY PRN
Status: DISCONTINUED | OUTPATIENT
Start: 2021-08-16 | End: 2021-08-21 | Stop reason: HOSPADM

## 2021-08-16 RX ORDER — NALOXONE HYDROCHLORIDE 0.4 MG/ML
0.2 INJECTION, SOLUTION INTRAMUSCULAR; INTRAVENOUS; SUBCUTANEOUS
Status: DISCONTINUED | OUTPATIENT
Start: 2021-08-16 | End: 2021-08-21 | Stop reason: HOSPADM

## 2021-08-16 RX ORDER — PIPERACILLIN SODIUM, TAZOBACTAM SODIUM 3; .375 G/15ML; G/15ML
3.38 INJECTION, POWDER, LYOPHILIZED, FOR SOLUTION INTRAVENOUS EVERY 6 HOURS
Status: DISCONTINUED | OUTPATIENT
Start: 2021-08-16 | End: 2021-08-21 | Stop reason: ALTCHOICE

## 2021-08-16 RX ORDER — GADOBUTROL 604.72 MG/ML
10 INJECTION INTRAVENOUS ONCE
Status: COMPLETED | OUTPATIENT
Start: 2021-08-16 | End: 2021-08-16

## 2021-08-16 RX ORDER — FINASTERIDE 5 MG/1
5 TABLET, FILM COATED ORAL DAILY
Status: DISCONTINUED | OUTPATIENT
Start: 2021-08-16 | End: 2021-08-21 | Stop reason: HOSPADM

## 2021-08-16 RX ORDER — ACETAMINOPHEN 325 MG/1
650 TABLET ORAL EVERY 6 HOURS PRN
Status: DISCONTINUED | OUTPATIENT
Start: 2021-08-16 | End: 2021-08-16

## 2021-08-16 RX ORDER — METOPROLOL SUCCINATE 25 MG/1
100 TABLET, EXTENDED RELEASE ORAL EVERY EVENING
Status: DISCONTINUED | OUTPATIENT
Start: 2021-08-16 | End: 2021-08-20

## 2021-08-16 RX ORDER — ONDANSETRON 2 MG/ML
4 INJECTION INTRAMUSCULAR; INTRAVENOUS EVERY 6 HOURS PRN
Status: DISCONTINUED | OUTPATIENT
Start: 2021-08-16 | End: 2021-08-17

## 2021-08-16 RX ORDER — CYCLOBENZAPRINE HCL 10 MG
10 TABLET ORAL 3 TIMES DAILY PRN
Status: DISCONTINUED | OUTPATIENT
Start: 2021-08-16 | End: 2021-08-21 | Stop reason: HOSPADM

## 2021-08-16 RX ORDER — LISINOPRIL 10 MG/1
10 TABLET ORAL AT BEDTIME
Status: DISCONTINUED | OUTPATIENT
Start: 2021-08-16 | End: 2021-08-21 | Stop reason: HOSPADM

## 2021-08-16 RX ORDER — ACETAMINOPHEN 650 MG/1
650 SUPPOSITORY RECTAL EVERY 6 HOURS PRN
Status: DISCONTINUED | OUTPATIENT
Start: 2021-08-16 | End: 2021-08-21 | Stop reason: HOSPADM

## 2021-08-16 RX ORDER — HYDRALAZINE HYDROCHLORIDE 20 MG/ML
10 INJECTION INTRAMUSCULAR; INTRAVENOUS EVERY 4 HOURS PRN
Status: DISCONTINUED | OUTPATIENT
Start: 2021-08-16 | End: 2021-08-21 | Stop reason: HOSPADM

## 2021-08-16 RX ORDER — ROSUVASTATIN CALCIUM 20 MG/1
20 TABLET, COATED ORAL DAILY
Status: DISCONTINUED | OUTPATIENT
Start: 2021-08-16 | End: 2021-08-21 | Stop reason: HOSPADM

## 2021-08-16 RX ORDER — NITROGLYCERIN 0.4 MG/1
0.4 TABLET SUBLINGUAL EVERY 5 MIN PRN
Status: DISCONTINUED | OUTPATIENT
Start: 2021-08-16 | End: 2021-08-21 | Stop reason: HOSPADM

## 2021-08-16 RX ORDER — OXYCODONE HYDROCHLORIDE 5 MG/1
5 TABLET ORAL EVERY 4 HOURS PRN
Status: DISCONTINUED | OUTPATIENT
Start: 2021-08-16 | End: 2021-08-17

## 2021-08-16 RX ORDER — CHOLECALCIFEROL (VITAMIN D3) 50 MCG
50 TABLET ORAL DAILY
Status: DISCONTINUED | OUTPATIENT
Start: 2021-08-16 | End: 2021-08-21 | Stop reason: HOSPADM

## 2021-08-16 RX ADMIN — FINASTERIDE 5 MG: 5 TABLET, FILM COATED ORAL at 08:14

## 2021-08-16 RX ADMIN — PIPERACILLIN SODIUM AND TAZOBACTAM SODIUM 3.38 G: 3; .375 INJECTION, POWDER, LYOPHILIZED, FOR SOLUTION INTRAVENOUS at 08:12

## 2021-08-16 RX ADMIN — CYCLOBENZAPRINE 10 MG: 10 TABLET, FILM COATED ORAL at 05:10

## 2021-08-16 RX ADMIN — VANCOMYCIN HYDROCHLORIDE 2000 MG: 5 INJECTION, POWDER, LYOPHILIZED, FOR SOLUTION INTRAVENOUS at 02:08

## 2021-08-16 RX ADMIN — PIPERACILLIN SODIUM AND TAZOBACTAM SODIUM 4.5 G: 4; .5 INJECTION, POWDER, LYOPHILIZED, FOR SOLUTION INTRAVENOUS at 01:12

## 2021-08-16 RX ADMIN — OXYCODONE HYDROCHLORIDE AND ACETAMINOPHEN 1000 MG: 500 TABLET ORAL at 08:14

## 2021-08-16 RX ADMIN — ROSUVASTATIN CALCIUM 20 MG: 20 TABLET, FILM COATED ORAL at 08:15

## 2021-08-16 RX ADMIN — Medication 1 CAPSULE: at 08:14

## 2021-08-16 RX ADMIN — CYCLOBENZAPRINE 10 MG: 10 TABLET, FILM COATED ORAL at 10:32

## 2021-08-16 RX ADMIN — Medication 1 CAPSULE: at 21:07

## 2021-08-16 RX ADMIN — ALFUZOSIN HYDROCHLORIDE 10 MG: 10 TABLET, EXTENDED RELEASE ORAL at 08:14

## 2021-08-16 RX ADMIN — INSULIN ASPART 3 UNITS: 100 INJECTION, SOLUTION INTRAVENOUS; SUBCUTANEOUS at 08:57

## 2021-08-16 RX ADMIN — Medication 50 MCG: at 08:14

## 2021-08-16 RX ADMIN — LABETALOL HYDROCHLORIDE 20 MG: 5 INJECTION, SOLUTION INTRAVENOUS at 01:57

## 2021-08-16 RX ADMIN — PIPERACILLIN SODIUM AND TAZOBACTAM SODIUM 3.38 G: 3; .375 INJECTION, POWDER, LYOPHILIZED, FOR SOLUTION INTRAVENOUS at 21:07

## 2021-08-16 RX ADMIN — METOPROLOL SUCCINATE 50 MG: 25 TABLET, EXTENDED RELEASE ORAL at 03:29

## 2021-08-16 RX ADMIN — LISINOPRIL 10 MG: 10 TABLET ORAL at 03:29

## 2021-08-16 RX ADMIN — METOPROLOL SUCCINATE 100 MG: 25 TABLET, EXTENDED RELEASE ORAL at 21:07

## 2021-08-16 RX ADMIN — LISINOPRIL 10 MG: 10 TABLET ORAL at 21:07

## 2021-08-16 RX ADMIN — MAGNESIUM 64 MG (MAGNESIUM CHLORIDE) TABLET,DELAYED RELEASE 535 MG: at 08:14

## 2021-08-16 RX ADMIN — INSULIN ASPART 4 UNITS: 100 INJECTION, SOLUTION INTRAVENOUS; SUBCUTANEOUS at 18:25

## 2021-08-16 RX ADMIN — CYCLOBENZAPRINE 10 MG: 10 TABLET, FILM COATED ORAL at 21:07

## 2021-08-16 RX ADMIN — INSULIN GLARGINE 16 UNITS: 100 INJECTION, SOLUTION SUBCUTANEOUS at 21:51

## 2021-08-16 RX ADMIN — PIPERACILLIN SODIUM AND TAZOBACTAM SODIUM 3.38 G: 3; .375 INJECTION, POWDER, LYOPHILIZED, FOR SOLUTION INTRAVENOUS at 15:09

## 2021-08-16 RX ADMIN — GADOBUTROL 10 ML: 604.72 INJECTION INTRAVENOUS at 04:19

## 2021-08-16 ASSESSMENT — ACTIVITIES OF DAILY LIVING (ADL)
ADLS_ACUITY_SCORE: 13

## 2021-08-16 ASSESSMENT — LIFESTYLE VARIABLES: TOBACCO_USE: 0

## 2021-08-16 NOTE — CONSULTS
[facility]    Infectious Disease Consultation     Date of Admission:  8/15/2021  Date of Consult : 08/16/21    Assessment:  1. Diabetic foot infection with osteomyelitis of left 5th metatarsal and 5th toe with abscess  2. Poorly controlled diabetes with last HbA1c of 9.1% 1/9/2021  3. CKD  4. Chronic medical conditions - diabetes complicated by neuropathy, retinopathy and nephropathy, Cad s/p CABG    Recommendations:  1. Surgery is planned for tomorrow, please send deep tissue/bone cultures  2. Continue Zosyn, hold vancomycin  3. Check HbA1c  Further antibiotic modification pending culture data.    Seema Kulkarni MD    Reason for Consult   I was asked to evaluate this patient for antimicrobial recommendations for diabetic foot infection:    Primary Care Physician   JAYA LLAMAS    Chief Complaint    Left foot erythema.    History is obtained from the patient and medical records    History of Present Illness   Nabil Cheung is a 61 year old male with poorly controlled diabetes with multiple infectious complications in the past, who has been admitted with left diabetic foot infection and ID has been asked to assist with antibiotic management.    Patient has had a long standing left foot lateral ulcer. He had debridement with great application and wound vac on 12/20/2020 and was treated with Cefadroxil. He has had intermittent worsening , X-ray in May did not reveal osteomyelitis, MRI was not done. More recently there has been worsening with increased purulent drainage over the past several weeks with erythema prompting hospitalization and MRI evidence of osteomyelitis of the 5th toe and metatarsal. Patient has been maintained on Vancomycin/Zosyn and Id has been asked to assist with antibiotic management. Podiatry has evaluated patient and surgery is planned foot tomorrow.    Patient remains afebrile and hemodynamically stable.    Antimicrobial recommendations  8/16 Zosyn, Vancomycin    Past Medical  History   I have reviewed this patient's medical history and updated it with pertinent information if needed.   No past medical history on file.    Past Surgical History   I have reviewed this patient's surgical history and updated it with pertinent information if needed.  Past Surgical History:   Procedure Laterality Date     GRAFT FLAP PEDICLE TRAM DELAY PROCEDURE Left 6/18/2020    Procedure: Debridement of sternal wound and left major pectoral flep;  Surgeon: MARCO Serna MD;  Location: [facility] OR     INCISION AND DRAINAGE CHEST WASHOUT, COMBINED N/A 6/12/2020    Procedure: INCISION AND DRAINAGE, WOUND, CHEST, WITH IRRIGATION and wound vac change;  Surgeon: Mark Mckinney MD;  Location: [facility] OR     REPAIR CHEST WALL N/A 6/18/2020    Procedure: Chest wall reconstruction;  Surgeon: MARCO Serna MD;  Location: [facility] OR       Prior to Admission Medications   Prior to Admission Medications   Prescriptions Last Dose Informant Patient Reported? Taking?   acetaminophen (TYLENOL) 325 MG tablet  at PRN  Yes Yes   Sig: Take 650 mg by mouth every 6 hours as needed for mild pain   albuterol (PROAIR HFA/PROVENTIL HFA/VENTOLIN HFA) 108 (90 Base) MCG/ACT inhaler 8/15/2021 at AM  Yes Yes   Sig: Inhale 1-2 puffs into the lungs   alfuzosin ER (UROXATRAL) 10 MG 24 hr tablet 8/15/2021 at AM Self Yes Yes   Sig: Take 10 mg by mouth daily    alpha-lipoic acid 600 MG capsule 8/14/2021 at PM  Yes Yes   Sig: Take 600 mg by mouth daily    aspirin (ASA) 325 MG EC tablet 8/14/2021 at PM Self Yes Yes   Sig: Take 81 mg by mouth every other day    becaplermin (REGRANEX) 0.01 % external gel   No Yes   Sig: Apply topically every morning Apply to left lateral foot wound daily   cyclobenzaprine (FLEXERIL) 10 MG tablet Past Month at PRN  No Yes   Sig: Take 1 tablet (10 mg) by mouth 3 times daily as needed for muscle spasms   empagliflozin (JARDIANCE) 25 MG TABS tablet   Yes Yes   Sig: Take 25 mg by mouth   finasteride (PROSCAR) 5  MG tablet 8/14/2021 at PM  Yes Yes   Sig: Take 5 mg by mouth daily   fish oil-omega-3 fatty acids 1000 MG capsule 8/14/2021 at PM Self Yes Yes   Sig: Take 1 g by mouth 2 times daily   insulin glargine (LANTUS PEN) 100 UNIT/ML pen 8/14/2021 at HS  No Yes   Sig: Inject 15 Units Subcutaneous At Bedtime   Patient taking differently: Inject 22 Units Subcutaneous At Bedtime    lactobacillus rhamnosus, GG, (CULTURELL) capsule 8/14/2021 at PM  Yes Yes   Sig: Take 1 capsule by mouth 2 times daily   lisinopril (ZESTRIL) 10 MG tablet 8/14/2021 at PM  Yes Yes   Sig: Take 10 mg by mouth daily   magnesium 250 MG tablet 8/14/2021 at PM  Yes Yes   Sig: Take 1 tablet by mouth   metFORMIN (GLUCOPHAGE) 1000 MG tablet 8/14/2021 at AM  No Yes   Sig: Take 1 tablet (1,000 mg) by mouth 2 times daily (with meals)   metoprolol succinate ER (TOPROL-XL) 50 MG 24 hr tablet 8/14/2021 at PM  Yes Yes   Sig: Take 50 mg by mouth every evening   nitroGLYcerin (NITROSTAT) 0.4 MG sublingual tablet  at PRN  Yes Yes   Sig: Place 0.4 mg under the tongue every 5 minutes as needed for chest pain For chest pain place 1 tablet under the tongue every 5 minutes for 3 doses. If symptoms persist 5 minutes after 1st dose call 911.   rosuvastatin (CRESTOR) 40 MG tablet 8/14/2021 at PM Self Yes Yes   Sig: Take 20 mg by mouth daily Evening    vitamin C (ASCORBIC ACID) 1000 MG TABS 8/14/2021 at AM  Yes Yes   Sig: Take 1,000 mg by mouth daily   vitamin D3 (CHOLECALCIFEROL) 2000 units (50 mcg) tablet 8/14/2021 at AM  Yes Yes   Sig: Take 1 tablet by mouth daily      Facility-Administered Medications: None     Allergies   Allergies   Allergen Reactions     Atorvastatin      Dust Mites      Fluorescein Nausea and Vomiting       Immunization History   Immunization History   Administered Date(s) Administered     COVID-19,PF,Moderna 03/20/2021, 04/17/2021     DTaP, Unspecified 03/05/2014     FLU 6-35 months 09/24/2018, 10/31/2019     Flu, Unspecified 09/17/2014, 09/21/2015,  11/22/2016, 09/28/2017, 10/31/2019     Ig, Unspecified Formulation 04/16/1997     Influenza (IIV3) PF 12/14/2001, 11/25/2003, 10/03/2010     Influenza Vaccine IM > 6 months Valent IIV4 09/17/2014, 09/21/2015, 09/28/2017     Meningococcal (Menomune ) 04/16/1997     Meningococcal,unspecified 04/16/1997     OPV, trivalent, live 04/09/1997     Poliovirus, inactivated (IPV) 04/09/1997     Td (Adult), Adsorbed 1958, 01/01/1995       Social History   I have reviewed this patient's social history and updated it with pertinent information if needed. Nabil Cheung  reports that he has never smoked. He has never used smokeless tobacco. He reports current alcohol use. He reports that he does not use drugs.    Family History   I have reviewed this patient's family history and updated it with pertinent information if needed.   No family history on file.    Review of Systems   The 10 point Review of Systems is negative other than noted in the HPI or here.     Physical Exam   Temp: 97.2  F (36.2  C) Temp src: Axillary BP: (!) 172/80 Pulse: 77   Resp: 16 SpO2: 95 % O2 Device: None (Room air)    Vital Signs with Ranges  Temp:  [96.6  F (35.9  C)-97.2  F (36.2  C)] 97.2  F (36.2  C)  Pulse:  [71-84] 77  Resp:  [15-18] 16  BP: (172-194)/() 172/80  SpO2:  [91 %-98 %] 95 %  210 lbs 0 oz  Body mass index is 29.29 kg/m .    GENERAL APPEARANCE:  awake  EYES: Eyes grossly normal to inspection, PERRL and conjunctivae and sclerae normal  NECK: no adenopathy, no asymmetry, masses, or scars and thyroid normal to palpation  RESP: lungs clear to auscultation - no rales, rhonchi or wheezes  CV: regular rates and rhythm, normal S1 S2, no S3 or S4 and no murmur, click or rub  ABDOMEN: soft, nontender, without hepatosplenomegaly or masses and bowel sounds normal  MS: Left foot lateral ulcer over metatarsal with maceration    Data   All laboratory data reviewed  Component      Latest Ref Rng & Units 8/15/2021   WBC      4.0 - 11.0 10e3/uL  10.6   RBC Count      4.40 - 5.90 10e6/uL 4.61   Hemoglobin      13.3 - 17.7 g/dL 12.7 (L)   Hematocrit      40.0 - 53.0 % 37.6 (L)   MCV      78 - 100 fL 82   MCH      26.5 - 33.0 pg 27.5   MCHC      31.5 - 36.5 g/dL 33.8   RDW      10.0 - 15.0 % 13.5   Platelet Count      150 - 450 10e3/uL 270     Component      Latest Ref Rng & Units 8/15/2021   Sodium      133 - 144 mmol/L 133   Potassium      3.4 - 5.3 mmol/L 4.8   Chloride      94 - 109 mmol/L 102   Carbon Dioxide      20 - 32 mmol/L 29   Anion Gap      3 - 14 mmol/L 2 (L)   Urea Nitrogen      7 - 30 mg/dL 26   Creatinine      0.66 - 1.25 mg/dL 1.36 (H)   Calcium      8.5 - 10.1 mg/dL 8.9   Glucose      70 - 99 mg/dL 261 (H)   Alkaline Phosphatase      40 - 150 U/L 72   AST      0 - 45 U/L 13   ALT      0 - 70 U/L 23   Protein Total      6.8 - 8.8 g/dL 7.7   Albumin      3.4 - 5.0 g/dL 3.3 (L)   Bilirubin Total      0.2 - 1.3 mg/dL 0.4   GFR Estimate      >60 mL/min/1.73m2 55 (L)     Recent Labs   Lab Test 01/08/21  1145 01/08/21  1031 06/18/20  0828 06/12/20  0916 06/12/20  0913 06/12/20  0910 07/29/19  0544   CULT No growth No growth Culture negative after 4 weeks  No anaerobes isolated  On day 2, isolated in broth only:  Staphylococcus epidermidis  * Culture negative after 4 weeks  No anaerobes isolated  On day 2, isolated in broth only:  Staphylococcus epidermidis  *  Canceled, Test credited  Test reordered as correct code  Reordered as BONEC   Culture negative after 4 weeks  No anaerobes isolated  Light growth  Normal skin riki   Culture negative after 4 weeks  No anaerobes isolated  Canceled, Test credited  Incorrectly ordered by PCU/Clinic  Test reordered as correct code    Light growth  Normal skin riki   No growth       Microbiology  8/15 blood cxs pending  8/15 L foot swab cx GNBB on stain, cs pending    Imaging  8/15 MRI left foot                                                                  IMPRESSION:  1.  Evidence for osteomyelitis  distal aspect left fifth metatarsal at the left fifth metatarsal neck and head with cortical destruction and replacement of the normal T1 signal. Associated area of osteomyelitis involving the proximal aspect of the   proximal phalanx of the left fifth toe.     2.  No other areas of osteomyelitis.     3.  Diffuse muscular edema involving the left foot which can be seen with myositis.     4.  Small subcutaneous fluid collection with draining abscess along the dorsal lateral aspect of the distal left foot adjacent to the site of osteomyelitis.     5.  Marked subcutaneous edema lateral aspect of the left foot.

## 2021-08-16 NOTE — PROGRESS NOTES
MRI noted that pt is scheduled as an outpt for imaging today. MRI tech called KITTY Borwn in the ER at 0046 and advised if MRI is needed for admission we will need new order as out pt imaging order will not cover inpatient imaging. MRI would like to image patient overnight if possible.

## 2021-08-16 NOTE — ED TRIAGE NOTES
Pt here with family member for wound check. Pt states he has been getting treatment for diabetic ulcer on his L foot for the pass 8 months. He states the wound is now red and swollen and is concerned that it's infected. Pt is scheduled for an MRI tomorrow for the foot. Pt Aox4. VSS.

## 2021-08-16 NOTE — H&P
[facility]    History and Physical  Hospitalist       Date of Admission:  8/15/2021  Date of Service (when I saw the patient): 08/16/21    Assessment & Plan   Nabil Cheung is a 61 year old male who presents with foot infection    Diabetic foot ulcer  Follows with Dr. Cline with podiatry/ wound center. Had admit 12/2020 for purulent LLE cellulitis ultimately with ograft and wound vac placement (S [place]greg[place]). Noted erythema, swelling and drainage from wound startin 8/14, no fevers, some pain. Afebrile here. WBC 10.6. foot XR with findings compatible with osteomyelitis  - blood cultures pending  - wound culture pending  - podiatry consult  - MRI L foot  - vanco/zosyn for now  - ID consult to assist with abx management    DM II with neuropathy, nephropathy and retinopathy  [empagliflozin 25 mg daily, insulin glargine 22 units at HS, metformin 1000 mg BID]  Poorly controlled, most recent A1C at 10.1%. started using insulin 1.5 years ago prior to CABG. Notes increased blood sugars correlating with his foot infection   - hold orals  - TID and HS blood sugars  - med sliding scale insulin  - mod CHO diet  - continue insulin 22 units at HS    CAD with h/o CABG 5/2020  HTN  [aspirin 325 mg daily, lisinopril 10 mg daily, metoprolol ER 50 mg at HS, prn NTG, rosuvastatin 40 mg daily]  CABG 5/2020 with complication of sternal wound infection with MRSE 6/2020. Hypertensive on arrival 180-190's systolic.   - continue lisinopril, metoprolol  - hold aspirin if possible procedure  - continue atorvastatin    CKD III  baseline creatinine ~1.3-1.4. creatinine on presentation at 1.36.   - monitor creatinine   - avoid nephrotoxins    BPH  [alfuzosin 10 mg daily, finasteride 5 mg daily]  - continue meds    COVID-19 negative on admission    DVT Prophylaxis: Pneumatic Compression Devices  Code Status: Full Code    Disposition: Expected discharge in 2-4 days     Ronan Richards MD  [phone] (P)  Text Page     Primary  "Care Physician   JAYA LLAMAS    Chief Complaint   Foot infection    History is obtained from the patient and medical records    History of Present Illness   Nabil Cheung is a 61 year old male who presents with erythema of his left foot.  He has a known diabetic foot ulcer that has been dealing with for several months.  He actually states he has been dealing with his diabetic foot ulcer on that foot for 2 years but things have been okay largely until December 2020.  He was admitted for infection and ultimately had a wound VAC placed.  He states in February he was actually doing quite well but then tried walking and had a setback.  In May 2020 when it became acutely worse.  He was in a contact cast in July 2021.  He notes that the last 4 to 5 weeks has been progressively worsening especially in the last 2 weeks.  He has been trying some local wound cares as prescribed.  The last day or 2 is noted swelling and redness on the lateral one third of his foot.  He has had no fevers or chills.  Denies any chest pain or shortness of breath.  Has no abdominal pain no nausea or vomiting.  He does report that his diabetes is a \"bad\".  He has noted hypoglycemia last couple weeks.    Past Medical History    I have reviewed this patient's medical history and updated it with pertinent information if needed.   No past medical history on file.    Past Surgical History   I have reviewed this patient's surgical history and updated it with pertinent information if needed.  Past Surgical History:   Procedure Laterality Date     GRAFT FLAP PEDICLE TRAM DELAY PROCEDURE Left 6/18/2020    Procedure: Debridement of sternal wound and left major pectoral flep;  Surgeon: MARCO Serna MD;  Location: [facility] OR     INCISION AND DRAINAGE CHEST WASHOUT, COMBINED N/A 6/12/2020    Procedure: INCISION AND DRAINAGE, WOUND, CHEST, WITH IRRIGATION and wound vac change;  Surgeon: Mark Mckinney MD;  Location: [facility] OR     REPAIR CHEST WALL N/A " 6/18/2020    Procedure: Chest wall reconstruction;  Surgeon: MARCO Serna MD;  Location: [facility] OR       Prior to Admission Medications   Prior to Admission Medications   Prescriptions Last Dose Informant Patient Reported? Taking?   acetaminophen (TYLENOL) 325 MG tablet  at PRN  Yes Yes   Sig: Take 650 mg by mouth every 6 hours as needed for mild pain   albuterol (PROAIR HFA/PROVENTIL HFA/VENTOLIN HFA) 108 (90 Base) MCG/ACT inhaler 8/15/2021 at AM  Yes Yes   Sig: Inhale 1-2 puffs into the lungs   alfuzosin ER (UROXATRAL) 10 MG 24 hr tablet 8/15/2021 at AM Self Yes Yes   Sig: Take 10 mg by mouth daily    alpha-lipoic acid 600 MG capsule 8/14/2021 at PM  Yes Yes   Sig: Take 600 mg by mouth daily    aspirin (ASA) 325 MG EC tablet 8/14/2021 at PM Self Yes Yes   Sig: Take 81 mg by mouth every other day    becaplermin (REGRANEX) 0.01 % external gel   No Yes   Sig: Apply topically every morning Apply to left lateral foot wound daily   cyclobenzaprine (FLEXERIL) 10 MG tablet Past Month at PRN  No Yes   Sig: Take 1 tablet (10 mg) by mouth 3 times daily as needed for muscle spasms   empagliflozin (JARDIANCE) 25 MG TABS tablet   Yes Yes   Sig: Take 25 mg by mouth   finasteride (PROSCAR) 5 MG tablet 8/14/2021 at PM  Yes Yes   Sig: Take 5 mg by mouth daily   fish oil-omega-3 fatty acids 1000 MG capsule 8/14/2021 at PM Self Yes Yes   Sig: Take 1 g by mouth 2 times daily   insulin glargine (LANTUS PEN) 100 UNIT/ML pen 8/14/2021 at HS  No Yes   Sig: Inject 15 Units Subcutaneous At Bedtime   Patient taking differently: Inject 22 Units Subcutaneous At Bedtime    lactobacillus rhamnosus, GG, (CULTURELL) capsule 8/14/2021 at PM  Yes Yes   Sig: Take 1 capsule by mouth 2 times daily   lisinopril (ZESTRIL) 10 MG tablet 8/14/2021 at PM  Yes Yes   Sig: Take 10 mg by mouth daily   magnesium 250 MG tablet 8/14/2021 at PM  Yes Yes   Sig: Take 1 tablet by mouth   metFORMIN (GLUCOPHAGE) 1000 MG tablet 8/14/2021 at AM  No Yes   Sig: Take 1  tablet (1,000 mg) by mouth 2 times daily (with meals)   metoprolol succinate ER (TOPROL-XL) 50 MG 24 hr tablet 8/14/2021 at PM  Yes Yes   Sig: Take 50 mg by mouth every evening   nitroGLYcerin (NITROSTAT) 0.4 MG sublingual tablet  at PRN  Yes Yes   Sig: Place 0.4 mg under the tongue every 5 minutes as needed for chest pain For chest pain place 1 tablet under the tongue every 5 minutes for 3 doses. If symptoms persist 5 minutes after 1st dose call 911.   rosuvastatin (CRESTOR) 40 MG tablet 8/14/2021 at PM Self Yes Yes   Sig: Take 20 mg by mouth daily Evening    vitamin C (ASCORBIC ACID) 1000 MG TABS 8/14/2021 at AM  Yes Yes   Sig: Take 1,000 mg by mouth daily   vitamin D3 (CHOLECALCIFEROL) 2000 units (50 mcg) tablet 8/14/2021 at AM  Yes Yes   Sig: Take 1 tablet by mouth daily      Facility-Administered Medications: None     Allergies   Allergies   Allergen Reactions     Atorvastatin      Dust Mites      Fluorescein Nausea and Vomiting       Social History   I have reviewed this patient's social history and updated it with pertinent information if needed. Nabil Cheung  reports that he has never smoked. He has never used smokeless tobacco. He reports current alcohol use. He reports that he does not use drugs.    Family History   I have reviewed this patient's family history and updated it with pertinent information if needed.   Mother and father with DM    Review of Systems   The 10 point Review of Systems is negative other than noted in the HPI or here.     Physical Exam   Temp: 97  F (36.1  C) Temp src: Temporal BP: (!) 191/149 Pulse: 74   Resp: 16 SpO2: 97 % O2 Device: None (Room air)    Vital Signs with Ranges  210 lbs 0 oz    Constitutional: alert, oriented and in no acute distress  Eyes: EOMI, PERRL  HEENT: OP clear  Respiratory: CTA B without w/c  Cardiovascular: RRR no murmur. tr edema.  GI: soft, nontender, nondistended, BS present  Lymph/Hematologic: no cervical LAD  Genitourinary: deferred  Skin: no rashes  or lesions grossly. L foot wrapped, drainage from lateral aspect. Mild erythema along lateral aspect of foot  Musculoskeletal: no deformities or arthritis  Neurologic: CN II-XII, DUONG  Psychiatric: mood and affect wnl    Data   Data reviewed today:  I personally reviewed the foot xr image(s) showing possible osteo.  Recent Labs   Lab 08/15/21  2233   WBC 10.6   HGB 12.7*   MCV 82         POTASSIUM 4.8   CHLORIDE 102   CO2 29   BUN 26   CR 1.36*   ANIONGAP 2*   ONEYDA 8.9   *   ALBUMIN 3.3*   PROTTOTAL 7.7   BILITOTAL 0.4   ALKPHOS 72   ALT 23   AST 13       Recent Results (from the past 24 hour(s))   Foot XR, G/E 3 views, left    Narrative    EXAM: XR FOOT LEFT G/E 3 VIEWS  LOCATION: [facility]  DATE/TIME: 8/15/2021 10:29 PM    INDICATION: Diabetic foot ulcer with overlying cellulitis. Evaluate for osteomyelitis.  COMPARISON: None.      Impression    IMPRESSION: Large soft tissue ulceration along the lateral aspect of the distal left foot adjacent to the left fifth metatarsal head and neck. The associated underlying left fifth metatarsal head and neck demonstrates cortical destruction along its   lateral aspect with underlying osteopenia. Findings most compatible with osteomyelitis. Additionally, there is a dislocation of the left fifth MTP joint. No fracture. Marked degenerative changes left first MTP joint.

## 2021-08-16 NOTE — PLAN OF CARE
07-11 Pt A&OX4, pleasant. Bedrest with BR privileges, up with scooter if needed.  Voiding in urinal.  IV antibiotics continue.  Eating/drinking well.  Plan for left fifth toe amputation tomorrow am.  Awaiting ID consult at this time.

## 2021-08-16 NOTE — ED NOTES
"[facility]  ED Nurse Handoff Report    ED Chief complaint: Wound Check      ED Diagnosis:   Final diagnoses:   None       Code Status: Hospitalist to address    Allergies:   Allergies   Allergen Reactions     Atorvastatin      Dust Mites      Fluorescein Nausea and Vomiting       Patient Story: Presents with non-healing diabetic left foot ulcer for 8 months. Developed \"little\" pain to left foot and redness last night.Scheduled for MRI left foot tomorrow morning. Reports elevated blood sugars at home past couple of days in mid 200's.    Focused Assessment:  A&Ox4. Hypertensive- history of otherwise stable vital signs. Afebrile. Half dollar sized ulcer left lateral foot. Lungs clear. Uses leg scooter and has walking shoe. Independent in cares.    Treatments and/or interventions provided: labs, xray  Patient's response to treatments and/or interventions: stable    To be done/followed up on inpatient unit:  admission orders    Does this patient have any cognitive concerns?: none    Activity level - Baseline/Home:  Independent  Activity Level - Current:   Independent    Patient's Preferred language: English   Needed?: No    Isolation: None  Infection: Not Applicable  Patient tested for COVID 19 prior to admission: YES  Bariatric?: No    Vital Signs:   Vitals:    08/15/21 2106 08/15/21 2251   BP: (!) 181/87 (!) 180/85   Pulse: 84    Resp: 18 18   Temp: 97  F (36.1  C)    TempSrc: Temporal    SpO2: 98% 96%   Weight: 95.3 kg (210 lb)      Labs Ordered and Resulted from Time of ED Arrival Up to the Time of Departure from the ED   COMPREHENSIVE METABOLIC PANEL - Abnormal; Notable for the following components:       Result Value    Anion Gap 2 (*)     Creatinine 1.36 (*)     Glucose 261 (*)     Albumin 3.3 (*)     GFR Estimate 55 (*)     All other components within normal limits   CBC WITH PLATELETS AND DIFFERENTIAL - Abnormal; Notable for the following components:    Hemoglobin 12.7 (*)     " Hematocrit 37.6 (*)     All other components within normal limits   ISTAT GASES LACTATE VENOUS POCT - Abnormal; Notable for the following components:    Bicarbonate Venous POCT 29 (*)     O2 Sat, Venous POCT 39 (*)     pO2 Venous POCT 23 (*)     All other components within normal limits   COVID-19 VIRUS (CORONAVIRUS) BY PCR - Normal    Narrative:     Testing was performed using the tonny  SARS-CoV-2 & Influenza A/B Assay on the tonny  [employer]  System.  This test should be ordered for the detection of SARS-COV-2 in individuals who meet SARS-CoV-2 clinical and/or epidemiological criteria. Test performance is unknown in asymptomatic patients.  This test is for in vitro diagnostic use under the FDA EUA for laboratories certified under CLIA to perform moderate and/or high complexity testing. This test has not been FDA cleared or approved.  A negative test does not rule out the presence of PCR inhibitors in the specimen or target RNA in concentration below the limit of detection for the assay. The possibility of a false negative should be considered if the patient's recent exposure or clinical presentation suggests COVID-19.  [facility] are certified under the Clinical Laboratory Improvement Amendments of 1988 (CLIA-88) as qualified to perform moderate and/or high complexity laboratory testing.   CBC WITH PLATELETS & DIFFERENTIAL    Narrative:     The following orders were created for panel order CBC with platelets differential.  Procedure                               Abnormality         Status                     ---------                               -----------         ------                     CBC with platelets and d...[[id number]]  Abnormal            Final result                 Please view results for these tests on the individual orders.   PULSE OXIMETRY NURSING   CARDIAC CONTINUOUS MONITORING   STRICT INTAKE AND OUTPUT   PERIPHERAL IV CATHETER   ISTAT CG4 GASES LACTATE PATRICK NURSING POCT   ISTAT  GAS OR ELECTROLYTE NURSING POCT   AEROBIC BACTERIAL CULTURE ROUTINE   BLOOD CULTURE   BLOOD CULTURE       Cardiac Rhythm:     Was the PSS-3 completed:   Yes  What interventions are required if any?               Family Comments: none  OBS brochure/video discussed/provided to patient/family: N/A              Name of person given brochure if not patient: n/a              Relationship to patient: n/a    For the majority of the shift this patient's behavior was Green.   Behavioral interventions performed were none.    ED NURSE PHONE NUMBER: [phone]

## 2021-08-16 NOTE — PROVIDER NOTIFICATION
MD Notification    Notified Person: MD    Notified Person Name: Dr. Richards    Notification Date/Time: 8/16/21 5394    Notification Interaction: Amcom    Purpose of Notification: FYI wound culture 1+ gram negative bacilli & no WBC seen.    Orders Received:    Comments:

## 2021-08-16 NOTE — PLAN OF CARE
Arrived from ED @ 0300. A&Ox4. Hypertensive, did not meet parameters. All other VSS. Denies pain. C/O muscle spasms, flexeril x1. Mod CHO diet, carb counts - BG checks w/ sliding scale insulin. Bedrest w/ BR privileges - IND w/ scooter. Using bedside urinal. L PIV SL w/ intermittent abx. L foot wound/ulcer dressing CDI. Podiatry & ID consulted.

## 2021-08-16 NOTE — PHARMACY-VANCOMYCIN DOSING SERVICE
"Pharmacy Vancomycin Note  Date of Service 2021  Patient's  1960   61 year old, male    Indication: Osteomyelitis  Day of Therapy: nt vancomycin regimen:  1500 mg IV q24h  Current vancomycin monitoring method: AUC  Current vancomycin therapeutic monitoring goal: 400-600 mg*h/L    Current estimated CrCl = Estimated Creatinine Clearance: 66.6 mL/min (A) (based on SCr of 1.36 mg/dL (H)).    Creatinine for last 3 days  8/15/2021: 10:33 PM Creatinine 1.36 mg/dL    Recent Vancomycin Levels (past 3 days)  No results found for requested labs within last 72 hours.    Vancomycin IV Administrations (past 72 hours)                   vancomycin 2000 mg in 0.9% NaCl 500 ml intermittent infusion 2,000 mg (mg) 2,000 mg New Bag 21 0208                Nephrotoxins and other renal medications (From now, onward)    Start     Dose/Rate Route Frequency Ordered Stop    21 1400  vancomycin 1500 mg in 0.9% NaCl 250 ml intermittent infusion 1,500 mg      1,500 mg  over 90 Minutes Intravenous EVERY 24 HOURS 21 0338      21 0800  piperacillin-tazobactam (ZOSYN) 3.375 g vial to attach to  mL bag     Note to Pharmacy: For [facility], [facility] and [facility]: For Zosyn-naive patients, use the \"Zosyn initial dose + extended infusion\" order panel.    3.375 g  over 30 Minutes Intravenous EVERY 6 HOURS 21 0308      21 0330  lisinopril (ZESTRIL) tablet 10 mg      10 mg Oral AT BEDTIME 21 0308      08/15/21 2355  vancomycin 2000 mg in 0.9% NaCl 500 ml intermittent infusion 2,000 mg      2,000 mg  over 2 Hours Intravenous ONCE 08/15/21 2352               Contrast Orders - past 72 hours (72h ago, onward)    None          Interpretation of levels and current regimen:  Vancomycin level is reflective of -600    Has serum creatinine changed greater than 50% in last 72 hours: No    Urine output:  good urine output    Renal Function: Stable    1. Continue Current Dose  2. Vancomycin monitoring method: " AUC  3. Vancomycin therapeutic monitoring goal: 400-600 mg*h/L  4. Pharmacy will check vancomycin levels as appropriate in 1-3 Days.  5. Serum creatinine levels will be ordered daily for the first week of therapy and at least twice weekly for subsequent weeks.    Manuel Michelle RPH

## 2021-08-16 NOTE — PROGRESS NOTES
[facility]    Hospitalist Interim Progress Note      Please see admission H/P per Dr Richards from earlier this AM.    In the interim, Dr Tierney, Podiatry saw Patient and discussed with him recommendation and plan for L 5th toe and distal MT amputation tomorrow morning.  In the interim, continue on IV antibiotic, ID advises continue Zosyn hold Vanco.  For NPO status after MN, will decrease PTA lantus hs from 22 to 16units; glucoses have been in the 240-260 range off PTA oral agents.     MD Kayli  Internal Medicine  [facility] .

## 2021-08-16 NOTE — PROGRESS NOTES
RECEIVING UNIT ED HANDOFF REVIEW    ED Nurse Handoff Report was reviewed by: Roberta Ruffin RN on August 16, 2021 at 1:24 AM

## 2021-08-16 NOTE — ED PROVIDER NOTES
History   Chief Complaint:  Wound Check       HPI   Nabil Cheung is a 61 year old male with history of diabetes and CAD, s/p CABG x4, who presents for a wound check on his left foot. The patient says that he has had the diabetic ulcer on his left foot for over 8 months. He says that it has been infected 2 times in the past 8 months, and is currently getting treatment for it. The patient says that since last night the area has been more red, swollen, and draining more. Nabil says that his blood sugars have also been slightly elevated, 160-180 in the morning compared to 120-140 normally. He says that he has also been coughing more, but thinks it is likely due to the smoky weather a couple weeks ago exacerbating his asthma. The patient denies any fever, nausea, vomiting, or fatigue.         Review of Systems   Constitutional: Negative for fatigue and fever.   Gastrointestinal: Negative for nausea and vomiting.   Musculoskeletal: Positive for joint swelling.   Skin: Positive for color change and wound.   All other systems reviewed and are negative.      Allergies:  Atorvastatin  Dust Mites  Fluorescein    Medications:  Lamisil  Januvia  Crestor  Zofran  Nitrostat  Toprol  Metformin  Magnesium  Lisinopril    Lantus  Proscar  Jardiance  Flexeril  Regranex  Asprin  Uroxatral   Albuterol   Novolog     Past Medical History:    Osteomyelitis   Cellulitis  Proliferative diabetic retinopathy of both eyes  CAD  Epididymo orchitis    BPH  Diabetic ulcer  Diabetes   Cataracts  Thyroid nodule  Hypothyroidism    Asthma     Past Surgical History:    CABG x4  Graft flap pedicle tram delay procedure  Incision and drainage chest wound  Repair chest wall    Bladder biopsy     Family History:    Diabetes  Breast cancer     Social History:  Patient presents to the ED with his wife.     Physical Exam     Patient Vitals for the past 24 hrs:   BP Temp Temp src Pulse Resp SpO2 Weight   08/15/21 2251 (!) 180/85 -- -- -- 18 96 % --   08/15/21  2106 (!) 181/87 97  F (36.1  C) Temporal 84 18 98 % 95.3 kg (210 lb)       Physical Exam  General: Well-nourished, appears to be resting comfortably when I enter the room  Eyes: PERRL, conjunctivae pink no scleral icterus or conjunctival injection  ENT:  Moist mucus membranes, posterior oropharynx clear without erythema or exudates  Respiratory:  Lungs clear to auscultation bilaterally, no crackles/rubs/wheezes.  Good air movement  CV: Normal rate and rhythm, no murmurs/rubs/gallops  GI:  Abdomen soft and non-distended.  Normoactive BS.  No tenderness, guarding or rebound  Skin: Warm, dry.  Ulceration over the lateral aspect of the left foot with overlying erythema and warmth over the dorsum of the foot extending proximally and distally and medially.            Musculoskeletal: No peripheral edema or calf tenderness  Neuro: Alert and oriented to person/place/time  Psychiatric: Normal affect      Emergency Department Course     Imaging:  Foot XR, G/E 3 views, left   Large soft tissue ulceration along the lateral aspect of the distal left foot adjacent to the left fifth metatarsal head and neck. The associated underlying left fifth metatarsal head and neck demonstrates cortical destruction along its   lateral aspect with underlying osteopenia. Findings most compatible with osteomyelitis. Additionally, there is a dislocation of the left fifth MTP joint. No fracture. Marked degenerative changes left first MTP joint.  Reading per radiology     Laboratory:    CBC: WBC: 10.6, HGB: 12.7 (L), PLT: 270  CMP: Glucose 361 (H), Anion Gap: 2 (L), GFR: 55 (L), Albumin: 3.3 (L), o/w WNL (Creatinine: 1.36 (H))   ISTAT gases lactate venous,  POCT: LACTW 1.6, Bicarb 29 (H), O2 Sat 39 (L), pCO2 48, pH 7.39, pO2 23 (L)    Asymptomatic COVID19 Virus PCR by nasopharyngeal swab pending      Procedures    Emergency Department Course:    Reviewed:  I reviewed nursing notes, vitals, past medical history and care  everywhere       Assessments:  2126 I performed an exam of the patient as documented above.   I updated the patient and discussed his laboratory results and x-ray imaging with him.  He agreed with the plan for admission.    Consults:   I consulted with the clinical pharmacist to evaluate and determine the best antibiotic coverage given the patient's history of resistant organisms as well as his renal insufficiency.  I spoke with Dr. Richards of the hospitalist service regarding patient's presentation, findings, and plan of care.     Interventions:  Zosyn 4.5 g IV  Vancomycin 2 g IV  Labetalol 20 mg IV    Disposition:  The patient was admitted to the hospital under the care of Dr. Richards.       Impression & Plan       Medical Decision Making:  Nabil Cheung is a 61 year old male who presents to the emergency department today for evaluation of worsening of a diabetic foot ulcer with overlying erythema and warmth concerning for cellulitis as well as underlying osteomyelitis.  He does not appear to be septic.  He was treated with IV antibiotics.  X-ray is consistent with osteomyelitis.  He  will need an MRI and likely debridement from podiatry.  He follows with Dr. Tierney.  He is vitally stable.  Blood pressure was elevated in the floor called with concerns that he could not be admitted until this was treated though it is clear that he has no signs of endorgan dysfunction, we did treat with a dose of labetalol to gently lowered his pressure.  He reports that this is better and running higher than normal in the past few months and that he had previously been on a higher dose of lisinopril and it had been decreased but he may need a higher dose again.  He was in agreement with the plan for admission.  Dr. Richards graciously agreed to admit the patient.    Covid-19  Nabil Cheung was evaluated during a global COVID-19 pandemic, which necessitated consideration that the patient might be at risk for infection with the  SARS-CoV-2 virus that causes COVID-19.   Applicable protocols for evaluation were followed during the patient's care.   COVID-19 was considered as part of the patient's evaluation. The plan for testing is:  a test was obtained during this visit.       Diagnosis:    ICD-10-CM    1. Diabetic ulcer of left midfoot associated with type 1 diabetes mellitus, with fat layer exposed (H)  E10.621 Case Request: left partial fifth ray amputation    L97.422 Case Request: left partial fifth ray amputation     Amputate toe(s)     Amputate toe(s)   2. Cellulitis and abscess of foot, except toes  L03.119     L02.619    3. Cellulitis and abscess of foot  L03.119 Case Request: left partial fifth ray amputation    L02.619 Case Request: left partial fifth ray amputation     Amputate toe(s)     Amputate toe(s)   4. Chronic osteomyelitis of left foot with draining sinus (H)  M86.472 Case Request: left partial fifth ray amputation     Case Request: left partial fifth ray amputation     Amputate toe(s)     Amputate toe(s)         Scribe Disclosure:  I, John Doyle, am serving as a scribe at 9:15 PM on 8/15/2021 to document services personally performed by Sophia Ponce MD based on my observations and the provider's statements to me.          Sophia Ponce MD  08/17/21 1800

## 2021-08-16 NOTE — CONSULTS
PATIENT HISTORY:  Nabil Cheung is a 61 year old male who was admitted for left foot infection.      I was asked to see Nabil Cheung  by  for left diabetic foot infection.    Patient was seen at bedside.  Patient is well known to provider. Have been treating foot ulcer for past few months. Had ordered an MRI last office visit as wound had gotten bigger. He developed more redness and swelling to the left foot the last 2 days which prompted him to come to the emergency department.  Patient denies pain but has baseline neuropathy due to diabetes.     Review of Systems:  Patient denies fever, chills, rash,  stiffness, limping, weakness, heart burn, blood in stool, chest pain with activity, calf pain when walking, shortness of breath with activity, chronic cough, easy bleeding/bruising, swelling of ankles, excessive thirst, fatigue, depression, anxiety.  Patient admits to wound, numbness.     PAST MEDICAL HISTORY: No past medical history on file.     PAST SURGICAL HISTORY:   Past Surgical History:   Procedure Laterality Date     GRAFT FLAP PEDICLE TRAM DELAY PROCEDURE Left 6/18/2020    Procedure: Debridement of sternal wound and left major pectoral flep;  Surgeon: MARCO Serna MD;  Location: [facility] OR     INCISION AND DRAINAGE CHEST WASHOUT, COMBINED N/A 6/12/2020    Procedure: INCISION AND DRAINAGE, WOUND, CHEST, WITH IRRIGATION and wound vac change;  Surgeon: Mark Mckinney MD;  Location: [facility] OR     REPAIR CHEST WALL N/A 6/18/2020    Procedure: Chest wall reconstruction;  Surgeon: MARCO Serna MD;  Location: [facility] OR        MEDICATIONS:   Current Facility-Administered Medications:      acetaminophen (TYLENOL) tablet 650 mg, 650 mg, Oral, Q6H PRN **OR** acetaminophen (TYLENOL) Suppository 650 mg, 650 mg, Rectal, Q6H PRN, Ronan Richards MD     albuterol (PROAIR HFA/PROVENTIL HFA/VENTOLIN HFA) 108 (90 Base) MCG/ACT inhaler 1-2 puff, 1-2 puff, Inhalation, Q4H PRN, Ronan Richards  MD Jonathan     alfuzosin ER (UROXATRAL) 24 hr tablet 10 mg, 10 mg, Oral, Daily, Ronan Richards MD     cyclobenzaprine (FLEXERIL) tablet 10 mg, 10 mg, Oral, TID PRN, Roann Richards MD, 10 mg at 08/16/21 0510     glucose gel 15-30 g, 15-30 g, Oral, Q15 Min PRN **OR** dextrose 50 % injection 25-50 mL, 25-50 mL, Intravenous, Q15 Min PRN **OR** glucagon injection 1 mg, 1 mg, Subcutaneous, Q15 Min PRN, Ronan Richards MD     finasteride (PROSCAR) tablet 5 mg, 5 mg, Oral, Daily, Ronan Richards MD     insulin aspart (NovoLOG) injection (RAPID ACTING), 1-7 Units, Subcutaneous, TID AC, Ronan Richards MD     insulin aspart (NovoLOG) injection (RAPID ACTING), 1-5 Units, Subcutaneous, At Bedtime, Ronan Richards MD, 2 Units at 08/16/21 0501     insulin glargine (LANTUS PEN) injection 22 Units, 22 Units, Subcutaneous, At Bedtime, Ronan Richards MD     lactobacillus rhamnosus (GG) (CULTURELL) capsule 1 capsule, 1 capsule, Oral, BID, Ronan Richards MD     lidocaine (LMX4) cream, , Topical, Q1H PRN, Ronan Richards MD     lidocaine 1 % 0.1-1 mL, 0.1-1 mL, Other, Q1H PRN, Ronan Richards MD     lisinopril (ZESTRIL) tablet 10 mg, 10 mg, Oral, At Bedtime, Ronan Richards MD, 10 mg at 08/16/21 0329     magnesium chloride CR tablet 535 mg, 535 mg, Oral, Daily, Ronan Richards MD     magnesium hydroxide (MILK OF MAGNESIA) suspension 30 mL, 30 mL, Oral, Daily PRN, Ronan Richards MD     melatonin tablet 1 mg, 1 mg, Oral, At Bedtime PRN, Ronan Richards MD     metoprolol succinate ER (TOPROL-XL) 24 hr tablet 50 mg, 50 mg, Oral, QPM, Ronan Richards MD, 50 mg at 08/16/21 0329     naloxone (NARCAN) injection 0.2 mg, 0.2 mg, Intravenous, Q2 Min PRN **OR** naloxone (NARCAN) injection 0.4 mg, 0.4 mg, Intravenous, Q2 Min PRN **OR** naloxone (NARCAN) injection 0.2 mg, 0.2 mg, Intramuscular, Q2 Min PRN **OR**  naloxone (NARCAN) injection 0.4 mg, 0.4 mg, Intramuscular, Q2 Min PRN, Ronan Richards MD     nitroGLYcerin (NITROSTAT) sublingual tablet 0.4 mg, 0.4 mg, Sublingual, Q5 Min PRN, Ronan Richards MD     ondansetron (ZOFRAN-ODT) ODT tab 4 mg, 4 mg, Oral, Q6H PRN **OR** ondansetron (ZOFRAN) injection 4 mg, 4 mg, Intravenous, Q6H PRN, Ronan Richards MD     oxyCODONE (ROXICODONE) tablet 5 mg, 5 mg, Oral, Q4H PRN, Ronan Richards MD     piperacillin-tazobactam (ZOSYN) 3.375 g vial to attach to  mL bag, 3.375 g, Intravenous, Q6H, Ronan Rihcards MD     rosuvastatin (CRESTOR) tablet 20 mg, 20 mg, Oral, Daily, Ronan Richards MD     senna-docusate (SENOKOT-S/PERICOLACE) 8.6-50 MG per tablet 1 tablet, 1 tablet, Oral, BID PRN **OR** senna-docusate (SENOKOT-S/PERICOLACE) 8.6-50 MG per tablet 2 tablet, 2 tablet, Oral, BID PRN, Ronan Richards MD     sodium chloride (PF) 0.9% PF flush 3 mL, 3 mL, Intracatheter, Q8H, Ronan Richards MD, 3 mL at 08/16/21 0502     sodium chloride (PF) 0.9% PF flush 3 mL, 3 mL, Intracatheter, q1 min prn, Ronan Richards MD     vancomycin 1500 mg in 0.9% NaCl 250 ml intermittent infusion 1,500 mg, 1,500 mg, Intravenous, Q24H, Ronan Richards MD     vitamin C (ASCORBIC ACID) tablet 1,000 mg, 1,000 mg, Oral, Daily, Ronan Richards MD     vitamin D3 (CHOLECALCIFEROL) tablet 50 mcg, 50 mcg, Oral, Daily, Ronan Richards MD     ALLERGIES:    Allergies   Allergen Reactions     Atorvastatin      Dust Mites      Fluorescein Nausea and Vomiting        SOCIAL HISTORY:   Social History     Socioeconomic History     Marital status: Single     Spouse name: Not on file     Number of children: Not on file     Years of education: Not on file     Highest education level: Not on file   Occupational History     Not on file   Tobacco Use     Smoking status: Never Smoker     Smokeless tobacco: Never Used    Substance and Sexual Activity     Alcohol use: Yes     Drug use: No     Sexual activity: Not on file   Other Topics Concern     Not on file   Social History Narrative    6/29/17 - Pt was interested in getting help filling out [employer] application online to receive medicaid. CHW gave him the list of information he needed to complete online application. He stated he had a tax extension for 2016 so he did not have tax forms that the application required. He was going to work on the application with tax information from 2015 later tonight. AM        7/27/17:    Pt wanted to get more information about gyms that are low-cost within the [facility]. Pt's asked for a gym with a membership fee of $10-$20 (max), that also had a walking track and a hot tub/sauna. We told him that options that fit these requirements he mentioned were very limited. He then stated that he heard that the [facility] offered a $5 fee for track-usage only, however, we could not call [facility] since it was closed. We suggested to the patient to call tomorrow and also mentioned there are gyms that are low-cost nearby, however, might not have a track or hot tub/sauna. We also suggested that he could walk around a lake would also be a cheap-alternative. -HB and NV        8/28/17:    Pt was here for a med refill. We asked him about his gym membership and access based on CHW's previous encounter with him and he said he tried to walk outside more this summer, but still did not find an affordable option. He did not have need for any other services at the time.     - AA and KT         9/28/17:    Pt had questions about free/low cost (under $10/month) walking tracks.  Found a website to walk through with the patient and shared different resources. - AB and HB        7/19/18: Pt. Was not seen tonight.- ZR, KP     Social Determinants of Health     Financial Resource Strain:      Difficulty of Paying Living Expenses:    Food Insecurity:      Worried About Running  Out of Food in the Last Year:      Ran Out of Food in the Last Year:    Transportation Needs:      Lack of Transportation (Medical):      Lack of Transportation (Non-Medical):    Physical Activity:      Days of Exercise per Week:      Minutes of Exercise per Session:    Stress:      Feeling of Stress :    Social Connections:      Frequency of Communication with Friends and Family:      Frequency of Social Gatherings with Friends and Family:      Attends Yazidism Services:      Active Member of Clubs or Organizations:      Attends Club or Organization Meetings:      Marital Status:    Intimate Partner Violence:      Fear of Current or Ex-Partner:      Emotionally Abused:      Physically Abused:      Sexually Abused:         FAMILY HISTORY: No family history on file.     EXAM:Vitals: BP (!) 172/80 (BP Location: Right arm)   Pulse 77   Temp 97.2  F (36.2  C) (Axillary)   Resp 16   Wt 95.3 kg (210 lb)   SpO2 95%   BMI 29.29 kg/m    BMI= Body mass index is 29.29 kg/m .    LABS:    WBC   Date Value Ref Range Status   01/09/2021 9.3 4.0 - 11.0 10e9/L Final     WBC Count   Date Value Ref Range Status   08/15/2021 10.6 4.0 - 11.0 10e3/uL Final       HA1C: 9.1 (1/2021)    General appearance: Patient is alert and fully cooperative with history & exam.  No sign of distress is noted during the visit.      Psychiatric: Affect is pleasant & appropriate.  Patient appears motivated to improve health.       Respiratory: Breathing is regular & unlabored while sitting.      HEENT: Hearing is intact to spoken word.  Speech is clear.  No gross evidence of visual impairment that would impact ambulation.       Dermatologic: Full thickness ulceration to the plantar lateral aspect of the left 5th metatarsal head. Measures approximately 1.8cm x 1.8cm x 0.2cm.  Localized redness and some purulent drainage noted.      Vascular: DP & PT pulses are intact & regular bilaterally. edema left foot but no varicosities noted.  CFT and skin  temperature is normal to both lower extremities.       Neurologic: Lower extremity sensation is diminished to feet.      Musculoskeletal: Patient is ambulatory without assistive device or brace.  No gross ankle deformity noted.  No foot or ankle joint effusion is noted.      IMAGING: MRI left foot: I personally reviewed images - Evidence for osteomyelitis distal aspect left fifth metatarsal at the left fifth metatarsal neck and head with cortical destruction and replacement of the normal T1 signal. Associated area of osteomyelitis involving the proximal aspect of the   proximal phalanx of the left fifth toe.     2.  No other areas of osteomyelitis.     3.  Diffuse muscular edema involving the left foot which can be seen with myositis.     4.  Small subcutaneous fluid collection with draining abscess along the dorsal lateral aspect of the distal left foot adjacent to the site of osteomyelitis.     5.  Marked subcutaneous edema lateral aspect of the left foot.        Left foot xray:  Large soft tissue ulceration along the lateral aspect of the distal left foot adjacent to the left fifth metatarsal head and neck. The associated underlying left fifth metatarsal head and neck demonstrates cortical destruction along its   lateral aspect with underlying osteopenia. Findings most compatible with osteomyelitis. Additionally, there is a dislocation of the       CULTURES:  1+ Gram negative bacilli     ASSESSMENT: 61 yr old diabetic male with chronic left foot ulceration and osteomyelitis left 5th metatarsal.      PLAN:  Reviewed patient's chart in [employer].  -reviewed and discussed xrays and MRI results with patient.   -discussed that with bone infection, this needs to be surgically removed to try and get infection under control. This means that we have to remove the left 5th toe and part of the metatarsal. Discussed that this may require more than one surgery.  Talked about risks including infection, numbness, continued pain,  recurrence, need for further surgery, blood loss, blood clotting. You will scar.    -Patient agrees with plan.   --Scheduled for surgery tomorrow morning at 7:30am.   -NPO after midnight. Orders placed.         Mirian Cline DPM, Podiatry/Foot and Ankle Surgery    7:36 AM

## 2021-08-16 NOTE — PHARMACY-ADMISSION MEDICATION HISTORY
Pharmacy Medication History  Admission medication history interview status for the 8/15/2021  admission is complete. See EPIC admission navigator for prior to admission medications     Location of Interview: Patient room  Medication history sources: Patient, Surescripts and Care Everywhere    Significant changes made to the medication list:  Added:  - probiotic  - vitamin c  Removed:  - zofran  - januvia  - lamisil    In the past week, patient estimated taking medication this percent of the time: greater than 90%    Additional medication history information:   Of note, patient was prescribed regranex gel but has not been able to start this medication due to a prior authorization needed by the insurance company.     Medication reconciliation completed by provider prior to medication history? No    Time spent in this activity: 10 minutes    Prior to Admission medications    Medication Sig Last Dose Taking? Auth Provider   acetaminophen (TYLENOL) 325 MG tablet Take 650 mg by mouth every 6 hours as needed for mild pain  at PRN Yes Unknown, Entered By History   albuterol (PROAIR HFA/PROVENTIL HFA/VENTOLIN HFA) 108 (90 Base) MCG/ACT inhaler Inhale 1-2 puffs into the lungs 8/15/2021 at AM Yes Reported, Patient   alfuzosin ER (UROXATRAL) 10 MG 24 hr tablet Take 10 mg by mouth daily  8/15/2021 at AM Yes Reported, Patient   alpha-lipoic acid 600 MG capsule Take 600 mg by mouth daily  8/14/2021 at PM Yes Unknown, Entered By History   aspirin (ASA) 325 MG EC tablet Take 81 mg by mouth every other day  8/14/2021 at PM Yes Reported, Patient   becaplermin (REGRANEX) 0.01 % external gel Apply topically every morning Apply to left lateral foot wound daily  Yes Mirian Cline DPM, Podiatry/Foot and Ankle Surgery   cyclobenzaprine (FLEXERIL) 10 MG tablet Take 1 tablet (10 mg) by mouth 3 times daily as needed for muscle spasms Past Month at PRN Yes Joao Claros PA   empagliflozin (JARDIANCE) 25 MG TABS tablet Take 25 mg by  mouth  Yes Reported, Patient   finasteride (PROSCAR) 5 MG tablet Take 5 mg by mouth daily 8/14/2021 at PM Yes Unknown, Entered By History   fish oil-omega-3 fatty acids 1000 MG capsule Take 1 g by mouth 2 times daily 8/14/2021 at PM Yes Reported, Patient   insulin glargine (LANTUS PEN) 100 UNIT/ML pen Inject 15 Units Subcutaneous At Bedtime  Patient taking differently: Inject 22 Units Subcutaneous At Bedtime  8/14/2021 at HS Yes Joao Claros PA   lactobacillus rhamnosus, GG, (CULTURELL) capsule Take 1 capsule by mouth 2 times daily 8/14/2021 at PM Yes Unknown, Entered By History   lisinopril (ZESTRIL) 10 MG tablet Take 10 mg by mouth daily 8/14/2021 at PM Yes Unknown, Entered By History   magnesium 250 MG tablet Take 1 tablet by mouth 8/14/2021 at PM Yes Reported, Patient   metFORMIN (GLUCOPHAGE) 1000 MG tablet Take 1 tablet (1,000 mg) by mouth 2 times daily (with meals) 8/14/2021 at AM Yes Eduardo Dash MD   metoprolol succinate ER (TOPROL-XL) 50 MG 24 hr tablet Take 50 mg by mouth every evening 8/14/2021 at PM Yes Unknown, Entered By History   nitroGLYcerin (NITROSTAT) 0.4 MG sublingual tablet Place 0.4 mg under the tongue every 5 minutes as needed for chest pain For chest pain place 1 tablet under the tongue every 5 minutes for 3 doses. If symptoms persist 5 minutes after 1st dose call 911.  at PRN Yes Unknown, Entered By History   rosuvastatin (CRESTOR) 40 MG tablet Take 20 mg by mouth daily Evening  8/14/2021 at PM Yes Reported, Patient   vitamin C (ASCORBIC ACID) 1000 MG TABS Take 1,000 mg by mouth daily 8/14/2021 at AM Yes Unknown, Entered By History   vitamin D3 (CHOLECALCIFEROL) 2000 units (50 mcg) tablet Take 1 tablet by mouth daily 8/14/2021 at AM Yes Unknown, Entered By History       The information provided in this note is only as accurate as the sources available at the time of update(s)

## 2021-08-17 ENCOUNTER — ANESTHESIA (OUTPATIENT)
Dept: SURGERY | Facility: CLINIC | Age: 61
End: 2021-08-17
Payer: COMMERCIAL

## 2021-08-17 ENCOUNTER — APPOINTMENT (OUTPATIENT)
Dept: GENERAL RADIOLOGY | Facility: CLINIC | Age: 61
End: 2021-08-17
Attending: PODIATRIST
Payer: COMMERCIAL

## 2021-08-17 LAB
GLUCOSE BLDC GLUCOMTR-MCNC: 163 MG/DL (ref 70–99)
GLUCOSE BLDC GLUCOMTR-MCNC: 172 MG/DL (ref 70–99)
GLUCOSE BLDC GLUCOMTR-MCNC: 230 MG/DL (ref 70–99)
GLUCOSE BLDC GLUCOMTR-MCNC: 266 MG/DL (ref 70–99)
GLUCOSE BLDC GLUCOMTR-MCNC: 266 MG/DL (ref 70–99)
GRAM STAIN RESULT: NORMAL
GRAM STAIN RESULT: NORMAL
POTASSIUM BLD-SCNC: 4.3 MMOL/L (ref 3.4–5.3)

## 2021-08-17 PROCEDURE — 93005 ELECTROCARDIOGRAM TRACING: CPT

## 2021-08-17 PROCEDURE — 710N000009 HC RECOVERY PHASE 1, LEVEL 1, PER MIN: Performed by: PODIATRIST

## 2021-08-17 PROCEDURE — 87205 SMEAR GRAM STAIN: CPT | Performed by: PODIATRIST

## 2021-08-17 PROCEDURE — 250N000013 HC RX MED GY IP 250 OP 250 PS 637: Performed by: PODIATRIST

## 2021-08-17 PROCEDURE — 87070 CULTURE OTHR SPECIMN AEROBIC: CPT | Performed by: PODIATRIST

## 2021-08-17 PROCEDURE — 258N000003 HC RX IP 258 OP 636: Performed by: ANESTHESIOLOGY

## 2021-08-17 PROCEDURE — 36415 COLL VENOUS BLD VENIPUNCTURE: CPT | Performed by: ANESTHESIOLOGY

## 2021-08-17 PROCEDURE — 278N000051 HC OR IMPLANT GENERAL: Performed by: PODIATRIST

## 2021-08-17 PROCEDURE — 250N000011 HC RX IP 250 OP 636: Performed by: ANESTHESIOLOGY

## 2021-08-17 PROCEDURE — 84132 ASSAY OF SERUM POTASSIUM: CPT | Performed by: ANESTHESIOLOGY

## 2021-08-17 PROCEDURE — 360N000075 HC SURGERY LEVEL 2, PER MIN: Performed by: PODIATRIST

## 2021-08-17 PROCEDURE — 272N000001 HC OR GENERAL SUPPLY STERILE: Performed by: PODIATRIST

## 2021-08-17 PROCEDURE — 87077 CULTURE AEROBIC IDENTIFY: CPT | Performed by: PODIATRIST

## 2021-08-17 PROCEDURE — 250N000011 HC RX IP 250 OP 636: Performed by: PODIATRIST

## 2021-08-17 PROCEDURE — 28820 AMPUTATION OF TOE: CPT | Mod: 51 | Performed by: PODIATRIST

## 2021-08-17 PROCEDURE — 250N000012 HC RX MED GY IP 250 OP 636 PS 637: Performed by: HOSPITALIST

## 2021-08-17 PROCEDURE — 999N000141 HC STATISTIC PRE-PROCEDURE NURSING ASSESSMENT: Performed by: PODIATRIST

## 2021-08-17 PROCEDURE — 99233 SBSQ HOSP IP/OBS HIGH 50: CPT | Performed by: HOSPITALIST

## 2021-08-17 PROCEDURE — 88304 TISSUE EXAM BY PATHOLOGIST: CPT | Mod: TC | Performed by: PODIATRIST

## 2021-08-17 PROCEDURE — 0Y6N0ZF DETACHMENT AT LEFT FOOT, PARTIAL 5TH RAY, OPEN APPROACH: ICD-10-PCS | Performed by: PODIATRIST

## 2021-08-17 PROCEDURE — 250N000011 HC RX IP 250 OP 636: Performed by: INTERNAL MEDICINE

## 2021-08-17 PROCEDURE — L4360 PNEUMAT WALKING BOOT PRE CST: HCPCS

## 2021-08-17 PROCEDURE — 250N000011 HC RX IP 250 OP 636

## 2021-08-17 PROCEDURE — 370N000017 HC ANESTHESIA TECHNICAL FEE, PER MIN: Performed by: PODIATRIST

## 2021-08-17 PROCEDURE — 87075 CULTR BACTERIA EXCEPT BLOOD: CPT | Performed by: PODIATRIST

## 2021-08-17 PROCEDURE — 250N000009 HC RX 250: Performed by: ANESTHESIOLOGY

## 2021-08-17 PROCEDURE — 250N000009 HC RX 250: Performed by: PODIATRIST

## 2021-08-17 PROCEDURE — 14041 TIS TRNFR F/C/C/M/N/A/G/H/F: CPT | Mod: LT | Performed by: PODIATRIST

## 2021-08-17 PROCEDURE — 120N000001 HC R&B MED SURG/OB

## 2021-08-17 PROCEDURE — 999N000063 XR FOOT PORT LEFT 3 VIEWS: Mod: LT

## 2021-08-17 DEVICE — BONE CEMENT SIMPLEX 1/2 DOSE 6188-1-001: Type: IMPLANTABLE DEVICE | Site: TOE | Status: FUNCTIONAL

## 2021-08-17 RX ORDER — PROPOFOL 10 MG/ML
INJECTION, EMULSION INTRAVENOUS CONTINUOUS PRN
Status: DISCONTINUED | OUTPATIENT
Start: 2021-08-17 | End: 2021-08-17

## 2021-08-17 RX ORDER — AMOXICILLIN 250 MG
1 CAPSULE ORAL 2 TIMES DAILY
Status: DISCONTINUED | OUTPATIENT
Start: 2021-08-17 | End: 2021-08-21 | Stop reason: HOSPADM

## 2021-08-17 RX ORDER — POLYETHYLENE GLYCOL 3350 17 G/17G
17 POWDER, FOR SOLUTION ORAL DAILY
Status: DISCONTINUED | OUTPATIENT
Start: 2021-08-18 | End: 2021-08-21 | Stop reason: HOSPADM

## 2021-08-17 RX ORDER — NICOTINE POLACRILEX 4 MG
15-30 LOZENGE BUCCAL
Status: DISCONTINUED | OUTPATIENT
Start: 2021-08-17 | End: 2021-08-21 | Stop reason: HOSPADM

## 2021-08-17 RX ORDER — FENTANYL CITRATE 50 UG/ML
INJECTION, SOLUTION INTRAMUSCULAR; INTRAVENOUS PRN
Status: DISCONTINUED | OUTPATIENT
Start: 2021-08-17 | End: 2021-08-17

## 2021-08-17 RX ORDER — ONDANSETRON 4 MG/1
4 TABLET, ORALLY DISINTEGRATING ORAL EVERY 6 HOURS PRN
Status: DISCONTINUED | OUTPATIENT
Start: 2021-08-17 | End: 2021-08-21 | Stop reason: HOSPADM

## 2021-08-17 RX ORDER — SODIUM CHLORIDE, SODIUM LACTATE, POTASSIUM CHLORIDE, CALCIUM CHLORIDE 600; 310; 30; 20 MG/100ML; MG/100ML; MG/100ML; MG/100ML
INJECTION, SOLUTION INTRAVENOUS CONTINUOUS
Status: DISCONTINUED | OUTPATIENT
Start: 2021-08-17 | End: 2021-08-17 | Stop reason: HOSPADM

## 2021-08-17 RX ORDER — DEXTROSE MONOHYDRATE 25 G/50ML
25-50 INJECTION, SOLUTION INTRAVENOUS
Status: DISCONTINUED | OUTPATIENT
Start: 2021-08-17 | End: 2021-08-21 | Stop reason: HOSPADM

## 2021-08-17 RX ORDER — ACETAMINOPHEN 325 MG/1
975 TABLET ORAL EVERY 8 HOURS
Status: DISPENSED | OUTPATIENT
Start: 2021-08-17 | End: 2021-08-20

## 2021-08-17 RX ORDER — BUPIVACAINE HYDROCHLORIDE 5 MG/ML
INJECTION, SOLUTION EPIDURAL; INTRACAUDAL PRN
Status: DISCONTINUED | OUTPATIENT
Start: 2021-08-17 | End: 2021-08-21 | Stop reason: HOSPADM

## 2021-08-17 RX ORDER — LIDOCAINE HYDROCHLORIDE 20 MG/ML
INJECTION, SOLUTION INFILTRATION; PERINEURAL PRN
Status: DISCONTINUED | OUTPATIENT
Start: 2021-08-17 | End: 2021-08-17

## 2021-08-17 RX ORDER — ACETAMINOPHEN 325 MG/1
650 TABLET ORAL EVERY 4 HOURS PRN
Status: DISCONTINUED | OUTPATIENT
Start: 2021-08-20 | End: 2021-08-21 | Stop reason: HOSPADM

## 2021-08-17 RX ORDER — ONDANSETRON 2 MG/ML
4 INJECTION INTRAMUSCULAR; INTRAVENOUS EVERY 6 HOURS PRN
Status: DISCONTINUED | OUTPATIENT
Start: 2021-08-17 | End: 2021-08-21 | Stop reason: HOSPADM

## 2021-08-17 RX ORDER — PROCHLORPERAZINE MALEATE 10 MG
10 TABLET ORAL EVERY 6 HOURS PRN
Status: DISCONTINUED | OUTPATIENT
Start: 2021-08-17 | End: 2021-08-21 | Stop reason: HOSPADM

## 2021-08-17 RX ORDER — LIDOCAINE 40 MG/G
CREAM TOPICAL
Status: DISCONTINUED | OUTPATIENT
Start: 2021-08-17 | End: 2021-08-21 | Stop reason: HOSPADM

## 2021-08-17 RX ORDER — OXYCODONE HYDROCHLORIDE 5 MG/1
5 TABLET ORAL EVERY 4 HOURS PRN
Status: DISCONTINUED | OUTPATIENT
Start: 2021-08-17 | End: 2021-08-21 | Stop reason: HOSPADM

## 2021-08-17 RX ORDER — PROPOFOL 10 MG/ML
INJECTION, EMULSION INTRAVENOUS PRN
Status: DISCONTINUED | OUTPATIENT
Start: 2021-08-17 | End: 2021-08-17

## 2021-08-17 RX ORDER — BISACODYL 10 MG
10 SUPPOSITORY, RECTAL RECTAL DAILY PRN
Status: DISCONTINUED | OUTPATIENT
Start: 2021-08-17 | End: 2021-08-21 | Stop reason: HOSPADM

## 2021-08-17 RX ORDER — VANCOMYCIN HYDROCHLORIDE 1 G/20ML
INJECTION, POWDER, LYOPHILIZED, FOR SOLUTION INTRAVENOUS PRN
Status: DISCONTINUED | OUTPATIENT
Start: 2021-08-17 | End: 2021-08-19

## 2021-08-17 RX ORDER — LIDOCAINE HYDROCHLORIDE 10 MG/ML
INJECTION, SOLUTION EPIDURAL; INFILTRATION; INTRACAUDAL; PERINEURAL
Status: DISCONTINUED
Start: 2021-08-17 | End: 2021-08-17 | Stop reason: HOSPADM

## 2021-08-17 RX ORDER — BUPIVACAINE HYDROCHLORIDE 5 MG/ML
INJECTION, SOLUTION EPIDURAL; INTRACAUDAL
Status: DISCONTINUED
Start: 2021-08-17 | End: 2021-08-17 | Stop reason: HOSPADM

## 2021-08-17 RX ORDER — OXYCODONE HYDROCHLORIDE 5 MG/1
10 TABLET ORAL EVERY 4 HOURS PRN
Status: DISCONTINUED | OUTPATIENT
Start: 2021-08-17 | End: 2021-08-21 | Stop reason: HOSPADM

## 2021-08-17 RX ORDER — ONDANSETRON 2 MG/ML
INJECTION INTRAMUSCULAR; INTRAVENOUS PRN
Status: DISCONTINUED | OUTPATIENT
Start: 2021-08-17 | End: 2021-08-17

## 2021-08-17 RX ADMIN — ONDANSETRON 4 MG: 2 INJECTION INTRAMUSCULAR; INTRAVENOUS at 08:25

## 2021-08-17 RX ADMIN — FINASTERIDE 5 MG: 5 TABLET, FILM COATED ORAL at 10:39

## 2021-08-17 RX ADMIN — INSULIN GLARGINE 22 UNITS: 100 INJECTION, SOLUTION SUBCUTANEOUS at 21:24

## 2021-08-17 RX ADMIN — FENTANYL CITRATE 50 MCG: 50 INJECTION, SOLUTION INTRAMUSCULAR; INTRAVENOUS at 07:46

## 2021-08-17 RX ADMIN — INSULIN ASPART 3 UNITS: 100 INJECTION, SOLUTION INTRAVENOUS; SUBCUTANEOUS at 17:26

## 2021-08-17 RX ADMIN — Medication 1 CAPSULE: at 10:38

## 2021-08-17 RX ADMIN — CYCLOBENZAPRINE 10 MG: 10 TABLET, FILM COATED ORAL at 10:40

## 2021-08-17 RX ADMIN — PROPOFOL 10 MG: 10 INJECTION, EMULSION INTRAVENOUS at 07:52

## 2021-08-17 RX ADMIN — ROSUVASTATIN CALCIUM 20 MG: 20 TABLET, FILM COATED ORAL at 10:38

## 2021-08-17 RX ADMIN — PROPOFOL 100 MCG/KG/MIN: 10 INJECTION, EMULSION INTRAVENOUS at 07:48

## 2021-08-17 RX ADMIN — MIDAZOLAM 2 MG: 1 INJECTION INTRAMUSCULAR; INTRAVENOUS at 07:43

## 2021-08-17 RX ADMIN — PIPERACILLIN SODIUM AND TAZOBACTAM SODIUM 3.38 G: 3; .375 INJECTION, POWDER, LYOPHILIZED, FOR SOLUTION INTRAVENOUS at 13:49

## 2021-08-17 RX ADMIN — PIPERACILLIN SODIUM AND TAZOBACTAM SODIUM 3.38 G: 3; .375 INJECTION, POWDER, LYOPHILIZED, FOR SOLUTION INTRAVENOUS at 01:39

## 2021-08-17 RX ADMIN — PIPERACILLIN SODIUM AND TAZOBACTAM SODIUM 3.38 G: 3; .375 INJECTION, POWDER, LYOPHILIZED, FOR SOLUTION INTRAVENOUS at 21:23

## 2021-08-17 RX ADMIN — CYCLOBENZAPRINE 10 MG: 10 TABLET, FILM COATED ORAL at 21:24

## 2021-08-17 RX ADMIN — LISINOPRIL 10 MG: 10 TABLET ORAL at 21:24

## 2021-08-17 RX ADMIN — PIPERACILLIN SODIUM AND TAZOBACTAM SODIUM 3.38 G: 3; .375 INJECTION, POWDER, LYOPHILIZED, FOR SOLUTION INTRAVENOUS at 07:31

## 2021-08-17 RX ADMIN — MAGNESIUM 64 MG (MAGNESIUM CHLORIDE) TABLET,DELAYED RELEASE 535 MG: at 10:38

## 2021-08-17 RX ADMIN — Medication 1 CAPSULE: at 21:24

## 2021-08-17 RX ADMIN — PHENYLEPHRINE HYDROCHLORIDE 100 MCG: 10 INJECTION INTRAVENOUS at 08:10

## 2021-08-17 RX ADMIN — ALFUZOSIN HYDROCHLORIDE 10 MG: 10 TABLET, EXTENDED RELEASE ORAL at 10:39

## 2021-08-17 RX ADMIN — PHENYLEPHRINE HYDROCHLORIDE 100 MCG: 10 INJECTION INTRAVENOUS at 08:30

## 2021-08-17 RX ADMIN — SENNOSIDES AND DOCUSATE SODIUM 1 TABLET: 8.6; 5 TABLET ORAL at 21:23

## 2021-08-17 RX ADMIN — OXYCODONE HYDROCHLORIDE AND ACETAMINOPHEN 1000 MG: 500 TABLET ORAL at 10:38

## 2021-08-17 RX ADMIN — INSULIN ASPART 2 UNITS: 100 INJECTION, SOLUTION INTRAVENOUS; SUBCUTANEOUS at 10:44

## 2021-08-17 RX ADMIN — Medication 12 MCG: at 07:47

## 2021-08-17 RX ADMIN — SODIUM CHLORIDE, POTASSIUM CHLORIDE, SODIUM LACTATE AND CALCIUM CHLORIDE: 600; 310; 30; 20 INJECTION, SOLUTION INTRAVENOUS at 06:38

## 2021-08-17 RX ADMIN — PHENYLEPHRINE HYDROCHLORIDE 100 MCG: 10 INJECTION INTRAVENOUS at 08:19

## 2021-08-17 RX ADMIN — PHENYLEPHRINE HYDROCHLORIDE 100 MCG: 10 INJECTION INTRAVENOUS at 08:23

## 2021-08-17 RX ADMIN — ACETAMINOPHEN 975 MG: 325 TABLET, FILM COATED ORAL at 10:38

## 2021-08-17 RX ADMIN — Medication 50 MCG: at 10:39

## 2021-08-17 RX ADMIN — METOPROLOL SUCCINATE 100 MG: 25 TABLET, EXTENDED RELEASE ORAL at 21:24

## 2021-08-17 RX ADMIN — LIDOCAINE HYDROCHLORIDE 40 MG: 20 INJECTION, SOLUTION INFILTRATION; PERINEURAL at 07:47

## 2021-08-17 ASSESSMENT — ACTIVITIES OF DAILY LIVING (ADL)
ADLS_ACUITY_SCORE: 13
ADLS_ACUITY_SCORE: 14
ADLS_ACUITY_SCORE: 13

## 2021-08-17 NOTE — PLAN OF CARE
Pt returned from OR this am in stable condition.  Pt tolerating diabetic diet, eating/drinking well.  Left leg elevated, iced, dressing intact.  IS instruction reviewed.  On scheduled tylenol, flexeril prn, good pain control.  Carb counting added along with sliding scale coverage.  PT/Orthosis consults.  IV SL'd, intermittent antibiotic, ID following.

## 2021-08-17 NOTE — OP NOTE
Procedure Date: 08/17/2021    SURGEON:  Mirian Cline DPM.    PREOPERATIVE DIAGNOSES:    1.  Diabetic with neuropathy.  2.  Chronic left foot ulceration.  3.  Osteomyelitis, left fifth metatarsal.    POSTOPERATIVE DIAGNOSES:    1.  Diabetic with neuropathy.  2.  Chronic left foot ulceration.  3.  Osteomyelitis, left fifth metatarsal.    PROCEDURES:    1.  Partial left fifth ray amputation.  2.  Flap closure, left foot.    ANESTHESIA:  MAC with local.    HEMOSTASIS:  Electrocautery.    ESTIMATED BLOOD LOSS:  10 mL.    SPECIMENS:  Left fifth metatarsal for cultures and path.    MATERIALS:  Quarter-inch plain packing and antibiotic bone cement with vancomycin.    INDICATIONS FOR PROCEDURE:  Mr. Cheung is a 61-year-old diabetic male that we have [facility] for chronic left foot ulceration.  We have tried multiple treatments such as different absorptive dressings, offloading and total contact casting.  He presented to the hospital with worsening redness and swelling to the left foot.  An MRI was obtained, which showed bone infection to the fifth metatarsal.  It was discussed with the patient to go in and remove the infected bone and also to try to flap the skin closed with that there was not another open wound, which can increase his chance of infection.  Risks, benefits, and complications were discussed with the patient.  No guarantees were made.  The patient wished to proceed with surgery.    DESCRIPTION OF PROCEDURE:  The patient was brought to the operating room and placed on the operating table in supine position.  Anesthesia was administered and local was injected.  The foot was prepped and draped using sterile technique.  Attention was directed to the lateral aspect of the left foot.  A curvilinear incision was made around the base of the left fifth toe down the side of the left fifth metatarsal and back up around excising the ulceration.  This was continued to the base of the fifth toe and up  around the skin to preserve skin for closure.  The skin was sharply dissected off the proximal phalanx and disarticulated at the fifth metatarsophalangeal joint.  The soft tissue was then sharply dissected off the distal 2/3 of the fifth metatarsal.  This was removed using sagittal saw.  This was sent for cultures and pathology.  The wound was flushed with copious amounts of normal saline and bleeding was controlled with electrocautery.  The remaining base of the fifth metatarsal was then curetted out and flushed with normal saline and a small amount of bone was taken and sent for proximal margin with a clean rongeur.  The remaining fifth metatarsal was packed with vancomycin impregnated antibiotic bone cement.  The dorsal skin flap at this time, measured approximately 7.5 x 3 cm and was rotated plantarly and the skin was reapproximated using 3-0 and 4-0 Prolene.  Quarter-inch packing was placed into the area to help with drainage.  The foot was placed in dry sterile dressing.  The patient tolerated the procedure and anesthesia well and was transferred to recovery with vital signs stable and vascular status intact.  The patient will be nonweightbearing.    Mirian Cline DPM        D: 2021   T: 2021   MT: [email or web address]    Name:     ABILIO SUAZO  MRN:      [id number]        Account:        [id number]   :      1960           Procedure Date: 2021     Document: [id number]

## 2021-08-17 NOTE — PLAN OF CARE
Current Problem: Impaired physical mobility r/t wound on L foot.   Management Provided: Provided rest during the night. Urinal within reach. Addressed bathroom needs.  Patient response: Pt rested well during the night. Ready for surgery today.   Xolair Pregnancy And Lactation Text: This medication is Pregnancy Category B and is considered safe during pregnancy. This medication is excreted in breast milk.

## 2021-08-17 NOTE — BRIEF OP NOTE
[facility]    Brief Operative Note    Pre-operative diagnosis: Diabetic ulcer of left midfoot associated with type 1 diabetes mellitus, with fat layer exposed (H) [E10.621, L97.422]  Cellulitis and abscess of foot [L03.119, L02.619]  Chronic osteomyelitis of left foot with draining sinus (H) [M86.472]  Post-operative diagnosis Same as pre-operative diagnosis    Procedure: Procedure(s):  left partial fifth ray amputation  Surgeon: Surgeon(s) and Role:     * Mirian Cline DPM, Podiatry/Foot and Ankle Surgery - Primary  Anesthesia: Combined MAC with Local   Estimated blood loss: Less than 10 ml  Drains: None  Specimens:   ID Type Source Tests Collected by Time Destination   1 : LEFT 5TH METATARSAL BONE Bone Biopsy Bone SURGICAL PATHOLOGY EXAM Mirian Cline DPM, Podiatry/Foot and Ankle Surgery 8/17/2021  8:15 AM    2 : LEFT PROXIMAL BONE-ASSESS FOR BONE INFECTION Bone Biopsy Bone SURGICAL PATHOLOGY EXAM Mirian Cline DPM, Podiatry/Foot and Ankle Surgery 8/17/2021  8:17 AM    A : LEFT METATARSAL BONE  Bone Biopsy Toe, Left ANAEROBIC BACTERIAL CULTURE ROUTINE, GRAM STAIN, AEROBIC BACTERIAL CULTURE ROUTINE Mirian Cline DPM, Podiatry/Foot and Ankle Surgery 8/17/2021  8:05 AM      Findings:   None.  Complications: None.  Implants:   Implant Name Type Inv. Item Serial No.  Lot No. LRB No. Used Action   BONE CEMENT SIMPLEX 1/2 DOSE 6188-1-001 - [id number] Cement, Bone BONE CEMENT SIMPLEX 1/2 DOSE 6188-1-001  [employer] [id number] Left 1 Implanted

## 2021-08-17 NOTE — PROGRESS NOTES
S.  Patient was seen today at 808-01 for a cam walker as ordered by Dr. Cline.  O/G. help stabilize foot and ankle.  A.  Patient was fit with a Large short pneumatic Cam Walker for the left leg.  Cam walker was assembled by removing the soft liner from the foot plate and uprights, the patients foot and leg was positioned into the soft liner with the heel firmly sealed.  The liner was closed tightly and secured with the Velcro closure. The heel and foot were positioned in the rocker bottom foot plate and the uprights were contoured to fall at mid-line of the lower leg and secured to the Velcro.  The foot plate Velcro straps were secured, proximal straps first, then the distal strap.  The lower leg straps were attached starting distal and working proximal. The ankle joints were set at 90 degrees of dorsi/plantar flexion. Donning and doffing of the Cam Walker was explained.  P.   Patient was advised to contact this office with any problems or questions.  Warren HERRON

## 2021-08-17 NOTE — PLAN OF CARE
Patient is alert and oriented x4 vital signs stable, denies pain, bedrest promoted as per patient's request. Up with scooter if needed. Making use of bed side urinal. PIV SL with intermittent antibiotics. BG checks with insulin coverage. Tolerating regular diet, NPO @ Midnight for possible left fifth toe amputation in the morning.

## 2021-08-17 NOTE — OR NURSING
PT HAS APPROXIMATELY 4-5MM SCABBED WOUND ON MEDIAL RIGHT GREAT 2ND TOE. DR. TINSLEY CALLED AND NOTIFIED OF WOUND. SHE SAID SHE WILL LOOK AT IT AND ASSESS IT TOMORROW DURING DRESSING CHANGE.

## 2021-08-17 NOTE — ANESTHESIA PREPROCEDURE EVALUATION
Anesthesia Pre-Procedure Evaluation    Patient: Nabil Cheung   MRN: [id number] : 1960        Preoperative Diagnosis: Diabetic ulcer of left midfoot associated with type 1 diabetes mellitus, with fat layer exposed (H) [E10.621, L97.422]  Cellulitis and abscess of foot [L03.119, L02.619]  Chronic osteomyelitis of left foot with draining sinus (H) [M86.472]   Procedure : Procedure(s):  left partial fifth ray amputation     No past medical history on file.   Past Surgical History:   Procedure Laterality Date     GRAFT FLAP PEDICLE TRAM DELAY PROCEDURE Left 2020    Procedure: Debridement of sternal wound and left major pectoral flep;  Surgeon: MARCO Serna MD;  Location: [facility] OR     INCISION AND DRAINAGE CHEST WASHOUT, COMBINED N/A 2020    Procedure: INCISION AND DRAINAGE, WOUND, CHEST, WITH IRRIGATION and wound vac change;  Surgeon: Mark Mckinney MD;  Location: [facility] OR     REPAIR CHEST WALL N/A 2020    Procedure: Chest wall reconstruction;  Surgeon: MARCO Serna MD;  Location: [facility] OR      Allergies   Allergen Reactions     Atorvastatin      Dust Mites      Fluorescein Nausea and Vomiting      Social History     Tobacco Use     Smoking status: Never Smoker     Smokeless tobacco: Never Used   Substance Use Topics     Alcohol use: Yes      Wt Readings from Last 1 Encounters:   08/15/21 95.3 kg (210 lb)        Anesthesia Evaluation            ROS/MED HX  ENT/Pulmonary:    (-) tobacco use and sleep apnea   Neurologic:       Cardiovascular:     (+) hypertension--CAD -CABG-date: 2020. -    METS/Exercise Tolerance:     Hematologic:       Musculoskeletal:       GI/Hepatic:       Renal/Genitourinary:     (+) BPH,     Endo:     (+) type II DM, Diabetic complications: nephropathy neuropathy. Obesity,     Psychiatric/Substance Use:       Infectious Disease:       Malignancy:       Other:            Physical Exam    Airway        Mallampati: II   TM distance: > 3 FB   Neck ROM: full    Mouth opening: > 3 cm    Respiratory Devices and Support         Dental  no notable dental history         Cardiovascular   cardiovascular exam normal          Pulmonary   pulmonary exam normal                OUTSIDE LABS:  CBC:   Lab Results   Component Value Date    WBC 10.6 08/15/2021    WBC 9.3 01/09/2021    HGB 12.7 (L) 08/15/2021    HGB 11.5 (L) 01/09/2021    HCT 37.6 (L) 08/15/2021    HCT 37.4 (L) 01/09/2021     08/15/2021     01/09/2021     BMP:   Lab Results   Component Value Date     08/15/2021     01/09/2021    POTASSIUM 3.7 08/16/2021    POTASSIUM 4.8 08/15/2021    CHLORIDE 102 08/15/2021    CHLORIDE 112 (H) 01/09/2021    CO2 29 08/15/2021    CO2 25 01/09/2021    BUN 26 08/15/2021    BUN 22 01/09/2021    CR 1.36 (H) 08/15/2021    CR 1.36 (H) 01/09/2021     (H) 08/16/2021     (H) 08/16/2021     COAGS:   Lab Results   Component Value Date    INR 1.05 01/08/2021     POC:   Lab Results   Component Value Date     (H) 01/09/2021     HEPATIC:   Lab Results   Component Value Date    ALBUMIN 3.3 (L) 08/15/2021    PROTTOTAL 7.7 08/15/2021    ALT 23 08/15/2021    AST 13 08/15/2021    ALKPHOS 72 08/15/2021    BILITOTAL 0.4 08/15/2021     OTHER:   Lab Results   Component Value Date    PH 7.39 08/15/2021    LACT 1.6 08/15/2021    A1C 9.1 (H) 01/09/2021    ONEYDA 8.9 08/15/2021    PHOS 3.8 06/11/2020    MAG 2.0 06/21/2020    TSH 2.79 09/25/2017    CRP <2.9 01/09/2021    SED 50 (H) 06/16/2020       Anesthesia Plan    ASA Status:  3   NPO Status:  NPO Appropriate    Anesthesia Type: MAC.              Consents    Anesthesia Plan(s) and associated risks, benefits, and realistic alternatives discussed. Questions answered and patient/representative(s) expressed understanding.     - Discussed with:  Patient         Postoperative Care    Pain management: IV analgesics, Oral pain medications.        Comments:                Sumit Beckford MD

## 2021-08-17 NOTE — ANESTHESIA CARE TRANSFER NOTE
Patient: Nabil Cheung    Procedure(s):  left partial fifth ray amputation    Diagnosis: Diabetic ulcer of left midfoot associated with type 1 diabetes mellitus, with fat layer exposed (H) [E10.621, L97.422]  Cellulitis and abscess of foot [L03.119, L02.619]  Chronic osteomyelitis of left foot with draining sinus (H) [M86.472]  Diagnosis Additional Information: No value filed.    Anesthesia Type:   MAC     Note:    Oropharynx: oropharynx clear of all foreign objects and spontaneously breathing  Level of Consciousness: awake  Oxygen Supplementation: face mask  Level of Supplemental Oxygen (L/min / FiO2): 6  Independent Airway: airway patency satisfactory and stable  Dentition: dentition unchanged  Vital Signs Stable: post-procedure vital signs reviewed and stable  Report to RN Given: handoff report given  Patient transferred to: PACU  Comments: Pt to PACU with O2 via mask, airway patent, VSS. Report to RN.  Handoff Report: Identifed the Patient, Identified the Reponsible Provider, Reviewed the pertinent medical history, Discussed the surgical course, Reviewed Intra-OP anesthesia mangement and issues during anesthesia, Set expectations for post-procedure period and Allowed opportunity for questions and acknowledgement of understanding      Vitals:  Vitals Value Taken Time   /59 08/17/21 0843   Temp     Pulse 54 08/17/21 0846   Resp 20 08/17/21 0846   SpO2 99 % 08/17/21 0846   Vitals shown include unvalidated device data.    Electronically Signed By: KANCHAN Mcclelland CRNA  August 17, 2021  8:47 AM

## 2021-08-17 NOTE — ANESTHESIA POSTPROCEDURE EVALUATION
Patient: Nabil Cheung    Procedure(s):  left partial fifth ray amputation    Diagnosis:Diabetic ulcer of left midfoot associated with type 1 diabetes mellitus, with fat layer exposed (H) [E10.621, L97.422]  Cellulitis and abscess of foot [L03.119, L02.619]  Chronic osteomyelitis of left foot with draining sinus (H) [M86.472]  Diagnosis Additional Information: No value filed.    Anesthesia Type:  MAC    Note:  Disposition: Outpatient   Postop Pain Control: Uneventful            Sign Out: Well controlled pain   PONV: No   Neuro/Psych: Uneventful            Sign Out: Acceptable/Baseline neuro status   Airway/Respiratory: Uneventful            Sign Out: Acceptable/Baseline resp. status   CV/Hemodynamics: Uneventful            Sign Out: Acceptable CV status   Other NRE: NONE   DID A NON-ROUTINE EVENT OCCUR? No           Last vitals:  Vitals Value Taken Time   /62 08/17/21 0920   Temp 36.5  C (97.7  F) 08/17/21 0844   Pulse 47 08/17/21 0926   Resp 26 08/17/21 0926   SpO2 98 % 08/17/21 0930   Vitals shown include unvalidated device data.    Electronically Signed By: Sumit Beckford MD  August 17, 2021  1:10 PM

## 2021-08-17 NOTE — PROGRESS NOTES
[facility]    Hospitalist Progress Note      Assessment & Plan   Nabil Cheung is a 61 year old male who presents with foot infection    Diabetic foot ulcer  Follows with Dr. Cline with podiatry/ wound center. Had admit 12/2020 for purulent LLE cellulitis ultimately with graft and wound vac placement (S mitgregoria). Noted erythema, swelling and drainage from wound startin 8/14, no fevers, some pain. Afebrile here. WBC 10.6. foot XR with findings compatible with osteomyelitis  - blood cultures NGTD  - wound culture 8/15/21:  Enterobacter cloacae and Staph lugdunesis:  - podiatry: L5th Rays amputation 8/17/2021   -post op pain mgmt; WB status per Podiatry  - ID consult; vanco/zosyn from ED;  ID recommends zosyn only. Wound cultures as above; ID following.        DM II with neuropathy, nephropathy and retinopathy  [empagliflozin 25 mg daily, insulin glargine 22 units at HS, metformin 1000 mg BID]  Poorly controlled, most recent A1C at 10.1%. started using insulin 1.5 years ago prior to CABG. Notes increased blood sugars correlating with his foot infection   - hold orals  - TID and HS blood sugars  - med sliding scale insulin  - mod CHO diet  - continue insulin 22 units at HS post op; add 1:15 carb counting on discussio with Patient; eating     CAD with h/o CABG 5/2020  HTN  [aspirin 325 mg daily, lisinopril 10 mg daily, metoprolol ER 50 mg at HS, prn NTG, rosuvastatin 40 mg daily]  CABG 5/2020 with complication of sternal wound infection with MRSE 6/2020. Hypertensive on arrival 180-190's systolic.   - continue lisinopril, metoprolol  - hold aspirin if possible procedure; defer restart to Podiatry   - continue atorvastatin     CKD III  baseline creatinine ~1.3-1.4. creatinine on presentation at 1.36.   -recheck BMP in AM  - avoid nephrotoxins     BPH  [alfuzosin 10 mg daily, finasteride 5 mg daily]  - continue meds     COVID-19 negative on admission    DVT Prophylaxis: Pneumatic Compression  Devices  Code Status: Full Code  Expected discharge:1-2 days pending surgical course; antibiotic treatment plan per ID, Therapy assessment regarding safe disposition    Dino Chilel MD  Text Page (7am - 6pm, M-F)      I spent >35 minutes on the unit/floor in management of care today assuming Patient care reviewing labs, medications, interdisciplinary notes; and completing documentation of encounter and orders with over 50% of my time was spent Counseling the Patient regarding diabetes and reported cough and Coordinating Care and plan with Nursing and Specialists, Ortho regarding OM/s/p amputation management and surveillance.       Interval History   Denies pain; states is hungry, concerned regarding diabetes and requests higher 'insulin.'  Also reports as outpatient working with Provider regarding cough and reactions to 'wild fire smoke;' denies hx of asthma; no productive cough or wheeze. Currently no dyspnea or wheeze.       -Data reviewed today: I reviewed all new labs and imaging results over the last 24 hours.    Physical Exam   Temp: 97  F (36.1  C) Temp src: Axillary BP: (!) 151/72 Pulse: 50   Resp: 16 SpO2: 94 % O2 Device: None (Room air) Oxygen Delivery: 2 LPM  Vitals:    08/15/21 2106   Weight: 95.3 kg (210 lb)     Vital Signs with Ranges  Temp:  [96  F (35.6  C)-98  F (36.7  C)] 97  F (36.1  C)  Pulse:  [47-69] 50  Resp:  [14-19] 16  BP: (104-178)/(57-79) 151/72  SpO2:  [91 %-99 %] 94 %  I/O last 3 completed shifts:  In: 360 [P.O.:360]  Out: 3725 [Urine:3725]    General/Constitutional:   NAD, alert, calm, cooperative    HEENT/Head Exam:  atraumatic  Eyes:  PERRL, no conjunctivits  Mouth/Oral Pharynx:  Buccal mucosa WNL  Chest/Respiratory:  Air exchange bilateral lung fields; no rales or wheeze. Respiration nonlabored.  Cardiovascular:  no murmur appreciated.  LE edema none  Gastrointestinal/Abdomen:  soft, nontender,  no rebound, guarding or other peritoneal signs.  Musculoskeletal:  extremities  warm, dry, noncyanotic; L foot dressed;   Neurologic:  gross CN and motor testing nonfocal.  Sensation grossly tested intact.  Psychiatric:  Mental status:  Alert, oriented, affect calm  Skin:  No rash noted on gross exam    Medications       acetaminophen  975 mg Oral Q8H     alfuzosin ER  10 mg Oral Daily     finasteride  5 mg Oral Daily     [START ON 8/18/2021] insulin aspart   Subcutaneous QAM AC     insulin aspart   Subcutaneous Daily with lunch     insulin aspart   Subcutaneous Daily with supper     insulin aspart  1-7 Units Subcutaneous TID AC     insulin aspart  1-5 Units Subcutaneous At Bedtime     insulin glargine  22 Units Subcutaneous At Bedtime     lactobacillus rhamnosus (GG)  1 capsule Oral BID     lisinopril  10 mg Oral At Bedtime     magnesium chloride  535 mg Oral Daily     metoprolol succinate ER  100 mg Oral QPM     piperacillin-tazobactam  3.375 g Intravenous Q6H     [START ON 8/18/2021] polyethylene glycol  17 g Oral Daily     rosuvastatin  20 mg Oral Daily     senna-docusate  1 tablet Oral BID     sodium chloride (PF)  3 mL Intracatheter Q8H     vitamin C  1,000 mg Oral Daily     vitamin D3  50 mcg Oral Daily       Data   Recent Labs   Lab 08/17/21  1039 08/17/21  0850 08/17/21  0656 08/17/21  0602 08/16/21  0320 08/15/21  2233 08/15/21  2233   WBC  --   --   --   --   --   --  10.6   HGB  --   --   --   --   --   --  12.7*   MCV  --   --   --   --   --   --  82   PLT  --   --   --   --   --   --  270   NA  --   --   --   --   --   --  133   POTASSIUM  --   --  4.3  --  3.7  --  4.8   CHLORIDE  --   --   --   --   --   --  102   CO2  --   --   --   --   --   --  29   BUN  --   --   --   --   --   --  26   CR  --   --   --   --   --   --  1.36*   ANIONGAP  --   --   --   --   --   --  2*   ONEYDA  --   --   --   --   --   --  8.9   * 163*  --  172* 251*   < > 261*   ALBUMIN  --   --   --   --   --   --  3.3*   PROTTOTAL  --   --   --   --   --   --  7.7   BILITOTAL  --   --   --   --   --    --  0.4   ALKPHOS  --   --   --   --   --   --  72   ALT  --   --   --   --   --   --  23   AST  --   --   --   --   --   --  13    < > = values in this interval not displayed.       Recent Results (from the past 24 hour(s))   X-ray lt Foot 3 vw port: In PACU    Narrative    FOOT PORTABLE LEFT THREE VIEWS August 17, 2021 9:06 AM     HISTORY: Postoperative.    COMPARISON: 8/15/2021.    FINDINGS: First MTP osteoarthritis.      Impression    IMPRESSION: Fifth ray amputation at the metatarsal diaphyseal level.    LALY MEYER MD         SYSTEM ID:  [id number]

## 2021-08-17 NOTE — PROGRESS NOTES
Pt transferred to periop for surgical procedure on his L foot. Handoff report given to periop staff nurse. PRICILLA FERRERL. [facility]. Ensured no accessories and underwear prior to transfer. Urinated prior to transport.

## 2021-08-18 ENCOUNTER — APPOINTMENT (OUTPATIENT)
Dept: PHYSICAL THERAPY | Facility: CLINIC | Age: 61
End: 2021-08-18
Payer: COMMERCIAL

## 2021-08-18 LAB
BACTERIA WND CULT: ABNORMAL
BACTERIA WND CULT: ABNORMAL
ERYTHROCYTE [DISTWIDTH] IN BLOOD BY AUTOMATED COUNT: 13.1 % (ref 10–15)
GLUCOSE BLDC GLUCOMTR-MCNC: 179 MG/DL (ref 70–99)
GLUCOSE BLDC GLUCOMTR-MCNC: 229 MG/DL (ref 70–99)
GLUCOSE BLDC GLUCOMTR-MCNC: 243 MG/DL (ref 70–99)
GLUCOSE BLDC GLUCOMTR-MCNC: 246 MG/DL (ref 70–99)
GRAM STAIN RESULT: ABNORMAL
GRAM STAIN RESULT: ABNORMAL
HCT VFR BLD AUTO: 36.6 % (ref 40–53)
HGB BLD-MCNC: 12.2 G/DL (ref 13.3–17.7)
MCH RBC QN AUTO: 26.9 PG (ref 26.5–33)
MCHC RBC AUTO-ENTMCNC: 33.3 G/DL (ref 31.5–36.5)
MCV RBC AUTO: 81 FL (ref 78–100)
PLATELET # BLD AUTO: 272 10E3/UL (ref 150–450)
POTASSIUM BLD-SCNC: 4.8 MMOL/L (ref 3.4–5.3)
RBC # BLD AUTO: 4.53 10E6/UL (ref 4.4–5.9)
WBC # BLD AUTO: 12.5 10E3/UL (ref 4–11)

## 2021-08-18 PROCEDURE — 99232 SBSQ HOSP IP/OBS MODERATE 35: CPT | Performed by: HOSPITALIST

## 2021-08-18 PROCEDURE — 97161 PT EVAL LOW COMPLEX 20 MIN: CPT | Mod: GP

## 2021-08-18 PROCEDURE — 120N000001 HC R&B MED SURG/OB

## 2021-08-18 PROCEDURE — 97530 THERAPEUTIC ACTIVITIES: CPT | Mod: GP

## 2021-08-18 PROCEDURE — 84132 ASSAY OF SERUM POTASSIUM: CPT | Performed by: HOSPITALIST

## 2021-08-18 PROCEDURE — 36415 COLL VENOUS BLD VENIPUNCTURE: CPT | Performed by: HOSPITALIST

## 2021-08-18 PROCEDURE — 85014 HEMATOCRIT: CPT | Performed by: HOSPITALIST

## 2021-08-18 PROCEDURE — 250N000013 HC RX MED GY IP 250 OP 250 PS 637: Performed by: PODIATRIST

## 2021-08-18 PROCEDURE — 250N000011 HC RX IP 250 OP 636: Performed by: INTERNAL MEDICINE

## 2021-08-18 PROCEDURE — 250N000012 HC RX MED GY IP 250 OP 636 PS 637: Performed by: HOSPITALIST

## 2021-08-18 RX ADMIN — ACETAMINOPHEN 975 MG: 325 TABLET, FILM COATED ORAL at 02:11

## 2021-08-18 RX ADMIN — MAGNESIUM 64 MG (MAGNESIUM CHLORIDE) TABLET,DELAYED RELEASE 535 MG: at 09:14

## 2021-08-18 RX ADMIN — SENNOSIDES AND DOCUSATE SODIUM 1 TABLET: 8.6; 5 TABLET ORAL at 21:31

## 2021-08-18 RX ADMIN — INSULIN ASPART 1 UNITS: 100 INJECTION, SOLUTION INTRAVENOUS; SUBCUTANEOUS at 10:44

## 2021-08-18 RX ADMIN — FINASTERIDE 5 MG: 5 TABLET, FILM COATED ORAL at 09:14

## 2021-08-18 RX ADMIN — POLYETHYLENE GLYCOL 3350 17 G: 17 POWDER, FOR SOLUTION ORAL at 09:13

## 2021-08-18 RX ADMIN — Medication 1 CAPSULE: at 09:14

## 2021-08-18 RX ADMIN — SENNOSIDES AND DOCUSATE SODIUM 1 TABLET: 8.6; 5 TABLET ORAL at 09:13

## 2021-08-18 RX ADMIN — OXYCODONE HYDROCHLORIDE AND ACETAMINOPHEN 1000 MG: 500 TABLET ORAL at 09:14

## 2021-08-18 RX ADMIN — PIPERACILLIN SODIUM AND TAZOBACTAM SODIUM 3.38 G: 3; .375 INJECTION, POWDER, LYOPHILIZED, FOR SOLUTION INTRAVENOUS at 09:13

## 2021-08-18 RX ADMIN — Medication 50 MCG: at 09:14

## 2021-08-18 RX ADMIN — PIPERACILLIN SODIUM AND TAZOBACTAM SODIUM 3.38 G: 3; .375 INJECTION, POWDER, LYOPHILIZED, FOR SOLUTION INTRAVENOUS at 15:24

## 2021-08-18 RX ADMIN — LISINOPRIL 10 MG: 10 TABLET ORAL at 21:31

## 2021-08-18 RX ADMIN — PIPERACILLIN SODIUM AND TAZOBACTAM SODIUM 3.38 G: 3; .375 INJECTION, POWDER, LYOPHILIZED, FOR SOLUTION INTRAVENOUS at 21:33

## 2021-08-18 RX ADMIN — ACETAMINOPHEN 975 MG: 325 TABLET, FILM COATED ORAL at 21:31

## 2021-08-18 RX ADMIN — INSULIN GLARGINE 22 UNITS: 100 INJECTION, SOLUTION SUBCUTANEOUS at 21:32

## 2021-08-18 RX ADMIN — INSULIN ASPART 3 UNITS: 100 INJECTION, SOLUTION INTRAVENOUS; SUBCUTANEOUS at 17:39

## 2021-08-18 RX ADMIN — ACETAMINOPHEN 975 MG: 325 TABLET, FILM COATED ORAL at 12:49

## 2021-08-18 RX ADMIN — PIPERACILLIN SODIUM AND TAZOBACTAM SODIUM 3.38 G: 3; .375 INJECTION, POWDER, LYOPHILIZED, FOR SOLUTION INTRAVENOUS at 02:13

## 2021-08-18 RX ADMIN — ROSUVASTATIN CALCIUM 20 MG: 20 TABLET, FILM COATED ORAL at 09:14

## 2021-08-18 RX ADMIN — Medication 1 CAPSULE: at 21:31

## 2021-08-18 RX ADMIN — ALFUZOSIN HYDROCHLORIDE 10 MG: 10 TABLET, EXTENDED RELEASE ORAL at 09:14

## 2021-08-18 ASSESSMENT — ACTIVITIES OF DAILY LIVING (ADL)
ADLS_ACUITY_SCORE: 13
DEPENDENT_IADLS:: INDEPENDENT
ADLS_ACUITY_SCORE: 13

## 2021-08-18 NOTE — PROGRESS NOTES
08/18/21 0815   Quick Adds   Type of Visit Initial PT Evaluation   Living Environment   People in home alone   Current Living Arrangements other (see comments)   Home Accessibility stairs to enter home   Number of Stairs, Main Entrance 10   Stair Railings, Main Entrance railing on right side (ascending)   Transportation Anticipated car, drives self   Living Environment Comments Patient rents a basement apartment from a friend. He has to go down approximately 10 stairs to get inside.    Self-Care   Usual Activity Tolerance good   Current Activity Tolerance fair   Equipment Currently Used at Home other (see comments);wheelchair, manual  (Knee scooter)   Activity/Exercise/Self-Care Comment Patient has been using the knee scooter since Jan 2021 for mobility outside of the home due to his L foot wound. He was not using any assistive devices inside his apartment.    Disability/Function   Hearing Difficulty or Deaf no   Wear Glasses or Blind no   Concentrating, Remembering or Making Decisions Difficulty no   Difficulty Communicating no   Difficulty Eating/Swallowing no   Fall history within last six months yes   Number of times patient has fallen within last six months 1   General Information   Onset of Illness/Injury or Date of Surgery 08/17/21   Referring Physician Mirian Cline, JULIANO   Patient/Family Therapy Goals Statement (PT) to get down the stairs into his home   Pertinent History of Current Problem (include personal factors and/or comorbidities that impact the POC) Patient is a 62 YO M admitted with L foot osteomyelitis and chronic L foot wound. He is POD #1 s/p partial L 5th ray amputation and flap closure. PMH: R rotator cuff tear, DM2, CAD/CABG, HTN, CKD   Existing Precautions/Restrictions fall;weight bearing   Weight-Bearing Status - LLE nonweight-bearing  (Heel WB in boot for pivot tranfsers ONLY)   Weight-Bearing Status - RLE full weight-bearing   General Observations Patient in supine upon arrival,  agreeable to PT. Activity orders: bedrest with bathroom priveliges   Cognition   Orientation Status (Cognition) oriented x 4   Affect/Mental Status (Cognition) WNL   Follows Commands (Cognition) WNL   Pain Assessment   Patient Currently in Pain No   Integumentary/Edema   Integumentary/Edema Comments L foot wrapped in bandaging with no drainged noted   Posture    Posture Forward head position;Protracted shoulders   Range of Motion (ROM)   ROM Comment B LE AROM WFL (L foot/ankle not tested); R shoulder flexion < 90 degrees due to history of rotator cuff tear   Strength   Strength Comments B LE strength WFL for transfers; R shoulder flexion/ABD <3/5   Bed Mobility   Bed Mobility rolling left;rolling right;supine-sit;sit-supine   Rolling Left [place] (Bed Mobility) independent   Rolling Right [place] (Bed Mobility) independent   Supine-Sit [place] (Bed Mobility) independent   Sit-Supine [place] (Bed Mobility) independent   Transfers   Transfers other (see comments)   Transfer Safety Comments Transfers from bed > knee scooter with SBA, not locking brakes.    Gait/Stairs (Locomotion)   [place] Level (Gait) supervision   Assistive Device (Gait) other (see comments)  (knee scooter)   Maintains Weight-bearing Status (Gait) able to maintain  (NWB on L LE)   Comment (Gait/Stairs) Patient mobilized 20' around room with knee scooter, quick arabella and lifting scooter up to change directions.  Patient declined gait training with walker/crutches at this time due to history of severe R shoulder injury and unable to tolerate weight bearing   Balance   Balance Comments Independent sitting balance with donning socks and boot; SBA for standing with UE support while maintaining NWB on L LE   Sensory Examination   Sensory Perception patient reports no sensory changes   Clinical Impression   Criteria for Skilled Therapeutic Intervention yes, treatment indicated   PT Diagnosis (PT) impaired mobility   Influenced by  the following impairments NWB on R LE, R rotator cuff tear (weakness and decreased ROM)   Functional limitations due to impairments decreased independence with tranfsers and gait   Clinical Presentation Stable/Uncomplicated   Clinical Presentation Rationale controlled pain, clinical judgement   Clinical Decision Making (Complexity) low complexity   Therapy Frequency (PT) 3x/week   Predicted Duration of Therapy Intervention (days/wks) 1 week   Planned Therapy Interventions (PT) balance training;gait training;patient/family education;orthotic fitting/training;stair training;strengthening;transfer training;home program guidelines;progressive activity/exercise   Risk & Benefits of therapy have been explained evaluation/treatment results reviewed;care plan/treatment goals reviewed;current/potential barriers reviewed;risks/benefits reviewed;participants voiced agreement with care plan;participants included;patient   Clinical Impression Comments Patient with limited mobility due to new NWB status on L LE, isntruction on assistive device training limited by patient's inability to tolerate weight bearing through R shoulder   PT Discharge Planning    PT Discharge Recommendation (DC Rec) Transitional Care Facility   PT Rationale for DC Rec Patient lives alone in a basement apartment and is currently NWB on L LE. He has very limited tolerance for weight bearing on R shoulder due to history of rotator cuff tear and patient declining AD training at this time. He has a knee scooter, but he is unable to get it down to his apartment and he states the apartment is too small to navigate in it. He would benefit from skilled PT intervention at TCU to address strength and mobility deficits.    Total Evaluation Time   Total Evaluation Time (Minutes) 14

## 2021-08-18 NOTE — PLAN OF CARE
A&Ox4. VSS on RA, HTN noted. Blood pressure did not meet parameters for PRN interventions. POD1 L partial 5th toe amputation. Denies pain, scheduled tylenol given. Left foot elevated, dressing CDI. Consistent carb diet. B. Using urinal at bedside, good output. NWB to L leg, can use scooter when going to BR. Contact precautions maintained. PIV SL with intermittent abx. Pt would like to discuss with MD about elevated BP and BG. Discharge pending progress.

## 2021-08-18 NOTE — PROGRESS NOTES
[facility]    Infectious Disease Progress Note    Date of Service : 08/18/2021     Assessment:  1. Diabetic foot infection with osteomyelitis of left 5th metatarsal and 5th toe with abscess. S/p partial left 5th ry amputation with flap closure. Operative culture with enterobacter cloacae complex. Surgical path pending.  2. Poorly controlled diabetes with last HbA1c of 9.1% 1/9/2021  3. CKD  4. Chronic medical conditions - diabetes complicated by neuropathy, retinopathy and nephropathy, Cad s/p CABG     Recommendations  1. Continue Zosyn  2. Follow cultures and pathology  3. Infected bone is felt to have been resected. Do not anticipate IV antibiotics at discharge . Likely oral quinolone at discharge.    Seema Kulkarni MD    Interval History   Resting, slight pain in foot . No additional complaints today.    Physical Exam   Temp: 97  F (36.1  C) Temp src: Axillary BP: 137/65 Pulse: 51   Resp: 16 SpO2: 92 % O2 Device: None (Room air)    Vitals:    08/15/21 2106   Weight: 95.3 kg (210 lb)     Vital Signs with Ranges  Temp:  [97  F (36.1  C)-98.7  F (37.1  C)] 97  F (36.1  C)  Pulse:  [50-70] 51  Resp:  [16] 16  BP: (137-177)/(65-75) 137/65  SpO2:  [92 %-94 %] 92 %    GENERAL APPEARANCE:  awake  EYES: Eyes grossly normal to inspection, PERRL and conjunctivae and sclerae normal  RESP: lungs clear to auscultation - no rales, rhonchi or wheezes  CV: regular rates and rhythm, normal S1 S2, no S3 or S4 and no murmur, click or rub  ABDOMEN: soft, nontender, without hepatosplenomegaly or masses and bowel sounds normal  MS: Left foot in dressing    Other:    Medications       acetaminophen  975 mg Oral Q8H     alfuzosin ER  10 mg Oral Daily     finasteride  5 mg Oral Daily     insulin aspart   Subcutaneous QAM AC     insulin aspart   Subcutaneous Daily with lunch     insulin aspart   Subcutaneous Daily with supper     insulin aspart  1-7 Units Subcutaneous TID AC     insulin aspart  1-5 Units Subcutaneous  At Bedtime     insulin glargine  22 Units Subcutaneous At Bedtime     lactobacillus rhamnosus (GG)  1 capsule Oral BID     lisinopril  10 mg Oral At Bedtime     magnesium chloride  535 mg Oral Daily     metoprolol succinate ER  100 mg Oral QPM     piperacillin-tazobactam  3.375 g Intravenous Q6H     polyethylene glycol  17 g Oral Daily     rosuvastatin  20 mg Oral Daily     senna-docusate  1 tablet Oral BID     sodium chloride (PF)  3 mL Intracatheter Q8H     vitamin C  1,000 mg Oral Daily     vitamin D3  50 mcg Oral Daily       Data   All microbiology laboratory data reviewed.  Recent Labs   Lab Test 08/18/21  0703 08/15/21  2233 01/09/21  0625   WBC 12.5* 10.6 9.3   HGB 12.2* 12.7* 11.5*   HCT 36.6* 37.6* 37.4*   MCV 81 82 81    270 265     Recent Labs   Lab Test 08/15/21  2233 01/09/21  0625 01/08/21  1031   CR 1.36* 1.36* 1.29*     Recent Labs   Lab Test 06/16/20  0759   SED 50*     Recent Labs   Lab Test 01/08/21  1145 01/08/21  1031 06/18/20  0828 06/12/20  0916 06/12/20  0913 06/12/20  0910 07/29/19  0544   CULT No growth No growth Culture negative after 4 weeks  No anaerobes isolated  On day 2, isolated in broth only:  Staphylococcus epidermidis  * Culture negative after 4 weeks  No anaerobes isolated  On day 2, isolated in broth only:  Staphylococcus epidermidis  *  Canceled, Test credited  Test reordered as correct code  Reordered as BONEC   Culture negative after 4 weeks  No anaerobes isolated  Light growth  Normal skin riki   Culture negative after 4 weeks  No anaerobes isolated  Canceled, Test credited  Incorrectly ordered by PCU/Clinic  Test reordered as correct code    Light growth  Normal skin riki   No growth     8/17 OR tissue cx Enterobacter cloacae complex    8/15 swab cx E.cloacae and Staph lugdunensis  Susceptibility     Enterobacter cloacae complex Staphylococcus lugdunensis     KATHY KATHY     Ampicillin  Resistant 1       Ampicillin/ Sulbactam  Resistant 1       Cefepime <=1.0  ug/mL Susceptible       Ceftazidime <=1.0 ug/mL Susceptible       Ceftriaxone <=1.0 ug/mL Susceptible       Ciprofloxacin <=0.25 ug/mL Susceptible       Clindamycin   <=0.25 ug/mL Susceptible     Erythromycin   <=0.25 ug/mL Susceptible     Gentamicin <=1.0 ug/mL Susceptible <=0.5 ug/mL Susceptible     Levofloxacin <=0.12 ug/mL Susceptible       Meropenem <=0.25 ug/mL Susceptible       Oxacillin   0.5 ug/mL Susceptible     Piperacillin/Tazobactam <=4.0 ug/mL Susceptible       Tetracycline   <=1.0 ug/mL Susceptible     Tobramycin <=1.0 ug/mL Susceptible       Trimethoprim/Sulfa <=1/19 ug/mL Susceptible <=0.5/9.5 u... Susceptible     Vancomycin   <=0.5 ug/mL Susceptible             1 Intrinsically Resistant

## 2021-08-18 NOTE — PROGRESS NOTES
Podiatry / Foot and Ankle Surgery Progress Note    August 18, 2021    Subject: Patient was seen at bedside.  Notes pain is minimal and intermittent.     Objective:  Vitals: /65 (BP Location: Right arm)   Pulse 51   Temp 97  F (36.1  C) (Axillary)   Resp 16   Wt 95.3 kg (210 lb)   SpO2 92%   BMI 29.29 kg/m    BMI= Body mass index is 29.29 kg/m .    General:  Patient is alert and orientated.  NAD.    Vascular:  DP and PT pulses are palpable.  No varicosities noted  CFT's < 3secs.  Skin temp is normal.    Neuro:  Light and gross touch sensation intact to digits, dorsum, and plantar aspects of the feet.    Derm:  Skin is supple.  No rashes, lesions, or ulcerations noted.    Musculoskeletal:  No foot deformity noted.      Imaging: left foot xray post op- I personally reviewed images - Fifth ray amputation at the metatarsal diaphyseal level.    Cultures:  1+ Enterobacter cloacae complexAbnormal     Pathology:  pending    Assessment: 61 yr old diabetic male s/p partial left 5th ray amputation due to osteomyelitis.    Plan:    -POD#1  -Dressing was changed.   -Keep foot and dressing dry.   -Feel all bone infection was removed and antibiotic bone cement placed in remaining 5th metatarsal.  -  Appreciate ID recs.   -minimal heel weight bearing for transfers in post op boot left foot.  -Patient discharging to TCU.  Will have them do 2x a week dressing changes there.    -okay to be discharged from podiatry standpoint.  Discharge pending ID, Medicine, and PT.  -recommend follow up with Dr. Cline in 2 weeks from discharge from hospital. recs placed.   -Podiatry will sign off. Please call with questions or concerns.       Mirian Cline DPM, Podiatry/Foot and Ankle Surgery  2:49 PM

## 2021-08-18 NOTE — PLAN OF CARE
POD0 L partial fifth amputation. A&Ox4. VSS ex elevated BP, on RA. Pt using IS independently. Tolerates consistent carb diet, denies nausea, BG checks, carb count. Left foot elevated, dressing CDI. Denies pain, using ice pack on L foot. Up to BR using scooter, NWB to L leg, boot at bedside. Using urinal at bedside. Contact precautions maintained. PT/Orthosis consulted. PRN flexeril given. PIV SL, int abx. Discharge pending progress.

## 2021-08-18 NOTE — PROGRESS NOTES
[facility]    Hospitalist Progress Note      Assessment & Plan   Nabil Cheung is a 61 year old male who presents with foot infection    Diabetic foot ulcer  Follows with Dr. Cline with podiatry/ wound center. Had admit 12/2020 for purulent LLE cellulitis ultimately with graft and wound vac placement (S yohana). Noted erythema, swelling and drainage from wound startin 8/14, no fevers, some pain. Afebrile here. WBC 10.6. foot XR with findings compatible with osteomyelitis  - blood cultures NGTD  - wound culture 8/15/21:  Enterobacter cloacae and Staph lugdunesis:  - podiatry: L5th Rays amputation 8/17/2021   -post op pain mgmt; WB status per Podiatry  - ID consult; vanco/zosyn from ED;  ID recommends zosyn only. Wound cultures as above; ID following.   -Nonweightbearing, left LE.  Transfers on the right.  SW evaluating TCU.       DM II with neuropathy, nephropathy and retinopathy  [empagliflozin 25 mg daily, insulin glargine 22 units at HS, metformin 1000 mg BID]  Poorly controlled, most recent A1C at 10.1%. started using insulin 1.5 years ago prior to CABG. Notes increased blood sugars correlating with his foot infection   - hold orals  - TID and HS blood sugars  - med sliding scale insulin  - mod CHO diet  - continue insulin 22 units at HS post op; add 1:15 carb counting on discussio with Patient; eating  -Glucoses remain high however resumption of PTA Lantus started last evening.  Monitor glucoses, adjust insulin accordingly.  Patient eating.     CAD with h/o CABG 5/2020  HTN  [aspirin 325 mg daily, lisinopril 10 mg daily, metoprolol ER 50 mg at HS, prn NTG, rosuvastatin 40 mg daily]  CABG 5/2020 with complication of sternal wound infection with MRSE 6/2020. Hypertensive on arrival 180-190's systolic.   - continue lisinopril, metoprolol  - hold aspirin if possible procedure; defer restart to Podiatry   - continue atorvastatin  -Blood pressure somewhat erratic, increase in lisinopril limited  by elevated CR.  Reluctant to increase metoprolol due to pulse rate.  Review of records indicates no significant pain condition, no use of PRNs.  update BMP tomorrow.  For BP.     CKD III  baseline creatinine ~1.3-1.4. creatinine on presentation at 1.36.   -recheck BMP in AM  - avoid nephrotoxins     BPH  [alfuzosin 10 mg daily, finasteride 5 mg daily]  - continue meds     COVID-19 negative on admission    DVT Prophylaxis: Pneumatic Compression Devices  Code Status: Full Code  Expected discharge:1-2 days pending surgical course; antibiotic treatment plan per ID, to TCU    Dino Chilel MD  Text Page (7am - 6pm, M-F)    I spent >25 minutes on the unit/floor in management of care today reviewing labs, medications, interdisciplinary notes; and completing documentation of encounter and orders with over 50% of my time was spent Counseling the Patient regarding elevated bp and diabetes control and Coordinating Care and plan with SW and Specialists, Podiatry regarding discharge planning; post op management and surveillance        Interval History   Denies pain, review of records indicates no use of as needed narcotics.  Nonweightbearing on left.  Transferring. Eating.  Nursing reports no wound issues, afebrile.      -Data reviewed today: I reviewed all new labs and imaging results over the last 24 hours.    Physical Exam   Temp: 96.8  F (36  C) Temp src: Oral BP: (!) 170/69 Pulse: 55   Resp: 16 SpO2: 93 % O2 Device: None (Room air)    Vitals:    08/15/21 2106   Weight: 95.3 kg (210 lb)     Vital Signs with Ranges  Temp:  [96.8  F (36  C)-98.7  F (37.1  C)] 96.8  F (36  C)  Pulse:  [51-70] 55  Resp:  [16] 16  BP: (137-177)/(65-75) 170/69  SpO2:  [92 %-93 %] 93 %  I/O last 3 completed shifts:  In: 920 [P.O.:920]  Out: 4325 [Urine:4325]    General/Constitutional:    NAD, alert, calm, cooperative    HEENT/Head Exam:  atraumatic  Eyes:  PERRL, no conjunctivits  Mouth/Oral Pharynx:  Buccal mucosa WNL  Chest/Respiratory:   Air exchange bilateral lung fields; no rales or wheeze. Respiration nonlabored.  Cardiovascular:  no murmur appreciated.  LE edema none  Gastrointestinal/Abdomen:  soft, nontender,  no rebound, guarding or other peritoneal signs.  Musculoskeletal:  extremities warm, dry, noncyanotic; L foot dressed;   Neurologic:  gross CN and motor testing nonfocal.  Sensation grossly tested intact.  Psychiatric:  Mental status:  Alert, oriented, affect calm  Skin:  No rash noted on gross exam    Medications       acetaminophen  975 mg Oral Q8H     alfuzosin ER  10 mg Oral Daily     finasteride  5 mg Oral Daily     insulin aspart   Subcutaneous QAM AC     insulin aspart   Subcutaneous Daily with lunch     insulin aspart   Subcutaneous Daily with supper     insulin aspart  1-7 Units Subcutaneous TID AC     insulin aspart  1-5 Units Subcutaneous At Bedtime     insulin glargine  22 Units Subcutaneous At Bedtime     lactobacillus rhamnosus (GG)  1 capsule Oral BID     lisinopril  10 mg Oral At Bedtime     magnesium chloride  535 mg Oral Daily     metoprolol succinate ER  100 mg Oral QPM     piperacillin-tazobactam  3.375 g Intravenous Q6H     polyethylene glycol  17 g Oral Daily     rosuvastatin  20 mg Oral Daily     senna-docusate  1 tablet Oral BID     sodium chloride (PF)  3 mL Intracatheter Q8H     vitamin C  1,000 mg Oral Daily     vitamin D3  50 mcg Oral Daily       Data   Recent Labs   Lab 08/18/21  1042 08/18/21  0703 08/18/21  0212 08/17/21  2102 08/17/21  0656 08/16/21  0320 08/15/21  2233 08/15/21  2233   WBC  --  12.5*  --   --   --   --   --  10.6   HGB  --  12.2*  --   --   --   --   --  12.7*   MCV  --  81  --   --   --   --   --  82   PLT  --  272  --   --   --   --   --  270   NA  --   --   --   --   --   --   --  133   POTASSIUM  --  4.8  --   --  4.3 3.7  --  4.8   CHLORIDE  --   --   --   --   --   --   --  102   CO2  --   --   --   --   --   --   --  29   BUN  --   --   --   --   --   --   --  26   CR  --   --   --    --   --   --   --  1.36*   ANIONGAP  --   --   --   --   --   --   --  2*   ONEYDA  --   --   --   --   --   --   --  8.9   *  --  229* 266*  --  251*   < > 261*   ALBUMIN  --   --   --   --   --   --   --  3.3*   PROTTOTAL  --   --   --   --   --   --   --  7.7   BILITOTAL  --   --   --   --   --   --   --  0.4   ALKPHOS  --   --   --   --   --   --   --  72   ALT  --   --   --   --   --   --   --  23   AST  --   --   --   --   --   --   --  13    < > = values in this interval not displayed.       No results found for this or any previous visit (from the past 24 hour(s)).

## 2021-08-18 NOTE — PLAN OF CARE
A&Ox4. VSS on RA except hypertensive-MD aware. Other VSS on RA. L/s clear. Denies chest pain and SOB. Left foot incision dressing changed by podiatry, CDI. CMS intact except baseline neuropathy. LLE elevated. Non Wt bearing or minimal heel touch wt bearing in boots for transfers only to LLE. ID following. Discharge to TCU pending progress/placement.

## 2021-08-18 NOTE — CONSULTS
Care Management Initial Consult    General Information  Assessment completed with: Patient,         Primary Care Provider verified and updated as needed:     Readmission within the last 30 days:           Advance Care Planning: Advance Care Planning Reviewed: no concerns identified          Communication Assessment  Patient's communication style: spoken language (English or Bilingual)    Hearing Difficulty or Deaf: no   Wear Glasses or Blind: no    Cognitive  Cognitive/Neuro/Behavioral: WDL  Level of Consciousness: alert  Arousal Level: arouses to voice        Best Language: 0 - No aphasia  Speech: clear    Living Environment:   People in home: alone     Current living Arrangements: apartment      Able to return to prior arrangements:         Family/Social Support:  Care provided by: self  Provides care for: no one                Description of Support System:           Current Resources:   Patient receiving home care services:       Community Resources:    Equipment currently used at home: other (see comments), wheelchair, manual (Knee scooter)  Supplies currently used at home:      Employment/Financial:  Employment Status:          Financial Concerns:             Lifestyle & Psychosocial Needs:  Social Determinants of Health     Tobacco Use: Low Risk      Smoking Tobacco Use: Never Smoker     Smokeless Tobacco Use: Never Used   Alcohol Use:      Frequency of Alcohol Consumption:      Average Number of Drinks:      Frequency of Binge Drinking:    Financial Resource Strain:      Difficulty of Paying Living Expenses:    Food Insecurity:      Worried About Running Out of Food in the Last Year:      Ran Out of Food in the Last Year:    Transportation Needs:      Lack of Transportation (Medical):      Lack of Transportation (Non-Medical):    Physical Activity:      Days of Exercise per Week:      Minutes of Exercise per Session:    Stress:      Feeling of Stress :    Social Connections:      Frequency of Communication  with Friends and Family:      Frequency of Social Gatherings with Friends and Family:      Attends Gnosticist Services:      Active Member of Clubs or Organizations:      Attends Club or Organization Meetings:      Marital Status:    Intimate Partner Violence:      Fear of Current or Ex-Partner:      Emotionally Abused:      Physically Abused:      Sexually Abused:    Depression: Not at risk     PHQ-2 Score: 0   Housing Stability:      Unable to Pay for Housing in the Last Year:      Number of Places Lived in the Last Year:      Unstable Housing in the Last Year:        Functional Status:  Prior to admission patient needed assistance:   Dependent ADLs:: Independent  Dependent IADLs:: Independent       Mental Health Status:          Chemical Dependency Status:                Values/Beliefs:  Spiritual, Cultural Beliefs, Gnosticist Practices, Values that affect care: no               Additional Information:  Writer spoke with pt. He lives in an apartment alone and is independent at baseline.     Pt is agreeable to TCU. He stated he is vaccinated. Pt would like a private room and is agreeable to private room fee. Pt would like referrals sent around the [facility].    LEIGH ANN Melgar

## 2021-08-19 LAB
ANION GAP SERPL CALCULATED.3IONS-SCNC: 4 MMOL/L (ref 3–14)
ATRIAL RATE - MUSE: 50 BPM
BACTERIA BONE ANAEROBE+AEROBE CULT: ABNORMAL
BACTERIA BONE ANAEROBE+AEROBE CULT: ABNORMAL
BUN SERPL-MCNC: 16 MG/DL (ref 7–30)
CALCIUM SERPL-MCNC: 9.3 MG/DL (ref 8.5–10.1)
CHLORIDE BLD-SCNC: 105 MMOL/L (ref 94–109)
CO2 SERPL-SCNC: 28 MMOL/L (ref 20–32)
CREAT SERPL-MCNC: 1.52 MG/DL (ref 0.66–1.25)
DIASTOLIC BLOOD PRESSURE - MUSE: NORMAL MMHG
ERYTHROCYTE [DISTWIDTH] IN BLOOD BY AUTOMATED COUNT: 13.2 % (ref 10–15)
GFR SERPL CREATININE-BSD FRML MDRD: 48 ML/MIN/1.73M2
GLUCOSE BLD-MCNC: 183 MG/DL (ref 70–99)
GLUCOSE BLDC GLUCOMTR-MCNC: 128 MG/DL (ref 70–99)
GLUCOSE BLDC GLUCOMTR-MCNC: 165 MG/DL (ref 70–99)
GLUCOSE BLDC GLUCOMTR-MCNC: 235 MG/DL (ref 70–99)
GLUCOSE BLDC GLUCOMTR-MCNC: 238 MG/DL (ref 70–99)
HCT VFR BLD AUTO: 36.6 % (ref 40–53)
HGB BLD-MCNC: 12.5 G/DL (ref 13.3–17.7)
INTERPRETATION ECG - MUSE: NORMAL
MCH RBC QN AUTO: 27.1 PG (ref 26.5–33)
MCHC RBC AUTO-ENTMCNC: 34.2 G/DL (ref 31.5–36.5)
MCV RBC AUTO: 79 FL (ref 78–100)
P AXIS - MUSE: 56 DEGREES
PATH REPORT.COMMENTS IMP SPEC: NORMAL
PATH REPORT.COMMENTS IMP SPEC: NORMAL
PATH REPORT.FINAL DX SPEC: NORMAL
PATH REPORT.GROSS SPEC: NORMAL
PATH REPORT.MICROSCOPIC SPEC OTHER STN: NORMAL
PATH REPORT.RELEVANT HX SPEC: NORMAL
PHOTO IMAGE: NORMAL
PLATELET # BLD AUTO: 283 10E3/UL (ref 150–450)
POTASSIUM BLD-SCNC: 3.9 MMOL/L (ref 3.4–5.3)
PR INTERVAL - MUSE: 226 MS
QRS DURATION - MUSE: 90 MS
QT - MUSE: 476 MS
QTC - MUSE: 433 MS
R AXIS - MUSE: -31 DEGREES
RBC # BLD AUTO: 4.61 10E6/UL (ref 4.4–5.9)
SODIUM SERPL-SCNC: 137 MMOL/L (ref 133–144)
SYSTOLIC BLOOD PRESSURE - MUSE: NORMAL MMHG
T AXIS - MUSE: 99 DEGREES
VENTRICULAR RATE- MUSE: 50 BPM
WBC # BLD AUTO: 9.6 10E3/UL (ref 4–11)

## 2021-08-19 PROCEDURE — 85027 COMPLETE CBC AUTOMATED: CPT | Performed by: HOSPITALIST

## 2021-08-19 PROCEDURE — 88305 TISSUE EXAM BY PATHOLOGIST: CPT | Mod: 26 | Performed by: PATHOLOGY

## 2021-08-19 PROCEDURE — 80048 BASIC METABOLIC PNL TOTAL CA: CPT | Performed by: HOSPITALIST

## 2021-08-19 PROCEDURE — 88311 DECALCIFY TISSUE: CPT | Mod: 26 | Performed by: PATHOLOGY

## 2021-08-19 PROCEDURE — 120N000001 HC R&B MED SURG/OB

## 2021-08-19 PROCEDURE — 250N000013 HC RX MED GY IP 250 OP 250 PS 637: Performed by: PODIATRIST

## 2021-08-19 PROCEDURE — 250N000011 HC RX IP 250 OP 636: Performed by: INTERNAL MEDICINE

## 2021-08-19 PROCEDURE — 250N000012 HC RX MED GY IP 250 OP 636 PS 637: Performed by: HOSPITALIST

## 2021-08-19 PROCEDURE — 36415 COLL VENOUS BLD VENIPUNCTURE: CPT | Performed by: HOSPITALIST

## 2021-08-19 PROCEDURE — 99233 SBSQ HOSP IP/OBS HIGH 50: CPT | Performed by: HOSPITALIST

## 2021-08-19 PROCEDURE — 250N000013 HC RX MED GY IP 250 OP 250 PS 637: Performed by: HOSPITALIST

## 2021-08-19 PROCEDURE — 88304 TISSUE EXAM BY PATHOLOGIST: CPT | Mod: 26 | Performed by: PATHOLOGY

## 2021-08-19 RX ORDER — AMLODIPINE BESYLATE 2.5 MG/1
2.5 TABLET ORAL DAILY
Status: DISCONTINUED | OUTPATIENT
Start: 2021-08-19 | End: 2021-08-20

## 2021-08-19 RX ADMIN — INSULIN GLARGINE 22 UNITS: 100 INJECTION, SOLUTION SUBCUTANEOUS at 21:50

## 2021-08-19 RX ADMIN — METOPROLOL SUCCINATE 100 MG: 25 TABLET, EXTENDED RELEASE ORAL at 21:49

## 2021-08-19 RX ADMIN — Medication 1 CAPSULE: at 21:49

## 2021-08-19 RX ADMIN — POLYETHYLENE GLYCOL 3350 17 G: 17 POWDER, FOR SOLUTION ORAL at 10:54

## 2021-08-19 RX ADMIN — SENNOSIDES AND DOCUSATE SODIUM 1 TABLET: 8.6; 5 TABLET ORAL at 21:49

## 2021-08-19 RX ADMIN — MAGNESIUM 64 MG (MAGNESIUM CHLORIDE) TABLET,DELAYED RELEASE 535 MG: at 10:48

## 2021-08-19 RX ADMIN — AMLODIPINE BESYLATE 2.5 MG: 2.5 TABLET ORAL at 14:46

## 2021-08-19 RX ADMIN — OXYCODONE HYDROCHLORIDE AND ACETAMINOPHEN 1000 MG: 500 TABLET ORAL at 10:49

## 2021-08-19 RX ADMIN — INSULIN ASPART 2 UNITS: 100 INJECTION, SOLUTION INTRAVENOUS; SUBCUTANEOUS at 17:34

## 2021-08-19 RX ADMIN — ALFUZOSIN HYDROCHLORIDE 10 MG: 10 TABLET, EXTENDED RELEASE ORAL at 10:48

## 2021-08-19 RX ADMIN — ACETAMINOPHEN 975 MG: 325 TABLET, FILM COATED ORAL at 21:49

## 2021-08-19 RX ADMIN — ROSUVASTATIN CALCIUM 20 MG: 20 TABLET, FILM COATED ORAL at 10:48

## 2021-08-19 RX ADMIN — ACETAMINOPHEN 975 MG: 325 TABLET, FILM COATED ORAL at 03:53

## 2021-08-19 RX ADMIN — Medication 1 CAPSULE: at 10:49

## 2021-08-19 RX ADMIN — PIPERACILLIN SODIUM AND TAZOBACTAM SODIUM 3.38 G: 3; .375 INJECTION, POWDER, LYOPHILIZED, FOR SOLUTION INTRAVENOUS at 01:40

## 2021-08-19 RX ADMIN — SENNOSIDES AND DOCUSATE SODIUM 1 TABLET: 8.6; 5 TABLET ORAL at 10:48

## 2021-08-19 RX ADMIN — ACETAMINOPHEN 975 MG: 325 TABLET, FILM COATED ORAL at 14:49

## 2021-08-19 RX ADMIN — FINASTERIDE 5 MG: 5 TABLET, FILM COATED ORAL at 10:48

## 2021-08-19 RX ADMIN — Medication 50 MCG: at 10:49

## 2021-08-19 RX ADMIN — PIPERACILLIN SODIUM AND TAZOBACTAM SODIUM 3.38 G: 3; .375 INJECTION, POWDER, LYOPHILIZED, FOR SOLUTION INTRAVENOUS at 10:50

## 2021-08-19 RX ADMIN — PIPERACILLIN SODIUM AND TAZOBACTAM SODIUM 3.38 G: 3; .375 INJECTION, POWDER, LYOPHILIZED, FOR SOLUTION INTRAVENOUS at 21:50

## 2021-08-19 RX ADMIN — PIPERACILLIN SODIUM AND TAZOBACTAM SODIUM 3.38 G: 3; .375 INJECTION, POWDER, LYOPHILIZED, FOR SOLUTION INTRAVENOUS at 14:48

## 2021-08-19 RX ADMIN — LISINOPRIL 10 MG: 10 TABLET ORAL at 21:49

## 2021-08-19 ASSESSMENT — ACTIVITIES OF DAILY LIVING (ADL)
ADLS_ACUITY_SCORE: 13

## 2021-08-19 NOTE — PROGRESS NOTES
Care Management Follow Up    Length of Stay (days): 3    Expected Discharge Date: 08/19/2021     Concerns to be Addressed:       Patient plan of care discussed at interdisciplinary rounds: Yes    Anticipated Discharge Disposition:       Anticipated Discharge Services:    Anticipated Discharge DME:      Patient/family educated on Medicare website which has current facility and service quality ratings:    Education Provided on the Discharge Plan:    Patient/Family in Agreement with the Plan:      Referrals Placed by CM/SW:    Private pay costs discussed: Not applicable    Additional Information:  Writer spoke with patient who confirmed that he would like to go with [facility]. Patient asked writer to confirm with admissions that due to his precautions he would need a private room with no additional fee due to medical necessity. Writer called and left a voicemail with [facility] admissions confirming patient's desire to go to their TCU and confirm the bed and inquire about the private room.     JESSICA Loya, SW   Social Work   [facility]

## 2021-08-19 NOTE — PLAN OF CARE
Patient is A&O x4. VSS on RA, ex hypertensive and bradycardic. MD notified. No PRN interventions. POD2; L-partial 5th toe amputation d/t osteomyelitis. Denies pain. Scheduled tylenol given. Continent of bowel/bladder. Uses urinal at bedside. R-foot elevated; dressing is CDI. Consistent carb diet; BG checks (243:165). NWB to R-leg- scooter available when going to the BR. L-PIV SL w/ intermittent antibiotics. Sticky note for MD- patient would like to converse with MD about elevated BP's and elevated BG checks. Discharge: TCU; pending progress. Continue to monitor. Contact precautions maintained.

## 2021-08-19 NOTE — PROVIDER NOTIFICATION
MD Notification    Notified Person: MD    Notified Person Name: Yolanda    Notification Date/Time:8/18/2021; 2340    Notification Interaction:Web-page    Purpose of Notification:Patient's BP elevated; HR 56. Ok to give PRN hydralazine?    Orders Received: Do not give PRN hydralazine; continue to monitor.     Comments:

## 2021-08-19 NOTE — PLAN OF CARE
"Pt A/O x4, very pleasant. BP elevated, not out of parameters. POD #2 partial 5th toe amputation. Dressing is CDI; declines dressing change stating it \"only needs to be done 2x/week\". Denies any pain-refuses Tylenol. Ice and elevation for comfort. Great UOP; uses urinal at bedside. Passing flatus Pt uses knee scooter for ambulation and transfers. BG mgmt with sliding scale insulin. New PIV. Mod CHo diet + CC. Contact precautions maintained.         "

## 2021-08-19 NOTE — PROVIDER NOTIFICATION
MD Notification    Notified Person: MD    Notified Person Name: Dr Chilel    Notification Date/Time: 1500 8/19    Notification Interaction: page/ phone call    Purpose of Notification: see comments    Orders Received: Dr Chilel will not lower dose for safety. Pt requires beta blocker based on heart history    Comments:    Pt hesitant to start metoprolol 100mg, he states he would be comfortable taking 50mg.  please consider adjusting or speak with the patient   thank you rashida

## 2021-08-19 NOTE — PROGRESS NOTES
[facility]    Hospitalist Progress Note      Assessment & Plan   Nabil Cheung is a 61 year old male who presents with foot infection    Diabetic foot ulcer  Follows with Dr. Cline with podiatry/ wound center. Had admit 12/2020 for purulent LLE cellulitis ultimately with graft and wound vac placement (S mitgregoria). Noted erythema, swelling and drainage from wound startin 8/14, no fevers, some pain. Afebrile here. WBC 10.6. foot XR with findings compatible with osteomyelitis  - blood cultures NGTD  - wound culture 8/15/21:  Enterobacter cloacae and Staph lugdunesis:  - podiatry: L5th Rays amputation 8/17/2021   -post op pain mgmt; WB status per Podiatry  - ID consult; vanco/zosyn from ED;  ID recommends zosyn only. Wound cultures NGTD; ID following.  Recommend continue Zosyn, transition to oral antibiotics Augmentin 875 mg twice daily with Cipro 5 mg every 12 hours at discharge for 1 week, treat until 8/24/2021.  -Nonweightbearing, left LE.  Transfers on the right.  SW evaluating TCU.       DM II with neuropathy, nephropathy and retinopathy  [empagliflozin 25 mg daily, insulin glargine 22 units at HS, metformin 1000 mg BID]  Poorly controlled, most recent A1C at 10.1%. started using insulin 1.5 years ago prior to CABG. Notes increased blood sugars correlating with his foot infection   - hold orals  - TID and HS blood sugars  - med sliding scale insulin  - mod CHO diet  - continue insulin 22 units at HS post op; add 1:15 carb counting on discussio with Patient; eating  -Glucoses downtrending on restart of PTA Lantus, carb counting, continue SSI.  Patient eating.      CAD with h/o CABG 5/2020  HTN  [aspirin 325 mg daily, lisinopril 10 mg daily, metoprolol ER 50 mg at HS, prn NTG, rosuvastatin 40 mg daily]  CABG 5/2020 with complication of sternal wound infection with MRSE 6/2020. Hypertensive on arrival 180-190's systolic.   - continue lisinopril, metoprolol  - hold aspirin pre procedure; defer  restart to Podiatry   - continue atorvastatin  -Blood pressure higher than goal; increase in lisinopril limited by elevated CR.  Reluctant to increase metoprolol due to pulse rate. On B2 with known CAD, status post CABG.  Review of records indicates no significant pain condition, no use of PRNs.  Discussed with patient, today's creatinine in fact increased at 1.52, therefore we will not increase lisinopril, maintain metoprolol at current dose, add amlodipine 2.5 mg.  Monitor glucoses.  Patient has as needed hydralazine.      CKD III  baseline creatinine ~1.3-1.4. creatinine on presentation at 1.36.   -See above, today's creatinine 1.52.  - avoid nephrotoxins     BPH  [alfuzosin 10 mg daily, finasteride 5 mg daily]  - continue meds     COVID-19 negative on admission    DVT Prophylaxis: Pneumatic Compression Devices  Code Status: Full Code  Expected discharge:1-2 days pending surgical course; Juan Miguel Chilel MD  Text Page (7am - 6pm, M-F)    Total unit/floor time 35 minutes:  time consisted of the following, examination of patient, review of records including labs, imaging results, medications, interdisciplinary notes and completing documentation; > 50% Counseling/discussion with Patient occluding long discussion regarding antihypertensives, indications for metoprolol due to history of CAD/CABG, limitation on increasing lisinopril due to MISA and Coordination of Care time with Nursing  and Specialists, Podiatry and ID regarding OM status post amputation care plan, management and surveillance.      Interval History   Denies pain, NWB, transfers..  No chest pain, dyspnea.  A lot of questions regarding his blood pressure and antihypertensives.  Eating.      -Data reviewed today: I reviewed all new labs and imaging results over the last 24 hours.    Physical Exam   Temp: (!) 96.1  F (35.6  C) Temp src: Oral BP: (!) 170/81 Pulse: 71   Resp: 16 SpO2: 95 % O2 Device: None (Room air)    Vitals:    08/15/21 2106    Weight: 95.3 kg (210 lb)     Vital Signs with Ranges  Temp:  [96.1  F (35.6  C)-98.2  F (36.8  C)] 96.1  F (35.6  C)  Pulse:  [52-71] 71  Resp:  [16] 16  BP: (165-187)/(72-82) 170/81  SpO2:  [94 %-96 %] 95 %  I/O last 3 completed shifts:  In: 1100 [P.O.:1100]  Out: 5300 [Urine:5300]    General/Constitutional:    NAD, alert, calm, cooperative ; appears comfortable.  HEENT/Head Exam:  atraumatic  Eyes:  PERRL, no conjunctivits  Mouth/Oral Pharynx:  Buccal mucosa WNL  Chest/Respiratory:  Air exchange bilateral lung fields; no rales or wheeze. Respiration nonlabored.  Cardiovascular:  no murmur appreciated.  LE edema none  Gastrointestinal/Abdomen:  soft, nontender,  no rebound, guarding or other peritoneal signs.  Musculoskeletal:  extremities warm, dry, noncyanotic; L foot dressed;   Neurologic:  gross CN and motor testing nonfocal.  Sensation grossly tested intact.  Psychiatric:  Mental status:  Alert, oriented, affect calm  Skin:  No rash noted on gross exam    Medications       acetaminophen  975 mg Oral Q8H     alfuzosin ER  10 mg Oral Daily     amLODIPine  2.5 mg Oral Daily     finasteride  5 mg Oral Daily     insulin aspart   Subcutaneous QAM AC     insulin aspart   Subcutaneous Daily with lunch     insulin aspart   Subcutaneous Daily with supper     insulin aspart  1-7 Units Subcutaneous TID AC     insulin aspart  1-5 Units Subcutaneous At Bedtime     insulin glargine  22 Units Subcutaneous At Bedtime     lactobacillus rhamnosus (GG)  1 capsule Oral BID     lisinopril  10 mg Oral At Bedtime     magnesium chloride  535 mg Oral Daily     metoprolol succinate ER  100 mg Oral QPM     piperacillin-tazobactam  3.375 g Intravenous Q6H     polyethylene glycol  17 g Oral Daily     rosuvastatin  20 mg Oral Daily     senna-docusate  1 tablet Oral BID     sodium chloride (PF)  3 mL Intracatheter Q8H     vitamin C  1,000 mg Oral Daily     vitamin D3  50 mcg Oral Daily       Data   Recent Labs   Lab 08/19/21  5709  08/19/21  1037 08/19/21  0144 08/18/21  0703 08/17/21  0656 08/16/21  0320 08/15/21  2233   WBC 9.6  --   --  12.5*  --   --  10.6   HGB 12.5*  --   --  12.2*  --   --  12.7*   MCV 79  --   --  81  --   --  82     --   --  272  --   --  270     --   --   --   --   --  133   POTASSIUM 3.9  --   --  4.8 4.3  --  4.8   CHLORIDE 105  --   --   --   --   --  102   CO2 28  --   --   --   --   --  29   BUN 16  --   --   --   --   --  26   CR 1.52*  --   --   --   --   --  1.36*   ANIONGAP 4  --   --   --   --   --  2*   ONEYDA 9.3  --   --   --   --   --  8.9   * 128* 165*  --   --    < > 261*   ALBUMIN  --   --   --   --   --   --  3.3*   PROTTOTAL  --   --   --   --   --   --  7.7   BILITOTAL  --   --   --   --   --   --  0.4   ALKPHOS  --   --   --   --   --   --  72   ALT  --   --   --   --   --   --  23   AST  --   --   --   --   --   --  13    < > = values in this interval not displayed.       No results found for this or any previous visit (from the past 24 hour(s)).

## 2021-08-19 NOTE — PROGRESS NOTES
[facility]    Infectious Disease Progress Note    Date of Service : 08/19/2021     Assessment:  1. Diabetic foot infection with osteomyelitis of left 5th metatarsal and 5th toe with abscess. S/p partial left 5th ray amputation with flap closure. Operative culture with enterobacter cloacae complex and staph lugdunensis. Surgical path pending.  2. Poorly controlled diabetes with last HbA1c of 9.1% 1/9/2021  3. CKD  4. Chronic medical conditions - diabetes complicated by neuropathy, retinopathy and nephropathy, Cad s/p CABG     Recommendations  1. Continue Zosyn  2. Follow culture/sensitivities and pathology  3. Infected bone is felt to have been resected. Do not anticipate IV antibiotics at discharge .   4. Oral transition to Augmentin 875 BID / cipro 500 Q12h at discharge for 1 week post op, treat until 8/24  5. Glycemic control and off loading per podiatry recommendations.    Seema Kulkarni MD    Interval History   Feels ok. Pain controlled. Tolerating antibiotics without side effects.    Physical Exam   Temp: 98.2  F (36.8  C) Temp src: Oral BP: (!) 165/72 Pulse: 52   Resp: 16 SpO2: 94 % O2 Device: None (Room air)    Vitals:    08/15/21 2106   Weight: 95.3 kg (210 lb)     Vital Signs with Ranges  Temp:  [96.8  F (36  C)-98.2  F (36.8  C)] 98.2  F (36.8  C)  Pulse:  [52-62] 52  Resp:  [16] 16  BP: (165-187)/(69-82) 165/72  SpO2:  [93 %-96 %] 94 %    GENERAL APPEARANCE:  awake  EYES: Eyes grossly normal to inspection, PERRL and conjunctivae and sclerae normal  RESP: lungs clear to auscultation - no rales, rhonchi or wheezes  CV: regular rates and rhythm, normal S1 S2, no S3 or S4 and no murmur, click or rub  ABDOMEN: soft, nontender, without hepatosplenomegaly or masses and bowel sounds normal  MS: Left foot in dressing  Other:    Medications       acetaminophen  975 mg Oral Q8H     alfuzosin ER  10 mg Oral Daily     finasteride  5 mg Oral Daily     insulin aspart   Subcutaneous QAM AC      insulin aspart   Subcutaneous Daily with lunch     insulin aspart   Subcutaneous Daily with supper     insulin aspart  1-7 Units Subcutaneous TID AC     insulin aspart  1-5 Units Subcutaneous At Bedtime     insulin glargine  22 Units Subcutaneous At Bedtime     lactobacillus rhamnosus (GG)  1 capsule Oral BID     lisinopril  10 mg Oral At Bedtime     magnesium chloride  535 mg Oral Daily     metoprolol succinate ER  100 mg Oral QPM     piperacillin-tazobactam  3.375 g Intravenous Q6H     polyethylene glycol  17 g Oral Daily     rosuvastatin  20 mg Oral Daily     senna-docusate  1 tablet Oral BID     sodium chloride (PF)  3 mL Intracatheter Q8H     vitamin C  1,000 mg Oral Daily     vitamin D3  50 mcg Oral Daily       Data   All microbiology laboratory data reviewed.  Recent Labs   Lab Test 08/18/21  0703 08/15/21  2233 01/09/21  0625   WBC 12.5* 10.6 9.3   HGB 12.2* 12.7* 11.5*   HCT 36.6* 37.6* 37.4*   MCV 81 82 81    270 265     Recent Labs   Lab Test 08/15/21  2233 01/09/21  0625 01/08/21  1031   CR 1.36* 1.36* 1.29*     Recent Labs   Lab Test 06/16/20  0759   SED 50*     Recent Labs   Lab Test 01/08/21  1145 01/08/21  1031 06/18/20  0828 06/12/20  0916 06/12/20  0913 06/12/20  0910 07/29/19  0544   CULT No growth No growth Culture negative after 4 weeks  No anaerobes isolated  On day 2, isolated in broth only:  Staphylococcus epidermidis  * Culture negative after 4 weeks  No anaerobes isolated  On day 2, isolated in broth only:  Staphylococcus epidermidis  *  Canceled, Test credited  Test reordered as correct code  Reordered as BONEC   Culture negative after 4 weeks  No anaerobes isolated  Light growth  Normal skin riki   Culture negative after 4 weeks  No anaerobes isolated  Canceled, Test credited  Incorrectly ordered by PCU/Clinic  Test reordered as correct code    Light growth  Normal skin riki   No growth     Micro  8/17 OR tissue cx Enterobacter cloacae complex, staph lugdunensis  sensitivities spending     8/15 swab cx E.cloacae and Staph lugdunensis  Susceptibility                Enterobacter cloacae complex Staphylococcus lugdunensis       KATHY KATHY       Ampicillin   Resistant 1           Ampicillin/ Sulbactam   Resistant 1           Cefepime <=1.0 ug/mL Susceptible           Ceftazidime <=1.0 ug/mL Susceptible           Ceftriaxone <=1.0 ug/mL Susceptible           Ciprofloxacin <=0.25 ug/mL Susceptible           Clindamycin     <=0.25 ug/mL Susceptible       Erythromycin     <=0.25 ug/mL Susceptible       Gentamicin <=1.0 ug/mL Susceptible <=0.5 ug/mL Susceptible       Levofloxacin <=0.12 ug/mL Susceptible           Meropenem <=0.25 ug/mL Susceptible           Oxacillin     0.5 ug/mL Susceptible       Piperacillin/Tazobactam <=4.0 ug/mL Susceptible           Tetracycline     <=1.0 ug/mL Susceptible       Tobramycin <=1.0 ug/mL Susceptible           Trimethoprim/Sulfa <=1/19 ug/mL Susceptible <=0.5/9.5 u... Susceptible       Vancomycin     <=0.5 ug/mL Susceptible                     1 Intrinsically Resistant      8/16 MRI Left foot  IMPRESSION:  1.  Evidence for osteomyelitis distal aspect left fifth metatarsal at the left fifth metatarsal neck and head with cortical destruction and replacement of the normal T1 signal. Associated area of osteomyelitis involving the proximal aspect of the   proximal phalanx of the left fifth toe.     2.  No other areas of osteomyelitis.     3.  Diffuse muscular edema involving the left foot which can be seen with myositis.     4.  Small subcutaneous fluid collection with draining abscess along the dorsal lateral aspect of the distal left foot adjacent to the site of osteomyelitis.     5.  Marked subcutaneous edema lateral aspect of the left foot.

## 2021-08-20 LAB
GLUCOSE BLDC GLUCOMTR-MCNC: 148 MG/DL (ref 70–99)
GLUCOSE BLDC GLUCOMTR-MCNC: 172 MG/DL (ref 70–99)
GLUCOSE BLDC GLUCOMTR-MCNC: 175 MG/DL (ref 70–99)
GLUCOSE BLDC GLUCOMTR-MCNC: 262 MG/DL (ref 70–99)
GLUCOSE BLDC GLUCOMTR-MCNC: 285 MG/DL (ref 70–99)

## 2021-08-20 PROCEDURE — 250N000011 HC RX IP 250 OP 636: Performed by: INTERNAL MEDICINE

## 2021-08-20 PROCEDURE — 250N000013 HC RX MED GY IP 250 OP 250 PS 637: Performed by: HOSPITALIST

## 2021-08-20 PROCEDURE — 120N000001 HC R&B MED SURG/OB

## 2021-08-20 PROCEDURE — 250N000013 HC RX MED GY IP 250 OP 250 PS 637: Performed by: PODIATRIST

## 2021-08-20 PROCEDURE — 250N000012 HC RX MED GY IP 250 OP 636 PS 637: Performed by: HOSPITALIST

## 2021-08-20 PROCEDURE — 250N000013 HC RX MED GY IP 250 OP 250 PS 637: Performed by: INTERNAL MEDICINE

## 2021-08-20 PROCEDURE — 99232 SBSQ HOSP IP/OBS MODERATE 35: CPT | Performed by: HOSPITALIST

## 2021-08-20 RX ORDER — METOPROLOL SUCCINATE 25 MG/1
75 TABLET, EXTENDED RELEASE ORAL EVERY EVENING
Status: DISCONTINUED | OUTPATIENT
Start: 2021-08-20 | End: 2021-08-21 | Stop reason: HOSPADM

## 2021-08-20 RX ORDER — AMLODIPINE BESYLATE 5 MG/1
5 TABLET ORAL DAILY
Status: DISCONTINUED | OUTPATIENT
Start: 2021-08-20 | End: 2021-08-21

## 2021-08-20 RX ORDER — ASPIRIN 325 MG
325 TABLET ORAL EVERY OTHER DAY
Status: DISCONTINUED | OUTPATIENT
Start: 2021-08-20 | End: 2021-08-20 | Stop reason: DRUGHIGH

## 2021-08-20 RX ORDER — ASPIRIN 81 MG/1
81 TABLET ORAL DAILY
COMMUNITY

## 2021-08-20 RX ORDER — ASPIRIN 81 MG/1
81 TABLET ORAL EVERY OTHER DAY
Status: DISCONTINUED | OUTPATIENT
Start: 2021-08-20 | End: 2021-08-21 | Stop reason: HOSPADM

## 2021-08-20 RX ADMIN — PIPERACILLIN SODIUM AND TAZOBACTAM SODIUM 3.38 G: 3; .375 INJECTION, POWDER, LYOPHILIZED, FOR SOLUTION INTRAVENOUS at 17:28

## 2021-08-20 RX ADMIN — ALFUZOSIN HYDROCHLORIDE 10 MG: 10 TABLET, EXTENDED RELEASE ORAL at 09:12

## 2021-08-20 RX ADMIN — ACETAMINOPHEN 975 MG: 325 TABLET, FILM COATED ORAL at 03:53

## 2021-08-20 RX ADMIN — METOPROLOL SUCCINATE 75 MG: 25 TABLET, EXTENDED RELEASE ORAL at 20:31

## 2021-08-20 RX ADMIN — MAGNESIUM 64 MG (MAGNESIUM CHLORIDE) TABLET,DELAYED RELEASE 535 MG: at 10:51

## 2021-08-20 RX ADMIN — INSULIN ASPART 1 UNITS: 100 INJECTION, SOLUTION INTRAVENOUS; SUBCUTANEOUS at 10:51

## 2021-08-20 RX ADMIN — ASPIRIN 81 MG: 81 TABLET, COATED ORAL at 20:30

## 2021-08-20 RX ADMIN — POLYETHYLENE GLYCOL 3350 17 G: 17 POWDER, FOR SOLUTION ORAL at 09:12

## 2021-08-20 RX ADMIN — LISINOPRIL 10 MG: 10 TABLET ORAL at 21:58

## 2021-08-20 RX ADMIN — Medication 1 CAPSULE: at 20:31

## 2021-08-20 RX ADMIN — INSULIN ASPART 3 UNITS: 100 INJECTION, SOLUTION INTRAVENOUS; SUBCUTANEOUS at 17:41

## 2021-08-20 RX ADMIN — INSULIN GLARGINE 22 UNITS: 100 INJECTION, SOLUTION SUBCUTANEOUS at 21:59

## 2021-08-20 RX ADMIN — SENNOSIDES AND DOCUSATE SODIUM 1 TABLET: 8.6; 5 TABLET ORAL at 09:12

## 2021-08-20 RX ADMIN — Medication 50 MCG: at 09:12

## 2021-08-20 RX ADMIN — AMLODIPINE BESYLATE 5 MG: 5 TABLET ORAL at 09:11

## 2021-08-20 RX ADMIN — PIPERACILLIN SODIUM AND TAZOBACTAM SODIUM 3.38 G: 3; .375 INJECTION, POWDER, LYOPHILIZED, FOR SOLUTION INTRAVENOUS at 10:51

## 2021-08-20 RX ADMIN — FINASTERIDE 5 MG: 5 TABLET, FILM COATED ORAL at 09:12

## 2021-08-20 RX ADMIN — PIPERACILLIN SODIUM AND TAZOBACTAM SODIUM 3.38 G: 3; .375 INJECTION, POWDER, LYOPHILIZED, FOR SOLUTION INTRAVENOUS at 03:53

## 2021-08-20 RX ADMIN — PIPERACILLIN SODIUM AND TAZOBACTAM SODIUM 3.38 G: 3; .375 INJECTION, POWDER, LYOPHILIZED, FOR SOLUTION INTRAVENOUS at 21:58

## 2021-08-20 RX ADMIN — OXYCODONE HYDROCHLORIDE AND ACETAMINOPHEN 1000 MG: 500 TABLET ORAL at 09:11

## 2021-08-20 RX ADMIN — Medication 1 CAPSULE: at 09:11

## 2021-08-20 RX ADMIN — ROSUVASTATIN CALCIUM 20 MG: 20 TABLET, FILM COATED ORAL at 09:10

## 2021-08-20 ASSESSMENT — ACTIVITIES OF DAILY LIVING (ADL)
ADLS_ACUITY_SCORE: 13
TOILETING_ISSUES: NO
WALKING_OR_CLIMBING_STAIRS: AMBULATION DIFFICULTY, REQUIRES EQUIPMENT
ADLS_ACUITY_SCORE: 14
DOING_ERRANDS_INDEPENDENTLY_DIFFICULTY: NO
ADLS_ACUITY_SCORE: 13
CONCENTRATING,_REMEMBERING_OR_MAKING_DECISIONS_DIFFICULTY: NO
EQUIPMENT_CURRENTLY_USED_AT_HOME: OTHER (SEE COMMENTS)
FALL_HISTORY_WITHIN_LAST_SIX_MONTHS: YES
WEAR_GLASSES_OR_BLIND: YES
ADLS_ACUITY_SCORE: 13
DRESSING/BATHING_DIFFICULTY: NO
NUMBER_OF_TIMES_PATIENT_HAS_FALLEN_WITHIN_LAST_SIX_MONTHS: 1
ADLS_ACUITY_SCORE: 14
VISION_MANAGEMENT: GLASSES
DIFFICULTY_EATING/SWALLOWING: NO
DIFFICULTY_COMMUNICATING: NO
WALKING_OR_CLIMBING_STAIRS_DIFFICULTY: YES
ADLS_ACUITY_SCORE: 13

## 2021-08-20 NOTE — PROGRESS NOTES
[facility]    Infectious Disease Progress Note    Date of Service : 08/20/2021     Assessment:  1. Diabetic foot infection with osteomyelitis of left 5th metatarsal and 5th toe with abscess. S/p partial left 5th ray amputation with flap closure. Operative culture with enterobacter cloacae complex and staph lugdunensis. Surgical path pending.  2. Poorly controlled diabetes with last HbA1c of 9.1% 1/9/2021  3. CKD  4. Chronic medical conditions - diabetes complicated by neuropathy, retinopathy and nephropathy, Cad s/p CABG    Recommendations  1. Zosyn while inpatient  2. Cultures /path reviewed. No residual osteo  3. Oral transition to Augmentin 875 BID / cipro 500 Q12h at discharge, treat until 8/24 (check renal function, may require dose modification if worsening renal function)  4. Glycemic control and off loading per podiatry recommendations.    No ID FUP following discharge, patient will follow with podiatry  ID will sign off, please call with questions or concerns ALLISON Kulkarni MD    Interval History   No new complaints, pain controlled, tolerating antibiotics without side effects.    Physical Exam   Temp: (!) 96.4  F (35.8  C) Temp src: Oral BP: (!) 182/68 Pulse: (!) 46   Resp: 16 SpO2: 96 % O2 Device: None (Room air)    Vitals:    08/15/21 2106   Weight: 95.3 kg (210 lb)     Vital Signs with Ranges  Temp:  [96.1  F (35.6  C)-97.1  F (36.2  C)] 96.4  F (35.8  C)  Pulse:  [46-76] 46  Resp:  [] 16  BP: (165-197)/(68-91) 182/68  SpO2:  [95 %-96 %] 96 %    GENERAL APPEARANCE:  awake  EYES: Eyes grossly normal to inspection, PERRL and conjunctivae and sclerae normal  RESP: lungs clear to auscultation - no rales, rhonchi or wheezes  CV: regular rates and rhythm, normal S1 S2, no S3 or S4 and no murmur, click or rub  ABDOMEN: soft, nontender, without hepatosplenomegaly or masses and bowel sounds normal  MS: Left foot in dressing  Other:    Medications       acetaminophen  975 mg  Oral Q8H     alfuzosin ER  10 mg Oral Daily     amLODIPine  2.5 mg Oral Daily     finasteride  5 mg Oral Daily     insulin aspart   Subcutaneous QAM AC     insulin aspart   Subcutaneous Daily with lunch     insulin aspart   Subcutaneous Daily with supper     insulin aspart  1-7 Units Subcutaneous TID AC     insulin aspart  1-5 Units Subcutaneous At Bedtime     insulin glargine  22 Units Subcutaneous At Bedtime     lactobacillus rhamnosus (GG)  1 capsule Oral BID     lisinopril  10 mg Oral At Bedtime     magnesium chloride  535 mg Oral Daily     metoprolol succinate ER  100 mg Oral QPM     piperacillin-tazobactam  3.375 g Intravenous Q6H     polyethylene glycol  17 g Oral Daily     rosuvastatin  20 mg Oral Daily     senna-docusate  1 tablet Oral BID     sodium chloride (PF)  3 mL Intracatheter Q8H     vitamin C  1,000 mg Oral Daily     vitamin D3  50 mcg Oral Daily       Data   All microbiology laboratory data reviewed.  Recent Labs   Lab Test 08/19/21  1235 08/18/21  0703 08/15/21  2233   WBC 9.6 12.5* 10.6   HGB 12.5* 12.2* 12.7*   HCT 36.6* 36.6* 37.6*   MCV 79 81 82    272 270     Recent Labs   Lab Test 08/19/21  1235 08/15/21  2233 01/09/21  0625   CR 1.52* 1.36* 1.36*     Recent Labs   Lab Test 06/16/20  0759   SED 50*     Recent Labs   Lab Test 01/08/21  1145 01/08/21  1031 06/18/20  0828 06/12/20  0916 06/12/20  0913 06/12/20  0910 07/29/19  0544   CULT No growth No growth Culture negative after 4 weeks  No anaerobes isolated  On day 2, isolated in broth only:  Staphylococcus epidermidis  * Culture negative after 4 weeks  No anaerobes isolated  On day 2, isolated in broth only:  Staphylococcus epidermidis  *  Canceled, Test credited  Test reordered as correct code  Reordered as BONEC   Culture negative after 4 weeks  No anaerobes isolated  Light growth  Normal skin riki   Culture negative after 4 weeks  No anaerobes isolated  Canceled, Test credited  Incorrectly ordered by PCU/Clinic  Test  reordered as correct code    Light growth  Normal skin riki   No growth     8/17 tissue path  Final Diagnosis   A: Bone, left 5th metatarsal, amputation  -Viable bone with focal acute inflammation  -Skin with ulceration and acute inflammation     B: Bone, left proximal, margin, excision  -Viable bone with no significant inflammation         Micro  8/17 OR tissue cx Enterobacter cloacae complex, staph lugdunensis sensitivities spending     8/15 swab cx E.cloacae and Staph lugdunensis    Susceptibility     Enterobacter cloacae complex Staphylococcus lugdunensis     KATHY KATHY     Ampicillin  Resistant 1       Ampicillin/ Sulbactam  Resistant 1       Cefepime <=1.0 ug/mL Susceptible       Ceftazidime <=1.0 ug/mL Susceptible       Ceftriaxone <=1.0 ug/mL Susceptible       Ciprofloxacin <=0.25 ug/mL Susceptible       Clindamycin   <=0.25 ug/mL Susceptible     Erythromycin   <=0.25 ug/mL Susceptible     Gentamicin <=1.0 ug/mL Susceptible <=0.5 ug/mL Susceptible     Levofloxacin <=0.12 ug/mL Susceptible       Meropenem <=0.25 ug/mL Susceptible       Oxacillin   0.5 ug/mL Susceptible     Piperacillin/Tazobactam <=4.0 ug/mL Susceptible       Tetracycline   <=1.0 ug/mL Susceptible     Tobramycin <=1.0 ug/mL Susceptible       Trimethoprim/Sulfa <=1/19 ug/mL Susceptible <=0.5/9.5 u... Susceptible     Vancomycin   <=0.5 ug/mL Susceptible                  8/16 MRI Left foot  IMPRESSION:  1.  Evidence for osteomyelitis distal aspect left fifth metatarsal at the left fifth metatarsal neck and head with cortical destruction and replacement of the normal T1 signal. Associated area of osteomyelitis involving the proximal aspect of the   proximal phalanx of the left fifth toe.     2.  No other areas of osteomyelitis.     3.  Diffuse muscular edema involving the left foot which can be seen with myositis.     4.  Small subcutaneous fluid collection with draining abscess along the dorsal lateral aspect of the distal left foot adjacent to  the site of osteomyelitis.     5.  Marked subcutaneous edema lateral aspect of the left foot.

## 2021-08-20 NOTE — PROGRESS NOTES
Care Management Follow Up    Length of Stay (days): 4    Expected Discharge Date: 08/21/2021     Concerns to be Addressed:       Patient plan of care discussed at interdisciplinary rounds: Yes    Anticipated Discharge Disposition:       Anticipated Discharge Services:    Anticipated Discharge DME:      Patient/family educated on Medicare website which has current facility and service quality ratings:    Education Provided on the Discharge Plan:    Patient/Family in Agreement with the Plan:      Referrals Placed by CM/SW:    Private pay costs discussed: Not applicable    Additional Information:  Writer paged the doctor to determine if patient is able to discharge. Writer called Shi at [place] and informed her that writer is waiting to hear from the doctor to determine if patient is able to discharge. Writer will continue to follow.     Addendum: Writer paged the doctor at 1300 to determine if patient is ready to discharge. Writer left a VM for Shi regarding that patient is waiting to hear from the physician.     JESSICA Loya, LGSW   Social Work   [facility]

## 2021-08-20 NOTE — PROVIDER NOTIFICATION
"MD Notification    Notified Person: MD    Notified Person Name: Ranjana     Notification Date/Time: 8/20/21 at 1355    Notification Interaction: Web-based paging     Purpose of Notification: \"ID signed off. Can discharge orders be placed?\"    Orders Received: Per MD, podiatry is primary team and needs to clarify post op orders ( ie weightbearing status, wound care, etc) for discharge planning.     Comments: Likely discharge to [place] [facility] tomorrow       "

## 2021-08-20 NOTE — PLAN OF CARE
A&Ox4. VSS on RA except hypertensive. Medications changed by hospital MD today. Denies pain. Baseline neuropathy and retinopathy. SBA with scooter. Showered this shift and wound care completed. NWB on LLE and partial weightbearing with boot on RLE. Okay for oral antibiotics per ID. Discharge to [place] tomorrow morning.

## 2021-08-20 NOTE — PROGRESS NOTES
[facility]    Hospitalist Progress Note      Assessment & Plan   Nabil Cheung is a 61 year old male who presents with foot infection    Diabetic foot ulcer  Follows with Dr. Cline with podiatry/ wound center. Had admit 12/2020 for purulent LLE cellulitis ultimately with graft and wound vac placement (S mitgregoria). Noted erythema, swelling and drainage from wound startin 8/14, no fevers, some pain. Afebrile here. WBC 10.6. foot XR with findings compatible with osteomyelitis  - blood cultures NGTD  - wound culture 8/15/21:  Enterobacter cloacae and Staph lugdunesis:  - podiatry: L5th Rays amputation 8/17/2021   -post op pain mgmt; WB status per Podiatry  - ID consult; vanco/zosyn from ED;  ID recommends zosyn only. Wound cultures NGTD; ID following.  Recommend continue Zosyn, transition to oral antibiotics Augmentin 875 mg twice daily with Cipro 500 mg every 12 hours at discharge for 1 week, treat until 8/24/2021.  -Nonweightbearing, left LE.  Transfers on the right.    -Podiatrist has not commented regarding restart of aspirin however given history of CABG, hemoglobin stable with no signs of bleeding will initiate on PTA  mg every other day         DM II with neuropathy, nephropathy and retinopathy  [empagliflozin 25 mg daily, insulin glargine 22 units at HS, metformin 1000 mg BID]  Poorly controlled, most recent A1C at 10.1%. started using insulin 1.5 years ago prior to CABG. Notes increased blood sugars correlating with his foot infection   - hold orals  - TID and HS blood sugars  - med sliding scale insulin  - mod CHO diet  - continue insulin 22 units at HS post op; add 1:15 carb counting on discussion with Patient; eating  -Glucoses downtrending on restart of PTA Lantus, carb counting, continue SSI.  Patient eating.  -Likely restarted PTA regimen on discharge.     CAD with h/o CABG 5/2020  HTN  [aspirin 325 mg daily, lisinopril 10 mg daily, metoprolol ER 50 mg at HS, prn NTG,  rosuvastatin 40 mg daily]  CABG 5/2020 with complication of sternal wound infection with MRSE 6/2020. Hypertensive on arrival 180-190's systolic.   - continue lisinopril, metoprolol  --Podiatrist has not commented regarding restart of aspirin however given history of CABG, hemoglobin stable with no signs of bleeding will initiate on PTA  mg every other day  - continue atorvastatin  -Blood pressure higher than goal; increase in lisinopril limited by elevated CR.  Reluctant to increase metoprolol due to pulse rate. On B2 with known CAD, status post CABG.  Review of records indicates no significant pain condition, no use of PRNs.  -Elevated BP, pulse rate this morning 49 therefore decrease metoprolol to 75 mg, concurrent increase amlodipine to 5 mg, maintain lisinopril at current dose 10 mg due to elevated CR.  Monitor BPs, adjust antihypertensives in TCU.       CKD III  baseline creatinine ~1.3-1.4. creatinine on presentation at 1.36.   -See above, today's creatinine 1.52.  - avoid nephrotoxins  -Monitor in TCU     BPH  [alfuzosin 10 mg daily, finasteride 5 mg daily]  - continue meds     COVID-19 negative on admission    DVT Prophylaxis: Pneumatic Compression Devices  Code Status: Full Code  Expected discharge: tomorrow to TCU.    Dino Chilel MD  Text Page (7am - 6pm, M-F)    I spent >25 minutes on the unit/floor in management of care today reviewing labs, medications, interdisciplinary notes; and completing documentation of encounter and orders with over 50% of my time was spent Counseling the Patient regarding blood pressure antihypertensives, restarted on aspirin and discharge planning and Coordinating Care and plan with Nursing and SW regarding discharge planning; and Specialists, podiatry regarding OM/ post amputation management and surveillance       Interval History   Is concerned about his blood pressure and PTA aspirin.  Advised patient due to bradycardia decrease metoprolol dose and concurrent  increased amlodipine, further adjustments in TCU.  Now at 5 days post amputation with stable hemoglobin no signs of bleeding and given history of CABG restart PTA aspirin.      -Data reviewed today: I reviewed all new labs and imaging results over the last 24 hours.    Physical Exam   Temp: 97  F (36.1  C) Temp src: Oral BP: (!) 162/70 Pulse: 55   Resp: 16 SpO2: 96 % O2 Device: None (Room air)    Vitals:    08/15/21 2106   Weight: 95.3 kg (210 lb)     Vital Signs with Ranges  Temp:  [96.4  F (35.8  C)-97.1  F (36.2  C)] 97  F (36.1  C)  Pulse:  [46-76] 55  Resp:  [] 16  BP: (162-197)/(68-91) 162/70  SpO2:  [95 %-96 %] 96 %  I/O last 3 completed shifts:  In: -   Out: 2430 [Urine:2430]    General/Constitutional:     NAD, alert, calm, cooperative    HEENT/Head Exam:  atraumatic  Eyes:  PERRL, no conjunctivits  Mouth/Oral Pharynx:  Buccal mucosa WNL  Chest/Respiratory:  Air exchange bilateral lung fields; no rales or wheeze. Respiration nonlabored.  Cardiovascular:  no murmur appreciated.  LE edema none  Gastrointestinal/Abdomen:  soft, nontender,  no rebound, guarding or other peritoneal signs.  Musculoskeletal:  extremities warm, dry, noncyanotic; L foot dressed;   Neurologic:  gross CN and motor testing nonfocal.  Sensation grossly tested intact.  Psychiatric:  Mental status:  Alert, oriented, affect calm  Skin:  No rash noted on gross exam    Medications       alfuzosin ER  10 mg Oral Daily     amLODIPine  5 mg Oral Daily     aspirin  325 mg Oral Every Other Day     finasteride  5 mg Oral Daily     insulin aspart   Subcutaneous QAM AC     insulin aspart   Subcutaneous Daily with lunch     insulin aspart   Subcutaneous Daily with supper     insulin aspart  1-7 Units Subcutaneous TID AC     insulin aspart  1-5 Units Subcutaneous At Bedtime     insulin glargine  22 Units Subcutaneous At Bedtime     lactobacillus rhamnosus (GG)  1 capsule Oral BID     lisinopril  10 mg Oral At Bedtime     magnesium chloride  535  mg Oral Daily     metoprolol succinate ER  75 mg Oral QPM     piperacillin-tazobactam  3.375 g Intravenous Q6H     polyethylene glycol  17 g Oral Daily     rosuvastatin  20 mg Oral Daily     senna-docusate  1 tablet Oral BID     sodium chloride (PF)  3 mL Intracatheter Q8H     vitamin C  1,000 mg Oral Daily     vitamin D3  50 mcg Oral Daily       Data   Recent Labs   Lab 08/20/21  1408 08/20/21  0734 08/20/21  0157 08/19/21  1235 08/18/21  0703 08/17/21  0656 08/16/21  0320 08/15/21  2233   WBC  --   --   --  9.6 12.5*  --   --  10.6   HGB  --   --   --  12.5* 12.2*  --   --  12.7*   MCV  --   --   --  79 81  --   --  82   PLT  --   --   --  283 272  --   --  270   NA  --   --   --  137  --   --   --  133   POTASSIUM  --   --   --  3.9 4.8 4.3  --  4.8   CHLORIDE  --   --   --  105  --   --   --  102   CO2  --   --   --  28  --   --   --  29   BUN  --   --   --  16  --   --   --  26   CR  --   --   --  1.52*  --   --   --  1.36*   ANIONGAP  --   --   --  4  --   --   --  2*   ONEYDA  --   --   --  9.3  --   --   --  8.9   * 148* 175* 183*  --   --    < > 261*   ALBUMIN  --   --   --   --   --   --   --  3.3*   PROTTOTAL  --   --   --   --   --   --   --  7.7   BILITOTAL  --   --   --   --   --   --   --  0.4   ALKPHOS  --   --   --   --   --   --   --  72   ALT  --   --   --   --   --   --   --  23   AST  --   --   --   --   --   --   --  13    < > = values in this interval not displayed.       No results found for this or any previous visit (from the past 24 hour(s)).

## 2021-08-20 NOTE — PROGRESS NOTES
Care Management Follow Up    Length of Stay (days): 4    Expected Discharge Date: 08/19/2021     Concerns to be Addressed:       Patient plan of care discussed at interdisciplinary rounds: Yes    Anticipated Discharge Disposition:       Anticipated Discharge Services:    Anticipated Discharge DME:      Patient/family educated on Medicare website which has current facility and service quality ratings:    Education Provided on the Discharge Plan:    Patient/Family in Agreement with the Plan:      Referrals Placed by CM/SW:    Private pay costs discussed: Not applicable    Additional Information:  Writer received a confirmation VM from [facility] confirming that due to patient having ESBL it is medically necessary for him to have a private room and will not be an additional cost.     Kalpana Lopez, JESSICA, LGSW   Social Work   [facility]

## 2021-08-20 NOTE — PROGRESS NOTES
Care Management Follow Up    Length of Stay (days): 4    Expected Discharge Date: 08/21/2021     Concerns to be Addressed:       Patient plan of care discussed at interdisciplinary rounds: Yes    Anticipated Discharge Disposition:       Anticipated Discharge Services:    Anticipated Discharge DME:      Patient/family educated on Medicare website which has current facility and service quality ratings:    Education Provided on the Discharge Plan:    Patient/Family in Agreement with the Plan:      Referrals Placed by CM/SW:    Private pay costs discussed: Not applicable    Additional Information:  Per notes and phone call with MD: Podiatry is primary team and needs to clarify post op orders. Writer spoke with Shi and magdalenoative plan for 1300 discharge ride via [employer] tomorrow. Please follow up with Shi 8/21 in the morning and confirm ride time.      Addendum: Writer completed PAS and faxed to [facility]. Writer placed PAS on front of chart.     PAS-RR    D: Per DHS regulation, SW completed and submitted PAS-RR to [place] [employer] via the Senior [employer] Line.  PAS-RR confirmation # is : [id number]    I: SW spoke with Patient and they are aware a PAS-RR has been submitted.  SW reviewed with patient that they may be contacted for a follow up appointment within 10 days of hospital discharge if their SNF stay is < 30 days.  Contact information for [facility] LinkAge Line was also provided.    A: Patient verbalized understanding.    P: Further questions may be directed to [facility] LinkAge Line at #[phone], option #4 for PAS-RR staff.      Kalpana Lopez, JESSICA, LGSW   Social Work   [facility]

## 2021-08-20 NOTE — PLAN OF CARE
Patient is A/O x4, very pleasant. VSS on RA, BP elevated- order parameters not met. POD #3 partial 5th toe amputation. Dressing is CDI; declined dressing change during day shift. Denies pain. Schedule tylenol given. Elevation for comfort. Great UOP; uses urinal at beside. Passing flatus. No BM. Uses knee scooter for ambulation and transfers. BG management with sliding scale insulin. New PIV. L-PIV-SL w/ intermittent antibiotics. Mod Cho diet + Carb Count. Contact precautions maintained. Discharge pending TCU placement.

## 2021-08-21 ENCOUNTER — LAB REQUISITION (OUTPATIENT)
Dept: LAB | Facility: CLINIC | Age: 61
End: 2021-08-21
Payer: COMMERCIAL

## 2021-08-21 VITALS
BODY MASS INDEX: 29.29 KG/M2 | HEART RATE: 47 BPM | SYSTOLIC BLOOD PRESSURE: 167 MMHG | TEMPERATURE: 96.8 F | DIASTOLIC BLOOD PRESSURE: 73 MMHG | OXYGEN SATURATION: 97 % | WEIGHT: 210 LBS | RESPIRATION RATE: 16 BRPM

## 2021-08-21 DIAGNOSIS — Z00.01 ENCOUNTER FOR GENERAL ADULT MEDICAL EXAMINATION WITH ABNORMAL FINDINGS: ICD-10-CM

## 2021-08-21 LAB
ANION GAP SERPL CALCULATED.3IONS-SCNC: 2 MMOL/L (ref 3–14)
BACTERIA BLD CULT: NO GROWTH
BACTERIA BLD CULT: NO GROWTH
BUN SERPL-MCNC: 22 MG/DL (ref 7–30)
CALCIUM SERPL-MCNC: 9 MG/DL (ref 8.5–10.1)
CHLORIDE BLD-SCNC: 105 MMOL/L (ref 94–109)
CO2 SERPL-SCNC: 30 MMOL/L (ref 20–32)
CREAT SERPL-MCNC: 1.57 MG/DL (ref 0.66–1.25)
GFR SERPL CREATININE-BSD FRML MDRD: 46 ML/MIN/1.73M2
GLUCOSE BLD-MCNC: 179 MG/DL (ref 70–99)
GLUCOSE BLDC GLUCOMTR-MCNC: 185 MG/DL (ref 70–99)
GLUCOSE BLDC GLUCOMTR-MCNC: 280 MG/DL (ref 70–99)
HGB BLD-MCNC: 12.6 G/DL (ref 13.3–17.7)
POTASSIUM BLD-SCNC: 4.1 MMOL/L (ref 3.4–5.3)
SODIUM SERPL-SCNC: 137 MMOL/L (ref 133–144)

## 2021-08-21 PROCEDURE — 250N000011 HC RX IP 250 OP 636: Performed by: INTERNAL MEDICINE

## 2021-08-21 PROCEDURE — 99239 HOSP IP/OBS DSCHRG MGMT >30: CPT | Performed by: HOSPITALIST

## 2021-08-21 PROCEDURE — 85018 HEMOGLOBIN: CPT | Performed by: HOSPITALIST

## 2021-08-21 PROCEDURE — U0003 INFECTIOUS AGENT DETECTION BY NUCLEIC ACID (DNA OR RNA); SEVERE ACUTE RESPIRATORY SYNDROME CORONAVIRUS 2 (SARS-COV-2) (CORONAVIRUS DISEASE [COVID-19]), AMPLIFIED PROBE TECHNIQUE, MAKING USE OF HIGH THROUGHPUT TECHNOLOGIES AS DESCRIBED BY CMS-2020-01-R: HCPCS | Mod: ORL | Performed by: NURSE PRACTITIONER

## 2021-08-21 PROCEDURE — 250N000013 HC RX MED GY IP 250 OP 250 PS 637: Performed by: HOSPITALIST

## 2021-08-21 PROCEDURE — 36415 COLL VENOUS BLD VENIPUNCTURE: CPT | Performed by: HOSPITALIST

## 2021-08-21 PROCEDURE — 80048 BASIC METABOLIC PNL TOTAL CA: CPT | Performed by: HOSPITALIST

## 2021-08-21 PROCEDURE — 250N000013 HC RX MED GY IP 250 OP 250 PS 637: Performed by: PODIATRIST

## 2021-08-21 RX ORDER — CIPROFLOXACIN 500 MG/1
500 TABLET, FILM COATED ORAL 2 TIMES DAILY
Qty: 6 TABLET | Refills: 0 | DISCHARGE
Start: 2021-08-21 | End: 2021-08-24

## 2021-08-21 RX ORDER — METOPROLOL SUCCINATE 25 MG/1
75 TABLET, EXTENDED RELEASE ORAL EVERY EVENING
DISCHARGE
Start: 2021-08-21 | End: 2023-12-01

## 2021-08-21 RX ORDER — AMLODIPINE BESYLATE 10 MG/1
10 TABLET ORAL DAILY
Status: DISCONTINUED | OUTPATIENT
Start: 2021-08-21 | End: 2021-08-21 | Stop reason: HOSPADM

## 2021-08-21 RX ORDER — AMLODIPINE BESYLATE 10 MG/1
10 TABLET ORAL DAILY
DISCHARGE
Start: 2021-08-21 | End: 2023-12-01

## 2021-08-21 RX ADMIN — PIPERACILLIN SODIUM AND TAZOBACTAM SODIUM 3.38 G: 3; .375 INJECTION, POWDER, LYOPHILIZED, FOR SOLUTION INTRAVENOUS at 04:19

## 2021-08-21 RX ADMIN — OXYCODONE HYDROCHLORIDE AND ACETAMINOPHEN 1000 MG: 500 TABLET ORAL at 09:38

## 2021-08-21 RX ADMIN — INSULIN ASPART 1 UNITS: 100 INJECTION, SOLUTION INTRAVENOUS; SUBCUTANEOUS at 09:47

## 2021-08-21 RX ADMIN — ROSUVASTATIN CALCIUM 20 MG: 20 TABLET, FILM COATED ORAL at 09:38

## 2021-08-21 RX ADMIN — ALFUZOSIN HYDROCHLORIDE 10 MG: 10 TABLET, EXTENDED RELEASE ORAL at 09:39

## 2021-08-21 RX ADMIN — Medication 1 CAPSULE: at 09:38

## 2021-08-21 RX ADMIN — FINASTERIDE 5 MG: 5 TABLET, FILM COATED ORAL at 09:38

## 2021-08-21 RX ADMIN — Medication 50 MCG: at 09:38

## 2021-08-21 RX ADMIN — AMLODIPINE BESYLATE 10 MG: 10 TABLET ORAL at 09:39

## 2021-08-21 ASSESSMENT — ACTIVITIES OF DAILY LIVING (ADL)
ADLS_ACUITY_SCORE: 14

## 2021-08-21 NOTE — DISCHARGE SUMMARY
[facility]    Discharge Summary  Hospitalist    Date of Admission:  8/15/2021  Date of Discharge:  8/21/2021  Discharging Provider: Dino Chilel MD  Date of Service (when I saw the patient): 08/21/21      History of Present Illness   is a 61 year old male who presents with L foot osteomyelitis.     Hospital Course   Nabil Cheung was admitted on 8/15/2021.  The following problems were addressed during his hospitalization:    Diabetic foot ulcer  Osteomyelitis   Follows with Dr. Cline with podiatry/ wound center. Had admit 12/2020 for purulent LLE cellulitis ultimately with graft and wound vac placement. Noted erythema, swelling and drainage from wound startin 8/14, no fevers, some pain. On admission WBC 10.6. foot XR with findings compatible with osteomyelitis  - blood cultures NGTD  - wound culture 8/15/21:  Enterobacter cloacae and Staph lugdunesis:  - podiatry: L5th Rays amputation 8/17/2021   - ID consult; vanco/zosyn from ED;  ID recommends zosyn only. Wound cultures NGTD; ID following.  Recommend continue Zosyn, transition to oral antibiotics Augmentin 875 mg twice daily with Cipro 500 mg every 12 hours at discharge for 1 week, treat until 8/24/2021.  -Nonweightbearing, left LE.  Transfers on the right.    -Podiatrist has not commented regarding restart of aspirin however given history of CABG, hemoglobin stable with no signs of bleeding will initiate on PTA  mg every other day. Discussed with Patient.  -discharge today to [facility] for ongoing rehabilitation.  Follow-up and recommendations see below.        DM II with neuropathy, nephropathy and retinopathy  [empagliflozin 25 mg daily, insulin glargine 22 units at HS, metformin 1000 mg BID]  Poorly controlled, most recent A1C at 10.1%. started using insulin 1.5 years ago prior to CABG. Notes increased blood sugars correlating with his foot infection   - hold orals  - TID and HS blood sugars  - med sliding scale  insulin  - mod CHO diet  - continue insulin 22 units at HS post op; add 1:15 carb counting on discussion with Patient; eating     CAD with h/o CABG 5/2020  HTN  [aspirin 325 mg daily, lisinopril 10 mg daily, metoprolol ER 50 mg at HS, prn NTG, rosuvastatin 40 mg daily]  CABG 5/2020 with complication of sternal wound infection with GLADYS 6/2020. Hypertensive on arrival 180-190's systolic.   - continue lisinopril, metoprolol  --Podiatrist has not commented regarding restart of aspirin however given history of CABG, hemoglobin stable with no signs of bleeding will initiate on PTA  mg every other day  - continue atorvastatin  -Blood pressure higher than goal; increase in lisinopril limited by elevated CR.  pulse rate tending low  therefore decrease metoprolol to 75 mg, concurrent increase amlodipine serially to 10mg, maintain lisinopril at current dose 10 mg due to elevated CR.  Monitor BPs, adjust antihypertensives in TCU.        CKD III  baseline creatinine ~1.3-1.4. creatinine on presentation at 1.36.   -See above,  creatinine 1.57 on discharge   - avoid nephrotoxins  -Monitor in TCU     BPH  [alfuzosin 10 mg daily, finasteride 5 mg daily]  - continue meds     COVID-19 negative on admission      Code Status   Full Code       Primary Care Physician   JAYA LLAMAS    Physical Exam   Temp: 96.8  F (36  C) Temp src: Oral BP: (!) 167/73 (RN notify) Pulse: (!) 47 (RN notify)   Resp: 16 SpO2: 97 % O2 Device: None (Room air)    Vitals:    08/15/21 2106   Weight: 95.3 kg (210 lb)     Vital Signs with Ranges  Temp:  [96.8  F (36  C)-99.9  F (37.7  C)] 96.8  F (36  C)  Pulse:  [47-71] 47  Resp:  [16] 16  BP: (162-172)/(70-80) 167/73  SpO2:  [96 %-97 %] 97 %  I/O last 3 completed shifts:  In: 750 [P.O.:750]  Out: 3550 [Urine:3550]    General/Constitutional:      NAD, alert, calm, cooperative    HEENT/Head Exam:  atraumatic  Eyes:  PERRL, no conjunctivits  Mouth/Oral Pharynx:  Buccal mucosa WNL  Chest/Respiratory:  Air  exchange bilateral lung fields; no rales or wheeze. Respiration nonlabored.  Cardiovascular:  no murmur appreciated.  LE edema none  Gastrointestinal/Abdomen:  soft, nontender,  no rebound, guarding or other peritoneal signs.  Musculoskeletal:  extremities warm, dry, noncyanotic; L foot dressed;   Neurologic:  gross CN and motor testing nonfocal.  Sensation grossly tested intact.  Psychiatric:  Mental status:  Alert, oriented, affect calm  Skin:  No rash noted on gross exam    Discharge Disposition   Discharged to short-term care facility  Condition at discharge: Fair    Consultations This Hospital Stay   PODIATRY IP CONSULT  PHARMACY TO DOSE VANCO  INFECTIOUS DISEASES IP CONSULT  PHYSICAL THERAPY ADULT IP CONSULT  ORTHOSIS EXTREMITY LOWER REFERRAL IP CONSULT  CARE MANAGEMENT / SOCIAL WORK IP CONSULT    Time Spent on this Encounter   IDino MD, personally saw the patient today and spent greater than 30 minutes discharging this patient discussing discharge plans with Patient, Nursing and SW; coordination with Specialty, Podiatry re follow-up and ID regarding antibiotics on discharge; completing discharge orders, medications and follow-up. .    Discharge Orders      Activity    Your activity upon discharge:  Minimal heel weight bearing left foot in boot for pivot transfers only.  Use knee roller to keep pressure off of foot.     Follow-up and recommended labs and tests     Patient has follow up with Dr. Cline on 9/3/2021 at 3:45pm at [facility].  [address], [address], [place], [place].    For questions or concerns, appointment changes: call: [phone]    Office fax number:  [phone]     Wound care and dressings    Instructions to care for your wound at TCU:     Left foot:  Keep foot and dressing dry.  Change dressing 2x a week with adaptic, 4x4 gauze pads, kerlix roll, and ace bandage.     General info for SNF    Length of Stay Estimate: Short Term Care: Estimated  # of Days <30  Condition at Discharge: Stable  Level of care:skilled   Rehabilitation Potential: Good  Admission H&P remains valid and up-to-date: Yes  Recent Chemotherapy: N/A  Use Nursing Home Standing Orders: Yes     Mantoux instructions    Give two-step Mantoux (PPD) Per Facility Policy Yes     Reason for your hospital stay    Presented with Osteomyelitis L foot     Follow Up and recommended labs and tests    Follow up with detention physician.  The following labs/tests are recommended: BMP; follow-up blood pressure with change in antihypertensives in Hospital; follow-up diabetes; adjust insulin accordingly.    Follow-up with Podiatry as scheduled     Contact Isolation     Advance Diet as Tolerated    Follow this diet upon discharge: Orders Placed This Encounter      Consistent Carb 60 grams CHO per Meal Diet     Discharge Medications   Current Discharge Medication List      START taking these medications    Details   amLODIPine (NORVASC) 10 MG tablet Take 1 tablet (10 mg) by mouth daily    Associated Diagnoses: Diabetes mellitus without complication (H)      amoxicillin-clavulanate (AUGMENTIN) 875-125 MG tablet Take 1 tablet by mouth 2 times daily for 3 days  Qty: 6 tablet, Refills: 0    Associated Diagnoses: Osteomyelitis, unspecified site, unspecified type (H)      ciprofloxacin (CIPRO) 500 MG tablet Take 1 tablet (500 mg) by mouth 2 times daily for 3 days  Qty: 6 tablet, Refills: 0    Associated Diagnoses: Osteomyelitis, unspecified site, unspecified type (H)      !! insulin aspart (NOVOLOG PEN) 100 UNIT/ML pen Inject 1-7 Units Subcutaneous 3 times daily (before meals) Correction Scale - MEDIUM INSULIN RESISTANCE DOSING     Do Not give Correction Insulin if Pre-Meal BG less than 140.   For Pre-Meal  - 189 give 1 unit.   For Pre-Meal  - 239 give 2 units.   For Pre-Meal  - 289 give 3 units.   For Pre-Meal  - 339 give 4 units.   For Pre-Meal - 399 give 5 units.   For Pre-Meal BG  400-449 give 6 units  For Pre-Meal BG greater than or equal to 450 give 7 units.   To be given with prandial insulin, and based on pre-meal blood glucose.    Notify provider if glucose greater than or equal to 350 mg/dL after administration of correction dose. If given at mealtime, administer within 30 minutes of start of meal    Associated Diagnoses: Diabetic ulcer of left midfoot associated with type 1 diabetes mellitus, with fat layer exposed (H)      !! insulin aspart (NOVOLOG PEN) 100 UNIT/ML pen Inject 1-5 Units Subcutaneous At Bedtime MEDIUM INSULIN RESISTANCE DOSING    Do Not give Bedtime Correction Insulin if BG less than  200.   For  - 249 give 1 units.   For  - 299 give 2 units.   For  - 349 give 3 units.   For  -399 give 4 units.   For BG greater than or equal to 400 give 5 units.  Notify provider if glucose greater than or equal to 350 mg/dL after administration of correction dose. If given at mealtime, administer within 30 minutes of start of meal    Associated Diagnoses: Diabetic ulcer of left midfoot associated with type 1 diabetes mellitus, with fat layer exposed (H)      !! insulin aspart (NOVOLOG PEN) 100 UNIT/ML pen DOSE:  1 units per 15 grams of carbohydrate before breakfast, lunch, dinner.  Only chart total amount of units given.  Do not give if pre-prandial glucose is less than 60 mg/dL. If given at mealtime, administer within 30 minutes of start of mealDOSE:  1 units per 15 grams of carbohydrate.  Only chart total amount of units given.  Do not give if pre-prandial glucose is less than 60 mg/dL. If given at mealtime, administer within 30 minutes of start of meal    Associated Diagnoses: Diabetic ulcer of left midfoot associated with type 1 diabetes mellitus, with fat layer exposed (H)       !! - Potential duplicate medications found. Please discuss with provider.      CONTINUE these medications which have CHANGED    Details   insulin glargine (LANTUS PEN) 100 UNIT/ML  pen Inject 22 Units Subcutaneous At Bedtime    Comments: If Lantus is not covered by insurance, may substitute Basaglar at same dose and frequency.    Associated Diagnoses: Diabetic ulcer of left midfoot associated with type 1 diabetes mellitus, with fat layer exposed (H)      metoprolol succinate ER (TOPROL-XL) 25 MG 24 hr tablet Take 3 tablets (75 mg) by mouth every evening    Associated Diagnoses: Diabetes mellitus without complication (H)         CONTINUE these medications which have NOT CHANGED    Details   acetaminophen (TYLENOL) 325 MG tablet Take 650 mg by mouth every 6 hours as needed for mild pain      albuterol (PROAIR HFA/PROVENTIL HFA/VENTOLIN HFA) 108 (90 Base) MCG/ACT inhaler Inhale 1-2 puffs into the lungs    Comments: Pharmacy may dispense brand covered by insurance (Proair, or proventil or ventolin or generic albuterol inhaler)      alfuzosin ER (UROXATRAL) 10 MG 24 hr tablet Take 10 mg by mouth daily       alpha-lipoic acid 600 MG capsule Take 600 mg by mouth daily       aspirin 81 MG EC tablet Take 81 mg by mouth every other day      finasteride (PROSCAR) 5 MG tablet Take 5 mg by mouth daily      fish oil-omega-3 fatty acids 1000 MG capsule Take 1 g by mouth 2 times daily      lactobacillus rhamnosus, GG, (CULTURELL) capsule Take 1 capsule by mouth 2 times daily      lisinopril (ZESTRIL) 10 MG tablet Take 10 mg by mouth daily      magnesium 250 MG tablet Take 1 tablet by mouth      nitroGLYcerin (NITROSTAT) 0.4 MG sublingual tablet Place 0.4 mg under the tongue every 5 minutes as needed for chest pain For chest pain place 1 tablet under the tongue every 5 minutes for 3 doses. If symptoms persist 5 minutes after 1st dose call 911.      rosuvastatin (CRESTOR) 40 MG tablet Take 20 mg by mouth daily Evening       vitamin C (ASCORBIC ACID) 1000 MG TABS Take 1,000 mg by mouth daily      vitamin D3 (CHOLECALCIFEROL) 2000 units (50 mcg) tablet Take 1 tablet by mouth daily         STOP taking these  medications       becaplermin (REGRANEX) 0.01 % external gel Comments:   Reason for Stopping:         cyclobenzaprine (FLEXERIL) 10 MG tablet Comments:   Reason for Stopping:         empagliflozin (JARDIANCE) 25 MG TABS tablet Comments:   Reason for Stopping:         metFORMIN (GLUCOPHAGE) 1000 MG tablet Comments:   Reason for Stopping:             Allergies   Allergies   Allergen Reactions     Atorvastatin      Dust Mites      Fluorescein Nausea and Vomiting     Data   Most Recent 3 CBC's:Recent Labs   Lab Test 08/21/21  0750 08/19/21  1235 08/18/21  0703 08/15/21  2233   WBC  --  9.6 12.5* 10.6   HGB 12.6* 12.5* 12.2* 12.7*   MCV  --  79 81 82   PLT  --  283 272 270      Most Recent 3 BMP's:  Recent Labs   Lab Test 08/21/21  0908 08/21/21  0750 08/21/21  0156 08/19/21  1235 08/18/21  0703 08/16/21  0320 08/15/21  2233   NA  --  137  --  137  --   --  133   POTASSIUM  --  4.1  --  3.9 4.8  --  4.8   CHLORIDE  --  105  --  105  --   --  102   CO2  --  30  --  28  --   --  29   BUN  --  22  --  16  --   --  26   CR  --  1.57*  --  1.52*  --   --  1.36*   ANIONGAP  --  2*  --  4  --   --  2*   ONEYDA  --  9.0  --  9.3  --   --  8.9   * 179* 280* 183*  --    < > 261*    < > = values in this interval not displayed.     Most Recent 2 LFT's:  Recent Labs   Lab Test 08/15/21  2233 01/08/21  1031   AST 13 15   ALT 23 29   ALKPHOS 72 68   BILITOTAL 0.4 0.3     Most Recent INR's and Anticoagulation Dosing History:  Anticoagulation Dose History     Recent Dosing and Labs Latest Ref Rng & Units 1/8/2021    INR 0.86 - 1.14 1.05        Most Recent 3 Troponin's:No lab results found.  Most Recent Cholesterol Panel:  Recent Labs   Lab Test 09/25/17  1825   CHOL 212*   LDL Cannot estimate LDL when triglyceride exceeds 400 mg/dL  118*   HDL 31*   TRIG 565*     Most Recent 6 Bacteria Isolates From Any Culture (See EPIC Reports for Culture Details):  Recent Labs   Lab Test 01/08/21  1145 01/08/21  1031 06/18/20  0828 06/12/20  0916  06/12/20  0913 06/12/20  0910   CULT No growth No growth Culture negative after 4 weeks  No anaerobes isolated  On day 2, isolated in broth only:  Staphylococcus epidermidis  * Culture negative after 4 weeks  No anaerobes isolated  On day 2, isolated in broth only:  Staphylococcus epidermidis  *  Canceled, Test credited  Test reordered as correct code  Reordered as BONEC   Culture negative after 4 weeks  No anaerobes isolated  Light growth  Normal skin riki   Culture negative after 4 weeks  No anaerobes isolated  Canceled, Test credited  Incorrectly ordered by PCU/Clinic  Test reordered as correct code    Light growth  Normal skin riki       Most Recent TSH, T4 and A1c Labs:  Recent Labs   Lab Test 01/09/21  0625 09/25/17  1825   TSH  --  2.79   A1C 9.1* 9.6*

## 2021-08-21 NOTE — PROGRESS NOTES
Care Management Follow Up    Length of Stay (days): 5    Expected Discharge Date: 08/21/2021     Concerns to be Addressed:       Patient plan of care discussed at interdisciplinary rounds: Yes    Anticipated Discharge Disposition:       Anticipated Discharge Services:    Anticipated Discharge DME:      Patient/family educated on Medicare website which has current facility and service quality ratings:    Education Provided on the Discharge Plan:    Patient/Family in Agreement with the Plan:      Referrals Placed by CM/SW:    Private pay costs discussed: Not applicable    Additional Information:    Sw left [place] admissions  this morning to confirm discharge date/time of today at 1300; waiting on return call back.      Carolyn Doss, WOLFGANGSW

## 2021-08-21 NOTE — PLAN OF CARE
Summary: Pt with ongoing left foot wound.  Now with osteomyelitis.  OR 8/17 for left fifth toe amputation.  DM, HTN, CKD 3  Primary Diagnosis: L foot wound  Orientation: A&Ox4  Aggression Stop Light: Green  Mobility: SBA with scooter. NWB on LLE, and partial heel weightbearing with boot on RLE  Pain Management: Denies   Diet: Mod CHO, carb count   Bowel/Bladder: Uses urinal @ BS. X1 BM yesterday.  Abnormal Lab/Assessments: , 280  Drain/Device/Wound: PIV SL w/ intermittent antibiotics. L Foot incision changed daily.  Consults: Podiatry, ID, PT, Orthosis.  D/C Day/Goals/Place: discharge to [facility] today, 8.21.21    Shift note: 8/20/21 [id number]  A&Ox4. VSS on RA except hypertensive. Tmax 99.9 orally. Denies pain. Blood pressure medications changed by hospital MD yesterday. Baseline neuropathy and retinopathy. Indp with scooter in room. NWB on LLE and partial weightbearing with boot on RLE. Okay for oral antibiotics per ID but still on IV.

## 2021-08-21 NOTE — PROVIDER NOTIFICATION
"MD Notification    Notified Person: MD    Notified Person Name: Ranjana     Notification Date/Time: 8/21/21 at 1157    Notification Interaction: Web-based paging    Purpose of Notification: \"Pt has questions about flexeril, jardiance and metformin. Can you clarify before discharge at 1 pm?\"    Orders Received: No orders changed.     Comments: Notified patient that we will coontinue current discharge medication plan and MD at TCU can switch medications if needed back to jardiance and metformin per pt preference       "

## 2021-08-21 NOTE — PLAN OF CARE
A&Ox4. VSS on RA except regina and hypertensive. Norvasc increase per MD again. Denies pain. BLE numbness at baseline. SBA with scooter. NWB on LLE and partial weightbearing with boot on RLE. Minimum of 2 pillows to elevate LLE. Dressing CDI. Tolerating MOD CHO diet. Discharging to [facility] today.

## 2021-08-21 NOTE — PROGRESS NOTES
Care Management Discharge Note    Discharge Date: 08/21/2021       Discharge Disposition:      Discharge Services:      Discharge DME:      Discharge Transportation: car, drives self    Private pay costs discussed: Not applicable    PAS Confirmation Code:    Patient/family educated on Medicare website which has current facility and service quality ratings:      Education Provided on the Discharge Plan:    Persons Notified of Discharge Plans: care team/pt  Patient/Family in Agreement with the Plan:      Handoff Referral Completed: Yes    Additional Information:    Pt confirmed with [facility] that pt can admit today with a 1300 discharge time.  [facility] can offer pt a private room at no additional cost due to contact precautions.  Amina confirmed with pt the discharge plan and stated that family can transport him via car to [facility]; Sw went over [facility]'s visiting policy and parking options.  Pt requested that some of his medications be adjusted/changed on his discharge orders; Amina stated that MD has to change these.  Sw to look for RN and encouraged pt to discuss this with RN prior to discharge.  A[facility] faxed discharge orders but will resend if medications are changed/adjusted.        Carolyn Doss, WOLFGANGSW

## 2021-08-22 ENCOUNTER — LAB REQUISITION (OUTPATIENT)
Dept: LAB | Facility: CLINIC | Age: 61
End: 2021-08-22
Payer: COMMERCIAL

## 2021-08-22 VITALS
WEIGHT: 214.6 LBS | OXYGEN SATURATION: 97 % | DIASTOLIC BLOOD PRESSURE: 83 MMHG | RESPIRATION RATE: 22 BRPM | SYSTOLIC BLOOD PRESSURE: 178 MMHG | BODY MASS INDEX: 29.93 KG/M2 | TEMPERATURE: 98.1 F | HEART RATE: 70 BPM

## 2021-08-22 DIAGNOSIS — Z00.01 ENCOUNTER FOR GENERAL ADULT MEDICAL EXAMINATION WITH ABNORMAL FINDINGS: ICD-10-CM

## 2021-08-22 LAB — SARS-COV-2 RNA RESP QL NAA+PROBE: NEGATIVE

## 2021-08-23 ENCOUNTER — LAB REQUISITION (OUTPATIENT)
Dept: LAB | Facility: CLINIC | Age: 61
End: 2021-08-23

## 2021-08-23 ENCOUNTER — TRANSITIONAL CARE UNIT VISIT (OUTPATIENT)
Dept: GERIATRICS | Facility: CLINIC | Age: 61
End: 2021-08-23
Payer: COMMERCIAL

## 2021-08-23 DIAGNOSIS — N18.31 STAGE 3A CHRONIC KIDNEY DISEASE (H): ICD-10-CM

## 2021-08-23 DIAGNOSIS — E11.621 DIABETIC ULCER OF LEFT MIDFOOT ASSOCIATED WITH TYPE 2 DIABETES MELLITUS, WITH FAT LAYER EXPOSED (H): Primary | ICD-10-CM

## 2021-08-23 DIAGNOSIS — Z95.1 S/P CABG X 4: ICD-10-CM

## 2021-08-23 DIAGNOSIS — N40.1 BPH WITH OBSTRUCTION/LOWER URINARY TRACT SYMPTOMS: ICD-10-CM

## 2021-08-23 DIAGNOSIS — E11.65 UNCONTROLLED TYPE 2 DIABETES MELLITUS WITH HYPERGLYCEMIA (H): ICD-10-CM

## 2021-08-23 DIAGNOSIS — E08.44 DIABETES MELLITUS DUE TO UNDERLYING CONDITION WITH DIABETIC AMYOTROPHY (H): ICD-10-CM

## 2021-08-23 DIAGNOSIS — L97.422 DIABETIC ULCER OF LEFT MIDFOOT ASSOCIATED WITH TYPE 2 DIABETES MELLITUS, WITH FAT LAYER EXPOSED (H): Primary | ICD-10-CM

## 2021-08-23 DIAGNOSIS — N13.8 BPH WITH OBSTRUCTION/LOWER URINARY TRACT SYMPTOMS: ICD-10-CM

## 2021-08-23 PROBLEM — N18.30 CHRONIC KIDNEY DISEASE, STAGE 3 (H): Status: ACTIVE | Noted: 2021-08-23

## 2021-08-23 PROCEDURE — 99310 SBSQ NF CARE HIGH MDM 45: CPT | Performed by: NURSE PRACTITIONER

## 2021-08-23 PROCEDURE — 86481 TB AG RESPONSE T-CELL SUSP: CPT | Mod: ORL | Performed by: NURSE PRACTITIONER

## 2021-08-23 PROCEDURE — P9604 ONE-WAY ALLOW PRORATED TRIP: HCPCS | Mod: ORL | Performed by: NURSE PRACTITIONER

## 2021-08-23 PROCEDURE — 36415 COLL VENOUS BLD VENIPUNCTURE: CPT | Mod: ORL | Performed by: NURSE PRACTITIONER

## 2021-08-23 NOTE — PROGRESS NOTES
[facility] GERIATRIC SERVICES  PRIMARY CARE PROVIDER AND CLINIC:  JAYA LLAMAS, [facility] [address]  / [facility] [address]  Chief Complaint   Patient presents with     Hospital F/U      [facility] Record Number:  [id number]  Place of Service where encounter took place:  [facility] (TCU) [[id number]]    Nabil Cheung  is a 61 year old  (1960), admitted to the above facility from  [facility]. Hospital stay 8/15/21 through 8/21/21..   HPI:    This is a 62 yo male who presents with L foot osteomyelitis. He follows with Dr Cline with podiatry/wound center. He initially admitted in 12/2020 for purulent LLE cellulitis ultimately with graft and wound vac placement.  This admission WBC 10.6. foot XR with findings compatible with osteomyelitis and wound culture 8/15/21 with Enterobacter cloacae and Staph lugdunesis, so underwent L 5th Rays amputation 8/17/2021. vanco/zosyn from ED;  ID recommends zosyn only. Wound cultures NGTD; ID following.  Recommend continue Zosyn, transition to oral antibiotics Augmentin 875 mg twice daily with Cipro 500 mg every 12 hours at discharge for 1 week, treat until 8/24/2021. He was given NWB on LLE, Podiatrist has not commented regarding restart of aspirin however given history of CABG, hemoglobin stable with no signs of bleeding will initiate on PTA  mg every other day. Per uncontrolled DM, A1c 10.1 and PTA empagliflozin and metformin on hold per JOSE ALFREDO, was started on novolog sliding scale TID with meals and lantus 22U at HS. Also per htn with brabycardia, metoprolol decreased to 75mg and increased amlodipine to 10mg daily. No other changes so discharged to the [facility] for rehab.      CODE STATUS/ADVANCE DIRECTIVES DISCUSSION:  Prior  CPR/Full code   ALLERGIES:   Allergies   Allergen Reactions     Atorvastatin      Dust Mites      Fluorescein Nausea and Vomiting      PAST MEDICAL HISTORY: History reviewed. No pertinent past medical history.   PAST  SURGICAL HISTORY:   has a past surgical history that includes Incision and drainage chest washout, combined (N/A, 6/12/2020); Repair chest wall (N/A, 6/18/2020); Graft Flap Pedicle Tram Delay Procedure (Left, 6/18/2020); and Amputate toe(s) (Right, 8/17/2021).  FAMILY HISTORY: family history is not on file.  SOCIAL HISTORY:   reports that he has never smoked. He has never used smokeless tobacco. He reports current alcohol use. He reports that he does not use drugs.  Patient's living condition: lives alone    Post Discharge Medication Reconciliation Status: discharge medications reconciled and changed, per note/orders  Current Outpatient Medications   Medication Sig     acetaminophen (TYLENOL) 325 MG tablet Take 650 mg by mouth every 6 hours as needed for mild pain     albuterol (PROAIR HFA/PROVENTIL HFA/VENTOLIN HFA) 108 (90 Base) MCG/ACT inhaler Inhale 1-2 puffs into the lungs     alfuzosin ER (UROXATRAL) 10 MG 24 hr tablet Take 10 mg by mouth daily      alpha-lipoic acid 600 MG capsule Take 600 mg by mouth daily      amLODIPine (NORVASC) 10 MG tablet Take 1 tablet (10 mg) by mouth daily     amoxicillin-clavulanate (AUGMENTIN) 875-125 MG tablet Take 1 tablet by mouth 2 times daily for 3 days (Patient taking differently: Take 1 tablet by mouth 2 times daily Complete on 8/24)     aspirin 81 MG EC tablet Take 81 mg by mouth every other day     ciprofloxacin (CIPRO) 500 MG tablet Take 1 tablet (500 mg) by mouth 2 times daily for 3 days (Patient taking differently: Take 500 mg by mouth 2 times daily Complete on 8/24)     finasteride (PROSCAR) 5 MG tablet Take 5 mg by mouth daily     fish oil-omega-3 fatty acids 1000 MG capsule Take 1 g by mouth 2 times daily     insulin aspart (NOVOLOG PEN) 100 UNIT/ML pen Inject 1-7 Units Subcutaneous 3 times daily (before meals) Correction Scale - MEDIUM INSULIN RESISTANCE DOSING     Do Not give Correction Insulin if Pre-Meal BG less than 140.   For Pre-Meal  - 189 give 1 unit.    For Pre-Meal  - 239 give 2 units.   For Pre-Meal  - 289 give 3 units.   For Pre-Meal  - 339 give 4 units.   For Pre-Meal - 399 give 5 units.   For Pre-Meal -449 give 6 units  For Pre-Meal BG greater than or equal to 450 give 7 units.   To be given with prandial insulin, and based on pre-meal blood glucose.    Notify provider if glucose greater than or equal to 350 mg/dL after administration of correction dose. If given at mealtime, administer within 30 minutes of start of meal (Patient taking differently: Inject 1-7 Units Subcutaneous 2 times daily (with meals) Correction Scale - MEDIUM INSULIN RESISTANCE DOSING     Only use at lunch and dinner time, patient fasting at AM  Do Not give Correction Insulin if Pre-Meal BG less than 140.   For Pre-Meal  - 189 give 1 unit.   For Pre-Meal  - 239 give 2 units.   For Pre-Meal  - 289 give 3 units.   For Pre-Meal  - 339 give 4 units.   For Pre-Meal - 399 give 5 units.   For Pre-Meal -449 give 6 units  For Pre-Meal BG greater than or equal to 450 give 7 units.   To be given with prandial insulin, and based on pre-meal blood glucose.    Notify provider if glucose greater than or equal to 350 mg/dL after administration of correction dose. If given at mealtime, administer within 30 minutes of start of meal)     insulin aspart (NOVOLOG PEN) 100 UNIT/ML pen Inject 1-5 Units Subcutaneous At Bedtime MEDIUM INSULIN RESISTANCE DOSING    Do Not give Bedtime Correction Insulin if BG less than  200.   For  - 249 give 1 units.   For  - 299 give 2 units.   For  - 349 give 3 units.   For  -399 give 4 units.   For BG greater than or equal to 400 give 5 units.  Notify provider if glucose greater than or equal to 350 mg/dL after administration of correction dose. If given at mealtime, administer within 30 minutes of start of meal     insulin aspart (NOVOLOG PEN) 100 UNIT/ML pen DOSE:  1 units per 15 grams  of carbohydrate before breakfast, lunch, dinner.  Only chart total amount of units given.  Do not give if pre-prandial glucose is less than 60 mg/dL. If given at mealtime, administer within 30 minutes of start of mealDOSE:  1 units per 15 grams of carbohydrate.  Only chart total amount of units given.  Do not give if pre-prandial glucose is less than 60 mg/dL. If given at mealtime, administer within 30 minutes of start of meal     insulin glargine (LANTUS PEN) 100 UNIT/ML pen Inject 22 Units Subcutaneous At Bedtime     lactobacillus rhamnosus, GG, (CULTURELL) capsule Take 1 capsule by mouth 2 times daily     lisinopril (ZESTRIL) 10 MG tablet Take 10 mg by mouth daily     magnesium 250 MG tablet Take 1 tablet by mouth     metoprolol succinate ER (TOPROL-XL) 25 MG 24 hr tablet Take 3 tablets (75 mg) by mouth every evening     nitroGLYcerin (NITROSTAT) 0.4 MG sublingual tablet Place 0.4 mg under the tongue every 5 minutes as needed for chest pain For chest pain place 1 tablet under the tongue every 5 minutes for 3 doses. If symptoms persist 5 minutes after 1st dose call 911.     rosuvastatin (CRESTOR) 40 MG tablet Take 20 mg by mouth daily Evening      vitamin C (ASCORBIC ACID) 1000 MG TABS Take 1,000 mg by mouth daily     vitamin D3 (CHOLECALCIFEROL) 2000 units (50 mcg) tablet Take 1 tablet by mouth daily     No current facility-administered medications for this visit.       ROS:  10 point ROS of systems including Constitutional, Eyes, Respiratory, Cardiovascular, Gastroenterology, Genitourinary, Integumentary, Musculoskeletal, Psychiatric were all negative except for pertinent positives noted in my HPI.    Vitals:  BP (!) 178/83   Pulse 70   Temp 98.1  F (36.7  C)   Resp 22   Wt 97.3 kg (214 lb 9.6 oz)   SpO2 97%   BMI 29.93 kg/m    Exam:  GENERAL APPEARANCE:  Alert, oriented, cooperative, denies pain  ENT:  Mouth and posterior oropharynx normal, moist mucous membranes, normal hearing acuity  NECK:  No  adenopathy,masses or thyromegaly  RESP:  respiratory effort and palpation of chest normal, lungs clear to auscultation , no respiratory distress  CV:  Palpation and auscultation of heart done , regular rate and rhythm, no murmur, rub, or gallop, no edema  ABDOMEN:  normal bowel sounds, soft, nontender, no hepatosplenomegaly or other masses  M/S:   Able to move all extremities  SKIN: L foot wrapped  NEURO:   No focal deficits  PSYCH:  oriented X 3, affect and mood normal    Lab/Diagnostic data:    Most Recent 3 CBC's:  Recent Labs   Lab Test 08/21/21  0750 08/19/21  1235 08/18/21  0703 08/15/21  2233   WBC  --  9.6 12.5* 10.6   HGB 12.6* 12.5* 12.2* 12.7*   MCV  --  79 81 82   PLT  --  283 272 270     Most Recent 3 BMP's:  Recent Labs   Lab Test 08/21/21  0908 08/21/21  0750 08/21/21  0156 08/19/21  1235 08/18/21  0703 08/16/21  0320 08/15/21  2233   NA  --  137  --  137  --   --  133   POTASSIUM  --  4.1  --  3.9 4.8  --  4.8   CHLORIDE  --  105  --  105  --   --  102   CO2  --  30  --  28  --   --  29   BUN  --  22  --  16  --   --  26   CR  --  1.57*  --  1.52*  --   --  1.36*   ANIONGAP  --  2*  --  4  --   --  2*   ONEYDA  --  9.0  --  9.3  --   --  8.9   * 179* 280* 183*  --    < > 261*    < > = values in this interval not displayed.       ASSESSMENT/PLAN:    (E11.621,  N70.787) Diabetic ulcer of left midfoot associated with type 2 diabetes mellitus, with fat layer exposed (H)  (primary encounter diagnosis)  Continue augmentin/cipro until 8/24, f/u with podiatry on 9/3. NWB on LLE    DVT prophylaxis  ASA 325mg EOD    (Z95.1) S/P CABG x 4  HTN  Continue lisinopril 10mg daily, amlodipine 10mg daily, metoprolol succinate 75mg (decreased in hospital). Monitor BP BID. Continue statin and ASA    (E11.65) Uncontrolled type 2 diabetes mellitus with hyperglycemia (H)  Last A1c 10.1, now wants to intermittent fast, will change sliding scale to cover lunch/dinner/HS only. Continue lantus 22U at HS. Wants to restart  metformin/empagliflozin when renal fx improves    Pain control  Continue tylenol PRN, denies pain    (N18.31) Stage 3a chronic kidney disease  Baseline 1.1-1.3. last Cr 1.57 with GFR 46, recheck BMP on 8/24    (N40.1,  N13.8) BPH with obstruction/lower urinary tract symptoms  Continue alfuzosin 10mg daily and proscar 5mg daily    Diarrhea  Continue probiotic until abx completed    Vit D  Continue D3 2000U daily    Orders:  Change sliding scale, lab recheck    Total time spent with patient visit at the [facility] nursing facility was 38 including patient visit and review of past records. Greater than 50% of total time spent with counseling and coordinating care due to change sliding scale to reflect intermittent fasting timeframe, lab recheck to wanting to use PO DM meds and therapy, which lasted 22 minutes.     Electronically signed by:  Naeem Heard NP

## 2021-08-23 NOTE — PLAN OF CARE
Physical Therapy Discharge Summary    Reason for therapy discharge:    Discharged to transitional care facility.    Progress towards therapy goal(s). See goals on Care Plan in [facility] electronic health record for goal details.  Goals not met.  Barriers to achieving goals:   discharge from facility.    Therapy recommendation(s):    Continued therapy is recommended.  Rationale/Recommendations:  cont PT at TCU to continue strengthening, balance, and mobility training to increase overall independence and safety with functional tasks.

## 2021-08-24 LAB
ANION GAP SERPL CALCULATED.3IONS-SCNC: 6 MMOL/L (ref 3–14)
BACTERIA BONE ANAEROBE+AEROBE CULT: NORMAL
BUN SERPL-MCNC: 23 MG/DL (ref 7–30)
CALCIUM SERPL-MCNC: 9.3 MG/DL (ref 8.5–10.1)
CHLORIDE BLD-SCNC: 105 MMOL/L (ref 94–109)
CO2 SERPL-SCNC: 27 MMOL/L (ref 20–32)
CREAT SERPL-MCNC: 1.46 MG/DL (ref 0.66–1.25)
ERYTHROCYTE [DISTWIDTH] IN BLOOD BY AUTOMATED COUNT: 13.5 % (ref 10–15)
GFR SERPL CREATININE-BSD FRML MDRD: 50 ML/MIN/1.73M2
GLUCOSE BLD-MCNC: 221 MG/DL (ref 70–99)
HCT VFR BLD AUTO: 39.7 % (ref 40–53)
HGB BLD-MCNC: 13.1 G/DL (ref 13.3–17.7)
MCH RBC QN AUTO: 26.6 PG (ref 26.5–33)
MCHC RBC AUTO-ENTMCNC: 33 G/DL (ref 31.5–36.5)
MCV RBC AUTO: 81 FL (ref 78–100)
PLATELET # BLD AUTO: 354 10E3/UL (ref 150–450)
POTASSIUM BLD-SCNC: 4.1 MMOL/L (ref 3.4–5.3)
RBC # BLD AUTO: 4.93 10E6/UL (ref 4.4–5.9)
SODIUM SERPL-SCNC: 138 MMOL/L (ref 133–144)
WBC # BLD AUTO: 8.6 10E3/UL (ref 4–11)

## 2021-08-24 PROCEDURE — 80048 BASIC METABOLIC PNL TOTAL CA: CPT | Performed by: NURSE PRACTITIONER

## 2021-08-24 PROCEDURE — 36415 COLL VENOUS BLD VENIPUNCTURE: CPT | Performed by: NURSE PRACTITIONER

## 2021-08-24 PROCEDURE — P9603 ONE-WAY ALLOW PRORATED MILES: HCPCS | Performed by: NURSE PRACTITIONER

## 2021-08-24 PROCEDURE — 85027 COMPLETE CBC AUTOMATED: CPT | Performed by: NURSE PRACTITIONER

## 2021-08-25 ENCOUNTER — TELEPHONE (OUTPATIENT)
Dept: GERIATRICS | Facility: CLINIC | Age: 61
End: 2021-08-25

## 2021-08-26 ENCOUNTER — TELEPHONE (OUTPATIENT)
Dept: GERIATRICS | Facility: CLINIC | Age: 61
End: 2021-08-26
Payer: COMMERCIAL

## 2021-08-26 LAB
QUANTIFERON NIL TUBE: 0.02 IU/ML
QUANTIFERON TB1 TUBE: 0.05 IU/ML
QUANTIFERON TB2 TUBE: 0.04

## 2021-08-26 NOTE — TELEPHONE ENCOUNTER
This is a request for a PRIOR AUTHORIZATION    * Please SUBMIT PRIOR AUTHORIZATION (if appropriate) and UPDATE [facility] once approved / denied. *    * Patient's Drug Insurance will not pay for this medication without a Prior Authorization in place. *    * Thank you! *    Medication:    *2 MEDS       - INSULIN ASPART ('GENERIC' NOVOLOG)       - ROSUVASTATIN 20MG TABLETS    Reason for Request:   PRIOR AUTHORIZATION REQUIRED    Plan Name:    [facility]  Insurance Phone #:   [phone] = PBM       [phone] = PLAN    BIN:     [id number]  PCN:     [facility]  ID:     [id number] (THIS IS ALSO MEDICAID ID#)  GROUP:    N/A    Additional Notes: * THE PT ADMITTED TO [facility] ON 8/21/21 ; PHARMACY THOUGHT PT WAS     ADMITTING UNDER A QUALIFIED (BILL FACI) SKILLED STAY, BUT THEY WERE     ACTUALLY ADMITTING UNDER MEDICAID-[facility] BENEFITS.       * 2 ISSUES >       #1 PHARMACY FILLED 'GENERIC' ASPART INSULIN FOR THE PT (QUANTITY = 6ML ;     2 PENS) > PLAN ONLY PAYS FOR *BRAND NOVOLOG* POSSIBLE TO DO A     BACK-DATED PRIOR AUTH TO 8/21 FOR THE GENERIC (ONE-TIME FILL /    OVERRIDE?)D NOVOLOG* --        #2 PHARMACY FILLED ROSUVASTATIN 20MG TABS FOR THE PT (QUANTITY = 30    TABS FOR 30 DAYS) > PLAN ONLY PAYS FOR 0.5TABS / DAY -- POSSIBLE TO DO A    BACK-DATED PRIOR AUTH TO 8/21 FOR A ONE-TIME FILL / OVERRIDE FOR     30 TABS FOR 30 DAYS OF THE 20MG TABLET?       * BOTH ORDERS HAVE BEEN UPDATED AND NOTHING IS NEEDED GOING      FORWARD; BUT WITHOUT THIS AUTH / OVERRIDE, PHARMACY MAY HAVE TO      WRITE THESE PRODUCTS OFF.       PLEASE LET US KNOW ANY OUTCOME - THANKS!       [facility] -- [phone]

## 2021-08-26 NOTE — PROGRESS NOTES
[facility]  INITIAL VISIT NOTE  August 27, 2021    PRIMARY CARE PROVIDER AND CLINIC:  [facility] [address] [place] [address] / [facility] [id number]    CHIEF COMPLAINT:  Hospital follow-up/Initial visit    HPI:    Nabil Cheung is a 61 year old  (1960) male who was seen at [facility] on August 27, 2021 for an initial visit.     Medical history is notable for CAD, hypertension, dyslipidemia, DM type II, diabetic neuropathy, diabetic foot ulcer, CKD stage IIIa, BPH, and thyroid nodule.    Summary of hospital course:  Patient was hospitalized at [facility] from August 15 through August 21, 2021 for nonhealing left foot ulcer with increased erythema.  MRI of left foot showed evidence for osteomyelitis in the distal aspect of the left fifth metatarsal and proximal phalanx of the left fifth toe as well as a small subcutaneous fluid collection with draining abscess along the dorsal lateral aspect of the distal left foot adjacent to the site of osteomyelitis.  Patient was initially started on IV Zosyn and vancomycin, then changed to only IV Zosyn per ID recommendation.  He was evaluated by podiatry service and underwent partial left fifth ray amputation and flap closure on August 17, 2021.  Bone biopsy culture grew Enterobacter cloacae complexes and Staphylococcus lugdunensis.  Blood cultures yielded no growth.  PTA metoprolol dose was reduced due to bradycardia and amlodipine dose increased to 10 mg daily.  Insulin regimen was also adjusted.  TCU was recommended per therapies.  He was discharged on oral Augmentin and ciprofloxacin for 3 more days.    Patient is admitted to this facility for medical management, nursing care, and rehab.     Of note, history was obtained from patient, facility RN, and extensive review of the chart necessitated by complex hospitalization.    Today's visit:  Patient was seen in his room, while lying in bed.  He appears comfortable.  Surgical  pain in his foot is adequately controlled.  His diabetes remains poorly controlled and his blood glucose last night was 356 mg/dL.  He denies fever, chills, chest pain, palpitation, dyspnea, nausea, vomiting, abdominal pain, or urinary symptoms.  He reports that he had a bowel movement last night.  He is requesting to get back on Metformin.      CODE STATUS:   CPR/Full code     PAST MEDICAL HISTORY:   CAD, s/p CABG x4 in May 2020  Sternal wound infection with MRSE in June 2020, s/p flap closure  Hypertension  Dyslipidemia  DM type II  Diabetic neuropathy and retinopathy  Diabetic foot ulcer  Osteomyelitis of distal left fifth metatarsal and proximal left fifth toe phalanx, s/p partial left fifth ray amputation and flap closure on August 17, 2021  CKD stage IIIa, baseline creatinine 1.2-1.4  Chronic anemia, baseline Hgb around 12  BPH  Thyroid nodule      PAST SURGICAL HISTORY:   Past Surgical History:   Procedure Laterality Date     AMPUTATE TOE(S) Right 8/17/2021    Procedure: left partial fifth ray amputation;  Surgeon: Mirian Cline DPM, Podiatry/Foot and Ankle Surgery;  Location: [facility] OR     GRAFT FLAP PEDICLE TRAM DELAY PROCEDURE Left 6/18/2020    Procedure: Debridement of sternal wound and left major pectoral flep;  Surgeon: MARCO Serna MD;  Location: [facility] OR     INCISION AND DRAINAGE CHEST WASHOUT, COMBINED N/A 6/12/2020    Procedure: INCISION AND DRAINAGE, WOUND, CHEST, WITH IRRIGATION and wound vac change;  Surgeon: Mark Mckinney MD;  Location: [facility] OR     REPAIR CHEST WALL N/A 6/18/2020    Procedure: Chest wall reconstruction;  Surgeon: MARCO Serna MD;  Location: [facility] OR       FAMILY HISTORY:   Family history is significant for diabetes in both parents and breast cancer in his mother.    SOCIAL HISTORY:    Social History     Tobacco Use     Smoking status: Never Smoker     Smokeless tobacco: Never Used   Substance Use Topics     Alcohol use: Yes       MEDICATIONS:  Current  Outpatient Medications   Medication Sig Dispense Refill     acetaminophen (TYLENOL) 325 MG tablet Take 650 mg by mouth every 6 hours as needed for mild pain       albuterol (PROAIR HFA/PROVENTIL HFA/VENTOLIN HFA) 108 (90 Base) MCG/ACT inhaler Inhale 1-2 puffs into the lungs       alfuzosin ER (UROXATRAL) 10 MG 24 hr tablet Take 10 mg by mouth daily        alpha-lipoic acid 600 MG capsule Take 600 mg by mouth daily        amLODIPine (NORVASC) 10 MG tablet Take 1 tablet (10 mg) by mouth daily       aspirin 81 MG EC tablet Take 81 mg by mouth every other day       finasteride (PROSCAR) 5 MG tablet Take 5 mg by mouth daily       fish oil-omega-3 fatty acids 1000 MG capsule Take 1 g by mouth 2 times daily       insulin aspart (NOVOLOG PEN) 100 UNIT/ML pen Inject 1-7 Units Subcutaneous 3 times daily (before meals) Correction Scale - MEDIUM INSULIN RESISTANCE DOSING     Do Not give Correction Insulin if Pre-Meal BG less than 140.   For Pre-Meal  - 189 give 1 unit.   For Pre-Meal  - 239 give 2 units.   For Pre-Meal  - 289 give 3 units.   For Pre-Meal  - 339 give 4 units.   For Pre-Meal - 399 give 5 units.   For Pre-Meal -449 give 6 units  For Pre-Meal BG greater than or equal to 450 give 7 units.   To be given with prandial insulin, and based on pre-meal blood glucose.    Notify provider if glucose greater than or equal to 350 mg/dL after administration of correction dose. If given at mealtime, administer within 30 minutes of start of meal (Patient taking differently: Inject 1-7 Units Subcutaneous 2 times daily (with meals) Correction Scale - MEDIUM INSULIN RESISTANCE DOSING     Only use at lunch and dinner time, patient fasting at AM  Do Not give Correction Insulin if Pre-Meal BG less than 140.   For Pre-Meal  - 189 give 1 unit.   For Pre-Meal  - 239 give 2 units.   For Pre-Meal  - 289 give 3 units.   For Pre-Meal  - 339 give 4 units.   For Pre-Meal - 399  give 5 units.   For Pre-Meal -449 give 6 units  For Pre-Meal BG greater than or equal to 450 give 7 units.   To be given with prandial insulin, and based on pre-meal blood glucose.    Notify provider if glucose greater than or equal to 350 mg/dL after administration of correction dose. If given at mealtime, administer within 30 minutes of start of meal)       insulin aspart (NOVOLOG PEN) 100 UNIT/ML pen Inject 1-5 Units Subcutaneous At Bedtime MEDIUM INSULIN RESISTANCE DOSING    Do Not give Bedtime Correction Insulin if BG less than  200.   For  - 249 give 1 units.   For  - 299 give 2 units.   For  - 349 give 3 units.   For  -399 give 4 units.   For BG greater than or equal to 400 give 5 units.  Notify provider if glucose greater than or equal to 350 mg/dL after administration of correction dose. If given at mealtime, administer within 30 minutes of start of meal       insulin aspart (NOVOLOG PEN) 100 UNIT/ML pen DOSE:  1 units per 15 grams of carbohydrate before breakfast, lunch, dinner.  Only chart total amount of units given.  Do not give if pre-prandial glucose is less than 60 mg/dL. If given at mealtime, administer within 30 minutes of start of mealDOSE:  1 units per 15 grams of carbohydrate.  Only chart total amount of units given.  Do not give if pre-prandial glucose is less than 60 mg/dL. If given at mealtime, administer within 30 minutes of start of meal       insulin glargine (LANTUS PEN) 100 UNIT/ML pen Inject 22 Units Subcutaneous At Bedtime       lactobacillus rhamnosus, GG, (CULTURELL) capsule Take 1 capsule by mouth 2 times daily       lisinopril (ZESTRIL) 10 MG tablet Take 10 mg by mouth daily       magnesium 250 MG tablet Take 1 tablet by mouth       metoprolol succinate ER (TOPROL-XL) 25 MG 24 hr tablet Take 3 tablets (75 mg) by mouth every evening       nitroGLYcerin (NITROSTAT) 0.4 MG sublingual tablet Place 0.4 mg under the tongue every 5 minutes as needed for chest  "pain For chest pain place 1 tablet under the tongue every 5 minutes for 3 doses. If symptoms persist 5 minutes after 1st dose call 911.       rosuvastatin (CRESTOR) 40 MG tablet Take 20 mg by mouth daily Evening        vitamin C (ASCORBIC ACID) 1000 MG TABS Take 1,000 mg by mouth daily       vitamin D3 (CHOLECALCIFEROL) 2000 units (50 mcg) tablet Take 1 tablet by mouth daily         ALLERGIES:  Allergies   Allergen Reactions     Atorvastatin      Dust Mites      Fluorescein Nausea and Vomiting       ROS:  10 point ROS were negative other than the symptoms noted above in the HPI.    PHYSICAL EXAM:  Vital signs were reviewed in the chart.  Vital Signs: BP (!) 156/84   Pulse 64   Temp 97.7  F (36.5  C)   Resp 18   Ht 1.803 m (5' 11\")   Wt 94.5 kg (208 lb 6.4 oz)   SpO2 97%   BMI 29.07 kg/m    General: Cooperative, comfortable, and in no acute distress  HEENT: Normocephalic; oropharynx clear  Cardiovascular: Normal S1, S2, RRR  Respiratory: Lungs clear to auscultation bilaterally  GI: Abdomen soft, non-tender, non-distended, +BS  Extremities: No LE edema  Neuro: CX II-XII grossly intact; ROM in all four extremities grossly intact  Psych: Alert and oriented x3; normal affect  Skin: Left foot surgical wound is dressed    LABORATORY/IMAGING DATA:    Most Recent 3 CBC's:Recent Labs   Lab Test 08/24/21  0600 08/21/21  0750 08/19/21  1235 08/18/21  0703   WBC 8.6  --  9.6 12.5*   HGB 13.1* 12.6* 12.5* 12.2*   MCV 81  --  79 81     --  283 272     Most Recent 3 BMP's:Recent Labs   Lab Test 08/24/21  0600 08/21/21  0908 08/21/21  0750 08/19/21  1235     --  137 137   POTASSIUM 4.1  --  4.1 3.9   CHLORIDE 105  --  105 105   CO2 27  --  30 28   BUN 23  --  22 16   CR 1.46*  --  1.57* 1.52*   ANIONGAP 6  --  2* 4   ONEYDA 9.3  --  9.0 9.3   * 185* 179* 183*     Most Recent 3 Troponin's:No lab results found.  Most Recent Cholesterol Panel:Recent Labs   Lab Test 09/25/17  1825   CHOL 212*   LDL Cannot " estimate LDL when triglyceride exceeds 400 mg/dL  118*   HDL 31*   TRIG 565*     Most Recent 6 Bacteria Isolates From Any Culture (See EPIC Reports for Culture Details):Recent Labs   Lab Test 01/08/21  1145 01/08/21  1031 06/18/20  0828 06/12/20  0916 06/12/20  0913 06/12/20  0910   CULT No growth No growth Culture negative after 4 weeks  No anaerobes isolated  On day 2, isolated in broth only:  Staphylococcus epidermidis  * Culture negative after 4 weeks  No anaerobes isolated  On day 2, isolated in broth only:  Staphylococcus epidermidis  *  Canceled, Test credited  Test reordered as correct code  Reordered as BONEC   Culture negative after 4 weeks  No anaerobes isolated  Light growth  Normal skin riki   Culture negative after 4 weeks  No anaerobes isolated  Canceled, Test credited  Incorrectly ordered by PCU/Clinic  Test reordered as correct code    Light growth  Normal skin riki       Most Recent TSH and T4:Recent Labs   Lab Test 09/25/17  1825   TSH 2.79     Most Recent Hemoglobin A1c:Recent Labs   Lab Test 01/09/21  0625   A1C 9.1*     Most Recent 6 glucoses:Recent Labs   Lab Test 08/24/21  0600 08/21/21  0908 08/21/21  0750 08/21/21  0156 08/20/21  2154 08/20/21  1740   * 185* 179* 280* 262* 285*         ASSESSMENT/PLAN:  Chronic diabetic foot ulcer,  Left foot abscess,  Osteomyelitis of distal left fifth metatarsal and proximal left fifth toe phalanx, s/p partial left fifth ray amputation and flap closure on August 17, 2021.  Bone culture grew Enterobacter cloacae complexes and Staphylococcus lugdunensis.  Patient is afebrile and hemodynamic stable.  Patient completed the course of antibiotic therapy with Augmentin and ciprofloxacin on August 24.  Surgical pain is adequately controlled.  Plan:  Minimal heel weight bearing for transfers in postop boot   Continue wound care per podiatry  Follow with Dr. Cline as instructed    DM type II, poorly controlled,  Diabetic neuropathy and  retinopathy.  Last Hgb A1c was 10.1% in June 2021.  Diabetic regimen was adjusted in hospital (PTA Metformin and empagliflozin were held due to renal failure).  Blood glucose levels have been in the range of 181-356 in the past 48 hours.  Discussed the options on how to manage his poorly controlled diabetes.  We decided that it is safe at this juncture to put him back on Metformin, unless creatinine increases to about 1.5.  Plan:  Resume PTA Metformin 1000 g p.o. twice daily  Continue Lantus 22 units subcu every night  Continue sliding scale NovoLog  Continue diabetic diet  Continue to closely monitor blood glucose  Further adjust his diabetic regimen as indicated    CAD, s/p CABG x4 in May 2020,  Essential hypertension,  Dyslipidemia.  Stable.  Plan:  Continue aspirin 81 mg p.o. every other day (dose has been changed from daily to every other day due to retinal hemorrhage)  Continue rosuvastatin 20 mg p.o. daily, amlodipine 10 mg p.o. daily, metoprolol ER 75 mg p.o. daily, and lisinopril 10 mg p.o. daily  PRN nitroglycerin sublingual for angina  Monitor blood pressure and cardiac status    CKD stage IIIa.  Baseline creatinine 1.2-1.4.  Last creatinine was 1.46 on August 24.  Plan:  Avoid NSAIDs and nephrotoxic  Monitor renal function periodically    Chronic anemia.  Baseline Hgb around 12.  Last hemoglobin was 13.1 on August 24.  Plan  Monitor hemoglobin periodically    BPH.  Plan:  Continue PTA finasteride 5 mg p.o. daily and alfuzosin 10 mg p.o. daily    Physical deconditioning.  Plan:  Continue PT/OT evaluation and therapy        Orders written by provider at facility:  Metformin 1000 mg p.o. twice daily  CBC and BMP on August 30        Recommendation by provider at facility:  None.        Total unit/floor time of 55 minutes consisted of the following: examination of patient, reviewing the record including pertinent labs and imaging. More than 50% of this time was spent in coordination of care with the patient,  nursing staff, and other healthcare providers. This time was spent on discussing the care plan including diabetic medication changes and specialty follow up.        Electronically signed by:  Emma Beard MD

## 2021-08-27 ENCOUNTER — LAB REQUISITION (OUTPATIENT)
Dept: LAB | Facility: CLINIC | Age: 61
End: 2021-08-27

## 2021-08-27 ENCOUNTER — TRANSITIONAL CARE UNIT VISIT (OUTPATIENT)
Dept: GERIATRICS | Facility: CLINIC | Age: 61
End: 2021-08-27
Payer: COMMERCIAL

## 2021-08-27 VITALS
TEMPERATURE: 97.7 F | RESPIRATION RATE: 18 BRPM | HEART RATE: 64 BPM | DIASTOLIC BLOOD PRESSURE: 84 MMHG | HEIGHT: 71 IN | SYSTOLIC BLOOD PRESSURE: 156 MMHG | WEIGHT: 208.4 LBS | BODY MASS INDEX: 29.18 KG/M2 | OXYGEN SATURATION: 97 %

## 2021-08-27 DIAGNOSIS — Z89.422 HISTORY OF PARTIAL RAY AMPUTATION OF FIFTH TOE OF LEFT FOOT (H): ICD-10-CM

## 2021-08-27 DIAGNOSIS — I25.10 CORONARY ARTERY DISEASE INVOLVING NATIVE CORONARY ARTERY OF NATIVE HEART WITHOUT ANGINA PECTORIS: ICD-10-CM

## 2021-08-27 DIAGNOSIS — M86.472 CHRONIC OSTEOMYELITIS OF LEFT FOOT WITH DRAINING SINUS (H): Primary | ICD-10-CM

## 2021-08-27 DIAGNOSIS — N18.31 STAGE 3A CHRONIC KIDNEY DISEASE (H): ICD-10-CM

## 2021-08-27 DIAGNOSIS — E11.42 DIABETIC POLYNEUROPATHY ASSOCIATED WITH TYPE 2 DIABETES MELLITUS (H): ICD-10-CM

## 2021-08-27 DIAGNOSIS — R53.81 PHYSICAL DECONDITIONING: ICD-10-CM

## 2021-08-27 DIAGNOSIS — E78.5 DYSLIPIDEMIA: ICD-10-CM

## 2021-08-27 DIAGNOSIS — I10 ESSENTIAL HYPERTENSION: ICD-10-CM

## 2021-08-27 DIAGNOSIS — D64.9 CHRONIC ANEMIA: ICD-10-CM

## 2021-08-27 DIAGNOSIS — Z00.01 ENCOUNTER FOR GENERAL ADULT MEDICAL EXAMINATION WITH ABNORMAL FINDINGS: ICD-10-CM

## 2021-08-27 DIAGNOSIS — L02.619 ABSCESS OF FOOT: ICD-10-CM

## 2021-08-27 DIAGNOSIS — E11.65 POORLY CONTROLLED DIABETES MELLITUS (H): ICD-10-CM

## 2021-08-27 PROCEDURE — U0005 INFEC AGEN DETEC AMPLI PROBE: HCPCS | Performed by: NURSE PRACTITIONER

## 2021-08-27 PROCEDURE — 99306 1ST NF CARE HIGH MDM 50: CPT | Performed by: INTERNAL MEDICINE

## 2021-08-27 ASSESSMENT — MIFFLIN-ST. JEOR: SCORE: 1762.43

## 2021-08-28 LAB — SARS-COV-2 RNA RESP QL NAA+PROBE: NEGATIVE

## 2021-08-29 ENCOUNTER — LAB REQUISITION (OUTPATIENT)
Dept: LAB | Facility: CLINIC | Age: 61
End: 2021-08-29
Payer: COMMERCIAL

## 2021-08-29 VITALS
SYSTOLIC BLOOD PRESSURE: 107 MMHG | RESPIRATION RATE: 18 BRPM | OXYGEN SATURATION: 95 % | DIASTOLIC BLOOD PRESSURE: 67 MMHG | BODY MASS INDEX: 29.07 KG/M2 | TEMPERATURE: 97.8 F | WEIGHT: 208.4 LBS | HEART RATE: 70 BPM

## 2021-08-29 DIAGNOSIS — N18.9 CHRONIC KIDNEY DISEASE, UNSPECIFIED: ICD-10-CM

## 2021-08-29 DIAGNOSIS — D64.9 ANEMIA, UNSPECIFIED: ICD-10-CM

## 2021-08-30 ENCOUNTER — LAB REQUISITION (OUTPATIENT)
Dept: LAB | Facility: CLINIC | Age: 61
End: 2021-08-30
Payer: COMMERCIAL

## 2021-08-30 ENCOUNTER — TRANSITIONAL CARE UNIT VISIT (OUTPATIENT)
Dept: GERIATRICS | Facility: CLINIC | Age: 61
End: 2021-08-30
Payer: COMMERCIAL

## 2021-08-30 DIAGNOSIS — E11.9 DIABETES MELLITUS WITHOUT COMPLICATION (H): Primary | ICD-10-CM

## 2021-08-30 DIAGNOSIS — Z00.01 ENCOUNTER FOR GENERAL ADULT MEDICAL EXAMINATION WITH ABNORMAL FINDINGS: ICD-10-CM

## 2021-08-30 DIAGNOSIS — I10 ESSENTIAL (PRIMARY) HYPERTENSION: ICD-10-CM

## 2021-08-30 DIAGNOSIS — N40.1 BPH WITH OBSTRUCTION/LOWER URINARY TRACT SYMPTOMS: ICD-10-CM

## 2021-08-30 DIAGNOSIS — N18.31 STAGE 3A CHRONIC KIDNEY DISEASE (H): ICD-10-CM

## 2021-08-30 DIAGNOSIS — N13.8 BPH WITH OBSTRUCTION/LOWER URINARY TRACT SYMPTOMS: ICD-10-CM

## 2021-08-30 DIAGNOSIS — L97.422 DIABETIC ULCER OF LEFT MIDFOOT ASSOCIATED WITH TYPE 2 DIABETES MELLITUS, WITH FAT LAYER EXPOSED (H): ICD-10-CM

## 2021-08-30 DIAGNOSIS — E11.621 DIABETIC ULCER OF LEFT MIDFOOT ASSOCIATED WITH TYPE 2 DIABETES MELLITUS, WITH FAT LAYER EXPOSED (H): ICD-10-CM

## 2021-08-30 LAB
ANION GAP SERPL CALCULATED.3IONS-SCNC: 3 MMOL/L (ref 3–14)
BUN SERPL-MCNC: 31 MG/DL (ref 7–30)
CALCIUM SERPL-MCNC: 9 MG/DL (ref 8.5–10.1)
CHLORIDE BLD-SCNC: 105 MMOL/L (ref 94–109)
CO2 SERPL-SCNC: 29 MMOL/L (ref 20–32)
CREAT SERPL-MCNC: 1.44 MG/DL (ref 0.66–1.25)
ERYTHROCYTE [DISTWIDTH] IN BLOOD BY AUTOMATED COUNT: 13.3 % (ref 10–15)
GAMMA INTERFERON BACKGROUND BLD IA-ACNC: 0.02 IU/ML
GFR SERPL CREATININE-BSD FRML MDRD: 51 ML/MIN/1.73M2
GLUCOSE BLD-MCNC: 158 MG/DL (ref 70–99)
HCT VFR BLD AUTO: 37.1 % (ref 40–53)
HGB BLD-MCNC: 12.4 G/DL (ref 13.3–17.7)
M TB IFN-G BLD-IMP: NEGATIVE
M TB IFN-G CD4+ BCKGRND COR BLD-ACNC: 9.98 IU/ML
MCH RBC QN AUTO: 26.8 PG (ref 26.5–33)
MCHC RBC AUTO-ENTMCNC: 33.4 G/DL (ref 31.5–36.5)
MCV RBC AUTO: 80 FL (ref 78–100)
MITOGEN IGNF BCKGRD COR BLD-ACNC: 0.02 IU/ML
MITOGEN IGNF BCKGRD COR BLD-ACNC: 0.03 IU/ML
PLATELET # BLD AUTO: 265 10E3/UL (ref 150–450)
POTASSIUM BLD-SCNC: 4.1 MMOL/L (ref 3.4–5.3)
QUANTIFERON MITOGEN: 10 IU/ML
RBC # BLD AUTO: 4.62 10E6/UL (ref 4.4–5.9)
SODIUM SERPL-SCNC: 137 MMOL/L (ref 133–144)
WBC # BLD AUTO: 9.7 10E3/UL (ref 4–11)

## 2021-08-30 PROCEDURE — 99309 SBSQ NF CARE MODERATE MDM 30: CPT | Performed by: NURSE PRACTITIONER

## 2021-08-30 PROCEDURE — 36415 COLL VENOUS BLD VENIPUNCTURE: CPT | Mod: ORL | Performed by: NURSE PRACTITIONER

## 2021-08-30 PROCEDURE — 80048 BASIC METABOLIC PNL TOTAL CA: CPT | Performed by: NURSE PRACTITIONER

## 2021-08-30 PROCEDURE — 85027 COMPLETE CBC AUTOMATED: CPT | Performed by: NURSE PRACTITIONER

## 2021-08-30 PROCEDURE — U0003 INFECTIOUS AGENT DETECTION BY NUCLEIC ACID (DNA OR RNA); SEVERE ACUTE RESPIRATORY SYNDROME CORONAVIRUS 2 (SARS-COV-2) (CORONAVIRUS DISEASE [COVID-19]), AMPLIFIED PROBE TECHNIQUE, MAKING USE OF HIGH THROUGHPUT TECHNOLOGIES AS DESCRIBED BY CMS-2020-01-R: HCPCS | Performed by: NURSE PRACTITIONER

## 2021-08-30 PROCEDURE — P9604 ONE-WAY ALLOW PRORATED TRIP: HCPCS | Mod: ORL | Performed by: NURSE PRACTITIONER

## 2021-08-30 NOTE — PROGRESS NOTES
[facility] GERIATRIC SERVICES    Chief Complaint   Patient presents with     RECHECK     HPI:  Nabil Cheung is a 61 year old  (1960), who is being seen today for an episodic care visit at: [facility] (TCU) [[id number]].     This is a 60 yo male who presents with L foot osteomyelitis. He follows with Dr Cline with podiatry/wound center. He initially admitted in 12/2020 for purulent LLE cellulitis ultimately with graft and wound vac placement.  This admission WBC 10.6. foot XR with findings compatible with osteomyelitis and wound culture 8/15/21 with Enterobacter cloacae and Staph lugdunesis, so underwent L 5th Rays amputation 8/17/2021. vanco/zosyn from ED;  ID recommends zosyn only. Wound cultures NGTD; ID following.  Recommend continue Zosyn, transition to oral antibiotics Augmentin 875 mg twice daily with Cipro 500 mg every 12 hours at discharge for 1 week, treat until 8/24/2021. He was given NWB on LLE, Podiatrist has not commented regarding restart of aspirin however given history of CABG, hemoglobin stable with no signs of bleeding will initiate on PTA  mg every other day. Per uncontrolled DM, A1c 10.1 and PTA empagliflozin and metformin on hold per JOSE ALFREDO, was started on novolog sliding scale TID with meals and lantus 22U at HS. Also per htn with brabycardia, metoprolol decreased to 75mg and increased amlodipine to 10mg daily. No other changes [facility]       Today's concern is:   CKD, DM    Allergies, and PMH/PSH reviewed in [facility] today.  REVIEW OF SYSTEMS:   4 point ROS including Respiratory, CV, GI and , other than that noted in the HPI,  is negative    Objective:   /67   Pulse 70   Temp 97.8  F (36.6  C)   Resp 18   Wt 94.5 kg (208 lb 6.4 oz)   SpO2 95%   BMI 29.07 kg/m    GENERAL APPEARANCE:  Alert, oriented, cooperative, denies pain  RESP:  respiratory effort and palpation of chest normal, lungs clear to auscultation , no respiratory distress  CV:   Palpation and auscultation of heart done , regular rate and rhythm, no murmur, rub, or gallop, no edema  SKIN: L foot wrapped  PSYCH:  oriented X 3, affect and mood normal      Most Recent 3 CBC's:  Recent Labs   Lab Test 08/30/21  0929 08/24/21  0600 08/21/21  0750 08/19/21  1235   WBC 9.7 8.6  --  9.6   HGB 12.4* 13.1* 12.6* 12.5*   MCV 80 81  --  79    354  --  283     Most Recent 3 BMP's:  Recent Labs   Lab Test 08/30/21  0929 08/24/21  0600 08/21/21  0908 08/21/21  0750    138  --  137   POTASSIUM 4.1 4.1  --  4.1   CHLORIDE 105 105  --  105   CO2 29 27  --  30   BUN 31* 23  --  22   CR 1.44* 1.46*  --  1.57*   ANIONGAP 3 6  --  2*   ONEYDA 9.0 9.3  --  9.0   * 221* 185* 179*       Assessment/Plan:    (E11.621,  L97.422) Diabetic ulcer of left midfoot associated with type 2 diabetes mellitus, with fat layer exposed (H)  (primary encounter diagnosis)  Given augmentin/cipro until 8/24, f/u with podiatry on 9/3. NWB on LLE     DVT prophylaxis  ASA 325mg EOD     (Z95.1) S/P CABG x 4  HTN  Continue lisinopril 10mg daily, amlodipine 10mg daily, metoprolol succinate 75mg (decreased in hospital). -150s. Continue statin and ASA     (E11.65) Uncontrolled type 2 diabetes mellitus with hyperglycemia (H)  Last A1c 10.1, using intermittent fast, continue sliding scale to cover lunch/dinner/HS only. Continue lantus 22U at HS. Slight improvement in renal fx, metformin restarted (1000mg BID). -300s     Pain control  Continue tylenol PRN, denies pain     (N18.31) Stage 3a chronic kidney disease  Baseline 1.1-1.3. last Cr 1.44 with GFR 51 on 8/24 (improved). Recheck BMP on 9/6     (N40.1,  N13.8) BPH with obstruction/lower urinary tract symptoms  Continue alfuzosin 10mg daily and proscar 5mg daily     Diarrhea  Discontinue probiotic, BMP recheck     Vit D  Continue D3 2000U daily    Orders:  Discontinue probiotic,     Electronically signed by: Naeem Heard NP

## 2021-08-31 VITALS
HEART RATE: 79 BPM | RESPIRATION RATE: 16 BRPM | DIASTOLIC BLOOD PRESSURE: 71 MMHG | SYSTOLIC BLOOD PRESSURE: 110 MMHG | BODY MASS INDEX: 29.15 KG/M2 | OXYGEN SATURATION: 98 % | WEIGHT: 209 LBS | TEMPERATURE: 98.1 F

## 2021-09-01 ENCOUNTER — TRANSITIONAL CARE UNIT VISIT (OUTPATIENT)
Dept: GERIATRICS | Facility: CLINIC | Age: 61
End: 2021-09-01
Payer: COMMERCIAL

## 2021-09-01 DIAGNOSIS — N40.1 BPH WITH OBSTRUCTION/LOWER URINARY TRACT SYMPTOMS: ICD-10-CM

## 2021-09-01 DIAGNOSIS — E11.621 DIABETIC ULCER OF LEFT MIDFOOT ASSOCIATED WITH TYPE 2 DIABETES MELLITUS, WITH FAT LAYER EXPOSED (H): Primary | ICD-10-CM

## 2021-09-01 DIAGNOSIS — N13.8 BPH WITH OBSTRUCTION/LOWER URINARY TRACT SYMPTOMS: ICD-10-CM

## 2021-09-01 DIAGNOSIS — E11.65 UNCONTROLLED TYPE 2 DIABETES MELLITUS WITH HYPERGLYCEMIA (H): ICD-10-CM

## 2021-09-01 DIAGNOSIS — L97.422 DIABETIC ULCER OF LEFT MIDFOOT ASSOCIATED WITH TYPE 2 DIABETES MELLITUS, WITH FAT LAYER EXPOSED (H): Primary | ICD-10-CM

## 2021-09-01 DIAGNOSIS — I10 ESSENTIAL (PRIMARY) HYPERTENSION: ICD-10-CM

## 2021-09-01 DIAGNOSIS — N18.31 STAGE 3A CHRONIC KIDNEY DISEASE (H): ICD-10-CM

## 2021-09-01 LAB — SARS-COV-2 RNA RESP QL NAA+PROBE: NEGATIVE

## 2021-09-01 PROCEDURE — 99309 SBSQ NF CARE MODERATE MDM 30: CPT | Performed by: NURSE PRACTITIONER

## 2021-09-01 RX ORDER — CALCIUM CARBONATE/VITAMIN D3 500-10/5ML
LIQUID (ML) ORAL DAILY
COMMUNITY

## 2021-09-01 NOTE — PROGRESS NOTES
[facility] GERIATRIC SERVICES    Chief Complaint   Patient presents with     RECHECK     HPI:  Nabil Cheung is a 61 year old  (1960), who is being seen today for an episodic care visit at: [facility] (TCU) [[id number]].     This is a 62 yo male who presents with L foot osteomyelitis. He follows with Dr Cline with podiatry/wound center. He initially admitted in 12/2020 for purulent LLE cellulitis ultimately with graft and wound vac placement.  This admission WBC 10.6. foot XR with findings compatible with osteomyelitis and wound culture 8/15/21 with Enterobacter cloacae and Staph lugdunesis, so underwent L 5th Rays amputation 8/17/2021. vanco/zosyn from ED;  ID recommends zosyn only. Wound cultures NGTD; ID following.  Recommend continue Zosyn, transition to oral antibiotics Augmentin 875 mg twice daily with Cipro 500 mg every 12 hours at discharge for 1 week, treat until 8/24/2021. He was given NWB on LLE, Podiatrist has not commented regarding restart of aspirin however given history of CABG, hemoglobin stable with no signs of bleeding will initiate on PTA  mg every other day. Per uncontrolled DM, A1c 10.1 and PTA empagliflozin and metformin on hold per JOSE ALFREDO, was started on novolog sliding scale TID with meals and lantus 22U at HS. Also per htn with brabycardia, metoprolol decreased to 75mg and increased amlodipine to 10mg daily. No other changes [facility]      Today's concern is:   DM, hypotension, CKD    Allergies, and PMH/PSH reviewed in [facility] today.  REVIEW OF SYSTEMS:  4 point ROS including Respiratory, CV, GI and , other than that noted in the HPI,  is negative    Objective:   /71   Pulse 79   Temp 98.1  F (36.7  C)   Resp 16   Wt 94.8 kg (209 lb)   SpO2 98%   BMI 29.15 kg/m    GENERAL APPEARANCE:  Alert, oriented, cooperative, denies pain  RESP:  respiratory effort and palpation of chest normal, lungs clear to auscultation , no respiratory  distress  CV:  Palpation and auscultation of heart done , regular rate and rhythm, no murmur, rub, or gallop, no edema  SKIN: L foot wrapped  PSYCH:  oriented X 3, affect and mood normal. SLUMS 28/30      Most Recent 3 CBC's:  Recent Labs   Lab Test 08/30/21  0929 08/24/21  0600 08/21/21  0750 08/19/21  1235   WBC 9.7 8.6  --  9.6   HGB 12.4* 13.1* 12.6* 12.5*   MCV 80 81  --  79    354  --  283     Most Recent 3 BMP's:  Recent Labs   Lab Test 08/30/21  0929 08/24/21  0600 08/21/21  0908 08/21/21  0750    138  --  137   POTASSIUM 4.1 4.1  --  4.1   CHLORIDE 105 105  --  105   CO2 29 27  --  30   BUN 31* 23  --  22   CR 1.44* 1.46*  --  1.57*   ANIONGAP 3 6  --  2*   ONEYDA 9.0 9.3  --  9.0   * 221* 185* 179*       Assessment/Plan:    (E11.621,  L97.422) Diabetic ulcer of left midfoot associated with type 2 diabetes mellitus, with fat layer exposed (H)  (primary encounter diagnosis)  Given augmentin/cipro until 8/24, f/u with podiatry on 9/3. NWB on LLE     DVT prophylaxis  ASA 325mg EOD     (Z95.1) S/P CABG x 4  HTN  Continue lisinopril 10mg daily, amlodipine 10mg daily, metoprolol succinate 75mg (decreased in hospital). -150s. HR <80. Continue statin and ASA     (E11.65) Uncontrolled type 2 diabetes mellitus with hyperglycemia (H)  Last A1c 10.1, using intermittent fast, continue sliding scale to cover lunch/dinner/HS only. Continue lantus 22U at HS. Slight improvement in renal fx, metformin restarted (1000mg BID). -350s     Pain control  Continue tylenol PRN, denies pain     (N18.31) Stage 3a chronic kidney disease  Baseline 1.1-1.3. last Cr 1.44 with GFR 51 on 8/24 (improved). Recheck BMP on 9/6     (N40.1,  N13.8) BPH with obstruction/lower urinary tract symptoms  Continue alfuzosin 10mg daily and proscar 5mg daily     Diarrhea  Discontinue probiotic    Hypomagnesia  Increase mag sultfate to 400mg daily, recheck level on 9/6     Vit D  Continue D3 2000U daily    Orders:  Increase  magnesium dose, BMP/mag recheck    Electronically signed by: Naeem Heard NP

## 2021-09-02 ENCOUNTER — LAB REQUISITION (OUTPATIENT)
Dept: LAB | Facility: CLINIC | Age: 61
End: 2021-09-02
Payer: COMMERCIAL

## 2021-09-02 DIAGNOSIS — Z00.01 ENCOUNTER FOR GENERAL ADULT MEDICAL EXAMINATION WITH ABNORMAL FINDINGS: ICD-10-CM

## 2021-09-02 PROCEDURE — U0003 INFECTIOUS AGENT DETECTION BY NUCLEIC ACID (DNA OR RNA); SEVERE ACUTE RESPIRATORY SYNDROME CORONAVIRUS 2 (SARS-COV-2) (CORONAVIRUS DISEASE [COVID-19]), AMPLIFIED PROBE TECHNIQUE, MAKING USE OF HIGH THROUGHPUT TECHNOLOGIES AS DESCRIBED BY CMS-2020-01-R: HCPCS | Performed by: NURSE PRACTITIONER

## 2021-09-03 ENCOUNTER — TRANSITIONAL CARE UNIT VISIT (OUTPATIENT)
Dept: GERIATRICS | Facility: CLINIC | Age: 61
End: 2021-09-03
Payer: COMMERCIAL

## 2021-09-03 ENCOUNTER — OFFICE VISIT (OUTPATIENT)
Dept: PODIATRY | Facility: CLINIC | Age: 61
End: 2021-09-03
Payer: COMMERCIAL

## 2021-09-03 VITALS
WEIGHT: 209 LBS | BODY MASS INDEX: 29.26 KG/M2 | DIASTOLIC BLOOD PRESSURE: 76 MMHG | SYSTOLIC BLOOD PRESSURE: 145 MMHG | HEIGHT: 71 IN

## 2021-09-03 VITALS
HEART RATE: 61 BPM | WEIGHT: 209 LBS | RESPIRATION RATE: 16 BRPM | TEMPERATURE: 97.7 F | OXYGEN SATURATION: 98 % | HEIGHT: 71 IN | DIASTOLIC BLOOD PRESSURE: 76 MMHG | SYSTOLIC BLOOD PRESSURE: 145 MMHG | BODY MASS INDEX: 29.26 KG/M2

## 2021-09-03 DIAGNOSIS — N18.31 STAGE 3A CHRONIC KIDNEY DISEASE (H): ICD-10-CM

## 2021-09-03 DIAGNOSIS — Z89.422 HISTORY OF PARTIAL RAY AMPUTATION OF FIFTH TOE OF LEFT FOOT (H): ICD-10-CM

## 2021-09-03 DIAGNOSIS — Z95.1 S/P CABG X 4: ICD-10-CM

## 2021-09-03 DIAGNOSIS — I10 ESSENTIAL (PRIMARY) HYPERTENSION: ICD-10-CM

## 2021-09-03 DIAGNOSIS — N40.1 BPH WITH OBSTRUCTION/LOWER URINARY TRACT SYMPTOMS: ICD-10-CM

## 2021-09-03 DIAGNOSIS — E11.65 UNCONTROLLED TYPE 2 DIABETES MELLITUS WITH HYPERGLYCEMIA (H): ICD-10-CM

## 2021-09-03 DIAGNOSIS — Z98.890 POST-OPERATIVE STATE: ICD-10-CM

## 2021-09-03 DIAGNOSIS — E11.42 DIABETIC POLYNEUROPATHY ASSOCIATED WITH TYPE 2 DIABETES MELLITUS (H): Primary | ICD-10-CM

## 2021-09-03 DIAGNOSIS — L97.422 DIABETIC ULCER OF LEFT MIDFOOT ASSOCIATED WITH TYPE 2 DIABETES MELLITUS, WITH FAT LAYER EXPOSED (H): Primary | ICD-10-CM

## 2021-09-03 DIAGNOSIS — N13.8 BPH WITH OBSTRUCTION/LOWER URINARY TRACT SYMPTOMS: ICD-10-CM

## 2021-09-03 DIAGNOSIS — E11.621 DIABETIC ULCER OF LEFT MIDFOOT ASSOCIATED WITH TYPE 2 DIABETES MELLITUS, WITH FAT LAYER EXPOSED (H): Primary | ICD-10-CM

## 2021-09-03 PROCEDURE — 99024 POSTOP FOLLOW-UP VISIT: CPT | Performed by: PODIATRIST

## 2021-09-03 PROCEDURE — 99309 SBSQ NF CARE MODERATE MDM 30: CPT | Performed by: NURSE PRACTITIONER

## 2021-09-03 ASSESSMENT — MIFFLIN-ST. JEOR
SCORE: 1765.15
SCORE: 1765.15

## 2021-09-03 NOTE — PROGRESS NOTES
"[facility] GERIATRIC SERVICES    Chief Complaint   Patient presents with     RECHECK     HPI:  Nabil Cheung is a 61 year old  (1960), who is being seen today for an episodic care visit at: [facility] (TCU) [[id number]].     This is a 60 yo male who presents with L foot osteomyelitis. He follows with Dr Cline with podiatry/wound center. He initially admitted in 12/2020 for purulent LLE cellulitis ultimately with graft and wound vac placement.  This admission WBC 10.6. foot XR with findings compatible with osteomyelitis and wound culture 8/15/21 with Enterobacter cloacae and Staph lugdunesis, so underwent L 5th Rays amputation 8/17/2021. vanco/zosyn from ED;  ID recommends zosyn only. Wound cultures NGTD; ID following.  Recommend continue Zosyn, transition to oral antibiotics Augmentin 875 mg twice daily with Cipro 500 mg every 12 hours at discharge for 1 week, treat until 8/24/2021. He was given NWB on LLE, Podiatrist has not commented regarding restart of aspirin however given history of CABG, hemoglobin stable with no signs of bleeding will initiate on PTA  mg every other day. Per uncontrolled DM, A1c 10.1 and PTA empagliflozin and metformin on hold per JOSE ALFREDO, was started on novolog sliding scale TID with meals and lantus 22U at HS. Also per htn with brabycardia, metoprolol decreased to 75mg and increased amlodipine to 10mg daily. No other changes [facility]      Today's concern is:   DM, bradycardia    Allergies, and PMH/PSH reviewed in [facility] today.  REVIEW OF SYSTEMS:  4 point ROS including Respiratory, CV, GI and , other than that noted in the HPI,  is negative    Objective:   BP (!) 145/76   Pulse 61   Temp 97.7  F (36.5  C)   Resp 16   Ht 1.803 m (5' 11\")   Wt 94.8 kg (209 lb)   SpO2 98%   BMI 29.15 kg/m    GENERAL APPEARANCE:  Alert, oriented, cooperative, denies pain  RESP:  respiratory effort and palpation of chest normal, lungs clear to auscultation , no " respiratory distress  CV:  Palpation and auscultation of heart done , regular rate and rhythm, no murmur, rub, or gallop, no edema  SKIN: L foot wrapped  PSYCH:  oriented X 3, affect and mood normal. SLUMS 28/30      Most Recent 3 CBC's:  Recent Labs   Lab Test 08/30/21  0929 08/24/21  0600 08/21/21  0750 08/19/21  1235   WBC 9.7 8.6  --  9.6   HGB 12.4* 13.1* 12.6* 12.5*   MCV 80 81  --  79    354  --  283     Most Recent 3 BMP's:  Recent Labs   Lab Test 08/30/21  0929 08/24/21  0600 08/21/21  0908 08/21/21  0750    138  --  137   POTASSIUM 4.1 4.1  --  4.1   CHLORIDE 105 105  --  105   CO2 29 27  --  30   BUN 31* 23  --  22   CR 1.44* 1.46*  --  1.57*   ANIONGAP 3 6  --  2*   ONEYDA 9.0 9.3  --  9.0   * 221* 185* 179*       Assessment/Plan:    (E11.621,  L97.422) Diabetic ulcer of left midfoot associated with type 2 diabetes mellitus, with fat layer exposed (H)  (primary encounter diagnosis)  Given augmentin/cipro until 8/24, f/u with podiatry in 2 wks. Now TTWB     DVT prophylaxis  ASA 325mg EOD     (Z95.1) S/P CABG x 4  HTN  Continue lisinopril 10mg daily, amlodipine 10mg daily, per bradycardia will change metoprolol succinate to 50mg. -150s. HR <60. Continue statin and ASA     (E11.65) Uncontrolled type 2 diabetes mellitus with hyperglycemia (H)  Last A1c 10.1, using intermittent fast, continue sliding scale to cover lunch/dinner/HS only. Continue lantus 22U at HS. Slight improvement in renal fx, metformin restarted (1000mg BID). -350s     Pain control  Continue tylenol PRN, denies pain     (N18.31) Stage 3a chronic kidney disease  Baseline 1.1-1.3. last Cr 1.44 with GFR 51 on 8/24 (improved). Recheck BMP on 9/7     (N40.1,  N13.8) BPH with obstruction/lower urinary tract symptoms  Continue alfuzosin 10mg daily and proscar 5mg daily     Diarrhea  Discontinue probiotic     Hypomagnesia  Increase mag sultfate to 400mg daily, recheck level on 9/7     Vit D  Continue D3 2000U  daily    Orders:  Labs, decrease metoprolol    Electronically signed by: Naeem Heard NP

## 2021-09-03 NOTE — PROGRESS NOTES
"Podiatry / Foot and Ankle Surgery Progress Note    September 3, 2021    Subject: Patient was seen for follow up on 2 weeks status post partial left fifth ray amputation.  Patient has been compliant with nonweightbearing.  Denies fever, nausea, vomiting.  Currently as at the [facility] where they do dressing changes 2 times a week.  No pain but has baseline neuropathy.    Objective:  Vitals: BP (!) 145/76   Ht 1.803 m (5' 11\")   Wt 94.8 kg (209 lb)   BMI 29.15 kg/m    BMI= Body mass index is 29.15 kg/m .    A1C: 9.1 (1/2021)    General:  Patient is alert and orientated.  NAD.    Vascular:  DP and PT pulses are palpable.  No edema or varicosities noted.  CFT's < 3secs.  Skin temp is normal.    Neuro:  Light and gross touch sensation intact to digits, dorsum, and plantar aspects of the feet.    Derm:  Skin is supple.  No rashes, lesions, or ulcerations noted.    Musculoskeletal:  No foot deformity noted.      Assessment:    Diabetic polyneuropathy associated with type 2 diabetes mellitus (H)  Post-operative state  History of partial ray amputation of fifth toe of left foot (H)    Medical Decision Making/Plan: At this time the dressing was changed.  We will have him continue to keep the foot and dressing dry.  He will continue mostly nonweightbearing but can put some heel weight in the boot for transfers on the left foot.  We will have him follow-up in 2 weeks for suture removal.  At that time he will able to put weight on the boot and he was given an order for new diabetic inserts.  All questions were answered to patient satisfaction and he will call for the question concerns.      Patient Risk Factor:  Patient is a medium risk factor for infection.     Mirian Cline DPM, Podiatry/Foot and Ankle Surgery    "

## 2021-09-03 NOTE — PATIENT INSTRUCTIONS
Thank you for choosing [facility] Podiatry / Foot & Ankle Surgery!    DR. TINSLEY'S CLINIC:  [facility] [place]   [address]   [place] [address]   [phone]  [phone]      SCHEDULE SURGERY: [phone]   BILLING QUESTIONS: [phone]   APPOINTMENTS: [phone]   CONSUMER PRICE LINE: [phone]      Follow up: 2 weeks    [facility]  [facility]  [address]  [place] [address]  Phone: [phone]  Fax: [phone] [facility]  [address]  [place] [address]  Phone: [phone]   Fax: [phone]   [facility] [place]  [address]  [place] [address]  Phone: [phone]  Fax: [phone] [facility]  [address]  [place] [address]  Phone: [phone]   Fax: [phone]   [facility]   [address]  [place] [address]  Phone: [phone]  Fax: [phone] * Please call any location listed to make an appointment for a casting/fitting. Your referral was sent to their central office and they will all have the order on file.

## 2021-09-03 NOTE — LETTER
"    9/3/2021         RE: Nabil Cheung  [address]  [place] [address]        Dear Colleague,    Thank you for referring your patient, Nabil Cheung, to the [facility] PODIATRY. Please see a copy of my visit note below.    Podiatry / Foot and Ankle Surgery Progress Note    September 3, 2021    Subject: Patient was seen for follow up on 2 weeks status post partial left fifth ray amputation.  Patient has been compliant with nonweightbearing.  Denies fever, nausea, vomiting.  Currently as at the [facility] where they do dressing changes 2 times a week.  No pain but has baseline neuropathy.    Objective:  Vitals: BP (!) 145/76   Ht 1.803 m (5' 11\")   Wt 94.8 kg (209 lb)   BMI 29.15 kg/m    BMI= Body mass index is 29.15 kg/m .    A1C: 9.1 (1/2021)    General:  Patient is alert and orientated.  NAD.    Vascular:  DP and PT pulses are palpable.  No edema or varicosities noted.  CFT's < 3secs.  Skin temp is normal.    Neuro:  Light and gross touch sensation intact to digits, dorsum, and plantar aspects of the feet.    Derm:  Skin is supple.  No rashes, lesions, or ulcerations noted.    Musculoskeletal:  No foot deformity noted.      Assessment:    Diabetic polyneuropathy associated with type 2 diabetes mellitus (H)  Post-operative state  History of partial ray amputation of fifth toe of left foot (H)    Medical Decision Making/Plan: At this time the dressing was changed.  We will have him continue to keep the foot and dressing dry.  He will continue mostly nonweightbearing but can put some heel weight in the boot for transfers on the left foot.  We will have him follow-up in 2 weeks for suture removal.  At that time he will able to put weight on the boot and he was given an order for new diabetic inserts.  All questions were answered to patient satisfaction and he will call for the question concerns.      Patient Risk Factor:  Patient is a medium risk factor for infection.     Mirian ESCAMILLA" JULIANO Cline, Podiatry/Foot and Ankle Surgery        Again, thank you for allowing me to participate in the care of your patient.        Sincerely,        Mirian Cline DPM, Podiatry/Foot and Ankle Surgery

## 2021-09-04 LAB — SARS-COV-2 RNA RESP QL NAA+PROBE: NEGATIVE

## 2021-09-06 ENCOUNTER — LAB REQUISITION (OUTPATIENT)
Dept: LAB | Facility: CLINIC | Age: 61
End: 2021-09-06
Payer: COMMERCIAL

## 2021-09-06 DIAGNOSIS — I10 ESSENTIAL (PRIMARY) HYPERTENSION: ICD-10-CM

## 2021-09-06 DIAGNOSIS — N18.9 CHRONIC KIDNEY DISEASE, UNSPECIFIED: ICD-10-CM

## 2021-09-07 VITALS
DIASTOLIC BLOOD PRESSURE: 80 MMHG | BODY MASS INDEX: 29.15 KG/M2 | TEMPERATURE: 97.5 F | SYSTOLIC BLOOD PRESSURE: 145 MMHG | RESPIRATION RATE: 16 BRPM | OXYGEN SATURATION: 99 % | HEART RATE: 57 BPM | WEIGHT: 209 LBS

## 2021-09-07 LAB
ANION GAP SERPL CALCULATED.3IONS-SCNC: 3 MMOL/L (ref 3–14)
BUN SERPL-MCNC: 26 MG/DL (ref 7–30)
CALCIUM SERPL-MCNC: 8.9 MG/DL (ref 8.5–10.1)
CHLORIDE BLD-SCNC: 107 MMOL/L (ref 94–109)
CO2 SERPL-SCNC: 27 MMOL/L (ref 20–32)
CREAT SERPL-MCNC: 1.47 MG/DL (ref 0.66–1.25)
GFR SERPL CREATININE-BSD FRML MDRD: 50 ML/MIN/1.73M2
GLUCOSE BLD-MCNC: 230 MG/DL (ref 70–99)
MAGNESIUM SERPL-MCNC: 2 MG/DL (ref 1.6–2.3)
POTASSIUM BLD-SCNC: 4.7 MMOL/L (ref 3.4–5.3)
SODIUM SERPL-SCNC: 137 MMOL/L (ref 133–144)

## 2021-09-07 PROCEDURE — 83735 ASSAY OF MAGNESIUM: CPT | Performed by: NURSE PRACTITIONER

## 2021-09-07 PROCEDURE — 36415 COLL VENOUS BLD VENIPUNCTURE: CPT | Mod: ORL | Performed by: NURSE PRACTITIONER

## 2021-09-07 PROCEDURE — P9604 ONE-WAY ALLOW PRORATED TRIP: HCPCS | Mod: ORL | Performed by: NURSE PRACTITIONER

## 2021-09-07 PROCEDURE — 80048 BASIC METABOLIC PNL TOTAL CA: CPT | Performed by: NURSE PRACTITIONER

## 2021-09-08 ENCOUNTER — TELEPHONE (OUTPATIENT)
Dept: GERIATRICS | Facility: CLINIC | Age: 61
End: 2021-09-08

## 2021-09-08 ENCOUNTER — TRANSITIONAL CARE UNIT VISIT (OUTPATIENT)
Dept: GERIATRICS | Facility: CLINIC | Age: 61
End: 2021-09-08
Payer: COMMERCIAL

## 2021-09-08 DIAGNOSIS — L97.422 DIABETIC ULCER OF LEFT MIDFOOT ASSOCIATED WITH TYPE 2 DIABETES MELLITUS, WITH FAT LAYER EXPOSED (H): Primary | ICD-10-CM

## 2021-09-08 DIAGNOSIS — M86.472 CHRONIC OSTEOMYELITIS OF LEFT FOOT WITH DRAINING SINUS (H): ICD-10-CM

## 2021-09-08 DIAGNOSIS — E11.9 DIABETES MELLITUS WITHOUT COMPLICATION (H): ICD-10-CM

## 2021-09-08 DIAGNOSIS — E11.621 DIABETIC ULCER OF LEFT MIDFOOT ASSOCIATED WITH TYPE 2 DIABETES MELLITUS, WITH FAT LAYER EXPOSED (H): Primary | ICD-10-CM

## 2021-09-08 DIAGNOSIS — R80.9 MICROALBUMINURIA: ICD-10-CM

## 2021-09-08 DIAGNOSIS — I10 ESSENTIAL (PRIMARY) HYPERTENSION: ICD-10-CM

## 2021-09-08 DIAGNOSIS — N18.31 STAGE 3A CHRONIC KIDNEY DISEASE (H): ICD-10-CM

## 2021-09-08 PROCEDURE — 99309 SBSQ NF CARE MODERATE MDM 30: CPT | Performed by: NURSE PRACTITIONER

## 2021-09-08 NOTE — TELEPHONE ENCOUNTER
Prior Authorization Retail Medication Request    Medication/Dose: ROSUVASTATIN 20MG TABLETS  ICD code (if different than what is on RX):    Previously Tried and Failed:    Rationale:      Insurance Name:  [employer]  Insurance ID:  [id number]      Pharmacy Information (if different than what is on RX)  Name:    Phone:

## 2021-09-08 NOTE — PROGRESS NOTES
[facility] GERIATRIC SERVICES    Chief Complaint   Patient presents with     RECHECK     HPI:  Nabil Cheung is a 61 year old  (1960), who is being seen today for an episodic care visit at: [facility] (TCU) [[id number]].     This is a 60 yo male who presents with L foot osteomyelitis. He follows with Dr Cline with podiatry/wound center. He initially admitted in 12/2020 for purulent LLE cellulitis ultimately with graft and wound vac placement.  This admission WBC 10.6. foot XR with findings compatible with osteomyelitis and wound culture 8/15/21 with Enterobacter cloacae and Staph lugdunesis, so underwent L 5th Rays amputation 8/17/2021. vanco/zosyn from ED;  ID recommends zosyn only. Wound cultures NGTD; ID following.  Recommend continue Zosyn, transition to oral antibiotics Augmentin 875 mg twice daily with Cipro 500 mg every 12 hours at discharge for 1 week, treat until 8/24/2021. He was given NWB on LLE, Podiatrist has not commented regarding restart of aspirin however given history of CABG, hemoglobin stable with no signs of bleeding will initiate on PTA  mg every other day. Per uncontrolled DM, A1c 10.1 and PTA empagliflozin and metformin on hold per JOSE ALFREDO, was started on novolog sliding scale TID with meals and lantus 22U at HS. Also per htn with brabycardia, metoprolol decreased to 75mg and increased amlodipine to 10mg daily. No other changes [facility]      Today's concern is:   CKD, hyperglycemia    Allergies, and PMH/PSH reviewed in [facility] today.  REVIEW OF SYSTEMS:  4 point ROS including Respiratory, CV, GI and , other than that noted in the HPI,  is negative    Objective:   BP (!) 145/80   Pulse 57   Temp 97.5  F (36.4  C)   Resp 16   Wt 94.8 kg (209 lb)   SpO2 99%   BMI 29.15 kg/m    GENERAL APPEARANCE:  Alert, oriented, cooperative, denies pain  RESP:  respiratory effort and palpation of chest normal, lungs clear to auscultation , no respiratory  distress  CV:  Palpation and auscultation of heart done , regular rate and rhythm, no murmur, rub, or gallop, no edema  SKIN: L foot wrapped  PSYCH:  oriented X 3, affect and mood normal. SLUMS 28/30      Most Recent 3 CBC's:  Recent Labs   Lab Test 08/30/21  0929 08/24/21  0600 08/21/21  0750 08/19/21  1235   WBC 9.7 8.6  --  9.6   HGB 12.4* 13.1* 12.6* 12.5*   MCV 80 81  --  79    354  --  283     Most Recent 3 BMP's:  Recent Labs   Lab Test 09/07/21  1025 08/30/21  0929 08/24/21  0600    137 138   POTASSIUM 4.7 4.1 4.1   CHLORIDE 107 105 105   CO2 27 29 27   BUN 26 31* 23   CR 1.47* 1.44* 1.46*   ANIONGAP 3 3 6   ONEYDA 8.9 9.0 9.3   * 158* 221*       Assessment/Plan:    (E11.621,  L97.422) Diabetic ulcer of left midfoot associated with type 2 diabetes mellitus, with fat layer exposed (H)  (primary encounter diagnosis)  Given augmentin/cipro until 8/24, f/u with podiatry on 9/15. Now TTWB     DVT prophylaxis  ASA 325mg EOD     (Z95.1) S/P CABG x 4  HTN  Continue lisinopril 10mg daily, amlodipine 10mg daily, per bradycardia will change metoprolol succinate to 50mg. -150s. HR <70. Continue statin and ASA     (E11.65) Uncontrolled type 2 diabetes mellitus with hyperglycemia (H)  Microalbuminuria  Last A1c 10.1, using intermittent fast, continue sliding scale to cover lunch/dinner/HS only. Continue lantus 22U at HS. Slight improvement in renal fx, metformin restarted (1000mg BID). -420s, patient does not want to increase lantus     Pain control  Continue tylenol PRN, denies pain     (N18.31) Stage 3a chronic kidney disease  Baseline 1.1-1.3. last Cr 1.47 with GFR 50 on 9/7. Recheck BMP on 9/13     (N40.1,  N13.8) BPH with obstruction/lower urinary tract symptoms  Continue alfuzosin 10mg daily and proscar 5mg daily     Diarrhea  Discontinued probiotic, resolved     Hypomagnesia  Increase mag sultfate to 400mg daily, last 2.0 on 9/7     Vit D  Continue D3 2000U daily    Orders:  BMP  recheck    Electronically signed by: Naeem Heard NP

## 2021-09-08 NOTE — TELEPHONE ENCOUNTER
Central Prior Authorization Team   Phone: [phone]    PA Initiation    Medication: INSULIN ASPART ('GENERIC' NOVOLOG)  Insurance Company: [employer] - Phone [phone] Fax [phone]  Pharmacy Filling the Rx: [facility] - [place], [place] - [address] [place] [address]  Filling Pharmacy Phone: [phone]  Filling Pharmacy Fax: [phone]  Start Date: 9/8/2021

## 2021-09-08 NOTE — TELEPHONE ENCOUNTER
Prior Authorization Retail Medication Request    Medication/Dose: prevnar 13 inj  ICD code (if different than what is on RX):    Previously Tried and Failed:    Rationale:      Insurance Name:  [employer] [place] [employer]  Insurance ID:  [id number]      Pharmacy Information (if different than what is on RX)  Name:    Phone:

## 2021-09-08 NOTE — TELEPHONE ENCOUNTER
Central Prior Authorization Team   Phone: [phone]    PA Initiation    Medication: ROSUVASTATIN 20MG TABLETS  Insurance Company: [employer] - Phone [phone] Fax [phone]  Pharmacy Filling the Rx: [facility] - [place], [place] - [address] [place] [address]  Filling Pharmacy Phone: [phone]  Filling Pharmacy Fax: [phone]  Start Date: 9/8/2021

## 2021-09-08 NOTE — TELEPHONE ENCOUNTER
Please advise- called [facility] pharmacy listed as patient's preferred pharmacy.      Pharmacist states that the Prevnar 13 pneumonia vaccine is not something they give to patients and that this is something given at their clinics.  They do not carry the pneumonia vaccine.     Did patient receive the pneumonia vaccine in clinic?  If so this will be going through patient's medical benefits since administration was in clinic. Please refer back to clinic staff for any requests for prior authorizations through medical benefits.      If patient is getting this at another pharmacy please provider pharmacy name and phone number.  Pharmacy will also need a specific prescription faxed with provider, directions, and quantity for them to process under pharmacy benefits.

## 2021-09-08 NOTE — TELEPHONE ENCOUNTER
Prior Authorization Retail Medication Request    Medication/Dose: INSULIN ASPART ('GENERIC' NOVOLOG)  ICD code (if different than what is on RX):    Previously Tried and Failed:    Rationale:      Insurance Name:  [employer]  Insurance ID:  [id number]      Pharmacy Information (if different than what is on RX)  Name:    Phone:

## 2021-09-09 ENCOUNTER — LAB REQUISITION (OUTPATIENT)
Dept: LAB | Facility: CLINIC | Age: 61
End: 2021-09-09
Payer: COMMERCIAL

## 2021-09-09 DIAGNOSIS — Z00.01 ENCOUNTER FOR GENERAL ADULT MEDICAL EXAMINATION WITH ABNORMAL FINDINGS: ICD-10-CM

## 2021-09-09 PROCEDURE — U0005 INFEC AGEN DETEC AMPLI PROBE: HCPCS | Performed by: NURSE PRACTITIONER

## 2021-09-09 NOTE — TELEPHONE ENCOUNTER
PRIOR AUTHORIZATION DENIED    Medication: INSULIN ASPART ('GENERIC' NOVOLOG)-DENIED    Denial Date: 9/9/2021    Denial Rational: PATIENT MUST TRY/ FAIL FORMULARY ALTERNATIVE - NOVOLOG FLEX PEN.        Appeal Information: N/A

## 2021-09-09 NOTE — TELEPHONE ENCOUNTER
PRIOR AUTHORIZATION DENIED    Medication: ROSUVASTATIN 20MG-DENIED    Denial Date: 9/9/2021    Denial Rational: INSURANCE WILL COVER 40MG TABS 1/2 DAILY.        Appeal Information: N/A

## 2021-09-11 LAB — SARS-COV-2 RNA RESP QL NAA+PROBE: NEGATIVE

## 2021-09-12 VITALS
DIASTOLIC BLOOD PRESSURE: 74 MMHG | OXYGEN SATURATION: 98 % | HEART RATE: 65 BPM | WEIGHT: 209 LBS | RESPIRATION RATE: 18 BRPM | BODY MASS INDEX: 29.15 KG/M2 | TEMPERATURE: 97.3 F | SYSTOLIC BLOOD PRESSURE: 139 MMHG

## 2021-09-13 ENCOUNTER — TRANSITIONAL CARE UNIT VISIT (OUTPATIENT)
Dept: GERIATRICS | Facility: CLINIC | Age: 61
End: 2021-09-13
Payer: COMMERCIAL

## 2021-09-13 ENCOUNTER — LAB REQUISITION (OUTPATIENT)
Dept: LAB | Facility: CLINIC | Age: 61
End: 2021-09-13
Payer: COMMERCIAL

## 2021-09-13 DIAGNOSIS — L97.422 DIABETIC ULCER OF LEFT MIDFOOT ASSOCIATED WITH TYPE 2 DIABETES MELLITUS, WITH FAT LAYER EXPOSED (H): Primary | ICD-10-CM

## 2021-09-13 DIAGNOSIS — N18.9 CHRONIC KIDNEY DISEASE, UNSPECIFIED: ICD-10-CM

## 2021-09-13 DIAGNOSIS — I10 ESSENTIAL (PRIMARY) HYPERTENSION: ICD-10-CM

## 2021-09-13 DIAGNOSIS — N18.31 STAGE 3A CHRONIC KIDNEY DISEASE (H): ICD-10-CM

## 2021-09-13 DIAGNOSIS — E11.9 DIABETES MELLITUS WITHOUT COMPLICATION (H): ICD-10-CM

## 2021-09-13 DIAGNOSIS — E11.621 DIABETIC ULCER OF LEFT MIDFOOT ASSOCIATED WITH TYPE 2 DIABETES MELLITUS, WITH FAT LAYER EXPOSED (H): Primary | ICD-10-CM

## 2021-09-13 DIAGNOSIS — M86.472 CHRONIC OSTEOMYELITIS OF LEFT FOOT WITH DRAINING SINUS (H): ICD-10-CM

## 2021-09-13 LAB
ANION GAP SERPL CALCULATED.3IONS-SCNC: 3 MMOL/L (ref 3–14)
BUN SERPL-MCNC: 25 MG/DL (ref 7–30)
CALCIUM SERPL-MCNC: 8.8 MG/DL (ref 8.5–10.1)
CHLORIDE BLD-SCNC: 106 MMOL/L (ref 94–109)
CO2 SERPL-SCNC: 29 MMOL/L (ref 20–32)
CREAT SERPL-MCNC: 1.44 MG/DL (ref 0.66–1.25)
GFR SERPL CREATININE-BSD FRML MDRD: 51 ML/MIN/1.73M2
GLUCOSE BLD-MCNC: 162 MG/DL (ref 70–99)
POTASSIUM BLD-SCNC: 4 MMOL/L (ref 3.4–5.3)
SODIUM SERPL-SCNC: 138 MMOL/L (ref 133–144)

## 2021-09-13 PROCEDURE — 36415 COLL VENOUS BLD VENIPUNCTURE: CPT | Mod: ORL | Performed by: NURSE PRACTITIONER

## 2021-09-13 PROCEDURE — P9604 ONE-WAY ALLOW PRORATED TRIP: HCPCS | Performed by: NURSE PRACTITIONER

## 2021-09-13 PROCEDURE — 82374 ASSAY BLOOD CARBON DIOXIDE: CPT | Performed by: NURSE PRACTITIONER

## 2021-09-13 PROCEDURE — 99308 SBSQ NF CARE LOW MDM 20: CPT | Performed by: NURSE PRACTITIONER

## 2021-09-13 NOTE — PROGRESS NOTES
[facility] GERIATRIC SERVICES    Chief Complaint   Patient presents with     RECHECK     HPI:  Nabil Cheung is a 61 year old  (1960), who is being seen today for an episodic care visit at: [facility] (TCU) [[id number]].     This is a 60 yo male who presents with L foot osteomyelitis. He follows with Dr Cline with podiatry/wound center. He initially admitted in 12/2020 for purulent LLE cellulitis ultimately with graft and wound vac placement.  This admission WBC 10.6. foot XR with findings compatible with osteomyelitis and wound culture 8/15/21 with Enterobacter cloacae and Staph lugdunesis, so underwent L 5th Rays amputation 8/17/2021. vanco/zosyn from ED;  ID recommends zosyn only. Wound cultures NGTD; ID following.  Recommend continue Zosyn, transition to oral antibiotics Augmentin 875 mg twice daily with Cipro 500 mg every 12 hours at discharge for 1 week, treat until 8/24/2021. He was given NWB on LLE, Podiatrist has not commented regarding restart of aspirin however given history of CABG, hemoglobin stable with no signs of bleeding will initiate on PTA  mg every other day. Per uncontrolled DM, A1c 10.1 and PTA empagliflozin and metformin on hold per JOSE ALFREDO, was started on novolog sliding scale TID with meals and lantus 22U at HS. Also per htn with brabycardia, metoprolol decreased to 75mg and increased amlodipine to 10mg daily. No other changes [facility]      Today's concern is:   CKD, hyperglycemia    Allergies, and PMH/PSH reviewed in [facility] today.  REVIEW OF SYSTEMS:  4 point ROS including Respiratory, CV, GI and , other than that noted in the HPI,  is negative    Objective:   /74   Pulse 65   Temp 97.3  F (36.3  C)   Resp 18   Wt 94.8 kg (209 lb)   SpO2 98%   BMI 29.15 kg/m    GENERAL APPEARANCE:  Alert, oriented, cooperative, denies pain  RESP:  respiratory effort and palpation of chest normal, lungs clear to auscultation , no respiratory  distress  CV:  Palpation and auscultation of heart done , regular rate and rhythm, no murmur, rub, or gallop, no edema  SKIN: L foot wrapped  PSYCH:  oriented X 3, affect and mood normal. SLUMS 28/30      Most Recent 3 CBC's:  Recent Labs   Lab Test 08/30/21  0929 08/24/21  0600 08/21/21  0750 08/19/21  1235   WBC 9.7 8.6  --  9.6   HGB 12.4* 13.1* 12.6* 12.5*   MCV 80 81  --  79    354  --  283     Most Recent 3 BMP's:  Recent Labs   Lab Test 09/13/21  0632 09/07/21  1025 08/30/21  0929    137 137   POTASSIUM 4.0 4.7 4.1   CHLORIDE 106 107 105   CO2 29 27 29   BUN 25 26 31*   CR 1.44* 1.47* 1.44*   ANIONGAP 3 3 3   ONEYDA 8.8 8.9 9.0   * 230* 158*         Assessment/Plan:    (E11.621,  L97.422) Diabetic ulcer of left midfoot associated with type 2 diabetes mellitus, with fat layer exposed (H)  (primary encounter diagnosis)  Given augmentin/cipro until 8/24, f/u with podiatry on 9/15. Now TTWB     DVT prophylaxis  ASA 81mg daily     (Z95.1) S/P CABG x 4  HTN  Continue lisinopril 10mg daily, amlodipine 10mg daily, per bradycardia will change metoprolol succinate to 50mg. -150s. HR <70. Continue statin and ASA     (E11.65) Uncontrolled type 2 diabetes mellitus with hyperglycemia (H)  Microalbuminuria  Last A1c 10.1, using intermittent fast, continue sliding scale to cover lunch/dinner/HS only. Continue lantus 22U at HS. Slight improvement in renal fx, metformin restarted (1000mg BID). -450s, patient does not want to increase lantus     Pain control  Continue tylenol PRN, denies pain     (N18.31) Stage 3a chronic kidney disease  Baseline 1.1-1.3. last Cr 1.44 with GFR 51 on 9/13 (improvement). PCP to further evaluate renal fx     (N40.1,  N13.8) BPH with obstruction/lower urinary tract symptoms  Continue alfuzosin 10mg daily and proscar 5mg daily     Diarrhea  Discontinued probiotic, resolved     Hypomagnesia  Increase mag sultfate to 400mg daily, last 2.0 on 9/7     Vit D  Continue D3  2000U daily    Orders:  NA    Electronically signed by: Naeem Heard NP

## 2021-09-14 ENCOUNTER — LAB REQUISITION (OUTPATIENT)
Dept: LAB | Facility: CLINIC | Age: 61
End: 2021-09-14
Payer: COMMERCIAL

## 2021-09-14 DIAGNOSIS — Z00.01 ENCOUNTER FOR GENERAL ADULT MEDICAL EXAMINATION WITH ABNORMAL FINDINGS: ICD-10-CM

## 2021-09-14 LAB
ANION GAP SERPL CALCULATED.3IONS-SCNC: 5 MMOL/L (ref 3–14)
BUN SERPL-MCNC: 32 MG/DL (ref 7–30)
CALCIUM SERPL-MCNC: 8.8 MG/DL (ref 8.5–10.1)
CHLORIDE BLD-SCNC: 103 MMOL/L (ref 94–109)
CO2 SERPL-SCNC: 26 MMOL/L (ref 20–32)
CREAT SERPL-MCNC: 1.41 MG/DL (ref 0.66–1.25)
GFR SERPL CREATININE-BSD FRML MDRD: 53 ML/MIN/1.73M2
GLUCOSE BLD-MCNC: 256 MG/DL (ref 70–99)
POTASSIUM BLD-SCNC: 4.1 MMOL/L (ref 3.4–5.3)
SODIUM SERPL-SCNC: 134 MMOL/L (ref 133–144)

## 2021-09-14 PROCEDURE — 80048 BASIC METABOLIC PNL TOTAL CA: CPT | Mod: ORL | Performed by: NURSE PRACTITIONER

## 2021-09-14 PROCEDURE — U0005 INFEC AGEN DETEC AMPLI PROBE: HCPCS | Performed by: NURSE PRACTITIONER

## 2021-09-14 PROCEDURE — P9603 ONE-WAY ALLOW PRORATED MILES: HCPCS | Mod: ORL | Performed by: NURSE PRACTITIONER

## 2021-09-14 PROCEDURE — 36415 COLL VENOUS BLD VENIPUNCTURE: CPT | Performed by: NURSE PRACTITIONER

## 2021-09-15 ENCOUNTER — TELEPHONE (OUTPATIENT)
Dept: PODIATRY | Facility: CLINIC | Age: 61
End: 2021-09-15

## 2021-09-15 ENCOUNTER — OFFICE VISIT (OUTPATIENT)
Dept: PODIATRY | Facility: CLINIC | Age: 61
End: 2021-09-15
Payer: COMMERCIAL

## 2021-09-15 VITALS
HEART RATE: 53 BPM | DIASTOLIC BLOOD PRESSURE: 75 MMHG | OXYGEN SATURATION: 96 % | SYSTOLIC BLOOD PRESSURE: 136 MMHG | TEMPERATURE: 97.1 F | WEIGHT: 209.6 LBS | RESPIRATION RATE: 14 BRPM | BODY MASS INDEX: 29.23 KG/M2

## 2021-09-15 VITALS
BODY MASS INDEX: 29.26 KG/M2 | WEIGHT: 209 LBS | HEIGHT: 71 IN | SYSTOLIC BLOOD PRESSURE: 134 MMHG | DIASTOLIC BLOOD PRESSURE: 66 MMHG

## 2021-09-15 DIAGNOSIS — Z89.422 HISTORY OF PARTIAL RAY AMPUTATION OF FIFTH TOE OF LEFT FOOT (H): ICD-10-CM

## 2021-09-15 DIAGNOSIS — E11.42 DIABETIC POLYNEUROPATHY ASSOCIATED WITH TYPE 2 DIABETES MELLITUS (H): Primary | ICD-10-CM

## 2021-09-15 DIAGNOSIS — Z98.890 POST-OPERATIVE STATE: ICD-10-CM

## 2021-09-15 LAB — SARS-COV-2 RNA RESP QL NAA+PROBE: NEGATIVE

## 2021-09-15 PROCEDURE — 99024 POSTOP FOLLOW-UP VISIT: CPT | Performed by: PODIATRIST

## 2021-09-15 ASSESSMENT — MIFFLIN-ST. JEOR: SCORE: 1765.15

## 2021-09-15 NOTE — PATIENT INSTRUCTIONS
Thank you for choosing [facility] Podiatry / Foot & Ankle Surgery!    DR. TINSLEY'S CLINIC:  [facility] SCHEDULE SURGERY: [phone]   [address] BILLING QUESTIONS: [phone]   [place] [address] APPOINTMENTS: [phone]   PH: [phone] CONSUMER PRICE LINE:[phone]   FAX: [phone]      Follow up: 1 month    SCAR CARE PROTOCOL  Scarring is an unfortunate but unavoidable part of surgery.  Every person scars differently and there is no way to predict how an individual's final scar will look.  Now that the sutures have been removed one can begin taking some steps to help minimize the appearance of scarring.    WOUND HEALING  As soon as the skin is incised during surgery, the body is taking steps to prepare for healing. After about 3 days, the body has sent cells to the incision to begin the healing process. These cells, called fibroblasts, make collagen, a protein in the skin that helps provide strength. Once the skin has been sufficiently strengthened, the sutures are removed. Over the next year, the body synthesizes new collagen and breaks down old collagen to help achieve a strong scar that allows the foot/ankle to function appropriately. This is where patients can help the appearance of the scar, as it will change over the next year.    STEPS  1. Do not expose the scar to the sun for 1 year.  2. Any sun exposure may permanently darken the appearance of the scar.  3. Wear shoes/socks or cover your scar with zinc oxide.  4. Massage the scar 2-3 times per day.  -Massage the entire length of the scar with gentle to moderate pressure.  -Pressure can help flatten the scar.  5. Lotion/Vitamin E helps keep the tissue soft.  6. Try over the counter scar products such as Mederma or Scar Zone.  -These are available at any pharmacy without a prescription.  -Patients must use these for extended periods of time (6-12 months) to see a difference.

## 2021-09-15 NOTE — PROGRESS NOTES
"Podiatry / Foot and Ankle Surgery Progress Note    September 15, 2021    Subject: Patient was seen for follow up on 1 month status post partial left fifth ray amputation due to osteomyelitis.  Patient denies fever, nausea, vomiting.  No pain but has baseline neuropathy.  Currently gets out of the [facility] tomorrow.    Objective:  Vitals: /66   Ht 1.803 m (5' 11\")   Wt 94.8 kg (209 lb)   BMI 29.15 kg/m      A1C: 9.1 (1/2021)    General:  Patient is alert and orientated.  NAD.    Vascular:  DP and PT pulses are palpable.  No edema or varicosities noted.  CFT's < 3secs.  Skin temp is normal.    Neuro:  Light and gross touch sensation diminished to feet.    Derm: Dressing is clean dry and intact.  Sutures are intact.  No redness, dehiscence or signs of infection noted.    Musculoskeletal: Left fifth toe is now amputated.    Assessment:    Diabetic polyneuropathy associated with type 2 diabetes mellitus (H)  Post-operative state  History of partial ray amputation of fifth toe of left foot (H)    Medical Decision Making/Plan: At this time the sutures were removed.  Patient can get the foot wet.  He can lotion the foot.  He can wear a regular sock.  We will have him flatfoot weight-bear in the boot until he gets his diabetic shoes and inserts.  We did put an order in this and fax it over [facility] in [place].  We will have him follow-up in 1 month for reassessment.  He is to monitor his feet closely and call with any further questions or concerns.      Patient Risk Factor:  Patient is a medium risk factor for infection.     Mirian Cline DPM, Podiatry/Foot and Ankle Surgery    "

## 2021-09-15 NOTE — TELEPHONE ENCOUNTER
As requested by patient at appointment today, diabetic shoes and inserts order faxed to [facility] at [phone].    Brittny NICHOLSON CMA

## 2021-09-15 NOTE — LETTER
"    9/15/2021         RE: Nabil Cheung  [address]  [facility] [address]        Dear Colleague,    Thank you for referring your patient, Nabil Cheung, to the [facility] PODIATRY. Please see a copy of my visit note below.    Podiatry / Foot and Ankle Surgery Progress Note    September 15, 2021    Subject: Patient was seen for follow up on 1 month status post partial left fifth ray amputation due to osteomyelitis.  Patient denies fever, nausea, vomiting.  No pain but has baseline neuropathy.  Currently gets out of the [facility] tomorrow.    Objective:  Vitals: /66   Ht 1.803 m (5' 11\")   Wt 94.8 kg (209 lb)   BMI 29.15 kg/m      A1C: 9.1 (1/2021)    General:  Patient is alert and orientated.  NAD.    Vascular:  DP and PT pulses are palpable.  No edema or varicosities noted.  CFT's < 3secs.  Skin temp is normal.    Neuro:  Light and gross touch sensation diminished to feet.    Derm: Dressing is clean dry and intact.  Sutures are intact.  No redness, dehiscence or signs of infection noted.    Musculoskeletal: Left fifth toe is now amputated.    Assessment:    Diabetic polyneuropathy associated with type 2 diabetes mellitus (H)  Post-operative state  History of partial ray amputation of fifth toe of left foot (H)    Medical Decision Making/Plan: At this time the sutures were removed.  Patient can get the foot wet.  He can lotion the foot.  He can wear a regular sock.  We will have him flatfoot weight-bear in the boot until he gets his diabetic shoes and inserts.  We did put an order in this and fax it over [facility] in [place].  We will have him follow-up in 1 month for reassessment.  He is to monitor his feet closely and call with any further questions or concerns.      Patient Risk Factor:  Patient is a medium risk factor for infection.     Mirian Cline DPM, Podiatry/Foot and Ankle Surgery        Again, thank you for allowing me to participate in the care of your " patient.        Sincerely,        Mirian Cline DPM, Podiatry/Foot and Ankle Surgery

## 2021-09-16 ENCOUNTER — DISCHARGE SUMMARY NURSING HOME (OUTPATIENT)
Dept: GERIATRICS | Facility: CLINIC | Age: 61
End: 2021-09-16
Payer: COMMERCIAL

## 2021-09-16 DIAGNOSIS — M86.9 OSTEOMYELITIS, UNSPECIFIED SITE, UNSPECIFIED TYPE (H): ICD-10-CM

## 2021-09-16 DIAGNOSIS — E11.65 UNCONTROLLED TYPE 2 DIABETES MELLITUS WITH HYPERGLYCEMIA (H): ICD-10-CM

## 2021-09-16 DIAGNOSIS — N13.8 BPH WITH OBSTRUCTION/LOWER URINARY TRACT SYMPTOMS: ICD-10-CM

## 2021-09-16 DIAGNOSIS — E11.9 DIABETES MELLITUS WITHOUT COMPLICATION (H): ICD-10-CM

## 2021-09-16 DIAGNOSIS — N18.31 STAGE 3A CHRONIC KIDNEY DISEASE (H): Primary | ICD-10-CM

## 2021-09-16 DIAGNOSIS — N40.1 BPH WITH OBSTRUCTION/LOWER URINARY TRACT SYMPTOMS: ICD-10-CM

## 2021-09-16 DIAGNOSIS — I10 ESSENTIAL (PRIMARY) HYPERTENSION: ICD-10-CM

## 2021-09-16 DIAGNOSIS — M86.472 CHRONIC OSTEOMYELITIS OF LEFT FOOT WITH DRAINING SINUS (H): ICD-10-CM

## 2021-09-16 PROCEDURE — 99316 NF DSCHRG MGMT 30 MIN+: CPT | Performed by: NURSE PRACTITIONER

## 2021-09-16 NOTE — PROGRESS NOTES
[facility] GERIATRIC SERVICES DISCHARGE SUMMARY  PATIENT'S NAME: Nabil Cheung  YOB: 1960  MEDICAL RECORD NUMBER:  [id number]  Place of Service where encounter took place[facility] [[id number]]    PRIMARY CARE PROVIDER AND CLINIC RESPONSIBLE AFTER TRANSFER:   [facility] [address]  [facility] [address]    Non-[facility] Provider     Transferring providers: Naeem Heard NP, Dr. Chirag MD  Recent Hospitalization/ED:  [facility] stay 8/15/21 to 8/21/21.  Date of SNF Admission: 8/21/21  Date of SNF (anticipated) Discharge: September 17, 2021  Discharged to: previous independent home  Cognitive Scores: SLUMS: 28/30  Physical Function: independent with CAM boot  DME: NA    CODE STATUS/ADVANCE DIRECTIVES DISCUSSION:  Prior   ALLERGIES: Atorvastatin, Dust mites, and Fluorescein    This is a 62 yo male who presented with L foot osteomyelitis. He follows with Dr Cline with podiatry/wound center. He initially admitted in 12/2020 for purulent LLE cellulitis ultimately with graft and wound vac placement.  This admission WBC 10.6. foot XR with findings compatible with osteomyelitis and wound culture 8/15/21 with Enterobacter cloacae and Staph lugdunesis, so underwent L 5th Rays amputation 8/17/2021. vanco/zosyn from ED;  ID recommends zosyn only. Wound cultures NGTD; ID following.  Recommend continue Zosyn, transition to oral antibiotics Augmentin 875 mg twice daily with Cipro 500 mg every 12 hours at discharge for 1 week, treat until 8/24/2021. He was given NWB on LLE, Podiatrist has not commented regarding restart of aspirin however given history of CABG, hemoglobin stable with no signs of bleeding will initiate on PTA  mg every other day. Per uncontrolled DM, A1c 10.1 and PTA empagliflozin and metformin on hold per JOSE ALFREDO, was started on novolog sliding scale TID with meals and lantus 22U at HS. Also per htn with brabycardia, metoprolol decreased to 75mg  and increased amlodipine to 10mg daily. No other changes [facility]      Summary of nursing facility stay:     (E11.621,  L97.422) Diabetic ulcer of left midfoot associated with type 2 diabetes mellitus, with fat layer exposed (H)  (primary encounter diagnosis)  Given augmentin/cipro until 8/24 (completed). Now given CAM boot, WBAT. F/u with podiatry on 10/15        (Z95.1) S/P CABG x 4  HTN  Continue lisinopril 10mg daily, amlodipine 10mg daily, per bradycardia  changed metoprolol succinate to 50mg. -150s. HR <70. Continue statin and ASA     (E11.65) Uncontrolled type 2 diabetes mellitus with hyperglycemia (H)  Microalbuminuria  Last A1c 10.1, using intermittent fast, continue sliding scale to cover lunch/dinner/HS only. Continue lantus 22U at HS. Slight improvement in renal fx, metformin restarted (1000mg BID). -450s, patient does not want to increase lantus     Pain control  Continue tylenol PRN, denies pain     (N18.31) Stage 3a chronic kidney disease  Baseline 1.1-1.3. last Cr 1.44 with GFR 51 on 9/13 (improvement). PCP to further evaluate renal fx     (N40.1,  N13.8) BPH with obstruction/lower urinary tract symptoms  Continue alfuzosin 10mg daily and proscar 5mg daily     Diarrhea  Discontinued probiotic, resolved     Hypomagnesia  Increase mag sultfate to 400mg daily, last 2.0 on 9/7     Vit D  Continue D3 2000U daily    Discharge Medications:    Current Outpatient Medications   Medication Sig Dispense Refill     acetaminophen (TYLENOL) 325 MG tablet Take 650 mg by mouth every 6 hours as needed for mild pain       albuterol (PROAIR HFA/PROVENTIL HFA/VENTOLIN HFA) 108 (90 Base) MCG/ACT inhaler Inhale 1-2 puffs into the lungs       alfuzosin ER (UROXATRAL) 10 MG 24 hr tablet Take 10 mg by mouth daily        alpha-lipoic acid 600 MG capsule Take 600 mg by mouth daily        amLODIPine (NORVASC) 10 MG tablet Take 1 tablet (10 mg) by mouth daily       aspirin 81 MG EC  tablet Take 81 mg by mouth every other day       finasteride (PROSCAR) 5 MG tablet Take 5 mg by mouth daily       fish oil-omega-3 fatty acids 1000 MG capsule Take 1 g by mouth 2 times daily       insulin aspart (NOVOLOG PEN) 100 UNIT/ML pen Inject 1-7 Units Subcutaneous 3 times daily (before meals) Correction Scale - MEDIUM INSULIN RESISTANCE DOSING     Do Not give Correction Insulin if Pre-Meal BG less than 140.   For Pre-Meal  - 189 give 1 unit.   For Pre-Meal  - 239 give 2 units.   For Pre-Meal  - 289 give 3 units.   For Pre-Meal  - 339 give 4 units.   For Pre-Meal - 399 give 5 units.   For Pre-Meal -449 give 6 units  For Pre-Meal BG greater than or equal to 450 give 7 units.   To be given with prandial insulin, and based on pre-meal blood glucose.    Notify provider if glucose greater than or equal to 350 mg/dL after administration of correction dose. If given at mealtime, administer within 30 minutes of start of meal (Patient taking differently: Inject 1-7 Units Subcutaneous 2 times daily (with meals) Correction Scale - MEDIUM INSULIN RESISTANCE DOSING     Only use at lunch and dinner time, patient fasting at AM  Do Not give Correction Insulin if Pre-Meal BG less than 140.   For Pre-Meal  - 189 give 1 unit.   For Pre-Meal  - 239 give 2 units.   For Pre-Meal  - 289 give 3 units.   For Pre-Meal  - 339 give 4 units.   For Pre-Meal - 399 give 5 units.   For Pre-Meal -449 give 6 units  For Pre-Meal BG greater than or equal to 450 give 7 units.   To be given with prandial insulin, and based on pre-meal blood glucose.    Notify provider if glucose greater than or equal to 350 mg/dL after administration of correction dose. If given at mealtime, administer within 30 minutes of start of meal)       insulin aspart (NOVOLOG PEN) 100 UNIT/ML pen Inject 1-5 Units Subcutaneous At Bedtime MEDIUM INSULIN RESISTANCE DOSING    Do Not give Bedtime Correction  Insulin if BG less than  200.   For  - 249 give 1 units.   For  - 299 give 2 units.   For  - 349 give 3 units.   For  -399 give 4 units.   For BG greater than or equal to 400 give 5 units.  Notify provider if glucose greater than or equal to 350 mg/dL after administration of correction dose. If given at mealtime, administer within 30 minutes of start of meal       insulin aspart (NOVOLOG PEN) 100 UNIT/ML pen DOSE:  1 units per 15 grams of carbohydrate before breakfast, lunch, dinner.  Only chart total amount of units given.  Do not give if pre-prandial glucose is less than 60 mg/dL. If given at mealtime, administer within 30 minutes of start of mealDOSE:  1 units per 15 grams of carbohydrate.  Only chart total amount of units given.  Do not give if pre-prandial glucose is less than 60 mg/dL. If given at mealtime, administer within 30 minutes of start of meal       insulin glargine (LANTUS PEN) 100 UNIT/ML pen Inject 22 Units Subcutaneous At Bedtime       lisinopril (ZESTRIL) 10 MG tablet Take 10 mg by mouth daily       magnesium oxide 400 MG CAPS Take by mouth daily       metFORMIN (GLUCOPHAGE) 1000 MG tablet Take 1,000 mg by mouth 2 times daily (with meals)       metoprolol succinate ER (TOPROL-XL) 25 MG 24 hr tablet Take 3 tablets (75 mg) by mouth every evening (Patient taking differently: Take 50 mg by mouth every evening )       nitroGLYcerin (NITROSTAT) 0.4 MG sublingual tablet Place 0.4 mg under the tongue every 5 minutes as needed for chest pain For chest pain place 1 tablet under the tongue every 5 minutes for 3 doses. If symptoms persist 5 minutes after 1st dose call 911.       rosuvastatin (CRESTOR) 40 MG tablet Take 20 mg by mouth daily Evening        vitamin C (ASCORBIC ACID) 1000 MG TABS Take 1,000 mg by mouth daily       vitamin D3 (CHOLECALCIFEROL) 2000 units (50 mcg) tablet Take 1 tablet by mouth daily       Controlled medications:   not applicable/none     Past Medical  History: No past medical history on file.  Physical Exam:   Vitals: /75   Pulse 53   Temp 97.1  F (36.2  C)   Resp 14   Wt 95.1 kg (209 lb 9.6 oz)   SpO2 96%   BMI 29.23 kg/m    BMI= Body mass index is 29.23 kg/m .  GENERAL APPEARANCE:  Alert, oriented, cooperative, denies pain  RESP:  respiratory effort and palpation of chest normal, lungs clear to auscultation , no respiratory distress  CV:  Palpation and auscultation of heart done , regular rate and rhythm, no murmur, rub, or gallop, no edema  MS: wearing CAM boot on L leg  PSYCH:  oriented X 3, affect and mood normal. [facility] 28/30    SNF labs:   Most Recent 3 CBC's:Recent Labs   Lab Test 08/30/21  0929 08/24/21  0600 08/21/21  0750 08/19/21  1235 08/19/21  1235   WBC 9.7 8.6  --   --  9.6   HGB 12.4* 13.1* 12.6*   < > 12.5*   MCV 80 81  --   --  79    354  --   --  283    < > = values in this interval not displayed.     Most Recent 3 BMP's:Recent Labs   Lab Test 09/14/21  0729 09/13/21  0632 09/07/21  1025    138 137   POTASSIUM 4.1 4.0 4.7   CHLORIDE 103 106 107   CO2 26 29 27   BUN 32* 25 26   CR 1.41* 1.44* 1.47*   ANIONGAP 5 3 3   ONEYDA 8.8 8.8 8.9   * 162* 230*       DISCHARGE PLAN:    Follow up labs: No labs orders/due    Medical Follow Up:      Follow up with primary care provider in 1-2 weeks    Current [place] scheduled appointments:  Next 5 appointments (look out 90 days)    Oct 15, 2021  1:00 PM  Return Visit with Mirian Cline DPM, Podiatry/Foot and Ankle Surgery  [facility] ([facility] [place] ) [address] [address] [address]  [phone]           Discharge Services: No therapy or home care recommended.     Discharge Instructions Verbalized to Patient at Discharge:     None    TOTAL DISCHARGE TIME:   Greater than 30 minutes  Electronically signed by:  Naeem Heard NP

## 2021-09-30 NOTE — ADDENDUM NOTE
Encounter addended by: Frida Juárez RN on: 9/30/2021 7:42 AM   Actions taken: Reconcile Outside Information medication data discarded

## 2021-10-09 ENCOUNTER — HEALTH MAINTENANCE LETTER (OUTPATIENT)
Age: 61
End: 2021-10-09

## 2021-10-14 NOTE — TELEPHONE ENCOUNTER
[facility] faxed an office note request for 9/15 office notes from Dr Cline. Faxed note back to them at [phone].

## 2021-10-15 ENCOUNTER — OFFICE VISIT (OUTPATIENT)
Dept: PODIATRY | Facility: CLINIC | Age: 61
End: 2021-10-15
Payer: COMMERCIAL

## 2021-10-15 VITALS — OXYGEN SATURATION: 98 % | SYSTOLIC BLOOD PRESSURE: 126 MMHG | HEART RATE: 62 BPM | DIASTOLIC BLOOD PRESSURE: 71 MMHG

## 2021-10-15 DIAGNOSIS — E11.42 DIABETIC POLYNEUROPATHY ASSOCIATED WITH TYPE 2 DIABETES MELLITUS (H): Primary | ICD-10-CM

## 2021-10-15 DIAGNOSIS — Z98.890 POST-OPERATIVE STATE: ICD-10-CM

## 2021-10-15 DIAGNOSIS — Z89.422 HISTORY OF PARTIAL RAY AMPUTATION OF FIFTH TOE OF LEFT FOOT (H): ICD-10-CM

## 2021-10-15 PROCEDURE — 99024 POSTOP FOLLOW-UP VISIT: CPT | Performed by: PODIATRIST

## 2021-10-15 NOTE — LETTER
10/15/2021         RE: Nabil Cheung  [address]  [facility] [address]        Dear Colleague,    Thank you for referring your patient, Nabil Cheung, to the [facility] PODIATRY. Please see a copy of my visit note below.    Podiatry / Foot and Ankle Surgery Progress Note    October 15, 2021    Subject: Patient was seen for follow up on 8 weeks partial left 5th ray amputation...  Notes no drainage.  Has been motioning daily.  Denies fever, nausea, vomiting.  Has not gotten his diabetic shoes or inserts yet from [facility].     Objective:  Vitals:/71   Pulse 62   SpO2 98%     A1C: 9.1 (1/2021)    General:  Patient is alert and orientated.  NAD.     Vascular:  DP and PT pulses are palpable.  No edema or varicosities noted.  CFT's < 3secs.  Skin temp is normal.     Neuro:  Light and gross touch sensation diminished to feet.     Derm:  Incision to the lateral aspect of the left foot is well-healed.  No open lesions or signs of infection noted.     Musculoskeletal: Left fifth toe is now amputated.     Assessment:    Diabetic polyneuropathy associated with type 2 diabetes mellitus (H)  Post-operative state  History of partial ray amputation of fifth toe of left foot (H)     Medical Decision Making/Plan: At this time, continue to lotion the foot daily.  He will continue weightbearing in postop shoe until he gets his new shoes and inserts.  We will refax the office note from September 15, 2021 today to make sure that they get back to help with the processing of his shoes and inserts.   We will have him follow-up in 2 months for reassessment.  He is to monitor his feet closely and call with any further questions or concerns.        Patient Risk Factor:  Patient is a medium risk factor for infection.      Mirian Cline DPM, Podiatry/Foot and Ankle Surgery        Again, thank you for allowing me to participate in the care of your patient.        Sincerely,        Mirian Cline DPM, Podiatry/Foot  and Ankle Surgery

## 2021-10-15 NOTE — PATIENT INSTRUCTIONS
Thank you for choosing [facility] Podiatry / Foot & Ankle Surgery!    DR. TINSLEY'S CLINIC:  [place] [facility] [place] SCHEDULE SURGERY: [phone] [address] BILLING QUESTIONS: [phone]   [place] [address] APPOINTMENTS: [phone]   PH: [phone] CONSUMER PRICE LINE:[phone]   FAX: [phone]      Follow up: 2 months

## 2021-10-15 NOTE — PROGRESS NOTES
Podiatry / Foot and Ankle Surgery Progress Note    October 15, 2021    Subject: Patient was seen for follow up on 8 weeks partial left 5th ray amputation...  Notes no drainage.  Has been motioning daily.  Denies fever, nausea, vomiting.  Has not gotten his diabetic shoes or inserts yet from [facility].     Objective:  Vitals:/71   Pulse 62   SpO2 98%     A1C: 9.1 (1/2021)    General:  Patient is alert and orientated.  NAD.     Vascular:  DP and PT pulses are palpable.  No edema or varicosities noted.  CFT's < 3secs.  Skin temp is normal.     Neuro:  Light and gross touch sensation diminished to feet.     Derm:  Incision to the lateral aspect of the left foot is well-healed.  No open lesions or signs of infection noted.     Musculoskeletal: Left fifth toe is now amputated.     Assessment:    Diabetic polyneuropathy associated with type 2 diabetes mellitus (H)  Post-operative state  History of partial ray amputation of fifth toe of left foot (H)     Medical Decision Making/Plan: At this time, continue to lotion the foot daily.  He will continue weightbearing in postop shoe until he gets his new shoes and inserts.  We will refax the office note from September 15, 2021 today to make sure that they get back to help with the processing of his shoes and inserts.   We will have him follow-up in 2 months for reassessment.  He is to monitor his feet closely and call with any further questions or concerns.        Patient Risk Factor:  Patient is a medium risk factor for infection.      Mirian Cline DPM, Podiatry/Foot and Ankle Surgery

## 2021-12-04 ENCOUNTER — HEALTH MAINTENANCE LETTER (OUTPATIENT)
Age: 61
End: 2021-12-04

## 2021-12-10 ENCOUNTER — OFFICE VISIT (OUTPATIENT)
Dept: PODIATRY | Facility: CLINIC | Age: 61
End: 2021-12-10
Payer: COMMERCIAL

## 2021-12-10 ENCOUNTER — ANCILLARY PROCEDURE (OUTPATIENT)
Dept: GENERAL RADIOLOGY | Facility: CLINIC | Age: 61
End: 2021-12-10
Attending: PODIATRIST
Payer: COMMERCIAL

## 2021-12-10 VITALS
HEIGHT: 71 IN | SYSTOLIC BLOOD PRESSURE: 156 MMHG | BODY MASS INDEX: 29.82 KG/M2 | WEIGHT: 213 LBS | DIASTOLIC BLOOD PRESSURE: 79 MMHG

## 2021-12-10 DIAGNOSIS — E11.42 DIABETIC POLYNEUROPATHY ASSOCIATED WITH TYPE 2 DIABETES MELLITUS (H): Primary | ICD-10-CM

## 2021-12-10 DIAGNOSIS — M79.672 LEFT FOOT PAIN: ICD-10-CM

## 2021-12-10 DIAGNOSIS — E11.42 DIABETIC POLYNEUROPATHY ASSOCIATED WITH TYPE 2 DIABETES MELLITUS (H): ICD-10-CM

## 2021-12-10 DIAGNOSIS — Z89.422 H/O AMPUTATION OF LESSER TOE, LEFT (H): ICD-10-CM

## 2021-12-10 PROCEDURE — 99213 OFFICE O/P EST LOW 20 MIN: CPT | Performed by: PODIATRIST

## 2021-12-10 PROCEDURE — 73630 X-RAY EXAM OF FOOT: CPT | Mod: LT | Performed by: RADIOLOGY

## 2021-12-10 ASSESSMENT — MIFFLIN-ST. JEOR: SCORE: 1783.29

## 2021-12-10 NOTE — LETTER
"    12/10/2021         RE: Nabil Cheung  [address]  [facility] [address]        Dear Colleague,    Thank you for referring your patient, Nabil Cheung, to the [facility] PODIATRY. Please see a copy of my visit note below.    Podiatry / Foot and Ankle Surgery Progress Note    December 10, 2021    Subject: Patient was seen for 4 months partial left 5th ray amputation.  Has been lotioning daily. has been walking more. Notes no new wounds. Minimal pain under left 4th metatarsal. No injujry    Objective:  Vitals: BP (!) 156/79   Ht 1.803 m (5' 11\")   Wt 96.6 kg (213 lb)   BMI 29.71 kg/m    BMI= Body mass index is 29.71 kg/m .    A1C: 9.1 (1/2021)     General:  Patient is alert and orientated.  NAD.     Vascular:  DP and PT pulses are palpable.  No edema or varicosities noted.  CFT's < 3secs.  Skin temp is normal.     Neuro:  Light and gross touch sensation diminished to feet.     Derm:  Incision to the lateral aspect of the left foot is well-healed.  No open lesions or signs of infection noted.     Musculoskeletal: Left fifth toe is now amputated.    Radiographs:  Left foot xray -no fractures are noted.  Degenerative changes throughout the midfoot.     Assessment:    Diabetic polyneuropathy associated with type 2 diabetes mellitus (H)  Post-operative state  History of partial ray amputation of fifth toe of left foot (H)     Medical Decision Making/Plan: At this time, continue to lotion the foot daily.   Continue progressing his weightbearing in his diabetic shoes and inserts. We will get an x-ray of his left foot just to check his fourth metatarsal since noted to be healing. Discussed that sometimes not that this is getting more pressure one to make sure there is no fractures starting. If we notice anything on x-ray we will call him. We will have him follow-up in 4-5 months for reassessment.  He is to monitor his feet closely and call with any further questions or concerns.        Patient Risk " Factor:  Patient is a medium risk factor for infection.      Mirian Cline DPM, Podiatry/Foot and Ankle Surgery        Again, thank you for allowing me to participate in the care of your patient.        Sincerely,        Mirian Cline DPM, Podiatry/Foot and Ankle Surgery

## 2021-12-10 NOTE — PATIENT INSTRUCTIONS
Thank you for choosing [facility] Podiatry / Foot & Ankle Surgery!    DR. TINSLEY'S CLINIC:  [facility]   [address]   [place] [address]   [phone]  [phone]      SCHEDULE SURGERY: [phone]   BILLING QUESTIONS: [phone]   APPOINTMENTS: [phone]   RADIOLOGY: [phone]   CONSUMER PRICE LINE: [phone]      Follow up: 4-5 months      Flu vaccines are now available at all [facility] clinics and retail pharmacies across the [facility]. Appointments are required for clinic locations. To schedule an appointment online, please log into [facility] or create an account if you are a new user. You can also call [phone], or simply walk in at one of the [facility] retail pharmacy locations.

## 2021-12-10 NOTE — PROGRESS NOTES
"Podiatry / Foot and Ankle Surgery Progress Note    December 10, 2021    Subject: Patient was seen for 4 months partial left 5th ray amputation.  Has been lotioning daily. has been walking more. Notes no new wounds. Minimal pain under left 4th metatarsal. No injujry    Objective:  Vitals: BP (!) 156/79   Ht 1.803 m (5' 11\")   Wt 96.6 kg (213 lb)   BMI 29.71 kg/m    BMI= Body mass index is 29.71 kg/m .    A1C: 9.1 (1/2021)     General:  Patient is alert and orientated.  NAD.     Vascular:  DP and PT pulses are palpable.  No edema or varicosities noted.  CFT's < 3secs.  Skin temp is normal.     Neuro:  Light and gross touch sensation diminished to feet.     Derm:  Incision to the lateral aspect of the left foot is well-healed.  No open lesions or signs of infection noted.     Musculoskeletal: Left fifth toe is now amputated.    Radiographs:  Left foot xray -no fractures are noted.  Degenerative changes throughout the midfoot.     Assessment:    Diabetic polyneuropathy associated with type 2 diabetes mellitus (H)  Post-operative state  History of partial ray amputation of fifth toe of left foot (H)     Medical Decision Making/Plan: At this time, continue to lotion the foot daily.   Continue progressing his weightbearing in his diabetic shoes and inserts. We will get an x-ray of his left foot just to check his fourth metatarsal since noted to be healing. Discussed that sometimes not that this is getting more pressure one to make sure there is no fractures starting. If we notice anything on x-ray we will call him. We will have him follow-up in 4-5 months for reassessment.  He is to monitor his feet closely and call with any further questions or concerns.        Patient Risk Factor:  Patient is a medium risk factor for infection.      Mirian Cline DPM, Podiatry/Foot and Ankle Surgery    "

## 2022-01-29 ENCOUNTER — HEALTH MAINTENANCE LETTER (OUTPATIENT)
Age: 62
End: 2022-01-29

## 2022-03-26 ENCOUNTER — HEALTH MAINTENANCE LETTER (OUTPATIENT)
Age: 62
End: 2022-03-26

## 2022-07-16 ENCOUNTER — HEALTH MAINTENANCE LETTER (OUTPATIENT)
Age: 62
End: 2022-07-16

## 2022-09-17 ENCOUNTER — HEALTH MAINTENANCE LETTER (OUTPATIENT)
Age: 62
End: 2022-09-17

## 2022-11-14 ENCOUNTER — NURSE TRIAGE (OUTPATIENT)
Dept: NURSING | Facility: CLINIC | Age: 62
End: 2022-11-14

## 2022-11-14 NOTE — TELEPHONE ENCOUNTER
I have an appointment available at 2:45pm tomorrow afternoon otherwise Wednesday 11/16 is fine.     PLease let patient know.     Mirian barrientos DPM

## 2022-11-14 NOTE — TELEPHONE ENCOUNTER
Phone call to patient and offered an appointment at 2:45 11/15/22. Appointment was scheduled and He verbalized understanding.     HUBERT Lowe RN

## 2022-11-14 NOTE — TELEPHONE ENCOUNTER
Nurse Triage SBAR    Situation: Pt calling with the start of a new foot ulcer on his left foot     Background: Hx of diabetic foot ulcer that required toe amputation. Had surgery with Dr. Cline-last office visit was 12/10/2021 with a recommendation to follow up in 4-5 months    Assessment: Noticed yesterday that a new blister had formed on the medial side of his left foot on his big toe. No pain but has neuropathy. Blister looks to be filled with fluid and is red/purple in color. No drainage. Slight amount of redness noted around the blister. Got an appointment scheduled with Dr. Cline on Wednesday 11/16/2022 but wondering if he should be seen sooner    Protocol Recommended Disposition:   See PCP Within 24 Hours    Recommendation: Per protocol, pt to be seen within the next 24 hours. PCP is through [facility] and he is wishing to stay within  for this issue. Advised that a message would be sent to provider to see if Wednesday appointment would be reasonable and he would also like to know if he should work on getting another referral from PCP to see podiatry       Reason for Disposition    [1] Ulcer, wound, blister, sore, or red area AND [2] new or increasing    Additional Information    Negative: Sounds like a life-threatening emergency to the triager    Negative: Caused by an animal bite    Negative: Caused by a human bite    Negative: Foreign body in skin (e.g., splinter, sliver)    Negative: Injury is a puncture wound    Negative: Followed a foot or ankle injury    Negative: SEVERE pain    Negative: Foot is cool or blue in comparison to other side    Negative: [1] Looks infected (spreading redness, red streak, pus) AND [2] fever    Negative: Patient sounds very sick or weak to the triager    Negative: Fever > 100.4 F (38.0 C)    Negative: [1] Drainage from foot AND [2] new or increased    Negative: [1] Foul-smelling odor from foot AND [2] new or increased    Negative: [1] Spreading redness or red streak AND  [2] area > 2 in. (5 cm)    Negative: [1] Purple or black skin on foot or toe AND [2] new or increased    Negative: Looks like a boil or deep ulcer    Negative: Blood glucose > 400 mg/dl (22 mmol/l)    Protocols used: DIABETES - FOOT PROBLEMS AND [id number]      Wilma Amaya, RN [facility] November 14, 2022 1:37 PM

## 2022-11-15 ENCOUNTER — OFFICE VISIT (OUTPATIENT)
Dept: PODIATRY | Facility: CLINIC | Age: 62
End: 2022-11-15
Payer: COMMERCIAL

## 2022-11-15 VITALS
DIASTOLIC BLOOD PRESSURE: 74 MMHG | BODY MASS INDEX: 29.82 KG/M2 | WEIGHT: 213 LBS | HEIGHT: 71 IN | SYSTOLIC BLOOD PRESSURE: 142 MMHG

## 2022-11-15 DIAGNOSIS — L97.522 DIABETIC ULCER OF TOE OF LEFT FOOT ASSOCIATED WITH DIABETES MELLITUS DUE TO UNDERLYING CONDITION, WITH FAT LAYER EXPOSED (H): ICD-10-CM

## 2022-11-15 DIAGNOSIS — E11.42 DIABETIC POLYNEUROPATHY ASSOCIATED WITH TYPE 2 DIABETES MELLITUS (H): Primary | ICD-10-CM

## 2022-11-15 DIAGNOSIS — Z89.422 HISTORY OF AMPUTATION OF LESSER TOE OF LEFT FOOT (H): ICD-10-CM

## 2022-11-15 DIAGNOSIS — E08.621 DIABETIC ULCER OF TOE OF LEFT FOOT ASSOCIATED WITH DIABETES MELLITUS DUE TO UNDERLYING CONDITION, WITH FAT LAYER EXPOSED (H): ICD-10-CM

## 2022-11-15 PROCEDURE — 11042 DBRDMT SUBQ TIS 1ST 20SQCM/<: CPT | Performed by: PODIATRIST

## 2022-11-15 PROCEDURE — 99213 OFFICE O/P EST LOW 20 MIN: CPT | Mod: 25 | Performed by: PODIATRIST

## 2022-11-15 RX ORDER — SULFAMETHOXAZOLE/TRIMETHOPRIM 800-160 MG
1 TABLET ORAL 2 TIMES DAILY
Qty: 20 TABLET | Refills: 0 | Status: SHIPPED | OUTPATIENT
Start: 2022-11-15 | End: 2022-11-25

## 2022-11-15 RX ORDER — LISINOPRIL 20 MG/1
20 TABLET ORAL DAILY
COMMUNITY
Start: 2022-09-15 | End: 2023-12-01

## 2022-11-15 NOTE — PROGRESS NOTES
"Podiatry / Foot and Ankle Surgery Progress Note    November 15, 2022    Subject: Patient was seen for concern for infection on left foot.  States that he noticed a blister on his left great toe about 2 days ago.  He states otherwise the diabetic shoes and inserts have been fine.  Denies fever, nausea, vomiting.  No pain but has baseline neuropathy.  Is concerned because he is diabetic.    Objective:  Vitals: BP (!) 142/74   Ht 1.803 m (5' 11\")   Wt 96.6 kg (213 lb)   BMI 29.71 kg/m    BMI= Body mass index is 29.71 kg/m .    A1C: 9.1 (1/9/2021)    General:  Patient is alert and orientated.  NAD.    Vascular:  DP and PT pulses are palpable.  No edema or varicosities noted.  CFT's < 3secs.  Skin temp is normal.    Neuro:  Light and gross touch sensation diminished to feet.    Derm: Full-thickness stage III ulceration to the plantar medial aspect of the left IPJ.  This measures approximately 1.7 cm in length by 1.0 cm in width by 0.2 cm in depth after debridement.  There was a small rock noted in the ulceration area that was removed..  Minimal localized redness noted.  No purulent drainage or malodor noted.    Musculoskeletal:  Previous partial left 5th ray amputation.         Assessment:    Diabetic polyneuropathy associated with type 2 diabetes mellitus (H)  Diabetic ulcer of toe of left foot associated with diabetes mellitus due to underlying condition, with fat layer exposed (H)  History of amputation of lesser toe of left foot (H)      Medical Decision Making/Plan: At this time the ulcer was debrided and the pebble was removed.  We will start him on Bactrim for 10 days.  He will apply Betadine and a bandage to the toe daily.  He can shower and get the foot wet but do not soak the foot.  Recommend patting it dry.  We will have him follow-up in 3 weeks for reassessment.  Discussed that if redness worsens to call or go to the hospital.  We will also fax an order for new diabetic shoes and inserts to be till just.  " All questions were answered to patient's satisfaction and he will call for the questions or concerns.    Procedure: After verbal consent, excisional debridement was performed on ulcer.  #15 blade was used to debride ulcer down to and including subcutaneous tissue. Bleeding controlled with light pressure.   No drainage noted.  No anesthesia was used due to neuropathy. Dry dressing applied to foot.  Patient tolerated procedure well.      Patient Risk Factor:  Patient is a medium risk factor for infection.     Mirian Cline DPM, Podiatry/Foot and Ankle Surgery

## 2022-11-15 NOTE — PATIENT INSTRUCTIONS
"Thank you for choosing [facility] Podiatry / Foot & Ankle Surgery!    DR TINSLEY'S CLINIC:  [facility]   [id number]   [place] [address]      TRIAGE LINE: [phone]  APPOINTMENTS: [phone]  RADIOLOGY: [phone]  SET UP SURGERY: [phone]  FAX NUMBER: [phone]  BILLING QUESTIONS: [phone]       Follow up: As needed      Iodine to ulcer daily    DIABETES AND YOUR FEET  Diabetes can result in several problems in the feet including ulcers (open sores) and amputations. Two of the most important reasons why people develop foot problems when they have diabetes is : 1. Neuropathy (loss of feeling)  2. Vascular disease (loss or decrease of blood flow).    Neuropathy is a term used to describe a loss of nerve function.  Patients with diabetes are at risk of developing neuropathy if their sugars continue to run high and are above the normal value. One theory for neuropathy is that the \"extra\" sugar in the body enters the nerves and is broken down. These by-products build up in the nerve causing it to swell and impairing nerve function. Often times, this can be prevented by controlling your sugars, dieting and exercise.    When a person develops neuropathy, they usually begin to feel numbness or tingling in their feet and sometime in their legs.  Other symptoms may include painful burning or hot feet, tingling or feeling like insects or ants are crawling on your feet or legs.  If the diabetes is sever and the sugars run high for long periods of time, neuropathy can also occur in the hands.    Vascular disease  is a term used to describe a loss or decrease in circulation (blood flow). There is a problem in getting blood and oxygen to areas that need it. Similar to neuropathy, sugars can build up in the walls of the arteries (blood vessels) and cause them to become swollen, thickened and hardened. This decreases the amount of blood that can go to an area that needs it. " Though this is common in the legs of diabetic patients, it can also affect other arteries (blood vessels) in the body such as in the heart and eyes.    In the legs, vascular disease usually results in cramping. Patients who develop leg cramps after walking the same distance every time (i.e. One block, half a mile, ect.) need to let their doctors know so that their circulation may be checked. Cramps causing severe pain in the feet and/or legs while sleeping and the cramps go away when you stand or hang your legs off the side of the bed, may also be a sign of poor blood circulation.  Occasional cramping in cold weather or on rare occasions with activity may not be due to poor circulation, but you should inform your doctor.    PREVENTION OF THESE DISEASES  The key to prevention is good blood sugar control. Poor blood sugar control is a big reason many of these problems start. Physical activity (exercise) is a very good way to help decrease your blood sugars. Exercise can lower your blood sugar, blood pressure, and cholesterol. It also reduces your risk for heart disease and stroke, relieves stress, and strengthens your heart, muscles and bones.  In addition, regular activity helps insulin work better, improves your blood circulation, and keeps your joints flexible. If you're trying to lose weight, a combination of exercise and wise food choices can help you reach your target weight and maintain it.      PAIN MANAGEMENT (**Please speak with your primary doctor about any medications**)  1.Blood Sugar Control - Most important  2. Medications such as:  Amytriptylline, duloxetine, gabapentin, lyrica, tramadol (talk with your primary care doctor about this).     NUTRITION:  Nutrition is also important to help with healing. If your body does not have what it needs, it can't heal.   Increasing your protein intake is important.  With wounds you need 60-90gm of protein a day to help with healing. Over the counter protein shakes  "such as Omer, Glucerna, Ensure, ect... can help to supplement your daily protein intake.   It is also important to take Vitamins to help with healing.  Vitamins such as B12, B6 and Vitamin D3 are important for healing. These can be gotten over the counter at pharmacies or at stores like [employer] or the [employer].    I can also prescribe a dietary supplement called \"Rheumate\" that has a lot of essential vitamins in one capsule.  This may not be covered by insurance though.     FOOT CARE RECOMMENDATIONS   1. Wash your feet with lukewarm water and a mild soap and then dry them thoroughly, especially between the toes.     2. Examine your feet daily looking for cuts, corns, blisters, cracks, ect, especially after wearing new shoes. Make sure to look between your toes. If you cannot see the bottom of your feet, set a mirror on the floor and hold your foot over it, or ask a spouse, friend or family member to examine your feet for you. Contact your doctor immediately if new problems are noted or if sores are not healing.     3. Immediately apply moisturizer to the tops and bottoms of your feet, avoiding areas between the toes. Hand lotion (Intesive Care, Harika, Eucerin, Neutrogena, Curel, ect) is sufficient unless your doctor prescribes a medicated lotion. Apply sunscreen to your feet when going swimming outside.     4. Use clean comfortable shoes, wear white socks (if you have any bleeding or drainage, you will see it on white socks). Socks should not have thick seams or cut off the circulation around the leg. Break in new shoes slowly and rotate with older shoes until broken in. Check the inside of your shoes with your hand to look for areas of irritation or objects that may have fallen into your shoes.       5. Keep slippers by the side of your bed for use during the night.     6.  Shoes should be fitted by a professional and should not cause areas of irritation.  Check your feet regularly when wearing a new pair of " shoes and replace them as needed.     7.  Talk to your doctor about proper exercise. Exercise and stretching stimulate blood flow to your feet and maintain proper glucose levels.     8.  Monitor your blood glucose level as instructed by your doctor. Notify your doctor immediately if your blood sugar is abnormally high or low.    9. Cut your nails straight across, but then gently round any sharp edges with a cardboard nail file. If you have neuropathy, peripheral vascular disease or cannot see that well to trim your own toenails contact [employer] ([phone]) or [employer] ([phone]).      THINGS TO AVOID DOING   1.  Do not soak your feet if you have an open sore. Use only lukewarm water and always check the temperature with your hand as hot water can easily burn your feet.       2.  Never use a hot water bottle or heating pad on your feet. Also do not apply cold compresses to your feet. With decreased sensation, you could burn or freeze your feet.       3.  Do not apply any of these to your feet:    -  Over the counter medicine for corns or warts    -  Harsh chemicals like boric acid    -  Do not self-treat corns, cuts, blisters or infections. Always consult your doctor.       4.  Do not wear sandals, slippers or walk barefoot, especially on hot sand or concrete or other harsh surfaces.     5.  If you smoke, stop!!!     FOOT ULCER (WOUND) EDUCATION  Ulceration ofthe foot involves a break or hole in the skin. Skin is our best protection against infection. Skin is quite durable, however, the underlying tissues are fragile. For this reason, the wound is likely to deepen rapidly. Deep wounds usually get infected and require amputation. Prompt healing is therefore essential to avoid limb loss.     Foot ulcers do not heal without intervention. Walking on the foot and living your normal life is not typically compatible with healing the sore. Successful healing will require several months of significant alteration  of your daily activities.   Ulcer complications frequently develop. This primarily includes infection of skin, which then spreads deep into your joints, bones and tendons. Spreading infection may travel up your leg and into other parts of your body. Deep infection is usually treated with amputation ofpart ofyour foot or your leg. Signs of infection include fever, chills, nausea, vomiting, erratic blood sugars, local redness, pus, strong odor and localized warmth. Signs of infection should be taken seriously. Prompt evaluation in the clinic or hospital emergency room is required.   Ulcer treatment requires debridement or surgical removal of devitalized tissue. Your doctor will trim away callused, moistened, unhealthy tissue from the wound surface and margin. This helps to clean the wound and allows proper inspection. Debridement also stimulates healing even though the wound originally appears larger. Expect some bleeding with each debridement. You will be given instruction regarding wound bandaging. This often includes ointment and gauze. Avoid tape directly on the skin. Hand washing is essential since most infections will come from your fingertips. Ulcer care requires a no touch technique. Your fingers should not touch the margin or base of the wound.    HELPFUL HEALING TIPS:  1. Debridement: Getting rid of bad tissue makes way for good tissue to promote healing  2. Addressing Foot Deformities: Hammertoes and bunions can cause increased pressure  3. Pressure Reduction: If pressure remains to the wound, it won't heal  4. Good Pulses: If bloodflow is not getting to the foot, the ulcer will not heal  5. Good Nutrition: If you are not getting proper nutrition your body can't heal.Protein! At least 90g a day.  Supplements are a good way to help get this, such as Omer, Glucerna, Ensure. Also taking 5000units of Vitamin D a day.   6. Infection Control: Keep the ulcer clean with wound cleanser. DO NOT SOAK IT!  7. Moisture  Control: Keep edema down and make sure that drainage is getting pulled away from the ulcer    IMPORTANCE OF DEBRIDEMENT   Reduces bioburden to help control or reduce infection. Even if an ulcer is not  infected,  the bacterial bioburden causes increased local inflammation.   Allows more accurate visualization of the wound base and edges, which allows for more precise staging.   Removes necrotic/non-viable tissue, which impedes wound healing, causes protein loss and can be a nidus for infection.   Stimulates new circulation (angiogenesis) and allows adequate oxygen delivery to the wound.   Removes undermining and tunneling, and may help reduce abscess formation.   Releases healing growth factors at the edge of the wound.   Prepares the wound bed by leaving only tissues that are capable of regenerating.

## 2022-11-15 NOTE — LETTER
"    11/15/2022         RE: Nabil Cheung  [address]  [facility] [address]        Dear Colleague,    Thank you for referring your patient, Nabil Cheung, to the [facility] PODI[facility]RY. Please see a copy of my visit note below.    Podiatry / Foot and Ankle Surgery Progress Note    November 15, 2022    Subject: Patient was seen for concern for infection on left foot.  States that he noticed a blister on his left great toe about 2 days ago.  He states otherwise the diabetic shoes and inserts have been fine.  Denies fever, nausea, vomiting.  No pain but has baseline neuropathy.  Is concerned because he is diabetic.    Objective:  Vitals: BP (!) 142/74   Ht 1.803 m (5' 11\")   Wt 96.6 kg (213 lb)   BMI 29.71 kg/m    BMI= Body mass index is 29.71 kg/m .    A1C: 9.1 (1/9/2021)    General:  Patient is alert and orientated.  NAD.    Vascular:  DP and PT pulses are palpable.  No edema or varicosities noted.  CFT's < 3secs.  Skin temp is normal.    Neuro:  Light and gross touch sensation diminished to feet.    Derm: Full-thickness stage III ulceration to the plantar medial aspect of the left IPJ.  This measures approximately 1.7 cm in length by 1.0 cm in width by 0.2 cm in depth after debridement.  There was a small rock noted in the ulceration area that was removed..  Minimal localized redness noted.  No purulent drainage or malodor noted.    Musculoskeletal:  Previous partial left 5th ray amputation.         Assessment:    Diabetic polyneuropathy associated with type 2 diabetes mellitus (H)  Diabetic ulcer of toe of left foot associated with diabetes mellitus due to underlying condition, with fat layer exposed (H)  History of amputation of lesser toe of left foot (H)      Medical Decision Making/Plan: At this time the ulcer was debrided and the pebble was removed.  We will start him on Bactrim for 10 days.  He will apply Betadine and a bandage to the toe daily.  He can shower and get the foot wet but do " not soak the foot.  Recommend patting it dry.  We will have him follow-up in 3 weeks for reassessment.  Discussed that if redness worsens to call or go to the hospital.  We will also fax an order for new diabetic shoes and inserts to be till just.  All questions were answered to patient's satisfaction and he will call for the questions or concerns.    Procedure: After verbal consent, excisional debridement was performed on ulcer.  #15 blade was used to debride ulcer down to and including subcutaneous tissue. Bleeding controlled with light pressure.   No drainage noted.  No anesthesia was used due to neuropathy. Dry dressing applied to foot.  Patient tolerated procedure well.      Patient Risk Factor:  Patient is a medium risk factor for infection.     Mirian Cline DPM, Podiatry/Foot and Ankle Surgery              Again, thank you for allowing me to participate in the care of your patient.        Sincerely,        Mirian Cline DPM, Podiatry/Foot and Ankle Surgery

## 2022-11-16 ENCOUNTER — TELEPHONE (OUTPATIENT)
Dept: PODIATRY | Facility: CLINIC | Age: 62
End: 2022-11-16

## 2022-11-16 DIAGNOSIS — E11.42 DIABETIC POLYNEUROPATHY ASSOCIATED WITH TYPE 2 DIABETES MELLITUS (H): Primary | ICD-10-CM

## 2022-11-16 DIAGNOSIS — L97.522 ULCER OF LEFT FOOT, WITH FAT LAYER EXPOSED (H): ICD-10-CM

## 2022-11-16 NOTE — TELEPHONE ENCOUNTER
Will put in order for augmentin and update his allergy list.     Please let patient know.     Thanks,     Mirian Cline DPM

## 2022-11-16 NOTE — TELEPHONE ENCOUNTER
Patient was informed of new prescription sent for Augmentin and verbalized understanding.     HUBERT Lwoe RN

## 2022-11-16 NOTE — TELEPHONE ENCOUNTER
Patient calls stating he saw Dr. Cline yesterday and she ordered Bactrim.   He is now remembering when he was hospitalized last August that he was put on Bactrim at that time and his kidneys did not react well to it. At the time they switched him over to another antibiotic, which he thinks was Augmentin.   He has not taken any of the Bactrim and would like a new prescription that does not contain Sulfa sent to the same pharmacy.   Will discuss with provider.   He would like a call when the new prescription is sent as he would like to start it as soon as possible.     Please advise a new antibiotic.     HUBERT Lowe RN

## 2022-12-06 ENCOUNTER — OFFICE VISIT (OUTPATIENT)
Dept: PODIATRY | Facility: CLINIC | Age: 62
End: 2022-12-06
Payer: COMMERCIAL

## 2022-12-06 VITALS — BODY MASS INDEX: 29.71 KG/M2 | SYSTOLIC BLOOD PRESSURE: 128 MMHG | DIASTOLIC BLOOD PRESSURE: 72 MMHG | WEIGHT: 213 LBS

## 2022-12-06 DIAGNOSIS — E11.42 DIABETIC POLYNEUROPATHY ASSOCIATED WITH TYPE 2 DIABETES MELLITUS (H): Primary | ICD-10-CM

## 2022-12-06 DIAGNOSIS — Z89.422 HISTORY OF AMPUTATION OF LESSER TOE OF LEFT FOOT (H): ICD-10-CM

## 2022-12-06 DIAGNOSIS — Z87.2 HEALED FOOT ULCER: ICD-10-CM

## 2022-12-06 PROCEDURE — 99213 OFFICE O/P EST LOW 20 MIN: CPT | Performed by: PODIATRIST

## 2022-12-06 NOTE — PROGRESS NOTES
Podiatry / Foot and Ankle Surgery Progress Note    December 6, 2022    Subject: Patient was seen for assessment of left foot ulcer.  Notes that he has not noted much drainage for the last few days.  Denies fever, nausea, vomiting.  No pain to the area.    Objective:  Vitals: /72   Wt 96.6 kg (213 lb)   BMI 29.71 kg/m    BMI= Body mass index is 29.71 kg/m .    A1C: 9.1 (1/9/2021)     General:  Patient is alert and orientated.  NAD.     Vascular:  DP and PT pulses are palpable.  No edema or varicosities noted.  CFT's < 3secs.  Skin temp is normal.     Neuro:  Light and gross touch sensation diminished to feet.     Derm: Full-thickness stage III ulceration to the plantar medial aspect of the left IPJ is healed.  No open lesions or signs of infection noted.     Musculoskeletal:  Previous partial left 5th ray amputation.           Assessment:     Diabetic polyneuropathy associated with type 2 diabetes mellitus (H)  Healed foot ulcer  History of amputation of lesser toe of left foot (H)        Medical Decision Making/Plan: At this time, the ulcer is healed.  We will have him keep a bandage over the area for the next week or 2 to help protect the new skin.  He can lotion the foot get the foot wet at this time.  He will continue to wear his diabetic shoes and inserts.  We will have him follow-up in 2 months for reassessment as he is at a higher risk of 3 ulceration.  All questions were answered to patient's satisfaction and he will call for the questions or concerns.     Patient Risk Factor:  Patient is a medium risk factor for infection.     Mirian Cline DPM, Podiatry/Foot and Ankle Surgery

## 2022-12-06 NOTE — LETTER
12/6/2022         RE: Nabil Cheung  [address]  [facility] [address]        Dear Colleague,    Thank you for referring your patient, Nabil Cheung, to the [facility] PODI[facility]. Please see a copy of my visit note below.    Podiatry / Foot and Ankle Surgery Progress Note    December 6, 2022    Subject: Patient was seen for assessment of left foot ulcer.  Notes that he has not noted much drainage for the last few days.  Denies fever, nausea, vomiting.  No pain to the area.    Objective:  Vitals: /72   Wt 96.6 kg (213 lb)   BMI 29.71 kg/m    BMI= Body mass index is 29.71 kg/m .    A1C: 9.1 (1/9/2021)     General:  Patient is alert and orientated.  NAD.     Vascular:  DP and PT pulses are palpable.  No edema or varicosities noted.  CFT's < 3secs.  Skin temp is normal.     Neuro:  Light and gross touch sensation diminished to feet.     Derm: Full-thickness stage III ulceration to the plantar medial aspect of the left IPJ is healed.  No open lesions or signs of infection noted.     Musculoskeletal:  Previous partial left 5th ray amputation.           Assessment:     Diabetic polyneuropathy associated with type 2 diabetes mellitus (H)  Healed foot ulcer  History of amputation of lesser toe of left foot (H)        Medical Decision Making/Plan: At this time, the ulcer is healed.  We will have him keep a bandage over the area for the next week or 2 to help protect the new skin.  He can lotion the foot get the foot wet at this time.  He will continue to wear his diabetic shoes and inserts.  We will have him follow-up in 2 months for reassessment as he is at a higher risk of 3 ulceration.  All questions were answered to patient's satisfaction and he will call for the questions or concerns.     Patient Risk Factor:  Patient is a medium risk factor for infection.     Mirian Cline DPM, Podiatry/Foot and Ankle Surgery               Again, thank you for allowing me to participate in the care of  your patient.        Sincerely,        Mirian Cline DPM, Podiatry/Foot and Ankle Surgery

## 2023-01-23 ENCOUNTER — HEALTH MAINTENANCE LETTER (OUTPATIENT)
Age: 63
End: 2023-01-23

## 2023-02-16 ENCOUNTER — OFFICE VISIT (OUTPATIENT)
Dept: PODIATRY | Facility: CLINIC | Age: 63
End: 2023-02-16
Payer: COMMERCIAL

## 2023-02-16 VITALS
HEIGHT: 71 IN | SYSTOLIC BLOOD PRESSURE: 134 MMHG | DIASTOLIC BLOOD PRESSURE: 82 MMHG | WEIGHT: 213 LBS | BODY MASS INDEX: 29.82 KG/M2

## 2023-02-16 DIAGNOSIS — L84 PRE-ULCERATIVE CALLUSES: ICD-10-CM

## 2023-02-16 DIAGNOSIS — Z89.422 HISTORY OF AMPUTATION OF LESSER TOE OF LEFT FOOT (H): Primary | ICD-10-CM

## 2023-02-16 DIAGNOSIS — E11.42 DIABETIC POLYNEUROPATHY ASSOCIATED WITH TYPE 2 DIABETES MELLITUS (H): ICD-10-CM

## 2023-02-16 PROCEDURE — 99213 OFFICE O/P EST LOW 20 MIN: CPT | Performed by: PODIATRIST

## 2023-02-16 NOTE — PATIENT INSTRUCTIONS
Thank you for choosing [facility] Podiatry / Foot & Ankle Surgery!    DR TINSLEY'S CLINIC:  [facility]   [address]   [place] [address]      TRIAGE LINE: [phone]  APPOINTMENTS: [phone]  RADIOLOGY: [phone]  SET UP SURGERY: [phone]  PHYSICAL THERAPY: [phone]   FAX NUMBER: [phone]  BILLING QUESTIONS: [phone]       Follow up: as needed .    Here is a list of routine foot care resources, which includes toenail trimming and callus/corn management.     This is not a referral. It is your responsibility to contact the organization and your insurance to confirm cost and coverage.      ROUTINE FOOT CARE (NAIL TRIMMING / CALLUSES)      [facility]  [phone]   [facility]  [phone]  Multiple locations   [facility]  [phone] Dr. Sánchez and Dr. Clifton  [address]  [place] [address]    Office: [phone]

## 2023-02-16 NOTE — PROGRESS NOTES
"Podiatry / Foot and Ankle Surgery Progress Note    February 16, 2023    Subject: Patient was seen for follow-up on left great toe.  He had a previous callus that was healed and we are reassessing the foot as he is at a high risk of reulceration.  Notes that he has been doing well.  He has been filing down the callus that occurs with a foot file.  Has not noted any issues.    Objective:  Vitals: Ht 1.803 m (5' 11\")   Wt 96.6 kg (213 lb)   BMI 29.71 kg/m    BMI= Body mass index is 29.71 kg/m .    A1C: 9.6 (9/15/2022))     General:  Patient is alert and orientated.  NAD.     Vascular:  DP and PT pulses are palpable.  No edema or varicosities noted.  CFT's < 3secs.  Skin temp is normal.     Neuro:  Light and gross touch sensation diminished to feet.     Derm: Full-thickness stage III ulceration to the plantar medial aspect of the left IPJ is healed.    Minimal preulcerative hyperkeratotic tissue noted to this area.  No open lesions or signs of infection are noted     Musculoskeletal:  Previous partial left 5th ray amputation.        Assessment:      History of amputation of lesser toe of left foot (H)  Diabetic polyneuropathy associated with type 2 diabetes mellitus (H)  Pre-ulcerative calluses        Medical Decision Making/Plan: At this time, the ulcer has remained healed.   He will continue to lotion the foot get the foot wet at this time.  He will continue to wear his diabetic shoes and inserts.  We will have him follow-up as needed.     All questions were answered to patient's satisfaction and he will call for the questions or concerns.     Patient Risk Factor:  Patient is a medium risk factor for infection.     Mirian Cline DPM, Podiatry/Foot and Ankle Surgery           "

## 2023-02-16 NOTE — LETTER
"    2/16/2023         RE: Nabil Cheung  [address]  [facility] [address]        Dear Colleague,    Thank you for referring your patient, Nabil Cheung, to the [facility]. Please see a copy of my visit note below.    Podiatry / Foot and Ankle Surgery Progress Note    February 16, 2023    Subject: Patient was seen for follow-up on left great toe.  He had a previous callus that was healed and we are reassessing the foot as he is at a high risk of reulceration.  Notes that he has been doing well.  He has been filing down the callus that occurs with a foot file.  Has not noted any issues.    Objective:  Vitals: Ht 1.803 m (5' 11\")   Wt 96.6 kg (213 lb)   BMI 29.71 kg/m    BMI= Body mass index is 29.71 kg/m .    A1C: 9.6 (9/15/2022))     General:  Patient is alert and orientated.  NAD.     Vascular:  DP and PT pulses are palpable.  No edema or varicosities noted.  CFT's < 3secs.  Skin temp is normal.     Neuro:  Light and gross touch sensation diminished to feet.     Derm: Full-thickness stage III ulceration to the plantar medial aspect of the left IPJ is healed.    Minimal preulcerative hyperkeratotic tissue noted to this area.  No open lesions or signs of infection are noted     Musculoskeletal:  Previous partial left 5th ray amputation.        Assessment:      History of amputation of lesser toe of left foot (H)  Diabetic polyneuropathy associated with type 2 diabetes mellitus (H)  Pre-ulcerative calluses        Medical Decision Making/Plan: At this time, the ulcer has remained healed.   He will continue to lotion the foot get the foot wet at this time.  He will continue to wear his diabetic shoes and inserts.  We will have him follow-up as needed.     All questions were answered to patient's satisfaction and he will call for the questions or concerns.     Patient Risk Factor:  Patient is a medium risk factor for infection.     Mirian Cline DPM, Podiatry/Foot and Ankle Surgery "               Again, thank you for allowing me to participate in the care of your patient.        Sincerely,        Mirian Cline DPM, Podiatry/Foot and Ankle Surgery

## 2023-05-06 ENCOUNTER — HEALTH MAINTENANCE LETTER (OUTPATIENT)
Age: 63
End: 2023-05-06

## 2023-05-27 ENCOUNTER — HOSPITAL ENCOUNTER (EMERGENCY)
Facility: CLINIC | Age: 63
Discharge: HOME | End: 2023-05-27
Attending: EMERGENCY MEDICINE | Admitting: EMERGENCY MEDICINE
Payer: MEDICARE

## 2023-05-27 ENCOUNTER — APPOINTMENT (OUTPATIENT)
Dept: RADIOLOGY | Facility: CLINIC | Age: 63
End: 2023-05-27
Attending: EMERGENCY MEDICINE
Payer: MEDICARE

## 2023-05-27 ENCOUNTER — NURSE TRIAGE (OUTPATIENT)
Dept: NURSING | Facility: CLINIC | Age: 63
End: 2023-05-27
Payer: MEDICARE

## 2023-05-27 VITALS
HEART RATE: 79 BPM | WEIGHT: 220 LBS | OXYGEN SATURATION: 98 % | DIASTOLIC BLOOD PRESSURE: 77 MMHG | RESPIRATION RATE: 16 BRPM | HEIGHT: 71 IN | TEMPERATURE: 97.5 F | BODY MASS INDEX: 30.8 KG/M2 | SYSTOLIC BLOOD PRESSURE: 186 MMHG

## 2023-05-27 DIAGNOSIS — R06.00 DYSPNEA, UNSPECIFIED TYPE: ICD-10-CM

## 2023-05-27 LAB
ALBUMIN SERPL-MCNC: 3.2 G/DL (ref 3.5–5)
ALP SERPL-CCNC: 48 U/L (ref 45–120)
ALT SERPL W P-5'-P-CCNC: 26 U/L (ref 0–45)
ANION GAP SERPL CALCULATED.3IONS-SCNC: 5 MMOL/L (ref 5–18)
AST SERPL W P-5'-P-CCNC: 20 U/L (ref 0–40)
ATRIAL RATE - MUSE: 78 BPM
BASOPHILS # BLD AUTO: 0 10E3/UL (ref 0–0.2)
BASOPHILS NFR BLD AUTO: 0 %
BILIRUB DIRECT SERPL-MCNC: 0.1 MG/DL
BILIRUB SERPL-MCNC: 0.5 MG/DL (ref 0–1)
BUN SERPL-MCNC: 22 MG/DL (ref 8–22)
CALCIUM SERPL-MCNC: 8.2 MG/DL (ref 8.5–10.5)
CHLORIDE BLD-SCNC: 105 MMOL/L (ref 98–107)
CO2 SERPL-SCNC: 27 MMOL/L (ref 22–31)
CREAT SERPL-MCNC: 1.57 MG/DL (ref 0.7–1.3)
DIASTOLIC BLOOD PRESSURE - MUSE: NORMAL MMHG
EOSINOPHIL # BLD AUTO: 0.4 10E3/UL (ref 0–0.7)
EOSINOPHIL NFR BLD AUTO: 4 %
ERYTHROCYTE [DISTWIDTH] IN BLOOD BY AUTOMATED COUNT: 14 % (ref 10–15)
GFR SERPL CREATININE-BSD FRML MDRD: 49 ML/MIN/1.73M2
GLUCOSE BLD-MCNC: 238 MG/DL (ref 70–125)
HCT VFR BLD AUTO: 32.3 % (ref 40–53)
HGB BLD-MCNC: 11.1 G/DL (ref 13.3–17.7)
IMM GRANULOCYTES # BLD: 0.1 10E3/UL
IMM GRANULOCYTES NFR BLD: 1 %
INTERPRETATION ECG - MUSE: NORMAL
LYMPHOCYTES # BLD AUTO: 1.4 10E3/UL (ref 0.8–5.3)
LYMPHOCYTES NFR BLD AUTO: 14 %
MAGNESIUM SERPL-MCNC: 1.7 MG/DL (ref 1.8–2.6)
MCH RBC QN AUTO: 28.3 PG (ref 26.5–33)
MCHC RBC AUTO-ENTMCNC: 34.4 G/DL (ref 31.5–36.5)
MCV RBC AUTO: 82 FL (ref 78–100)
MONOCYTES # BLD AUTO: 0.8 10E3/UL (ref 0–1.3)
MONOCYTES NFR BLD AUTO: 8 %
NEUTROPHILS # BLD AUTO: 7.3 10E3/UL (ref 1.6–8.3)
NEUTROPHILS NFR BLD AUTO: 73 %
NRBC # BLD AUTO: 0 10E3/UL
NRBC BLD AUTO-RTO: 0 /100
P AXIS - MUSE: 54 DEGREES
PLATELET # BLD AUTO: 200 10E3/UL (ref 150–450)
POTASSIUM BLD-SCNC: 4.4 MMOL/L (ref 3.5–5)
PR INTERVAL - MUSE: 216 MS
PROT SERPL-MCNC: 5.7 G/DL (ref 6–8)
QRS DURATION - MUSE: 92 MS
QT - MUSE: 394 MS
QTC - MUSE: 449 MS
R AXIS - MUSE: -30 DEGREES
RBC # BLD AUTO: 3.92 10E6/UL (ref 4.4–5.9)
SODIUM SERPL-SCNC: 137 MMOL/L (ref 136–145)
SYSTOLIC BLOOD PRESSURE - MUSE: NORMAL MMHG
T AXIS - MUSE: 124 DEGREES
TROPONIN I SERPL-MCNC: 0.07 NG/ML (ref 0–0.29)
VENTRICULAR RATE- MUSE: 78 BPM
WBC # BLD AUTO: 9.9 10E3/UL (ref 4–11)

## 2023-05-27 PROCEDURE — 36415 COLL VENOUS BLD VENIPUNCTURE: CPT | Performed by: EMERGENCY MEDICINE

## 2023-05-27 PROCEDURE — 80053 COMPREHEN METABOLIC PANEL: CPT | Performed by: EMERGENCY MEDICINE

## 2023-05-27 PROCEDURE — 99285 EMERGENCY DEPT VISIT HI MDM: CPT | Mod: 25

## 2023-05-27 PROCEDURE — 83735 ASSAY OF MAGNESIUM: CPT | Performed by: EMERGENCY MEDICINE

## 2023-05-27 PROCEDURE — 71046 X-RAY EXAM CHEST 2 VIEWS: CPT

## 2023-05-27 PROCEDURE — 84484 ASSAY OF TROPONIN QUANT: CPT | Performed by: EMERGENCY MEDICINE

## 2023-05-27 PROCEDURE — 93005 ELECTROCARDIOGRAM TRACING: CPT | Performed by: EMERGENCY MEDICINE

## 2023-05-27 PROCEDURE — 82248 BILIRUBIN DIRECT: CPT | Performed by: EMERGENCY MEDICINE

## 2023-05-27 PROCEDURE — 85025 COMPLETE CBC W/AUTO DIFF WBC: CPT | Performed by: EMERGENCY MEDICINE

## 2023-05-27 ASSESSMENT — ACTIVITIES OF DAILY LIVING (ADL): ADLS_ACUITY_SCORE: 35

## 2023-05-27 NOTE — ED TRIAGE NOTES
Pt is in the process of moving, has been exerting himself and also exposed to allergens. Reports SOB and JOYCE. Hx of heart disease and asthma.     Triage Assessment     Row Name 05/27/23 0824       Triage Assessment (Adult)    Airway WDL WDL       Respiratory WDL    Respiratory WDL X;rhythm/pattern    Rhythm/Pattern, Respiratory dyspnea on exertion;shortness of breath       Skin Circulation/Temperature WDL    Skin Circulation/Temperature WDL WDL       Cardiac WDL    Cardiac WDL WDL       Peripheral/Neurovascular WDL    Peripheral Neurovascular WDL WDL       Cognitive/Neuro/Behavioral WDL    Cognitive/Neuro/Behavioral WDL WDL

## 2023-05-27 NOTE — ED PROVIDER NOTES
Emergency Department Encounter     Evaluation Date & Time:   5/27/2023  3:08 PM    CHIEF COMPLAINT:  Shortness of Breath      Triage Note:  Pt is in the process of moving, has been exerting himself and also exposed to allergens. Reports SOB and JOYCE. Hx of heart disease and asthma.             ED COURSE & MEDICAL DECISION MAKING:     ED Course as of 05/27/23 1756   Sat May 27, 2023   1632 Renal function similar to baseline.  Normal WBC with mild anemia.  Hepatic function panel wnl.     1633 CXR (independent interpretation): no acute cardiopulmonary process    1653 Trop wnl, symptoms for several days, ACS ruled out.     1708 Pt reports he saw cardiologist earlier this week and was told everything looked good.  Will not place rapid access for now as a result. Certainly all of this could be exacerbation of chronic lung disease related to allergens and poor air quality we're having.         Pt here with ongoing dyspnea over the past several days, worsened today when out walking with wife for their typical walk. Reports a history of chronic dyspnea related to his underlying asthma and cardiac disease, but worse since they moved into an apartment with a former smoker.  Pt set to be moving out in next week now.  Pt wanted to make sure his heart is ok, so they came in now. Denies chest pain, leg pain/swelling, hx of dvt/pe, recent travel/surgery, syncope.  Vitals reassuring with 99% on RA, no tachycardia and clear lungs without wheezing/crackles.  Very low suspicion for PE. Will get labs, CXR, reassess. Certainly could be all related to poor air quality and allergens in a patient with underlying lung disease.      3:15 PM I met with the patient to gather history and to perform my initial exam. I discussed the plan for care while in the Emergency Department. Appropriate PPE was worn during patient encounters.    4:54 PM I updated patient. Discharge discussed.    Medical Decision Making    History:    Supplemental history  from: Family Member/Significant Other    External Record(s) reviewed: Outpatient Record: 5/5 (See HPI)    Work Up:    Chart documentation includes differential considered and any EKGs or imaging independently interpreted by provider, where specified.    In additional to work up documented, I considered the following work up: Documented in chart, if applicable.    External consultation:    Discussion of management with another provider: Documented in chart, if applicable    Complicating factors:    Care impacted by chronic illness: Chronic Kidney Disease, Diabetes, Heart Disease and Hypertension    Care affected by social determinants of health: N/A    Disposition considerations: Discharge. No recommendations on prescription strength medication(s). I considered admission, but ultimately discharged patient after reassuring workup, outpatient follow up/treatment plan..      At the conclusion of the encounter I discussed the results of all the tests and the disposition. The questions were answered. The patient or family acknowledged understanding and was agreeable with the care plan.      MEDICATIONS GIVEN IN THE EMERGENCY DEPARTMENT:  Medications - No data to display    NEW PRESCRIPTIONS STARTED AT TODAY'S ED VISIT:  Discharge Medication List as of 5/27/2023  5:09 PM          HPI   HPI     Nabil Cheung is a 63 year old male with a pertinent history of asthma, CAD, CABG, CKD stage III, DM, and hypertension who presents to this ED via private vehicle with wife for evaluation of shortness of breath.    Per chart review, patient was seen on 5/5 (~3 weeks ago) at [facility] for a cough. Imaging negative. Patient prescribed prednisone and started on PPI. Patient discharged in stable condition.    Patient states that he has had shortness of breath for awhile but it started to progressively worsen ~5 days ago. Patient notes that he usually walks ~1.5 miles everyday but had to cut it down to ~1  mile this morning due to shortness of breath which prompted him to the ED. He notes that symptoms are triggered by the air quality or smoke. He recently moved into an apartment that had a previous smoker which he believes has triggered his current episode. Shortness of breath is present while laying down as well as while standing up and does not change with activity. He has been using a steroid inhaler without much relief.    Of note, patient does endorses weight gain but attributes this to his insulin usage.    Denies chest pain, leg swelling, fever, vomiting, bloody stool, or other concerns.    REVIEW OF SYSTEMS:  See HPI      Medical History   History reviewed. No pertinent past medical history.    Past Surgical History:   Procedure Laterality Date     AMPUTATE TOE(S) Right 8/17/2021    Procedure: left partial fifth ray amputation;  Surgeon: Mirian Cline DPM, Podiatry/Foot and Ankle Surgery;  Location: [facility] OR     GRAFT FLAP PEDICLE TRAM DELAY PROCEDURE Left 6/18/2020    Procedure: Debridement of sternal wound and left major pectoral flep;  Surgeon: MARCO Serna MD;  Location: [facility] OR     INCISION AND DRAINAGE CHEST WASHOUT, COMBINED N/A 6/12/2020    Procedure: INCISION AND DRAINAGE, WOUND, CHEST, WITH IRRIGATION and wound vac change;  Surgeon: Mark Mckinney MD;  Location: [facility] OR     REPAIR CHEST WALL N/A 6/18/2020    Procedure: Chest wall reconstruction;  Surgeon: MARCO Serna MD;  Location: [facility] OR       History reviewed. No pertinent family history.    Social History     Tobacco Use     Smoking status: Never     Smokeless tobacco: Never   Substance Use Topics     Alcohol use: Yes     Drug use: No       acetaminophen (TYLENOL) 325 MG tablet  albuterol (PROAIR HFA/PROVENTIL HFA/VENTOLIN HFA) 108 (90 Base) MCG/ACT inhaler  alfuzosin ER (UROXATRAL) 10 MG 24 hr tablet  alpha-lipoic acid 600 MG capsule  amLODIPine (NORVASC) 10 MG tablet  aspirin 81 MG EC tablet  finasteride (PROSCAR) 5 MG  "tablet  fish oil-omega-3 fatty acids 1000 MG capsule  insulin aspart (NOVOLOG PEN) 100 UNIT/ML pen  insulin aspart (NOVOLOG PEN) 100 UNIT/ML pen  insulin aspart (NOVOLOG PEN) 100 UNIT/ML pen  insulin glargine (LANTUS PEN) 100 UNIT/ML pen  lisinopril (ZESTRIL) 10 MG tablet  lisinopril (ZESTRIL) 20 MG tablet  magnesium oxide 400 MG CAPS  metFORMIN (GLUCOPHAGE) 1000 MG tablet  metoprolol succinate ER (TOPROL-XL) 25 MG 24 hr tablet  nitroGLYcerin (NITROSTAT) 0.4 MG sublingual tablet  rosuvastatin (CRESTOR) 40 MG tablet  vitamin C (ASCORBIC ACID) 1000 MG TABS  vitamin D3 (CHOLECALCIFEROL) 2000 units (50 mcg) tablet        Physical Exam     Vitals:  BP (!) 186/77   Pulse 79   Temp 97.5  F (36.4  C) (Temporal)   Resp 16   Ht 1.803 m (5' 11\")   Wt 99.8 kg (220 lb)   SpO2 98%   BMI 30.68 kg/m      PHYSICAL EXAM:   Physical Exam  Vitals and nursing note reviewed.   Constitutional:       General: He is not in acute distress.     Appearance: Normal appearance.   HENT:      Head: Normocephalic and atraumatic.      Nose: Nose normal.      Mouth/Throat:      Mouth: Mucous membranes are moist.   Eyes:      Conjunctiva/sclera:      Left eye: Hemorrhage (subconjunctival) present.      Pupils: Pupils are equal, round, and reactive to light.   Neck:      Vascular: No JVD.   Cardiovascular:      Rate and Rhythm: Normal rate and regular rhythm.      Pulses: Normal pulses.           Radial pulses are 2+ on the right side and 2+ on the left side.        Dorsalis pedis pulses are 2+ on the right side and 2+ on the left side.   Pulmonary:      Effort: Pulmonary effort is normal. No respiratory distress.      Breath sounds: Normal breath sounds.   Abdominal:      Palpations: Abdomen is soft.      Tenderness: There is no abdominal tenderness.   Musculoskeletal:      Cervical back: Full passive range of motion without pain and neck supple.      Comments: No calf tenderness or swelling b/l   Skin:     General: Skin is warm.      Findings: " No rash.   Neurological:      General: No focal deficit present.      Mental Status: He is alert. Mental status is at baseline.      Comments: Fluent speech, no acute lateralizing deficits   Psychiatric:         Mood and Affect: Mood normal.         Behavior: Behavior normal.         Results     LAB:  All pertinent labs reviewed and interpreted  Labs Ordered and Resulted from Time of ED Arrival to Time of ED Departure   BASIC METABOLIC PANEL - Abnormal       Result Value    Sodium 137      Potassium 4.4      Chloride 105      Carbon Dioxide (CO2) 27      Anion Gap 5      Urea Nitrogen 22      Creatinine 1.57 (*)     Calcium 8.2 (*)     Glucose 238 (*)     GFR Estimate 49 (*)    MAGNESIUM - Abnormal    Magnesium 1.7 (*)    HEPATIC FUNCTION PANEL - Abnormal    Bilirubin Total 0.5      Bilirubin Direct 0.1      Protein Total 5.7 (*)     Albumin 3.2 (*)     Alkaline Phosphatase 48      AST 20      ALT 26     CBC WITH PLATELETS AND DIFFERENTIAL - Abnormal    WBC Count 9.9      RBC Count 3.92 (*)     Hemoglobin 11.1 (*)     Hematocrit 32.3 (*)     MCV 82      MCH 28.3      MCHC 34.4      RDW 14.0      Platelet Count 200      % Neutrophils 73      % Lymphocytes 14      % Monocytes 8      % Eosinophils 4      % Basophils 0      % Immature Granulocytes 1      NRBCs per 100 WBC 0      Absolute Neutrophils 7.3      Absolute Lymphocytes 1.4      Absolute Monocytes 0.8      Absolute Eosinophils 0.4      Absolute Basophils 0.0      Absolute Immature Granulocytes 0.1      Absolute NRBCs 0.0     TROPONIN I - Normal    Troponin I 0.07         RADIOLOGY:  XR Chest 2 Views   Final Result   IMPRESSION: No acute cardiopulmonary abnormality. Unchanged borderline enlarged cardiomediastinal silhouette with median sternotomy wires.               ECG:  NSR, rate 78, 1st degree AV block, no acute ischemia, similar to EKG from 8/17/21    I have independently reviewed and interpreted the EKG(s) documented above      PROCEDURES:  Procedures:  none      FINAL IMPRESSION:    ICD-10-CM    1. Dyspnea, unspecified type  R06.00           0 minutes of critical care time      I, Elizabeth Posey, am serving as a scribe to document services personally performed by Dr. Mesfin Cárdenas, based on my observations and the provider's statements to me. I, Mesfin Cárdenas, DO attest that Elizabeth Posey is acting in a scribe capacity, has observed my performance of the services and has documented them in accordance with my direction.      Mesfin Cárdenas DO  Emergency Medicine  [facility] EMERGENCY ROOM  5/27/2023  3:10 PM        Mesfin Cárdenas MD  05/27/23 6417

## 2023-05-27 NOTE — TELEPHONE ENCOUNTER
"Nurse Triage SBAR    Is this a 2nd Level Triage? NO    Situation: Patient calling with concern his SOB may be heart related (pt is asthmatic) .  Consent: not needed    Background: Pt had bypass 3 years ago and mild heart attack and has severe asthma.  Moved into apartment with previous smokers and notes his asthma has been acting up a lot.  States \"last few days he's been exceptionally SOB\".   Wanting to know if he can go in for EKG just to rule out heart concern as he states the last heart attack also didn't involve chest pain.        Assessment:   * Hearing wheezing - started being more frequent in the past 2 months since moving into an apartment previously filled with smokers.  Mild coughing.   * Has gone on for awhile but worsening the past couple days as they are moving out.    * SOB is constant regardless of whether laying down or moving.    * Between mild and moderate.  Walked a mile this morning and could tell he was slower than usual.   * Pt has asthma and has felt this before.  Also had a heart attack.    * Pt had a heart attack 3 years ago.    * Pt has asthma.   * Pt unsure if this is heart or lung related.   * Denies chest pain - runny nose, fever, dizziness.   States he has a persistent mild cough.    Pt does not have pulse oximeter.          Protocol Recommended Disposition:   ED NOW.     Recommendation: Advised patient to Go to ED now . Reviewed concerning symptoms and when to call back.   Pt states he will go to [facility] at this time.      Routed to provider as FYI     Does the patient meet one of the following criteria for ADS visit consideration? 16+ years old, with an [facility] PCP     TIP  Providers, please consider if this condition is appropriate for management at one of our Acute and Diagnostic Services sites.     If patient is a good candidate, please use dotphrase <dot>triageresponse and select Refer to ADS to document.       Pamela Fontanez RN [facility] 5/27/2023 2:36 " PM      Reason for Disposition    [1] MODERATE difficulty breathing (e.g., speaks in phrases, SOB even at rest, pulse 100-120) AND [2] NEW-onset or WORSE than normal    Additional Information    Negative: SEVERE difficulty breathing (e.g., struggling for each breath, speaks in single words)    Negative: [1] Breathing stopped AND [2] hasn't returned    Negative: Choking on something    Negative: Bluish (or gray) lips or face now    Negative: Difficult to awaken or acting confused (e.g., disoriented, slurred speech)    Negative: Passed out (i.e., lost consciousness, collapsed and was not responding)    Negative: Wheezing started suddenly after medicine, an allergic food or bee sting    Negative: Stridor    Negative: Slow, shallow and weak breathing    Negative: Sounds like a life-threatening emergency to the triager    Negative: Chest pain    Negative: [1] Wheezing (high pitched whistling sound) AND [2] previous asthma attacks or use of asthma medicines    Negative: [1] Difficulty breathing AND [2] only present when coughing    Negative: [1] Difficulty breathing AND [2] only from stuffy or runny nose    Negative: [1] Difficulty breathing AND [2] within 14 days of COVID-19 Exposure    Protocols used: BREATHING DIFFICULTY-A-AH

## 2023-05-27 NOTE — DISCHARGE INSTRUCTIONS
As we discussed, this could be all allergy/air quality induced.  Take over the counter zyrtec 10-20mg daily. Follow up with primary clinic and cardiologist for ongoing evaluation and care. Return for new/worsening concerns.

## 2023-10-07 ENCOUNTER — HEALTH MAINTENANCE LETTER (OUTPATIENT)
Age: 63
End: 2023-10-07

## 2023-10-13 NOTE — DISCHARGE INSTRUCTIONS
Discharge RN: Please fax discharge orders and summary to [facility] (Fax: [phone])  [facility]      AFTER YOU GO HOME FROM YOUR HEART SURGERY  (Muscle Flap Closure 6/18/20 with Plastic Surgery, Dr Serna)    You had a sternectomy with muscle flap closure, avoid lifting anything greater than ten pounds for 6 weeks after surgery and then less than 20 pounds for an additional 6 weeks. Do not reach backwards or use arms to push out of chair. Do not let people pull on your arms to assist with standing. Avoid twisting or reaching too far across your body.  Avoid strenuous activities such as bowling, vacuuming, raking, shoveling, golf or tennis for 12 weeks after your surgery OR as indicated by Plastic Surgery.   It is okay to resume sex if you feel comfortable in doing so. You may have to try different positions with your partner.  Splint your chest incision by hugging a pillow or bringing your arms across your chest when coughing or sneezing.     No driving for 4 weeks after surgery or while on pain medication.    Shower or wash your incisions daily with soap and water (or as instructed), pat dry. Keep wound clean and dry, showers are okay after discharge, but don't let spray hit directly on incision. No baths or swimming for 1 month. Cover chest tube sites with gauze until they stop draining, then leave open to air. It is not abnormal for chest tube sites to drain yellowish/clear fluid for up to 2-3 weeks after surgery.   Watch for signs of infection: increased redness, tenderness, warmth or any drainage that appears infected (pus like) or is persistent.  Also a temperature > 100.5 F or chills. Call your surgeon or primary care provider's office immediately. Remove any skin glue left on incisions after 10-14 days. This will not affect your incision and can speed up healing.    Exercise is very important in your recovery. Please follow the guidelines set up for you in  your cardiac rehab classes at the hospital. If outpatient cardiac rehab was ordered for you, we highly recommend you participate. If you have problems arranging your cardiac rehab, please call [phone] for all locations, with the exception of [place], please call [phone] and [place], please call [phone].    Avoid sitting for prolonged periods of time, try to walk every hour during the day. If you have a leg incision, elevate your leg often when you are not walking.    Check your weight when you get home from the hospital and continue to check it daily through your recovery for at least a month. If you notice a weight gain of 2-3 pounds in a week, notify your primary care physician, cardiologist or surgeon.    Bowel activity may be slow after surgery. If necessary, you may take an over the counter laxative such as Milk of Magnesia or Miralax. You may have stool softeners prescribed (docusate sodium, Senokot). We recommend using stool softeners while using narcotics for pain (oxycodone/percocet, hydrocodone/vicodin, hydromorphone/dilaudid).      Wean OFF of narcotics (oxycodone, dilaudid, hydrocodone) as soon as possible. You should continue taking acetaminophen as long as you have any surgical pain as the first choice for pain control and add narcotics as necessary for pain to be tolerable.      DO NOT SMOKE.  IF YOU NEED HELP QUITTING, PLEASE TALK WITH YOUR CARDIOLOGIST OR PRIMARY DOCTOR.    REGARDING PRESCRIPTION REFILLS.  If you need a refill on your pain medication contact us to discuss your pain and a possible one time refill.   All other medications will be adjusted, discontinued and re-filled by your primary care physician and/or your cardiologist as they were prior to your surgery. We have given you enough for one to three month with possibly one refill.    POST-OPERATIVE CLINIC VISITS  You have a follow up telephone call with [facility] Advance Care Practitioners on 6/26/20 at 2:30 pm.   You will then return to the care of your primary provider and your cardiologist. Future medication refills should come from your PCP or Cardiologist.   You should see your primary care provider in 1-2 weeks after discharge. It is important to see your cardiologist about 4-6 weeks after discharge.    You will see Infectious Disease (Dr Connolly) on 7/8/20 at 9 am.    You will see Plastic Surgery team on 7/10 at 9:30 am.   If you do not hear from a  in 7 days, please call [phone] (choose option 1) and request to be seen with a general cardiologist or someone that you have seen in the past.   If there is a need to return to see CT Surgery please call our  at [phone].    SURGICAL QUESTIONS  Please call Makenna Mejia with surgical recovery and medication questions, the phone number is listed below.  They can assist you with your needs and contact other surgery care team members as indicated.    On weekends or after hours, please call [phone] and ask the  to   page the Cardiothoracic Surgery fellow on call.      Thank you,    Your Cardiothoracic Surgery Team  Makenna Mejia RN Care Coordinator-  [phone]   Allie Seay RN Care Coordinator-  [phone]  Maribeth Pennington PA-C      No

## 2023-12-01 ENCOUNTER — OFFICE VISIT (OUTPATIENT)
Dept: FAMILY MEDICINE | Facility: CLINIC | Age: 63
End: 2023-12-01
Payer: MEDICARE

## 2023-12-01 ENCOUNTER — E-CONSULT (OUTPATIENT)
Dept: SLEEP MEDICINE | Facility: CLINIC | Age: 63
End: 2023-12-01
Payer: MEDICARE

## 2023-12-01 VITALS
OXYGEN SATURATION: 98 % | HEIGHT: 71 IN | DIASTOLIC BLOOD PRESSURE: 80 MMHG | BODY MASS INDEX: 32.8 KG/M2 | TEMPERATURE: 98.3 F | SYSTOLIC BLOOD PRESSURE: 158 MMHG | RESPIRATION RATE: 18 BRPM | HEART RATE: 68 BPM | WEIGHT: 234.3 LBS

## 2023-12-01 DIAGNOSIS — R35.0 BENIGN PROSTATIC HYPERPLASIA WITH URINARY FREQUENCY: ICD-10-CM

## 2023-12-01 DIAGNOSIS — Z11.4 SCREENING FOR HIV (HUMAN IMMUNODEFICIENCY VIRUS): ICD-10-CM

## 2023-12-01 DIAGNOSIS — I10 ESSENTIAL (PRIMARY) HYPERTENSION: ICD-10-CM

## 2023-12-01 DIAGNOSIS — E08.3593 PROLIFERATIVE DIABETIC RETINOPATHY OF BOTH EYES ASSOCIATED WITH DIABETES MELLITUS DUE TO UNDERLYING CONDITION, UNSPECIFIED PROLIFERATIVE RETINOPATHY TYPE (H): ICD-10-CM

## 2023-12-01 DIAGNOSIS — J45.50 SEVERE PERSISTENT ASTHMA WITHOUT COMPLICATION (H): ICD-10-CM

## 2023-12-01 DIAGNOSIS — Z89.619: ICD-10-CM

## 2023-12-01 DIAGNOSIS — R06.81 WITNESSED APNEIC SPELLS: ICD-10-CM

## 2023-12-01 DIAGNOSIS — Z11.59 NEED FOR HEPATITIS C SCREENING TEST: ICD-10-CM

## 2023-12-01 DIAGNOSIS — E11.21 TYPE 2 DIABETES MELLITUS WITH DIABETIC NEPHROPATHY, WITH LONG-TERM CURRENT USE OF INSULIN (H): Primary | ICD-10-CM

## 2023-12-01 DIAGNOSIS — N40.1 BENIGN PROSTATIC HYPERPLASIA WITH URINARY FREQUENCY: ICD-10-CM

## 2023-12-01 DIAGNOSIS — R29.818 SUSPECTED SLEEP APNEA: Primary | ICD-10-CM

## 2023-12-01 DIAGNOSIS — N18.32 STAGE 3B CHRONIC KIDNEY DISEASE (H): ICD-10-CM

## 2023-12-01 DIAGNOSIS — E11.69: ICD-10-CM

## 2023-12-01 DIAGNOSIS — Z79.4 TYPE 2 DIABETES MELLITUS WITH DIABETIC NEPHROPATHY, WITH LONG-TERM CURRENT USE OF INSULIN (H): Primary | ICD-10-CM

## 2023-12-01 DIAGNOSIS — Z29.11 NEED FOR VACCINATION AGAINST RESPIRATORY SYNCYTIAL VIRUS: ICD-10-CM

## 2023-12-01 DIAGNOSIS — R23.8 PAPULE: ICD-10-CM

## 2023-12-01 DIAGNOSIS — R06.83 SNORING: ICD-10-CM

## 2023-12-01 DIAGNOSIS — Z23 NEED FOR SHINGLES VACCINE: ICD-10-CM

## 2023-12-01 DIAGNOSIS — G47.10 EXCESSIVE SLEEPINESS: ICD-10-CM

## 2023-12-01 DIAGNOSIS — I25.810 CORONARY ARTERY DISEASE INVOLVING CORONARY BYPASS GRAFT OF NATIVE HEART WITHOUT ANGINA PECTORIS: ICD-10-CM

## 2023-12-01 DIAGNOSIS — R53.82 CHRONIC FATIGUE: ICD-10-CM

## 2023-12-01 PROBLEM — M86.9 OSTEOMYELITIS, UNSPECIFIED SITE, UNSPECIFIED TYPE (H): Status: RESOLVED | Noted: 2021-01-08 | Resolved: 2023-12-01

## 2023-12-01 PROBLEM — M86.472 CHRONIC OSTEOMYELITIS OF LEFT FOOT WITH DRAINING SINUS (H): Status: RESOLVED | Noted: 2021-08-15 | Resolved: 2023-12-01

## 2023-12-01 LAB
ALBUMIN SERPL BCG-MCNC: 3.9 G/DL (ref 3.5–5.2)
ANION GAP SERPL CALCULATED.3IONS-SCNC: 12 MMOL/L (ref 7–15)
BUN SERPL-MCNC: 31 MG/DL (ref 8–23)
CALCIUM SERPL-MCNC: 8.7 MG/DL (ref 8.8–10.2)
CHLORIDE SERPL-SCNC: 102 MMOL/L (ref 98–107)
CHOLEST SERPL-MCNC: 160 MG/DL
CREAT SERPL-MCNC: 1.8 MG/DL (ref 0.67–1.17)
CREAT UR-MCNC: 79.9 MG/DL
DEPRECATED HCO3 PLAS-SCNC: 23 MMOL/L (ref 22–29)
EGFRCR SERPLBLD CKD-EPI 2021: 41 ML/MIN/1.73M2
GLUCOSE SERPL-MCNC: 211 MG/DL (ref 70–99)
HBA1C MFR BLD: 9.5 % (ref 0–5.6)
HCV AB SERPL QL IA: NONREACTIVE
HDLC SERPL-MCNC: 38 MG/DL
HIV 1+2 AB+HIV1 P24 AG SERPL QL IA: NONREACTIVE
LDLC SERPL CALC-MCNC: 69 MG/DL
MICROALBUMIN UR-MCNC: 1555 MG/L
MICROALBUMIN/CREAT UR: 1946.18 MG/G CR (ref 0–17)
NONHDLC SERPL-MCNC: 122 MG/DL
PHOSPHATE SERPL-MCNC: 3.7 MG/DL (ref 2.5–4.5)
POTASSIUM SERPL-SCNC: 4.6 MMOL/L (ref 3.4–5.3)
SODIUM SERPL-SCNC: 137 MMOL/L (ref 135–145)
TRIGL SERPL-MCNC: 266 MG/DL

## 2023-12-01 PROCEDURE — 82043 UR ALBUMIN QUANTITATIVE: CPT | Performed by: STUDENT IN AN ORGANIZED HEALTH CARE EDUCATION/TRAINING PROGRAM

## 2023-12-01 PROCEDURE — 82570 ASSAY OF URINE CREATININE: CPT | Performed by: STUDENT IN AN ORGANIZED HEALTH CARE EDUCATION/TRAINING PROGRAM

## 2023-12-01 PROCEDURE — 99204 OFFICE O/P NEW MOD 45 MIN: CPT | Mod: 25 | Performed by: STUDENT IN AN ORGANIZED HEALTH CARE EDUCATION/TRAINING PROGRAM

## 2023-12-01 PROCEDURE — 36415 COLL VENOUS BLD VENIPUNCTURE: CPT | Performed by: STUDENT IN AN ORGANIZED HEALTH CARE EDUCATION/TRAINING PROGRAM

## 2023-12-01 PROCEDURE — 99000 SPECIMEN HANDLING OFFICE-LAB: CPT | Performed by: STUDENT IN AN ORGANIZED HEALTH CARE EDUCATION/TRAINING PROGRAM

## 2023-12-01 PROCEDURE — 87389 HIV-1 AG W/HIV-1&-2 AB AG IA: CPT | Performed by: STUDENT IN AN ORGANIZED HEALTH CARE EDUCATION/TRAINING PROGRAM

## 2023-12-01 PROCEDURE — 99207 E-CONSULT TO SLEEP MEDICINE (ADULT OUTPT PROVIDER TO SPECIALIST WRITTEN QUESTION & RESPONSE): CPT | Performed by: STUDENT IN AN ORGANIZED HEALTH CARE EDUCATION/TRAINING PROGRAM

## 2023-12-01 PROCEDURE — 80069 RENAL FUNCTION PANEL: CPT | Performed by: STUDENT IN AN ORGANIZED HEALTH CARE EDUCATION/TRAINING PROGRAM

## 2023-12-01 PROCEDURE — 90662 IIV NO PRSV INCREASED AG IM: CPT | Performed by: STUDENT IN AN ORGANIZED HEALTH CARE EDUCATION/TRAINING PROGRAM

## 2023-12-01 PROCEDURE — 86803 HEPATITIS C AB TEST: CPT | Performed by: STUDENT IN AN ORGANIZED HEALTH CARE EDUCATION/TRAINING PROGRAM

## 2023-12-01 PROCEDURE — G0008 ADMIN INFLUENZA VIRUS VAC: HCPCS | Performed by: STUDENT IN AN ORGANIZED HEALTH CARE EDUCATION/TRAINING PROGRAM

## 2023-12-01 PROCEDURE — 84244 ASSAY OF RENIN: CPT | Mod: 90 | Performed by: STUDENT IN AN ORGANIZED HEALTH CARE EDUCATION/TRAINING PROGRAM

## 2023-12-01 PROCEDURE — 83036 HEMOGLOBIN GLYCOSYLATED A1C: CPT | Performed by: STUDENT IN AN ORGANIZED HEALTH CARE EDUCATION/TRAINING PROGRAM

## 2023-12-01 PROCEDURE — 99451 NTRPROF PH1/NTRNET/EHR 5/>: CPT | Performed by: INTERNAL MEDICINE

## 2023-12-01 PROCEDURE — 82088 ASSAY OF ALDOSTERONE: CPT | Performed by: STUDENT IN AN ORGANIZED HEALTH CARE EDUCATION/TRAINING PROGRAM

## 2023-12-01 PROCEDURE — 80061 LIPID PANEL: CPT | Performed by: STUDENT IN AN ORGANIZED HEALTH CARE EDUCATION/TRAINING PROGRAM

## 2023-12-01 RX ORDER — VALSARTAN 160 MG/1
160 TABLET ORAL 2 TIMES DAILY
COMMUNITY
Start: 2023-04-25

## 2023-12-01 RX ORDER — UBIDECARENONE 200 MG
1 CAPSULE ORAL DAILY
COMMUNITY

## 2023-12-01 RX ORDER — RESPIRATORY SYNCYTIAL VIRUS VACCINE 120MCG/0.5
0.5 KIT INTRAMUSCULAR ONCE
Qty: 1 EACH | Refills: 0 | Status: SHIPPED | OUTPATIENT
Start: 2023-12-01 | End: 2023-12-01

## 2023-12-01 RX ORDER — TERAZOSIN 2 MG/1
1 CAPSULE ORAL AT BEDTIME
COMMUNITY
Start: 2023-06-27 | End: 2024-02-13

## 2023-12-01 RX ORDER — DAPAGLIFLOZIN 10 MG/1
10 TABLET, FILM COATED ORAL DAILY
COMMUNITY
Start: 2023-11-17 | End: 2024-02-13

## 2023-12-01 RX ORDER — DILTIAZEM HYDROCHLORIDE 60 MG/1
1 TABLET, FILM COATED ORAL
COMMUNITY
Start: 2023-04-25 | End: 2024-07-16

## 2023-12-01 RX ORDER — METOPROLOL SUCCINATE 50 MG/1
50 TABLET, EXTENDED RELEASE ORAL DAILY
COMMUNITY
Start: 2023-10-24 | End: 2024-07-16

## 2023-12-01 NOTE — PROGRESS NOTES
12/1/2023     E-Consult has been accepted.    Interprofessional consultation requested by:  Carlos Correa MD      Clinical Question/Purpose: MY CLINICAL QUESTION IS:     Patient assessment and information reviewed: 63 years old male, BMI of 32, medical comorbidity of hypertension, coronary artery disease, diabetes, chronic kidney disease, asthma, who is referred with symptoms of frequent snoring, witnessed apneas, chronic fatigue and excessive leg movements in sleep.  There is an immediate high risk for obstructive sleep apnea, and overnight sleep study is recommended for further evaluation.  If there is excessive movements in sleep, a PSG would be more appropriate.    Recommendations:     In lab PSG for assessment of sleep apnea  Instruct patient to contact [place] [facility] at [phone] to schedule in lab sleep study and a consultation appointment with sleep medicine to review history, results and management      The recommendations provided in this E-Consult are based on a review of clinical data pertinent to the clinical question presented, without a review of the patient's complete medical record or, the benefit of a comprehensive in-person or virtual patient evaluation. This consultation should not replace the clinical judgement and evaluation of the provider ordering this E-Consult. Any new clinical issues, or changes in patient status since the filing of this E-Consult will need to be taken into account when assessing these recommendations. Please contact me if you have further questions.    My total time spent reviewing clinical information and formulating assessment was 5 minutes.        Daniel Arana MD, MD

## 2023-12-01 NOTE — PROGRESS NOTES
Assessment & Plan     Type 2 diabetes mellitus with diabetic nephropathy, with long-term current use of insulin (H)  Abner is a 63-year-old male who has history of type 2 diabetes with diabetic nephropathy, diabetic retinopathy, diabetic neuropathy status post amputation of fifth digit of left foot who presents today for establishing care.  He does see endocrinology for management of his diabetes.    Their discussion has been regarding insulin, as A1c has not been at goal, last was 9.7%.  He has discussed insulin pump with endocrinology, they advised he get his A1c down before below 9 before switching to pump.  Is on basal and daytime insulin, dipagliflozin, and metformin 500 mg 2 times daily.  Previously attempted GLP-1, but had a GI reaction and had to stop.  Current plan from endocrinology is 22 units basal insulin with 1 to 7 units 3 times daily before meals (sliding scale and carb correction (1:15)) for daytime insulin.  He will continue follow-up with endocrinology.    Last eye exam September 2023, has diabetic retinopathy, he has monthly injections with retina consultants in [place]    Foot exam today is abnormal, has absent sensation to monofilament to bilateral feet, normal DP pulses, see exam below.  Referral placed to [facility] for diabetic orthotics.  He has history of crepitation of the fifth digit of the left foot secondary to diabetic ulcer and osteomyelitis.    For nephropathy he follows with nephrology, Dr. Quezada.  As above currently on valsartan and SGL 2.  We will repeat kidney function study, as well as albumin creatinine ratio, and A1c.    - metFORMIN (GLUCOPHAGE) 500 MG tablet  - HEMOGLOBIN A1C  - Adult Eye [facility] Referral  - HEMOGLOBIN A1C    Proliferative diabetic retinopathy of both eyes associated with diabetes mellitus due to underlying condition, unspecified proliferative retinopathy type (H)    - metFORMIN (GLUCOPHAGE) 500 MG tablet    History of amputation of lower extremity  associated with diabetes mellitus (H)    - Orthotics and Prosthetics DME Orthotic; Diabetic Shoe(s)/Insert(s); 3 pair; Progress note must support the need for shoes/orthotics. Use .diabeticfootexam or diabetic foot exam picklist in [employer].    Stage 3b chronic kidney disease (H)  As above, secondary to diabetic nephropathy and likely uncontrolled hypertension.  We will attempt to control hypertension as below.  Otherwise plan as above.  - Albumin Random Urine Quantitative with Creat Ratio  - Albumin Random Urine Quantitative with Creat Ratio    Coronary artery disease involving native coronary artery of native heart without angina  Status post CABG x4 in May 2020, currently on metoprolol 50 mg daily, valsartan 160 mg daily, aspirin 81 mg daily and rosuvastatin 20 mg daily.  Has Nitrostat available but is not using.  Continue current treatment, will obtain lipid panel to assess for LDL level, goal less than 70.  - metoprolol succinate ER (TOPROL XL) 50 MG 24 hr tablet  - Lipid panel reflex to direct LDL Non-fasting  - Lipid panel reflex to direct LDL Non-fasting    Severe persistent asthma without complication (H28)  Despite treatment with Toprol, has been able to maintain control of his asthma with Symbicort, reporting he does not use albuterol due to no need.  Does see allergy and pulmonology, last seen in September 2023.  Was having persistent cough secondary to lisinopril, has improved with switch to valsartan.  Has noticed exacerbations to sawdust exposure and tobacco smoke.  Continue Symbicort, continue follow-up with pulmonology, next follow-up in December 2023.  - SYMBICORT 80-4.5 MCG/ACT Inhaler    Essential (primary) hypertension  Not well controlled on valsartan 160 mg and metoprolol 50 mg daily, given his asthma history do not recommend for increasing metoprolol.  We will increase valsartan to 320 mg, and monitor for improvement.  Patient does report that at home his blood pressure does read between 130s to  140s, also indicating that his blood pressure is above goal at home.  He will continue to take blood pressures at home, and will return for care in 2 weeks.      Excessive sleepiness  Patient also reports feeling tired daily, stating a lot more naps.  Is unaware if he snores, but has been told that he does gasp for air during sleep.  Does state that he does have excessive leg movements during sleep as well.  Given his hypertension, age, there is concern for obstructive sleep apnea, we discussed E consult to sleep medicine, which he agrees to do.  This was placed for patient, will contact patient once we have recommendations from sleep medicine.      ADDENDUM: Sleep medicine consultant recommended a in lab sleep study, will place referral to sleep medicine, and have staff notify patient.    - valsartan (DIOVAN) 160 MG tablet  - Aldosterone  - Renin activity  - Aldosterone Renin Ratio  - Renal panel (Alb, BUN, Ca, Cl, CO2, Creat, Gluc, Phos, K, Na)  - Adult E-Consult to Sleep Medicine (Outpt Provider to Specialist Written Question & Response)  - Aldosterone Renin Ratio  - Renal panel (Alb, BUN, Ca, Cl, CO2, Creat, Gluc, Phos, K, Na)    Papule  Patient has papule on nose which is flesh-colored, which the patient reports has been growing in size, and is new over the past year.  Looks concerning for possible basal cell carcinoma, referral placed to dermatology for further evaluation and treatment.  - Adult Dermatology  Referral      Screening for HIV (human immunodeficiency virus)    - HIV Antigen Antibody Combo  - HIV Antigen Antibody Combo    Need for hepatitis C screening test    - Hepatitis C Screen Reflex to HCV RNA Quant and Genotype  - Hepatitis C Screen Reflex to HCV RNA Quant and Genotype    Need for shingles vaccine    - zoster vaccine recombinant adjuvanted (SHINGRIX) injection  Dispense: 0.5 mL; Refill: 0    Need for vaccination against respiratory syncytial virus    - respiratory syncytial virus  "vaccine, bivalent (ABRYSVO) injection  Dispense: 1 each; Refill: 0    Benign prostatic hyperplasia with urinary frequency  Well-controlled on terazosin, continue current medication.  - terazosin (HYTRIN) 2 MG capsule               BMI:   Estimated body mass index is 32.68 kg/m  as calculated from the following:    Height as of this encounter: 1.803 m (5' 11\").    Weight as of this encounter: 106.3 kg (234 lb 4.8 oz).   Weight management plan: Discussed healthy diet and exercise guidelines    Follow-up in 2 weeks.    Carlos Correa MD  [facility]    Allison Ferrer is a 63 year old, presenting for the following health issues:  Establish Care (Skin tag on nose. Med refills. Diabetic foot exam.)        12/1/2023     8:19 AM   Additional Questions   Roomed by Amisha VOSS       History of Present Illness       Reason for visit:  First appointment with new primary care    He eats 2-3 servings of fruits and vegetables daily.He consumes 0 sweetened beverage(s) daily.He exercises with enough effort to increase his heart rate 9 or less minutes per day.  He exercises with enough effort to increase his heart rate 3 or less days per week. He is missing 1 dose(s) of medications per week.     Reports feeling well, he is tolerating all of his medications well, no new issues.            Review of Systems   Constitutional, HEENT, cardiovascular, pulmonary, GI, , musculoskeletal, neuro, skin, endocrine and psych systems are negative, except as otherwise noted.      Objective    BP (!) 158/80 (BP Location: Right arm, Patient Position: Sitting, Cuff Size: Adult Regular)   Pulse 68   Temp 98.3  F (36.8  C) (Oral)   Resp 18   Ht 1.803 m (5' 11\")   Wt 106.3 kg (234 lb 4.8 oz)   SpO2 98%   BMI 32.68 kg/m    Body mass index is 32.68 kg/m .  Physical Exam   GENERAL: healthy, alert and no distress  EYES: Eyes grossly normal to inspection, PERRL and conjunctivae and sclerae normal  HENT: ear canals and " TM's normal, nose and mouth without ulcers or lesions  NECK: no adenopathy, no asymmetry, masses, or scars and thyroid normal to palpation  RESP: lungs clear to auscultation - no rales, rhonchi or wheezes  CV: regular rate and rhythm, normal S1 S2, no S3 or S4, no murmur, click or rub, no peripheral edema and peripheral pulses strong  ABDOMEN: soft, nontender, no hepatosplenomegaly, no masses and bowel sounds normal  MS: no gross musculoskeletal defects noted, no edema  SKIN: no suspicious lesions or rashes and papule - face, nose, 2 x 2 mm, flesh-colored.  NEURO: Normal strength and tone, mentation intact and speech normal  PSYCH: mentation appears normal, affect normal/bright  Diabetic foot exam: normal DP and PT pulses, no trophic changes or ulcerative lesions, abnormal monofilament exam with absent sensation at all points on bilateral feet, surgical absence of left fifth digit, hammertoe of third digit of left foot.            Admission on 05/27/2023, Discharged on 05/27/2023   Component Date Value Ref Range Status    Ventricular Rate 05/27/2023 78  BPM Final    Atrial Rate 05/27/2023 78  BPM Final    PA Interval 05/27/2023 216  ms Final    QRS Duration 05/27/2023 92  ms Final    QT 05/27/2023 394  ms Final    QTc 05/27/2023 449  ms Final    P Axis 05/27/2023 54  degrees Final    R AXIS 05/27/2023 -30  degrees Final    T Axis 05/27/2023 124  degrees Final    Interpretation ECG 05/27/2023    Final                    Value:Sinus rhythm with 1st degree A-V block  Possible Left atrial enlargement  Left axis deviation  Cannot rule out Anteroseptal infarct (cited on or before 17-AUG-2021)  T wave abnormality, consider lateral ischemia  Abnormal ECG  When compared with ECG of 17-AUG-2021 07:00,  Vent. rate has increased BY  28 BPM  Questionable change in initial forces of Septal leads  Confirmed by SEE ED PROVIDER NOTE FOR, ECG INTERPRETATION ([id number]),  YESICA PANIAGUA ([id number]) on 5/27/2023 9:16:18 PM      Sodium  05/27/2023 137  136 - 145 mmol/L Final    Potassium 05/27/2023 4.4  3.5 - 5.0 mmol/L Final    Chloride 05/27/2023 105  98 - 107 mmol/L Final    Carbon Dioxide (CO2) 05/27/2023 27  22 - 31 mmol/L Final    Anion Gap 05/27/2023 5  5 - 18 mmol/L Final    Urea Nitrogen 05/27/2023 22  8 - 22 mg/dL Final    Creatinine 05/27/2023 1.57 (H)  0.70 - 1.30 mg/dL Final    Calcium 05/27/2023 8.2 (L)  8.5 - 10.5 mg/dL Final    Glucose 05/27/2023 238 (H)  70 - 125 mg/dL Final    GFR Estimate 05/27/2023 49 (L)  >60 mL/min/1.73m2 Final    eGFR calculated using 2021 CKD-EPI equation.    Magnesium 05/27/2023 1.7 (L)  1.8 - 2.6 mg/dL Final    Troponin I 05/27/2023 0.07  0.00 - 0.29 ng/mL Final    Bilirubin Total 05/27/2023 0.5  0.0 - 1.0 mg/dL Final    Bilirubin Direct 05/27/2023 0.1  <=0.5 mg/dL Final    Protein Total 05/27/2023 5.7 (L)  6.0 - 8.0 g/dL Final    Albumin 05/27/2023 3.2 (L)  3.5 - 5.0 g/dL Final    Alkaline Phosphatase 05/27/2023 48  45 - 120 U/L Final    AST 05/27/2023 20  0 - 40 U/L Final    ALT 05/27/2023 26  0 - 45 U/L Final    WBC Count 05/27/2023 9.9  4.0 - 11.0 10e3/uL Final    RBC Count 05/27/2023 3.92 (L)  4.40 - 5.90 10e6/uL Final    Hemoglobin 05/27/2023 11.1 (L)  13.3 - 17.7 g/dL Final    Hematocrit 05/27/2023 32.3 (L)  40.0 - 53.0 % Final    MCV 05/27/2023 82  78 - 100 fL Final    MCH 05/27/2023 28.3  26.5 - 33.0 pg Final    MCHC 05/27/2023 34.4  31.5 - 36.5 g/dL Final    RDW 05/27/2023 14.0  10.0 - 15.0 % Final    Platelet Count 05/27/2023 200  150 - 450 10e3/uL Final    % Neutrophils 05/27/2023 73  % Final    % Lymphocytes 05/27/2023 14  % Final    % Monocytes 05/27/2023 8  % Final    % Eosinophils 05/27/2023 4  % Final    % Basophils 05/27/2023 0  % Final    % Immature Granulocytes 05/27/2023 1  % Final    NRBCs per 100 WBC 05/27/2023 0  <1 /100 Final    Absolute Neutrophils 05/27/2023 7.3  1.6 - 8.3 10e3/uL Final    Absolute Lymphocytes 05/27/2023 1.4  0.8 - 5.3 10e3/uL Final    Absolute Monocytes  05/27/2023 0.8  0.0 - 1.3 10e3/uL Final    Absolute Eosinophils 05/27/2023 0.4  0.0 - 0.7 10e3/uL Final    Absolute Basophils 05/27/2023 0.0  0.0 - 0.2 10e3/uL Final    Absolute Immature Granulocytes 05/27/2023 0.1  <=0.4 10e3/uL Final    Absolute NRBCs 05/27/2023 0.0  10e3/uL Final

## 2023-12-04 LAB — RENIN PLAS-CCNC: 1.3 NG/ML/HR

## 2023-12-05 LAB
ALDOST SERPL-MCNC: 5.8 NG/DL (ref 0–31)
ALDOST/RENIN PLAS-RTO: 4.5 {RATIO} (ref 0–25)

## 2023-12-07 ENCOUNTER — TELEPHONE (OUTPATIENT)
Dept: DERMATOLOGY | Facility: CLINIC | Age: 63
End: 2023-12-07
Payer: MEDICARE

## 2023-12-07 NOTE — TELEPHONE ENCOUNTER
Called pt to assist in scheduling earlier appointment for spot check referral. No answer. LVM to call back.  Melly Argueta RN

## 2023-12-08 NOTE — TELEPHONE ENCOUNTER
Called patient and left a JOSE ALFREDO requesting patient to call back the clinic call back number ([phone])

## 2023-12-14 DIAGNOSIS — L97.422 DIABETIC ULCER OF LEFT MIDFOOT ASSOCIATED WITH TYPE 1 DIABETES MELLITUS, WITH FAT LAYER EXPOSED (H): ICD-10-CM

## 2023-12-14 DIAGNOSIS — E10.621 DIABETIC ULCER OF LEFT MIDFOOT ASSOCIATED WITH TYPE 1 DIABETES MELLITUS, WITH FAT LAYER EXPOSED (H): ICD-10-CM

## 2023-12-19 DIAGNOSIS — E10.621 DIABETIC ULCER OF LEFT MIDFOOT ASSOCIATED WITH TYPE 1 DIABETES MELLITUS, WITH FAT LAYER EXPOSED (H): ICD-10-CM

## 2023-12-19 DIAGNOSIS — L97.422 DIABETIC ULCER OF LEFT MIDFOOT ASSOCIATED WITH TYPE 1 DIABETES MELLITUS, WITH FAT LAYER EXPOSED (H): ICD-10-CM

## 2024-02-13 ENCOUNTER — OFFICE VISIT (OUTPATIENT)
Dept: DERMATOLOGY | Facility: CLINIC | Age: 64
End: 2024-02-13
Attending: STUDENT IN AN ORGANIZED HEALTH CARE EDUCATION/TRAINING PROGRAM
Payer: MEDICARE

## 2024-02-13 DIAGNOSIS — L57.0 ACTINIC KERATOSES: ICD-10-CM

## 2024-02-13 DIAGNOSIS — N40.1 BENIGN PROSTATIC HYPERPLASIA WITH URINARY FREQUENCY: ICD-10-CM

## 2024-02-13 DIAGNOSIS — D23.9 DERMATOFIBROMA: ICD-10-CM

## 2024-02-13 DIAGNOSIS — L82.1 SEBORRHEIC KERATOSES: Primary | ICD-10-CM

## 2024-02-13 DIAGNOSIS — R35.0 BENIGN PROSTATIC HYPERPLASIA WITH URINARY FREQUENCY: ICD-10-CM

## 2024-02-13 PROCEDURE — 99203 OFFICE O/P NEW LOW 30 MIN: CPT | Mod: 25 | Performed by: PHYSICIAN ASSISTANT

## 2024-02-13 PROCEDURE — 17000 DESTRUCT PREMALG LESION: CPT | Performed by: PHYSICIAN ASSISTANT

## 2024-02-13 RX ORDER — DAPAGLIFLOZIN 10 MG/1
10 TABLET, FILM COATED ORAL DAILY
Qty: 90 TABLET | Refills: 0 | Status: SHIPPED | OUTPATIENT
Start: 2024-02-13 | End: 2024-07-05

## 2024-02-13 RX ORDER — TERAZOSIN 2 MG/1
2 CAPSULE ORAL AT BEDTIME
Qty: 90 CAPSULE | Refills: 0 | Status: SHIPPED | OUTPATIENT
Start: 2024-02-13 | End: 2024-07-05

## 2024-02-13 ASSESSMENT — PAIN SCALES - GENERAL: PAINLEVEL: NO PAIN (0)

## 2024-02-13 NOTE — TELEPHONE ENCOUNTER
Patient is going out of town tomorrow for 1.5 months and would like to know if a 90 day supply of the patients scripts can be sent over to the pharmacy.    ---      Medication Question or Refill  What medication are you calling about (include dose and sig)?:       Preferred Pharmacy:  [facility]/pharmacy #[id number] - [place], [place] - [address] [place] [address]  Phone: [phone] Fax: [phone]      Controlled Substance Agreement on file:   CSA -- Patient Level:    CSA: None found at the patient level.       Who prescribed the medication?: Carlos Dorsey    Do you need a refill? Yes    Could we send this information to you in [employer] or would you prefer to receive a phone call?:     Patient would prefer a phone call     Okay to leave a detailed message?: Yes at Cell number on file:    Telephone Information:   Mobile [phone]

## 2024-02-13 NOTE — LETTER
2/13/2024       RE: Nabil Cheung  [address]  [place] [address]     Dear Colleague,    Thank you for referring your patient, Nabil Cheung, to the [facility] [place] at [facility]. Please see a copy of my visit note below.    [facility] Dermatology Note  Encounter Date: Feb 13, 2024  Office Visit     Reviewed patients past medical history and pertinent chart review prior to patients visit today.     Dermatology Problem List:  # Actinic keratosis  - cryotherapy 2/13/2024    ____________________________________________    Assessment & Plan:     #Actinic keratosis x1  - We discussed the precancerous nature of the skin lesions.  I recommended liquid nitrogen cryotherapy and the patient was agreeable.      Cryotherapy procedure note, location(s): left nasal tip x1 After verbal consent and discussion of risks and benefits including, but not limited to, dyspigmentation/scar, blister, and pain, the lesion(s) was(were) treated with 1-2 mm freeze border for 1-2 cycles with liquid nitrogen. Post cryotherapy instructions were provided.    # Dermatofibroma, left shin  # Seborrheic keratoses  We discussed the benign nature of the skin lesions. No treatment is required. I recommend continued observation with follow up should any concerning changes arise.       All risks, benefits and alternatives were discussed with patient.  Patient is in agreement and understands the assessment and plan.  All questions were answered.  Shreya Frankel PA-C  [facility] Dermatology  _______________________________________    CC: Derm Problem (Nabil is here today for a lesion of concern on the nose )    HPI:  Mr. Nabil Cheung is a(n) 63 year old male who presents today as a new patient for a lesion on the nose. The lesion has been present for over a year. It is occasionally itchy, no pain or bleeding. Additionally, he notes a  lesion on the left shin that has been present for many years. Patient is otherwise feeling well, without additional skin concerns.      Physical Exam:  SKIN: Focused examination of scalp, face, ears, neck, and left shin was performed.  - Pink macule(s) with gritty scale involving the left nasal tip, consistent with actinic keratosis.   - Waxy, stuck on appearing papule(s) throughout exam, consistent with seborrheic keraotses.   - Pink macule with central hypopigmentation that dimples with lateral pressure involving the left shin, consistent with a dermatofibroma.     - No other lesions of concern on areas examined.     Medications:  Current Outpatient Medications   Medication    acetaminophen (TYLENOL) 325 MG tablet    albuterol (PROAIR HFA/PROVENTIL HFA/VENTOLIN HFA) 108 (90 Base) MCG/ACT inhaler    alpha-lipoic acid 600 MG capsule    aspirin 81 MG EC tablet    coenzyme Q-10 200 MG CAPS capsule    FARXIGA 10 MG TABS tablet    finasteride (PROSCAR) 5 MG tablet    insulin aspart (NOVOLOG PEN) 100 UNIT/ML pen    insulin aspart (NOVOLOG PEN) 100 UNIT/ML pen    insulin aspart (NOVOLOG PEN) 100 UNIT/ML pen    insulin glargine (LANTUS PEN) 100 UNIT/ML pen    magnesium oxide 400 MG CAPS    metFORMIN (GLUCOPHAGE) 500 MG tablet    metoprolol succinate ER (TOPROL XL) 50 MG 24 hr tablet    nitroGLYcerin (NITROSTAT) 0.4 MG sublingual tablet    rosuvastatin (CRESTOR) 40 MG tablet    SYMBICORT 80-4.5 MCG/ACT Inhaler    terazosin (HYTRIN) 2 MG capsule    valsartan (DIOVAN) 160 MG tablet    vitamin C (ASCORBIC ACID) 1000 MG TABS    vitamin D3 (CHOLECALCIFEROL) 2000 units (50 mcg) tablet     No current facility-administered medications for this visit.      Past Medical History:   Patient Active Problem List   Diagnosis    Diabetes mellitus without complication (H)    Essential (primary) hypertension    Surgical wound infection    S/P CABG x 4    Non-healing surgical wound, subsequent encounter    BPH with obstruction/lower urinary  tract symptoms    Cataracts, bilateral    Chronic right shoulder pain    Coronary artery disease involving native coronary artery of native heart with other form of angina pectoris (H24)    Diabetic ulcer of left midfoot associated with type 2 diabetes mellitus, with fat layer exposed (H)    Elevated PSA, less than 10 ng/ml    Epididymoorchitis    Erectile dysfunction associated with type 2 diabetes mellitus (H)    Microalbuminuria    Myopia of both eyes with astigmatism and presbyopia    Retrograde ejaculation    Thyroid nodule    Uncontrolled type 2 diabetes mellitus with hyperglycemia (H)    Cellulitis and abscess of foot    Proliferative diabetic retinopathy of both eyes associated with diabetes mellitus due to underlying condition (H)    Cellulitis and abscess of foot, except toes    Diabetic ulcer of left midfoot associated with type 1 diabetes mellitus, with fat layer exposed (H)    Chronic kidney disease, stage 3 (H)    History of amputation of lesser toe of left foot (H24)    Severe persistent asthma without complication (H28)     No past medical history on file.    CC Carlos Correa MD  [address] [place] [id number] on close of this encounter.

## 2024-02-13 NOTE — NURSING NOTE
Dermatology Rooming Note    Nabil Cheung's goals for this visit include:   Chief Complaint   Patient presents with    Derm Problem     Nabil is here today for a lesion of concern on the nose      Malaika GARCIA CMA

## 2024-02-13 NOTE — PROGRESS NOTES
[facility] Dermatology Note  Encounter Date: Feb 13, 2024  Office Visit     Reviewed patients past medical history and pertinent chart review prior to patients visit today.     Dermatology Problem List:  # Actinic keratosis  - cryotherapy 2/13/2024    ____________________________________________    Assessment & Plan:     #Actinic keratosis x1  - We discussed the precancerous nature of the skin lesions.  I recommended liquid nitrogen cryotherapy and the patient was agreeable.      Cryotherapy procedure note, location(s): left nasal tip x1 After verbal consent and discussion of risks and benefits including, but not limited to, dyspigmentation/scar, blister, and pain, the lesion(s) was(were) treated with 1-2 mm freeze border for 1-2 cycles with liquid nitrogen. Post cryotherapy instructions were provided.    # Dermatofibroma, left shin  # Seborrheic keratoses  We discussed the benign nature of the skin lesions. No treatment is required. I recommend continued observation with follow up should any concerning changes arise.       All risks, benefits and alternatives were discussed with patient.  Patient is in agreement and understands the assessment and plan.  All questions were answered.  Shreya Frankel PA-C  [facility] Dermatology  _______________________________________    CC: Derm Problem (Nabil is here today for a lesion of concern on the nose )    HPI:  Mr. Nabil Cheung is a(n) 63 year old male who presents today as a new patient for a lesion on the nose. The lesion has been present for over a year. It is occasionally itchy, no pain or bleeding. Additionally, he notes a lesion on the left shin that has been present for many years. Patient is otherwise feeling well, without additional skin concerns.      Physical Exam:  SKIN: Focused examination of scalp, face, ears, neck, and left shin was performed.  - Pink macule(s) with gritty scale involving the left nasal tip, consistent with actinic  keratosis.   - Waxy, stuck on appearing papule(s) throughout exam, consistent with seborrheic keraotses.   - Pink macule with central hypopigmentation that dimples with lateral pressure involving the left shin, consistent with a dermatofibroma.     - No other lesions of concern on areas examined.     Medications:  Current Outpatient Medications   Medication    acetaminophen (TYLENOL) 325 MG tablet    albuterol (PROAIR HFA/PROVENTIL HFA/VENTOLIN HFA) 108 (90 Base) MCG/ACT inhaler    alpha-lipoic acid 600 MG capsule    aspirin 81 MG EC tablet    coenzyme Q-10 200 MG CAPS capsule    FARXIGA 10 MG TABS tablet    finasteride (PROSCAR) 5 MG tablet    insulin aspart (NOVOLOG PEN) 100 UNIT/ML pen    insulin aspart (NOVOLOG PEN) 100 UNIT/ML pen    insulin aspart (NOVOLOG PEN) 100 UNIT/ML pen    insulin glargine (LANTUS PEN) 100 UNIT/ML pen    magnesium oxide 400 MG CAPS    metFORMIN (GLUCOPHAGE) 500 MG tablet    metoprolol succinate ER (TOPROL XL) 50 MG 24 hr tablet    nitroGLYcerin (NITROSTAT) 0.4 MG sublingual tablet    rosuvastatin (CRESTOR) 40 MG tablet    SYMBICORT 80-4.5 MCG/ACT Inhaler    terazosin (HYTRIN) 2 MG capsule    valsartan (DIOVAN) 160 MG tablet    vitamin C (ASCORBIC ACID) 1000 MG TABS    vitamin D3 (CHOLECALCIFEROL) 2000 units (50 mcg) tablet     No current facility-administered medications for this visit.      Past Medical History:   Patient Active Problem List   Diagnosis    Diabetes mellitus without complication (H)    Essential (primary) hypertension    Surgical wound infection    S/P CABG x 4    Non-healing surgical wound, subsequent encounter    BPH with obstruction/lower urinary tract symptoms    Cataracts, bilateral    Chronic right shoulder pain    Coronary artery disease involving native coronary artery of native heart with other form of angina pectoris (H24)    Diabetic ulcer of left midfoot associated with type 2 diabetes mellitus, with fat layer exposed (H)    Elevated PSA, less than 10 ng/ml     Epididymoorchitis    Erectile dysfunction associated with type 2 diabetes mellitus (H)    Microalbuminuria    Myopia of both eyes with astigmatism and presbyopia    Retrograde ejaculation    Thyroid nodule    Uncontrolled type 2 diabetes mellitus with hyperglycemia (H)    Cellulitis and abscess of foot    Proliferative diabetic retinopathy of both eyes associated with diabetes mellitus due to underlying condition (H)    Cellulitis and abscess of foot, except toes    Diabetic ulcer of left midfoot associated with type 1 diabetes mellitus, with fat layer exposed (H)    Chronic kidney disease, stage 3 (H)    History of amputation of lesser toe of left foot (H24)    Severe persistent asthma without complication (H28)     No past medical history on file.    CC Carlos Correa MD  [address] [place] [address] on close of this encounter.

## 2024-02-13 NOTE — PATIENT INSTRUCTIONS
Patient Education        Proper skin care from [facility]:     -Eliminate harsh soaps as they strip the natural oils from the skin, often resulting in dry itchy skin ( i.e. Dial, Zest, [place])  -Use mild soaps such as Cetaphil or Dove Sensitive Skin in the shower. You do not need to use soap on arms, legs, and trunk every time you shower unless visibly soiled.   -Avoid hot or cold showers.  -After showering, lightly dry off and apply moisturizing within 2-3 minutes. This will help trap moisture in the skin.   -Aggressive use of a moisturizer at least 1-2 times a day to the entire body (including -Vanicream, Cetaphil, Aquaphor or Cerave) and moisturize hands after every washing.  -We recommend using moisturizers that come in a tub that needs to be scooped out, not a pump. This has more of an oil base. It will hold moisture in your skin much better than a water base moisturizer. The above recommended are non-pore clogging.        Wear a sunscreen with at least SPF 30 on your face, ears, neck and V of the chest daily. Wear sunscreen on other areas of the body if those areas are exposed to the sun throughout the day. Sunscreens can contain physical and/or chemical blockers. Physical blockers are less likely to clog pores, these include zinc oxide and titanium dioxide. Reapply every two hour and after swimming.      Sunscreen examples: https://www.ewg.org/sunscreen/     UV radiation  UVA radiation remains constant throughout the day and throughout the year. It is a longer wavelength than UVB and therefore penetrates deeper into the skin leading to immediate and delayed tanning, photoaging, and skin cancer. 70-80% of UVA and UVB radiation occurs between the hours of 10am-2pm.  UVB radiation  UVB radiation causes the most harmful effects and is more significant during the summer months. However, snow and ice can reflect UVB radiation leading to skin damage during the winter months as well. UVB radiation is  responsible for tanning, burning, inflammation, delayed erythema (pinkness), pigmentation (brown spots), and skin cancer.      I recommend self monthly full body exams and yearly full body exams with a dermatology provider. If you develop a new or changing lesion please follow up for examination. Most skin cancers are pink and scaly or pink and pearly. However, we do see blue/brown/black skin cancers.  Consider the ABCDEs of melanoma when giving yourself your monthly full body exam ( don't forget the groin, buttocks, feet, toes, etc). A-asymmetry, B-borders, C-color, D-diameter, E-elevation or evolving. If you see any of these changes please follow up in clinic. If you cannot see your back I recommend purchasing a hand held mirror to use with a larger wall mirror.       Checking for Skin Cancer  You can find cancer early by checking your skin each month. There are 3 kinds of skin cancer. They are melanoma, basal cell carcinoma, and squamous cell carcinoma. Doing monthly skin checks is the best way to find new marks or skin changes. Follow the instructions below for checking your skin.   The ABCDEs of checking moles for melanoma   Check your moles or growths for signs of melanoma using ABCDE:   Asymmetry: the sides of the mole or growth don t match  Border: the edges are ragged, notched, or blurred  Color: the color within the mole or growth varies  Diameter: the mole or growth is larger than 6 mm (size of a pencil eraser)  Evolving: the size, shape, or color of the mole or growth is changing (evolving is not shown in the images below)    Checking for other types of skin cancer  Basal cell carcinoma or squamous cell carcinoma have symptoms such as:      A spot or mole that looks different from all other marks on your skin  Changes in how an area feels, such as itching, tenderness, or pain  Changes in the skin's surface, such as oozing, bleeding, or scaliness  A sore that does not heal  New swelling or redness beyond  the border of a mole     Who s at risk?  Anyone can get skin cancer. But you are at greater risk if you have:   Fair skin, light-colored hair, or light-colored eyes  Many moles or abnormal moles on your skin  A history of sunburns from sunlight or tanning beds  A family history of skin cancer  A history of exposure to radiation or chemicals  A weakened immune system  If you have had skin cancer in the past, you are at risk for recurring skin cancer.   How to check your skin  Do your monthly skin checkups in front of a full-length mirror. Check all parts of your body, including your:   Head (ears, face, neck, and scalp)  Torso (front, back, and sides)  Arms (tops, undersides, upper, and lower armpits)  Hands (palms, backs, and fingers, including under the nails)  Buttocks and genitals  Legs (front, back, and sides)  Feet (tops, soles, toes, including under the nails, and between toes)  If you have a lot of moles, take digital photos of them each month. Make sure to take photos both up close and from a distance. These can help you see if any moles change over time.   Most skin changes are not cancer. But if you see any changes in your skin, call your doctor right away. Only he or she can diagnose a problem. If you have skin cancer, seeing your doctor can be the first step toward getting the treatment that could save your life.   [employer] last reviewed this educational content on 4/1/2019 2000-2020 [employer]. [address] [place] [address], [place] [address]. All rights reserved. This information is not intended as a substitute for professional medical care. Always follow your healthcare professional's instructions.        When should I call my doctor?  If you are worsening or not improving, please, contact us or seek urgent care as noted below.      Who should I call with questions (adults)?  [facility] (adult and pediatric): [phone]  [facility]  [place] (adult): [phone]  [facility] ([place], [place], [place] and [place]) [phone]  For urgent needs outside of business hours call the [facility] at [phone] and ask for the dermatology resident on call to be paged  If this is a medical emergency and you are unable to reach an ER, Call 911        If you need a prescription refill, please contact your pharmacy. Refills are approved or denied by our Physicians during normal business hours, Monday through Fridays  Per office policy, refills will not be granted if you have not been seen within the past year (or sooner depending on your child's condition)

## 2024-03-21 ENCOUNTER — OFFICE VISIT (OUTPATIENT)
Dept: SLEEP MEDICINE | Facility: CLINIC | Age: 64
End: 2024-03-21
Payer: MEDICARE

## 2024-03-21 VITALS
OXYGEN SATURATION: 97 % | DIASTOLIC BLOOD PRESSURE: 96 MMHG | SYSTOLIC BLOOD PRESSURE: 181 MMHG | BODY MASS INDEX: 32.06 KG/M2 | HEART RATE: 86 BPM | WEIGHT: 229 LBS | HEIGHT: 71 IN

## 2024-03-21 DIAGNOSIS — I10 ESSENTIAL (PRIMARY) HYPERTENSION: ICD-10-CM

## 2024-03-21 DIAGNOSIS — R25.8 NOCTURNAL LEG MOVEMENTS: ICD-10-CM

## 2024-03-21 DIAGNOSIS — R06.81 WITNESSED APNEIC SPELLS: ICD-10-CM

## 2024-03-21 DIAGNOSIS — J45.50 SEVERE PERSISTENT ASTHMA WITHOUT COMPLICATION (H): ICD-10-CM

## 2024-03-21 DIAGNOSIS — R06.83 SNORING: Primary | ICD-10-CM

## 2024-03-21 DIAGNOSIS — E11.65 UNCONTROLLED TYPE 2 DIABETES MELLITUS WITH HYPERGLYCEMIA (H): ICD-10-CM

## 2024-03-21 DIAGNOSIS — R53.82 CHRONIC FATIGUE: ICD-10-CM

## 2024-03-21 DIAGNOSIS — I25.118 CORONARY ARTERY DISEASE INVOLVING NATIVE CORONARY ARTERY OF NATIVE HEART WITH OTHER FORM OF ANGINA PECTORIS (H): ICD-10-CM

## 2024-03-21 PROCEDURE — 99205 OFFICE O/P NEW HI 60 MIN: CPT | Performed by: PHYSICIAN ASSISTANT

## 2024-03-21 ASSESSMENT — SLEEP AND FATIGUE QUESTIONNAIRES
HOW LIKELY ARE YOU TO NOD OFF OR FALL ASLEEP WHILE LYING DOWN TO REST IN THE AFTERNOON WHEN CIRCUMSTANCES PERMIT: HIGH CHANCE OF DOZING
HOW LIKELY ARE YOU TO NOD OFF OR FALL ASLEEP WHILE SITTING AND READING: SLIGHT CHANCE OF DOZING
HOW LIKELY ARE YOU TO NOD OFF OR FALL ASLEEP WHILE SITTING AND TALKING TO SOMEONE: WOULD NEVER DOZE
HOW LIKELY ARE YOU TO NOD OFF OR FALL ASLEEP WHILE SITTING QUIETLY AFTER LUNCH WITHOUT ALCOHOL: SLIGHT CHANCE OF DOZING
HOW LIKELY ARE YOU TO NOD OFF OR FALL ASLEEP WHEN YOU ARE A PASSENGER IN A CAR FOR AN HOUR WITHOUT A BREAK: MODERATE CHANCE OF DOZING
HOW LIKELY ARE YOU TO NOD OFF OR FALL ASLEEP WHILE SITTING INACTIVE IN A PUBLIC PLACE: SLIGHT CHANCE OF DOZING
HOW LIKELY ARE YOU TO NOD OFF OR FALL ASLEEP WHILE WATCHING TV: SLIGHT CHANCE OF DOZING
HOW LIKELY ARE YOU TO NOD OFF OR FALL ASLEEP IN A CAR, WHILE STOPPED FOR A FEW MINUTES IN TRAFFIC: WOULD NEVER DOZE

## 2024-03-21 NOTE — NURSING NOTE
"Chief Complaint   Patient presents with    Sleep Problem     Possible sleep apnea, Some snoring/ some pauses in breathing       Initial BP (!) 191/104   Pulse 86   Ht 1.803 m (5' 11\")   Wt 103.9 kg (229 lb)   SpO2 97%   BMI 31.94 kg/m   Estimated body mass index is 31.94 kg/m  as calculated from the following:    Height as of this encounter: 1.803 m (5' 11\").    Weight as of this encounter: 103.9 kg (229 lb).    Medication Reconciliation: complete  ESS 9  Neck circumference: 49 centimeters.  Roberta Rose MA   "

## 2024-03-21 NOTE — PATIENT INSTRUCTIONS
"Nabil to follow up with Primary Care provider regarding elevated blood pressure.               MY TREATMENT INFORMATION FOR SLEEP APNEA-  Nabil Cheung  Frequently asked questions:  1. What is Obstructive Sleep Apnea (NAVI)? NAVI is the most common type of sleep apnea. Apnea means, \"without breath.\"  Apnea is most often caused by narrowing or collapse of the upper airway as muscles relax during sleep.   Almost everyone has occasional apneas. Most people with sleep apnea have had brief interruptions at night frequently for many years.  The severity of sleep apnea is related to how frequent and severe the events are.   2. What are the consequences of NAVI? Symptoms include: feeling sleepy during the day, snoring loudly, gasping or stopping of breathing, trouble sleeping, and occasionally morning headaches or heartburn at night.  Sleepiness can be serious and even increase the risk of falling asleep while driving. Other health consequences may include development of high blood pressure and other cardiovascular disease in persons who are susceptible. Untreated NAVI  can contribute to heart disease, stroke and diabetes.   3. What are the treatment options? In most situations, sleep apnea is a lifelong disease that must be managed with daily therapy. Medications are not effective for sleep apnea and surgery is generally not considered until other therapies have been tried. Your treatment is your choice . Continuous Positive Airway (CPAP) works right away and is the therapy that is effective in nearly everyone. An oral device to hold your jaw forward is usually the next most reliable option. Other options include postioning devices (to keep you off your back), weight loss, and surgery including a tongue pacing device. There is more detail about some of these options below.  4. Are my sleep studies covered by insurance? Although we will request verification of coverage, we advise you also check in advance of the study to ensure " there is coverage.    Important tips for those choosing CPAP and similar devices  REMEMBER-IF YOU RECEIVE A CALL FROM  [phone]-->IT IS TO SETUP A DEVICE  For new devices, sign up for device MARILEE to monitor your device for your followup visits  We encourage you to utilize the ALTILIA marilee or website (myAir web (resmed.com) ) to monitor your therapy progress and share the data with your healthcare team when you discuss your sleep apnea.                                                    Know your equipment:  CPAP is continuous positive airway pressure that prevents obstructive sleep apnea by keeping the throat from collapsing while you are sleeping. In most cases, the device is  smart  and can slowly self-adjusts if your throat collapses and keeps a record every day of how well you are treated-this information is available to you and your care team.  BPAP is bilevel positive airway pressure that keeps your throat open and also assists each breath with a pressure boost to maintain adequate breathing.  Special kinds of BPAP are used in patients who have inadequate breathing from lung or heart disease. In most cases, the device is  smart  and can slowly self-adjusts to assist breathing. Like CPAP, the device keeps a record of how well you are treated.  Your mask is your connection to the device. You get to choose what feels most comfortable and the staff will help to make sure if fits. Here: are some examples of the different masks that are available: Magnetic mask aids may assist with use but there are safety issues that should be addressed when considering with magnets* ( see end of discussion).       Key points to remember on your journey with sleep apnea:  Sleep study.  PAP devices often need to be adjusted during a sleep study to show that they are effective and adjusted right.  Good tips to remember: Try wearing just the mask during a quiet time during the day so your body adapts to wearing it. A  humidifier is recommended for comfort in most cases to prevent drying of your nose and throat. Allergy medication from your provider may help you if you are having nasal congestion.  Getting settled-in. It takes more than one night for most of us to get used to wearing a mask. Try wearing just the mask during a quiet time during the day so your body adapts to wearing it. A humidifier is recommended for comfort in most cases. Our team will work with you carefully on the first day and will be in contact within 4 days and again at 2 and 4 weeks for advice and remote device adjustments. Your therapy is evaluated by the device each day.   Use it every night. The more you are able to sleep naturally for 7-8 hours, the more likely you will have good sleep and to prevent health risks or symptoms from sleep apnea. Even if you use it 4 hours it helps. Occasionally all of us are unable to use a medical therapy, in sleep apnea, it is not dangerous to miss one night.   Communicate. Call our skilled team on the number provided on the first day if your visit for problems that make it difficult to wear the device. Over 2 out of 3 patients can learn to wear the device long-term with help from our team. Remember to call our team or your sleep providers if you are unable to wear the device as we may have other solutions for those who cannot adapt to mask CPAP therapy. It is recommended that you sleep your sleep provider within the first 3 months and yearly after that if you are not having problems.   Use it for your health. We encourage use of CPAP masks during daytime quiet periods to allow your face and brain to adapt to the sensation of CPAP so that it will be a more natural sensation to awaken to at night or during naps. This can be very useful during the first few weeks or months of adapting to CPAP though it does not help medically to wear CPAP during wakefulness and  should not be used as a strategy just to meet  guidelines.  Take care of your equipment. Make sure you clean your mask and tubing using directions every day and that your filter and mask are replaced as recommended or if they are not working.     *Masks with magnets:  Updated Contraindications  Masks with magnetic components are contraindicated for use by patients where they, or anyone in close physical contact while using the mask, have the following:   Active medical implants that interact with magnets (i.e., pacemakers, implantable cardioverter defibrillators (ICD), neurostimulators, cerebrospinal fluid (CSF) shunts, insulin/infusion pumps)   Metallic implants/objects containing ferromagnetic material (i.e., aneurysm clips/flow disruption devices, embolic coils, stents, valves, electrodes, implants to restore hearing or balance with implanted magnets, ocular implants, metallic splinters in the eye)  Updated Warning  Keep the mask magnets at a safe distance of at least 6 inches (150 mm) away from implants or medical devices that may be adversely affected by magnetic interference. This warning applies to you or anyone in close physical contact with your mask. The magnets are in the frame and lower headgear clips, with a magnetic field strength of up to 400mT. When worn, they connect to secure the mask but may inadvertently detach while asleep.  Implants/medical devices, including those listed within contraindications, may be adversely affected if they change function under external magnetic fields or contain ferromagnetic materials that attract/repel to magnetic fields (some metallic implants, e.g., contact lenses with metal, dental implants, metallic cranial plates, screws, ngozi hole covers, and bone substitute devices). Consult your physician and  of your implant / other medical device for information on the potential adverse effects of magnetic fields.    BESIDES CPAP, WHAT OTHER THERAPIES ARE THERE?    Positioning Device  Positioning devices are  generally used when sleep apnea is mild and only occurs on your back.This example shows a pillow that straps around the waist. It may be appropriate for those whose sleep study shows milder sleep apnea that occurs primarily when lying flat on one's back. Preliminary studies have shown benefit but effectiveness at home may need to be verified by a home sleep test. These devices are generally not covered by medical insurance.  Examples of devices that maintain sleeping on the back to prevent snoring and mild sleep apnea.    Belt type body positioner  http://BYTEGRID/    Electronic reminder  http://nightshifttherapy.com/            Oral Appliance  What is oral appliance therapy?  An oral appliance device fits on your teeth at night like a retainer used after having braces. The device is made by a specialized dentist and requires several visits over 1-2 months before a manufactured device is made to fit your teeth and is adjusted to prevent your sleep apnea. Once an oral device is working properly, snoring should be improved. A home sleep test may be recommended at that time if to determine whether the sleep apnea is adequately treated.       Some things to remember:  -Oral devices are often, but not always, covered by your medical insurance. Be sure to check with your insurance provider.   -If you are referred for oral therapy, you will be given a list of specialized dentists to consider or you may choose to visit the Web site of the American Academy of Dental Sleep Medicine  -Oral devices are less likely to work if you have severe sleep apnea or are extremely overweight.     More detailed information  An oral appliance is a small acrylic device that fits over the upper and lower teeth  (similar to a retainer or a mouth guard). This device slightly moves jaw forward, which moves the base of the tongue forward, opens the airway, improves breathing for effective treat snoring and obstructive sleep apnea in perhaps 7  out of 10 people .  The best working devices are custom-made by a dental device  after a mold is made of the teeth 1, 2, 3.  When is an oral appliance indicated?  Oral appliance therapy is recommended as a first-line treatment for patients with primary snoring, mild sleep apnea, and for patients with moderate sleep apnea who prefer appliance therapy to use of CPAP4, 5. Severity of sleep apnea is determined by sleep testing and is based on the number of respiratory events per hour of sleep.   How successful is oral appliance therapy?  The success rate of oral appliance therapy in patients with mild sleep apnea is 75-80% while in patients with moderate sleep apnea it is 50-70%. The chance of success in patients with severe sleep apnea is 40-50%. The research also shows that oral appliances have a beneficial effect on the cardiovascular health of NAVI patients at the same magnitude as CPAP therapy7.  Oral appliances should be a second-line treatment in cases of severe sleep apnea, but if not completely successful then a combination therapy utilizing CPAP plus oral appliance therapy may be effective. Oral appliances tend to be effective in a broad range of patients although studies show that the patients who have the highest success are females, younger patients, those with milder disease, and less severe obesity. 3, 6.   Finding a dentist that practices dental sleep medicine  Specific training is available through the American Academy of Dental Sleep Medicine for dentists interested in working in the field of sleep. To find a dentist who is educated in the field of sleep and the use of oral appliances, near you, visit the Web site of the American Academy of Dental Sleep Medicine.    References  1. Farzana et al. Objectively measured vs self-reported compliance during oral appliance therapy for sleep-disordered breathing. Chest 2013; 144(5): 4872-3244.  2. Zhane et al. Objective measurement of  compliance during oral appliance therapy for sleep-disordered breathing. Thorax 2013; 68(1): 91-96.  3. Keyon, et al. Mandibular advancement devices in 620 men and women with NAVI and snoring: tolerability and predictors of treatment success. Chest 2004; 125: 3724-0969.  4. Jim et al. Oral appliances for snoring and NAVI: a review. Sleep 2006; 29: 244-262.  5. Rose et al. Oral appliance treatment for NAVI: an update. J Clin Sleep Med 2014; 10(2): 215-227.  6. Chon et al. Predictors of OSAH treatment outcome. J Dent Res 2007; 86: 9840-5235.      Weight Loss:   Your Body mass index is 31.94 kg/m .    Being overweight does not necessarily mean you will have health consequences.  Those who have BMI over 35 or over 27 with existing medical conditions carries greater risk.   Weight loss decreases severity of sleep apnea in most people with obesity. For those with mild obesity who have developed snoring with weight gain, even 15-30 pound weight loss can improve and occasionally milder eliminate sleep apnea.  Structured and life-long dietary and health habits are necessary to lose weight and keep healthier weight levels.     The Comprehensive Weight loss program offers all aspects of weight loss strategies including two Non-Surgical Weight Loss Programs: Medical Weight Management and our 24 Week Healthy Lifestyle Program:    Medical Weight Management: You will meet with a Medical Weight Management Provider, as well as a Registered Dietician. The program may include medication therapy, dietary education, recommended exercise and physical therapy programs, monthly support group meetings, and possible psychological counseling. Follow up visits with the provider or dietician are scheduled based on your progress and needs.    24 Week Healthy Lifestyle Program: This unique program is designed to give you the support of weekly appointments and activities thru a 24-week period. It may include all of the components  of the basic program (above), with the addition of 11 individual Health  Visits, 24-week access to the [employer] website for over 700 online classes, and monthly support group meetings. This program has an out-of-pocket expense of $499 to cover the items that can not be billed to insurance (health coaches and [employer] access), and is non-refundable/non-transferable (you may be able to use a Health Savings Account; ask your HSA provider). There may be an optional meal replacement plan prescribed as well.   Surgical management achieves meaningful long-term weight loss and improvement in health risks in most patients with more severe obesity.      Sleep Apnea Surgery:    Surgery for obstructive sleep apnea is considered generally only when other therapies fail to work. Surgery may be discussed with you if you are having a difficult time tolerating CPAP and or when there is an abnormal structure that requires surgical correction.  Nose and throat surgeries often enlarge the airway to prevent collapse.  Most of these surgeries create pain for 1-2 weeks and up to half of the most common surgeries are not effective throughout life.  You should carefully discuss the benefits and drawbacks to surgery with your sleep provider and surgeon to determine if it is the best solution for you.   More information  Surgery for NAVI is directed at areas that are responsible for narrowing or complete obstruction of the airway during sleep.  There are a wide range of procedures available to enlarge and/or stabilize the airway to prevent blockage of breathing in the three major areas where it can occur: the palate, tongue, and nasal regions.  Successful surgical treatment depends on the accurate identification of the factors responsible for obstructive sleep apnea in each person.  A personalized approach is required because there is no single treatment that works well for everyone.  Because of anatomic variation, consultation with an  examination by a sleep surgeon is a critical first step in determining what surgical options are best for each patient.  In some cases, examination during sedation may be recommended in order to guide the selection of procedures.  Patients will be counseled about risks and benefits as well as the typical recovery course after surgery. Surgery is typically not a cure for a person s NAVI.  However, surgery will often significantly improve one s NAVI severity (termed  success rate ).  Even in the absence of a cure, surgery will decrease the cardiovascular risk associated with OSA7; improve overall quality of life8 (sleepiness, functionality, sleep quality, etc).      Palate Procedures:  Patients with NAVI often have narrowing of their airway in the region of their tonsils and uvula.  The goals of palate procedures are to widen the airway in this region as well as to help the tissues resist collapse.  Modern palate procedure techniques focus on tissue conservation and soft tissue rearrangement, rather than tissue removal.  Often the uvula is preserved in this procedure. Residual sleep apnea is common in patient after pharyngoplasty with an average reduction in sleep apnea events of 33%2.      Tongue Procedures:  ExamWhile patients are awake, the muscles that surround the throat are active and keep this region open for breathing. These muscles relax during sleep, allowing the tongue and other structures to collapse and block breathing.  There are several different tongue procedures available.  Selection of a tongue base procedure depends on characteristics seen on physical exam.  Generally, procedures are aimed at removing bulky tissues in this area or preventing the back of the tongue from falling back during sleep.  Success rates for tongue surgery range from 50-62%3.    Hypoglossal Nerve Stimulation:  Hypoglossal nerve stimulation has recently received approval from the [place] Food and Drug Administration for the  treatment of obstructive sleep apnea.  This is based on research showing that the system was safe and effective in treating sleep apnea6.  Results showed that the median AHI score decreased 68%, from 29.3 to 9.0. This therapy uses an implant system that senses breathing patterns and delivers mild stimulation to airway muscles, which keeps the airway open during sleep.  The system consists of three fully implanted components: a small generator (similar in size to a pacemaker), a breathing sensor, and a stimulation lead.  Using a small handheld remote, a patient turns the therapy on before bed and off upon awakening.    Candidates for this device must be greater than 18 years of age, have moderate to severe obstructive sleep apnea with less than 25% central events  (AHI between 15-65), BMI less than 35, have tried CPAP/oral appliance for at least 8 weeks without success, and have appropriate upper airway anatomy (determined by a sleep endoscopy performed by Dr. Omega Anderson or Dr. Prosper Burton).    Nasal Procedures:  Nasal obstruction can interfere with nasal breathing during the day and night.  Studies have shown that relief of nasal obstruction can improve the ability of some patients to tolerate positive airway pressure therapy for obstructive sleep apnea1.  Treatment options include medications such as nasal saline, topical corticosteroid and antihistamine sprays, and oral medications such as antihistamines or decongestants. Non-surgical treatments can include external nasal dilators for selected patients. If these are not successful by themselves, surgery can improve the nasal airway either alone or in combination with these other options.        Combination Procedures:  Combination of surgical procedures and other treatments may be recommended, particularly if patients have more than one area of narrowing or persistent positional disease.  The success rate of combination surgery ranges from 66-80%2,3.     References  Emily IBRAHIM. The Role of the Nose in Snoring and Obstructive Sleep Apnoea: An Update.  Eur Arch Otorhinolaryngol. 2011; 268: 1365-73.   Robel SM; Sheldon JA; Alexandre JR; Pallanch JF; Shannan MB; Mami SG; Moisés BONILLA. Surgical modifications of the upper airway for obstructive sleep apnea in adults: a systematic review and meta-analysis. SLEEP 2010;33(10):5490-2490. David CERNA. Hypopharyngeal surgery in obstructive sleep apnea: an evidence-based medicine review.  Arch Otolaryngol Head Neck Surg. 2006 Feb;132(2):206-13.  Mayco YH1, Shawna Y, Dariusz PATRICIA. The efficacy of anatomically based multilevel surgery for obstructive sleep apnea. Otolaryngol Head Neck Surg. 2003 Oct;129(4):327-35.  David CERNA, Goldberg A. Hypopharyngeal Surgery in Obstructive Sleep Apnea: An Evidence-Based Medicine Review. Arch Otolaryngol Head Neck Surg. 2006 Feb;132(2):206-13.  Eliecer GARCIA et al. Upper-Airway Stimulation for Obstructive Sleep Apnea.  N Engl J Med. 2014 Jan 9;370(2):139-49.  Peamarilys Y et al. Increased Incidence of Cardiovascular Disease in Middle-aged Men with Obstructive Sleep Apnea. Am J Respir Crit Care Med; 2002 166: 159-165  Mcginnis EM et al. Studying Life Effects and Effectiveness of Palatopharyngoplasty (SLEEP) study: Subjective Outcomes of Isolated Uvulopalatopharyngoplasty. Otolaryngol Head Neck Surg. 2011; 144: 623-631.        WHAT IF I ONLY HAVE SNORING?    Mandibular advancement devices, lateral sleep positioning, long-term weight loss and treatment of nasal allergies have been shown to improve snoring.  Exercising tongue muscles with a game (https://apps.Rockbot/aXess [place]/marilee/soundly-reduce-snoring/wf2466496260) or stimulating the tongue during the day with a device (https://doi.org/10.3390/uwl65664600) have improved snoring in some individuals.  https://www.[employer].Rheonix/  https://www.sleepfoundation.org/best-anti-snoring-mouthpieces-and-mouthguards    Remember to Drive Safe... Drive Alive     Sleep health  profoundly affects your health, mood, and your safety.  Thirty three percent of the population (one in three of us) is not getting enough sleep and many have a sleep disorder. Not getting enough sleep or having an untreated / undertreated sleep condition may make us sleepy without even knowing it. In fact, our driving could be dramatically impaired due to our sleep health. As your provider, here are some things I would like you to know about driving:     Here are some warning signs for impairment and dangerous drowsy driving:              -Having been awake more than 16 hours               -Looking tired               -Eyelid drooping              -Head nodding (it could be too late at this point)              -Driving for more than 30 minutes     Some things you could do to make the driving safer if you are experiencing some drowsiness:              -Stop driving and rest              -Call for transportation              -Make sure your sleep disorder is adequately treated     Some things that have been shown NOT to work when experiencing drowsiness while driving:              -Turning on the radio              -Opening windows              -Eating any  distracting  /  entertaining  foods (e.g., sunflower seeds, candy, or any other)              -Talking on the phone      Your decision may not only impact your life, but also the life of others. Please, remember to drive safe for yourself and all of us.

## 2024-04-17 DIAGNOSIS — E11.21 TYPE 2 DIABETES MELLITUS WITH DIABETIC NEPHROPATHY, WITH LONG-TERM CURRENT USE OF INSULIN (H): Primary | ICD-10-CM

## 2024-04-17 DIAGNOSIS — Z79.4 TYPE 2 DIABETES MELLITUS WITH DIABETIC NEPHROPATHY, WITH LONG-TERM CURRENT USE OF INSULIN (H): Primary | ICD-10-CM

## 2024-04-17 NOTE — TELEPHONE ENCOUNTER
New Medication Request    Contacts         Type Contact Phone/Fax    04/17/2024 11:10 AM CDT Phone (Incoming) Nabil Cheung (Self) [phone] (M)            What medication are you requesting?: Freestyle Rhonda 2 Sanderson and Sensor - usually gets 1 month at a time.     Reason for medication request: Pt is requesting a prescription for Freestyle Rhonda 2 Sanderson and Sensor. He would like a whole new kit. What he has been doing was he was prescribed this 4 years ago, and it took him 6 months before using them. He has been getting two a month to use but was not using them. PT has been using past monitors without getting new ones and is requesting a prescription for this to be sent to [facility]. I do not know how set up pending meds, please help set it up.     Have you taken this medication before?: Yes: Has been using in the past.     Controlled Substance Agreement on file:   CSA -- Patient Level:    CSA: None found at the patient level.         Patient offered an appointment? No    Preferred Pharmacy:     [facility]/pharmacy #[id number] - [place], [place] - [address] [place] [address]  Phone: [phone] Fax: [phone]      Could we send this information to you [facility] or would you prefer to receive a phone call?:   Patient would prefer a phone call   Okay to leave a detailed message?: No at Cell number on file:    Telephone Information:   Mobile [phone]

## 2024-04-17 NOTE — TELEPHONE ENCOUNTER
Patient would like a call when the device and sensor has been approved and sent to the pharmacy.    KITTY Ellison

## 2024-05-04 ENCOUNTER — HEALTH MAINTENANCE LETTER (OUTPATIENT)
Age: 64
End: 2024-05-04

## 2024-06-26 ENCOUNTER — TRANSFERRED RECORDS (OUTPATIENT)
Dept: MULTI SPECIALTY CLINIC | Facility: CLINIC | Age: 64
End: 2024-06-26
Payer: MEDICARE

## 2024-06-26 LAB — RETINOPATHY: NORMAL

## 2024-07-05 DIAGNOSIS — R35.0 BENIGN PROSTATIC HYPERPLASIA WITH URINARY FREQUENCY: ICD-10-CM

## 2024-07-05 DIAGNOSIS — N40.1 BENIGN PROSTATIC HYPERPLASIA WITH URINARY FREQUENCY: ICD-10-CM

## 2024-07-05 RX ORDER — DAPAGLIFLOZIN 10 MG/1
10 TABLET, FILM COATED ORAL DAILY
Qty: 90 TABLET | Refills: 1 | Status: SHIPPED | OUTPATIENT
Start: 2024-07-05

## 2024-07-05 RX ORDER — TERAZOSIN 2 MG/1
2 CAPSULE ORAL AT BEDTIME
Qty: 90 CAPSULE | Refills: 1 | Status: SHIPPED | OUTPATIENT
Start: 2024-07-05

## 2024-07-05 NOTE — TELEPHONE ENCOUNTER
Patient calling in because he is due needs medications refilled.     He also has some concerns about his blood pressure staying high- he is also seeing a kidney specialist. Talking about starting a water pill. Patient is wanting Dr. Correa's opinion regarding additional medications.     Scheduled a visit with Dr. Correa on July 16th to follow up since it seems to be a bigger conversation to be hard about medication follow up and who is going to prescribe what.

## 2024-07-13 ENCOUNTER — HEALTH MAINTENANCE LETTER (OUTPATIENT)
Age: 64
End: 2024-07-13

## 2024-07-16 ENCOUNTER — ANCILLARY PROCEDURE (OUTPATIENT)
Dept: GENERAL RADIOLOGY | Facility: CLINIC | Age: 64
End: 2024-07-16
Attending: STUDENT IN AN ORGANIZED HEALTH CARE EDUCATION/TRAINING PROGRAM
Payer: MEDICARE

## 2024-07-16 ENCOUNTER — OFFICE VISIT (OUTPATIENT)
Dept: FAMILY MEDICINE | Facility: CLINIC | Age: 64
End: 2024-07-16
Payer: MEDICARE

## 2024-07-16 VITALS
OXYGEN SATURATION: 97 % | DIASTOLIC BLOOD PRESSURE: 91 MMHG | BODY MASS INDEX: 31.59 KG/M2 | TEMPERATURE: 97.9 F | SYSTOLIC BLOOD PRESSURE: 158 MMHG | HEART RATE: 60 BPM | HEIGHT: 72 IN | WEIGHT: 233.2 LBS | RESPIRATION RATE: 12 BRPM

## 2024-07-16 DIAGNOSIS — Z89.619: ICD-10-CM

## 2024-07-16 DIAGNOSIS — E08.3593 PROLIFERATIVE DIABETIC RETINOPATHY OF BOTH EYES ASSOCIATED WITH DIABETES MELLITUS DUE TO UNDERLYING CONDITION, UNSPECIFIED PROLIFERATIVE RETINOPATHY TYPE (H): ICD-10-CM

## 2024-07-16 DIAGNOSIS — L97.522 DIABETIC ULCER OF TOE OF LEFT FOOT ASSOCIATED WITH DIABETES MELLITUS DUE TO UNDERLYING CONDITION, WITH FAT LAYER EXPOSED (H): ICD-10-CM

## 2024-07-16 DIAGNOSIS — I10 ESSENTIAL (PRIMARY) HYPERTENSION: ICD-10-CM

## 2024-07-16 DIAGNOSIS — E11.65 TYPE 2 DIABETES MELLITUS WITH HYPERGLYCEMIA, WITH LONG-TERM CURRENT USE OF INSULIN (H): ICD-10-CM

## 2024-07-16 DIAGNOSIS — E66.01 MORBID OBESITY (H): ICD-10-CM

## 2024-07-16 DIAGNOSIS — E11.69: ICD-10-CM

## 2024-07-16 DIAGNOSIS — I25.118 CORONARY ARTERY DISEASE INVOLVING NATIVE CORONARY ARTERY OF NATIVE HEART WITH OTHER FORM OF ANGINA PECTORIS (H): ICD-10-CM

## 2024-07-16 DIAGNOSIS — N18.32 CKD STAGE 3B, GFR 30-44 ML/MIN (H): ICD-10-CM

## 2024-07-16 DIAGNOSIS — J45.50 SEVERE PERSISTENT ASTHMA WITHOUT COMPLICATION (H): ICD-10-CM

## 2024-07-16 DIAGNOSIS — Z79.4 TYPE 2 DIABETES MELLITUS WITH HYPERGLYCEMIA, WITH LONG-TERM CURRENT USE OF INSULIN (H): ICD-10-CM

## 2024-07-16 DIAGNOSIS — R60.0 BILATERAL LOWER EXTREMITY EDEMA: Primary | ICD-10-CM

## 2024-07-16 DIAGNOSIS — R06.01 ORTHOPNEA: ICD-10-CM

## 2024-07-16 DIAGNOSIS — E08.621 DIABETIC ULCER OF TOE OF LEFT FOOT ASSOCIATED WITH DIABETES MELLITUS DUE TO UNDERLYING CONDITION, WITH FAT LAYER EXPOSED (H): ICD-10-CM

## 2024-07-16 PROBLEM — Z47.81 ENCOUNTER FOR ORTHOPEDIC AFTERCARE FOLLOWING SURGICAL AMPUTATION: Status: ACTIVE | Noted: 2021-08-21

## 2024-07-16 PROBLEM — Z89.422 ACQUIRED ABSENCE OF OTHER LEFT TOE(S) (H): Status: ACTIVE | Noted: 2021-08-21

## 2024-07-16 PROBLEM — E11.9 TYPE 2 DIABETES MELLITUS (H): Status: ACTIVE | Noted: 2021-08-21

## 2024-07-16 PROBLEM — N40.0 BENIGN PROSTATIC HYPERPLASIA WITHOUT LOWER URINARY TRACT SYMPTOMS: Status: ACTIVE | Noted: 2021-08-21

## 2024-07-16 PROBLEM — E11.319 TYPE 2 DIABETES MELLITUS WITH UNSPECIFIED DIABETIC RETINOPATHY WITHOUT MACULAR EDEMA (H): Status: ACTIVE | Noted: 2021-08-21

## 2024-07-16 PROBLEM — E78.5 HYPERLIPIDEMIA, UNSPECIFIED: Status: ACTIVE | Noted: 2021-08-21

## 2024-07-16 PROBLEM — J45.40 MODERATE PERSISTENT ASTHMA WITHOUT COMPLICATION: Status: ACTIVE | Noted: 2023-08-02

## 2024-07-16 PROBLEM — M86.272: Status: ACTIVE | Noted: 2021-08-21

## 2024-07-16 PROBLEM — E11.22 TYPE 2 DIABETES MELLITUS WITH CHRONIC KIDNEY DISEASE (H): Status: ACTIVE | Noted: 2021-08-21

## 2024-07-16 PROBLEM — Z79.84 LONG TERM (CURRENT) USE OF ORAL HYPOGLYCEMIC DRUGS: Status: ACTIVE | Noted: 2021-08-21

## 2024-07-16 PROBLEM — D64.9 ANEMIA, UNSPECIFIED: Status: ACTIVE | Noted: 2021-08-21

## 2024-07-16 PROBLEM — I25.10 ATHEROSCLEROTIC HEART DISEASE OF NATIVE CORONARY ARTERY WITHOUT ANGINA PECTORIS: Status: ACTIVE | Noted: 2021-08-21

## 2024-07-16 PROBLEM — B95.7 OTHER STAPHYLOCOCCUS AS THE CAUSE OF DISEASES CLASSIFIED ELSEWHERE: Status: ACTIVE | Noted: 2021-08-21

## 2024-07-16 PROBLEM — R05.3 CHRONIC COUGH: Status: ACTIVE | Noted: 2023-05-05

## 2024-07-16 PROBLEM — Z95.1 PRESENCE OF AORTOCORONARY BYPASS GRAFT: Status: ACTIVE | Noted: 2021-08-21

## 2024-07-16 PROBLEM — J30.9 ALLERGIC RHINITIS: Status: ACTIVE | Noted: 2023-09-18

## 2024-07-16 PROBLEM — B95.2 ENTEROCOCCUS AS THE CAUSE OF DISEASES CLASSIFIED ELSEWHERE: Status: ACTIVE | Noted: 2021-08-21

## 2024-07-16 PROBLEM — N18.31 CHRONIC KIDNEY DISEASE, STAGE 3A (H): Status: ACTIVE | Noted: 2021-08-21

## 2024-07-16 LAB
ALBUMIN SERPL BCG-MCNC: 3.8 G/DL (ref 3.5–5.2)
ALP SERPL-CCNC: 59 U/L (ref 40–150)
ALT SERPL W P-5'-P-CCNC: 20 U/L (ref 0–70)
ANION GAP SERPL CALCULATED.3IONS-SCNC: 9 MMOL/L (ref 7–15)
AST SERPL W P-5'-P-CCNC: 22 U/L (ref 0–45)
BASOPHILS # BLD AUTO: 0.1 10E3/UL (ref 0–0.2)
BASOPHILS NFR BLD AUTO: 0 %
BILIRUB SERPL-MCNC: 0.3 MG/DL
BUN SERPL-MCNC: 34 MG/DL (ref 8–23)
CALCIUM SERPL-MCNC: 8.7 MG/DL (ref 8.8–10.4)
CHLORIDE SERPL-SCNC: 103 MMOL/L (ref 98–107)
CREAT SERPL-MCNC: 2.15 MG/DL (ref 0.67–1.17)
EGFRCR SERPLBLD CKD-EPI 2021: 33 ML/MIN/1.73M2
EOSINOPHIL # BLD AUTO: 0.5 10E3/UL (ref 0–0.7)
EOSINOPHIL NFR BLD AUTO: 4 %
ERYTHROCYTE [DISTWIDTH] IN BLOOD BY AUTOMATED COUNT: 14.2 % (ref 10–15)
GLUCOSE SERPL-MCNC: 196 MG/DL (ref 70–99)
HCO3 SERPL-SCNC: 26 MMOL/L (ref 22–29)
HCT VFR BLD AUTO: 37.9 % (ref 40–53)
HGB BLD-MCNC: 12.6 G/DL (ref 13.3–17.7)
IMM GRANULOCYTES # BLD: 0 10E3/UL
IMM GRANULOCYTES NFR BLD: 0 %
LYMPHOCYTES # BLD AUTO: 1.9 10E3/UL (ref 0.8–5.3)
LYMPHOCYTES NFR BLD AUTO: 16 %
MCH RBC QN AUTO: 27.7 PG (ref 26.5–33)
MCHC RBC AUTO-ENTMCNC: 33.2 G/DL (ref 31.5–36.5)
MCV RBC AUTO: 83 FL (ref 78–100)
MONOCYTES # BLD AUTO: 1.2 10E3/UL (ref 0–1.3)
MONOCYTES NFR BLD AUTO: 10 %
NEUTROPHILS # BLD AUTO: 7.9 10E3/UL (ref 1.6–8.3)
NEUTROPHILS NFR BLD AUTO: 69 %
NT-PROBNP SERPL-MCNC: 1939 PG/ML (ref 0–900)
PLATELET # BLD AUTO: 232 10E3/UL (ref 150–450)
POTASSIUM SERPL-SCNC: 4.8 MMOL/L (ref 3.4–5.3)
PROT SERPL-MCNC: 6.5 G/DL (ref 6.4–8.3)
RBC # BLD AUTO: 4.55 10E6/UL (ref 4.4–5.9)
SODIUM SERPL-SCNC: 138 MMOL/L (ref 135–145)
T4 FREE SERPL-MCNC: 0.94 NG/DL (ref 0.9–1.7)
TSH SERPL DL<=0.005 MIU/L-ACNC: 8.07 UIU/ML (ref 0.3–4.2)
WBC # BLD AUTO: 11.4 10E3/UL (ref 4–11)

## 2024-07-16 PROCEDURE — 99214 OFFICE O/P EST MOD 30 MIN: CPT | Performed by: STUDENT IN AN ORGANIZED HEALTH CARE EDUCATION/TRAINING PROGRAM

## 2024-07-16 PROCEDURE — 71046 X-RAY EXAM CHEST 2 VIEWS: CPT | Mod: TC | Performed by: RADIOLOGY

## 2024-07-16 PROCEDURE — G2211 COMPLEX E/M VISIT ADD ON: HCPCS | Performed by: STUDENT IN AN ORGANIZED HEALTH CARE EDUCATION/TRAINING PROGRAM

## 2024-07-16 PROCEDURE — 93005 ELECTROCARDIOGRAM TRACING: CPT | Performed by: STUDENT IN AN ORGANIZED HEALTH CARE EDUCATION/TRAINING PROGRAM

## 2024-07-16 PROCEDURE — 83880 ASSAY OF NATRIURETIC PEPTIDE: CPT | Performed by: STUDENT IN AN ORGANIZED HEALTH CARE EDUCATION/TRAINING PROGRAM

## 2024-07-16 PROCEDURE — 36415 COLL VENOUS BLD VENIPUNCTURE: CPT | Performed by: STUDENT IN AN ORGANIZED HEALTH CARE EDUCATION/TRAINING PROGRAM

## 2024-07-16 PROCEDURE — 84443 ASSAY THYROID STIM HORMONE: CPT | Performed by: STUDENT IN AN ORGANIZED HEALTH CARE EDUCATION/TRAINING PROGRAM

## 2024-07-16 PROCEDURE — 84439 ASSAY OF FREE THYROXINE: CPT | Performed by: STUDENT IN AN ORGANIZED HEALTH CARE EDUCATION/TRAINING PROGRAM

## 2024-07-16 PROCEDURE — 80053 COMPREHEN METABOLIC PANEL: CPT | Performed by: STUDENT IN AN ORGANIZED HEALTH CARE EDUCATION/TRAINING PROGRAM

## 2024-07-16 PROCEDURE — 85025 COMPLETE CBC W/AUTO DIFF WBC: CPT | Performed by: STUDENT IN AN ORGANIZED HEALTH CARE EDUCATION/TRAINING PROGRAM

## 2024-07-16 RX ORDER — CARVEDILOL 12.5 MG/1
12.5 TABLET ORAL
COMMUNITY
Start: 2024-07-05 | End: 2024-07-31

## 2024-07-16 RX ORDER — CHLORTHALIDONE 25 MG/1
12.5 TABLET ORAL DAILY
Qty: 30 TABLET | Refills: 0 | Status: SHIPPED | OUTPATIENT
Start: 2024-07-16 | End: 2024-07-22

## 2024-07-16 RX ORDER — BUDESONIDE AND FORMOTEROL FUMARATE DIHYDRATE 160; 4.5 UG/1; UG/1
2 AEROSOL RESPIRATORY (INHALATION) 2 TIMES DAILY
COMMUNITY

## 2024-07-16 ASSESSMENT — ASTHMA QUESTIONNAIRES
ACT_TOTALSCORE: 11
QUESTION_2 LAST FOUR WEEKS HOW OFTEN HAVE YOU HAD SHORTNESS OF BREATH: MORE THAN ONCE A DAY
QUESTION_4 LAST FOUR WEEKS HOW OFTEN HAVE YOU USED YOUR RESCUE INHALER OR NEBULIZER MEDICATION (SUCH AS ALBUTEROL): ONE OR TWO TIMES PER DAY
QUESTION_5 LAST FOUR WEEKS HOW WOULD YOU RATE YOUR ASTHMA CONTROL: SOMEWHAT CONTROLLED
QUESTION_1 LAST FOUR WEEKS HOW MUCH OF THE TIME DID YOUR ASTHMA KEEP YOU FROM GETTING AS MUCH DONE AT WORK, SCHOOL OR AT HOME: SOME OF THE TIME
QUESTION_3 LAST FOUR WEEKS HOW OFTEN DID YOUR ASTHMA SYMPTOMS (WHEEZING, COUGHING, SHORTNESS OF BREATH, CHEST TIGHTNESS OR PAIN) WAKE YOU UP AT NIGHT OR EARLIER THAN USUAL IN THE MORNING: TWO OR THREE NIGHTS A WEEK
ACT_TOTALSCORE: 11

## 2024-07-16 NOTE — PROGRESS NOTES
Assessment & Plan     Bilateral lower extremity edema  Has history of moderate persistent asthma, diabetes, hypertension, coronary artery disease status post CABG x 4 May 2020 presenting with orthopnea and bilateral lower extremity edema.  Has been having bilateral lower extremity edema, as well as orthopnea over the last several weeks. I am concerned that this secondary to heart failure, but also possibly secondary to known asthma, renal injury also considered    Will obtain CBC, BNP, renal function panel, TSH/T4, echocardiogram exercise stress test (may need to convert to pharmacologic stress test) and chest x-ray.      Will start treatment with chlorthalidone 25 mg.  This should help both blood pressure as well as diuresis.    Await results, encourage follow-up with cardiology who he already is established with, last seen May 2023.    - BNP-N terminal pro  - TSH with free T4 reflex  - BNP-N terminal pro  - TSH with free T4 reflex  - T4 free    Orthopnea  See above  - BNP-N terminal pro  - EKG 12-lead, tracing only  - Echocardiogram Exercise Stress  - TSH with free T4 reflex  - CBC with platelets and differential  - XR Chest 2 Views  - BNP-N terminal pro  - TSH with free T4 reflex  - CBC with platelets and differential  - T4 free      Coronary artery disease involving native coronary artery of native heart with other form of angina pectoris (H24)   currently on metoprolol 50 mg daily, valsartan 160 mg daily, aspirin 81 mg daily and rosuvastatin 20 mg daily.  Has Nitrostat available but is not using.  See above, echocardiogram exercise stress test ordered, chest x-ray ordered.  Recommend follow-up with cardiology whom he last saw 1 year ago in May 2023.  He already has an [facility] relationship with [facility]  - Echocardiogram Exercise Stress    Severe persistent asthma without complication (H28)  Patient has been reporting dyspnea/orthopnea as above, otherwise has been able to maintain  control of his asthma with Symbicort, reporting he does not use albuterol due to no need.  Currently using Symbicort twice a day does see allergy and pulmonology, last seen in September 2023. Was having persistent cough secondary to lisinopril, has improved with switch to valsartan. Has noticed exacerbations to sawdust exposure and tobacco smoke. Continue Symbicort, continue follow-up with pulmonology, last visit with pulmonology June 19, 2024.    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  He does see endocrinology for management of his diabetes.     Their discussion has been regarding insulin, as A1c has not been at goal, last was 9.4% on June 12, 2024.  He has discussed insulin pump with endocrinology, they advised he get his A1c down before below 9 before switching to pump.  Is on basal and daytime insulin, dipagliflozin, and metformin 500 mg 2 times daily.  Previously attempted GLP-1, but had a GI reaction and had to stop.  Current plan from endocrinology is 32 units basal insulin with 1 to 7 units 3 times daily before meals (sliding scale and carb correction (1:15)) for daytime insulin.  He will continue follow-up with endocrinology.     Is on rosuvastatin and aspirin.    Last eye exam September 2023, has diabetic retinopathy, he has monthly injections with retina consultants in [place]    "  Foot exam in December 2023 was abnormal, had absent sensation to monofilament to bilateral feet, normal DP pulses, see exam below.  Referral placed to [facility] for diabetic orthotics.  He has history of crepitation of the fifth digit of the left foot secondary to diabetic ulcer and osteomyelitis.     For nephropathy he follows with nephrology, Dr. Quezada.  As above currently on valsartan and SGL 2.  We will repeat kidney function study, as well as albumin creatinine ratio, and A1c.    Ordered albumin creatinine ratio.    Chronic kidney disease, stage 3b (H)  Follows with nephrology, on Farxiga and valsartan, will obtain renal panel as well as albumin creatinine ratio.  Will attempt to better blood pressure control, encouraged continued follow-up with nephrology.    - Renal panel (Alb, BUN, Ca, Cl, CO2, Creat, Gluc, Phos, K, Na)  -Albumin creatinine ratio        Essential (primary) hypertension  Not well-controlled despite treatment with terazosin, carvedilol 12.5 mg twice daily, valsartan 160 twice daily.  Recommend starting chlorthalidone 25 mg daily, he will return for follow-up in 2 to 4 weeks, will assess for control at that time.  - chlorthalidone (HYGROTON) 25 MG tablet  Dispense: 30 tablet; Refill: 0        Morbid obesity      BMI  Estimated body mass index is 31.35 kg/m  as calculated from the following:    Height as of this encounter: 1.837 m (6' 0.32\").    Weight as of this encounter: 105.8 kg (233 lb 3.2 oz).   Weight management plan: Discussed healthy diet and exercise guidelines      Follow-up in 2 weeks, or go to the emergency room if having significant worsening of shortness of breath, significant worsening of lower extremity edema or chest pain    Subjective   Nabil is a 64 year old, presenting for the following health issues:  Heart Problem (Asthma, SOB and fatigue)      7/16/2024     9:58 AM   Additional Questions   Roomed by ANNE BAHENA     Via the Health Maintenance questionnaire, the patient has " "reported the following services have been completed -Eye Exam: retina center 2024-06-26, this information has been sent to the abstraction team.  Heart Problem    History of Present Illness     Asthma:  He presents for follow up of asthma.  He has some cough, some wheezing, and some shortness of breath.  He is using a relief medication daily. He typically misses taking his controller medication 1 time(s) per week. Patient is aware of the following triggers: unaware of any triggers. The patient has not had a visit to the Emergency Room, Urgent Care or Hospital due to asthma since the last clinic visit.     Hypertension: He presents for follow up of hypertension.  He does check blood pressure  regularly outside of the clinic. Outside blood pressures have been over 140/90. He does not follow a low salt diet.     Vascular Disease:  He presents for follow up of vascular disease.      He takes daily aspirin.    He eats 2-3 servings of fruits and vegetables daily.He consumes 0 sweetened beverage(s) daily.He exercises with enough effort to increase his heart rate 9 or less minutes per day.  He exercises with enough effort to increase his heart rate 3 or less days per week. He is missing 1 dose(s) of medications per week.   \      For the past month has had increased shortness of breath, and has had shortness of breath when in the supine position.  May also be short of breath during the day, but it is not as significant. He attempted using symbicort 6 times a day and it did not seem to improve his symptoms in June, so he went back to 2 times a day.     Sometimes will get left sided chest pain which happens at any time, does occur if he is stretching a certain way.  Does not hurt when he walks \"(he walks 1/4 to 1/2 mile a day). Does not improve with rest.  Generally pain is present for about 30 seconds to a minute. This has been noticeable to him for the past couple of years.     Does report some swelling in both legs, " "usually in the ankles and lower leg covered by ankle socks, but rarely goes into the calves. This has been present for a couple of months. If he elevated his legs he would have more issues breathing.      Denies palpitations, radiation of pain, diaphoresis, nausea/vomiting, syncope/presyncope                 Review of Systems  Constitutional, neuro, ENT, endocrine, pulmonary, cardiac, gastrointestinal, genitourinary, musculoskeletal, integument and psychiatric systems are negative, except as otherwise noted.      Objective    BP (!) 158/91 (BP Location: Right arm, Patient Position: Sitting, Cuff Size: Adult Regular)   Pulse 60   Temp 97.9  F (36.6  C) (Oral)   Resp 12   Ht 1.837 m (6' 0.32\")   Wt 105.8 kg (233 lb 3.2 oz)   SpO2 97%   BMI 31.35 kg/m    Body mass index is 31.35 kg/m .  Physical Exam   GENERAL: alert and no distress  EYES: Eyes grossly normal to inspection, PERRL and conjunctivae and sclerae normal  HENT: ear canals and TM's normal, nose and mouth without ulcers or lesions  NECK: no adenopathy, no asymmetry, masses, or scars  RESP: lungs clear to auscultation - no rales, rhonchi or wheezes  CV: regular rate and rhythm, normal S1 S2, no S3 or S4, no murmur, click or rub, no peripheral edema  ABDOMEN: soft, nontender, no hepatosplenomegaly, no masses and bowel sounds normal  MS: no gross musculoskeletal defects noted, no edema  SKIN: no suspicious lesions or rashes  NEURO: Normal strength and tone, mentation intact and speech normal  PSYCH: mentation appears normal, affect normal/bright      EKst degree AV block with normal sinus rhythm, left axis deviation, possible anterior infarct, unchanged from EKG done 2023      Transferred Records on 2024   Component Date Value Ref Range Status    RETINOPATHY 2024 UNKNOWN   Final         [facility]  Outside Information         suggestion  Information displayed in this report may not trend or trigger automated decision support. "       Contains abnormal data POC GLYCOSYLATED HGB-A1C MONITORING  Order: [id number]  Component  Ref Range & Units 1 mo ago   Hemoglobin A1C  4.0 - 5.6 % 9.4 High    Estimated Average Glucose  mg/dL 223   Comment: Results are not to be used for diag     PANEL RENAL  Order: [id number]  Component  Ref Range & Units 3 mo ago   Sodium  135 - 148 mEq/L 138   Potassium  3.5 - 5.3 mEq/L 4.7   Chloride  92 - 108 mEq/L 102   CO2  22 - 30 mEq/L 24   AnGap  8 - 16 mEq/L 12   Glucose  70 - 100 mg/dL 324 High    BUN  8 - 23 mg/dL 27 High    Creatinine  0.70 - 1.25 mg/dL 1.95 High    Calcium  8.8 - 10.2 mg/dL 9.1   Albumin  3.8 - 5.1 g/dL 3.5 Low    Phosphorus  2.5 - 4.5 mg/dL 3.6   eGFR (2021 CKD-EPI)  >=60 ml/min/1.73m2 37 Low    BC WITH PLATELET  Order: [id number]  Component  Ref Range & Units 3 mo ago   WBC  4.00 - 10.00 k/cmm 9.45   RBC  4.60 - 6.00 m/cmm 4.35 Low    Hgb  13.1 - 17.5 g/dL 12.2 Low    Hematocrit  40.0 - 51.0 % 35.4 Low    MCV  80.0 - 100.0 fL 81.4   MCH  25.0 - 32.0 pg 28.0   MCHC  31.0 - 36.0 g/dL 34.5   RDW  11.5 - 14.5 % 14.3   Plt  150 - 400 k/cmm 235   MPV  6.5 - 12.5 fL 9.2   The longitudinal plan of care for the diagnosis(es)/condition(s) as documented were addressed during this visit. Due to the added complexity in care, I will continue to support Nabil in the subsequent management and with ongoing continuity of care.      Signed Electronically by: Carlos Correa MD

## 2024-07-19 PROBLEM — E08.621 DIABETIC ULCER OF TOE OF LEFT FOOT ASSOCIATED WITH DIABETES MELLITUS DUE TO UNDERLYING CONDITION, WITH FAT LAYER EXPOSED (H): Status: ACTIVE | Noted: 2024-07-19

## 2024-07-19 PROBLEM — L97.522 DIABETIC ULCER OF TOE OF LEFT FOOT ASSOCIATED WITH DIABETES MELLITUS DUE TO UNDERLYING CONDITION, WITH FAT LAYER EXPOSED (H): Status: ACTIVE | Noted: 2024-07-19

## 2024-07-19 PROBLEM — M86.272: Status: RESOLVED | Noted: 2021-08-21 | Resolved: 2024-07-19

## 2024-07-20 ENCOUNTER — NURSE TRIAGE (OUTPATIENT)
Dept: NURSING | Facility: CLINIC | Age: 64
End: 2024-07-20
Payer: MEDICARE

## 2024-07-20 NOTE — TELEPHONE ENCOUNTER
"Clinic Action Needed: Yes, please call patient asap. Leave a detailed VM if no answer. See FNA encounter below.     Routed to: PCP care team      Patient scheduled to have a stress test on Monday, 07/22. He is requesting the results of the EKG he had done on 07/16 prior to the stress test.     Pt worried that if there is anything abnormal on the EKG, it may not be wise to do the stress test on Monday.     RN and  looked through patient's chart but unable to find results of EKG. Status just says \"completed\".     I advised pt that I will send an urgent message to pt's care team to review and call him back on Monday morning.     Patient verbalized understanding and had no further questions.      Amparo Perea RN  [facility] Nurse Advisor  7/20/2024 1:44 PM     Reason for Disposition   Caller requesting routine or non-urgent lab result    Additional Information   Negative: ED call to PCP (i.e., primary care provider; doctor, NP, or PA)   Negative: Doctor (or NP/PA) call to PCP   Negative: Call about patient who is currently hospitalized   Negative: Lab or radiology calling with CRITICAL test results   Negative: [1] Follow-up call from patient regarding patient's clinical status AND [2] information urgent   Negative: [1] Caller requests to speak ONLY to PCP AND [2] URGENT question   Negative: [1] Caller requests to speak to PCP now AND [2] won't tell us reason for call  (Exception: If 10 pm to 6 am, caller must first discuss reason for the call.)   Negative: Notification of hospital admission   Negative: Notification of death   Negative: Caller requesting lab results  (Exception: Routine or non-urgent lab result.)   Negative: Lab or radiology calling with test results   Negative: [1] Follow-up call from patient regarding patient's clinical status AND [2] information NON-URGENT   Negative: [1] Caller requests to speak ONLY to PCP AND [2] NON-URGENT question   Negative: Caller requesting an appointment, triage " offered and declined    Protocols used: PCP Call - No Triage-A-AH

## 2024-07-22 ENCOUNTER — HOSPITAL ENCOUNTER (OUTPATIENT)
Dept: CARDIOLOGY | Facility: CLINIC | Age: 64
Discharge: HOME | End: 2024-07-22
Attending: STUDENT IN AN ORGANIZED HEALTH CARE EDUCATION/TRAINING PROGRAM | Admitting: STUDENT IN AN ORGANIZED HEALTH CARE EDUCATION/TRAINING PROGRAM
Payer: MEDICARE

## 2024-07-22 DIAGNOSIS — I25.118 CORONARY ARTERY DISEASE INVOLVING NATIVE CORONARY ARTERY OF NATIVE HEART WITH OTHER FORM OF ANGINA PECTORIS (H): ICD-10-CM

## 2024-07-22 DIAGNOSIS — I10 ESSENTIAL (PRIMARY) HYPERTENSION: ICD-10-CM

## 2024-07-22 DIAGNOSIS — I50.30 HEART FAILURE WITH PRESERVED EJECTION FRACTION, NYHA CLASS I (H): Primary | ICD-10-CM

## 2024-07-22 DIAGNOSIS — R06.01 ORTHOPNEA: ICD-10-CM

## 2024-07-22 DIAGNOSIS — E03.8 SUBCLINICAL HYPOTHYROIDISM: ICD-10-CM

## 2024-07-22 DIAGNOSIS — R06.01 ORTHOPNEA: Primary | ICD-10-CM

## 2024-07-22 LAB
CV STRESS CURRENT BP HE: NORMAL
CV STRESS CURRENT HR HE: 62
CV STRESS CURRENT HR HE: 63
CV STRESS CURRENT HR HE: 65
CV STRESS CURRENT HR HE: 65
CV STRESS CURRENT HR HE: 66
CV STRESS CURRENT HR HE: 68
CV STRESS CURRENT HR HE: 68
CV STRESS CURRENT HR HE: 72
CV STRESS CURRENT HR HE: 73
CV STRESS CURRENT HR HE: 74
CV STRESS CURRENT HR HE: 75
CV STRESS CURRENT HR HE: 76
CV STRESS CURRENT HR HE: 77
CV STRESS CURRENT HR HE: 78
CV STRESS CURRENT HR HE: 78
CV STRESS CURRENT HR HE: 79
CV STRESS CURRENT HR HE: 82
CV STRESS CURRENT HR HE: 83
CV STRESS CURRENT HR HE: 83
CV STRESS DEVIATION TIME HE: NORMAL
CV STRESS ECHO PERCENT HR HE: NORMAL
CV STRESS EXERCISE STAGE HE: NORMAL
CV STRESS EXERCISE STAGE REACHED HE: NORMAL
CV STRESS FINAL RESTING BP HE: NORMAL
CV STRESS FINAL RESTING HR HE: 65
CV STRESS MAX HR HE: 84
CV STRESS MAX TREADMILL GRADE HE: 12
CV STRESS MAX TREADMILL SPEED HE: 2.5
CV STRESS PEAK DIA BP HE: NORMAL
CV STRESS PEAK SYS BP HE: NORMAL
CV STRESS PHASE HE: NORMAL
CV STRESS PROTOCOL HE: NORMAL
CV STRESS REASON STOPPED HE: NORMAL
CV STRESS RESTING PT POSITION HE: NORMAL
CV STRESS RESTING PT POSITION HE: NORMAL
CV STRESS ST DEVIATION AMOUNT HE: NORMAL
CV STRESS ST DEVIATION ELEVATION HE: NORMAL
CV STRESS ST EVELATION AMOUNT HE: NORMAL
CV STRESS SYMPTOMS HE: NORMAL
CV STRESS TEST TYPE HE: NORMAL
CV STRESS TOTAL STAGE TIME MIN 1 HE: NORMAL
STRESS ECHO BASELINE DIASTOLIC HE: 70
STRESS ECHO BASELINE HR: 61
STRESS ECHO BASELINE SYSTOLIC BP: 124
STRESS ECHO LAST STRESS DIASTOLIC BP: 72
STRESS ECHO LAST STRESS HR: 83
STRESS ECHO LAST STRESS SYSTOLIC BP: 142
STRESS ECHO POST ESTIMATED WORKLOAD: 5
STRESS ECHO POST EXERCISE DUR MIN: 3
STRESS ECHO POST EXERCISE DUR SEC: 15
STRESS ECHO TARGET HR: 131

## 2024-07-22 PROCEDURE — 93352 ADMIN ECG CONTRAST AGENT: CPT | Performed by: INTERNAL MEDICINE

## 2024-07-22 PROCEDURE — 93321 DOPPLER ECHO F-UP/LMTD STD: CPT | Mod: 26 | Performed by: INTERNAL MEDICINE

## 2024-07-22 PROCEDURE — 93325 DOPPLER ECHO COLOR FLOW MAPG: CPT | Mod: TC

## 2024-07-22 PROCEDURE — 93016 CV STRESS TEST SUPVJ ONLY: CPT | Performed by: INTERNAL MEDICINE

## 2024-07-22 PROCEDURE — C8928 TTE W OR W/O FOL W/CON,STRES: HCPCS

## 2024-07-22 PROCEDURE — 255N000002 HC RX 255 OP 636: Performed by: STUDENT IN AN ORGANIZED HEALTH CARE EDUCATION/TRAINING PROGRAM

## 2024-07-22 PROCEDURE — 93325 DOPPLER ECHO COLOR FLOW MAPG: CPT | Mod: 26 | Performed by: INTERNAL MEDICINE

## 2024-07-22 PROCEDURE — 93350 STRESS TTE ONLY: CPT | Mod: 26 | Performed by: INTERNAL MEDICINE

## 2024-07-22 PROCEDURE — 93018 CV STRESS TEST I&R ONLY: CPT | Performed by: INTERNAL MEDICINE

## 2024-07-22 RX ORDER — SPIRONOLACTONE 25 MG/1
12.5 TABLET ORAL DAILY
Qty: 30 TABLET | Refills: 0 | Status: SHIPPED | OUTPATIENT
Start: 2024-07-22 | End: 2024-09-03

## 2024-07-22 RX ORDER — CHLORTHALIDONE 25 MG/1
25 TABLET ORAL DAILY
Qty: 90 TABLET | Refills: 3 | Status: SHIPPED | OUTPATIENT
Start: 2024-07-22

## 2024-07-22 RX ORDER — SPIRONOLACTONE 25 MG/1
25 TABLET ORAL DAILY
Qty: 30 TABLET | Refills: 0 | Status: SHIPPED | OUTPATIENT
Start: 2024-07-22 | End: 2024-07-22

## 2024-07-22 RX ORDER — LEVOTHYROXINE SODIUM 50 UG/1
50 TABLET ORAL DAILY
Qty: 90 TABLET | Refills: 0 | Status: SHIPPED | OUTPATIENT
Start: 2024-07-22

## 2024-07-22 RX ADMIN — PERFLUTREN 2 ML: 6.52 INJECTION, SUSPENSION INTRAVENOUS at 11:49

## 2024-07-23 ENCOUNTER — TELEPHONE (OUTPATIENT)
Dept: FAMILY MEDICINE | Facility: CLINIC | Age: 64
End: 2024-07-23
Payer: MEDICARE

## 2024-07-23 NOTE — TELEPHONE ENCOUNTER
Medication Question or Refill    Contacts       Contact Date/Time Type Contact Phone/Fax    07/23/2024 08:18 AM CDT Phone (Incoming) [facility] [address] (Pharmacy) [phone]            What medication are you calling about (include dose and sig)?: Spironolactone (ALDACTONE) 25 mg Take 0.5 tables (12.5 mg) by mouth daily    Preferred Pharmacy:   [address] [address]  Phone: [phone] Fax: [phone]    Who prescribed the medication?: Dr. Correa    Do you need a refill? No    When did you use the medication last? NA    Patient offered an appointment? No    Do you have any questions or concerns?  Yes: Pharmacist calling for medication clarification.  Requesting dosing summary.

## 2024-07-24 ENCOUNTER — TRANSFERRED RECORDS (OUTPATIENT)
Dept: HEALTH INFORMATION MANAGEMENT | Facility: CLINIC | Age: 64
End: 2024-07-24
Payer: MEDICARE

## 2024-07-24 LAB — RETINOPATHY: POSITIVE

## 2024-07-30 ENCOUNTER — OFFICE VISIT (OUTPATIENT)
Dept: FAMILY MEDICINE | Facility: CLINIC | Age: 64
End: 2024-07-30
Payer: MEDICARE

## 2024-07-30 VITALS
BODY MASS INDEX: 31.61 KG/M2 | RESPIRATION RATE: 16 BRPM | SYSTOLIC BLOOD PRESSURE: 144 MMHG | TEMPERATURE: 97.7 F | WEIGHT: 233.4 LBS | DIASTOLIC BLOOD PRESSURE: 76 MMHG | OXYGEN SATURATION: 99 % | HEIGHT: 72 IN | HEART RATE: 71 BPM

## 2024-07-30 DIAGNOSIS — N18.31 CHRONIC KIDNEY DISEASE, STAGE 3A (H): ICD-10-CM

## 2024-07-30 DIAGNOSIS — I10 ESSENTIAL (PRIMARY) HYPERTENSION: ICD-10-CM

## 2024-07-30 DIAGNOSIS — E08.621 DIABETIC ULCER OF TOE OF LEFT FOOT ASSOCIATED WITH DIABETES MELLITUS DUE TO UNDERLYING CONDITION, WITH FAT LAYER EXPOSED (H): ICD-10-CM

## 2024-07-30 DIAGNOSIS — I50.30 HEART FAILURE WITH PRESERVED EJECTION FRACTION, NYHA CLASS I (H): ICD-10-CM

## 2024-07-30 DIAGNOSIS — G45.3 AMAUROSIS FUGAX OF LEFT EYE: ICD-10-CM

## 2024-07-30 DIAGNOSIS — L97.522 DIABETIC ULCER OF TOE OF LEFT FOOT ASSOCIATED WITH DIABETES MELLITUS DUE TO UNDERLYING CONDITION, WITH FAT LAYER EXPOSED (H): ICD-10-CM

## 2024-07-30 DIAGNOSIS — Z23 HIGH PRIORITY FOR COVID-19 VACCINATION: ICD-10-CM

## 2024-07-30 DIAGNOSIS — Z79.4 TYPE 2 DIABETES MELLITUS WITH HYPERGLYCEMIA, WITH LONG-TERM CURRENT USE OF INSULIN (H): ICD-10-CM

## 2024-07-30 DIAGNOSIS — I25.810 CORONARY ARTERY DISEASE INVOLVING CORONARY BYPASS GRAFT OF NATIVE HEART WITHOUT ANGINA PECTORIS: ICD-10-CM

## 2024-07-30 DIAGNOSIS — E11.65 TYPE 2 DIABETES MELLITUS WITH HYPERGLYCEMIA, WITH LONG-TERM CURRENT USE OF INSULIN (H): ICD-10-CM

## 2024-07-30 DIAGNOSIS — E03.8 SUBCLINICAL HYPOTHYROIDISM: ICD-10-CM

## 2024-07-30 DIAGNOSIS — I48.3 TYPICAL ATRIAL FLUTTER (H): Primary | ICD-10-CM

## 2024-07-30 DIAGNOSIS — E83.51 HYPOCALCEMIA: ICD-10-CM

## 2024-07-30 DIAGNOSIS — D64.9 ANEMIA, UNSPECIFIED TYPE: ICD-10-CM

## 2024-07-30 LAB
ANION GAP SERPL CALCULATED.3IONS-SCNC: 10 MMOL/L (ref 7–15)
BASOPHILS # BLD AUTO: 0.1 10E3/UL (ref 0–0.2)
BASOPHILS NFR BLD AUTO: 1 %
BUN SERPL-MCNC: 39.7 MG/DL (ref 8–23)
CALCIUM SERPL-MCNC: 8.5 MG/DL (ref 8.8–10.4)
CHLORIDE SERPL-SCNC: 102 MMOL/L (ref 98–107)
CREAT SERPL-MCNC: 2.09 MG/DL (ref 0.67–1.17)
CREAT UR-MCNC: 61.8 MG/DL
EGFRCR SERPLBLD CKD-EPI 2021: 34 ML/MIN/1.73M2
EOSINOPHIL # BLD AUTO: 0.4 10E3/UL (ref 0–0.7)
EOSINOPHIL NFR BLD AUTO: 4 %
ERYTHROCYTE [DISTWIDTH] IN BLOOD BY AUTOMATED COUNT: 14 % (ref 10–15)
GLUCOSE SERPL-MCNC: 210 MG/DL (ref 70–99)
HBA1C MFR BLD: 8.9 % (ref 0–5.6)
HCO3 SERPL-SCNC: 26 MMOL/L (ref 22–29)
HCT VFR BLD AUTO: 36.4 % (ref 40–53)
HGB BLD-MCNC: 12.5 G/DL (ref 13.3–17.7)
IMM GRANULOCYTES # BLD: 0 10E3/UL
IMM GRANULOCYTES NFR BLD: 0 %
LYMPHOCYTES # BLD AUTO: 1.9 10E3/UL (ref 0.8–5.3)
LYMPHOCYTES NFR BLD AUTO: 18 %
MCH RBC QN AUTO: 28 PG (ref 26.5–33)
MCHC RBC AUTO-ENTMCNC: 34.3 G/DL (ref 31.5–36.5)
MCV RBC AUTO: 82 FL (ref 78–100)
MICROALBUMIN UR-MCNC: 1312 MG/L
MICROALBUMIN/CREAT UR: 2122.98 MG/G CR (ref 0–17)
MONOCYTES # BLD AUTO: 1 10E3/UL (ref 0–1.3)
MONOCYTES NFR BLD AUTO: 9 %
NEUTROPHILS # BLD AUTO: 7.2 10E3/UL (ref 1.6–8.3)
NEUTROPHILS NFR BLD AUTO: 69 %
PLATELET # BLD AUTO: 230 10E3/UL (ref 150–450)
POTASSIUM SERPL-SCNC: 4.8 MMOL/L (ref 3.4–5.3)
PTH-INTACT SERPL-MCNC: 98 PG/ML (ref 15–65)
RBC # BLD AUTO: 4.46 10E6/UL (ref 4.4–5.9)
SODIUM SERPL-SCNC: 138 MMOL/L (ref 135–145)
VIT D+METAB SERPL-MCNC: 30 NG/ML (ref 20–50)
WBC # BLD AUTO: 10.4 10E3/UL (ref 4–11)

## 2024-07-30 PROCEDURE — 82306 VITAMIN D 25 HYDROXY: CPT | Performed by: STUDENT IN AN ORGANIZED HEALTH CARE EDUCATION/TRAINING PROGRAM

## 2024-07-30 PROCEDURE — 93005 ELECTROCARDIOGRAM TRACING: CPT | Performed by: STUDENT IN AN ORGANIZED HEALTH CARE EDUCATION/TRAINING PROGRAM

## 2024-07-30 PROCEDURE — 80048 BASIC METABOLIC PNL TOTAL CA: CPT | Performed by: STUDENT IN AN ORGANIZED HEALTH CARE EDUCATION/TRAINING PROGRAM

## 2024-07-30 PROCEDURE — 85025 COMPLETE CBC W/AUTO DIFF WBC: CPT | Performed by: STUDENT IN AN ORGANIZED HEALTH CARE EDUCATION/TRAINING PROGRAM

## 2024-07-30 PROCEDURE — 82043 UR ALBUMIN QUANTITATIVE: CPT | Performed by: STUDENT IN AN ORGANIZED HEALTH CARE EDUCATION/TRAINING PROGRAM

## 2024-07-30 PROCEDURE — 93010 ELECTROCARDIOGRAM REPORT: CPT | Mod: OFF | Performed by: INTERNAL MEDICINE

## 2024-07-30 PROCEDURE — 99214 OFFICE O/P EST MOD 30 MIN: CPT | Mod: 25 | Performed by: STUDENT IN AN ORGANIZED HEALTH CARE EDUCATION/TRAINING PROGRAM

## 2024-07-30 PROCEDURE — 83970 ASSAY OF PARATHORMONE: CPT | Performed by: STUDENT IN AN ORGANIZED HEALTH CARE EDUCATION/TRAINING PROGRAM

## 2024-07-30 PROCEDURE — 83036 HEMOGLOBIN GLYCOSYLATED A1C: CPT | Performed by: STUDENT IN AN ORGANIZED HEALTH CARE EDUCATION/TRAINING PROGRAM

## 2024-07-30 PROCEDURE — 91320 SARSCV2 VAC 30MCG TRS-SUC IM: CPT | Performed by: STUDENT IN AN ORGANIZED HEALTH CARE EDUCATION/TRAINING PROGRAM

## 2024-07-30 PROCEDURE — 82570 ASSAY OF URINE CREATININE: CPT | Performed by: STUDENT IN AN ORGANIZED HEALTH CARE EDUCATION/TRAINING PROGRAM

## 2024-07-30 PROCEDURE — 90480 ADMN SARSCOV2 VAC 1/ONLY CMP: CPT | Performed by: STUDENT IN AN ORGANIZED HEALTH CARE EDUCATION/TRAINING PROGRAM

## 2024-07-30 PROCEDURE — 36415 COLL VENOUS BLD VENIPUNCTURE: CPT | Performed by: STUDENT IN AN ORGANIZED HEALTH CARE EDUCATION/TRAINING PROGRAM

## 2024-07-30 NOTE — PROGRESS NOTES
HAS-BLED Risk Score: 3  Estimate risk of major bleeding: 3: Risk is 5.8% and 3.72 bleeds per 100 patient-years. Alternatives to anticoagulation should be considered: Patient is at high risk for major bleeding.      Answers submitted by the patient for this visit:  General Questionnaire (Submitted on 7/30/2024)  Chief Complaint: Chronic problems general questions HPI Form  What is the reason for your visit today? : follow up  How many servings of fruits and vegetables do you eat daily?: 2-3  On average, how many sweetened beverages do you drink each day (Examples: soda, juice, sweet tea, etc.  Do NOT count diet or artificially sweetened beverages)?: 0  How many minutes a day do you exercise enough to make your heart beat faster?: 9 or less  How many days a week do you exercise enough to make your heart beat faster?: 3 or less  How many days per week do you miss taking your medication?: 1  What makes it hard for you to take your medication every day?: remembering to take

## 2024-07-30 NOTE — PROGRESS NOTES
Assessment & Plan   Nabil is a 64-year-old male presenting today for follow-up, has history of moderate persistent asthma, diabetes, hypertension, coronary artery disease status post CABG x 4 May 2020 presenting with orthopnea and bilateral lower extremity edema.     Typical atrial flutter (H)    On examination today, had a regular rhythm, EKG was done, showing atrial flutter.  This is a new finding for him, and may be explanation for his symptoms.  Rate has been controlled, he is currently taking carvedilol 12.5 mg twice daily.  Patient with [id number] score of 4 (for age, CHF history, hypertension history, diabetes history), with estimated stroke risk of 4.8 %/year and 6.7% risk of stroke/TIA/systemic embolism.  Has bled score of 3 making risk of 5.8% bleeding, 3.72 bleeds per 100 patient years, high risk for bleeding.     Recommended we start anticoagulation today with Eliquis at 5 mg twice daily.  He was agreeable and this was started today.  Of note serum creatinine is over 1.5 the patient is neither 80 years or older and body weight is over 60 kg, so did not decrease dose.    Recommend getting a Zio patch to determine if there is also atrial fibrillation, also to determine burden.  Advise against alcohol use at all at this point.    Referral placed urgently to cardiology to consider radiofrequency ablation.    - apixaban ANTICOAGULANT (ELIQUIS ANTICOAGULANT) 5 MG tablet  Dispense: 180 tablet; Refill: 3  - ZIO PATCH 3-7 DAYS (additional cost to patient)  - ZIO PATCH 3-7 DAYS APPLICATION    Bilateral lower extremity edema\  Orthopnea    Has history of moderate persistent asthma, diabetes, hypertension, coronary artery disease status post CABG x 4 May 2020 who is here for follow-up of orthopnea and bilateral lower extremity edema.  Has been having bilateral lower extremity edema, as well as orthopnea over the last several weeks. I am concerned that this secondary to heart failure, but also possibly secondary  to known asthma.    BNP was elevated, chlorthalidone 12.5 mg was started, some improvement in leg swelling with this, we will increase to 25 mg daily.    Thyroid testing showed elevated TSH with normal T4, possible subclinical hypothyroidism is contributing to symptoms, recommend that we start with Synthroid 50 mcg and repeat TSH in 6 weeks.  Chest x-ray was normal other than enlarged heart which was stable     Was unable to tolerate a echocardiogram exercise stress test, will be doing a pharmacologic stress test on 7 August     Due to atrial flutter, I did place a referral to cardiology urgently for further evaluation as above and to consider treatment as I do believe atrial flutter to be the cause of his symptoms..      Amaurosis fugax of left eye  Patient with known diabetic macular edema and retinopathy.  Had ophthalmology appointment had reported that he had a spot in his vision that occurred on 1 day which lasted several hours and it has not recurred.  There was no total or partial loss of vision in the eye.  Possibly concerning for thrombus as cause. especially in the setting of known atrial flutter.  Ophthalmology had recommended for further evaluation with MRI and carotid ultrasound, I agree and placed orders for these to be done.  We discussed going to the emergency room however because he is asymptomatic at this time, resists going to the emergency room.  He understands the risks, understands that he may have had a retinal stroke.  He understands that he may have other stroke.  We discussed warning signs of stroke to include diplopia, sudden loss of vision in one or both eyes, difficulty with speech, arm or leg weakness,, he knows that he should go immediately to the emergency room if experiencing any of these.    - MR Brain w/o Contrast  - US Carotid Bilateral    Heart failure with preserved ejection fraction, NYHA class I (H)  Coronary artery disease involving native coronary artery of native heart with  other form of angina pectoris (H24)     Weight today is unchanged from last visit July 16, 2024.  This is encouraging that there is not any acute exacerbation ongoing.    currently on metoprolol 50 mg daily, valsartan 160 mg daily, aspirin 81 mg daily and rosuvastatin 20 mg daily. Has Nitrostat available but is not using.  Echocardiogram exercise stress test was nondiagnostic, patient unable to exercise, (no significant findings on echocardiogram)Lexiscan was ordered and pending., chest x-ray ordered. Recommend follow-up with cardiology whom he last saw 1 year ago in May 2023.  He wishes to establish care with [facility], I put in an urgent cardiology referral as above  - Adult Cardiology al [facility] Referral      Essential (primary) hypertension  Not well-controlled despite treatment with terazosin, carvedilol 12.5 mg twice daily, valsartan 160 twice daily.  Recommend increasing from chlorthalidone 12.5 mg to 25 mg daily, he will return for follow-up in 2 to 4 weeks, will assess for control at that time.    Type 2 diabetes mellitus with hyperglycemia, with long-term current use of insulin (H)  Diabetic ulcer of toe of left foot associated with diabetes mellitus due to underlying condition, with fat layer exposed (H)    Abner is a 63-year-old male who has history of type 2 diabetes with diabetic nephropathy, diabetic retinopathy, diabetic neuropathy status post amputation of fifth digit of left foot.  He does see endocrinology for management of his diabetes.     Their discussion has been regarding insulin, as A1c has not been at goal, last was 9.4% on June 12, 2024.  He has discussed insulin pump with endocrinology, they advised he get his A1c down before below 9 before switching to pump.  Is on basal and daytime insulin, dipagliflozin, and metformin 500 mg 2 times daily.  Previously attempted GLP-1, but had a GI reaction and had to stop.  Current plan from endocrinology is 32 units basal insulin with 1 to 7 units 3  times daily before meals (sliding scale and carb correction (1:15)) for daytime insulin.  He will continue follow-up with endocrinology.     Is on rosuvastatin and aspirin.     Last eye exam September 2023, has diabetic retinopathy, he has monthly injections with retina consultants in [place]     Foot exam in December 2023 was abnormal, had absent sensation to monofilament to bilateral feet, normal DP pulses, see exam below.  Referral placed to [facility] for diabetic orthotics.  He has history of crepitation of the fifth digit of the left foot secondary to diabetic ulcer and osteomyelitis.     For nephropathy he follows with nephrology, Dr. Quezada.  As above currently on valsartan and SGL 2.  Will obtain albumin creatinine ratio today, repeat A1c in September.    - Albumin Random Urine Quantitative with Creat Ratio  - HEMOGLOBIN A1C  - HEMOGLOBIN A1C    Subclinical hypothyroidism  As above, starting Synthroid 50 mcg, repeat TSH in 6 weeks.    Hypocalcemia  Prior vitamin D deficiency seen April 2024, will repeat vitamin D level, if low we will replace.  Will obtain PTH level, suspect parathyroid disease.  Further evaluation pending lab studies  - Basic metabolic panel  - Vitamin D Deficiency  - Basic metabolic panel  - Vitamin D Deficiency    Chronic kidney disease, stage 3a (H)  Stable, continue Farxiga, valsartan, attempt to better control blood pressure.  - Parathyroid Hormone Intact  - Parathyroid Hormone Intact    Anemia, unspecified type  Has mild normocytic anemia, stable, will obtain repeat study to determine whether there is any change as prior studies are more than 1 year ago.  If any significant change we will do further workup otherwise no further workup necessary at this time.  - CBC with platelets and differential  - CBC with platelets and differential    High priority for COVID-19 vaccination    - COVID-19 mRNA vaccine 12+y (COMIRNATY (PFIZER)) injection 30 mcg        Subjective   Nabil is a 64  year old, presenting for the following health issues:  Follow Up (2 week follow up on SOB/asthma, bilateral lower extremity edema. Reports still having SOB and light-headed. Med questions.)        7/30/2024     9:41 AM   Additional Questions   Roomed by SHRUTI MERLOS     History of Present Illness       Reason for visit:  Follow up    He eats 2-3 servings of fruits and vegetables daily.He consumes 0 sweetened beverage(s) daily.He exercises with enough effort to increase his heart rate 9 or less minutes per day.  He exercises with enough effort to increase his heart rate 3 or less days per week. He is missing 1 dose(s) of medications per week.  He is not taking prescribed medications regularly due to remembering to take.       Blood pressure : had an instance of 101/83 causing fatigue but generally has been about 145/85.     Reports that the swelling has been a little bit better but there is really not any improvement to his shortness of breath.  Specifically still the shortness of breath gets worse when he is in the supine position.  Does feel shortness of breath at rest but is always worse when he lays down.  This is not changed over the past 6 weeks, but he did not have shortness of breath prior to that    Symptoms he will get left-sided chest pain still, especially if he is stretching a certain way but does not hurt when he is active, reporting still is walking about half a mile a day.  Does not improve when he rests.  Usually the pain lasts for up to a minute and then goes away.  This has been ongoing for at least 2 years.    So the swelling in both legs but it does seem to be better since using chlorthalidone 12.5 mg.  He notes that he did lose several pounds initially after trying the chlorthalidone but then the pain seemed to come back he is only about 2 pounds down from where he was at his last visit.  The ankles and lower legs are still where the swelling happens.              Review of Systems  Constitutional,  "neuro, ENT, endocrine, pulmonary, cardiac, gastrointestinal, genitourinary, musculoskeletal, integument and psychiatric systems are negative, except as otherwise noted.      Objective    BP (!) 144/76 (BP Location: Right arm, Patient Position: Sitting, Cuff Size: Adult Regular)   Pulse 71   Temp 97.7  F (36.5  C) (Oral)   Resp 16   Ht 1.837 m (6' 0.32\")   Wt 105.9 kg (233 lb 6.4 oz)   SpO2 99%   BMI 31.37 kg/m    Body mass index is 31.37 kg/m .  Physical Exam   GENERAL: alert and no distress  EYES: Eyes grossly normal to inspection, PERRL and conjunctivae and sclerae normal  HENT: ear canals and TM's normal, nose and mouth without ulcers or lesions  NECK: no adenopathy, no asymmetry, masses, or scars  RESP: lungs clear to auscultation - no rales, rhonchi or wheezes  CV: irregularly irregular rhythm, normal S1 S2, no S3 or S4, no murmur, click or rub, peripheral pulses strong, and 2+ bilateral lower extremity pitting edema to mid tibia    ABDOMEN: soft, nontender, no hepatosplenomegaly, no masses and bowel sounds normal  MS: no gross musculoskeletal defects noted, no edema  SKIN: no suspicious lesions or rashes  NEURO: Normal strength and tone, mentation intact and speech normal  PSYCH: mentation appears normal, affect normal/bright    EKG - Reviewed and interpreted by me; atrial flutter,  no LVH by voltage criteria.    Hospital Outpatient Visit on 07/22/2024   Component Date Value Ref Range Status    Pharmacologic Protocol 07/22/2024 Stress Echo   Final    Test Type 07/22/2024 Treadmill   Final    Baseline HR 07/22/2024 61   Final    Baseline Systolic BP 07/22/2024 124   Final    Baseline Diastolic BP 07/22/2024 70   Final    Last Stress HR 07/22/2024 83   Final    Last Stress Systolic BP 07/22/2024 142   Final    Last Stress Diastolic BP 07/22/2024 72   Final    Target HR 07/22/2024 131   Final    PERCENT HR 07/22/2024 85%   Final    Exercise duration (min) 07/22/2024 3   Final    Exercise duration (sec) " 07/22/2024 15   Final    Estimated workload 07/22/2024 5.0   Final    ST Deviation Elevation 07/22/2024 III 0.5mm   Final    Deviation Time 07/22/2024 V2 -0.5mm   Final    ST Elevation Amount 07/22/2024 V2 0.7mm   Final    ST Deviation Amount he 07/22/2024 III -0.8mm   Final    Final Resting BP 07/22/2024 124/68   Final    Final Resting HR 07/22/2024 65   Final    Max Treadmill Speed 07/22/2024 2.5   Final    Max Treadmill Grade 07/22/2024 12.0   Final    Peak Systolic BP 07/22/2024 156/70   Final    Peak Diastolic BP 07/22/2024 142/72   Final    Max HR  07/22/2024 84   Final    Stress Phase 07/22/2024 Resting   Final    Stress Resting Pt Position 07/22/2024 SUPINE   Final    Current HR 07/22/2024 63   Final    Current BP 07/22/2024 124/70   Final    Stress Phase 07/22/2024 Resting   Final    Stress Resting Pt Position 07/22/2024 MANUAL EVENT   Final    Current HR 07/22/2024 62   Final    Current BP 07/22/2024 124/70   Final    Stress Phase 07/22/2024 Stress   Final    Stage Minute 07/22/2024 EXE 00:00   Final    Exercise Stage 07/22/2024 STAGE 1   Final    Current HR 07/22/2024 65   Final    Current BP 07/22/2024 124/70   Final    Stress Phase 07/22/2024 Stress   Final    Stage Minute 07/22/2024 EXE 01:00   Final    Exercise Stage 07/22/2024 STAGE 1   Final    Current HR 07/22/2024 73   Final    Current BP 07/22/2024 124/70   Final    Stress Phase 07/22/2024 Stress   Final    Stage Minute 07/22/2024 EXE 01:41   Final    Exercise Stage 07/22/2024 STAGE 1   Final    Current HR 07/22/2024 78   Final    Current BP 07/22/2024 142/72   Final    Stress Phase 07/22/2024 Stress   Final    Stage Minute 07/22/2024 EXE 02:00   Final    Exercise Stage 07/22/2024 STAGE 1   Final    Current HR 07/22/2024 79   Final    Current BP 07/22/2024 142/72   Final    Stress Phase 07/22/2024 Stress   Final    Stage Minute 07/22/2024 EXE 03:00   Final    Exercise Stage 07/22/2024 STAGE 2   Final    Current HR 07/22/2024 82   Final    Current  BP 07/22/2024 142/72   Final    Stress Phase 07/22/2024 Stress   Final    Stage Minute 07/22/2024 EXE 03:15   Final    Exercise Stage 07/22/2024 STAGE 2   Final    Current HR 07/22/2024 83   Final    Current BP 07/22/2024 142/72   Final    Stress Phase 07/22/2024 Recovery   Final    Stage Minute 07/22/2024 REC 00:00   Final    Exercise Stage 07/22/2024 Recovery   Final    Current HR 07/22/2024 83   Final    Current BP 07/22/2024 142/72   Final    Stress Phase 07/22/2024 Recovery   Final    Stage Minute 07/22/2024 REC 00:45   Final    Exercise Stage 07/22/2024 Recovery   Final    Current HR 07/22/2024 77   Final    Current BP 07/22/2024 142/72   Final    Stress Phase 07/22/2024 Recovery   Final    Stage Minute 07/22/2024 REC 00:51   Final    Exercise Stage 07/22/2024 Recovery   Final    Current HR 07/22/2024 78   Final    Current BP 07/22/2024 113/62   Final    Stress Phase 07/22/2024 Recovery   Final    Stage Minute 07/22/2024 REC 01:15   Final    Exercise Stage 07/22/2024 Recovery   Final    Current HR 07/22/2024 76   Final    Current BP 07/22/2024 125/56   Final    Stress Phase 07/22/2024 Recovery   Final    Stage Minute 07/22/2024 REC 01:45   Final    Exercise Stage 07/22/2024 Recovery   Final    Current HR 07/22/2024 77   Final    Current BP 07/22/2024 125/56   Final    Stress Phase 07/22/2024 Recovery   Final    Stage Minute 07/22/2024 REC 02:45   Final    Exercise Stage 07/22/2024 Recovery   Final    Current HR 07/22/2024 77   Final    Current BP 07/22/2024 125/56   Final    Stress Phase 07/22/2024 Recovery   Final    Stage Minute 07/22/2024 REC 03:26   Final    Exercise Stage 07/22/2024 Recovery   Final    Current HR 07/22/2024 75   Final    Current BP 07/22/2024 156/70   Final    Stress Phase 07/22/2024 Recovery   Final    Stage Minute 07/22/2024 REC 03:45   Final    Exercise Stage 07/22/2024 Recovery   Final    Current HR 07/22/2024 74   Final    Current BP 07/22/2024 156/70   Final    Stress Phase  07/22/2024 Recovery   Final    Stage Minute 07/22/2024 REC 04:45   Final    Exercise Stage 07/22/2024 Recovery   Final    Current HR 07/22/2024 72   Final    Current BP 07/22/2024 156/70   Final    Stress Phase 07/22/2024 Recovery   Final    Stage Minute 07/22/2024 REC 05:15   Final    Exercise Stage 07/22/2024 Recovery   Final    Current HR 07/22/2024 68   Final    Current BP 07/22/2024 124/68   Final    Stress Phase 07/22/2024 Recovery   Final    Stage Minute 07/22/2024 REC 05:45   Final    Exercise Stage 07/22/2024 Recovery   Final    Current HR 07/22/2024 68   Final    Current BP 07/22/2024 124/68   Final    Stress Phase 07/22/2024 Recovery   Final    Stage Minute 07/22/2024 REC 06:45   Final    Exercise Stage 07/22/2024 Recovery   Final    Current HR 07/22/2024 66   Final    Current BP 07/22/2024 124/68   Final    Stress Phase 07/22/2024 Recovery   Final    Stage Minute 07/22/2024 REC 07:06   Final    Exercise Stage 07/22/2024 Recovery   Final    Current HR 07/22/2024 65   Final    Current BP 07/22/2024 124/68   Final           Signed Electronically by: Carlos Correa MD

## 2024-07-31 LAB
ATRIAL RATE - MUSE: 271 BPM
DIASTOLIC BLOOD PRESSURE - MUSE: NORMAL MMHG
INTERPRETATION ECG - MUSE: NORMAL
P AXIS - MUSE: 79 DEGREES
PR INTERVAL - MUSE: NORMAL MS
QRS DURATION - MUSE: 90 MS
QT - MUSE: 452 MS
QTC - MUSE: 412 MS
R AXIS - MUSE: -18 DEGREES
SYSTOLIC BLOOD PRESSURE - MUSE: NORMAL MMHG
T AXIS - MUSE: 126 DEGREES
VENTRICULAR RATE- MUSE: 50 BPM

## 2024-07-31 RX ORDER — CARVEDILOL 12.5 MG/1
12.5 TABLET ORAL 2 TIMES DAILY WITH MEALS
COMMUNITY
Start: 2024-07-31

## 2024-08-05 ENCOUNTER — OFFICE VISIT (OUTPATIENT)
Dept: CARDIOLOGY | Facility: CLINIC | Age: 64
End: 2024-08-05
Attending: STUDENT IN AN ORGANIZED HEALTH CARE EDUCATION/TRAINING PROGRAM
Payer: MEDICARE

## 2024-08-05 VITALS
RESPIRATION RATE: 16 BRPM | HEART RATE: 63 BPM | HEIGHT: 71 IN | DIASTOLIC BLOOD PRESSURE: 80 MMHG | SYSTOLIC BLOOD PRESSURE: 138 MMHG | WEIGHT: 230 LBS | BODY MASS INDEX: 32.2 KG/M2

## 2024-08-05 DIAGNOSIS — I48.3 TYPICAL ATRIAL FLUTTER (H): Primary | ICD-10-CM

## 2024-08-05 DIAGNOSIS — N18.32 STAGE 3B CHRONIC KIDNEY DISEASE (H): ICD-10-CM

## 2024-08-05 DIAGNOSIS — E03.8 SUBCLINICAL HYPOTHYROIDISM: ICD-10-CM

## 2024-08-05 DIAGNOSIS — I10 ESSENTIAL (PRIMARY) HYPERTENSION: ICD-10-CM

## 2024-08-05 DIAGNOSIS — E78.00 PURE HYPERCHOLESTEROLEMIA: ICD-10-CM

## 2024-08-05 DIAGNOSIS — I50.30 HEART FAILURE WITH PRESERVED EJECTION FRACTION, NYHA CLASS I (H): ICD-10-CM

## 2024-08-05 DIAGNOSIS — I25.83 CORONARY ARTERIOSCLEROSIS DUE TO LIPID RICH PLAQUE: Primary | ICD-10-CM

## 2024-08-05 DIAGNOSIS — T81.89XD NON-HEALING SURGICAL WOUND, SUBSEQUENT ENCOUNTER: ICD-10-CM

## 2024-08-05 DIAGNOSIS — E11.9 TYPE 2 DIABETES MELLITUS WITHOUT COMPLICATION, WITH LONG-TERM CURRENT USE OF INSULIN (H): ICD-10-CM

## 2024-08-05 DIAGNOSIS — Z95.1 S/P CABG (CORONARY ARTERY BYPASS GRAFT): ICD-10-CM

## 2024-08-05 DIAGNOSIS — I48.3 TYPICAL ATRIAL FLUTTER (H): ICD-10-CM

## 2024-08-05 DIAGNOSIS — Z79.4 TYPE 2 DIABETES MELLITUS WITHOUT COMPLICATION, WITH LONG-TERM CURRENT USE OF INSULIN (H): ICD-10-CM

## 2024-08-05 PROBLEM — J45.50 SEVERE PERSISTENT ASTHMA WITHOUT COMPLICATION (H): Status: RESOLVED | Noted: 2023-12-01 | Resolved: 2024-08-05

## 2024-08-05 PROBLEM — N18.31 CHRONIC KIDNEY DISEASE, STAGE 3A (H): Status: RESOLVED | Noted: 2021-08-21 | Resolved: 2024-08-05

## 2024-08-05 PROBLEM — I25.118 CORONARY ARTERY DISEASE INVOLVING NATIVE CORONARY ARTERY OF NATIVE HEART WITH OTHER FORM OF ANGINA PECTORIS (H): Status: RESOLVED | Noted: 2020-05-04 | Resolved: 2024-08-05

## 2024-08-05 LAB
ATRIAL RATE - MUSE: 62 BPM
DIASTOLIC BLOOD PRESSURE - MUSE: NORMAL MMHG
INTERPRETATION ECG - MUSE: NORMAL
P AXIS - MUSE: 39 DEGREES
PR INTERVAL - MUSE: 240 MS
QRS DURATION - MUSE: 94 MS
QT - MUSE: 430 MS
QTC - MUSE: 436 MS
R AXIS - MUSE: -34 DEGREES
SYSTOLIC BLOOD PRESSURE - MUSE: NORMAL MMHG
T AXIS - MUSE: 127 DEGREES
VENTRICULAR RATE- MUSE: 62 BPM

## 2024-08-05 PROCEDURE — G2211 COMPLEX E/M VISIT ADD ON: HCPCS | Performed by: INTERNAL MEDICINE

## 2024-08-05 PROCEDURE — 99205 OFFICE O/P NEW HI 60 MIN: CPT | Performed by: INTERNAL MEDICINE

## 2024-08-05 PROCEDURE — 93000 ELECTROCARDIOGRAM COMPLETE: CPT | Performed by: INTERNAL MEDICINE

## 2024-08-05 RX ORDER — ZINC GLUCONATE 50 MG
25 TABLET ORAL DAILY
COMMUNITY

## 2024-08-05 RX ORDER — FUROSEMIDE 40 MG
40 TABLET ORAL DAILY
Qty: 30 TABLET | Refills: 11 | Status: SHIPPED | OUTPATIENT
Start: 2024-08-05

## 2024-08-05 NOTE — PROGRESS NOTES
[facility] Heart Care Office Consult     Assessment:   (I25.10,  I25.83) Coronary arteriosclerosis due to lipid rich plaque  (primary encounter diagnosis)  Comment: Angiography March 2020 showed normal left main, mid LAD 75% stenosis with first diagonal 70% stenosis, circumflex with a proximal total occlusion, and right coronary artery with a distal 70% PDA lesion.  This prompted coronary artery bypass grafting.    (Z95.1) S/P CABG (coronary artery bypass graft)  Comment: In 2020 he had a LIMA to the LAD, vein graft to first diagonal, vein graft to obtuse marginal artery, and vein graft to distal right coronary artery.  Given possible heart failure as well as atrial flutter agree with pharmacological stress nuclear as he was unable to complete a stress echo with walking.    (I50.30) Heart failure with preserved ejection fraction, NYHA class I (H)  Comment: Increased BNP, most likely due to atrial flutter but cannot rule out ischemia.  Good ejection fraction, will switch chlorthalidone over to furosemide.  He will not make this with till Thursday of this week, we then suggest rechecking renal profile as well as potassium after about 4 to 5 days.  At this point time given preserved ejection fraction and his hesitancy to make medication changes would not start Aldactone.  Will continue the valsartan and carvedilol.  Ejection fraction was again is preserved and the Farxiga is also a good med.    (I48.3) Typical atrial flutter (H)  Comment: Episodic, given the recurrence although he is now in sinus rhythm today, would recommend ablation of this typical, right-sided most likely counterclockwise atrial flutter.  This most likely caused him to go to heart failure.    (I10) Essential (primary) hypertension  Comment: Under good control currently on valsartan as well as carvedilol.    (E78.00) Pure hypercholesterolemia  Comment: Numbers good with a total cholesterol 160 and LDL of 69.    (T81.89XD) Non-healing  surgical wound, subsequent encounter  Comment: So noted following his surgery.    (N18.32) Stage 3b chronic kidney disease (H)  Comment: Concerned with GFR of 34 and creatinine of 2.09, with increased dose of diuresis need to make sure he does not go into worsening renal failure.    (E11.9,  Z79.4) Type 2 diabetes mellitus without complication, with long-term current use of insulin (H)  Comment: Needs much better control, his hemoglobin A1c is 8.9.    (E03.8) Subclinical hypothyroidism  Comment: Agree with strict thyroid workup, suspect this could have caused the abnormal heartbeat which could have contributed to heart failure,    Sleep apnea- continue with the evaluation for this.     Plan:   1.  Continue to work on weight loss.  2.  Continue with sleep apnea evaluation.  3.  Hold on starting Aldactone.  4.  Continue valsartan, carvedilol and Farxiga for heart failure.  5.  Switch chlorthalidone over to Lasix given his heart failure.  6.  Strict follow-up on his renal profile as well as his potassium.  7.  Strict follow-up on his thyroid, adjust thyroid meds as needed.  8.  Referral to EP to discuss ablation of his atrial flutter.  9.  Follow-up with me thereafter given all these issues.  10.  Agree with pharmacological stress nuclear.  11.  Given advanced CHADS 2 VASC score agree with Dasha.    History of Present Illness:   Thank you for asking the [facility] Heart Care team to see Nabil Cheung a 64 year old male  in consultation  to evaluate atrial flutter.   Patient has historically been getting his cardiovascular care from [facility], he has decided to switch over to the [facility] since he is moved to [place].  He tells me that starting around May he developed shortness of breath on activity, he noted increased bipedal edema as well as some PND and orthopnea over the last 3 to 4 weeks.  He was placed on chlorthalidone, and sent to be seen by us in the rapid access clinic.  Incidentally,  he was also noted to have an ECG showing typical atrial flutter with a very slow ventricular response.  He was told to start Eliquis which she did not start.  Past Medical History:   Benign essential hypertension  Coronary artery disease  Prior MI  Coronary artery bypass grafting  Obstructive sleep apnea  Asthma  Benign prostatic hypertrophy  Hypothyroidism  Pure hypercholesterolemia  Chronic kidney disease    Past history is negative for cancer, tuberculosis, rheumatic fever,  cerebrovascular accident,  peptic ulcer disease, chronic obstructive pulmonary disease.    Past Surgical History:     Past Surgical History:   Procedure Laterality Date    AMPUTATE TOE(S) Right 8/17/2021    Procedure: left partial fifth ray amputation;  Surgeon: Mirian Cline DPM, Podiatry/Foot and Ankle Surgery;  Location: [facility] OR    GRAFT FLAP PEDICLE TRAM DELAY PROCEDURE Left 6/18/2020    Procedure: Debridement of sternal wound and left major pectoral flep;  Surgeon: MARCO Serna MD;  Location: [facility] OR    INCISION AND DRAINAGE CHEST WASHOUT, COMBINED N/A 6/12/2020    Procedure: INCISION AND DRAINAGE, WOUND, CHEST, WITH IRRIGATION and wound vac change;  Surgeon: Mark Mckinney MD;  Location: [facility] OR    REPAIR CHEST WALL N/A 6/18/2020    Procedure: Chest wall reconstruction;  Surgeon: MARCO Serna MD;  Location: [facility] OR     Family History:   Family history is positive for MI in his father's father at the age of 67.      Social History:   He lives independently, is currently retired, he is single, denies any tobacco use, drinks rare alcohol, reports that he has never smoked. He has never been exposed to tobacco smoke. He has never used smokeless tobacco. He reports current alcohol use. He reports that he does not use drugs.The primary care physician is Carlos Correa    Meds:   Scheduled Meds:  Current Outpatient Medications   Medication Sig Dispense Refill    acetaminophen (TYLENOL) 325 MG tablet Take 650 mg by mouth  every 6 hours as needed for mild pain      alpha-lipoic acid 600 MG capsule Take 600 mg by mouth daily       aspirin 81 MG EC tablet Take 81 mg by mouth daily      budesonide-formoterol (SYMBICORT) 160-4.5 MCG/ACT Inhaler Inhale 2 puffs into the lungs 2 times daily      carvedilol (COREG) 12.5 MG tablet Take 1 tablet (12.5 mg) by mouth 2 times daily (with meals)      chlorthalidone (HYGROTON) 25 MG tablet Take 1 tablet (25 mg) by mouth daily 90 tablet 3    coenzyme Q-10 200 MG CAPS capsule Take 1 capsule by mouth daily      Continuous Blood Gluc  (FREESTYLE JULIO 2 READER) CARLOS Use to read blood sugars as per 's instructions. 1 each 0    Continuous Blood Gluc Sensor (FREESTYLE JULIO 2 SENSOR) MISC CHANGE EVERY 14 DAYS 2 each 5    FARXIGA 10 MG TABS tablet Take 1 tablet (10 mg) by mouth daily 90 tablet 1    finasteride (PROSCAR) 5 MG tablet Take 5 mg by mouth daily      insulin aspart (NOVOLOG PEN) 100 UNIT/ML pen Inject 1-7 Units Subcutaneous 3 times daily (before meals) Correction Scale - MEDIUM INSULIN RESISTANCE DOSING     Do Not give Correction Insulin if Pre-Meal BG less than 140.   For Pre-Meal  - 189 give 1 unit.   For Pre-Meal  - 239 give 2 units.   For Pre-Meal  - 289 give 3 units.   For Pre-Meal  - 339 give 4 units.   For Pre-Meal - 399 give 5 units.   For Pre-Meal -449 give 6 units  For Pre-Meal BG greater than or equal to 450 give 7 units.   To be given with prandial insulin, and based on pre-meal blood glucose.    Notify provider if glucose greater than or equal to 350 mg/dL after administration of correction dose. If given at mealtime, administer within 30 minutes of start of meal (Patient taking differently: Inject 1-7 Units subcutaneously 2 times daily (with meals) Correction Scale - MEDIUM INSULIN RESISTANCE DOSING     Only use at lunch and dinner time, patient fasting at AM  Do Not give Correction Insulin if Pre-Meal BG less than 140.   For  Pre-Meal  - 189 give 1 unit.   For Pre-Meal  - 239 give 2 units.   For Pre-Meal  - 289 give 3 units.   For Pre-Meal  - 339 give 4 units.   For Pre-Meal - 399 give 5 units.   For Pre-Meal -449 give 6 units  For Pre-Meal BG greater than or equal to 450 give 7 units.   To be given with prandial insulin, and based on pre-meal blood glucose.    Notify provider if glucose greater than or equal to 350 mg/dL after administration of correction dose. If given at mealtime, administer within 30 minutes of start of meal)      insulin aspart (NOVOLOG PEN) 100 UNIT/ML pen Inject 1-5 Units Subcutaneous At Bedtime MEDIUM INSULIN RESISTANCE DOSING    Do Not give Bedtime Correction Insulin if BG less than  200.   For  - 249 give 1 units.   For  - 299 give 2 units.   For  - 349 give 3 units.   For  -399 give 4 units.   For BG greater than or equal to 400 give 5 units.  Notify provider if glucose greater than or equal to 350 mg/dL after administration of correction dose. If given at mealtime, administer within 30 minutes of start of meal      insulin aspart (NOVOLOG PEN) 100 UNIT/ML pen DOSE:  1 units per 15 grams of carbohydrate before breakfast, lunch, dinner.  Only chart total amount of units given.  Do not give if pre-prandial glucose is less than 60 mg/dL. If given at mealtime, administer within 30 minutes of start of mealDOSE:  1 units per 15 grams of carbohydrate.  Only chart total amount of units given.  Do not give if pre-prandial glucose is less than 60 mg/dL. If given at mealtime, administer within 30 minutes of start of meal      insulin glargine (LANTUS PEN) 100 UNIT/ML pen Inject 32 Units Subcutaneous at bedtime 15 mL 3    levothyroxine (SYNTHROID/LEVOTHROID) 50 MCG tablet Take 1 tablet (50 mcg) by mouth daily 90 tablet 0    magnesium oxide 400 MG CAPS Take by mouth daily      metFORMIN (GLUCOPHAGE) 500 MG tablet Take 500 mg by mouth 2 times daily (with meals)    "   rosuvastatin (CRESTOR) 40 MG tablet Take 20 mg by mouth daily Evening       spironolactone (ALDACTONE) 25 MG tablet Take 0.5 tablets (12.5 mg) by mouth daily 30 tablet 0    terazosin (HYTRIN) 2 MG capsule Take 1 capsule (2 mg) by mouth at bedtime 90 capsule 1    valsartan (DIOVAN) 160 MG tablet Take 160 mg by mouth 2 times daily      vitamin C (ASCORBIC ACID) 1000 MG TABS Take 1,000 mg by mouth daily      vitamin D3 (CHOLECALCIFEROL) 2000 units (50 mcg) tablet Take 1 tablet by mouth daily      zinc gluconate 50 MG tablet Take 25 mg by mouth daily      albuterol (PROAIR HFA/PROVENTIL HFA/VENTOLIN HFA) 108 (90 Base) MCG/ACT inhaler Inhale 1-2 puffs into the lungs (Patient not taking: Reported on 8/5/2024)      apixaban ANTICOAGULANT (ELIQUIS ANTICOAGULANT) 5 MG tablet Take 1 tablet (5 mg) by mouth 2 times daily (Patient not taking: Reported on 8/5/2024) 180 tablet 3    nitroGLYcerin (NITROSTAT) 0.4 MG sublingual tablet Place 0.4 mg under the tongue every 5 minutes as needed for chest pain For chest pain place 1 tablet under the tongue every 5 minutes for 3 doses. If symptoms persist 5 minutes after 1st dose call 911. (Patient not taking: Reported on 7/16/2024)         PRN Meds:.  No current facility-administered medications for this visit.       Allergies:   Atorvastatin, Bactrim [sulfamethoxazole-trimethoprim], Dust mites, and Fluorescein    Objective:      Physical Exam  104.3 kg (230 lb)  1.803 m (5' 11\")  Body mass index is 32.08 kg/m .  /80 (BP Location: Right arm, Patient Position: Sitting, Cuff Size: Adult Large)   Pulse 63   Resp 16   Ht 1.803 m (5' 11\")   Wt 104.3 kg (230 lb)   BMI 32.08 kg/m      General Appearance:   Alert, cooperative and in no acute distress.   HEENT:  No scleral icterus; the mucous membranes were pink and moist.   Neck: JVP to jaw at 30 degrees. No thyromegaly. No HJR   Chest: The spine was straight. The chest was symmetric.,  Midline sternotomy scar   Lungs:   Respirations " "unlabored; the lungs are clear to auscultation.   Cardiovascular:   S1 and S2 without murmur, clicks or rubs. Brachial, radial, carotid and posterior tibial pulses are intact and symetrical.  No carotid bruits noted   Abdomen:  No organomegaly, masses, bruits, or tenderness. Bowels sounds are present   Extremities: No cyanosis, clubbing, or edema.   Skin: No xanthelasma.   Neurologic: Mood and affect are appropriate.         Lab Reviewed Personally by myself  Lab Results   Component Value Date     07/30/2024     01/09/2021    CO2 26 07/30/2024    CO2 27 05/27/2023    CO2 25 01/09/2021    BUN 39.7 07/30/2024    BUN 22 05/27/2023    BUN 22 01/09/2021     Lab Results   Component Value Date    WBC 10.4 07/30/2024    WBC 9.3 01/09/2021    HGB 12.5 07/30/2024    HGB 11.5 01/09/2021    HCT 36.4 07/30/2024    HCT 37.4 01/09/2021    MCV 82 07/30/2024    MCV 81 01/09/2021     07/30/2024     01/09/2021     Lab Results   Component Value Date    CHOL 160 12/01/2023    CHOL 212 09/25/2017    TRIG 266 12/01/2023    TRIG 565 09/25/2017    HDL 38 12/01/2023    HDL 31 09/25/2017     No results found for: \"BNP\"    ECG personally reviewed by myself shows sinus rhythm, leftward axis, cannot rule out old anterior septal Q wave MI type pattern, nonspecific ST-T wave changes.     Review of Systems:     Review of Systems:   Enc Vitals  BP: 138/80  Pulse: 63  Resp: 16  Weight: 104.3 kg (230 lb)  Height: 180.3 cm (5' 11\")                                          "

## 2024-08-05 NOTE — PROGRESS NOTES
----- Message -----  From: Radha Valdivia MD  Sent: 8/5/2024  12:18 PM CDT  To: Sue Mendieta RN    New consult to rapid access clinic today, needs EP referral for ablation of typical flutter.  LF

## 2024-08-05 NOTE — LETTER
8/5/2024    Carlos Correa MD  [address] [place] [address]    RE: Nabil Cheung       Dear Colleague,     I had the pleasure of seeing Nabil Cheung in the [facility].    [facility] Office Consult     Assessment:   (I25.10,  I25.83) Coronary arteriosclerosis due to lipid rich plaque  (primary encounter diagnosis)  Comment: Angiography March 2020 showed normal left main, mid LAD 75% stenosis with first diagonal 70% stenosis, circumflex with a proximal total occlusion, and right coronary artery with a distal 70% PDA lesion.  This prompted coronary artery bypass grafting.    (Z95.1) S/P CABG (coronary artery bypass graft)  Comment: In 2020 he had a LIMA to the LAD, vein graft to first diagonal, vein graft to obtuse marginal artery, and vein graft to distal right coronary artery.  Given possible heart failure as well as atrial flutter agree with pharmacological stress nuclear as he was unable to complete a stress echo with walking.    (I50.30) Heart failure with preserved ejection fraction, NYHA class I (H)  Comment: Increased BNP, most likely due to atrial flutter but cannot rule out ischemia.  Good ejection fraction, will switch chlorthalidone over to furosemide.  He will not make this with till Thursday of this week, we then suggest rechecking renal profile as well as potassium after about 4 to 5 days.  At this point time given preserved ejection fraction and his hesitancy to make medication changes would not start Aldactone.  Will continue the valsartan and carvedilol.  Ejection fraction was again is preserved and the Farxiga is also a good med.    (I48.3) Typical atrial flutter (H)  Comment: Episodic, given the recurrence although he is now in sinus rhythm today, would recommend ablation of this typical, right-sided most likely counterclockwise atrial flutter.  This most likely caused him to go to heart failure.    (I10) Essential (primary) hypertension  Comment: Under  good control currently on valsartan as well as carvedilol.    (E78.00) Pure hypercholesterolemia  Comment: Numbers good with a total cholesterol 160 and LDL of 69.    (T81.89XD) Non-healing surgical wound, subsequent encounter  Comment: So noted following his surgery.    (N18.32) Stage 3b chronic kidney disease (H)  Comment: Concerned with GFR of 34 and creatinine of 2.09, with increased dose of diuresis need to make sure he does not go into worsening renal failure.    (E11.9,  Z79.4) Type 2 diabetes mellitus without complication, with long-term current use of insulin (H)  Comment: Needs much better control, his hemoglobin A1c is 8.9.    (E03.8) Subclinical hypothyroidism  Comment: Agree with strict thyroid workup, suspect this could have caused the abnormal heartbeat which could have contributed to heart failure,    Sleep apnea- continue with the evaluation for this.     Plan:   1.  Continue to work on weight loss.  2.  Continue with sleep apnea evaluation.  3.  Hold on starting Aldactone.  4.  Continue valsartan, carvedilol and Farxiga for heart failure.  5.  Switch chlorthalidone over to Lasix given his heart failure.  6.  Strict follow-up on his renal profile as well as his potassium.  7.  Strict follow-up on his thyroid, adjust thyroid meds as needed.  8.  Referral to EP to discuss ablation of his atrial flutter.  9.  Follow-up with me thereafter given all these issues.  10.  Agree with pharmacological stress nuclear.  11.  Given advanced CHADS 2 VASC score agree with Dasha.    History of Present Illness:   Thank you for asking the [facility] team to see Nabil Cehung a 64 year old male  in consultation  to evaluate atrial flutter.   Patient has historically been getting his cardiovascular care from [facility], he has decided to switch over to the [facility] since he is moved to [place].  He tells me that starting around May he developed shortness of breath on activity, he  noted increased bipedal edema as well as some PND and orthopnea over the last 3 to 4 weeks.  He was placed on chlorthalidone, and sent to be seen by us in the rapid access clinic.  Incidentally, he was also noted to have an ECG showing typical atrial flutter with a very slow ventricular response.  He was told to start Eliquis which she did not start.  Past Medical History:   Benign essential hypertension  Coronary artery disease  Prior MI  Coronary artery bypass grafting  Obstructive sleep apnea  Asthma  Benign prostatic hypertrophy  Hypothyroidism  Pure hypercholesterolemia  Chronic kidney disease    Past history is negative for cancer, tuberculosis, rheumatic fever,  cerebrovascular accident,  peptic ulcer disease, chronic obstructive pulmonary disease.    Past Surgical History:     Past Surgical History:   Procedure Laterality Date     AMPUTATE TOE(S) Right 8/17/2021    Procedure: left partial fifth ray amputation;  Surgeon: Mirian Cline DPM, Podiatry/Foot and Ankle Surgery;  Location: [facility] OR     GRAFT FLAP PEDICLE TRAM DELAY PROCEDURE Left 6/18/2020    Procedure: Debridement of sternal wound and left major pectoral flep;  Surgeon: MARCO Serna MD;  Location: [facility] OR     INCISION AND DRAINAGE CHEST WASHOUT, COMBINED N/A 6/12/2020    Procedure: INCISION AND DRAINAGE, WOUND, CHEST, WITH IRRIGATION and wound vac change;  Surgeon: Mark Mckinney MD;  Location: [facility] OR     REPAIR CHEST WALL N/A 6/18/2020    Procedure: Chest wall reconstruction;  Surgeon: MARCO Serna MD;  Location: [facility] OR     Family History:   Family history is positive for MI in his father's father at the age of 67.      Social History:   He lives independently, is currently retired, he is single, denies any tobacco use, drinks rare alcohol, reports that he has never smoked. He has never been exposed to tobacco smoke. He has never used smokeless tobacco. He reports current alcohol use. He reports that he does not use  drugs.The primary care physician is Carlos Correa    Meds:   Scheduled Meds:  Current Outpatient Medications   Medication Sig Dispense Refill     acetaminophen (TYLENOL) 325 MG tablet Take 650 mg by mouth every 6 hours as needed for mild pain       alpha-lipoic acid 600 MG capsule Take 600 mg by mouth daily        aspirin 81 MG EC tablet Take 81 mg by mouth daily       budesonide-formoterol (SYMBICORT) 160-4.5 MCG/ACT Inhaler Inhale 2 puffs into the lungs 2 times daily       carvedilol (COREG) 12.5 MG tablet Take 1 tablet (12.5 mg) by mouth 2 times daily (with meals)       chlorthalidone (HYGROTON) 25 MG tablet Take 1 tablet (25 mg) by mouth daily 90 tablet 3     coenzyme Q-10 200 MG CAPS capsule Take 1 capsule by mouth daily       Continuous Blood Gluc  (FREESTYLE JULIO 2 READER) CARLOS Use to read blood sugars as per 's instructions. 1 each 0     Continuous Blood Gluc Sensor (FREESTYLE JULIO 2 SENSOR) MISC CHANGE EVERY 14 DAYS 2 each 5     FARXIGA 10 MG TABS tablet Take 1 tablet (10 mg) by mouth daily 90 tablet 1     finasteride (PROSCAR) 5 MG tablet Take 5 mg by mouth daily       insulin aspart (NOVOLOG PEN) 100 UNIT/ML pen Inject 1-7 Units Subcutaneous 3 times daily (before meals) Correction Scale - MEDIUM INSULIN RESISTANCE DOSING     Do Not give Correction Insulin if Pre-Meal BG less than 140.   For Pre-Meal  - 189 give 1 unit.   For Pre-Meal  - 239 give 2 units.   For Pre-Meal  - 289 give 3 units.   For Pre-Meal  - 339 give 4 units.   For Pre-Meal - 399 give 5 units.   For Pre-Meal -449 give 6 units  For Pre-Meal BG greater than or equal to 450 give 7 units.   To be given with prandial insulin, and based on pre-meal blood glucose.    Notify provider if glucose greater than or equal to 350 mg/dL after administration of correction dose. If given at mealtime, administer within 30 minutes of start of meal (Patient taking differently: Inject 1-7 Units  subcutaneously 2 times daily (with meals) Correction Scale - MEDIUM INSULIN RESISTANCE DOSING     Only use at lunch and dinner time, patient fasting at AM  Do Not give Correction Insulin if Pre-Meal BG less than 140.   For Pre-Meal  - 189 give 1 unit.   For Pre-Meal  - 239 give 2 units.   For Pre-Meal  - 289 give 3 units.   For Pre-Meal  - 339 give 4 units.   For Pre-Meal - 399 give 5 units.   For Pre-Meal -449 give 6 units  For Pre-Meal BG greater than or equal to 450 give 7 units.   To be given with prandial insulin, and based on pre-meal blood glucose.    Notify provider if glucose greater than or equal to 350 mg/dL after administration of correction dose. If given at mealtime, administer within 30 minutes of start of meal)       insulin aspart (NOVOLOG PEN) 100 UNIT/ML pen Inject 1-5 Units Subcutaneous At Bedtime MEDIUM INSULIN RESISTANCE DOSING    Do Not give Bedtime Correction Insulin if BG less than  200.   For  - 249 give 1 units.   For  - 299 give 2 units.   For  - 349 give 3 units.   For  -399 give 4 units.   For BG greater than or equal to 400 give 5 units.  Notify provider if glucose greater than or equal to 350 mg/dL after administration of correction dose. If given at mealtime, administer within 30 minutes of start of meal       insulin aspart (NOVOLOG PEN) 100 UNIT/ML pen DOSE:  1 units per 15 grams of carbohydrate before breakfast, lunch, dinner.  Only chart total amount of units given.  Do not give if pre-prandial glucose is less than 60 mg/dL. If given at mealtime, administer within 30 minutes of start of mealDOSE:  1 units per 15 grams of carbohydrate.  Only chart total amount of units given.  Do not give if pre-prandial glucose is less than 60 mg/dL. If given at mealtime, administer within 30 minutes of start of meal       insulin glargine (LANTUS PEN) 100 UNIT/ML pen Inject 32 Units Subcutaneous at bedtime 15 mL 3     levothyroxine  "(SYNTHROID/LEVOTHROID) 50 MCG tablet Take 1 tablet (50 mcg) by mouth daily 90 tablet 0     magnesium oxide 400 MG CAPS Take by mouth daily       metFORMIN (GLUCOPHAGE) 500 MG tablet Take 500 mg by mouth 2 times daily (with meals)       rosuvastatin (CRESTOR) 40 MG tablet Take 20 mg by mouth daily Evening        spironolactone (ALDACTONE) 25 MG tablet Take 0.5 tablets (12.5 mg) by mouth daily 30 tablet 0     terazosin (HYTRIN) 2 MG capsule Take 1 capsule (2 mg) by mouth at bedtime 90 capsule 1     valsartan (DIOVAN) 160 MG tablet Take 160 mg by mouth 2 times daily       vitamin C (ASCORBIC ACID) 1000 MG TABS Take 1,000 mg by mouth daily       vitamin D3 (CHOLECALCIFEROL) 2000 units (50 mcg) tablet Take 1 tablet by mouth daily       zinc gluconate 50 MG tablet Take 25 mg by mouth daily       albuterol (PROAIR HFA/PROVENTIL HFA/VENTOLIN HFA) 108 (90 Base) MCG/ACT inhaler Inhale 1-2 puffs into the lungs (Patient not taking: Reported on 8/5/2024)       apixaban ANTICOAGULANT (ELIQUIS ANTICOAGULANT) 5 MG tablet Take 1 tablet (5 mg) by mouth 2 times daily (Patient not taking: Reported on 8/5/2024) 180 tablet 3     nitroGLYcerin (NITROSTAT) 0.4 MG sublingual tablet Place 0.4 mg under the tongue every 5 minutes as needed for chest pain For chest pain place 1 tablet under the tongue every 5 minutes for 3 doses. If symptoms persist 5 minutes after 1st dose call 911. (Patient not taking: Reported on 7/16/2024)         PRN Meds:.  No current facility-administered medications for this visit.       Allergies:   Atorvastatin, Bactrim [sulfamethoxazole-trimethoprim], Dust mites, and Fluorescein    Objective:      Physical Exam  104.3 kg (230 lb)  1.803 m (5' 11\")  Body mass index is 32.08 kg/m .  /80 (BP Location: Right arm, Patient Position: Sitting, Cuff Size: Adult Large)   Pulse 63   Resp 16   Ht 1.803 m (5' 11\")   Wt 104.3 kg (230 lb)   BMI 32.08 kg/m      General Appearance:   Alert, cooperative and in no acute " "distress.   HEENT:  No scleral icterus; the mucous membranes were pink and moist.   Neck: JVP to jaw at 30 degrees. No thyromegaly. No HJR   Chest: The spine was straight. The chest was symmetric.,  Midline sternotomy scar   Lungs:   Respirations unlabored; the lungs are clear to auscultation.   Cardiovascular:   S1 and S2 without murmur, clicks or rubs. Brachial, radial, carotid and posterior tibial pulses are intact and symetrical.  No carotid bruits noted   Abdomen:  No organomegaly, masses, bruits, or tenderness. Bowels sounds are present   Extremities: No cyanosis, clubbing, or edema.   Skin: No xanthelasma.   Neurologic: Mood and affect are appropriate.         Lab Reviewed Personally by myself  Lab Results   Component Value Date     07/30/2024     01/09/2021    CO2 26 07/30/2024    CO2 27 05/27/2023    CO2 25 01/09/2021    BUN 39.7 07/30/2024    BUN 22 05/27/2023    BUN 22 01/09/2021     Lab Results   Component Value Date    WBC 10.4 07/30/2024    WBC 9.3 01/09/2021    HGB 12.5 07/30/2024    HGB 11.5 01/09/2021    HCT 36.4 07/30/2024    HCT 37.4 01/09/2021    MCV 82 07/30/2024    MCV 81 01/09/2021     07/30/2024     01/09/2021     Lab Results   Component Value Date    CHOL 160 12/01/2023    CHOL 212 09/25/2017    TRIG 266 12/01/2023    TRIG 565 09/25/2017    HDL 38 12/01/2023    HDL 31 09/25/2017     No results found for: \"BNP\"    ECG personally reviewed by myself shows sinus rhythm, leftward axis, cannot rule out old anterior septal Q wave MI type pattern, nonspecific ST-T wave changes.     Review of Systems:     Review of Systems:   Enc Vitals  BP: 138/80  Pulse: 63  Resp: 16  Weight: 104.3 kg (230 lb)  Height: 180.3 cm (5' 11\")                                              Thank you for allowing me to participate in the care of your patient.      Sincerely,     ALBERT CHAVEZ MD     [facility]  cc:   Carlos Correa MD  [id number] [place] [address]

## 2024-08-05 NOTE — PATIENT INSTRUCTIONS
Mr. Nabil Cheung,  It certainly was nice to meet you today.  Per our conversation you're retaining fluid and having some skipped beats in the chest.  This could represent heart failure or an abnormal heartbeat and the reason I am setting you up to see our EP service to discuss ablation of the flutter.  In addition given the water retention will change to lasix and check kidney and potassium and thyroid all next week after 4-5 days of meds change.  We will get the stress test without exercise.  We will call you the results of these tests.  In the interim continue the sleep apnea evaluation.  I will plan on seeing you in 1-2 months or sooner if need be.  Paco Valdivia

## 2024-08-07 ENCOUNTER — HOSPITAL ENCOUNTER (OUTPATIENT)
Dept: CARDIOLOGY | Facility: CLINIC | Age: 64
Discharge: HOME | End: 2024-08-07
Attending: STUDENT IN AN ORGANIZED HEALTH CARE EDUCATION/TRAINING PROGRAM
Payer: MEDICARE

## 2024-08-07 ENCOUNTER — HOSPITAL ENCOUNTER (OUTPATIENT)
Dept: NUCLEAR MEDICINE | Facility: CLINIC | Age: 64
Discharge: HOME | End: 2024-08-07
Attending: STUDENT IN AN ORGANIZED HEALTH CARE EDUCATION/TRAINING PROGRAM
Payer: MEDICARE

## 2024-08-07 DIAGNOSIS — I25.118 CORONARY ARTERY DISEASE INVOLVING NATIVE CORONARY ARTERY OF NATIVE HEART WITH OTHER FORM OF ANGINA PECTORIS (H): ICD-10-CM

## 2024-08-07 LAB
CV STRESS CURRENT BP HE: NORMAL
CV STRESS CURRENT HR HE: 58
CV STRESS CURRENT HR HE: 64
CV STRESS CURRENT HR HE: 66
CV STRESS CURRENT HR HE: 69
CV STRESS CURRENT HR HE: 70
CV STRESS CURRENT HR HE: 70
CV STRESS CURRENT HR HE: 71
CV STRESS CURRENT HR HE: 74
CV STRESS CURRENT HR HE: 75
CV STRESS CURRENT HR HE: 78
CV STRESS CURRENT HR HE: 78
CV STRESS DEVIATION TIME HE: NORMAL
CV STRESS ECHO PERCENT HR HE: NORMAL
CV STRESS EXERCISE STAGE HE: NORMAL
CV STRESS FINAL RESTING BP HE: NORMAL
CV STRESS FINAL RESTING HR HE: 69
CV STRESS MAX HR HE: 79
CV STRESS MAX TREADMILL GRADE HE: 0
CV STRESS MAX TREADMILL SPEED HE: 0
CV STRESS PEAK DIA BP HE: NORMAL
CV STRESS PEAK SYS BP HE: NORMAL
CV STRESS PHASE HE: NORMAL
CV STRESS PROTOCOL HE: NORMAL
CV STRESS RESTING PT POSITION HE: NORMAL
CV STRESS ST DEVIATION AMOUNT HE: NORMAL
CV STRESS ST DEVIATION ELEVATION HE: NORMAL
CV STRESS ST EVELATION AMOUNT HE: NORMAL
CV STRESS TEST TYPE HE: NORMAL
CV STRESS TOTAL STAGE TIME MIN 1 HE: NORMAL
NUC STRESS EJECTION FRACTION: 45 %
RATE PRESSURE PRODUCT: NORMAL
STRESS ECHO BASELINE DIASTOLIC HE: 79
STRESS ECHO BASELINE HR: 61
STRESS ECHO BASELINE SYSTOLIC BP: 158
STRESS ECHO CALCULATED PERCENT HR: 51 %
STRESS ECHO LAST STRESS DIASTOLIC BP: 87
STRESS ECHO LAST STRESS HR: 74
STRESS ECHO LAST STRESS SYSTOLIC BP: 163
STRESS ECHO TARGET HR: 154

## 2024-08-07 PROCEDURE — 78452 HT MUSCLE IMAGE SPECT MULT: CPT | Mod: ME

## 2024-08-07 PROCEDURE — 78452 HT MUSCLE IMAGE SPECT MULT: CPT | Mod: 26 | Performed by: INTERNAL MEDICINE

## 2024-08-07 PROCEDURE — 343N000001 HC RX 343: Performed by: STUDENT IN AN ORGANIZED HEALTH CARE EDUCATION/TRAINING PROGRAM

## 2024-08-07 PROCEDURE — 93018 CV STRESS TEST I&R ONLY: CPT | Performed by: INTERNAL MEDICINE

## 2024-08-07 PROCEDURE — 93016 CV STRESS TEST SUPVJ ONLY: CPT | Performed by: INTERNAL MEDICINE

## 2024-08-07 PROCEDURE — A9500 TC99M SESTAMIBI: HCPCS | Performed by: STUDENT IN AN ORGANIZED HEALTH CARE EDUCATION/TRAINING PROGRAM

## 2024-08-07 PROCEDURE — 93017 CV STRESS TEST TRACING ONLY: CPT

## 2024-08-07 PROCEDURE — G1010 CDSM STANSON: HCPCS | Performed by: INTERNAL MEDICINE

## 2024-08-07 PROCEDURE — 250N000011 HC RX IP 250 OP 636: Performed by: STUDENT IN AN ORGANIZED HEALTH CARE EDUCATION/TRAINING PROGRAM

## 2024-08-07 RX ORDER — REGADENOSON 0.08 MG/ML
0.4 INJECTION, SOLUTION INTRAVENOUS ONCE
Status: COMPLETED | OUTPATIENT
Start: 2024-08-07 | End: 2024-08-07

## 2024-08-07 RX ORDER — ALBUTEROL SULFATE 90 UG/1
2 AEROSOL, METERED RESPIRATORY (INHALATION) EVERY 5 MIN PRN
Status: DISCONTINUED | OUTPATIENT
Start: 2024-08-07 | End: 2024-08-08 | Stop reason: HOSPADM

## 2024-08-07 RX ORDER — AMINOPHYLLINE 25 MG/ML
50-100 INJECTION, SOLUTION INTRAVENOUS
Status: DISCONTINUED | OUTPATIENT
Start: 2024-08-07 | End: 2024-08-08 | Stop reason: HOSPADM

## 2024-08-07 RX ADMIN — REGADENOSON 0.4 MG: 0.08 INJECTION, SOLUTION INTRAVENOUS at 10:27

## 2024-08-07 RX ADMIN — Medication 8 MILLICURIE: at 10:03

## 2024-08-07 RX ADMIN — Medication 37.8 MILLICURIE: at 10:25

## 2024-08-13 ENCOUNTER — LAB (OUTPATIENT)
Dept: LAB | Facility: CLINIC | Age: 64
End: 2024-08-13
Payer: MEDICARE

## 2024-08-13 DIAGNOSIS — I50.30 HEART FAILURE WITH PRESERVED EJECTION FRACTION, NYHA CLASS I (H): ICD-10-CM

## 2024-08-13 DIAGNOSIS — I10 ESSENTIAL (PRIMARY) HYPERTENSION: ICD-10-CM

## 2024-08-13 DIAGNOSIS — E03.8 SUBCLINICAL HYPOTHYROIDISM: ICD-10-CM

## 2024-08-13 DIAGNOSIS — N18.32 CKD STAGE 3B, GFR 30-44 ML/MIN (H): ICD-10-CM

## 2024-08-13 PROCEDURE — 36415 COLL VENOUS BLD VENIPUNCTURE: CPT

## 2024-08-13 PROCEDURE — 80069 RENAL FUNCTION PANEL: CPT

## 2024-08-13 PROCEDURE — 84443 ASSAY THYROID STIM HORMONE: CPT

## 2024-08-14 DIAGNOSIS — N18.4 CKD (CHRONIC KIDNEY DISEASE) STAGE 4, GFR 15-29 ML/MIN (H): Primary | ICD-10-CM

## 2024-08-14 LAB
ALBUMIN SERPL BCG-MCNC: 3.9 G/DL (ref 3.5–5.2)
ANION GAP SERPL CALCULATED.3IONS-SCNC: 12 MMOL/L (ref 7–15)
BUN SERPL-MCNC: 46.6 MG/DL (ref 8–23)
CALCIUM SERPL-MCNC: 9.4 MG/DL (ref 8.8–10.4)
CHLORIDE SERPL-SCNC: 96 MMOL/L (ref 98–107)
CREAT SERPL-MCNC: 2.46 MG/DL (ref 0.67–1.17)
EGFRCR SERPLBLD CKD-EPI 2021: 28 ML/MIN/1.73M2
GLUCOSE SERPL-MCNC: 306 MG/DL (ref 70–99)
HCO3 SERPL-SCNC: 25 MMOL/L (ref 22–29)
PHOSPHATE SERPL-MCNC: 4.6 MG/DL (ref 2.5–4.5)
POTASSIUM SERPL-SCNC: 5 MMOL/L (ref 3.4–5.3)
SODIUM SERPL-SCNC: 133 MMOL/L (ref 135–145)
TSH SERPL DL<=0.005 MIU/L-ACNC: 1.97 UIU/ML (ref 0.3–4.2)

## 2024-09-03 DIAGNOSIS — I10 ESSENTIAL (PRIMARY) HYPERTENSION: ICD-10-CM

## 2024-09-03 DIAGNOSIS — I50.30 HEART FAILURE WITH PRESERVED EJECTION FRACTION, NYHA CLASS I (H): ICD-10-CM

## 2024-09-03 RX ORDER — SPIRONOLACTONE 25 MG/1
12.5 TABLET ORAL DAILY
Qty: 30 TABLET | Refills: 0 | Status: SHIPPED | OUTPATIENT
Start: 2024-09-03

## 2024-09-03 NOTE — TELEPHONE ENCOUNTER
Refill request from pharmacy.  Pending Prescriptions:                       Disp   Refills    spironolactone (ALDACTONE) 25 MG tablet   30 tab*0            Sig: Take 0.5 tablets (12.5 mg) by mouth daily.    Last OV with PCP: 07/30/2024

## 2024-09-13 ENCOUNTER — TELEPHONE (OUTPATIENT)
Dept: FAMILY MEDICINE | Facility: CLINIC | Age: 64
End: 2024-09-13
Payer: MEDICARE

## 2024-09-13 NOTE — TELEPHONE ENCOUNTER
General Call    Contacts       Contact Date/Time Type Contact Phone/Fax    09/13/2024 02:38 PM CDT Phone (Incoming) Nabil Cheung (Self) [phone] (M)          Reason for Call:  Pt is scheduled to have labs drawn for a renal panel tomorrow.  He tested positive for Covid on 9/8/24, symptoms started 9/7/24. He tested positive again this morning.      What are your questions or concerns:  Pt is wondering if he should wait to have labs drawn until he gets a negative test, or if the labs are time sensitive and he needs to get them done right away?  Pt would wear an N-95 mask if he comes tomorrow.      Date of last appointment with provider: 7/30/24    Could we send this information to you in [facility] or would you prefer to receive a phone call?:   Patient would prefer a phone call     Okay to leave a detailed message?: Yes at Cell number on file:    Telephone Information:   Mobile [phone]      Rosario Quigley RN

## 2024-09-16 NOTE — TELEPHONE ENCOUNTER
Outgoing call to patient, no answer. Left a message on a self- identified VM. Looks like he has already moved his lap appointment to 9/17. No further needs at this time.     Roberta PATTERSON RN

## 2024-09-17 ENCOUNTER — LAB (OUTPATIENT)
Dept: LAB | Facility: CLINIC | Age: 64
End: 2024-09-17
Payer: MEDICARE

## 2024-09-17 DIAGNOSIS — N18.4 CKD (CHRONIC KIDNEY DISEASE) STAGE 4, GFR 15-29 ML/MIN (H): ICD-10-CM

## 2024-09-17 LAB
ALBUMIN SERPL BCG-MCNC: 3.6 G/DL (ref 3.5–5.2)
ANION GAP SERPL CALCULATED.3IONS-SCNC: 10 MMOL/L (ref 7–15)
BUN SERPL-MCNC: 36.1 MG/DL (ref 8–23)
CALCIUM SERPL-MCNC: 9 MG/DL (ref 8.8–10.4)
CHLORIDE SERPL-SCNC: 103 MMOL/L (ref 98–107)
CREAT SERPL-MCNC: 2.09 MG/DL (ref 0.67–1.17)
EGFRCR SERPLBLD CKD-EPI 2021: 34 ML/MIN/1.73M2
GLUCOSE SERPL-MCNC: 167 MG/DL (ref 70–99)
HCO3 SERPL-SCNC: 23 MMOL/L (ref 22–29)
PHOSPHATE SERPL-MCNC: 4.8 MG/DL (ref 2.5–4.5)
POTASSIUM SERPL-SCNC: 5.1 MMOL/L (ref 3.4–5.3)
SODIUM SERPL-SCNC: 136 MMOL/L (ref 135–145)

## 2024-09-17 PROCEDURE — 36415 COLL VENOUS BLD VENIPUNCTURE: CPT

## 2024-09-17 PROCEDURE — 80069 RENAL FUNCTION PANEL: CPT

## 2024-09-21 ENCOUNTER — HEALTH MAINTENANCE LETTER (OUTPATIENT)
Age: 64
End: 2024-09-21

## 2024-10-08 ENCOUNTER — OFFICE VISIT (OUTPATIENT)
Dept: CARDIOLOGY | Facility: CLINIC | Age: 64
End: 2024-10-08
Payer: MEDICARE

## 2024-10-08 VITALS
SYSTOLIC BLOOD PRESSURE: 144 MMHG | DIASTOLIC BLOOD PRESSURE: 74 MMHG | HEART RATE: 64 BPM | RESPIRATION RATE: 16 BRPM | WEIGHT: 231 LBS | OXYGEN SATURATION: 95 % | BODY MASS INDEX: 32.22 KG/M2

## 2024-10-08 DIAGNOSIS — I50.30 HEART FAILURE WITH PRESERVED EJECTION FRACTION, NYHA CLASS I (H): ICD-10-CM

## 2024-10-08 DIAGNOSIS — E03.8 SUBCLINICAL HYPOTHYROIDISM: ICD-10-CM

## 2024-10-08 DIAGNOSIS — N40.0 BENIGN PROSTATIC HYPERPLASIA WITHOUT LOWER URINARY TRACT SYMPTOMS: ICD-10-CM

## 2024-10-08 DIAGNOSIS — E11.9 DIABETES MELLITUS WITHOUT COMPLICATION (H): ICD-10-CM

## 2024-10-08 DIAGNOSIS — I48.92 ATRIAL FLUTTER, UNSPECIFIED TYPE (H): ICD-10-CM

## 2024-10-08 DIAGNOSIS — Z95.1 S/P CABG (CORONARY ARTERY BYPASS GRAFT): ICD-10-CM

## 2024-10-08 DIAGNOSIS — E78.00 PURE HYPERCHOLESTEROLEMIA: ICD-10-CM

## 2024-10-08 DIAGNOSIS — I10 ESSENTIAL (PRIMARY) HYPERTENSION: ICD-10-CM

## 2024-10-08 DIAGNOSIS — N18.32 STAGE 3B CHRONIC KIDNEY DISEASE (H): ICD-10-CM

## 2024-10-08 DIAGNOSIS — I25.83 CORONARY ARTERIOSCLEROSIS DUE TO LIPID RICH PLAQUE: Primary | ICD-10-CM

## 2024-10-08 PROBLEM — N40.1 BPH WITH OBSTRUCTION/LOWER URINARY TRACT SYMPTOMS: Status: RESOLVED | Noted: 2019-07-30 | Resolved: 2024-10-08

## 2024-10-08 PROBLEM — N13.8 BPH WITH OBSTRUCTION/LOWER URINARY TRACT SYMPTOMS: Status: RESOLVED | Noted: 2019-07-30 | Resolved: 2024-10-08

## 2024-10-08 PROBLEM — E11.22 TYPE 2 DIABETES MELLITUS WITH CHRONIC KIDNEY DISEASE (H): Status: RESOLVED | Noted: 2021-08-21 | Resolved: 2024-10-08

## 2024-10-08 PROCEDURE — G2211 COMPLEX E/M VISIT ADD ON: HCPCS | Performed by: INTERNAL MEDICINE

## 2024-10-08 PROCEDURE — 99214 OFFICE O/P EST MOD 30 MIN: CPT | Performed by: INTERNAL MEDICINE

## 2024-10-08 NOTE — LETTER
10/8/2024    Carlos Correa MD  [address] [place] [address]    RE: Nabil Cheung       Dear Colleague,     I had the pleasure of seeing Nabil Cheung in the [facility].      [facility]  Heart Care Clinic Follow-up Note    Assessment & Plan      (I25.10,  I25.83) Coronary arteriosclerosis due to lipid rich plaque  (primary encounter diagnosis)  Comment: Angiography March 2020 showed normal left main, mid LAD 75% stenosis with first diagonal 70% stenosis, circumflex with a proximal total occlusion, and right coronary artery with a distal 70% PDA lesion.  This prompted coronary artery bypass grafting.     (Z95.1) S/P CABG (coronary artery bypass graft)  Comment: In 2020 he had a LIMA to the LAD, vein graft to first diagonal, vein graft to obtuse marginal artery, and vein graft to distal right coronary artery.  Recent nuclear stress test showed small area of mild inferolateral scar without any significant ischemia, ejection fraction mildly down.  No further evaluation and stable.     (I50.30) Heart failure with preserved ejection fraction, NYHA class I (H)  Comment: Increased BNP, most likely due to atrial flutter but cannot rule out ischemia.  Midrange ejection fraction of 40 to 45%, symptoms resolved on furosemide.  Discussed fluid and salt restriction.  Continue Farxiga, Aldactone, valsartan, carvedilol.    (I48.3) Typical atrial flutter (H)  Comment: Episodic, given the recurrence although he is now in sinus rhythm today, would recommend ablation of this typical, right-sided most likely counterclockwise atrial flutter.  This most likely caused him to go to heart failure.  On Eliquis 5 mg twice daily.     (I10) Essential (primary) hypertension  Comment: Not under good control currently on valsartan as well as carvedilol.  Will take the liberty of increasing carvedilol up to 25 mg mouth twice daily, he will call let us know how he does with this, if no significant issues we will then  get him 25 mg tablets, warned him of bradycardia as well as lower heart rates.     (E78.00) Pure hypercholesterolemia  Comment: Numbers good with a total cholesterol 160 and LDL of 69.  He is concerned about side effects to rosuvastatin, will have him take a 3-week rosuvastatin holiday.      (N18.32) Stage 3b chronic kidney disease (H)  Comment: Concerned with GFR of 34 and creatinine of 2.09, with increased dose of diuresis need to make sure he does not go into worsening renal failure.  He is monitoring his fluid status and sees nephrology.     (E11.9,  Z79.4) Type 2 diabetes mellitus without complication, with long-term current use of insulin (H)  Comment: Needs much better control, his hemoglobin A1c is 8.9.  He is not sure what insulin is on, he thinks possibly NovoLog and Lantus as well as now Farxiga.  He will check medicines at home and defer to primary.     (E03.8) Subclinical hypothyroidism  Comment: Agree with strict thyroid workup, suspect this could have caused the abnormal heartbeat which could have contributed to heart failure,      (N40.0) Benign prostatic hyperplasia without lower urinary tract symptoms  Comment: Stable at this point time on tamsulosin as well as Proscar.    Plan  1.  Given concern of statin myopathy will have him take holiday off rosuvastatin for 3 weeks and call us to let us know how this goes for him.  If he gets better off rosuvastatin consider PCSK9 inhibitor.  2.  Work on weight loss.  3.  Continue with sleep apnea evaluation.  4.  Increase carvedilol to 25 twice daily and if no significant bradycardia or dizziness given prescription for same.  5.  Given all this follow-up with me in 6 months or sooner if needed.    The longitudinal plan of care for the diagnosis(es)/condition(s) as documented were addressed during this visit. Due to the added complexity in care, I will continue to support Nabil in the subsequent management and with ongoing continuity of care.      Subjective  CC: 64-year-old white gentleman here for a follow-up visit.  Still living independently, finishes stress test.  He is here tell me he gets short of breath of air quality index is bad but there is no PND, orthopnea, chest pains, major palpitations, significant peripheral edema, syncope or presyncope.    Medications  Current Outpatient Medications   Medication Sig Dispense Refill     acetaminophen (TYLENOL) 325 MG tablet Take 650 mg by mouth every 6 hours as needed for mild pain       albuterol (PROAIR HFA/PROVENTIL HFA/VENTOLIN HFA) 108 (90 Base) MCG/ACT inhaler Inhale 1-2 puffs into the lungs       alpha-lipoic acid 600 MG capsule Take 600 mg by mouth daily        apixaban ANTICOAGULANT (ELIQUIS ANTICOAGULANT) 5 MG tablet Take 1 tablet (5 mg) by mouth 2 times daily 180 tablet 3     aspirin 81 MG EC tablet Take 81 mg by mouth three times a week.       budesonide-formoterol (SYMBICORT) 160-4.5 MCG/ACT Inhaler Inhale 2 puffs into the lungs 2 times daily as needed.       carvedilol (COREG) 12.5 MG tablet Take 1 tablet (12.5 mg) by mouth 2 times daily (with meals)       coenzyme Q-10 200 MG CAPS capsule Take 1 capsule by mouth daily as needed.       Continuous Blood Gluc  (FREESTYLE JULIO 2 READER) CARLOS Use to read blood sugars as per 's instructions. 1 each 0     Continuous Blood Gluc Sensor (FREESTYLE JULIO 2 SENSOR) MISC CHANGE EVERY 14 DAYS 2 each 5     FARXIGA 10 MG TABS tablet Take 1 tablet (10 mg) by mouth daily 90 tablet 1     finasteride (PROSCAR) 5 MG tablet Take 5 mg by mouth daily       furosemide (LASIX) 40 MG tablet Take 1 tablet (40 mg) by mouth daily 30 tablet 11     insulin aspart (NOVOLOG PEN) 100 UNIT/ML pen Inject 1-7 Units Subcutaneous 3 times daily (before meals) Correction Scale - MEDIUM INSULIN RESISTANCE DOSING     Do Not give Correction Insulin if Pre-Meal BG less than 140.   For Pre-Meal  - 189 give 1 unit.   For Pre-Meal  - 239 give 2 units.    For Pre-Meal  - 289 give 3 units.   For Pre-Meal  - 339 give 4 units.   For Pre-Meal - 399 give 5 units.   For Pre-Meal -449 give 6 units  For Pre-Meal BG greater than or equal to 450 give 7 units.   To be given with prandial insulin, and based on pre-meal blood glucose.    Notify provider if glucose greater than or equal to 350 mg/dL after administration of correction dose. If given at mealtime, administer within 30 minutes of start of meal (Patient taking differently: Inject 1-7 Units subcutaneously 2 times daily (with meals). Correction Scale - MEDIUM INSULIN RESISTANCE DOSING     Only use at lunch and dinner time, patient fasting at AM  Do Not give Correction Insulin if Pre-Meal BG less than 140.   For Pre-Meal  - 189 give 1 unit.   For Pre-Meal  - 239 give 2 units.   For Pre-Meal  - 289 give 3 units.   For Pre-Meal  - 339 give 4 units.   For Pre-Meal - 399 give 5 units.   For Pre-Meal -449 give 6 units  For Pre-Meal BG greater than or equal to 450 give 7 units.   To be given with prandial insulin, and based on pre-meal blood glucose.    Notify provider if glucose greater than or equal to 350 mg/dL after administration of correction dose. If given at mealtime, administer within 30 minutes of start of meal)       insulin glargine (LANTUS PEN) 100 UNIT/ML pen Inject 32 Units Subcutaneous at bedtime 15 mL 3     levothyroxine (SYNTHROID/LEVOTHROID) 50 MCG tablet Take 1 tablet (50 mcg) by mouth daily 90 tablet 0     magnesium oxide 400 MG CAPS Take by mouth daily       rosuvastatin (CRESTOR) 40 MG tablet Take 20 mg by mouth daily Evening        spironolactone (ALDACTONE) 25 MG tablet Take 0.5 tablets (12.5 mg) by mouth daily. 30 tablet 0     terazosin (HYTRIN) 2 MG capsule Take 1 capsule (2 mg) by mouth at bedtime 90 capsule 1     valsartan (DIOVAN) 160 MG tablet Take 160 mg by mouth 2 times daily       vitamin C (ASCORBIC ACID) 1000 MG TABS Take 1,000 mg  "by mouth daily       vitamin D3 (CHOLECALCIFEROL) 2000 units (50 mcg) tablet Take 1 tablet by mouth daily       zinc gluconate 50 MG tablet Take 25 mg by mouth daily       nitroGLYcerin (NITROSTAT) 0.4 MG sublingual tablet Place 0.4 mg under the tongue every 5 minutes as needed for chest pain For chest pain place 1 tablet under the tongue every 5 minutes for 3 doses. If symptoms persist 5 minutes after 1st dose call 911. (Patient not taking: Reported on 7/16/2024)         Objective  BP (!) 144/74 (BP Location: Left arm, Patient Position: Sitting, Cuff Size: Adult Large)   Pulse 64   Resp 16   Wt 104.8 kg (231 lb)   SpO2 95%   BMI 32.22 kg/m      General Appearance:    Alert, cooperative, no distress, appears stated age   Head:    Normocephalic, without obvious abnormality, atraumatic   Throat:   Lips, mucosa, and tongue normal; teeth and gums normal   Neck:   Supple, symmetrical, trachea midline, no adenopathy;        thyroid:  No enlargement/tenderness/nodules; no carotid    bruit or JVD   Back:     Symmetric, no curvature, ROM normal, no CVA tenderness   Lungs:     Clear to auscultation bilaterally, respirations unlabored   Chest wall:    No tenderness, midline sternotomy scar   Heart:    Regular rate and rhythm, S1 and S2 normal, no murmur, rub   or gallop   Abdomen:     Soft, non-tender, bowel sounds active all four quadrants,     no masses, no organomegaly   Extremities:   Normal, atraumatic, no cyanosis or edema   Pulses:   2+ and symmetric all extremities   Skin:   Skin color, texture, turgor normal, no rashes or lesions     Results    Lab Results personally reviewed   Lab Results   Component Value Date    CHOL 160 12/01/2023    CHOL 212 (H) 09/25/2017     Lab Results   Component Value Date    HDL 38 (L) 12/01/2023    HDL 31 (L) 09/25/2017     No components found for: \"LDLCALC\"  Lab Results   Component Value Date    TRIG 266 (H) 12/01/2023    TRIG 565 (H) 09/25/2017     Lab Results   Component Value Date "    WBC 10.4 07/30/2024    HGB 12.5 (L) 07/30/2024    HCT 36.4 (L) 07/30/2024     07/30/2024     Lab Results   Component Value Date    BUN 36.1 (H) 09/17/2024     09/17/2024    CO2 23 09/17/2024       Reviewed electrocardiogram sinus rhythm, nonspecific ST-T wave changes, occasional PVC.              Thank you for allowing me to participate in the care of your patient.      Sincerely,     ALBERT VALDIVIA MD     [facility]  cc:   Radha Valdivia MD  [address], [address]  [place] [address]

## 2024-10-08 NOTE — PROGRESS NOTES
[facility]  Heart Care Clinic Follow-up Note    Assessment & Plan      (I25.10,  I25.83) Coronary arteriosclerosis due to lipid rich plaque  (primary encounter diagnosis)  Comment: Angiography March 2020 showed normal left main, mid LAD 75% stenosis with first diagonal 70% stenosis, circumflex with a proximal total occlusion, and right coronary artery with a distal 70% PDA lesion.  This prompted coronary artery bypass grafting.     (Z95.1) S/P CABG (coronary artery bypass graft)  Comment: In 2020 he had a LIMA to the LAD, vein graft to first diagonal, vein graft to obtuse marginal artery, and vein graft to distal right coronary artery.  Recent nuclear stress test showed small area of mild inferolateral scar without any significant ischemia, ejection fraction mildly down.  No further evaluation and stable.     (I50.30) Heart failure with preserved ejection fraction, NYHA class I (H)  Comment: Increased BNP, most likely due to atrial flutter but cannot rule out ischemia.  Midrange ejection fraction of 40 to 45%, symptoms resolved on furosemide.  Discussed fluid and salt restriction.  Continue Farxiga, Aldactone, valsartan, carvedilol.    (I48.3) Typical atrial flutter (H)  Comment: Episodic, given the recurrence although he is now in sinus rhythm today, would recommend ablation of this typical, right-sided most likely counterclockwise atrial flutter.  This most likely caused him to go to heart failure.  On Eliquis 5 mg twice daily.     (I10) Essential (primary) hypertension  Comment: Not under good control currently on valsartan as well as carvedilol.  Will take the liberty of increasing carvedilol up to 25 mg mouth twice daily, he will call let us know how he does with this, if no significant issues we will then get him 25 mg tablets, warned him of bradycardia as well as lower heart rates.     (E78.00) Pure hypercholesterolemia  Comment: Numbers good with a total cholesterol 160 and LDL of 69.  He is  concerned about side effects to rosuvastatin, will have him take a 3-week rosuvastatin holiday.      (N18.32) Stage 3b chronic kidney disease (H)  Comment: Concerned with GFR of 34 and creatinine of 2.09, with increased dose of diuresis need to make sure he does not go into worsening renal failure.  He is monitoring his fluid status and sees nephrology.     (E11.9,  Z79.4) Type 2 diabetes mellitus without complication, with long-term current use of insulin (H)  Comment: Needs much better control, his hemoglobin A1c is 8.9.  He is not sure what insulin is on, he thinks possibly NovoLog and Lantus as well as now Farxiga.  He will check medicines at home and defer to primary.     (E03.8) Subclinical hypothyroidism  Comment: Agree with strict thyroid workup, suspect this could have caused the abnormal heartbeat which could have contributed to heart failure,      (N40.0) Benign prostatic hyperplasia without lower urinary tract symptoms  Comment: Stable at this point time on tamsulosin as well as Proscar.    Plan  1.  Given concern of statin myopathy will have him take holiday off rosuvastatin for 3 weeks and call us to let us know how this goes for him.  If he gets better off rosuvastatin consider PCSK9 inhibitor.  2.  Work on weight loss.  3.  Continue with sleep apnea evaluation.  4.  Increase carvedilol to 25 twice daily and if no significant bradycardia or dizziness given prescription for same.  5.  Given all this follow-up with me in 6 months or sooner if needed.    The longitudinal plan of care for the diagnosis(es)/condition(s) as documented were addressed during this visit. Due to the added complexity in care, I will continue to support Nabil in the subsequent management and with ongoing continuity of care.     Subjective  CC: 64-year-old white gentleman here for a follow-up visit.  Still living independently, finishes stress test.  He is here tell me he gets short of breath of air quality index is bad but there  is no PND, orthopnea, chest pains, major palpitations, significant peripheral edema, syncope or presyncope.    Medications  Current Outpatient Medications   Medication Sig Dispense Refill    acetaminophen (TYLENOL) 325 MG tablet Take 650 mg by mouth every 6 hours as needed for mild pain      albuterol (PROAIR HFA/PROVENTIL HFA/VENTOLIN HFA) 108 (90 Base) MCG/ACT inhaler Inhale 1-2 puffs into the lungs      alpha-lipoic acid 600 MG capsule Take 600 mg by mouth daily       apixaban ANTICOAGULANT (ELIQUIS ANTICOAGULANT) 5 MG tablet Take 1 tablet (5 mg) by mouth 2 times daily 180 tablet 3    aspirin 81 MG EC tablet Take 81 mg by mouth three times a week.      budesonide-formoterol (SYMBICORT) 160-4.5 MCG/ACT Inhaler Inhale 2 puffs into the lungs 2 times daily as needed.      carvedilol (COREG) 12.5 MG tablet Take 1 tablet (12.5 mg) by mouth 2 times daily (with meals)      coenzyme Q-10 200 MG CAPS capsule Take 1 capsule by mouth daily as needed.      Continuous Blood Gluc  (FREESTYLE JULIO 2 READER) CARLOS Use to read blood sugars as per 's instructions. 1 each 0    Continuous Blood Gluc Sensor (FREESTYLE JULIO 2 SENSOR) MISC CHANGE EVERY 14 DAYS 2 each 5    FARXIGA 10 MG TABS tablet Take 1 tablet (10 mg) by mouth daily 90 tablet 1    finasteride (PROSCAR) 5 MG tablet Take 5 mg by mouth daily      furosemide (LASIX) 40 MG tablet Take 1 tablet (40 mg) by mouth daily 30 tablet 11    insulin aspart (NOVOLOG PEN) 100 UNIT/ML pen Inject 1-7 Units Subcutaneous 3 times daily (before meals) Correction Scale - MEDIUM INSULIN RESISTANCE DOSING     Do Not give Correction Insulin if Pre-Meal BG less than 140.   For Pre-Meal  - 189 give 1 unit.   For Pre-Meal  - 239 give 2 units.   For Pre-Meal  - 289 give 3 units.   For Pre-Meal  - 339 give 4 units.   For Pre-Meal - 399 give 5 units.   For Pre-Meal -449 give 6 units  For Pre-Meal BG greater than or equal to 450 give 7 units.    To be given with prandial insulin, and based on pre-meal blood glucose.    Notify provider if glucose greater than or equal to 350 mg/dL after administration of correction dose. If given at mealtime, administer within 30 minutes of start of meal (Patient taking differently: Inject 1-7 Units subcutaneously 2 times daily (with meals). Correction Scale - MEDIUM INSULIN RESISTANCE DOSING     Only use at lunch and dinner time, patient fasting at AM  Do Not give Correction Insulin if Pre-Meal BG less than 140.   For Pre-Meal  - 189 give 1 unit.   For Pre-Meal  - 239 give 2 units.   For Pre-Meal  - 289 give 3 units.   For Pre-Meal  - 339 give 4 units.   For Pre-Meal - 399 give 5 units.   For Pre-Meal -449 give 6 units  For Pre-Meal BG greater than or equal to 450 give 7 units.   To be given with prandial insulin, and based on pre-meal blood glucose.    Notify provider if glucose greater than or equal to 350 mg/dL after administration of correction dose. If given at mealtime, administer within 30 minutes of start of meal)      insulin glargine (LANTUS PEN) 100 UNIT/ML pen Inject 32 Units Subcutaneous at bedtime 15 mL 3    levothyroxine (SYNTHROID/LEVOTHROID) 50 MCG tablet Take 1 tablet (50 mcg) by mouth daily 90 tablet 0    magnesium oxide 400 MG CAPS Take by mouth daily      rosuvastatin (CRESTOR) 40 MG tablet Take 20 mg by mouth daily Evening       spironolactone (ALDACTONE) 25 MG tablet Take 0.5 tablets (12.5 mg) by mouth daily. 30 tablet 0    terazosin (HYTRIN) 2 MG capsule Take 1 capsule (2 mg) by mouth at bedtime 90 capsule 1    valsartan (DIOVAN) 160 MG tablet Take 160 mg by mouth 2 times daily      vitamin C (ASCORBIC ACID) 1000 MG TABS Take 1,000 mg by mouth daily      vitamin D3 (CHOLECALCIFEROL) 2000 units (50 mcg) tablet Take 1 tablet by mouth daily      zinc gluconate 50 MG tablet Take 25 mg by mouth daily      nitroGLYcerin (NITROSTAT) 0.4 MG sublingual tablet Place 0.4 mg  "under the tongue every 5 minutes as needed for chest pain For chest pain place 1 tablet under the tongue every 5 minutes for 3 doses. If symptoms persist 5 minutes after 1st dose call 911. (Patient not taking: Reported on 7/16/2024)         Objective  BP (!) 144/74 (BP Location: Left arm, Patient Position: Sitting, Cuff Size: Adult Large)   Pulse 64   Resp 16   Wt 104.8 kg (231 lb)   SpO2 95%   BMI 32.22 kg/m      General Appearance:    Alert, cooperative, no distress, appears stated age   Head:    Normocephalic, without obvious abnormality, atraumatic   Throat:   Lips, mucosa, and tongue normal; teeth and gums normal   Neck:   Supple, symmetrical, trachea midline, no adenopathy;        thyroid:  No enlargement/tenderness/nodules; no carotid    bruit or JVD   Back:     Symmetric, no curvature, ROM normal, no CVA tenderness   Lungs:     Clear to auscultation bilaterally, respirations unlabored   Chest wall:    No tenderness, midline sternotomy scar   Heart:    Regular rate and rhythm, S1 and S2 normal, no murmur, rub   or gallop   Abdomen:     Soft, non-tender, bowel sounds active all four quadrants,     no masses, no organomegaly   Extremities:   Normal, atraumatic, no cyanosis or edema   Pulses:   2+ and symmetric all extremities   Skin:   Skin color, texture, turgor normal, no rashes or lesions     Results    Lab Results personally reviewed   Lab Results   Component Value Date    CHOL 160 12/01/2023    CHOL 212 (H) 09/25/2017     Lab Results   Component Value Date    HDL 38 (L) 12/01/2023    HDL 31 (L) 09/25/2017     No components found for: \"LDLCALC\"  Lab Results   Component Value Date    TRIG 266 (H) 12/01/2023    TRIG 565 (H) 09/25/2017     Lab Results   Component Value Date    WBC 10.4 07/30/2024    HGB 12.5 (L) 07/30/2024    HCT 36.4 (L) 07/30/2024     07/30/2024     Lab Results   Component Value Date    BUN 36.1 (H) 09/17/2024     09/17/2024    CO2 23 09/17/2024       Reviewed " electrocardiogram sinus rhythm, nonspecific ST-T wave changes, occasional PVC.

## 2024-10-08 NOTE — PATIENT INSTRUCTIONS
Mr Nabil CLEANING Fossil,  I enjoyed visiting with you again today.  I am glad to hear you are doing well.  Per our conversation check this list of meds and call us either way at [phone] for accuracy.  I would like you to take a holiday off the ROSUVASTATIN for 3 weeks and restart right away and call the above number with results either way.  When the dust settles I would like you to double your CARVEDILOL and take 2 twice a day or 25 mg and if no issues such as too slow heart beat (dizzy) call us for a new script.  I will plan on seeing you 4-6 months.  Paco Valdivia

## 2024-10-17 NOTE — PROVIDER NOTIFICATION
Continue therapy, work towards goals   MD Notification    Notified Person: MD    Notified Person Name: Dr. Cline    Notification Date/Time: 8/20/21 at 1618    Notification Interaction: Web-based paging     Purpose of Notification:  Can you order aspirin per hospitalist rec? ALSO, is patient able to discharge?    Orders Received: No orders placed    Comments: Per Dr. Cline, she signed off on 8/18 and all of her weight bearing restrictions and dressing information is in discharge navigator. From her perspective it is okay for hospitalist MD to discharge patient.

## 2024-11-05 ENCOUNTER — MYC MEDICAL ADVICE (OUTPATIENT)
Dept: CARDIOLOGY | Facility: CLINIC | Age: 64
End: 2024-11-05
Payer: MEDICARE

## 2024-11-06 ENCOUNTER — TRANSFERRED RECORDS (OUTPATIENT)
Dept: HEALTH INFORMATION MANAGEMENT | Facility: CLINIC | Age: 64
End: 2024-11-06
Payer: MEDICARE

## 2024-11-06 DIAGNOSIS — E03.8 SUBCLINICAL HYPOTHYROIDISM: ICD-10-CM

## 2024-11-06 LAB — RETINOPATHY: POSITIVE

## 2024-11-06 NOTE — TELEPHONE ENCOUNTER
Refill request from pharmacy for levothyroxine (SYNTHROID/LEVOTHROID) 50 MCG tablet.  Order pended.    Last OV: 07/30/2024

## 2024-11-07 RX ORDER — LEVOTHYROXINE SODIUM 50 UG/1
50 TABLET ORAL DAILY
Qty: 90 TABLET | Refills: 1 | Status: SHIPPED | OUTPATIENT
Start: 2024-11-07

## 2024-11-19 NOTE — TELEPHONE ENCOUNTER
Called patient to review recent elevated blood pressure reading.  Per [place] Community Measures guidelines, patients blood pressure is out of parameters and recheck blood pressure is recommended.    Last Blood Pressure: 144/74  Last Heart Rate: 64  Date: 10/8/24  Location: [facility]    Today's Blood Pressure: 121/73  Today's Heart Rate: 70  Location: Home BP    Patient reported blood pressure updated in Epic. Blood pressure falls within [place] Community Measures guidelines.  Patient will follow up as previously advised.Thanks    SHRUTI Sweeney

## 2024-11-30 ENCOUNTER — HEALTH MAINTENANCE LETTER (OUTPATIENT)
Age: 64
End: 2024-11-30

## 2024-12-22 DIAGNOSIS — N40.1 BENIGN PROSTATIC HYPERPLASIA WITH URINARY FREQUENCY: ICD-10-CM

## 2024-12-22 DIAGNOSIS — R35.0 BENIGN PROSTATIC HYPERPLASIA WITH URINARY FREQUENCY: ICD-10-CM

## 2024-12-23 ENCOUNTER — THERAPY VISIT (OUTPATIENT)
Dept: SLEEP MEDICINE | Facility: CLINIC | Age: 64
End: 2024-12-23
Payer: MEDICARE

## 2024-12-23 DIAGNOSIS — R06.81 WITNESSED APNEIC SPELLS: ICD-10-CM

## 2024-12-23 DIAGNOSIS — R25.8 NOCTURNAL LEG MOVEMENTS: ICD-10-CM

## 2024-12-23 DIAGNOSIS — I10 ESSENTIAL (PRIMARY) HYPERTENSION: ICD-10-CM

## 2024-12-23 DIAGNOSIS — I25.118 CORONARY ARTERY DISEASE INVOLVING NATIVE CORONARY ARTERY OF NATIVE HEART WITH OTHER FORM OF ANGINA PECTORIS (H): ICD-10-CM

## 2024-12-23 DIAGNOSIS — J45.50 SEVERE PERSISTENT ASTHMA WITHOUT COMPLICATION (H): ICD-10-CM

## 2024-12-23 DIAGNOSIS — R06.83 SNORING: ICD-10-CM

## 2024-12-23 DIAGNOSIS — R53.82 CHRONIC FATIGUE: ICD-10-CM

## 2024-12-23 PROCEDURE — 95810 POLYSOM 6/> YRS 4/> PARAM: CPT | Performed by: PSYCHIATRY & NEUROLOGY

## 2024-12-24 LAB — SLPCOMP: NORMAL

## 2024-12-24 RX ORDER — DAPAGLIFLOZIN 10 MG/1
10 TABLET, FILM COATED ORAL DAILY
Qty: 90 TABLET | Refills: 1 | Status: SHIPPED | OUTPATIENT
Start: 2024-12-24

## 2024-12-24 NOTE — PROCEDURES
" SLEEP STUDY INTERPRETATION  DIAGNOSTIC POLYSOMNOGRAPHY REPORT      Patient: ABILIO SUAZO  YOB: 1960  Study Date: 12/23/2024  MRN: [id number]  Referring Provider: Carlos Correa MD  Ordering Provider: Louisa Harris PA-C    Indications for Polysomnography: The patient is a 64 year old Male who is 5' 11\" and weighs 231.0 lbs. His BMI is 32.3, Traver sleepiness scale 9 and neck circumference is 49 cm. Relevant medical history includes snoring, witnessed apneas. A diagnostic polysomnogram was performed to evaluate for sleep apnea.    Polysomnogram Data: A full night polysomnogram recorded the standard physiologic parameters including EEG, EOG, EMG, ECG, nasal and oral airflow. Respiratory parameters of chest and abdominal movements were recorded with respiratory inductance plethysmography. Oxygen saturation was recorded by pulse oximetry. Hypopnea scoring rule used: 1B 4%.    Sleep Architecture: Sleep fragmentation  The total recording time of the polysomnogram was 491.0 minutes. The total sleep time was 295.0 minutes. Sleep latency was 7.0 minutes. REM latency was 291.5 minutes. Arousal index was 96.8 arousals per hour. Sleep efficiency was 60.1%. Wake after sleep onset was 189.0 minutes. The patient spent 13.9% of total sleep time in Stage N1, 45.1% in Stage N2, 16.9% in Stage N3, and 24.1% in REM. Time in REM supine was 0 minutes.    Respiration: Severe sleep disordered breathing best characterized as mixed (combination of both obstructive and central phenomena) with hypoxemia.  Of note the patient did not have REM supine during the study.    Events ? The polysomnogram revealed a presence of 12 obstructive, 213 central, and 5 mixed apneas resulting in an apnea index of 46.8 events per hour. There were 108 obstructive hypopneas and - central hypopneas resulting in an obstructive hypopnea index of 22.0 and central hypopnea index of - events per hour. The combined apnea/hypopnea index was 68.7 " events per hour (central apnea/hypopnea index was 43.3 events per hour). The REM AHI was 43.1 events per hour. The supine AHI was - events per hour. The RERA index was 3.9 events per hour.  The RDI was 72.6 events per hour.  Snoring - was reported as mild-moderate.  Respiratory rate and pattern - was notable for normal respiratory rate and pattern.  Sustained Sleep Associated Hypoventilation - Transcutaneous carbon dioxide monitoring was not used.  Sleep Associated Hypoxemia - (Greater than 5 minutes O2 sat at or below 88%) was present. Baseline oxygen saturation was 93.4%. Lowest oxygen saturation was 77.0%. Time spent less than or equal to 88% was 16.8 minutes. Time spent less than or equal to 89% was 38.9 minutes.    Movement Activity: Frequent motor activity throughout sleep.  PLM's during NREM sleep, the majority of which were not associated with cortical arousal; increased transient motor activity during REM sleep but associated with sleep disordered breathing.    Periodic Limb Activity - There were 305 PLMs during the entire study. The PLM index was 62.0 movements per hour. The PLM Arousal Index was 24.8 per hour.  REM EMG Activity - Excessive transient muscle activity was present but in association with sleep disordered breathing.  Nocturnal Behavior - Abnormal sleep related behaviors were not noted.  Bruxism - None apparent.    Cardiac Summary: Sinus  The average pulse rate was 73.9 bpm. The minimum pulse rate was 55.0 bpm while the maximum pulse rate was 94.0 bpm.      Assessment:   Severe sleep disordered breathing best characterized as mixed (combination of both obstructive and central phenomena) with hypoxemia.  Of note the patient did not have REM supine during the study.    Frequent motor activity throughout sleep.  PLM's during NREM sleep, the majority of which were not associated with cortical arousal; increased transient motor activity during REM sleep but associated with sleep disordered  breathing.      Recommendations:  Recommend first addressing the patients obstructive component of mixed apneas, with auto-CPAP as the improved sleep consolidation often also corrects the central component as well.    Close clinical follow up.   Advice regarding the risks of drowsy driving.  Suggest optimizing sleep schedule and avoiding sleep deprivation.  Weight management (if Treatment of PLMs (dopaminergic agents or delta-2 ligands) should be targeted at patients who either have wakeful motor restlessness or those in whom there is a high clinical suspicion of periodic limb movement disorder and not for elevated PLMs alone.  Once sleep disordered breathing is addressed recommend screening for dream enactment and if present consider a diagnosis of RBD.     Diagnostic Codes: G47.33, G47.36, G47.9      Naeem Gauthier MD 12-  Diplomate, Sleep Medicine, ABPN

## 2025-01-03 ENCOUNTER — TRANSFERRED RECORDS (OUTPATIENT)
Dept: HEALTH INFORMATION MANAGEMENT | Facility: CLINIC | Age: 65
End: 2025-01-03
Payer: MEDICARE

## 2025-01-03 LAB — RETINOPATHY: POSITIVE

## 2025-01-05 NOTE — PROGRESS NOTES
Virtual Visit Details    Type of service:  Video Visit   Video Start Time: 10:27 AM  Video End Time: 11:02 AM  Originating Location (pt. Location): Home    Distant Location (provider location):  Off-site  Platform used for Video Visit: [facility]   Outpatient Sleep Medicine  Jan 6, 2025       Name: Nabil Cheung MRN# [id number]   Age: 64 year old YOB: 1960            Assessment and Plan:   1. Mixed sleep apnea (Primary)  2. PLMD (periodic limb movement disorder)    During this visit, we reviewed the summary of the study including stage, position, event distribution, oximetry and heart rate.  Severe sleep apnea was identified with mix of obstructive and central phenomenon with hypoxemia-AHI 68.7, central AHI 43.3, RDI 72.6, REM AHI 43.1, oxygen dimitri 77% with 16.8 minutes spent less than or equal to 88%.  There was frequent motor activity throughout sleep including PLM's during NREM (PLM index 62.0 with PLM arousal 24.8) and increased motor activity during REM but this was associated with sleep disordered breathing events.     Discussed results of sleep study with patient in detail today.  Discussed given his severity of sleep apnea CPAP therapy is recommended and is considered gold standard, he politely declined at this time.  Does not feel his sleep is disturbed enough to warrant struggling with the mask, does not feel he would be compliant.  Discussed something like mandibular advancement device could be considered to help with obstructive events and he politely declined at this time as well.  He will let me know if he changes his mind.    At consult visit and again today denies typical RLS symptoms.  Discussed there were frequent PLM's though since not bothersome to patient agreed not to treat at this time.    Given patient is not interested in treatment agreed for now we will follow-up as needed but he will certainly let me know if he changes his mind.  Let him know if  "he wants me to place orders for CPAP in order for coverage would need to get the machine within the next year, is over a year would have to update his study before coverage.       Chief Complaint      Chief Complaint   Patient presents with    RECHECK          History of Present Illness:   Abilio Cheung is a 64 year old male who presents to the clinic for results of recent sleep study completed on 12/23/24. Study was completed to evaluate for NAVI.     Reviewed results of sleep study with patient as follows:   SLEEP STUDY INTERPRETATION  DIAGNOSTIC POLYSOMNOGRAPHY REPORT     Patient: ABILIO CHEUNG  YOB: 1960  Study Date: 12/23/2024  MRN: [id number]  Referring Provider: Carlos Correa MD  Ordering Provider: Louisa Harris PA-C     Indications for Polysomnography: The patient is a 64 year old Male who is 5' 11\" and weighs 231.0 lbs. His BMI is 32.3, Walling sleepiness scale 9 and neck circumference is 49 cm. Relevant medical history includes snoring, witnessed apneas. A diagnostic polysomnogram was performed to evaluate for sleep apnea.     Polysomnogram Data: A full night polysomnogram recorded the standard physiologic parameters including EEG, EOG, EMG, ECG, nasal and oral airflow. Respiratory parameters of chest and abdominal movements were recorded with respiratory inductance plethysmography. Oxygen saturation was recorded by pulse oximetry. Hypopnea scoring rule used: 1B 4%.     Sleep Architecture: Sleep fragmentation  The total recording time of the polysomnogram was 491.0 minutes. The total sleep time was 295.0 minutes. Sleep latency was 7.0 minutes. REM latency was 291.5 minutes. Arousal index was 96.8 arousals per hour. Sleep efficiency was 60.1%. Wake after sleep onset was 189.0 minutes. The patient spent 13.9% of total sleep time in Stage N1, 45.1% in Stage N2, 16.9% in Stage N3, and 24.1% in REM. Time in REM supine was 0 minutes.     Respiration: Severe sleep disordered breathing best " characterized as mixed (combination of both obstructive and central phenomena) with hypoxemia.  Of note the patient did not have REM supine during the study.    Events ? The polysomnogram revealed a presence of 12 obstructive, 213 central, and 5 mixed apneas resulting in an apnea index of 46.8 events per hour. There were 108 obstructive hypopneas and - central hypopneas resulting in an obstructive hypopnea index of 22.0 and central hypopnea index of - events per hour. The combined apnea/hypopnea index was 68.7 events per hour (central apnea/hypopnea index was 43.3 events per hour). The REM AHI was 43.1 events per hour. The supine AHI was - events per hour. The RERA index was 3.9 events per hour.  The RDI was 72.6 events per hour.  Snoring - was reported as mild-moderate.  Respiratory rate and pattern - was notable for normal respiratory rate and pattern.  Sustained Sleep Associated Hypoventilation - Transcutaneous carbon dioxide monitoring was not used.  Sleep Associated Hypoxemia - (Greater than 5 minutes O2 sat at or below 88%) was present. Baseline oxygen saturation was 93.4%. Lowest oxygen saturation was 77.0%. Time spent less than or equal to 88% was 16.8 minutes. Time spent less than or equal to 89% was 38.9 minutes.     Movement Activity: Frequent motor activity throughout sleep.  PLM's during NREM sleep, the majority of which were not associated with cortical arousal; increased transient motor activity during REM sleep but associated with sleep disordered breathing.    Periodic Limb Activity - There were 305 PLMs during the entire study. The PLM index was 62.0 movements per hour. The PLM Arousal Index was 24.8 per hour.  REM EMG Activity - Excessive transient muscle activity was present but in association with sleep disordered breathing.  Nocturnal Behavior - Abnormal sleep related behaviors were not noted.  Bruxism - None apparent.     Cardiac Summary: Sinus  The average pulse rate was 73.9 bpm. The minimum  "pulse rate was 55.0 bpm while the maximum pulse rate was 94.0 bpm.       Assessment:   Severe sleep disordered breathing best characterized as mixed (combination of both obstructive and central phenomena) with hypoxemia.  Of note the patient did not have REM supine during the study.    Frequent motor activity throughout sleep.  PLM's during NREM sleep, the majority of which were not associated with cortical arousal; increased transient motor activity during REM sleep but associated with sleep disordered breathing.       Recommendations:  Recommend first addressing the patients obstructive component of mixed apneas, with auto-CPAP as the improved sleep consolidation often also corrects the central component as well.    Close clinical follow up.   Advice regarding the risks of drowsy driving.  Suggest optimizing sleep schedule and avoiding sleep deprivation.  Weight management (if Treatment of PLMs (dopaminergic agents or delta-2 ligands) should be targeted at patients who either have wakeful motor restlessness or those in whom there is a high clinical suspicion of periodic limb movement disorder and not for elevated PLMs alone.  Once sleep disordered breathing is addressed recommend screening for dream enactment and if present consider a diagnosis of RBD.     Past medical/surgical history, family history, social history, medications and allergies were reviewed.           Physical Examination:   Ht 1.803 m (5' 11\")   Wt 102.1 kg (225 lb)   BMI 31.38 kg/m    General appearance: Awake, alert, cooperative. Well groomed. Sitting comfortably in chair. In no apparent distress.  Pulmonary:  Able to speak easily in full sentences. No cough or wheeze.   Skin:  No rashes or significant lesions on visible skin.   Neurologic: Alert, oriented x3.   Psychiatric: Mood euthymic. Affect congruent with full range and intensity.      CC:  Carlos Correa PA-C  Jan 6, 2025     [facility] " [place]  [address] , [place] [address]     [facility]  [address] [place] [address], [place] [address]    Chart documentation was completed, in part, with [employer] voice-recognition software. Even though reviewed, some grammatical, spelling, and word errors may remain.    40 minutes spent on day of encounter doing chart review, history and exam, documentation, and further activities as noted above

## 2025-01-06 ENCOUNTER — VIRTUAL VISIT (OUTPATIENT)
Dept: SLEEP MEDICINE | Facility: CLINIC | Age: 65
End: 2025-01-06
Payer: MEDICARE

## 2025-01-06 VITALS — HEIGHT: 71 IN | BODY MASS INDEX: 31.5 KG/M2 | WEIGHT: 225 LBS

## 2025-01-06 DIAGNOSIS — G47.61 PLMD (PERIODIC LIMB MOVEMENT DISORDER): ICD-10-CM

## 2025-01-06 DIAGNOSIS — G47.39 MIXED SLEEP APNEA: Primary | ICD-10-CM

## 2025-01-06 PROCEDURE — 98007 SYNCH AUDIO-VIDEO EST HI 40: CPT | Performed by: PHYSICIAN ASSISTANT

## 2025-01-06 ASSESSMENT — SLEEP AND FATIGUE QUESTIONNAIRES
HOW LIKELY ARE YOU TO NOD OFF OR FALL ASLEEP WHILE SITTING QUIETLY AFTER LUNCH WITHOUT ALCOHOL: WOULD NEVER DOZE
HOW LIKELY ARE YOU TO NOD OFF OR FALL ASLEEP WHILE WATCHING TV: WOULD NEVER DOZE
HOW LIKELY ARE YOU TO NOD OFF OR FALL ASLEEP WHEN YOU ARE A PASSENGER IN A CAR FOR AN HOUR WITHOUT A BREAK: SLIGHT CHANCE OF DOZING
HOW LIKELY ARE YOU TO NOD OFF OR FALL ASLEEP WHILE LYING DOWN TO REST IN THE AFTERNOON WHEN CIRCUMSTANCES PERMIT: MODERATE CHANCE OF DOZING
HOW LIKELY ARE YOU TO NOD OFF OR FALL ASLEEP IN A CAR, WHILE STOPPED FOR A FEW MINUTES IN TRAFFIC: WOULD NEVER DOZE
HOW LIKELY ARE YOU TO NOD OFF OR FALL ASLEEP WHILE SITTING INACTIVE IN A PUBLIC PLACE: SLIGHT CHANCE OF DOZING
HOW LIKELY ARE YOU TO NOD OFF OR FALL ASLEEP WHILE SITTING AND READING: SLIGHT CHANCE OF DOZING
HOW LIKELY ARE YOU TO NOD OFF OR FALL ASLEEP WHILE SITTING AND TALKING TO SOMEONE: WOULD NEVER DOZE

## 2025-01-06 ASSESSMENT — PAIN SCALES - GENERAL: PAINLEVEL_OUTOF10: MILD PAIN (2)

## 2025-01-06 NOTE — NURSING NOTE
Current patient location: [address] [place] [address] N   [place] [address]    Is the patient currently in the state of [place]? YES    Visit mode:VIDEO    If the visit is dropped, the patient can be reconnected by:VIDEO VISIT: Send to e-mail at: [email or web address]    Will anyone else be joining the visit? NO  (If patient encounters technical issues they should call [phone]    Are changes needed to the allergy or medication list? Pt stated no changes to allergies and Pt stated no med changes    Are refills needed on medications prescribed by this physician? NO    Rooming Documentation:  Questionnaire(s) completed    Reason for visit: BRYAN BILLINGSF

## 2025-03-09 ENCOUNTER — HEALTH MAINTENANCE LETTER (OUTPATIENT)
Age: 65
End: 2025-03-09

## 2025-03-26 ENCOUNTER — TRANSFERRED RECORDS (OUTPATIENT)
Dept: HEALTH INFORMATION MANAGEMENT | Facility: CLINIC | Age: 65
End: 2025-03-26
Payer: MEDICARE

## 2025-03-26 LAB — RETINOPATHY: POSITIVE

## 2025-04-09 ENCOUNTER — OFFICE VISIT (OUTPATIENT)
Dept: INTERNAL MEDICINE | Facility: CLINIC | Age: 65
End: 2025-04-09
Payer: MEDICARE

## 2025-04-09 VITALS
RESPIRATION RATE: 16 BRPM | WEIGHT: 225 LBS | HEIGHT: 71 IN | OXYGEN SATURATION: 98 % | TEMPERATURE: 97.9 F | DIASTOLIC BLOOD PRESSURE: 68 MMHG | SYSTOLIC BLOOD PRESSURE: 138 MMHG | HEART RATE: 70 BPM | BODY MASS INDEX: 31.5 KG/M2

## 2025-04-09 DIAGNOSIS — E11.65 UNCONTROLLED TYPE 2 DIABETES MELLITUS WITH HYPERGLYCEMIA (H): ICD-10-CM

## 2025-04-09 DIAGNOSIS — I48.92 ATRIAL FLUTTER, UNSPECIFIED TYPE (H): ICD-10-CM

## 2025-04-09 DIAGNOSIS — I10 ESSENTIAL (PRIMARY) HYPERTENSION: ICD-10-CM

## 2025-04-09 DIAGNOSIS — N18.32 STAGE 3B CHRONIC KIDNEY DISEASE (H): Primary | ICD-10-CM

## 2025-04-09 LAB
ANION GAP SERPL CALCULATED.3IONS-SCNC: 11 MMOL/L (ref 7–15)
BUN SERPL-MCNC: 46.3 MG/DL (ref 8–23)
CALCIUM SERPL-MCNC: 9.2 MG/DL (ref 8.8–10.4)
CHLORIDE SERPL-SCNC: 98 MMOL/L (ref 98–107)
CREAT SERPL-MCNC: 2.28 MG/DL (ref 0.67–1.17)
EGFRCR SERPLBLD CKD-EPI 2021: 31 ML/MIN/1.73M2
EST. AVERAGE GLUCOSE BLD GHB EST-MCNC: 240 MG/DL
GLUCOSE SERPL-MCNC: 333 MG/DL (ref 70–99)
HBA1C MFR BLD: 10 % (ref 0–5.6)
HCO3 SERPL-SCNC: 24 MMOL/L (ref 22–29)
POTASSIUM SERPL-SCNC: 5.6 MMOL/L (ref 3.4–5.3)
SODIUM SERPL-SCNC: 133 MMOL/L (ref 135–145)

## 2025-04-09 PROCEDURE — 99214 OFFICE O/P EST MOD 30 MIN: CPT | Performed by: NURSE PRACTITIONER

## 2025-04-09 PROCEDURE — 3078F DIAST BP <80 MM HG: CPT | Performed by: NURSE PRACTITIONER

## 2025-04-09 PROCEDURE — 3075F SYST BP GE 130 - 139MM HG: CPT | Performed by: NURSE PRACTITIONER

## 2025-04-09 PROCEDURE — 80048 BASIC METABOLIC PNL TOTAL CA: CPT | Performed by: NURSE PRACTITIONER

## 2025-04-09 PROCEDURE — 36415 COLL VENOUS BLD VENIPUNCTURE: CPT | Performed by: NURSE PRACTITIONER

## 2025-04-09 PROCEDURE — G2211 COMPLEX E/M VISIT ADD ON: HCPCS | Performed by: NURSE PRACTITIONER

## 2025-04-09 PROCEDURE — 83036 HEMOGLOBIN GLYCOSYLATED A1C: CPT | Performed by: NURSE PRACTITIONER

## 2025-04-09 RX ORDER — LANSOPRAZOLE 30 MG/1
30 CAPSULE, DELAYED RELEASE ORAL
COMMUNITY
Start: 2024-12-10

## 2025-04-09 ASSESSMENT — ASTHMA QUESTIONNAIRES
ACT_TOTALSCORE: 15
QUESTION_3 LAST FOUR WEEKS HOW OFTEN DID YOUR ASTHMA SYMPTOMS (WHEEZING, COUGHING, SHORTNESS OF BREATH, CHEST TIGHTNESS OR PAIN) WAKE YOU UP AT NIGHT OR EARLIER THAN USUAL IN THE MORNING: ONCE OR TWICE
EMERGENCY_ROOM_LAST_YEAR_TOTAL: ONE
QUESTION_5 LAST FOUR WEEKS HOW WOULD YOU RATE YOUR ASTHMA CONTROL: SOMEWHAT CONTROLLED
QUESTION_2 LAST FOUR WEEKS HOW OFTEN HAVE YOU HAD SHORTNESS OF BREATH: THREE TO SIX TIMES A WEEK
QUESTION_1 LAST FOUR WEEKS HOW MUCH OF THE TIME DID YOUR ASTHMA KEEP YOU FROM GETTING AS MUCH DONE AT WORK, SCHOOL OR AT HOME: SOME OF THE TIME
QUESTION_4 LAST FOUR WEEKS HOW OFTEN HAVE YOU USED YOUR RESCUE INHALER OR NEBULIZER MEDICATION (SUCH AS ALBUTEROL): ONE OR TWO TIMES PER DAY

## 2025-04-09 NOTE — PATIENT INSTRUCTIONS
You are overdue for a follow-up appointment with your cardiology team.  Please call and schedule a follow-up appointment.    You are overdue for a follow-up with your PCP, Dr. Correa.  We will offer you scheduling before you leave today.    We can update your labs today.  Please follow-up with your endocrinologist.    In the meantime, try to improve diet with less fast food and simple carbohydrates.

## 2025-04-09 NOTE — PROGRESS NOTES
"  Assessment & Plan     Stage 3b chronic kidney disease (H)  He wants to reevaluate his kidney function today.  I can see that he had a renal panel checked this past December with creatinine 2.2, GFR 32.    He continues on Farxiga, valsartan.  - BASIC METABOLIC PANEL; Future    Uncontrolled type 2 diabetes mellitus with hyperglycemia (H)  He does see endocrinology.  Apparently is intolerant of GLP-1 agonist.  Continues on metformin 500 mg twice daily.  Farxiga.  Lantus, NovoLog.  - HEMOGLOBIN A1C; Future    Essential (primary) hypertension  Controlled today on valsartan, carvedilol, furosemide, spironolactone    Atrial flutter, unspecified type (H)  He has questions about his Eliquis.  We reviewed his cardiology note from October.  At that point, Dr. Valdivia had discussed setting him up for ablation    Patient Instructions   You are overdue for a follow-up appointment with your cardiology team.  Please call and schedule a follow-up appointment.    You are overdue for a follow-up with your PCP, Dr. Correa.  We will offer you scheduling before you leave today.    We can update your labs today.  Please follow-up with your endocrinologist.    In the meantime, try to improve diet with less fast food and simple carbohydrates.      The longitudinal plan of care for the diagnosis(es)/condition(s) as documented were addressed during this visit. Due to the added complexity in care, I will continue to support Nabil in the subsequent management and with ongoing continuity of care.        BMI  Estimated body mass index is 31.38 kg/m  as calculated from the following:    Height as of this encounter: 1.803 m (5' 11\").    Weight as of this encounter: 102.1 kg (225 lb).     Blood sugar testing frequency justification:  Uncontrolled diabetes        Subjective   Nabil is a 65 year old, presenting for the following health issues:  Follow Up      4/9/2025    11:16 AM   Additional Questions   Roomed by      History of Present Illness " "      CKD: He uses over the counter pain medication, including tylanol, two times daily.    Diabetes:   He presents for follow up of diabetes.  He is checking home blood glucose four or more times daily.   He checks blood glucose before meals and at bedtime.  Blood glucose is sometimes over 200 and never under 70.  When his blood glucose is low, the patient is asymptomatic for confusion, blurred vision, lethargy and reports not feeling dizzy, shaky, or weak.  He is concerned about blood sugar frequently over 200.   He is having numbness in feet.            Hypertension: He presents for follow up of hypertension.  He does check blood pressure  regularly outside of the clinic. Outside blood pressures have been over 140/90. He does not follow a low salt diet.     Hypothyroidism:     Since last visit, patient describes the following symptoms::  Fatigue    Vascular Disease:  He presents for follow up of vascular disease.      He is not taking daily aspirin.    Reason for visit:  Check labs and meds    He eats 2-3 servings of fruits and vegetables daily.He consumes 1 sweetened beverage(s) daily.He exercises with enough effort to increase his heart rate 20 to 29 minutes per day.  He exercises with enough effort to increase his heart rate 5 days per week. He is missing 1 dose(s) of medications per week.                      Objective    /68 (BP Location: Right arm, Patient Position: Sitting, Cuff Size: Adult Regular)   Pulse 70   Temp 97.9  F (36.6  C) (Oral)   Resp 16   Ht 1.803 m (5' 11\")   Wt 102.1 kg (225 lb)   SpO2 98%   BMI 31.38 kg/m    Body mass index is 31.38 kg/m .  Physical Exam               Signed Electronically by: Manuel Khan, CAROL    "

## 2025-04-10 ENCOUNTER — TELEPHONE (OUTPATIENT)
Dept: FAMILY MEDICINE | Facility: CLINIC | Age: 65
End: 2025-04-10
Payer: MEDICARE

## 2025-04-10 NOTE — TELEPHONE ENCOUNTER
Outgoing call to patient #1. No answer. LMTCB. When pt calls back please route call back to clinic RN line to relay message below and schedule appointment. Thank you.       ----- Message from Manuel Khan sent at 4/10/2025  6:36 AM CDT -----  Please call patient.  Let him know that his potassium was elevated and his creatinine was a bit worse than when we last checked.    Rather than make changes to his medications now, I would like him to follow-up with Dr. Dorsey in clinic in 2 weeks to recheck these labs

## 2025-04-10 NOTE — TELEPHONE ENCOUNTER
Incoming call from patient. Relayed PCP's detailed message. Scheduled follow up appointment with PCP. No further questions/concerns at this time. Thank you.

## 2025-04-21 ENCOUNTER — HOSPITAL ENCOUNTER (OUTPATIENT)
Dept: MRI IMAGING | Facility: CLINIC | Age: 65
Discharge: HOME | End: 2025-04-21
Attending: STUDENT IN AN ORGANIZED HEALTH CARE EDUCATION/TRAINING PROGRAM
Payer: MEDICARE

## 2025-04-21 ENCOUNTER — HOSPITAL ENCOUNTER (OUTPATIENT)
Dept: ULTRASOUND IMAGING | Facility: CLINIC | Age: 65
Discharge: HOME | End: 2025-04-21
Attending: STUDENT IN AN ORGANIZED HEALTH CARE EDUCATION/TRAINING PROGRAM
Payer: MEDICARE

## 2025-04-21 DIAGNOSIS — G45.3 AMAUROSIS FUGAX OF LEFT EYE: ICD-10-CM

## 2025-04-21 PROBLEM — I65.23 BILATERAL CAROTID ARTERY STENOSIS: Status: ACTIVE | Noted: 2025-04-21

## 2025-04-21 PROCEDURE — 70551 MRI BRAIN STEM W/O DYE: CPT

## 2025-04-21 PROCEDURE — 93880 EXTRACRANIAL BILAT STUDY: CPT

## 2025-04-25 ENCOUNTER — OFFICE VISIT (OUTPATIENT)
Dept: FAMILY MEDICINE | Facility: CLINIC | Age: 65
End: 2025-04-25
Payer: MEDICARE

## 2025-04-25 VITALS
TEMPERATURE: 97.1 F | WEIGHT: 228 LBS | DIASTOLIC BLOOD PRESSURE: 68 MMHG | BODY MASS INDEX: 31.92 KG/M2 | HEART RATE: 57 BPM | HEIGHT: 71 IN | SYSTOLIC BLOOD PRESSURE: 130 MMHG | RESPIRATION RATE: 12 BRPM | OXYGEN SATURATION: 100 %

## 2025-04-25 DIAGNOSIS — I48.92 ATRIAL FLUTTER, UNSPECIFIED TYPE (H): ICD-10-CM

## 2025-04-25 DIAGNOSIS — E66.01 MORBID OBESITY (H): ICD-10-CM

## 2025-04-25 DIAGNOSIS — E87.5 HYPERKALEMIA: ICD-10-CM

## 2025-04-25 DIAGNOSIS — Z89.619: ICD-10-CM

## 2025-04-25 DIAGNOSIS — E03.8 SUBCLINICAL HYPOTHYROIDISM: ICD-10-CM

## 2025-04-25 DIAGNOSIS — R80.9 MICROALBUMINURIA: ICD-10-CM

## 2025-04-25 DIAGNOSIS — J45.50 SEVERE PERSISTENT ASTHMA WITHOUT COMPLICATION (H): ICD-10-CM

## 2025-04-25 DIAGNOSIS — N18.32 STAGE 3B CHRONIC KIDNEY DISEASE (H): ICD-10-CM

## 2025-04-25 DIAGNOSIS — I10 ESSENTIAL (PRIMARY) HYPERTENSION: ICD-10-CM

## 2025-04-25 DIAGNOSIS — I50.30 HEART FAILURE WITH PRESERVED EJECTION FRACTION, NYHA CLASS I (H): ICD-10-CM

## 2025-04-25 DIAGNOSIS — I65.23 BILATERAL CAROTID ARTERY STENOSIS: ICD-10-CM

## 2025-04-25 DIAGNOSIS — G47.33 OSA (OBSTRUCTIVE SLEEP APNEA): ICD-10-CM

## 2025-04-25 DIAGNOSIS — E11.3593 TYPE 2 DIABETES MELLITUS WITH BOTH EYES AFFECTED BY PROLIFERATIVE RETINOPATHY WITHOUT MACULAR EDEMA, WITHOUT LONG-TERM CURRENT USE OF INSULIN (H): Primary | ICD-10-CM

## 2025-04-25 DIAGNOSIS — Z23 NEED FOR VACCINATION: ICD-10-CM

## 2025-04-25 DIAGNOSIS — I25.83 CORONARY ARTERIOSCLEROSIS DUE TO LIPID RICH PLAQUE: ICD-10-CM

## 2025-04-25 DIAGNOSIS — E11.69: ICD-10-CM

## 2025-04-25 DIAGNOSIS — G47.61 PLMD (PERIODIC LIMB MOVEMENT DISORDER): ICD-10-CM

## 2025-04-25 PROCEDURE — 99214 OFFICE O/P EST MOD 30 MIN: CPT | Performed by: STUDENT IN AN ORGANIZED HEALTH CARE EDUCATION/TRAINING PROGRAM

## 2025-04-25 PROCEDURE — 84443 ASSAY THYROID STIM HORMONE: CPT | Performed by: STUDENT IN AN ORGANIZED HEALTH CARE EDUCATION/TRAINING PROGRAM

## 2025-04-25 PROCEDURE — 82570 ASSAY OF URINE CREATININE: CPT | Performed by: STUDENT IN AN ORGANIZED HEALTH CARE EDUCATION/TRAINING PROGRAM

## 2025-04-25 PROCEDURE — 3075F SYST BP GE 130 - 139MM HG: CPT | Performed by: STUDENT IN AN ORGANIZED HEALTH CARE EDUCATION/TRAINING PROGRAM

## 2025-04-25 PROCEDURE — 84132 ASSAY OF SERUM POTASSIUM: CPT | Performed by: STUDENT IN AN ORGANIZED HEALTH CARE EDUCATION/TRAINING PROGRAM

## 2025-04-25 PROCEDURE — G2211 COMPLEX E/M VISIT ADD ON: HCPCS | Performed by: STUDENT IN AN ORGANIZED HEALTH CARE EDUCATION/TRAINING PROGRAM

## 2025-04-25 PROCEDURE — 3078F DIAST BP <80 MM HG: CPT | Performed by: STUDENT IN AN ORGANIZED HEALTH CARE EDUCATION/TRAINING PROGRAM

## 2025-04-25 PROCEDURE — 80061 LIPID PANEL: CPT | Performed by: STUDENT IN AN ORGANIZED HEALTH CARE EDUCATION/TRAINING PROGRAM

## 2025-04-25 PROCEDURE — 36415 COLL VENOUS BLD VENIPUNCTURE: CPT | Performed by: STUDENT IN AN ORGANIZED HEALTH CARE EDUCATION/TRAINING PROGRAM

## 2025-04-25 PROCEDURE — 82043 UR ALBUMIN QUANTITATIVE: CPT | Performed by: STUDENT IN AN ORGANIZED HEALTH CARE EDUCATION/TRAINING PROGRAM

## 2025-04-25 NOTE — PATIENT INSTRUCTIONS
Increase lantus from 35 to 37 units and check your fasting blood sugar levels.  If you are not at goal of 100 to 130 after being on 37 units for 3 days, then go up to 39 units for 3 days, if still not at goal, go up to 39 units.  Do not go above 45 units without notifying me.

## 2025-04-25 NOTE — PROGRESS NOTES
Assessment & Plan     Type 2 diabetes mellitus with both eyes affected by proliferative retinopathy without macular edema, without long-term current use of insulin (H)  History of amputation of lower extremity associated with diabetes mellitus (H)  Microalbuminuria    Abner is a 65-year-old male who has history of type 2 diabetes with diabetic nephropathy, diabetic retinopathy, diabetic neuropathy status post amputation of fifth digit of left foot.  He does see endocrinology for management of his diabetes.    A1c currently at 10, diabetes not well-controlled.     Is on Lantus 35 units daily, NovoLog 14 to 18 units, on correction scale, 1: 40>140., dipagliflozin 10 mg, and metformin 500 mg 2 times daily.  Reports that his fasting glucose le I am concerned that he may have have a much higher fasting glucose than what he is reporting given his blood sugar vels are between 130 and 180.  Admits to not checking his blood glucose as often as he should.  When last drawn at 11:00 AM on April 9 was 333 and his A1c was 10%.    Previously attempted GLP-1, but had a GI reaction and had to stop.        Is on rosuvastatin 40 and aspirin.  LDL is at goal, at 55 taken today.       Last eye exam March 2025, has diabetic retinopathy, he has monthly injections with retina consultants in [place]        For nephropathy he follows with nephrology, Dr. Quezada.  Currently on valsartan and SGL 2.        Recommend that we increase Lantus by 2 units every 3 days until reaching fasting glucose level of 100-140.  He will keep a blood sugar log of fasting glucose as well as 2-hour postprandial.  We will see him back in 2 to 4 weeks to adjust insulin further, if we are able to get fasting glucose under control we will switch daytime insulin as needed based on his results.    If having GFR < 30 will need to stop metformin, currently just above this. Can not increase metformin above 500 mg twice daily due to poor kidney function.     He has  previously seen endocrinology at [facility], would like to switch his care over to [facility], referral placed to endocrinology appreciate their assistance.    - Adult Endocrinology  Referral      Hypertension  Stage 3b chronic kidney disease (H)  Blood pressure well-controlled on valsartan 160 mg twice daily, spironolactone 12 and half milligrams daily, Lasix 40 mg daily, Coreg 12.5 mg twice daily,.  Continue current medication for blood pressure control.    Obtain albumin creatinine ratio today, has had microalbuminuria previously.    Previously seeing nephrologist at [facility], would like to switch his care to [facility], referral placed.    - Albumin Random Urine Quantitative with Creat Ratio  - Adult Nephrology [facility] Referral  - Albumin Random Urine Quantitative with Creat Ratio      - Adult Nephrology [facility] Referral    Heart failure with preserved ejection fraction, NYHA class I (H)  Currently well-controlled on farxiga 10 mg, spironolactone 12.5 mg, Lasix 40 mg, carvedilol 12.5 mg twice daily, valsartan 160 mg twice daily.  May need to stop spironolactone depending on presence of hyperkalemia, is otherwise in this note discussed.  Will continue other medications.    Last echocardiogram was done July 2024, ejection fraction of 55 to 60%, though Lexiscan showed ejection fraction of 45% at stress a couple of weeks thereafter in August 2024.  Will defer to cardiology on timing for next echocardiogram.    Morbid obesity (H)  Weight is essentially stable, encouraged healthy diet, given history of heart failure would recommend low-sodium diet.  Will continue to monitor, BMI of 31, would recommend for 10% body weight loss over the next 6-12 months    Severe persistent asthma without complication (H)  Not controlled despite use of Symbicort.  Reports she is only taking 2 puffs daily, sometimes 2 puffs twice daily.  Encouraged him to take 2 puffs twice daily, do believe that this will assist him.  He does use  an spacer daily, encouraged him to continue this as well.  No longer using albuterol, may stop this entirely and use Symbicort as rescue inhaler as well, we discussed how to do this, no more than 12 puffs daily.    He will continue follow-up with pulmonology at [facility], reports that he likes his asthma doctor.  He is following up with them again in June.  Should get another pulmonary function test at that time.    Atrial flutter, unspecified type (H)  Found to be in atrial flutter in July 2024, rate has been controlled, he is currently taking carvedilol 12.5 mg twice daily.  Patient with [id number] score of 5 ( age, presence of heart failure, hypertension, diabetes, vascular disease)  Has bled score of 3 making risk of 5.8% bleeding, 3.72 bleeds per 100 patient years, high risk for bleeding.      .  He reports he does not feel like he is in flutter anymore    Has been on Eliquis 5 mg twice daily, wants to know if he can stop this medication..  Of note serum creatinine is over 1.5 the patient is neither 80 years or older and body weight is over 60 kg, so did not decrease dose.    He has seen cardiology who recommended ablation but he did not wish to go through it at that time.  He would like to see if he can get off of Eliquis.    I would not recommend stopping Eliquis currently, would recommend that we do a Zio patch for 14 days, but after discussion with patient as he has a cardiology appointment upcoming on May, feel it is reasonable to defer to their evaluation and recommendation.  He is in agreement, he will discuss this further with cardiology.    Hyperkalemia  Had hyperkalemia on lab work, 5.6, recommended we repeat today.  Potentially pseudohyperkalemia due to lab error, but we will repeat to see if we need to make any changes to his medication, he is on ARB as well as spironolactone which could impact this.    - Potassium  - Potassium    Coronary arteriosclerosis due to lipid rich plaque  Has a history of  angiography in March 2020 showing mid LAD 75% stenosis, first diagonal 70% stenosis, circumflex with proximal total occlusion and RCA with distal 70% PDA lesion causing him to get CABG.   nuclear stress test done 2024 showing small area of mild inferolateral scar without any significant ischemia, will continue to monitor for symptoms of chest pain or shortness of breath.  That is not related to his asthma  - Lipid panel reflex to direct LDL Non-fasting  - Lipid panel reflex to direct LDL Non-fasting    Bilateral carotid artery stenosis  Ultrasound done in on April 21, 2025 showing 50 to 69% stenosis in right ICA, less than 50% stenosis in left ICA, is on optimal statin treatment and is currently on an anticoagulant Eliquis, if we did stop Eliquis would recommend to start aspirin and statin.    NAVI (obstructive sleep apnea)  PLMD (periodic limb movement disorder)  He had a sleep study done December 2020 for showing significant findings of an AHI 60.7, central AHI 43.3, REM AHI 43.1 as well as PLM's during non-REM, PLM index of 62.0 and arousal 24.8.  We reviewed this again, and the recommendation for CPAP therapy that was made by sleep medicine.  He does not wish to start CPAP, reports that he does not believe the results of the study as he does not feel tired during the day.  He also has previously declined mandibular advancement device, and do not believe that he will accept any treatment for this as he is not convinced of the diagnosis.  We did discuss the diagnosis, he still does not believe the study is correct.  We discussed what sleep apnea could do to his heart, cause pulmonary hypertension if he does not get a treated, he does not wish to.    We will continue to discuss this with him at future visits as well.  If you want to get the CPAP machine he would need to do it within the next 8 months before the sleep study expires.    Subclinical hypothyroidism  Was started on Synthroid because of atrial flutter and  "presence of subclinical hypothyroidism.  He would like to see if he can get off of Synthroid which he is currently on 50 mcg daily now.  Recommend that we repeat a TSH at this time as well as get opinion from cardiology.  If having supratherapeutic TSH levels or low normal range TSH levels on Synthroid 50 mcg and cardiology agrees, can stop Synthroid.  If we do stop it I would want to repeat TSH in 6 weeks.      - TSH with free T4 reflex  - TSH with free T4 reflex    Need for vaccination  Recommend RSV vaccination in this patient with history of asthma, diabetes and CKD, RSV vaccination place he can get this at his local pharmacy.  - RSV vaccine, bivalent, ABRYSVO, injection  Dispense: 0.5 mL; Refill: 0            BMI  Estimated body mass index is 31.8 kg/m  as calculated from the following:    Height as of this encounter: 1.803 m (5' 11\").    Weight as of this encounter: 103.4 kg (228 lb).     Blood sugar testing frequency justification:  Uncontrolled diabetes and Adjustment of medication(s)    Follow-up in 1 month for diabetes follow-up    Allison Ferrer is a 65 year old, presenting for the following health issues:  Follow Up (Discuss lab results and re-check labs./Discuss eliquis and thyroid medications. And medication refills.)        4/25/2025    10:36 AM   Additional Questions   Roomed by SHRUTI MERLOS     History of Present Illness       CKD: He uses over the counter pain medication, including tylanol, one time daily.    Diabetes:   He presents for follow up of diabetes.  He is checking home blood glucose four or more times daily.   He checks blood glucose before and after meals and at bedtime.  Blood glucose is sometimes over 200 and never under 70.  When his blood glucose is low, the patient is asymptomatic for confusion, blurred vision, lethargy and reports not feeling dizzy, shaky, or weak.  He is concerned about blood sugar frequently over 200.   He is having numbness in feet.            Hypertension: He " "presents for follow up of hypertension.  He does check blood pressure  regularly outside of the clinic. Outside blood pressures have been over 140/90. He does not follow a low salt diet.     Hypothyroidism:     Since last visit, patient describes the following symptoms::  Fatigue    Vascular Disease:  He presents for follow up of vascular disease.      He is not taking daily aspirin.    He eats 2-3 servings of fruits and vegetables daily.He consumes 0 sweetened beverage(s) daily.He exercises with enough effort to increase his heart rate 10 to 19 minutes per day.  He exercises with enough effort to increase his heart rate 5 days per week. He is missing 1 dose(s) of medications per week.  He is not taking prescribed medications regularly due to remembering to take.                Reports that he is taking his blood sugars at home but not as frequently as he should probably.  He is getting numbers between 130 and 180, usually more in the 160-180 range.      Review of Systems  Constitutional, neuro, ENT, endocrine, pulmonary, cardiac, gastrointestinal, genitourinary, musculoskeletal, integument and psychiatric systems are negative, except as otherwise noted.      Objective    /68 (BP Location: Right arm, Patient Position: Sitting, Cuff Size: Adult Large)   Pulse 57   Temp 97.1  F (36.2  C)   Resp 12   Ht 1.803 m (5' 11\")   Wt 103.4 kg (228 lb)   SpO2 100%   BMI 31.80 kg/m    Body mass index is 31.8 kg/m .  Physical Exam   GENERAL: alert and no distress  RESP: lungs clear to auscultation - no rales, rhonchi or wheezes  CV: regular rate and rhythm, normal S1 S2, no S3 or S4, no murmur, click or rub, trace pitting peripheral edema to mid tibia bilaterally  MS: no gross musculoskeletal defects noted, no edema  SKIN: no suspicious lesions or rashes  NEURO: Normal strength and tone, mentation intact and speech normal  PSYCH: mentation appears normal, affect normal/bright    Office Visit on 04/09/2025   Component " Date Value Ref Range Status    Estimated Average Glucose 04/09/2025 240 (H)  <117 mg/dL Final    Hemoglobin A1C 04/09/2025 10.0 (H)  0.0 - 5.6 % Final    Normal <5.7%   Prediabetes 5.7-6.4%    Diabetes 6.5% or higher     Note: Adopted from ADA consensus guidelines.    Sodium 04/09/2025 133 (L)  135 - 145 mmol/L Final    Potassium 04/09/2025 5.6 (H)  3.4 - 5.3 mmol/L Final    Chloride 04/09/2025 98  98 - 107 mmol/L Final    Carbon Dioxide (CO2) 04/09/2025 24  22 - 29 mmol/L Final    Anion Gap 04/09/2025 11  7 - 15 mmol/L Final    Urea Nitrogen 04/09/2025 46.3 (H)  8.0 - 23.0 mg/dL Final    Creatinine 04/09/2025 2.28 (H)  0.67 - 1.17 mg/dL Final    GFR Estimate 04/09/2025 31 (L)  >60 mL/min/1.73m2 Final    eGFR calculated using 2021 CKD-EPI equation.    Calcium 04/09/2025 9.2  8.8 - 10.4 mg/dL Final    Glucose 04/09/2025 333 (H)  70 - 99 mg/dL Final       The longitudinal plan of care for the diagnosis(es)/condition(s) as documented were addressed during this visit. Due to the added complexity in care, I will continue to support Nabil in the subsequent management and with ongoing continuity of care.      Signed Electronically by: Carlos Correa MD

## 2025-04-26 PROBLEM — E08.621 DIABETIC ULCER OF TOE OF LEFT FOOT ASSOCIATED WITH DIABETES MELLITUS DUE TO UNDERLYING CONDITION, WITH FAT LAYER EXPOSED (H): Status: RESOLVED | Noted: 2024-07-19 | Resolved: 2025-04-26

## 2025-04-26 PROBLEM — L97.522 DIABETIC ULCER OF TOE OF LEFT FOOT ASSOCIATED WITH DIABETES MELLITUS DUE TO UNDERLYING CONDITION, WITH FAT LAYER EXPOSED (H): Status: RESOLVED | Noted: 2024-07-19 | Resolved: 2025-04-26

## 2025-04-26 PROBLEM — E11.621 DIABETIC ULCER OF LEFT MIDFOOT ASSOCIATED WITH TYPE 2 DIABETES MELLITUS, WITH FAT LAYER EXPOSED (H): Status: RESOLVED | Noted: 2019-07-23 | Resolved: 2025-04-26

## 2025-04-26 PROBLEM — L97.422 DIABETIC ULCER OF LEFT MIDFOOT ASSOCIATED WITH TYPE 1 DIABETES MELLITUS, WITH FAT LAYER EXPOSED (H): Status: RESOLVED | Noted: 2021-08-16 | Resolved: 2025-04-26

## 2025-04-26 PROBLEM — L97.422 DIABETIC ULCER OF LEFT MIDFOOT ASSOCIATED WITH TYPE 2 DIABETES MELLITUS, WITH FAT LAYER EXPOSED (H): Status: RESOLVED | Noted: 2019-07-23 | Resolved: 2025-04-26

## 2025-04-26 PROBLEM — E10.621 DIABETIC ULCER OF LEFT MIDFOOT ASSOCIATED WITH TYPE 1 DIABETES MELLITUS, WITH FAT LAYER EXPOSED (H): Status: RESOLVED | Noted: 2021-08-16 | Resolved: 2025-04-26

## 2025-04-26 LAB
CHOLEST SERPL-MCNC: 151 MG/DL
CREAT UR-MCNC: 55.3 MG/DL
FASTING STATUS PATIENT QL REPORTED: YES
HDLC SERPL-MCNC: 35 MG/DL
LDLC SERPL CALC-MCNC: 55 MG/DL
MICROALBUMIN UR-MCNC: 713 MG/L
MICROALBUMIN/CREAT UR: 1289.33 MG/G CR (ref 0–17)
NONHDLC SERPL-MCNC: 116 MG/DL
POTASSIUM SERPL-SCNC: 5.7 MMOL/L (ref 3.4–5.3)
TRIGL SERPL-MCNC: 304 MG/DL
TSH SERPL DL<=0.005 MIU/L-ACNC: 2.38 UIU/ML (ref 0.3–4.2)

## 2025-04-28 ENCOUNTER — PATIENT OUTREACH (OUTPATIENT)
Dept: CARE COORDINATION | Facility: CLINIC | Age: 65
End: 2025-04-28
Payer: MEDICARE

## 2025-04-28 ENCOUNTER — TELEPHONE (OUTPATIENT)
Dept: FAMILY MEDICINE | Facility: CLINIC | Age: 65
End: 2025-04-28
Payer: MEDICARE

## 2025-04-28 NOTE — TELEPHONE ENCOUNTER
[facility] [place] pharmacy calling with an FYI - they don't do RSV vaccines at their pharmacy.

## 2025-04-30 ENCOUNTER — PATIENT OUTREACH (OUTPATIENT)
Dept: CARE COORDINATION | Facility: CLINIC | Age: 65
End: 2025-04-30
Payer: MEDICARE

## 2025-05-05 DIAGNOSIS — E03.8 SUBCLINICAL HYPOTHYROIDISM: ICD-10-CM

## 2025-05-05 DIAGNOSIS — E87.5 HYPERKALEMIA: Primary | ICD-10-CM

## 2025-05-05 RX ORDER — LEVOTHYROXINE SODIUM 50 UG/1
50 TABLET ORAL DAILY
Qty: 90 TABLET | Refills: 3 | Status: SHIPPED | OUTPATIENT
Start: 2025-05-05

## 2025-05-08 ENCOUNTER — OFFICE VISIT (OUTPATIENT)
Dept: CARDIOLOGY | Facility: CLINIC | Age: 65
End: 2025-05-08
Payer: MEDICARE

## 2025-05-08 VITALS
OXYGEN SATURATION: 100 % | RESPIRATION RATE: 16 BRPM | DIASTOLIC BLOOD PRESSURE: 76 MMHG | BODY MASS INDEX: 32.07 KG/M2 | HEART RATE: 60 BPM | WEIGHT: 229.1 LBS | SYSTOLIC BLOOD PRESSURE: 150 MMHG | HEIGHT: 71 IN

## 2025-05-08 DIAGNOSIS — I10 ESSENTIAL (PRIMARY) HYPERTENSION: ICD-10-CM

## 2025-05-08 DIAGNOSIS — E78.00 PURE HYPERCHOLESTEROLEMIA: ICD-10-CM

## 2025-05-08 DIAGNOSIS — I50.30 HEART FAILURE WITH PRESERVED EJECTION FRACTION, NYHA CLASS I (H): Primary | ICD-10-CM

## 2025-05-08 DIAGNOSIS — Z95.1 S/P CABG (CORONARY ARTERY BYPASS GRAFT): ICD-10-CM

## 2025-05-08 DIAGNOSIS — N18.32 STAGE 3B CHRONIC KIDNEY DISEASE (H): ICD-10-CM

## 2025-05-08 DIAGNOSIS — I48.3 TYPICAL ATRIAL FLUTTER (H): ICD-10-CM

## 2025-05-08 DIAGNOSIS — I25.83 CORONARY ARTERIOSCLEROSIS DUE TO LIPID RICH PLAQUE: ICD-10-CM

## 2025-05-08 RX ORDER — ASPIRIN 81 MG/1
81 TABLET ORAL DAILY
COMMUNITY

## 2025-05-08 RX ORDER — INSULIN LISPRO-AABC 100 [IU]/ML
INJECTION, SOLUTION SUBCUTANEOUS
COMMUNITY
Start: 2025-05-01

## 2025-05-08 NOTE — PATIENT INSTRUCTIONS
It was great to see you today! Your care team includes myself and Dr. Valdivia.    Recommendations:  Schedule updated echocardiogram   Continue to follow up with primary care regarding potassium levels   Follow up with nephrology  I would recommend getting a smart watch to monitor your heart rhythm, especially if you are going to come off the eliquis   No changes to your medications were made today.   Monitor for signs or symptoms of fluid retention such as increased leg or abdominal swelling, worsening shortness of breath, difficulty breathing or rapid weight gain (>3 pounds in 1 day or >5 pounds in 1 week)   Notify the office if you are having shortness of breath or chest pain.    Continue a heart healthy and low sodium diet (plant based or mediterranean diet).  Work on increasing physical activity with an end goal of 150 minutes per week of mild to moderate intensity exercise (brisk walk, biking, swimming)     Follow up with Dr. Valdivia on 7/1/25.      If you have questions, concerns, or new concerning symptoms prior to your next appointment, please contact the Cardiology Nursing team at [phone].    For scheduling issues/changes, please call [phone].

## 2025-05-08 NOTE — LETTER
5/8/2025    Carlos Correa MD  [address] [place] [address]    RE: Nabil Cooneyo       Dear Colleague,     I had the pleasure of seeing Nabil Cheung in the [facility].  HEART CARE ENCOUNTER NOTE      [facility]  [phone]    Primary Care: Carlos Correa  Primary Cardiologist: Dr. Valdivia    Assessment/Recommendations   Assessment:  CAD  S/p CABG (LIMA-LAD, SVG-diagonal, SVG-obtuse marginal, SVG-RCA) in 2020  Denies anginal symptoms  Nuclear stress test August 2024 without ischemia  Heart failure with mildly reduced ejection fraction  LVEF 40 to 45% on stress test, normal on stress echo  Likely ischemic cardiomyopathy  GDMT with Coreg, Farxiga, valsartan.    Spironolactone recently discontinued for hyperkalemia  Euvolemic on exam  Atrial flutter  Was recommended ablation but not interested.    Seems atrial flutter episode and was related to hypothyroidism which has since been corrected  Patient expresses desire to come off of Eliquis due to nosebleeds and issues with indigestion.  Discussed [id number] of 5 and associated stroke risk and alternatives such as Watchman procedure but patient is not interested in that.    HTN  Elevated in the office today, typically well-controlled  HLD  LDL well-controlled at 55 on rosuvastatin 40 mg    Other pertinent medical hx reviewed:  CKD 3B -follows with nephrology  Type 2 diabetes  Hypothyroidism  BPH    Plan:  Updated echo for reassessment of LVEF given discrepant findings on echo and stress test  Updated potassium level as ordered by PCP for reassessment after stopping spironolactone  Recommended continue anticoagulation with Eliquis but if patient desires to come off of it would recommend using a smart watch to monitor for recurrence of arrhythmias  Continue to follow-up with nephrology      Follow up with Dr. Valdivia in July as scheduled.    The longitudinal plan of care for the diagnosis(es)/condition(s) as documented were  addressed during this visit. Due to the added complexity in care, I will continue to support Nabil in the subsequent management and with ongoing continuity of care.      History of Present Illness/Subjective     Nabil Cheung is a 65 year old male with PMHx of CAD with CABG, heart failure with mildly reduced ejection fraction, atrial flutter, HTN, HLD, CKD stage IIIb, type 2 diabetes, hypothyroidism presenting for follow-up.      He was last seen by Dr. Valdivia on 10/8/2024.  At that time, patient was reporting getting more short of breath due to the air quality was also endorsing muscle aches he thought was from his statin.  He was recommended holding statin for 3 weeks and considering PCSK9 inhibitor if needed.  His carvedilol was also increased to 25 mg twice daily.  Regarding his atrial flutter he was recommended considering ablation    Patient has been feeling well from a cardiac standpoint and main concern today is if he can come off of Eliquis.  He has not had send doing an ablation procedure or a Watchman.  He reports being symptomatic with atrial flutter episode in the past and was likely related to his hypothyroidism.  He feels the Eliquis is leading to indigestion issues and frequent nosebleeds.  He otherwise denies chest pain, shortness of breath, edema, lightheadedness, dizziness, weight gain.    Cardiac Testing     ECG, 8/5/2024: Sinus rhythm with first-degree AV block, left axis deviation        Stress echo, 7/22/2024:  Interpretation Summary  1. The patient has severely reduced physical capacity. He achieved 54%  predicted maximal heart rate.  2. Exercise stress ECG is nondiagnostic due to baseline ECG abnormalities.  3. Exercise stress ECHO is nondiagnostic since he only achieved 54% predicted  maximal heart rate.  4. Rest ECHO images showed normal left ventricular size, wall motion and  systolic function. The estimated left venticular ejection fraction is 55-60%.  At the peak of exercise 54%  predicted maximal rate, left ventricle appear  augmented. There is no segmental wall motion abnormality.     Comments: This is inconclusive study for ischemic evaluation since he only  achived 54% predicted maximal heart rate and very low exercise capacity. May  consider another image study for further evaluation.      Nuclear stress test, 8/7/2024     The nuclear stress test is abnormal.     There is a small area of a mild degree of infarction in the entire inferolateral segment(s) of the left ventricle.  No ischemia identified.     The left ventricular ejection fraction at stress is 45%.     The patient is at a low risk of future cardiac ischemic events.     There is no prior study for comparison.    Labs:  LDL 55  Potassium 5.7  Creatinine 2.28  A1c 10  Hemoglobin 12.5  Platelet 230        Physical Examination    Vitals: There were no vitals taken for this visit.    General: Well appearing, in no acute distress. Resting comfortably in exam chair  Skin: No clubbing, no cyanosis.  Eyes: Extra ocular movements intact  Neck: No jugular venous distention, no carotid bruits, carotids have a normal upstroke  Lungs: Clear to auscultation bilaterally, no wheezing or rhonchi.  Heart: Regular rhythm, PMI not displaced, S1, S2 normal, no S3, no S4, no heaves, no rub and no murmur.  Abdomen: Soft, nontender  Extremities: No peripheral edema . Grade 2/4 distal pulses bilaterally.  Neuro: Oriented to person, place and time, alert, cooperative, gait coordinated.    BP Readings from Last 3 Encounters:   04/25/25 130/68   04/09/25 138/68   10/08/24 (!) 144/74       Pulse Readings from Last 3 Encounters:   04/25/25 57   04/09/25 70   10/08/24 64       Wt Readings from Last 3 Encounters:   04/25/25 103.4 kg (228 lb)   04/09/25 102.1 kg (225 lb)   01/06/25 102.1 kg (225 lb)         Review of Systems      Please refer above for cardiac ROS details.       History     MEDICAL HISTORY:  No past medical history on file.  SURGICAL  HISTORY  Past Surgical History:   Procedure Laterality Date     AMPUTATE TOE(S) Right 8/17/2021    Procedure: left partial fifth ray amputation;  Surgeon: Mirian Cline DPM, Podiatry/Foot and Ankle Surgery;  Location: [facility] OR     GRAFT FLAP PEDICLE TRAM DELAY PROCEDURE Left 6/18/2020    Procedure: Debridement of sternal wound and left major pectoral flep;  Surgeon: MARCO Serna MD;  Location: [facility] OR     INCISION AND DRAINAGE CHEST WASHOUT, COMBINED N/A 6/12/2020    Procedure: INCISION AND DRAINAGE, WOUND, CHEST, WITH IRRIGATION and wound vac change;  Surgeon: Mark Mckinney MD;  Location: [facility] OR     REPAIR CHEST WALL N/A 6/18/2020    Procedure: Chest wall reconstruction;  Surgeon: MARCO Serna MD;  Location: [facility] OR      FAMILY HISTORY:  Family History   Problem Relation Age of Onset     Sleep Apnea Brother     FAMILY HISTORY:  Family History   Problem Relation Age of Onset     Sleep Apnea Brother       SOCIAL HISTORY:  Social History     Socioeconomic History     Marital status: Single     Spouse name: Not on file     Number of children: Not on file     Years of education: Not on file     Highest education level: Not on file   Occupational History     Not on file   Tobacco Use     Smoking status: Never     Passive exposure: Never     Smokeless tobacco: Never   Vaping Use     Vaping status: Never Used   Substance and Sexual Activity     Alcohol use: Yes     Drug use: No     Sexual activity: Not on file   Other Topics Concern     Not on file   Social History Narrative    6/29/17 - Pt was interested in getting help filling out [employer] application online to receive medicaid. CHW gave him the list of information he needed to complete online application. He stated he had a tax extension for 2016 so he did not have tax forms that the application required. He was going to work on the application with tax information from 2015 later tonight. AM        7/27/17:    Pt wanted to get more information  about gyms that are low-cost within the [facility]. Pt's asked for a gym with a membership fee of $10-$20 (max), that also had a walking track and a hot tub/sauna. We told him that options that fit these requirements he mentioned were very limited. He then stated that he heard that the [facility] offered a $5 fee for track-usage only, however, we could not call [facility] since it was closed. We suggested to the patient to call tomorrow and also mentioned there are gyms that are low-cost nearby, however, might not have a track or hot tub/sauna. We also suggested that he could walk around a lake would also be a cheap-alternative. -HB and NV        8/28/17:    Pt was here for a med refill. We asked him about his gym membership and access based on CHW's previous encounter with him and he said he tried to walk outside more this summer, but still did not find an affordable option. He did not have need for any other services at the time.     - AA and KT         9/28/17:    Pt had questions about free/low cost (under $10/month) walking tracks.  Found a website to walk through with the patient and shared different resources. - AB and HB        7/19/18: Pt. Was not seen tonight.- ZR, KP     Social Drivers of Health     Financial Resource Strain: Low Risk  (12/1/2023)    Financial Resource Strain      Within the past 12 months, have you or your family members you live with been unable to get utilities (heat, electricity) when it was really needed?: No   Food Insecurity: Low Risk  (12/1/2023)    Food Insecurity      Within the past 12 months, did you worry that your food would run out before you got money to buy more?: No      Within the past 12 months, did the food you bought just not last and you didn t have money to get more?: No   Transportation Needs: Low Risk  (12/1/2023)    Transportation Needs      Within the past 12 months, has lack of transportation kept you from medical appointments, getting your medicines,  non-medical meetings or appointments, work, or from getting things that you need?: No   Physical Activity: Not on file   Stress: Not on file   Social Connections: Not on file   Interpersonal Safety: Low Risk  (4/9/2025)    Interpersonal Safety      Do you feel physically and emotionally safe where you currently live?: Yes      Within the past 12 months, have you been hit, slapped, kicked or otherwise physically hurt by someone?: No      Within the past 12 months, have you been humiliated or emotionally abused in other ways by your partner or ex-partner?: No   Housing Stability: Low Risk  (12/1/2023)    Housing Stability      Do you have housing? : Yes      Are you worried about losing your housing?: No    ALLERGIES:  Allergies   Allergen Reactions     Atorvastatin      Bactrim [Sulfamethoxazole-Trimethoprim]      Kidney issues     Dust Mites      Fluorescein Nausea and Vomiting            Medications:     Current Outpatient Medications   Medication Sig Dispense Refill     acetaminophen (TYLENOL) 325 MG tablet Take 650 mg by mouth every 6 hours as needed for mild pain       alpha-lipoic acid 600 MG capsule Take 600 mg by mouth daily        apixaban ANTICOAGULANT (ELIQUIS ANTICOAGULANT) 5 MG tablet Take 1 tablet (5 mg) by mouth 2 times daily 180 tablet 3     budesonide-formoterol (SYMBICORT) 160-4.5 MCG/ACT Inhaler Inhale 2 puffs into the lungs 2 times daily as needed.       carvedilol (COREG) 12.5 MG tablet Take 1 tablet (12.5 mg) by mouth 2 times daily (with meals)       coenzyme Q-10 200 MG CAPS capsule Take 1 capsule by mouth daily as needed.       Continuous Blood Gluc  (FREESTYLE JULIO 2 READER) CARLOS Use to read blood sugars as per 's instructions. 1 each 0     Continuous Blood Gluc Sensor (FREESTYLE JULIO 2 SENSOR) MISC CHANGE EVERY 14 DAYS 2 each 5     FARXIGA 10 MG TABS tablet TAKE 1 TABLET (10 MG) BY MOUTH DAILY. 90 tablet 1     finasteride (PROSCAR) 5 MG tablet Take 5 mg by mouth daily        furosemide (LASIX) 40 MG tablet Take 1 tablet (40 mg) by mouth daily 30 tablet 11     insulin aspart (NOVOLOG PEN) 100 UNIT/ML pen Inject 1-7 Units Subcutaneous 3 times daily (before meals) Correction Scale - MEDIUM INSULIN RESISTANCE DOSING     Do Not give Correction Insulin if Pre-Meal BG less than 140.   For Pre-Meal  - 189 give 1 unit.   For Pre-Meal  - 239 give 2 units.   For Pre-Meal  - 289 give 3 units.   For Pre-Meal  - 339 give 4 units.   For Pre-Meal - 399 give 5 units.   For Pre-Meal -449 give 6 units  For Pre-Meal BG greater than or equal to 450 give 7 units.   To be given with prandial insulin, and based on pre-meal blood glucose.    Notify provider if glucose greater than or equal to 350 mg/dL after administration of correction dose. If given at mealtime, administer within 30 minutes of start of meal (Patient taking differently: Inject 14-18 Units subcutaneously 3 times daily (before meals). Correction Scale - MEDIUM INSULIN RESISTANCE DOSING     Do Not give Correction Insulin if Pre-Meal BG less than 140.   For Pre-Meal  - 189 give 1 unit.   For Pre-Meal  - 239 give 2 units.   For Pre-Meal  - 289 give 3 units.   For Pre-Meal  - 339 give 4 units.   For Pre-Meal - 399 give 5 units.   For Pre-Meal -449 give 6 units  For Pre-Meal BG greater than or equal to 450 give 7 units.   To be given with prandial insulin, and based on pre-meal blood glucose.    Notify provider if glucose greater than or equal to 350 mg/dL after administration of correction dose. If given at mealtime, administer within 30 minutes of start of meal)       insulin glargine (LANTUS PEN) 100 UNIT/ML pen Inject 32 Units Subcutaneous at bedtime (Patient taking differently: Inject 35 Units subcutaneously at bedtime.) 15 mL 3     LANsoprazole (PREVACID) 30 MG DR capsule Take 30 mg by mouth every morning (before breakfast).       levothyroxine (SYNTHROID/LEVOTHROID) 50  MCG tablet Take 1 tablet (50 mcg) by mouth daily. 90 tablet 3     magnesium oxide 400 MG CAPS Take by mouth daily       metFORMIN (GLUCOPHAGE) 500 MG tablet Take 500 mg by mouth 2 times daily (with meals).       nitroGLYcerin (NITROSTAT) 0.4 MG sublingual tablet Place 0.4 mg under the tongue every 5 minutes as needed for chest pain For chest pain place 1 tablet under the tongue every 5 minutes for 3 doses. If symptoms persist 5 minutes after 1st dose call 911.       rosuvastatin (CRESTOR) 40 MG tablet Take 20 mg by mouth daily Evening        spironolactone (ALDACTONE) 25 MG tablet Take 0.5 tablets (12.5 mg) by mouth daily. 30 tablet 0     terazosin (HYTRIN) 2 MG capsule TAKE 1 CAPSULE BY MOUTH AT BEDTIME 90 capsule 3     valsartan (DIOVAN) 160 MG tablet Take 160 mg by mouth 2 times daily       vitamin C (ASCORBIC ACID) 1000 MG TABS Take 500 mg by mouth daily.       vitamin D3 (CHOLECALCIFEROL) 2000 units (50 mcg) tablet Take 1 tablet by mouth daily. 5000 iu       zinc gluconate 50 MG tablet Take 25 mg by mouth daily             Mesfin Smith PA-C  May 8, 2025    I spent 40 minutes in the visit, with more than 50% of the total face-to-face time of the visit in counseling /  coordination of care.    This note was partially generated using Dragon voice recognition system, and there may be some incorrect words,  spellings, and punctuation that were not noted in checking the note before saving.    Thank you for allowing me to participate in the care of your patient.      Sincerely,     Mesfin Smith PA-C     [facility]  cc:   Radha Valdivia MD  [address], [address]  [place] [address]

## 2025-05-08 NOTE — PROGRESS NOTES
HEART CARE ENCOUNTER NOTE      [facility]  [phone]    Primary Care: Carlos Correa  Primary Cardiologist: Dr. Valdivia    Assessment/Recommendations   Assessment:  CAD  S/p CABG (LIMA-LAD, SVG-diagonal, SVG-obtuse marginal, SVG-RCA) in 2020  Denies anginal symptoms  Nuclear stress test August 2024 without ischemia  Heart failure with mildly reduced ejection fraction  LVEF 40 to 45% on stress test, normal on stress echo  Likely ischemic cardiomyopathy  GDMT with Coreg, Farxiga, valsartan.    Spironolactone recently discontinued for hyperkalemia  Euvolemic on exam  Atrial flutter  Was recommended ablation but not interested.    Seems atrial flutter episode and was related to hypothyroidism which has since been corrected  Patient expresses desire to come off of Eliquis due to nosebleeds and issues with indigestion.  Discussed [id number] of 5 and associated stroke risk and alternatives such as Watchman procedure but patient is not interested in that.    HTN  Elevated in the office today, typically well-controlled  HLD  LDL well-controlled at 55 on rosuvastatin 40 mg    Other pertinent medical hx reviewed:  CKD 3B -follows with nephrology  Type 2 diabetes  Hypothyroidism  BPH    Plan:  Updated echo for reassessment of LVEF given discrepant findings on echo and stress test  Updated potassium level as ordered by PCP for reassessment after stopping spironolactone  Recommended continue anticoagulation with Eliquis but if patient desires to come off of it would recommend using a smart watch to monitor for recurrence of arrhythmias  Continue to follow-up with nephrology      Follow up with Dr. Valdivia in July as scheduled.    The longitudinal plan of care for the diagnosis(es)/condition(s) as documented were addressed during this visit. Due to the added complexity in care, I will continue to support Nabil in the subsequent management and with ongoing continuity of care.      History of Present  Illness/Subjective     Nabil Cheung is a 65 year old male with PMHx of CAD with CABG, heart failure with mildly reduced ejection fraction, atrial flutter, HTN, HLD, CKD stage IIIb, type 2 diabetes, hypothyroidism presenting for follow-up.      He was last seen by Dr. Valdivia on 10/8/2024.  At that time, patient was reporting getting more short of breath due to the air quality was also endorsing muscle aches he thought was from his statin.  He was recommended holding statin for 3 weeks and considering PCSK9 inhibitor if needed.  His carvedilol was also increased to 25 mg twice daily.  Regarding his atrial flutter he was recommended considering ablation    Patient has been feeling well from a cardiac standpoint and main concern today is if he can come off of Eliquis.  He has not had send doing an ablation procedure or a Watchman.  He reports being symptomatic with atrial flutter episode in the past and was likely related to his hypothyroidism.  He feels the Eliquis is leading to indigestion issues and frequent nosebleeds.  He otherwise denies chest pain, shortness of breath, edema, lightheadedness, dizziness, weight gain.    Cardiac Testing     ECG, 8/5/2024: Sinus rhythm with first-degree AV block, left axis deviation        Stress echo, 7/22/2024:  Interpretation Summary  1. The patient has severely reduced physical capacity. He achieved 54%  predicted maximal heart rate.  2. Exercise stress ECG is nondiagnostic due to baseline ECG abnormalities.  3. Exercise stress ECHO is nondiagnostic since he only achieved 54% predicted  maximal heart rate.  4. Rest ECHO images showed normal left ventricular size, wall motion and  systolic function. The estimated left venticular ejection fraction is 55-60%.  At the peak of exercise 54% predicted maximal rate, left ventricle appear  augmented. There is no segmental wall motion abnormality.     Comments: This is inconclusive study for ischemic evaluation since he only  achived 54%  predicted maximal heart rate and very low exercise capacity. May  consider another image study for further evaluation.      Nuclear stress test, 8/7/2024    The nuclear stress test is abnormal.    There is a small area of a mild degree of infarction in the entire inferolateral segment(s) of the left ventricle.  No ischemia identified.    The left ventricular ejection fraction at stress is 45%.    The patient is at a low risk of future cardiac ischemic events.    There is no prior study for comparison.    Labs:  LDL 55  Potassium 5.7  Creatinine 2.28  A1c 10  Hemoglobin 12.5  Platelet 230        Physical Examination    Vitals: There were no vitals taken for this visit.    General: Well appearing, in no acute distress. Resting comfortably in exam chair  Skin: No clubbing, no cyanosis.  Eyes: Extra ocular movements intact  Neck: No jugular venous distention, no carotid bruits, carotids have a normal upstroke  Lungs: Clear to auscultation bilaterally, no wheezing or rhonchi.  Heart: Regular rhythm, PMI not displaced, S1, S2 normal, no S3, no S4, no heaves, no rub and no murmur.  Abdomen: Soft, nontender  Extremities: No peripheral edema . Grade 2/4 distal pulses bilaterally.  Neuro: Oriented to person, place and time, alert, cooperative, gait coordinated.    BP Readings from Last 3 Encounters:   04/25/25 130/68   04/09/25 138/68   10/08/24 (!) 144/74       Pulse Readings from Last 3 Encounters:   04/25/25 57   04/09/25 70   10/08/24 64       Wt Readings from Last 3 Encounters:   04/25/25 103.4 kg (228 lb)   04/09/25 102.1 kg (225 lb)   01/06/25 102.1 kg (225 lb)         Review of Systems      Please refer above for cardiac ROS details.       History     MEDICAL HISTORY:  No past medical history on file.  SURGICAL HISTORY  Past Surgical History:   Procedure Laterality Date    AMPUTATE TOE(S) Right 8/17/2021    Procedure: left partial fifth ray amputation;  Surgeon: Mirian Cline DPM, Podiatry/Foot and Ankle Surgery;   Location: [facility] OR    GRAFT FLAP PEDICLE TRAM DELAY PROCEDURE Left 6/18/2020    Procedure: Debridement of sternal wound and left major pectoral flep;  Surgeon: MARCO Serna MD;  Location: [facility] OR    INCISION AND DRAINAGE CHEST WASHOUT, COMBINED N/A 6/12/2020    Procedure: INCISION AND DRAINAGE, WOUND, CHEST, WITH IRRIGATION and wound vac change;  Surgeon: Mark Mckinney MD;  Location: [facility] OR    REPAIR CHEST WALL N/A 6/18/2020    Procedure: Chest wall reconstruction;  Surgeon: MARCO Serna MD;  Location: [facility] OR      FAMILY HISTORY:  Family History   Problem Relation Age of Onset    Sleep Apnea Brother     FAMILY HISTORY:  Family History   Problem Relation Age of Onset    Sleep Apnea Brother       SOCIAL HISTORY:  Social History     Socioeconomic History    Marital status: Single     Spouse name: Not on file    Number of children: Not on file    Years of education: Not on file    Highest education level: Not on file   Occupational History    Not on file   Tobacco Use    Smoking status: Never     Passive exposure: Never    Smokeless tobacco: Never   Vaping Use    Vaping status: Never Used   Substance and Sexual Activity    Alcohol use: Yes    Drug use: No    Sexual activity: Not on file   Other Topics Concern    Not on file   Social History Narrative    6/29/17 - Pt was interested in getting help filling out [employer] application online to receive medicaid. CHW gave him the list of information he needed to complete online application. He stated he had a tax extension for 2016 so he did not have tax forms that the application required. He was going to work on the application with tax information from 2015 later tonight. AM        7/27/17:    Pt wanted to get more information about gyms that are low-cost within the [facility]. Pt's asked for a gym with a membership fee of $10-$20 (max), that also had a walking track and a hot tub/sauna. We told him that options that fit these requirements he  mentioned were very limited. He then stated that he heard that the [facility] offered a $5 fee for track-usage only, however, we could not call [facility] since it was closed. We suggested to the patient to call tomorrow and also mentioned there are gyms that are low-cost nearby, however, might not have a track or hot tub/sauna. We also suggested that he could walk around a lake would also be a cheap-alternative. -HB and NV        8/28/17:    Pt was here for a med refill. We asked him about his gym membership and access based on CHW's previous encounter with him and he said he tried to walk outside more this summer, but still did not find an affordable option. He did not have need for any other services at the time.     - AA and KT         9/28/17:    Pt had questions about free/low cost (under $10/month) walking tracks.  Found a website to walk through with the patient and shared different resources. - AB and HB        7/19/18: Pt. Was not seen tonight.- ZR, KP     Social Drivers of Health     Financial Resource Strain: Low Risk  (12/1/2023)    Financial Resource Strain     Within the past 12 months, have you or your family members you live with been unable to get utilities (heat, electricity) when it was really needed?: No   Food Insecurity: Low Risk  (12/1/2023)    Food Insecurity     Within the past 12 months, did you worry that your food would run out before you got money to buy more?: No     Within the past 12 months, did the food you bought just not last and you didn t have money to get more?: No   Transportation Needs: Low Risk  (12/1/2023)    Transportation Needs     Within the past 12 months, has lack of transportation kept you from medical appointments, getting your medicines, non-medical meetings or appointments, work, or from getting things that you need?: No   Physical Activity: Not on file   Stress: Not on file   Social Connections: Not on file   Interpersonal Safety: Low Risk  (4/9/2025)    Interpersonal Safety      Do you feel physically and emotionally safe where you currently live?: Yes     Within the past 12 months, have you been hit, slapped, kicked or otherwise physically hurt by someone?: No     Within the past 12 months, have you been humiliated or emotionally abused in other ways by your partner or ex-partner?: No   Housing Stability: Low Risk  (12/1/2023)    Housing Stability     Do you have housing? : Yes     Are you worried about losing your housing?: No    ALLERGIES:  Allergies   Allergen Reactions    Atorvastatin     Bactrim [Sulfamethoxazole-Trimethoprim]      Kidney issues    Dust Mites     Fluorescein Nausea and Vomiting            Medications:     Current Outpatient Medications   Medication Sig Dispense Refill    acetaminophen (TYLENOL) 325 MG tablet Take 650 mg by mouth every 6 hours as needed for mild pain      alpha-lipoic acid 600 MG capsule Take 600 mg by mouth daily       apixaban ANTICOAGULANT (ELIQUIS ANTICOAGULANT) 5 MG tablet Take 1 tablet (5 mg) by mouth 2 times daily 180 tablet 3    budesonide-formoterol (SYMBICORT) 160-4.5 MCG/ACT Inhaler Inhale 2 puffs into the lungs 2 times daily as needed.      carvedilol (COREG) 12.5 MG tablet Take 1 tablet (12.5 mg) by mouth 2 times daily (with meals)      coenzyme Q-10 200 MG CAPS capsule Take 1 capsule by mouth daily as needed.      Continuous Blood Gluc  (FREESTYLE JULIO 2 READER) CARLOS Use to read blood sugars as per 's instructions. 1 each 0    Continuous Blood Gluc Sensor (FREESTYLE JULIO 2 SENSOR) MISC CHANGE EVERY 14 DAYS 2 each 5    FARXIGA 10 MG TABS tablet TAKE 1 TABLET (10 MG) BY MOUTH DAILY. 90 tablet 1    finasteride (PROSCAR) 5 MG tablet Take 5 mg by mouth daily      furosemide (LASIX) 40 MG tablet Take 1 tablet (40 mg) by mouth daily 30 tablet 11    insulin aspart (NOVOLOG PEN) 100 UNIT/ML pen Inject 1-7 Units Subcutaneous 3 times daily (before meals) Correction Scale - MEDIUM INSULIN RESISTANCE DOSING     Do Not give  Correction Insulin if Pre-Meal BG less than 140.   For Pre-Meal  - 189 give 1 unit.   For Pre-Meal  - 239 give 2 units.   For Pre-Meal  - 289 give 3 units.   For Pre-Meal  - 339 give 4 units.   For Pre-Meal - 399 give 5 units.   For Pre-Meal -449 give 6 units  For Pre-Meal BG greater than or equal to 450 give 7 units.   To be given with prandial insulin, and based on pre-meal blood glucose.    Notify provider if glucose greater than or equal to 350 mg/dL after administration of correction dose. If given at mealtime, administer within 30 minutes of start of meal (Patient taking differently: Inject 14-18 Units subcutaneously 3 times daily (before meals). Correction Scale - MEDIUM INSULIN RESISTANCE DOSING     Do Not give Correction Insulin if Pre-Meal BG less than 140.   For Pre-Meal  - 189 give 1 unit.   For Pre-Meal  - 239 give 2 units.   For Pre-Meal  - 289 give 3 units.   For Pre-Meal  - 339 give 4 units.   For Pre-Meal - 399 give 5 units.   For Pre-Meal -449 give 6 units  For Pre-Meal BG greater than or equal to 450 give 7 units.   To be given with prandial insulin, and based on pre-meal blood glucose.    Notify provider if glucose greater than or equal to 350 mg/dL after administration of correction dose. If given at mealtime, administer within 30 minutes of start of meal)      insulin glargine (LANTUS PEN) 100 UNIT/ML pen Inject 32 Units Subcutaneous at bedtime (Patient taking differently: Inject 35 Units subcutaneously at bedtime.) 15 mL 3    LANsoprazole (PREVACID) 30 MG DR capsule Take 30 mg by mouth every morning (before breakfast).      levothyroxine (SYNTHROID/LEVOTHROID) 50 MCG tablet Take 1 tablet (50 mcg) by mouth daily. 90 tablet 3    magnesium oxide 400 MG CAPS Take by mouth daily      metFORMIN (GLUCOPHAGE) 500 MG tablet Take 500 mg by mouth 2 times daily (with meals).      nitroGLYcerin (NITROSTAT) 0.4 MG sublingual tablet  Place 0.4 mg under the tongue every 5 minutes as needed for chest pain For chest pain place 1 tablet under the tongue every 5 minutes for 3 doses. If symptoms persist 5 minutes after 1st dose call 911.      rosuvastatin (CRESTOR) 40 MG tablet Take 20 mg by mouth daily Evening       spironolactone (ALDACTONE) 25 MG tablet Take 0.5 tablets (12.5 mg) by mouth daily. 30 tablet 0    terazosin (HYTRIN) 2 MG capsule TAKE 1 CAPSULE BY MOUTH AT BEDTIME 90 capsule 3    valsartan (DIOVAN) 160 MG tablet Take 160 mg by mouth 2 times daily      vitamin C (ASCORBIC ACID) 1000 MG TABS Take 500 mg by mouth daily.      vitamin D3 (CHOLECALCIFEROL) 2000 units (50 mcg) tablet Take 1 tablet by mouth daily. 5000 iu      zinc gluconate 50 MG tablet Take 25 mg by mouth daily             Mesfin Smith PA-C  May 8, 2025    I spent 40 minutes in the visit, with more than 50% of the total face-to-face time of the visit in counseling /  coordination of care.    This note was partially generated using Dragon voice recognition system, and there may be some incorrect words,  spellings, and punctuation that were not noted in checking the note before saving.

## 2025-06-05 ENCOUNTER — HOSPITAL ENCOUNTER (OUTPATIENT)
Dept: CARDIOLOGY | Facility: CLINIC | Age: 65
End: 2025-06-05
Payer: MEDICARE

## 2025-06-05 DIAGNOSIS — I50.30 HEART FAILURE WITH PRESERVED EJECTION FRACTION, NYHA CLASS I (H): ICD-10-CM

## 2025-06-05 LAB — LVEF ECHO: NORMAL

## 2025-06-05 PROCEDURE — 255N000002 HC RX 255 OP 636

## 2025-06-05 RX ADMIN — PERFLUTREN 2 ML: 6.52 INJECTION, SUSPENSION INTRAVENOUS at 10:43

## 2025-06-06 ENCOUNTER — RESULTS FOLLOW-UP (OUTPATIENT)
Dept: CARDIOLOGY | Facility: CLINIC | Age: 65
End: 2025-06-06

## 2025-06-28 ENCOUNTER — HEALTH MAINTENANCE LETTER (OUTPATIENT)
Age: 65
End: 2025-06-28

## 2025-07-01 ENCOUNTER — OFFICE VISIT (OUTPATIENT)
Dept: CARDIOLOGY | Facility: CLINIC | Age: 65
End: 2025-07-01
Payer: MEDICARE

## 2025-07-01 VITALS
HEART RATE: 57 BPM | HEIGHT: 71 IN | DIASTOLIC BLOOD PRESSURE: 70 MMHG | BODY MASS INDEX: 32.9 KG/M2 | WEIGHT: 235 LBS | SYSTOLIC BLOOD PRESSURE: 142 MMHG | RESPIRATION RATE: 16 BRPM

## 2025-07-01 DIAGNOSIS — I25.83 CORONARY ARTERIOSCLEROSIS DUE TO LIPID RICH PLAQUE: Primary | ICD-10-CM

## 2025-07-01 DIAGNOSIS — N18.31 STAGE 3A CHRONIC KIDNEY DISEASE (H): ICD-10-CM

## 2025-07-01 DIAGNOSIS — N18.32 STAGE 3B CHRONIC KIDNEY DISEASE (H): ICD-10-CM

## 2025-07-01 DIAGNOSIS — Z95.1 S/P CABG (CORONARY ARTERY BYPASS GRAFT): ICD-10-CM

## 2025-07-01 DIAGNOSIS — G47.33 OSA (OBSTRUCTIVE SLEEP APNEA): ICD-10-CM

## 2025-07-01 DIAGNOSIS — N40.0 BENIGN PROSTATIC HYPERPLASIA WITHOUT LOWER URINARY TRACT SYMPTOMS: ICD-10-CM

## 2025-07-01 DIAGNOSIS — E78.00 PURE HYPERCHOLESTEROLEMIA: ICD-10-CM

## 2025-07-01 DIAGNOSIS — E11.9 DIABETES MELLITUS WITHOUT COMPLICATION (H): ICD-10-CM

## 2025-07-01 DIAGNOSIS — E03.8 SUBCLINICAL HYPOTHYROIDISM: ICD-10-CM

## 2025-07-01 DIAGNOSIS — I48.92 ATRIAL FLUTTER, UNSPECIFIED TYPE (H): ICD-10-CM

## 2025-07-01 DIAGNOSIS — I50.30 HEART FAILURE WITH PRESERVED EJECTION FRACTION, NYHA CLASS I (H): ICD-10-CM

## 2025-07-01 DIAGNOSIS — I10 ESSENTIAL (PRIMARY) HYPERTENSION: ICD-10-CM

## 2025-07-01 PROBLEM — E66.01 MORBID OBESITY (H): Status: RESOLVED | Noted: 2025-04-25 | Resolved: 2025-07-01

## 2025-07-01 PROCEDURE — 99214 OFFICE O/P EST MOD 30 MIN: CPT | Performed by: INTERNAL MEDICINE

## 2025-07-01 PROCEDURE — 3078F DIAST BP <80 MM HG: CPT | Performed by: INTERNAL MEDICINE

## 2025-07-01 PROCEDURE — G2211 COMPLEX E/M VISIT ADD ON: HCPCS | Performed by: INTERNAL MEDICINE

## 2025-07-01 PROCEDURE — 3077F SYST BP >= 140 MM HG: CPT | Performed by: INTERNAL MEDICINE

## 2025-07-01 NOTE — LETTER
7/1/2025    Carlos Correa MD  [address] [place] [address]    RE: Nabil Cooneyo       Dear Colleague,     I had the pleasure of seeing Nabil Cheung in the [facility].      [facility]  Heart Care Clinic Follow-up Note    Assessment & Plan        (I25.83) Coronary arteriosclerosis due to lipid rich plaque  (primary encounter diagnosis)  Comment: Angiography March 2020 showed normal left main, mid LAD 75% stenosis with first diagonal 70% stenosis, circumflex with a proximal total occlusion, and right coronary artery with a distal 70% PDA lesion.  This prompted coronary artery bypass grafting.     (Z95.1) S/P CABG (coronary artery bypass graft)  Comment: In 2020 he had a LIMA to the LAD, vein graft to first diagonal, vein graft to obtuse marginal artery, and vein graft to distal right coronary artery.  Did not have left atrial appendage oversewing, no symptoms and work on prevention.     (I50.30) Heart failure with preserved ejection fraction, NYHA class I (H)  Comment: Increased BNP, most likely due to atrial flutter but cannot rule out ischemia.  Midrange ejection fraction of 40 to 45%, symptoms resolved on furosemide.  Discussed fluid and salt restriction.  Continue Farxiga, valsartan, carvedilol.  Repeat MRI at ejection fraction evaluation later on this month.     (I48.3) Typical atrial flutter (H)  Comment: Episodic, given the recurrence although he is now in sinus rhythm today, would recommend ablation of this typical, right-sided most likely counterclockwise atrial flutter.  This most likely caused him to go to heart failure.  On Eliquis 5 mg twice daily.  He would like to discontinue Eliquis, his CHADS2 Vascor is 7 given it was 7.2 to 12.2% risk of stroke per year.  We agreed on Zio patch monitor, if this shows no atrial fibrillation or flutter we then discontinue Eliquis provided he has an Apple watch or other monitoring device for the long-term.  Did discuss left atrial  appendage occlusion, he is not interested, and wants to go and was not oversewn during surgery.     (I10) Essential (primary) hypertension  Comment: Resistant, on valsartan as well as carvedilol as well as terazosin and finasteride.  Levels fairly well-controlled thus do not increase meds further but can always go up on dose of carvedilol.      (E78.00) Pure hypercholesterolemia  Comment: Numbers good with a total cholesterol 160 and LDL of 69.  We tried holiday off rosuvastatin, symptoms did not necessarily get better, continue rosuvastatin.    (N18.32) Stage 3b chronic kidney disease (H)  Comment: Concerned with GFR of 31 with GFR 2.28 and sees nephrology.      (E11.9,  Z79.4) Type 2 diabetes mellitus without complication, with long-term current use of insulin (H)  Comment: On Farxiga, metformin, Lantus insulin as well as sliding scale.  Doing better.    (E03.8) Subclinical hypothyroidism  Comment: On Synthroid.    (N40.0) Benign prostatic hyperplasia without lower urinary tract symptoms  Comment: Stable at this point time on tamsulosin as well as Proscar.    (G47.33) NAVI (obstructive sleep apnea)  Comment: Nightly CPAP.    Obesity-class I with BMI of 32 and work on weight loss.    Plan  1.  Weight loss.  2.  3-day Zio patch monitor, if no A-fib seen we will most likely tell him to hold Eliquis and get a smart watch.  3.  Await cardiac MRI, if ejection fraction diminished we will most likely continue furosemide, otherwise go to furosemide every other day.  4.  Follow-up me in 6 months given all these issues or sooner if needed.    The longitudinal plan of care for the diagnosis(es)/condition(s) as documented were addressed during this visit. Due to the added complexity in care, I will continue to support Nabil in the subsequent management and with ongoing continuity of care.     Subjective  CC: 65-year-old white gentleman here for a 6-month follow-up.  Still living independently, still trying exercise but his  biggest issue is back discomfort.  Denies any syncope, presyncope, chest pains, palpitations, significant shortness of breath, PND, orthopnea or peripheral edema.    Medications  Current Outpatient Medications   Medication Sig Dispense Refill     acetaminophen (TYLENOL) 325 MG tablet Take 650 mg by mouth every 6 hours as needed for mild pain (Patient taking differently: Take 650 mg by mouth 2 times daily.)       alpha-lipoic acid 600 MG capsule Take 600 mg by mouth daily        apixaban ANTICOAGULANT (ELIQUIS ANTICOAGULANT) 5 MG tablet Take 1 tablet (5 mg) by mouth 2 times daily 180 tablet 3     aspirin 81 MG EC tablet Take 81 mg by mouth daily. 3 times a week       budesonide-formoterol (SYMBICORT) 160-4.5 MCG/ACT Inhaler Inhale 2 puffs into the lungs 2 times daily as needed.       carvedilol (COREG) 12.5 MG tablet Take 1 tablet (12.5 mg) by mouth 2 times daily (with meals)       Continuous Blood Gluc  (FREESTYLE JULIO 2 READER) CARLOS Use to read blood sugars as per 's instructions. 1 each 0     Continuous Blood Gluc Sensor (FREESTYLE JULIO 2 SENSOR) MISC CHANGE EVERY 14 DAYS 2 each 5     FARXIGA 10 MG TABS tablet TAKE 1 TABLET (10 MG) BY MOUTH DAILY. 90 tablet 1     finasteride (PROSCAR) 5 MG tablet Take 5 mg by mouth daily       furosemide (LASIX) 40 MG tablet Take 1 tablet (40 mg) by mouth daily 30 tablet 11     insulin aspart (NOVOLOG PEN) 100 UNIT/ML pen Inject 1-7 Units Subcutaneous 3 times daily (before meals) Correction Scale - MEDIUM INSULIN RESISTANCE DOSING     Do Not give Correction Insulin if Pre-Meal BG less than 140.   For Pre-Meal  - 189 give 1 unit.   For Pre-Meal  - 239 give 2 units.   For Pre-Meal  - 289 give 3 units.   For Pre-Meal  - 339 give 4 units.   For Pre-Meal - 399 give 5 units.   For Pre-Meal -449 give 6 units  For Pre-Meal BG greater than or equal to 450 give 7 units.   To be given with prandial insulin, and based on pre-meal blood  "glucose.    Notify provider if glucose greater than or equal to 350 mg/dL after administration of correction dose. If given at mealtime, administer within 30 minutes of start of meal       insulin glargine (LANTUS PEN) 100 UNIT/ML pen Inject 40 Units subcutaneously at bedtime.       LANsoprazole (PREVACID) 30 MG DR capsule Take 30 mg by mouth as needed.       levothyroxine (SYNTHROID/LEVOTHROID) 50 MCG tablet Take 1 tablet (50 mcg) by mouth daily. 90 tablet 3     LYUMJEV KWIKPEN 100 UNIT/ML SOPN        magnesium oxide 400 MG CAPS Take by mouth daily       metFORMIN (GLUCOPHAGE) 500 MG tablet Take 500 mg by mouth 2 times daily (with meals).       nitroGLYcerin (NITROSTAT) 0.4 MG sublingual tablet Place 0.4 mg under the tongue every 5 minutes as needed for chest pain For chest pain place 1 tablet under the tongue every 5 minutes for 3 doses. If symptoms persist 5 minutes after 1st dose call 911.       rosuvastatin (CRESTOR) 40 MG tablet Take 20 mg by mouth daily Evening        terazosin (HYTRIN) 2 MG capsule TAKE 1 CAPSULE BY MOUTH AT BEDTIME 90 capsule 3     valsartan (DIOVAN) 160 MG tablet Take 160 mg by mouth 2 times daily       vitamin C (ASCORBIC ACID) 1000 MG TABS Take 500 mg by mouth daily.       vitamin D3 (CHOLECALCIFEROL) 2000 units (50 mcg) tablet Take 1 tablet by mouth daily. 5000 iu       zinc gluconate 50 MG tablet Take 25 mg by mouth daily       coenzyme Q-10 200 MG CAPS capsule Take 1 capsule by mouth daily as needed. (Patient not taking: Reported on 7/1/2025)         Objective  BP (!) 142/70 (BP Location: Left arm, Patient Position: Sitting, Cuff Size: Adult Large)   Pulse 57   Resp 16   Ht 1.803 m (5' 11\")   Wt 106.6 kg (235 lb)   BMI 32.78 kg/m      General Appearance:    Alert, cooperative, no distress, appears stated age   Head:    Normocephalic, without obvious abnormality, atraumatic   Throat:   Lips, mucosa, and tongue normal; teeth and gums normal   Neck:   Supple, symmetrical, trachea " "midline, no adenopathy;        thyroid:  No enlargement/tenderness/nodules; no carotid    bruit or JVD   Back:     Symmetric, no curvature, ROM normal, no CVA tenderness   Lungs:     Clear to auscultation bilaterally, respirations unlabored   Chest wall:    No tenderness, midline sternotomy scar   Heart:    Regular rate and rhythm, S1 and S2 normal, no murmur, rub   or gallop   Abdomen:     Soft, non-tender, bowel sounds active all four quadrants,     no masses, no organomegaly   Extremities:   Normal, atraumatic, no cyanosis or edema   Pulses:   2+ and symmetric all extremities   Skin:   Skin color, texture, turgor normal, no rashes or lesions     Results    Lab Results personally reviewed   Lab Results   Component Value Date    CHOL 151 04/25/2025    CHOL 160 12/01/2023     Lab Results   Component Value Date    HDL 35 (L) 04/25/2025    HDL 38 (L) 12/01/2023     No components found for: \"LDLCALC\"  Lab Results   Component Value Date    TRIG 304 (H) 04/25/2025    TRIG 266 (H) 12/01/2023     Lab Results   Component Value Date    WBC 10.4 07/30/2024    HGB 12.5 (L) 07/30/2024    HCT 36.4 (L) 07/30/2024     07/30/2024     Lab Results   Component Value Date    BUN 46.3 (H) 04/09/2025     (L) 04/09/2025    CO2 24 04/09/2025           Thank you for allowing me to participate in the care of your patient.      Sincerely,     ALBERT VALDIVIA MD     [facility]  cc:   Radha Valdivia MD  [address], [address]  [place] [address]      "

## 2025-07-01 NOTE — PATIENT INSTRUCTIONS
Mr Nabil CLE[place],  I enjoyed visiting with you again today.  I am glad to hear you are doing well.  Per our conversation let us get the heart monitor.  I will plan on seeing you 1/2 year.  We will be in touch after the MRI at that I might talk about lower dose of lasix.  Paco Valdivia

## 2025-07-01 NOTE — PROGRESS NOTES
[facility]  Heart Care Clinic Follow-up Note    Assessment & Plan        (I25.83) Coronary arteriosclerosis due to lipid rich plaque  (primary encounter diagnosis)  Comment: Angiography March 2020 showed normal left main, mid LAD 75% stenosis with first diagonal 70% stenosis, circumflex with a proximal total occlusion, and right coronary artery with a distal 70% PDA lesion.  This prompted coronary artery bypass grafting.     (Z95.1) S/P CABG (coronary artery bypass graft)  Comment: In 2020 he had a LIMA to the LAD, vein graft to first diagonal, vein graft to obtuse marginal artery, and vein graft to distal right coronary artery.  Did not have left atrial appendage oversewing, no symptoms and work on prevention.     (I50.30) Heart failure with preserved ejection fraction, NYHA class I (H)  Comment: Increased BNP, most likely due to atrial flutter but cannot rule out ischemia.  Midrange ejection fraction of 40 to 45%, symptoms resolved on furosemide.  Discussed fluid and salt restriction.  Continue Farxiga, valsartan, carvedilol.  Repeat MRI at ejection fraction evaluation later on this month.     (I48.3) Typical atrial flutter (H)  Comment: Episodic, given the recurrence although he is now in sinus rhythm today, would recommend ablation of this typical, right-sided most likely counterclockwise atrial flutter.  This most likely caused him to go to heart failure.  On Eliquis 5 mg twice daily.  He would like to discontinue Eliquis, his CHADS2 Vascor is 7 given it was 7.2 to 12.2% risk of stroke per year.  We agreed on Zio patch monitor, if this shows no atrial fibrillation or flutter we then discontinue Eliquis provided he has an Apple watch or other monitoring device for the long-term.  Did discuss left atrial appendage occlusion, he is not interested, and wants to go and was not oversewn during surgery.     (I10) Essential (primary) hypertension  Comment: Resistant, on valsartan as well as carvedilol as  well as terazosin and finasteride.  Levels fairly well-controlled thus do not increase meds further but can always go up on dose of carvedilol.      (E78.00) Pure hypercholesterolemia  Comment: Numbers good with a total cholesterol 160 and LDL of 69.  We tried holiday off rosuvastatin, symptoms did not necessarily get better, continue rosuvastatin.    (N18.32) Stage 3b chronic kidney disease (H)  Comment: Concerned with GFR of 31 with GFR 2.28 and sees nephrology.      (E11.9,  Z79.4) Type 2 diabetes mellitus without complication, with long-term current use of insulin (H)  Comment: On Farxiga, metformin, Lantus insulin as well as sliding scale.  Doing better.    (E03.8) Subclinical hypothyroidism  Comment: On Synthroid.    (N40.0) Benign prostatic hyperplasia without lower urinary tract symptoms  Comment: Stable at this point time on tamsulosin as well as Proscar.    (G47.33) NAVI (obstructive sleep apnea)  Comment: Nightly CPAP.    Obesity-class I with BMI of 32 and work on weight loss.    Plan  1.  Weight loss.  2.  3-day Zio patch monitor, if no A-fib seen we will most likely tell him to hold Eliquis and get a smart watch.  3.  Await cardiac MRI, if ejection fraction diminished we will most likely continue furosemide, otherwise go to furosemide every other day.  4.  Follow-up me in 6 months given all these issues or sooner if needed.    The longitudinal plan of care for the diagnosis(es)/condition(s) as documented were addressed during this visit. Due to the added complexity in care, I will continue to support Nabil in the subsequent management and with ongoing continuity of care.     Subjective  CC: 65-year-old white gentleman here for a 6-month follow-up.  Still living independently, still trying exercise but his biggest issue is back discomfort.  Denies any syncope, presyncope, chest pains, palpitations, significant shortness of breath, PND, orthopnea or peripheral edema.    Medications  Current Outpatient  Medications   Medication Sig Dispense Refill    acetaminophen (TYLENOL) 325 MG tablet Take 650 mg by mouth every 6 hours as needed for mild pain (Patient taking differently: Take 650 mg by mouth 2 times daily.)      alpha-lipoic acid 600 MG capsule Take 600 mg by mouth daily       apixaban ANTICOAGULANT (ELIQUIS ANTICOAGULANT) 5 MG tablet Take 1 tablet (5 mg) by mouth 2 times daily 180 tablet 3    aspirin 81 MG EC tablet Take 81 mg by mouth daily. 3 times a week      budesonide-formoterol (SYMBICORT) 160-4.5 MCG/ACT Inhaler Inhale 2 puffs into the lungs 2 times daily as needed.      carvedilol (COREG) 12.5 MG tablet Take 1 tablet (12.5 mg) by mouth 2 times daily (with meals)      Continuous Blood Gluc  (FREESTYLE JULIO 2 READER) CARLOS Use to read blood sugars as per 's instructions. 1 each 0    Continuous Blood Gluc Sensor (FREESTYLE JULIO 2 SENSOR) MISC CHANGE EVERY 14 DAYS 2 each 5    FARXIGA 10 MG TABS tablet TAKE 1 TABLET (10 MG) BY MOUTH DAILY. 90 tablet 1    finasteride (PROSCAR) 5 MG tablet Take 5 mg by mouth daily      furosemide (LASIX) 40 MG tablet Take 1 tablet (40 mg) by mouth daily 30 tablet 11    insulin aspart (NOVOLOG PEN) 100 UNIT/ML pen Inject 1-7 Units Subcutaneous 3 times daily (before meals) Correction Scale - MEDIUM INSULIN RESISTANCE DOSING     Do Not give Correction Insulin if Pre-Meal BG less than 140.   For Pre-Meal  - 189 give 1 unit.   For Pre-Meal  - 239 give 2 units.   For Pre-Meal  - 289 give 3 units.   For Pre-Meal  - 339 give 4 units.   For Pre-Meal - 399 give 5 units.   For Pre-Meal -449 give 6 units  For Pre-Meal BG greater than or equal to 450 give 7 units.   To be given with prandial insulin, and based on pre-meal blood glucose.    Notify provider if glucose greater than or equal to 350 mg/dL after administration of correction dose. If given at mealtime, administer within 30 minutes of start of meal      insulin glargine  "(LANTUS PEN) 100 UNIT/ML pen Inject 40 Units subcutaneously at bedtime.      LANsoprazole (PREVACID) 30 MG DR capsule Take 30 mg by mouth as needed.      levothyroxine (SYNTHROID/LEVOTHROID) 50 MCG tablet Take 1 tablet (50 mcg) by mouth daily. 90 tablet 3    LYUMJEV KWIKPEN 100 UNIT/ML SOPN       magnesium oxide 400 MG CAPS Take by mouth daily      metFORMIN (GLUCOPHAGE) 500 MG tablet Take 500 mg by mouth 2 times daily (with meals).      nitroGLYcerin (NITROSTAT) 0.4 MG sublingual tablet Place 0.4 mg under the tongue every 5 minutes as needed for chest pain For chest pain place 1 tablet under the tongue every 5 minutes for 3 doses. If symptoms persist 5 minutes after 1st dose call 911.      rosuvastatin (CRESTOR) 40 MG tablet Take 20 mg by mouth daily Evening       terazosin (HYTRIN) 2 MG capsule TAKE 1 CAPSULE BY MOUTH AT BEDTIME 90 capsule 3    valsartan (DIOVAN) 160 MG tablet Take 160 mg by mouth 2 times daily      vitamin C (ASCORBIC ACID) 1000 MG TABS Take 500 mg by mouth daily.      vitamin D3 (CHOLECALCIFEROL) 2000 units (50 mcg) tablet Take 1 tablet by mouth daily. 5000 iu      zinc gluconate 50 MG tablet Take 25 mg by mouth daily      coenzyme Q-10 200 MG CAPS capsule Take 1 capsule by mouth daily as needed. (Patient not taking: Reported on 7/1/2025)         Objective  BP (!) 142/70 (BP Location: Left arm, Patient Position: Sitting, Cuff Size: Adult Large)   Pulse 57   Resp 16   Ht 1.803 m (5' 11\")   Wt 106.6 kg (235 lb)   BMI 32.78 kg/m      General Appearance:    Alert, cooperative, no distress, appears stated age   Head:    Normocephalic, without obvious abnormality, atraumatic   Throat:   Lips, mucosa, and tongue normal; teeth and gums normal   Neck:   Supple, symmetrical, trachea midline, no adenopathy;        thyroid:  No enlargement/tenderness/nodules; no carotid    bruit or JVD   Back:     Symmetric, no curvature, ROM normal, no CVA tenderness   Lungs:     Clear to auscultation bilaterally, " "respirations unlabored   Chest wall:    No tenderness, midline sternotomy scar   Heart:    Regular rate and rhythm, S1 and S2 normal, no murmur, rub   or gallop   Abdomen:     Soft, non-tender, bowel sounds active all four quadrants,     no masses, no organomegaly   Extremities:   Normal, atraumatic, no cyanosis or edema   Pulses:   2+ and symmetric all extremities   Skin:   Skin color, texture, turgor normal, no rashes or lesions     Results    Lab Results personally reviewed   Lab Results   Component Value Date    CHOL 151 04/25/2025    CHOL 160 12/01/2023     Lab Results   Component Value Date    HDL 35 (L) 04/25/2025    HDL 38 (L) 12/01/2023     No components found for: \"LDLCALC\"  Lab Results   Component Value Date    TRIG 304 (H) 04/25/2025    TRIG 266 (H) 12/01/2023     Lab Results   Component Value Date    WBC 10.4 07/30/2024    HGB 12.5 (L) 07/30/2024    HCT 36.4 (L) 07/30/2024     07/30/2024     Lab Results   Component Value Date    BUN 46.3 (H) 04/09/2025     (L) 04/09/2025    CO2 24 04/09/2025         "

## 2025-07-08 ENCOUNTER — ORDERS ONLY (AUTO-RELEASED) (OUTPATIENT)
Dept: CARDIOLOGY | Facility: CLINIC | Age: 65
End: 2025-07-08
Payer: MEDICARE

## 2025-07-08 DIAGNOSIS — I25.83 CORONARY ARTERIOSCLEROSIS DUE TO LIPID RICH PLAQUE: ICD-10-CM

## 2025-07-16 ENCOUNTER — HOSPITAL ENCOUNTER (OUTPATIENT)
Dept: MRI IMAGING | Facility: HOSPITAL | Age: 65
Discharge: HOME | End: 2025-07-16
Payer: MEDICARE

## 2025-07-16 DIAGNOSIS — I50.42 CHRONIC COMBINED SYSTOLIC AND DIASTOLIC CHF (CONGESTIVE HEART FAILURE) (H): ICD-10-CM

## 2025-07-16 DIAGNOSIS — I51.7 LVH (LEFT VENTRICULAR HYPERTROPHY): ICD-10-CM

## 2025-07-16 PROCEDURE — 999N000122 MR MYOCARDIUM  OVERREAD

## 2025-07-16 PROCEDURE — A9585 GADOBUTROL INJECTION: HCPCS

## 2025-07-16 PROCEDURE — 75561 CARDIAC MRI FOR MORPH W/DYE: CPT | Mod: 26 | Performed by: GENERAL ACUTE CARE HOSPITAL

## 2025-07-16 PROCEDURE — 255N000002 HC RX 255 OP 636

## 2025-07-16 PROCEDURE — 75561 CARDIAC MRI FOR MORPH W/DYE: CPT

## 2025-07-16 PROCEDURE — 75565 CARD MRI VELOC FLOW MAPPING: CPT | Mod: 26 | Performed by: GENERAL ACUTE CARE HOSPITAL

## 2025-07-16 RX ORDER — GADOBUTROL 604.72 MG/ML
17 INJECTION INTRAVENOUS ONCE
Status: COMPLETED | OUTPATIENT
Start: 2025-07-16 | End: 2025-07-16

## 2025-07-16 RX ADMIN — GADOBUTROL 17 ML: 604.72 INJECTION INTRAVENOUS at 10:03

## 2025-07-19 ENCOUNTER — HEALTH MAINTENANCE LETTER (OUTPATIENT)
Age: 65
End: 2025-07-19

## 2025-07-29 NOTE — PROGRESS NOTES
Diabetes Consult Note  Jul 31, 2025      History of Present Illness     Nabil Cheung is a 65 year old male who is seen for Type 2 Diabetes Mellitus and multiple related complications.  He has previously followed with [facility] for [place] care.      Diabetes Disease Summary:     Diagnosed in approximately 2004, age 44 years.  Has tried a variety of treatments.  Finally went on insulin in 2020 in conjunction with open heart surgery.  Has tried Bydureon, noted appetite change and nauseated.  Symptoms increased over time and eventually discontinued.  Now on insulin, farxiga and insulin.    Nabil's PMH, PSH, allergies, pertinent social and family history were reviewed today and pertinent information is summarized below.    Problem List     Patient Active Problem List   Diagnosis    Diabetes mellitus without complication (H)    Essential (primary) hypertension    Surgical wound infection    S/P CABG (coronary artery bypass graft)    Non-healing surgical wound, subsequent encounter    Cataracts, bilateral    Chronic right shoulder pain    Elevated PSA, less than 10 ng/ml    Epididymoorchitis    Erectile dysfunction associated with type 2 diabetes mellitus (H)    Microalbuminuria    Myopia of both eyes with astigmatism and presbyopia    Retrograde ejaculation    Thyroid nodule    Uncontrolled type 2 diabetes mellitus with hyperglycemia (H)    Cellulitis and abscess of foot    Proliferative diabetic retinopathy of both eyes associated with diabetes mellitus due to underlying condition (H)    Cellulitis and abscess of foot, except toes    Stage 3b chronic kidney disease (H)    History of amputation of lesser toe of left foot    Acquired absence of other left toe(s)    Allergic rhinitis    Anemia, unspecified    Chronic cough    Encounter for orthopedic aftercare following surgical amputation    Enterococcus as the cause of diseases classified elsewhere    Hyperlipidemia, unspecified    Long term (current) use of  insulin (H)    Long term (current) use of oral hypoglycemic drugs    Other staphylococcus as the cause of diseases classified elsewhere    Benign prostatic hyperplasia without lower urinary tract symptoms    Coronary arteriosclerosis due to lipid rich plaque    Type 2 diabetes mellitus with unspecified diabetic retinopathy without macular edema (H)    Moderate persistent asthma without complication    History of amputation of lower extremity associated with diabetes mellitus (H)    Heart failure with preserved ejection fraction, NYHA class I (H)    Subclinical hypothyroidism    Atrial flutter (H)    Bilateral carotid artery stenosis    NAVI (obstructive sleep apnea)        Medical / Surgical History     No past medical history on file.  Past Surgical History:   Procedure Laterality Date    AMPUTATE TOE(S) Right 8/17/2021    Procedure: left partial fifth ray amputation;  Surgeon: Mirian Cline DPM, Podiatry/Foot and Ankle Surgery;  Location: [facility] OR    GRAFT FLAP PEDICLE TRAM DELAY PROCEDURE Left 6/18/2020    Procedure: Debridement of sternal wound and left major pectoral flep;  Surgeon: MARCO Serna MD;  Location: [facility] OR    INCISION AND DRAINAGE CHEST WASHOUT, COMBINED N/A 6/12/2020    Procedure: INCISION AND DRAINAGE, WOUND, CHEST, WITH IRRIGATION and wound vac change;  Surgeon: Mark Mckinney MD;  Location: [facility] OR    REPAIR CHEST WALL N/A 6/18/2020    Procedure: Chest wall reconstruction;  Surgeon: MARCO Serna MD;  Location: [facility] OR       Social History   Fully retired, previously  and software, political work, non-profit work.  Hobbies - fantasy baseball league, travel trailer and starting to travel.    Social History     Socioeconomic History    Marital status: Single     Spouse name: Not on file    Number of children: Not on file    Years of education: Not on file    Highest education level: Not on file   Occupational History    Not on file   Tobacco Use    Smoking  status: Never     Passive exposure: Never    Smokeless tobacco: Never   Vaping Use    Vaping status: Never Used   Substance and Sexual Activity    Alcohol use: Yes    Drug use: No    Sexual activity: Not on file   Other Topics Concern    Not on file   Social History Narrative    6/29/17 - Pt was interested in getting help filling out [employer] application online to receive medicaid. CHW gave him the list of information he needed to complete online application. He stated he had a tax extension for 2016 so he did not have tax forms that the application required. He was going to work on the application with tax information from 2015 later tonight. AM        7/27/17:    Pt wanted to get more information about gyms that are low-cost within the [facility]. Pt's asked for a gym with a membership fee of $10-$20 (max), that also had a walking track and a hot tub/sauna. We told him that options that fit these requirements he mentioned were very limited. He then stated that he heard that the [facility] offered a $5 fee for track-usage only, however, we could not call [facility] since it was closed. We suggested to the patient to call tomorrow and also mentioned there are gyms that are low-cost nearby, however, might not have a track or hot tub/sauna. We also suggested that he could walk around a lake would also be a cheap-alternative. -HB and NV        8/28/17:    Pt was here for a med refill. We asked him about his gym membership and access based on CHW's previous encounter with him and he said he tried to walk outside more this summer, but still did not find an affordable option. He did not have need for any other services at the time.     - AA and KT         9/28/17:    Pt had questions about free/low cost (under $10/month) walking tracks.  Found a website to walk through with the patient and shared different resources. - AB and HB        7/19/18: Pt. Was not seen tonight.- ZR, KP     Social Drivers of Health     Financial  Resource Strain: Low Risk  (12/1/2023)    Financial Resource Strain     Within the past 12 months, have you or your family members you live with been unable to get utilities (heat, electricity) when it was really needed?: No   Food Insecurity: Low Risk  (12/1/2023)    Food Insecurity     Within the past 12 months, did you worry that your food would run out before you got money to buy more?: No     Within the past 12 months, did the food you bought just not last and you didn t have money to get more?: No   Transportation Needs: Low Risk  (12/1/2023)    Transportation Needs     Within the past 12 months, has lack of transportation kept you from medical appointments, getting your medicines, non-medical meetings or appointments, work, or from getting things that you need?: No   Physical Activity: Not on file   Stress: Not on file   Social Connections: Not on file   Interpersonal Safety: Low Risk  (4/9/2025)    Interpersonal Safety     Do you feel physically and emotionally safe where you currently live?: Yes     Within the past 12 months, have you been hit, slapped, kicked or otherwise physically hurt by someone?: No     Within the past 12 months, have you been humiliated or emotionally abused in other ways by your partner or ex-partner?: No   Housing Stability: Low Risk  (12/1/2023)    Housing Stability     Do you have housing? : Yes     Are you worried about losing your housing?: No       Family History     Family History   Problem Relation Age of Onset    Sleep Apnea Brother        Most recent A1c:    Recent Labs   Lab Test 04/09/25  1154 07/30/24  1103   A1C 10.0* 8.9*        Diabetes Complications:     Microvascular:  Eye:  last eye exam: seeing every 6 weeks for retinopathy treatments    Neuro:  paresthesias: yes, mostly feet but progressing up legs and some burning sensation in hands. Does perform own foot exams and nail but requesting podiatry referral to assist with nail care.   vitamin B12:No components found  "for: \"[id number]\"  Renal:  Most recent GFR data:   GFR Estimate   Date Value Ref Range Status   04/09/2025 31 (L) >60 mL/min/1.73m2 Final     Comment:     eGFR calculated using 2021 CKD-EPI equation.   09/17/2024 34 (L) >60 mL/min/1.73m2 Final     Comment:     eGFR calculated using 2021 CKD-EPI equation.   08/13/2024 28 (L) >60 mL/min/1.73m2 Final     Comment:     eGFR calculated using 2021 CKD-EPI equation.   01/09/2021 55 (L) >60 mL/min/[1.73_m2] Final     Comment:     Non  GFR Calc  Starting 12/18/2018, serum creatinine based estimated GFR (eGFR) will be   calculated using the Chronic Kidney Disease Epidemiology Collaboration   (CKD-EPI) equation.     01/08/2021 59 (L) >60 mL/min/[1.73_m2] Final     Comment:     Non  GFR Calc  Starting 12/18/2018, serum creatinine based estimated GFR (eGFR) will be   calculated using the Chronic Kidney Disease Epidemiology Collaboration   (CKD-EPI) equation.     07/28/2020 76 >60 mL/min/[1.73_m2] Final     Comment:     Non  GFR Calc  Starting 12/18/2018, serum creatinine based estimated GFR (eGFR) will be   calculated using the Chronic Kidney Disease Epidemiology Collaboration   (CKD-EPI) equation.     nephropathy yes, microalbuminuria yes ,   on ACE-I/ARB:  valsartan (Diovan)  Macrovascular  Macrovascular:  history of CAD, CVA or PAD: yes , smoking/tobacco use: no  On lipid-lowering med(s):  rosuvastatin (Crestor)  On ASA:  both ASA (3 times/week) plus Eliquis  BP at goal:   Most recent blood pressure reading   07/31/25 (!) 179/92   Endocrine/Other:  Thyroid:   TSH   Date Value Ref Range Status   04/25/2025 2.38 0.30 - 4.20 uIU/mL Final   09/25/2017 2.79 0.40 - 4.00 mU/L Final   Sleep Apnea: yes, On CPAP/BiPAP:  has not yet filled prescription for machine  Liver: FIB-4 Calculation: 1.41 at 7/30/2024 11:03 AM  Dental: hasn't seen in a couple of year  Mood/depression: generally good, has a good network of friends  Immunizations: does " receive routine vaccines  ED: yes     Diabetes Management:     -Medication Management: Lantus,  units and Glargine  units, Farxiga (ran out of Rx 5 days ago and waiting to fill)  -Hypoglycemic, DKA and/or hospitalizations related to diabetes: none  -Physical activity: sedentary; tries limited walking (0.5 mile goal)  -Weight history: weight has been variable over the years (230's during 2045-4525), has been down to low of 185 lbs. But increased with use of insulin.  Averaging 225-230 lb. presently  -Diet/snacking: usually eating 2 meals/day, often skips breakfast or eats very light, bedtime snack, water, milk and diet beverages for fluid  -Alcohol/other substance use: rare  -Glucose monitoring: CGM:  Freestyle Rhonda        Review of Systems:    Complete 10 point review of systems is negative except for what mentioned above.    Allergies     Allergies   Allergen Reactions    Atorvastatin     Bactrim [Sulfamethoxazole-Trimethoprim]      Kidney issues    Dust Mites     Fluorescein Nausea and Vomiting       Medications     Current Outpatient Medications   Medication Sig Dispense Refill    acetaminophen (TYLENOL) 325 MG tablet Take 650 mg by mouth every 6 hours as needed for mild pain (Patient taking differently: Take 650 mg by mouth 2 times daily.)      alpha-lipoic acid 600 MG capsule Take 600 mg by mouth daily       apixaban ANTICOAGULANT (ELIQUIS ANTICOAGULANT) 5 MG tablet Take 1 tablet (5 mg) by mouth 2 times daily 180 tablet 3    aspirin 81 MG EC tablet Take 81 mg by mouth daily. 3 times a week      budesonide-formoterol (SYMBICORT) 160-4.5 MCG/ACT Inhaler Inhale 2 puffs into the lungs 2 times daily as needed.      carvedilol (COREG) 12.5 MG tablet Take 1 tablet (12.5 mg) by mouth 2 times daily (with meals)      Continuous Glucose Sensor (FREESTYLE RHONDA 3 PLUS SENSOR) MISC Change every 15 days. 6 each 4    FARXIGA 10 MG TABS tablet Take 1 tablet (10 mg) by mouth daily. 90 tablet 1    finasteride (PROSCAR) 5 MG  tablet Take 5 mg by mouth daily      furosemide (LASIX) 40 MG tablet Take 1 tablet (40 mg) by mouth daily 30 tablet 11    insulin aspart (NOVOLOG PEN) 100 UNIT/ML pen Inject 1-7 Units Subcutaneous 3 times daily (before meals) Correction Scale - MEDIUM INSULIN RESISTANCE DOSING     Do Not give Correction Insulin if Pre-Meal BG less than 140.   For Pre-Meal  - 189 give 1 unit.   For Pre-Meal  - 239 give 2 units.   For Pre-Meal  - 289 give 3 units.   For Pre-Meal  - 339 give 4 units.   For Pre-Meal - 399 give 5 units.   For Pre-Meal -449 give 6 units  For Pre-Meal BG greater than or equal to 450 give 7 units.   To be given with prandial insulin, and based on pre-meal blood glucose.    Notify provider if glucose greater than or equal to 350 mg/dL after administration of correction dose. If given at mealtime, administer within 30 minutes of start of meal      insulin glargine (LANTUS PEN) 100 UNIT/ML pen Inject 44 Units subcutaneously at bedtime. 30 mL 1    insulin pen needle (31G X 5 MM) 31G X 5 MM miscellaneous Use 4 pen needles daily or as directed. 400 each 3    LANsoprazole (PREVACID) 30 MG DR capsule Take 30 mg by mouth as needed.      levothyroxine (SYNTHROID/LEVOTHROID) 50 MCG tablet Take 1 tablet (50 mcg) by mouth daily. 90 tablet 3    LYUMJEV KWIKPEN 100 UNIT/ML SOPN       magnesium oxide 400 MG CAPS Take by mouth daily      nitroGLYcerin (NITROSTAT) 0.4 MG sublingual tablet Place 0.4 mg under the tongue every 5 minutes as needed for chest pain For chest pain place 1 tablet under the tongue every 5 minutes for 3 doses. If symptoms persist 5 minutes after 1st dose call 911.      rosuvastatin (CRESTOR) 40 MG tablet Take 20 mg by mouth daily Evening       terazosin (HYTRIN) 2 MG capsule TAKE 1 CAPSULE BY MOUTH AT BEDTIME 90 capsule 3    valsartan (DIOVAN) 160 MG tablet Take 160 mg by mouth 2 times daily      vitamin C (ASCORBIC ACID) 1000 MG TABS Take 500 mg by mouth daily.       "vitamin D3 (CHOLECALCIFEROL) 2000 units (50 mcg) tablet Take 1 tablet by mouth daily. 5000 iu      zinc gluconate 50 MG tablet Take 25 mg by mouth daily      coenzyme Q-10 200 MG CAPS capsule Take 1 capsule by mouth daily as needed. (Patient not taking: Reported on 7/31/2025)       No current facility-administered medications for this visit.        Physical Exam:     BP (!) 179/92 (BP Location: Right arm, Patient Position: Sitting, Cuff Size: Adult Large)   Pulse 62   Wt 107.1 kg (236 lb 3.2 oz)   SpO2 99%   BMI 32.94 kg/m    Wt Readings from Last 10 Encounters:   07/31/25 107.1 kg (236 lb 3.2 oz)   07/01/25 106.6 kg (235 lb)   05/08/25 103.9 kg (229 lb 1.6 oz)   04/25/25 103.4 kg (228 lb)   04/09/25 102.1 kg (225 lb)   01/06/25 102.1 kg (225 lb)   10/08/24 104.8 kg (231 lb)   08/05/24 104.3 kg (230 lb)   07/30/24 105.9 kg (233 lb 6.4 oz)   07/16/24 105.8 kg (233 lb 3.2 oz)     Estimated body mass index is 32.94 kg/m  as calculated from the following:    Height as of 7/1/25: 1.803 m (5' 11\").    Weight as of this encounter: 107.1 kg (236 lb 3.2 oz).    General: Pleasant, well nourished and hydrated male in [place] [place].   Psych:  Mood is \"good,\" affect is appropriate.  Thought form and content are fluid and coherent.    HEENT: Eyes and sclera are clear. No lid lag, retraction. Extraocular movements are grossly intact without proptosis.  Nares are patent, mucous membranes moist. Dentition: intact.  Neck: Supple, no LAD.  No masses or JVD are noted. Thyromegaly: no nodules palpable, no tenderness  Resp: Easy and unlabored breathing, no wheezing, crepitus or rhonci.   Neuro: Alert and oriented, communicating clearly. Grossly intact, no focal deficits   Physical Exam  Cardiovascular:      Pulses:           Dorsalis pedis pulses are 1+ on the right side and 1+ on the left side.        Posterior tibial pulses are 1+ on the right side and 1+ on the left side.   Feet:      Right foot:      Protective Sensation: 8 sites tested.  " 0 sites sensed.      Skin integrity: Skin integrity normal.      Left foot:      Protective Sensation: 8 sites tested.  0 sites sensed.      Skin integrity: Skin integrity normal.      Comments: Missing 5th toe on left foot.        Labs:       Recent Labs   Lab Test 04/25/25  1234 04/25/25  1209 04/09/25  1154 09/17/24  1021 08/13/24  1442 07/30/24  1210 07/30/24  1103 07/16/24  1117 12/01/23  0937 12/01/23  0937 05/27/23  1546 05/27/23  1546   A1C  --   --  10.0*  --   --   --  8.9*  --   --  9.5*   < >  --    TSH  --  2.38  --   --  1.97  --   --  8.07*   < >  --   --   --    T4  --   --   --   --   --   --   --  0.94  --   --   --   --    CHOL  --  151  --   --   --   --   --   --   --  160  --   --    LDL  --  55  --   --   --   --   --   --   --  69  --   --    HDL  --  35*  --   --   --   --   --   --   --  38*  --   --    TRIG  --  304*  --   --   --   --   --   --   --  266*  --   --    NA  --   --  133* 136 133*  --  138 138  --  137  --  137   CHLORIDE  --   --  98 103 96*  --  102 103  --  102   < > 105   CO2  --   --  24 23 25  --  26 26  --  23   < > 27   ALBUMIN  --   --   --  3.6 3.9  --   --  3.8  --  3.9   < > 3.2*   CR  --   --  2.28* 2.09* 2.46*  --  2.09* 2.15*  --  1.80*  --  1.57*   MICROL 713.0  --   --   --   --  1,312.0  --   --   --  1,555.0   < >  --    AST  --   --   --   --   --   --   --  22  --   --   --  20   ALT  --   --   --   --   --   --   --  20  --   --   --  26   HGB  --   --   --   --   --   --  12.5* 12.6*  --   --   --  11.1*    < > = values in this interval not displayed.       Lab Results   Component Value Date    GFR Estimate 31 04/09/2025    GFR Estimate 34 09/17/2024    GFR Estimate 28 08/13/2024    GFR Estimate 34 07/30/2024    GFR Estimate 33 07/16/2024    GFR Estimate 55 01/09/2021    GFR Estimate 59 01/08/2021    GFR Estimate 76 07/28/2020    GFR Estimate 62 06/21/2020    GFR Estimate 63 06/20/2020     Lab Results   Component Value Date    Albumin Urine mg/g Cr  1,289.33 04/25/2025    Albumin Urine mg/g Cr 2,122.98 07/30/2024    Albumin Urine mg/g Cr 1,946.18 12/01/2023    Albumin Urine mg/g Cr 956.79 06/29/2017     FIB-4 Calculation: 1.41 at 7/30/2024 11:03 AM  (Calculated from: SGOT/AST: 22 U/L, SGPT/ALT: 20 U/L, Platelets: 230 10e3/uL   Age: 64 years)    Assessment & Recommendations:         ICD-10-CM    1. Uncontrolled type 2 diabetes mellitus with hyperglycemia (H)  E11.65 Hemoglobin A1c     Hemoglobin A1c     Orthopedic  Referral     Vitamin B12     Adult Urology  Referral     FARXIGA 10 MG TABS tablet     Continuous Glucose Sensor (FREESTYLE JULIO 3 PLUS SENSOR) MISC     insulin glargine (LANTUS PEN) 100 UNIT/ML pen     insulin pen needle (31G X 5 MM) 31G X 5 MM miscellaneous      2. Type 2 diabetes mellitus with both eyes affected by proliferative retinopathy without macular edema, without long-term current use of insulin (H)  E11.3593 Adult Endocrinology  Referral      3. Benign prostatic hyperplasia with urinary frequency  N40.1 FARXIGA 10 MG TABS tablet    R35.0         Reviewed Nabil's current diabetes management plan in conjunction with their AGP/glucometer report and discussed opportunities for improved glycemic control. Overall, glucose numbers are elevated, even moreso now that he ran out of Farxiga.  Discussed need to resume and adjust his insulin accordingly to bring down his overall average.    Stop metformin  Referral to podiatry  Referral to urology  Recommend daily BP checks - discuss with Nephology at upcoming appt.  Adjust long-acting insulin in 1-2 units increments to achieve fasting glucose at target of 150 mg/dL  Restart Farxiga  Switch to Julio 3+ when current supply of 3 is exhausted  Labs today  Recheck in 3 months with A1c completed at least 1 week prior to appointment    -Complication Management:  Microvascular: evidence of neuropathy, nephropathy and retinopathy present  Macrovascular: evidence of CVD, non-smoker,  on statin  Endocrine/Other:  discussed preventive vaccines and routine dental cleaning    Patient understands and agrees with the above plan.     70 minutes spent on the date of the encounter doing chart review, history and exam, documentation, education and counseling, as well as communication and coordination of care, and further activities as noted above.  This time excludes time spent reviewing CGM.    It is my privilege to be involved in the care of the above patient.     Nelly Dean PA-C, MPAS, MPH  [facility]        Diabetes, Endocrinology, and Metabolism      [phone] Appointments        This note was completed in part using Dragon voice recognition, and may contain word and grammatical errors.

## 2025-07-31 ENCOUNTER — LAB (OUTPATIENT)
Dept: LAB | Facility: CLINIC | Age: 65
End: 2025-07-31
Payer: MEDICARE

## 2025-07-31 ENCOUNTER — OFFICE VISIT (OUTPATIENT)
Dept: ENDOCRINOLOGY | Facility: CLINIC | Age: 65
End: 2025-07-31
Payer: MEDICARE

## 2025-07-31 VITALS
HEART RATE: 62 BPM | OXYGEN SATURATION: 99 % | WEIGHT: 236.2 LBS | DIASTOLIC BLOOD PRESSURE: 92 MMHG | BODY MASS INDEX: 32.94 KG/M2 | SYSTOLIC BLOOD PRESSURE: 179 MMHG

## 2025-07-31 DIAGNOSIS — D64.9 ANEMIA, UNSPECIFIED: ICD-10-CM

## 2025-07-31 DIAGNOSIS — E11.65 UNCONTROLLED TYPE 2 DIABETES MELLITUS WITH HYPERGLYCEMIA (H): Primary | ICD-10-CM

## 2025-07-31 DIAGNOSIS — R35.0 BENIGN PROSTATIC HYPERPLASIA WITH URINARY FREQUENCY: ICD-10-CM

## 2025-07-31 DIAGNOSIS — E11.3593 TYPE 2 DIABETES MELLITUS WITH BOTH EYES AFFECTED BY PROLIFERATIVE RETINOPATHY WITHOUT MACULAR EDEMA, WITHOUT LONG-TERM CURRENT USE OF INSULIN (H): ICD-10-CM

## 2025-07-31 DIAGNOSIS — N18.32 STAGE 3B CHRONIC KIDNEY DISEASE (H): ICD-10-CM

## 2025-07-31 DIAGNOSIS — E11.65 UNCONTROLLED TYPE 2 DIABETES MELLITUS WITH HYPERGLYCEMIA (H): ICD-10-CM

## 2025-07-31 DIAGNOSIS — E78.5 HYPERLIPIDEMIA, UNSPECIFIED: Primary | ICD-10-CM

## 2025-07-31 DIAGNOSIS — N40.1 BENIGN PROSTATIC HYPERPLASIA WITH URINARY FREQUENCY: ICD-10-CM

## 2025-07-31 LAB
CV ZIO PRELIM RESULTS: NORMAL
EST. AVERAGE GLUCOSE BLD GHB EST-MCNC: 220 MG/DL
HBA1C MFR BLD: 9.3 % (ref 0–5.6)
VIT B12 SERPL-MCNC: 576 PG/ML (ref 232–1245)

## 2025-07-31 RX ORDER — HYDROCHLOROTHIAZIDE 12.5 MG/1
CAPSULE ORAL
Qty: 6 EACH | Refills: 4 | Status: SHIPPED | OUTPATIENT
Start: 2025-07-31

## 2025-07-31 RX ORDER — DAPAGLIFLOZIN 10 MG/1
10 TABLET, FILM COATED ORAL DAILY
Qty: 90 TABLET | Refills: 1 | Status: SHIPPED | OUTPATIENT
Start: 2025-07-31

## 2025-07-31 NOTE — PATIENT INSTRUCTIONS
Stop metformin  Referral to podiatry  Referral to urology  Recommend daily BP checks - discuss with Nephology at upcoming appt.  Adjust long-acting insulin in 1-2 units increments to achieve fasting glucose at target of 150 mg/dL  Restart Farxiga  Switch to Rhonda 3+ when current supply of 3 is exhausted  Labs today  Recheck in 3 months with A1c completed at least 1 week prior to appointment

## 2025-07-31 NOTE — LETTER
7/31/2025      Nabil Cheung  [address]  [place] [address]      Dear Colleague,    Thank you for referring your patient, Nabil Cheung, to the [facility] [place]. Please see a copy of my visit note below.           Diabetes Consult Note  Jul 31, 2025      History of Present Illness     Nabil Cheung is a 65 year old male who is seen for Type 2 Diabetes Mellitus and multiple related complications.  He [place] [facility] [place].      Diabetes Disease Summary:     Diagnosed in approximately 2004, age 44 years.  Has tried a variety of treatments.  Finally went on insulin in 2020 in conjunction with open heart surgery.  Has tried Bydureon, noted appetite change and nauseated.  Symptoms increased over time and eventually discontinued.  Now on insulin, farxiga and insulin.    Nabil's PMH, PSH, allergies, pertinent social and family history were reviewed today and pertinent information is summarized below.    Problem List     Patient Active Problem List   Diagnosis     Diabetes mellitus without complication (H)     Essential (primary) hypertension     Surgical wound infection     S/P CABG (coronary artery bypass graft)     Non-healing surgical wound, subsequent encounter     Cataracts, bilateral     Chronic right shoulder pain     Elevated PSA, less than 10 ng/ml     Epididymoorchitis     Erectile dysfunction associated with type 2 diabetes mellitus (H)     Microalbuminuria     Myopia of both eyes with astigmatism and presbyopia     Retrograde ejaculation     Thyroid nodule     Uncontrolled type 2 diabetes mellitus with hyperglycemia (H)     Cellulitis and abscess of foot     Proliferative diabetic retinopathy of both eyes associated with diabetes mellitus due to underlying condition (H)     Cellulitis and abscess of foot, except toes     Stage 3b chronic kidney disease (H)     History of amputation of lesser toe of left foot     Acquired absence of  other left toe(s)     Allergic rhinitis     Anemia, unspecified     Chronic cough     Encounter for orthopedic aftercare following surgical amputation     Enterococcus as the cause of diseases classified elsewhere     Hyperlipidemia, unspecified     Long term (current) use of insulin (H)     Long term (current) use of oral hypoglycemic drugs     Other staphylococcus as the cause of diseases classified elsewhere     Benign prostatic hyperplasia without lower urinary tract symptoms     Coronary arteriosclerosis due to lipid rich plaque     Type 2 diabetes mellitus with unspecified diabetic retinopathy without macular edema (H)     Moderate persistent asthma without complication     History of amputation of lower extremity associated with diabetes mellitus (H)     Heart failure with preserved ejection fraction, NYHA class I (H)     Subclinical hypothyroidism     Atrial flutter (H)     Bilateral carotid artery stenosis     NAVI (obstructive sleep apnea)        Medical / Surgical History     No past medical history on file.  Past Surgical History:   Procedure Laterality Date     AMPUTATE TOE(S) Right 8/17/2021    Procedure: left partial fifth ray amputation;  Surgeon: Mirian Cline DPM, Podiatry/Foot and Ankle Surgery;  Location: [facility] OR     GRAFT FLAP PEDICLE TRAM DELAY PROCEDURE Left 6/18/2020    Procedure: Debridement of sternal wound and left major pectoral flep;  Surgeon: MARCO Serna MD;  Location: [facility] OR     INCISION AND DRAINAGE CHEST WASHOUT, COMBINED N/A 6/12/2020    Procedure: INCISION AND DRAINAGE, WOUND, CHEST, WITH IRRIGATION and wound vac change;  Surgeon: Mark Mckinney MD;  Location: [facility] OR     REPAIR CHEST WALL N/A 6/18/2020    Procedure: Chest wall reconstruction;  Surgeon: MARCO Serna MD;  Location: [facility] OR       Social History   Fully retired, previously  and software, political work, non-profit work.  Hobbies - fantasy baseball league, travel trailer and  starting to travel.    Social History     Socioeconomic History     Marital status: Single     Spouse name: Not on file     Number of children: Not on file     Years of education: Not on file     Highest education level: Not on file   Occupational History     Not on file   Tobacco Use     Smoking status: Never     Passive exposure: Never     Smokeless tobacco: Never   Vaping Use     Vaping status: Never Used   Substance and Sexual Activity     Alcohol use: Yes     Drug use: No     Sexual activity: Not on file   Other Topics Concern     Not on file   Social History Narrative    6/29/17 - Pt was interested in getting help filling out [employer] application online to receive medicaid. CHW gave him the list of information he needed to complete online application. He stated he had a tax extension for 2016 so he did not have tax forms that the application required. He was going to work on the application with tax information from 2015 later tonight. AM        7/27/17:    Pt wanted to get more information about gyms that are low-cost within the [facility]. Pt's asked for a gym with a membership fee of $10-$20 (max), that also had a walking track and a hot tub/sauna. We told him that options that fit these requirements he mentioned were very limited. He then stated that he heard that the [facility] offered a $5 fee for track-usage only, however, we could not call [facility] since it was closed. We suggested to the patient to call tomorrow and also mentioned there are gyms that are low-cost nearby, however, might not have a track or hot tub/sauna. We also suggested that he could walk around a lake would also be a cheap-alternative. -HB and NV        8/28/17:    Pt was here for a med refill. We asked him about his gym membership and access based on CHW's previous encounter with him and he said he tried to walk outside more this summer, but still did not find an affordable option. He did not have need for any other services at the  time.     - AA and KT         9/28/17:    Pt had questions about free/low cost (under $10/month) walking tracks.  Found a website to walk through with the patient and shared different resources. - AB and HB        7/19/18: Pt. Was not seen tonight.- ZR, KP     Social Drivers of Health     Financial Resource Strain: Low Risk  (12/1/2023)    Financial Resource Strain      Within the past 12 months, have you or your family members you live with been unable to get utilities (heat, electricity) when it was really needed?: No   Food Insecurity: Low Risk  (12/1/2023)    Food Insecurity      Within the past 12 months, did you worry that your food would run out before you got money to buy more?: No      Within the past 12 months, did the food you bought just not last and you didn t have money to get more?: No   Transportation Needs: Low Risk  (12/1/2023)    Transportation Needs      Within the past 12 months, has lack of transportation kept you from medical appointments, getting your medicines, non-medical meetings or appointments, work, or from getting things that you need?: No   Physical Activity: Not on file   Stress: Not on file   Social Connections: Not on file   Interpersonal Safety: Low Risk  (4/9/2025)    Interpersonal Safety      Do you feel physically and emotionally safe where you currently live?: Yes      Within the past 12 months, have you been hit, slapped, kicked or otherwise physically hurt by someone?: No      Within the past 12 months, have you been humiliated or emotionally abused in other ways by your partner or ex-partner?: No   Housing Stability: Low Risk  (12/1/2023)    Housing Stability      Do you have housing? : Yes      Are you worried about losing your housing?: No       Family History     Family History   Problem Relation Age of Onset     Sleep Apnea Brother        Most recent A1c:    Recent Labs   Lab Test 04/09/25  1154 07/30/24  1103   A1C 10.0* 8.9*        Diabetes Complications:  "    Microvascular:  Eye:  last eye exam: seeing every 6 weeks for retinopathy treatments    Neuro:  paresthesias: yes, mostly feet but progressing up legs and some burning sensation in hands. Does perform own foot exams and nail but requesting podiatry referral to assist with nail care.   vitamin B12:No components found for: \"[id number]\"  Renal:  Most recent GFR data:   GFR Estimate   Date Value Ref Range Status   04/09/2025 31 (L) >60 mL/min/1.73m2 Final     Comment:     eGFR calculated using 2021 CKD-EPI equation.   09/17/2024 34 (L) >60 mL/min/1.73m2 Final     Comment:     eGFR calculated using 2021 CKD-EPI equation.   08/13/2024 28 (L) >60 mL/min/1.73m2 Final     Comment:     eGFR calculated using 2021 CKD-EPI equation.   01/09/2021 55 (L) >60 mL/min/[1.73_m2] Final     Comment:     Non  GFR Calc  Starting 12/18/2018, serum creatinine based estimated GFR (eGFR) will be   calculated using the Chronic Kidney Disease Epidemiology Collaboration   (CKD-EPI) equation.     01/08/2021 59 (L) >60 mL/min/[1.73_m2] Final     Comment:     Non  GFR Calc  Starting 12/18/2018, serum creatinine based estimated GFR (eGFR) will be   calculated using the Chronic Kidney Disease Epidemiology Collaboration   (CKD-EPI) equation.     07/28/2020 76 >60 mL/min/[1.73_m2] Final     Comment:     Non  GFR Calc  Starting 12/18/2018, serum creatinine based estimated GFR (eGFR) will be   calculated using the Chronic Kidney Disease Epidemiology Collaboration   (CKD-EPI) equation.     nephropathy yes, microalbuminuria yes ,   on ACE-I/ARB:  valsartan (Diovan)  Macrovascular  Macrovascular:  history of CAD, CVA or PAD: yes , smoking/tobacco use: no  On lipid-lowering med(s):  rosuvastatin (Crestor)  On ASA:  both ASA (3 times/week) plus Eliquis  BP at goal:   Most recent blood pressure reading   07/31/25 (!) 179/92   Endocrine/Other:  Thyroid:   TSH   Date Value Ref Range Status   04/25/2025 2.38 " 0.30 - 4.20 uIU/mL Final   09/25/2017 2.79 0.40 - 4.00 mU/L Final   Sleep Apnea: yes, On CPAP/BiPAP:  has not yet filled prescription for machine  Liver: FIB-4 Calculation: 1.41 at 7/30/2024 11:03 AM  Dental: hasn't seen in a couple of year  Mood/depression: generally good, has a good network of friends  Immunizations: does receive routine vaccines  ED: yes     Diabetes Management:     -Medication Management: Lantus,  units and Glargine  units, Farxiga (ran out of Rx 5 days ago and waiting to fill)  -Hypoglycemic, DKA and/or hospitalizations related to diabetes: none  -Physical activity: sedentary; tries limited walking (0.5 mile goal)  -Weight history: weight has been variable over the years (230's during [id number]), has been down to low of 185 lbs. But increased with use of insulin.  Averaging 225-230 lb. presently  -Diet/snacking: usually eating 2 meals/day, often skips breakfast or eats very light, bedtime snack, water, milk and diet beverages for fluid  -Alcohol/other substance use: rare  -Glucose monitoring: CGM:  Freestyle Rhonda        Review of Systems:    Complete 10 point review of systems is negative except for what mentioned above.    Allergies     Allergies   Allergen Reactions     Atorvastatin      Bactrim [Sulfamethoxazole-Trimethoprim]      Kidney issues     Dust Mites      Fluorescein Nausea and Vomiting       Medications     Current Outpatient Medications   Medication Sig Dispense Refill     acetaminophen (TYLENOL) 325 MG tablet Take 650 mg by mouth every 6 hours as needed for mild pain (Patient taking differently: Take 650 mg by mouth 2 times daily.)       alpha-lipoic acid 600 MG capsule Take 600 mg by mouth daily        apixaban ANTICOAGULANT (ELIQUIS ANTICOAGULANT) 5 MG tablet Take 1 tablet (5 mg) by mouth 2 times daily 180 tablet 3     aspirin 81 MG EC tablet Take 81 mg by mouth daily. 3 times a week       budesonide-formoterol (SYMBICORT) 160-4.5 MCG/ACT Inhaler Inhale 2 puffs into the lungs  2 times daily as needed.       carvedilol (COREG) 12.5 MG tablet Take 1 tablet (12.5 mg) by mouth 2 times daily (with meals)       Continuous Glucose Sensor (FREESTYLE JULIO 3 PLUS SENSOR) MISC Change every 15 days. 6 each 4     FARXIGA 10 MG TABS tablet Take 1 tablet (10 mg) by mouth daily. 90 tablet 1     finasteride (PROSCAR) 5 MG tablet Take 5 mg by mouth daily       furosemide (LASIX) 40 MG tablet Take 1 tablet (40 mg) by mouth daily 30 tablet 11     insulin aspart (NOVOLOG PEN) 100 UNIT/ML pen Inject 1-7 Units Subcutaneous 3 times daily (before meals) Correction Scale - MEDIUM INSULIN RESISTANCE DOSING     Do Not give Correction Insulin if Pre-Meal BG less than 140.   For Pre-Meal  - 189 give 1 unit.   For Pre-Meal  - 239 give 2 units.   For Pre-Meal  - 289 give 3 units.   For Pre-Meal  - 339 give 4 units.   For Pre-Meal - 399 give 5 units.   For Pre-Meal -449 give 6 units  For Pre-Meal BG greater than or equal to 450 give 7 units.   To be given with prandial insulin, and based on pre-meal blood glucose.    Notify provider if glucose greater than or equal to 350 mg/dL after administration of correction dose. If given at mealtime, administer within 30 minutes of start of meal       insulin glargine (LANTUS PEN) 100 UNIT/ML pen Inject 44 Units subcutaneously at bedtime. 30 mL 1     insulin pen needle (31G X 5 MM) 31G X 5 MM miscellaneous Use 4 pen needles daily or as directed. 400 each 3     LANsoprazole (PREVACID) 30 MG DR capsule Take 30 mg by mouth as needed.       levothyroxine (SYNTHROID/LEVOTHROID) 50 MCG tablet Take 1 tablet (50 mcg) by mouth daily. 90 tablet 3     LYUMJEV KWIKPEN 100 UNIT/ML SOPN        magnesium oxide 400 MG CAPS Take by mouth daily       nitroGLYcerin (NITROSTAT) 0.4 MG sublingual tablet Place 0.4 mg under the tongue every 5 minutes as needed for chest pain For chest pain place 1 tablet under the tongue every 5 minutes for 3 doses. If symptoms  "persist 5 minutes after 1st dose call 911.       rosuvastatin (CRESTOR) 40 MG tablet Take 20 mg by mouth daily Evening        terazosin (HYTRIN) 2 MG capsule TAKE 1 CAPSULE BY MOUTH AT BEDTIME 90 capsule 3     valsartan (DIOVAN) 160 MG tablet Take 160 mg by mouth 2 times daily       vitamin C (ASCORBIC ACID) 1000 MG TABS Take 500 mg by mouth daily.       vitamin D3 (CHOLECALCIFEROL) 2000 units (50 mcg) tablet Take 1 tablet by mouth daily. 5000 iu       zinc gluconate 50 MG tablet Take 25 mg by mouth daily       coenzyme Q-10 200 MG CAPS capsule Take 1 capsule by mouth daily as needed. (Patient not taking: Reported on 7/31/2025)       No current facility-administered medications for this visit.        Physical Exam:     BP (!) 179/92 (BP Location: Right arm, Patient Position: Sitting, Cuff Size: Adult Large)   Pulse 62   Wt 107.1 kg (236 lb 3.2 oz)   SpO2 99%   BMI 32.94 kg/m    Wt Readings from Last 10 Encounters:   07/31/25 107.1 kg (236 lb 3.2 oz)   07/01/25 106.6 kg (235 lb)   05/08/25 103.9 kg (229 lb 1.6 oz)   04/25/25 103.4 kg (228 lb)   04/09/25 102.1 kg (225 lb)   01/06/25 102.1 kg (225 lb)   10/08/24 104.8 kg (231 lb)   08/05/24 104.3 kg (230 lb)   07/30/24 105.9 kg (233 lb 6.4 oz)   07/16/24 105.8 kg (233 lb 3.2 oz)     Estimated body mass index is 32.94 kg/m  as calculated from the following:    Height as of 7/1/25: 1.803 m (5' 11\").    Weight as of this encounter: 107.1 kg (236 lb 3.2 oz).    General: Pleasant, well nourished and hydrated male in NAD.   Psych:  Mood is \"good,\" affect is appropriate.  Thought form and content are fluid and coherent.    HEENT: Eyes and sclera are clear. No lid lag, retraction. Extraocular movements are grossly intact without proptosis.  Nares are patent, mucous membranes moist. Dentition: intact.  Neck: Supple, no LAD.  No masses or JVD are noted. Thyromegaly: no nodules palpable, no tenderness  Resp: Easy and unlabored breathing, no wheezing, crepitus or rhonci. "   Neuro: Alert and oriented, communicating clearly. Grossly intact, no focal deficits   Physical Exam  Cardiovascular:      Pulses:           Dorsalis pedis pulses are 1+ on the right side and 1+ on the left side.        Posterior tibial pulses are 1+ on the right side and 1+ on the left side.   Feet:      Right foot:      Protective Sensation: 8 sites tested.  0 sites sensed.      Skin integrity: Skin integrity normal.      Left foot:      Protective Sensation: 8 sites tested.  0 sites sensed.      Skin integrity: Skin integrity normal.      Comments: Missing 5th toe on left foot.        Labs:       Recent Labs   Lab Test 04/25/25  1234 04/25/25  1209 04/09/25  1154 09/17/24  1021 08/13/24  1442 07/30/24  1210 07/30/24  1103 07/16/24  1117 12/01/23  0937 12/01/23  0937 05/27/23  1546 05/27/23  1546   A1C  --   --  10.0*  --   --   --  8.9*  --   --  9.5*   < >  --    TSH  --  2.38  --   --  1.97  --   --  8.07*   < >  --   --   --    T4  --   --   --   --   --   --   --  0.94  --   --   --   --    CHOL  --  151  --   --   --   --   --   --   --  160  --   --    LDL  --  55  --   --   --   --   --   --   --  69  --   --    HDL  --  35*  --   --   --   --   --   --   --  38*  --   --    TRIG  --  304*  --   --   --   --   --   --   --  266*  --   --    NA  --   --  133* 136 133*  --  138 138  --  137  --  137   CHLORIDE  --   --  98 103 96*  --  102 103  --  102   < > 105   CO2  --   --  24 23 25  --  26 26  --  23   < > 27   ALBUMIN  --   --   --  3.6 3.9  --   --  3.8  --  3.9   < > 3.2*   CR  --   --  2.28* 2.09* 2.46*  --  2.09* 2.15*  --  1.80*  --  1.57*   MICROL 713.0  --   --   --   --  1,312.0  --   --   --  1,555.0   < >  --    AST  --   --   --   --   --   --   --  22  --   --   --  20   ALT  --   --   --   --   --   --   --  20  --   --   --  26   HGB  --   --   --   --   --   --  12.5* 12.6*  --   --   --  11.1*    < > = values in this interval not displayed.       Lab Results   Component Value Date     GFR Estimate 31 04/09/2025    GFR Estimate 34 09/17/2024    GFR Estimate 28 08/13/2024    GFR Estimate 34 07/30/2024    GFR Estimate 33 07/16/2024    GFR Estimate 55 01/09/2021    GFR Estimate 59 01/08/2021    GFR Estimate 76 07/28/2020    GFR Estimate 62 06/21/2020    GFR Estimate 63 06/20/2020     Lab Results   Component Value Date    Albumin Urine mg/g Cr 1,289.33 04/25/2025    Albumin Urine mg/g Cr 2,122.98 07/30/2024    Albumin Urine mg/g Cr 1,946.18 12/01/2023    Albumin Urine mg/g Cr 956.79 06/29/2017     FIB-4 Calculation: 1.41 at 7/30/2024 11:03 AM  (Calculated from: SGOT/AST: 22 U/L, SGPT/ALT: 20 U/L, Platelets: 230 10e3/uL   Age: 64 years)    Assessment & Recommendations:         ICD-10-CM    1. Uncontrolled type 2 diabetes mellitus with hyperglycemia (H)  E11.65 Hemoglobin A1c     Hemoglobin A1c     Orthopedic  Referral     Vitamin B12     Adult Urology  Referral     FARXIGA 10 MG TABS tablet     Continuous Glucose Sensor (FREESTYLE JULIO 3 PLUS SENSOR) MISC     insulin glargine (LANTUS PEN) 100 UNIT/ML pen     insulin pen needle (31G X 5 MM) 31G X 5 MM miscellaneous      2. Type 2 diabetes mellitus with both eyes affected by proliferative retinopathy without macular edema, without long-term current use of insulin (H)  E11.3593 Adult Endocrinology  Referral      3. Benign prostatic hyperplasia with urinary frequency  N40.1 FARXIGA 10 MG TABS tablet    R35.0         Reviewed Nabil's current diabetes management plan in conjunction with their AGP/glucometer report and discussed opportunities for improved glycemic control. Overall, glucose numbers are elevated, even moreso now that he ran out of Farxiga.  Discussed need to resume and adjust his insulin accordingly to bring down his overall average.    Stop metformin  Referral to podiatry  Referral to urology  Recommend daily BP checks - discuss with Nephology at upcoming appt.  Adjust long-acting insulin in 1-2 units increments to  achieve fasting glucose at target of 150 mg/dL  Restart Farxiga  Switch to Rhonda 3+ when current supply of 3 is exhausted  Labs today  Recheck in 3 months with A1c completed at least 1 week prior to appointment    -Complication Management:  Microvascular: evidence of neuropathy, nephropathy and retinopathy present  Macrovascular: evidence of CVD, non-smoker, on statin  Endocrine/Other:  discussed preventive vaccines and routine dental cleaning    Patient understands and agrees with the above plan.     70 minutes spent on the date of the encounter doing chart review, history and exam, documentation, education and counseling, as well as communication and coordination of care, and further activities as noted above.  This time excludes time spent reviewing CGM.    It is my privilege to be involved in the care of the above patient.     Nelly Dean PA-C, MPAS, MPH  [facility]        Diabetes, Endocrinology, and Metabolism      [phone] Appointments        This note was completed in part using Dragon voice recognition, and may contain word and grammatical errors.    Again, thank you for allowing me to participate in the care of your patient.        Sincerely,        Charlotte Dean PA-C    Electronically signed Dressing: bandage

## 2025-08-02 LAB — CV ZIO PRELIM RESULTS: NORMAL

## 2025-08-04 ENCOUNTER — PATIENT OUTREACH (OUTPATIENT)
Dept: CARE COORDINATION | Facility: CLINIC | Age: 65
End: 2025-08-04
Payer: MEDICARE

## 2025-08-13 DIAGNOSIS — I50.30 HEART FAILURE WITH PRESERVED EJECTION FRACTION, NYHA CLASS I (H): ICD-10-CM

## 2025-08-13 RX ORDER — FUROSEMIDE 40 MG/1
40 TABLET ORAL DAILY
Qty: 30 TABLET | Refills: 2 | Status: SHIPPED | OUTPATIENT
Start: 2025-08-13

## 2025-08-20 ENCOUNTER — PATIENT OUTREACH (OUTPATIENT)
Dept: NEPHROLOGY | Facility: CLINIC | Age: 65
End: 2025-08-20
Payer: MEDICARE

## 2025-08-20 DIAGNOSIS — E55.9 VITAMIN D DEFICIENCY: ICD-10-CM

## 2025-08-20 DIAGNOSIS — N18.32 STAGE 3B CHRONIC KIDNEY DISEASE (H): Primary | ICD-10-CM

## 2025-08-23 ENCOUNTER — LAB (OUTPATIENT)
Dept: LAB | Facility: CLINIC | Age: 65
End: 2025-08-23
Payer: MEDICARE

## 2025-08-23 DIAGNOSIS — E78.5 HYPERLIPIDEMIA, UNSPECIFIED: ICD-10-CM

## 2025-08-23 DIAGNOSIS — N18.32 STAGE 3B CHRONIC KIDNEY DISEASE (H): ICD-10-CM

## 2025-08-23 DIAGNOSIS — E55.9 VITAMIN D DEFICIENCY: ICD-10-CM

## 2025-08-23 DIAGNOSIS — D64.9 ANEMIA, UNSPECIFIED: ICD-10-CM

## 2025-08-23 DIAGNOSIS — E87.5 HYPERKALEMIA: ICD-10-CM

## 2025-08-23 LAB
ALBUMIN MFR UR ELPH: 317 MG/DL
ALBUMIN SERPL BCG-MCNC: 4 G/DL (ref 3.5–5.2)
ALBUMIN UR-MCNC: >=300 MG/DL
ALT SERPL W P-5'-P-CCNC: 25 U/L (ref 0–70)
ANION GAP SERPL CALCULATED.3IONS-SCNC: 10 MMOL/L (ref 7–15)
APPEARANCE UR: CLEAR
BACTERIA #/AREA URNS HPF: ABNORMAL /HPF
BILIRUB UR QL STRIP: NEGATIVE
BUN SERPL-MCNC: 40.9 MG/DL (ref 8–23)
CALCIUM SERPL-MCNC: 9.1 MG/DL (ref 8.8–10.4)
CHLORIDE SERPL-SCNC: 100 MMOL/L (ref 98–107)
COLOR UR AUTO: YELLOW
CREAT SERPL-MCNC: 2.45 MG/DL (ref 0.67–1.17)
CREAT UR-MCNC: 73.9 MG/DL
CREAT UR-MCNC: 73.9 MG/DL
EGFRCR SERPLBLD CKD-EPI 2021: 28 ML/MIN/1.73M2
ERYTHROCYTE [DISTWIDTH] IN BLOOD BY AUTOMATED COUNT: 13.3 % (ref 10–15)
GLUCOSE SERPL-MCNC: 209 MG/DL (ref 70–99)
GLUCOSE UR STRIP-MCNC: >=1000 MG/DL
HCO3 SERPL-SCNC: 24 MMOL/L (ref 22–29)
HCT VFR BLD AUTO: 38.7 % (ref 40–53)
HGB BLD-MCNC: 13.5 G/DL (ref 13.3–17.7)
HGB UR QL STRIP: ABNORMAL
KETONES UR STRIP-MCNC: NEGATIVE MG/DL
LEUKOCYTE ESTERASE UR QL STRIP: NEGATIVE
MCH RBC QN AUTO: 28.2 PG (ref 26.5–33)
MCHC RBC AUTO-ENTMCNC: 34.9 G/DL (ref 31.5–36.5)
MCV RBC AUTO: 80.8 FL (ref 78–100)
MICROALBUMIN UR-MCNC: 1871 MG/L
MICROALBUMIN/CREAT UR: 2531.8 MG/G CR (ref 0–17)
NITRATE UR QL: NEGATIVE
PH UR STRIP: 5.5 [PH] (ref 5–8)
PHOSPHATE SERPL-MCNC: 4.5 MG/DL (ref 2.5–4.5)
PLATELET # BLD AUTO: 241 10E3/UL (ref 150–450)
POTASSIUM SERPL-SCNC: 4.2 MMOL/L (ref 3.4–5.3)
PROT/CREAT 24H UR: 4.29 MG/MG CR (ref 0–0.2)
PTH-INTACT SERPL-MCNC: 168 PG/ML (ref 15–65)
RBC # BLD AUTO: 4.79 10E6/UL (ref 4.4–5.9)
RBC #/AREA URNS AUTO: ABNORMAL /HPF
SODIUM SERPL-SCNC: 134 MMOL/L (ref 135–145)
SP GR UR STRIP: 1.02 (ref 1–1.03)
SQUAMOUS #/AREA URNS AUTO: ABNORMAL /LPF
UROBILINOGEN UR STRIP-ACNC: 0.2 E.U./DL
VIT D+METAB SERPL-MCNC: 36 NG/ML (ref 20–50)
WBC # BLD AUTO: 11.4 10E3/UL (ref 4–11)
WBC #/AREA URNS AUTO: ABNORMAL /HPF

## 2025-08-23 PROCEDURE — 84156 ASSAY OF PROTEIN URINE: CPT

## 2025-08-23 PROCEDURE — 81001 URINALYSIS AUTO W/SCOPE: CPT

## 2025-08-23 PROCEDURE — 84460 ALANINE AMINO (ALT) (SGPT): CPT

## 2025-08-23 PROCEDURE — 82306 VITAMIN D 25 HYDROXY: CPT

## 2025-08-23 PROCEDURE — 83970 ASSAY OF PARATHORMONE: CPT

## 2025-08-23 PROCEDURE — 80069 RENAL FUNCTION PANEL: CPT

## 2025-08-23 PROCEDURE — 82043 UR ALBUMIN QUANTITATIVE: CPT

## 2025-08-23 PROCEDURE — 85027 COMPLETE CBC AUTOMATED: CPT

## 2025-08-23 PROCEDURE — 82570 ASSAY OF URINE CREATININE: CPT

## 2025-08-23 PROCEDURE — 36415 COLL VENOUS BLD VENIPUNCTURE: CPT

## 2025-08-26 ENCOUNTER — OFFICE VISIT (OUTPATIENT)
Dept: NEPHROLOGY | Facility: CLINIC | Age: 65
End: 2025-08-26
Attending: STUDENT IN AN ORGANIZED HEALTH CARE EDUCATION/TRAINING PROGRAM
Payer: MEDICARE

## 2025-08-26 ENCOUNTER — PATIENT OUTREACH (OUTPATIENT)
Dept: NEPHROLOGY | Facility: CLINIC | Age: 65
End: 2025-08-26

## 2025-08-26 VITALS
OXYGEN SATURATION: 97 % | BODY MASS INDEX: 31.38 KG/M2 | DIASTOLIC BLOOD PRESSURE: 80 MMHG | SYSTOLIC BLOOD PRESSURE: 165 MMHG | HEART RATE: 77 BPM | WEIGHT: 225 LBS

## 2025-08-26 DIAGNOSIS — N18.4 STAGE 4 CHRONIC KIDNEY DISEASE (H): Primary | ICD-10-CM

## 2025-08-26 DIAGNOSIS — R80.9 MICROALBUMINURIA: ICD-10-CM

## 2025-08-26 DIAGNOSIS — N18.32 STAGE 3B CHRONIC KIDNEY DISEASE (H): ICD-10-CM

## 2025-08-26 PROCEDURE — 3077F SYST BP >= 140 MM HG: CPT | Performed by: INTERNAL MEDICINE

## 2025-08-26 PROCEDURE — 3079F DIAST BP 80-89 MM HG: CPT | Performed by: INTERNAL MEDICINE

## 2025-08-26 PROCEDURE — 1126F AMNT PAIN NOTED NONE PRSNT: CPT | Performed by: INTERNAL MEDICINE

## 2025-08-26 PROCEDURE — 99204 OFFICE O/P NEW MOD 45 MIN: CPT | Performed by: INTERNAL MEDICINE

## 2025-08-26 ASSESSMENT — PAIN SCALES - GENERAL: PAINLEVEL_OUTOF10: NO PAIN (0)

## 2025-08-27 ENCOUNTER — PATIENT OUTREACH (OUTPATIENT)
Dept: CARE COORDINATION | Facility: CLINIC | Age: 65
End: 2025-08-27
Payer: MEDICARE

## (undated) DEVICE — WIPE PREMOIST CLEANSING WASHCLOTHS 7988

## (undated) DEVICE — APPLICATORS COTTON TIP 6"X2 STERILE LF C15053-006

## (undated) DEVICE — PREP SKIN SCRUB TRAY 4461A

## (undated) DEVICE — SU SILK 0 TIE 6X30" A306H

## (undated) DEVICE — SPONGE BALL KERLIX ROUND XL W/O STRING LATEX 4935

## (undated) DEVICE — SYR BULB IRRIG 50ML LATEX FREE 0035280

## (undated) DEVICE — PREP DURAPREP 26ML APL 8630

## (undated) DEVICE — Device

## (undated) DEVICE — SU VICRYL 4-0 PS-2 18" UND J496H

## (undated) DEVICE — SOL WATER IRRIG 1000ML BOTTLE 2F7114

## (undated) DEVICE — SU ETHILON 3-0 PS-1 18" 1663H

## (undated) DEVICE — DRAIN JACKSON PRATT CHANNEL 15FR ROUND HUBLESS SIL JP-2228

## (undated) DEVICE — ESU PENCIL W/HOLSTER E2350H

## (undated) DEVICE — NDL 25GA 1.5" 305127

## (undated) DEVICE — GLOVE PROTEXIS W/NEU-THERA 7.0  2D73TE70

## (undated) DEVICE — SUCTION TIP YANKAUER STR K87

## (undated) DEVICE — BLADE CLIPPER SGL USE 9680

## (undated) DEVICE — DRSG TEGADERM 8X12" 1629

## (undated) DEVICE — DRSG WOUND VAC SPONGE MED BLACK M8275052/5

## (undated) DEVICE — ESU ELEC BLADE 6" COATED E1450-6

## (undated) DEVICE — APPLICATOR COTTON TIP 6"X2 STERILE LF 6012

## (undated) DEVICE — SOL NACL 0.9% INJ 1000ML BAG 07983-09

## (undated) DEVICE — PREP POVIDONE IODINE SOLUTION 10% 4OZ

## (undated) DEVICE — DRSG KERLIX 4 1/2"X4YDS ROLL 6715

## (undated) DEVICE — LINEN TOWEL PACK X6 WHITE 5487

## (undated) DEVICE — GLOVE PROTEXIS POWDER FREE 6.5 ORTHOPEDIC 2D73ET65

## (undated) DEVICE — BLADE KNIFE SURG 15 371115

## (undated) DEVICE — DRAPE STERI TOWEL LG 1010

## (undated) DEVICE — DRSG KERLIX FLUFFS X5

## (undated) DEVICE — DRAPE U SPLIT 74X120" 29440

## (undated) DEVICE — SUCTION SLEEVE NEPTUNE 2 165MM 0703-005-165

## (undated) DEVICE — ESU ELEC BLADE 6" COATED/INSULATED E1455-6

## (undated) DEVICE — SU PROLENE 3-0 PS-2 18" 8687H

## (undated) DEVICE — SOL NACL 0.9% IRRIG 500ML BOTTLE 2F7123

## (undated) DEVICE — DRSG ADAPTIC 3X8" 6113

## (undated) DEVICE — CLIP HORIZON MED BLUE 002200

## (undated) DEVICE — LIGHT HANDLE X1 31140133

## (undated) DEVICE — SU VICRYL 3-0 FS-1 27" J442H

## (undated) DEVICE — DECANTER BAG 2002S

## (undated) DEVICE — BLADE SAW SAGITTAL 25.5X9.5X.4MM FINE LINVATEC 5023-138

## (undated) DEVICE — GLOVE PROTEXIS BLUE W/NEU-THERA 6.5  2D73EB65

## (undated) DEVICE — SOL NACL 0.9% 10ML VIAL 0409-4888-02

## (undated) DEVICE — TEST TUBE W/SCREW CAP 17361

## (undated) DEVICE — LINEN TOWEL PACK X30 5481

## (undated) DEVICE — SU PDS II 0 CT-1 27" Z340H

## (undated) DEVICE — CAST PADDING 4" STERILE 9044S

## (undated) DEVICE — PHOTON GUIDE INVUITY WIDE FLAT 104015

## (undated) DEVICE — BLADE KNIFE SURG 10 371110

## (undated) DEVICE — CATH TRAY FOLEY SURESTEP 16FR W/URNE MTR STLK LATEX A303316A

## (undated) DEVICE — SUCTION TIP YANKAUER W/O VENT K86

## (undated) DEVICE — SPONGE LAP 18X18" X8435

## (undated) DEVICE — STPL SKIN 35W ROTATING HEAD PRW35

## (undated) DEVICE — PACK EXTREMITY SOP15EXFSD

## (undated) DEVICE — DRSG MEDIPORE 3 1/2X13 3/4" 3573

## (undated) DEVICE — ESU GROUND PAD E7506

## (undated) DEVICE — DEFIB PRO-PADZ LVP LQD GEL ADULT 8900-2105-01

## (undated) DEVICE — ESU ELEC BLADE 2.75" COATED/INSULATED E1455

## (undated) DEVICE — SU MONOCRYL 2-0 SH 27" UND Y417H

## (undated) DEVICE — SU ETHIBOND 0 CT-1 CR 8X18" CX21D

## (undated) DEVICE — CLIP HORIZON SM RED WIDE SLOT 001201

## (undated) DEVICE — PROTECTOR ARM ONE-STEP TRENDELENBURG 40418

## (undated) DEVICE — PREP CHLORAPREP 26ML TINTED ORANGE  260815

## (undated) DEVICE — ESU PENCIL SMOKE EVAC W/ROCKER SWITCH 0703-047-000

## (undated) DEVICE — SYR 10ML LL W/O NDL 302995

## (undated) DEVICE — GEL ULTRASOUND AQUASONIC 20GM 01-01

## (undated) DEVICE — HEMOSTAT ABSORBABLE AGENT ARISTA 3GM POWDER SM0002-USA

## (undated) DEVICE — TUBE CULTURE AEROBIC/ANAEROBIC W/O SWABS A.C.T.I.1. 12401

## (undated) DEVICE — LINEN TOWEL PACK X5 5464

## (undated) DEVICE — SUCTION MANIFOLD DORNOCH ULTRA CART UL-CL500

## (undated) DEVICE — CAST PADDING 6" STERILE 9046S

## (undated) DEVICE — GLOVE SENSICARE 7.5 MSG1075 LATEX FREE

## (undated) DEVICE — PREP POVIDONE IODINE SCRUB 7.5% 4OZ APL82212

## (undated) DEVICE — ESU GROUND PAD ADULT W/CORD E7507

## (undated) DEVICE — ESU GROUND PAD UNIVERSAL W/O CORD

## (undated) DEVICE — DRAPE IOBAN INCISE 23X17" 6650EZ

## (undated) DEVICE — WIPES FOLEY CARE SURESTEP PROVON DFC100

## (undated) DEVICE — CANISTER WOUND VAC W/GEL 1000ML M8275093/5

## (undated) DEVICE — DRAIN JACKSON PRATT RESERVOIR 100ML SU130-1305

## (undated) DEVICE — DRAPE SHEET REV FOLD 3/4 9349

## (undated) DEVICE — SYR 10ML SLIP TIP W/O NDL 303134

## (undated) RX ORDER — ONDANSETRON 2 MG/ML
INJECTION INTRAMUSCULAR; INTRAVENOUS
Status: DISPENSED
Start: 2021-08-17

## (undated) RX ORDER — HYDROMORPHONE HYDROCHLORIDE 1 MG/ML
INJECTION, SOLUTION INTRAMUSCULAR; INTRAVENOUS; SUBCUTANEOUS
Status: DISPENSED
Start: 2020-06-18

## (undated) RX ORDER — PIPERACILLIN SODIUM, TAZOBACTAM SODIUM 3; .375 G/15ML; G/15ML
INJECTION, POWDER, LYOPHILIZED, FOR SOLUTION INTRAVENOUS
Status: DISPENSED
Start: 2021-08-17

## (undated) RX ORDER — LIDOCAINE HYDROCHLORIDE 10 MG/ML
INJECTION, SOLUTION EPIDURAL; INFILTRATION; INTRACAUDAL; PERINEURAL
Status: DISPENSED
Start: 2020-06-15

## (undated) RX ORDER — ONDANSETRON 2 MG/ML
INJECTION INTRAMUSCULAR; INTRAVENOUS
Status: DISPENSED
Start: 2020-06-18

## (undated) RX ORDER — CEFAZOLIN SODIUM 1 G/3ML
INJECTION, POWDER, FOR SOLUTION INTRAMUSCULAR; INTRAVENOUS
Status: DISPENSED
Start: 2020-06-12

## (undated) RX ORDER — PAPAVERINE HYDROCHLORIDE 30 MG/ML
INJECTION INTRAMUSCULAR; INTRAVENOUS
Status: DISPENSED
Start: 2020-06-18

## (undated) RX ORDER — CEFAZOLIN SODIUM 2 G/100ML
INJECTION, SOLUTION INTRAVENOUS
Status: DISPENSED
Start: 2020-06-18

## (undated) RX ORDER — FENTANYL CITRATE 50 UG/ML
INJECTION, SOLUTION INTRAMUSCULAR; INTRAVENOUS
Status: DISPENSED
Start: 2020-06-18

## (undated) RX ORDER — PROPOFOL 10 MG/ML
INJECTION, EMULSION INTRAVENOUS
Status: DISPENSED
Start: 2020-06-18

## (undated) RX ORDER — LIDOCAINE HYDROCHLORIDE 20 MG/ML
INJECTION, SOLUTION EPIDURAL; INFILTRATION; INTRACAUDAL; PERINEURAL
Status: DISPENSED
Start: 2021-08-17

## (undated) RX ORDER — FENTANYL CITRATE 50 UG/ML
INJECTION, SOLUTION INTRAMUSCULAR; INTRAVENOUS
Status: DISPENSED
Start: 2020-06-12

## (undated) RX ORDER — CEFAZOLIN SODIUM 1 G/3ML
INJECTION, POWDER, FOR SOLUTION INTRAMUSCULAR; INTRAVENOUS
Status: DISPENSED
Start: 2020-06-18

## (undated) RX ORDER — PROPOFOL 10 MG/ML
INJECTION, EMULSION INTRAVENOUS
Status: DISPENSED
Start: 2021-08-17

## (undated) RX ORDER — LIDOCAINE HYDROCHLORIDE 20 MG/ML
INJECTION, SOLUTION EPIDURAL; INFILTRATION; INTRACAUDAL; PERINEURAL
Status: DISPENSED
Start: 2020-06-18

## (undated) RX ORDER — HEPARIN SODIUM 1000 [USP'U]/ML
INJECTION, SOLUTION INTRAVENOUS; SUBCUTANEOUS
Status: DISPENSED
Start: 2020-06-18

## (undated) RX ORDER — FENTANYL CITRATE 50 UG/ML
INJECTION, SOLUTION INTRAMUSCULAR; INTRAVENOUS
Status: DISPENSED
Start: 2021-08-17

## (undated) RX ORDER — ALBUMIN, HUMAN INJ 5% 5 %
SOLUTION INTRAVENOUS
Status: DISPENSED
Start: 2020-06-18

## (undated) RX ORDER — METOPROLOL TARTRATE 50 MG
TABLET ORAL
Status: DISPENSED
Start: 2020-06-12

## (undated) RX ORDER — GENTAMICIN 40 MG/ML
INJECTION, SOLUTION INTRAMUSCULAR; INTRAVENOUS
Status: DISPENSED
Start: 2020-06-18

## (undated) RX ORDER — DEXAMETHASONE SODIUM PHOSPHATE 4 MG/ML
INJECTION, SOLUTION INTRA-ARTICULAR; INTRALESIONAL; INTRAMUSCULAR; INTRAVENOUS; SOFT TISSUE
Status: DISPENSED
Start: 2021-08-17